# Patient Record
Sex: MALE | Race: WHITE | HISPANIC OR LATINO | Employment: UNEMPLOYED | URBAN - METROPOLITAN AREA
[De-identification: names, ages, dates, MRNs, and addresses within clinical notes are randomized per-mention and may not be internally consistent; named-entity substitution may affect disease eponyms.]

---

## 2017-03-03 DIAGNOSIS — N39.0 RECURRENT UTI: Primary | ICD-10-CM

## 2017-03-07 ENCOUNTER — OFFICE VISIT (OUTPATIENT)
Dept: UROLOGY | Facility: CLINIC | Age: 27
End: 2017-03-07
Payer: COMMERCIAL

## 2017-03-07 VITALS
WEIGHT: 245 LBS | HEART RATE: 80 BPM | BODY MASS INDEX: 29.83 KG/M2 | DIASTOLIC BLOOD PRESSURE: 86 MMHG | SYSTOLIC BLOOD PRESSURE: 138 MMHG | HEIGHT: 76 IN

## 2017-03-07 DIAGNOSIS — R30.0 DYSURIA: Primary | ICD-10-CM

## 2017-03-07 LAB
ALBUMIN UR-MCNC: NEGATIVE MG/DL
APPEARANCE UR: CLEAR
BILIRUB UR QL STRIP: NEGATIVE
COLOR UR AUTO: YELLOW
GLUCOSE UR STRIP-MCNC: NEGATIVE MG/DL
HGB UR QL STRIP: NEGATIVE
KETONES UR STRIP-MCNC: NEGATIVE MG/DL
LEUKOCYTE ESTERASE UR QL STRIP: NEGATIVE
NITRATE UR QL: NEGATIVE
PH UR STRIP: 6.5 PH (ref 5–7)
SP GR UR STRIP: 1.02 (ref 1–1.03)
URN SPEC COLLECT METH UR: NORMAL
UROBILINOGEN UR STRIP-ACNC: 0.2 EU/DL (ref 0.2–1)

## 2017-03-07 PROCEDURE — 87591 N.GONORRHOEAE DNA AMP PROB: CPT | Performed by: PHYSICIAN ASSISTANT

## 2017-03-07 PROCEDURE — 51798 US URINE CAPACITY MEASURE: CPT | Performed by: PHYSICIAN ASSISTANT

## 2017-03-07 PROCEDURE — 87491 CHLMYD TRACH DNA AMP PROBE: CPT | Performed by: PHYSICIAN ASSISTANT

## 2017-03-07 PROCEDURE — 81003 URINALYSIS AUTO W/O SCOPE: CPT | Performed by: PHYSICIAN ASSISTANT

## 2017-03-07 PROCEDURE — 99203 OFFICE O/P NEW LOW 30 MIN: CPT | Mod: 25 | Performed by: PHYSICIAN ASSISTANT

## 2017-03-07 RX ORDER — LEVOFLOXACIN 500 MG/1
500 TABLET, FILM COATED ORAL
COMMUNITY
Start: 2017-02-20 | End: 2020-08-27

## 2017-03-07 RX ORDER — FLUTICASONE PROPIONATE 50 MCG
2 SPRAY, SUSPENSION (ML) NASAL
COMMUNITY
Start: 2017-02-20 | End: 2020-11-04

## 2017-03-07 ASSESSMENT — PAIN SCALES - GENERAL: PAINLEVEL: NO PAIN (0)

## 2017-03-07 NOTE — LETTER
Patient:  Higinio Wallace  :   1990  MRN:     [id number]        Mr.Brian JOANNE Wallace  [address] [facility] [address]        2017      .Dear Mr. Wallace,  The results of your urine tests are attached and are negative. Please let me know if you have any questions or concerns.  Thank you,  Whit Gill PA-C  Urololgy    Resulted Orders   UA without Microscopic [[id number]]   Result Value Ref Range    Color Urine Yellow     Appearance Urine Clear     Glucose Urine Negative NEG mg/dL    Bilirubin Urine Negative NEG    Ketones Urine Negative NEG mg/dL    Specific Gravity Urine 1.025 1.003 - 1.035    Blood Urine Negative NEG    pH Urine 6.5 5.0 - 7.0 pH    Protein Albumin Urine Negative NEG mg/dL    Urobilinogen Urine 0.2 0.2 - 1.0 EU/dL    Nitrite Urine Negative NEG    Leukocyte Esterase Urine Negative NEG    Source Midstream Urine    NEISSERIA GONORRHOEA PCR   Result Value Ref Range    Specimen Descrip       Urine  CORRECTED ON  AT 0926: PREVIOUSLY REPORTED AS Midstream Urine      N Gonorrhea PCR  NEG     Negative   Negative for N. gonorrhoeae rRNA by transcription mediated amplification.   A negative result by transcription mediated amplification does not preclude the   presence of N. gonorrhoeae infection because results are dependent on proper   and adequate collection, absence of inhibitors, and sufficient rRNA to be   detected.     CHLAMYDIA TRACHOMATIS PCR   Result Value Ref Range    Specimen Description       Urine  CORRECTED ON  AT 0926: PREVIOUSLY REPORTED AS Midstream Urine      Chlamydia Trachomatis PCR  NEG     Negative   Negative for C. trachomatis rRNA by transcription mediated amplification.   A negative result by transcription mediated amplification does not preclude the   presence of C. trachomatis infection because results are dependent on proper   and adequate collection, absence of inhibitors, and sufficient rRNA to be   detected.                        [id number]  1990

## 2017-03-07 NOTE — LETTER
"3/7/2017       RE: Higinio Wallace  [address] [facility] [address]     Dear Colleague,    Thank you for referring your patient, Higinio Wallace, to the [facility] [place] at [facility]. Please see a copy of my visit note below.    UROLOGY OFFICE VISIT    March 7, 2017    CC: burning with urination    HPI: It is a pleasure to see Mr. Higinio Wallace, a very pleasant 26 year old male who presents via self-referral for evaluation of the above. He states this has been ongoing off and on since he was a young child. The burning will come on suddenly, last for a few days, and then resolves on it's own. He is uncircumcised and recently became sexually active with his current girlfriend who he is in a monogamous relationship with. Has not been checked for sexually transmitted diseases but does not have concern for any. He does not have a primary physician. He tells me his brother, who was also uncircumcised, had similar issues with burning with urination which got worse after he became sexually active. He apparently met with a urologist who recommended circumcision which his brother then had performed and he has not had burning with urination since then. The patient is interested in discussing elective circumcision - this is his only complaint today. He denies gross hematuria, frequency, urgency, pyuria, or penile discharge.     Past Medical History   Diagnosis Date     Complication of anesthesia        Past Surgical History   Procedure Laterality Date     Knee surgery Left         Allergies   Allergen Reactions     Liquid Adhesive      And band aid     No family history on file.     ROS: 10 point ROS neg other than the symptoms noted above in the HPI.    PEx  /86 (BP Location: Left arm, Patient Position: Chair, Cuff Size: Adult Regular)  Pulse 80  Ht 1.93 m (6' 4\")  Wt 111.1 kg (245 lb)  BMI 29.82 kg/m2  GEN: well-appearing male in NAD  HEENT: " normocephalic, atraumatic  RESP: no increased respiratory effort  ABD: soft, NT, ND  : patient declines today - states he will wait for follow up appt with urologist  EXT: no LE edema  NEURO: A/O x 3  PSYCH: Pleasant and cooperative throughout history and exam    LABS: Urine dip today completely negative    ASSESSMENT:  1) Chronic intermittent dysuria - low suspicion for true UTI's as symptoms resolve spontaneously, but no previous labs to confirm. UA today completley negative. Suspect he may have intermittent balanitis which may cause some burning with urination. As he has never been screened for STD's, also need to check these to rule out.     PLAN:  -Send urine for gonorrhea, chlamydia  -Discussed good hygiene with patient including daily cleaning of the foreskin, penis. We also discussed that his chronic intermittent dysuria may be related to intermittent balanitis which can be managed with good hygiene and topical antifungal creams PRN.  -Also discussed that a circumcision would not be guaranteed to relieve his intermittent dysuria. Regardless, circumcision worked well for his brother with similar complaints so patient wishes to consult with one of our urologists for possible elective circumcision.  -Schedule consult visit with next available urologist.   -Patient to look into procedural costs, insurance coverage, etc. in the meantime.    15 minutes were spent with the patient, >50% in counseling and coordination of care.    Whit Gill PA-C  Urology    Again, thank you for allowing me to participate in the care of your patient.      Sincerely,    Whit Gill PA-C

## 2017-03-07 NOTE — LETTER
Date:March 8, 2017      Patient was self referred, no letter generated. Do not send.        [facility] Health Information

## 2017-03-07 NOTE — MR AVS SNAPSHOT
"              After Visit Summary   3/7/2017    Higinio Wallace    MRN: [id number]           Patient Information     Date Of Birth          1990        Visit Information        Provider Department      3/7/2017 11:00 AM Whit Gill PA-C [facility] [place]        Today's Diagnoses     Dysuria    -  1       Follow-ups after your visit        Your next 10 appointments already scheduled     Mar 28, 2017  8:50 AM CDT   Return Visit with Sergo Estrada MD   [facility] ([facility] [place])    [address]  [address]  [facility] [id number] [phone]              Who to contact     If you have questions or need follow up information about today's clinic visit or your schedule please contact [facility] directly at [phone].  Normal or non-critical lab and imaging results will be communicated to you by MyChart, letter or phone within 4 business days after the clinic has received the results. If you do not hear from us within 7 days, please contact the clinic through [employer] or phone. If you have a critical or abnormal lab result, we will notify you by phone as soon as possible.  Submit refill requests through [employer] or call your pharmacy and they will forward the refill request to us. Please allow 3 business days for your refill to be completed.          Additional Information About Your Visit        MyChart Information     [employer] lets you send messages to your doctor, view your test results, renew your prescriptions, schedule appointments and more. To sign up, go to [email or web address] . Click on \"Log in\" on the left side of the screen, which will take you to the Welcome page. Then click on \"Sign up Now\" on the right side of the page.     You will be asked to enter the access code listed below, as well as some personal information. Please follow the directions to " "create your username and password.     Your access code is: [email or web address]  Expires: 2017 11:58 AM     Your access code will  in 90 days. If you need help or a new code, please call your [place] clinic or [phone].        Care EveryWhere ID     This is your Care EveryWhere ID. This could be used by other organizations to access your [place] medical records  [id number]        Your Vitals Were     Pulse Height BMI (Body Mass Index)             80 1.93 m (6' 4\") 29.82 kg/m2          Blood Pressure from Last 3 Encounters:   17 138/86    Weight from Last 3 Encounters:   17 111.1 kg (245 lb)              We Performed the Following     CHLAMYDIA TRACHOMATIS PCR     MEASURE POST-VOID RESIDUAL URINE/BLADDER CAPACITY, US NON-IMAGING (61122)     NEISSERIA GONORRHOEA PCR     UA without Microscopic [[id number]]        Primary Care Provider    None       No address on file        Thank you!     Thank you for choosing [facility] [place]  for your care. Our goal is always to provide you with excellent care. Hearing back from our patients is one way we can continue to improve our services. Please take a few minutes to complete the written survey that you may receive in the mail after your visit with us. Thank you!             Your Updated Medication List - Protect others around you: Learn how to safely use, store and throw away your medicines at www.disposemymeds.org.          This list is accurate as of: 3/7/17 11:58 AM.  Always use your most recent med list.                   Brand Name Dispense Instructions for use    fluticasone 50 MCG/ACT spray    FLONASE     2 sprays       levofloxacin 500 MG tablet    LEVAQUIN     Take 500 mg by mouth         "

## 2017-03-07 NOTE — PROGRESS NOTES
"UROLOGY OFFICE VISIT    March 7, 2017    CC: burning with urination    HPI: It is a pleasure to see Mr. Higinio Wallace, a very pleasant 26 year old male who presents via self-referral for evaluation of the above. He states this has been ongoing off and on since he was a young child. The burning will come on suddenly, last for a few days, and then resolves on it's own. He is uncircumcised and recently became sexually active with his current girlfriend who he is in a monogamous relationship with. Has not been checked for sexually transmitted diseases but does not have concern for any. He does not have a primary physician. He tells me his brother, who was also uncircumcised, had similar issues with burning with urination which got worse after he became sexually active. He apparently met with a urologist who recommended circumcision which his brother then had performed and he has not had burning with urination since then. The patient is interested in discussing elective circumcision - this is his only complaint today. He denies gross hematuria, frequency, urgency, pyuria, or penile discharge.     Past Medical History   Diagnosis Date     Complication of anesthesia        Past Surgical History   Procedure Laterality Date     Knee surgery Left         Allergies   Allergen Reactions     Liquid Adhesive      And band aid     No family history on file.     ROS: 10 point ROS neg other than the symptoms noted above in the HPI.    PEx  /86 (BP Location: Left arm, Patient Position: Chair, Cuff Size: Adult Regular)  Pulse 80  Ht 1.93 m (6' 4\")  Wt 111.1 kg (245 lb)  BMI 29.82 kg/m2  GEN: well-appearing male in NAD  HEENT: normocephalic, atraumatic  RESP: no increased respiratory effort  ABD: soft, NT, ND  : patient declines today - states he will wait for follow up appt with urologist  EXT: no LE edema  NEURO: A/O x 3  PSYCH: Pleasant and cooperative throughout history and exam    LABS: Urine dip today completely " negative    ASSESSMENT:  1) Chronic intermittent dysuria - low suspicion for true UTI's as symptoms resolve spontaneously, but no previous labs to confirm. UA today completley negative. Suspect he may have intermittent balanitis which may cause some burning with urination. As he has never been screened for STD's, also need to check these to rule out.     PLAN:  -Send urine for gonorrhea, chlamydia  -Discussed good hygiene with patient including daily cleaning of the foreskin, penis. We also discussed that his chronic intermittent dysuria may be related to intermittent balanitis which can be managed with good hygiene and topical antifungal creams PRN.  -Also discussed that a circumcision would not be guaranteed to relieve his intermittent dysuria. Regardless, circumcision worked well for his brother with similar complaints so patient wishes to consult with one of our urologists for possible elective circumcision.  -Schedule consult visit with next available urologist.   -Patient to look into procedural costs, insurance coverage, etc. in the meantime.    15 minutes were spent with the patient, >50% in counseling and coordination of care.    Whit Gill PA-C  Urology

## 2017-03-08 LAB
C TRACH DNA SPEC QL NAA+PROBE: NORMAL
N GONORRHOEA DNA SPEC QL NAA+PROBE: NORMAL
SPECIMEN SOURCE: NORMAL
SPECIMEN SOURCE: NORMAL

## 2017-06-25 ENCOUNTER — HOSPITAL ENCOUNTER (EMERGENCY)
Facility: CLINIC | Age: 27
Discharge: HOME | End: 2017-06-25
Attending: EMERGENCY MEDICINE | Admitting: EMERGENCY MEDICINE
Payer: COMMERCIAL

## 2017-06-25 ENCOUNTER — APPOINTMENT (OUTPATIENT)
Dept: GENERAL RADIOLOGY | Facility: CLINIC | Age: 27
End: 2017-06-25
Attending: EMERGENCY MEDICINE
Payer: COMMERCIAL

## 2017-06-25 VITALS
DIASTOLIC BLOOD PRESSURE: 84 MMHG | HEART RATE: 78 BPM | SYSTOLIC BLOOD PRESSURE: 133 MMHG | RESPIRATION RATE: 16 BRPM | TEMPERATURE: 97.8 F | OXYGEN SATURATION: 95 %

## 2017-06-25 DIAGNOSIS — R06.02 SHORTNESS OF BREATH: ICD-10-CM

## 2017-06-25 DIAGNOSIS — J06.9 UPPER RESPIRATORY TRACT INFECTION, UNSPECIFIED TYPE: ICD-10-CM

## 2017-06-25 DIAGNOSIS — J98.01 ACUTE BRONCHOSPASM: ICD-10-CM

## 2017-06-25 LAB — INTERPRETATION ECG - MUSE: NORMAL

## 2017-06-25 PROCEDURE — 99285 EMERGENCY DEPT VISIT HI MDM: CPT | Mod: 25

## 2017-06-25 PROCEDURE — 71020 XR CHEST 2 VW: CPT

## 2017-06-25 PROCEDURE — 40000275 ZZH STATISTIC RCP TIME EA 10 MIN

## 2017-06-25 PROCEDURE — 25000125 ZZHC RX 250: Performed by: EMERGENCY MEDICINE

## 2017-06-25 PROCEDURE — 94640 AIRWAY INHALATION TREATMENT: CPT

## 2017-06-25 PROCEDURE — 93005 ELECTROCARDIOGRAM TRACING: CPT

## 2017-06-25 RX ORDER — ALBUTEROL SULFATE 90 UG/1
2 AEROSOL, METERED RESPIRATORY (INHALATION) EVERY 4 HOURS PRN
Qty: 1 INHALER | Refills: 0 | Status: SHIPPED | OUTPATIENT
Start: 2017-06-25 | End: 2022-07-12

## 2017-06-25 RX ORDER — IPRATROPIUM BROMIDE AND ALBUTEROL SULFATE 2.5; .5 MG/3ML; MG/3ML
3 SOLUTION RESPIRATORY (INHALATION)
Status: DISCONTINUED | OUTPATIENT
Start: 2017-06-25 | End: 2017-06-25 | Stop reason: HOSPADM

## 2017-06-25 RX ORDER — PREDNISONE 20 MG/1
20 TABLET ORAL ONCE
Status: COMPLETED | OUTPATIENT
Start: 2017-06-25 | End: 2017-06-25

## 2017-06-25 RX ORDER — PREDNISONE 20 MG/1
40 TABLET ORAL DAILY
Qty: 10 TABLET | Refills: 0 | Status: SHIPPED | OUTPATIENT
Start: 2017-06-25 | End: 2017-06-30

## 2017-06-25 RX ADMIN — IPRATROPIUM BROMIDE AND ALBUTEROL SULFATE 3 ML: .5; 3 SOLUTION RESPIRATORY (INHALATION) at 07:54

## 2017-06-25 RX ADMIN — PREDNISONE 20 MG: 20 TABLET ORAL at 07:47

## 2017-06-25 NOTE — DISCHARGE INSTRUCTIONS
Discharge Instructions  Asthma    Asthma is a condition causing narrowing and inflammation of the airways that can make it hard to breathe.  Asthma can also cause cough, wheezing, noisy breathing and tightness in the chest.  Asthma can be brought on or  triggered  by many things, including dust, mold, pollen, cigarette smoke, exercise, stress and infections, like the common cold.     Return to the Emergency Department if:    Your breathing gets worse.    You need to use your inhaler more often than every 4 hours, or can t get relief from your inhaler.    You are very weak, or feel much more ill.    You develop new symptoms, such as chest pain.    You cough up blood.    You are vomiting enough that you can t keep fluids or your medicine down.    What can I do to help myself?    Fill any prescriptions the doctor gave you and take them right away--especially antibiotics. Be sure to finish the whole antibiotic prescription.    You may be given a prescription for an inhaler, which can help loosen tight air passages.  Use this as needed, but not more often than directed. Inhalers work much better when used with a spacer.     You may be given a prescription for a steroid to reduce inflammation. Used long-term, these can have many serious side effects, but for short courses these do not happen. You may notice restlessness or increased appetite.        You may use non-prescription cough or cold medicines. Cough medicines may help, but don t make the cough go away completely.     Avoid smoke, because this can make your symptoms worse. If you smoke, this may be a good time to quit! Consider using nicotine lozenges, gum, or patches to reduce cravings.     If you have a fever, Tylenol  (acetaminophen), Motrin  (ibuprofen), or Advil  (ibuprofen), may help bring fever down and may help you feel more comfortable. Be sure to read and follow the package directions, and ask your doctor if you have questions.    Be sure to get your  flu shot each year.  The pneumonia shot can help prevent pneumonia.  It is important that you follow up with your regular doctor, to be sure that you are improving from this spell (an acute asthma exacerbation), and also to do what you can to keep from having trouble again. Sometimes you need long-term medicines to keep your asthma under control.   If you were given a prescription for medicine here today, be sure to read all of the information (including the package insert) that comes with your prescription.  This will include important information about the medicine, its side effects, and any warnings that you need to know about.  The pharmacist who fills the prescription can provide more information and answer questions you may have about the medicine.  If you have questions or concerns that the pharmacist cannot address, please call or return to the Emergency Department.   Opioid Medication Information    Pain medications are among the most commonly prescribed medicines, so we are including this information for all our patients. If you did not receive pain medication or get a prescription for pain medicine, you can ignore it.     You may have been given a prescription for an opioid (narcotic) pain medicine and/or have received a pain medicine while here in the Emergency Department. These medicines can make you drowsy or impaired. You must not drive, operate dangerous equipment, or engage in any other dangerous activities while taking these medications. If you drive while taking these medications, you could be arrested for DUI, or driving under the influence. Do not drink any alcohol while you are taking these medications.     Opioid pain medications can cause addiction. If you have a history of chemical dependency of any type, you are at a higher risk of becoming addicted to pain medications.  Only take these prescribed medications to treat your pain when all other options have been tried. Take it for as short a  time and as few doses as possible. Store your pain pills in a secure place, as they are frequently stolen and provide a dangerous opportunity for children or visitors in your house to start abusing these powerful medications. We will not replace any lost or stolen medicine.  As soon as your pain is better, you should flush all your remaining medication.     Many prescription pain medications contain Tylenol  (acetaminophen), including Vicodin , Tylenol #3 , Norco , Lortab , and Percocet .  You should not take any extra pills of Tylenol  if you are using these prescription medications or you can get very sick.  Do not ever take more than 3000 mg of acetaminophen in any 24 hour period.    All opioids tend to cause constipation. Drink plenty of water and eat foods that have a lot of fiber, such as fruits, vegetables, prune juice, apple juice and high fiber cereal.  Take a laxative if you don t move your bowels at least every other day. Miralax , Milk of Magnesia, Colace , or Senna  can be used to keep you regular.      Remember that you can always come back to the Emergency Department if you are not able to see your regular doctor in the amount of time listed above, if you get any new symptoms, or if there is anything that worries you.

## 2017-06-25 NOTE — ED PROVIDER NOTES
PRIMARY CARE PHYSICIAN:  [facility].       CHIEF COMPLAINT:  Shortness of breath and fatigue.         HISTORY OF PRESENT ILLNESS:  Higinio Wallace is a 26-year-old male who reports that on 06/15/2017, he developed signs to suggest pneumonia.  He went in to see a physician and was diagnosed via chest x-ray with pneumonia, started on a 7-day course of Levaquin.  The patient said at that time he was having productive cough, fevers, chills, shortness of breath.  Those symptoms had improved, he still continued to have some coughing.  His roommate noticed today that he was having a coughing fit, the patient said that he became lightheaded, fatigued with that, had shortness of breath, also a flushed feeling in his chest, but no overt chest tightness or pain; and therefore presented to the ER.  Reports 5/10 discomfort because his breathing is doing better and his roommate heard audible wheezing.  He completed the course of antibiotics on 06/20/2017.  He says as a child he had a history of asthma, but has had no recent episodes.      MEDICATIONS:  Flonase, finished Levaquin recently.      ALLERGIES:  Liquid adhesives and Band-Aids.      PAST MEDICAL HISTORY:  History of knee surgery with an anesthetic complication and remote asthma.        SOCIAL HISTORY:  The patient does smoke, denies alcohol use.      REVIEW OF SYSTEMS:  Ten-point review of systems is all negative except as in the HPI.      PHYSICAL EXAMINATION:   GENERAL:  The patient is a pleasant, appropriate 26-year-old male who appears mildly uncomfortable on the gurney, but no respiratory distress.   VITAL SIGNS:  Temperature 97.8, blood pressure 133/78, heart rate 70, respirations 16, satting 95% on room air.   NEUROLOGIC:  GCS of 15, he has good strength in all extremities.   HEAD AND NECK:  Mucous membranes are moist.   LYMPHATICS:  There is no cervical lymphadenopathy.   CARDIOVASCULAR:  Regular rate and rhythm.   RESPIRATORY:  He has got  wheezing throughout with good air movement, no respiratory distress.  Speaks in full sentences.   GASTROINTESTINAL:  Abdomen is soft, nontender.   SKIN:  Cool, pink and dry.   HEME:  No signs of any bruising or bleeding.   PSYCHIATRIC:  The patient is not tearful.   MUSCULOSKELETAL:  No gross deformity.       LABORATORY AND DIAGNOSTICS:  The EKG demonstrates a normal sinus rhythm.  No specific ST abnormality.  Ventricular rate 82, , QRS 92 and QTC of 448.  His chest x-ray was clear without any signs of infiltrate, nor pneumothorax as read by the radiologist.      EMERGENCY DEPARTMENT COURSE AND DECISION MAKING:  The patient presented complaining of a flushed feeling in his chest; I did consider cardiac causes, though unlikely and things such as arrhythmia; therefore a screening EKG was performed which was reassuring.  The patient had wheezing, which I felt likely indicated asthma, improved with nebs. I considered nonresolution of the pneumonia and therefore a chest x-ray was ordered, but the patient showed no significant distress.  The patient had nebs, he completely cleared.  He was rechecked at 0900 and was feeling much better.  I discussed labs would not likely be helpful and he is doing better, this is likely bronchospasm.  I am not sure if this is recurrence of the child asthma or secondary to the recent illness, but he was discharged home in improved condition with no respiratory distress and resolved wheezing.        PLAN:  He was referred to a primary care doctor.  He will follow up with your doctor in 3 days.  Return if any significant problems.       DIAGNOSES:   1.  Shortness of breath.   2.  Acute bronchospasm.   3.  Upper respiratory infection.         RANDY LANDAVERDE MD             D: 2017 09:21   T: 2017 10:08   MT: [id number]      Name:     NIKKY ISAAC   MRN:      [id number]        Account:      [id number]   :      1990           Visit Date:   2017       Document: [id number]       cc: [facility]

## 2017-06-25 NOTE — ED AVS SNAPSHOT
[facility] Emergency Department    [address]    [facility] [address]    Phone:  [phone]    Fax:  [phone]                                       Higinio Wallace   MRN: [id number]    Department:  [facility] Emergency Department   Date of Visit:  6/25/2017           Patient Information     Date Of Birth          1990        Your diagnoses for this visit were:     Shortness of breath     Acute bronchospasm     Upper respiratory tract infection, unspecified type        You were seen by Gera Cortez MD.      Follow-up Information     Follow up with [facility]. Schedule an appointment as soon as possible for a visit in 3 days.    Specialty:  Family Medicine    Contact information:    [address] [facility] [address] [phone]        Discharge Instructions         Discharge Instructions  Asthma    Asthma is a condition causing narrowing and inflammation of the airways that can make it hard to breathe.  Asthma can also cause cough, wheezing, noisy breathing and tightness in the chest.  Asthma can be brought on or  triggered  by many things, including dust, mold, pollen, cigarette smoke, exercise, stress and infections, like the common cold.     Return to the Emergency Department if:    Your breathing gets worse.    You need to use your inhaler more often than every 4 hours, or can t get relief from your inhaler.    You are very weak, or feel much more ill.    You develop new symptoms, such as chest pain.    You cough up blood.    You are vomiting enough that you can t keep fluids or your medicine down.    What can I do to help myself?    Fill any prescriptions the doctor gave you and take them right away--especially antibiotics. Be sure to finish the whole antibiotic prescription.    You may be given a prescription for an inhaler, which can help loosen tight air passages.  Use this as needed, but not more often than  directed. Inhalers work much better when used with a spacer.     You may be given a prescription for a steroid to reduce inflammation. Used long-term, these can have many serious side effects, but for short courses these do not happen. You may notice restlessness or increased appetite.        You may use non-prescription cough or cold medicines. Cough medicines may help, but don t make the cough go away completely.     Avoid smoke, because this can make your symptoms worse. If you smoke, this may be a good time to quit! Consider using nicotine lozenges, gum, or patches to reduce cravings.     If you have a fever, Tylenol  (acetaminophen), Motrin  (ibuprofen), or Advil  (ibuprofen), may help bring fever down and may help you feel more comfortable. Be sure to read and follow the package directions, and ask your doctor if you have questions.    Be sure to get your flu shot each year.  The pneumonia shot can help prevent pneumonia.  It is important that you follow up with your regular doctor, to be sure that you are improving from this spell (an acute asthma exacerbation), and also to do what you can to keep from having trouble again. Sometimes you need long-term medicines to keep your asthma under control.   If you were given a prescription for medicine here today, be sure to read all of the information (including the package insert) that comes with your prescription.  This will include important information about the medicine, its side effects, and any warnings that you need to know about.  The pharmacist who fills the prescription can provide more information and answer questions you may have about the medicine.  If you have questions or concerns that the pharmacist cannot address, please call or return to the Emergency Department.   Opioid Medication Information    Pain medications are among the most commonly prescribed medicines, so we are including this information for all our patients. If you did not receive pain  medication or get a prescription for pain medicine, you can ignore it.     You may have been given a prescription for an opioid (narcotic) pain medicine and/or have received a pain medicine while here in the Emergency Department. These medicines can make you drowsy or impaired. You must not drive, operate dangerous equipment, or engage in any other dangerous activities while taking these medications. If you drive while taking these medications, you could be arrested for DUI, or driving under the influence. Do not drink any alcohol while you are taking these medications.     Opioid pain medications can cause addiction. If you have a history of chemical dependency of any type, you are at a higher risk of becoming addicted to pain medications.  Only take these prescribed medications to treat your pain when all other options have been tried. Take it for as short a time and as few doses as possible. Store your pain pills in a secure place, as they are frequently stolen and provide a dangerous opportunity for children or visitors in your house to start abusing these powerful medications. We will not replace any lost or stolen medicine.  As soon as your pain is better, you should flush all your remaining medication.     Many prescription pain medications contain Tylenol  (acetaminophen), including Vicodin , Tylenol #3 , Norco , Lortab , and Percocet .  You should not take any extra pills of Tylenol  if you are using these prescription medications or you can get very sick.  Do not ever take more than 3000 mg of acetaminophen in any 24 hour period.    All opioids tend to cause constipation. Drink plenty of water and eat foods that have a lot of fiber, such as fruits, vegetables, prune juice, apple juice and high fiber cereal.  Take a laxative if you don t move your bowels at least every other day. Miralax , Milk of Magnesia, Colace , or Senna  can be used to keep you regular.      Remember that you can always come back to  the Emergency Department if you are not able to see your regular doctor in the amount of time listed above, if you get any new symptoms, or if there is anything that worries you.        24 Hour Appointment Hotline       To make an appointment at any [facility] call [phone] ([phone]). If you don't have a family doctor or clinic, we will help you find one. [facility] are conveniently located to serve the needs of you and your family.             Review of your medicines      START taking        Dose / Directions Last dose taken    albuterol 108 (90 BASE) MCG/ACT Inhaler   Commonly known as:  albuterol   Dose:  2 puff   Quantity:  1 Inhaler        Inhale 2 puffs into the lungs every 4 hours as needed for shortness of breath / dyspnea or wheezing   Refills:  0        predniSONE 20 MG tablet   Commonly known as:  DELTASONE   Dose:  40 mg   Quantity:  10 tablet        Take 2 tablets (40 mg) by mouth daily for 5 days   Refills:  0          Our records show that you are taking the medicines listed below. If these are incorrect, please call your family doctor or clinic.        Dose / Directions Last dose taken    fluticasone 50 MCG/ACT spray   Commonly known as:  FLONASE   Dose:  2 spray        2 sprays   Refills:  0        levofloxacin 500 MG tablet   Commonly known as:  LEVAQUIN   Dose:  500 mg        Take 500 mg by mouth   Refills:  0                Prescriptions were sent or printed at these locations (2 Prescriptions)                   Other Prescriptions                Printed at Department/Unit printer (2 of 2)         predniSONE (DELTASONE) 20 MG tablet               albuterol (ALBUTEROL) 108 (90 BASE) MCG/ACT Inhaler                Procedures and tests performed during your visit     EKG 12-lead, tracing only    XR Chest 2 Views      Orders Needing Specimen Collection     None      Pending Results     Date and Time Order Name Status Description    6/25/2017 0740 EKG 12-lead, tracing only  Preliminary             Pending Culture Results     No orders found from 6/23/2017 to 6/26/2017.            Pending Results Instructions     If you had any lab results that were not finalized at the time of your Discharge, you can call the ED Lab Result RN at [phone]. You will be contacted by this team for any positive Lab results or changes in treatment. The nurses are available 7 days a week from 10A to 6:30P.  You can leave a message 24 hours per day and they will return your call.        Test Results From Your Hospital Stay        6/25/2017  8:24 AM      Narrative     XR CHEST 2 VW   6/25/2017 8:21 AM     HISTORY: Shortness of breath    COMPARISON: None.    FINDINGS: Heart size is normal. The lungs are clear.        Impression     IMPRESSION: Negative.    BRENDA CRESPO MD                Clinical Quality Measure: Blood Pressure Screening     Your blood pressure was checked while you were in the emergency department today. The last reading we obtained was  BP: 133/78 . Please read the guidelines below about what these numbers mean and what you should do about them.  If your systolic blood pressure (the top number) is less than 120 and your diastolic blood pressure (the bottom number) is less than 80, then your blood pressure is normal. There is nothing more that you need to do about it.  If your systolic blood pressure (the top number) is 120-139 or your diastolic blood pressure (the bottom number) is 80-89, your blood pressure may be higher than it should be. You should have your blood pressure rechecked within a year by a primary care provider.  If your systolic blood pressure (the top number) is 140 or greater or your diastolic blood pressure (the bottom number) is 90 or greater, you may have high blood pressure. High blood pressure is treatable, but if left untreated over time it can put you at risk for heart attack, stroke, or kidney failure. You should have your blood pressure rechecked by a primary  "care provider within the next 4 weeks.  If your provider in the emergency department today gave you specific instructions to follow-up with your doctor or provider even sooner than that, you should follow that instruction and not wait for up to 4 weeks for your follow-up visit.        Thank you for choosing [facility]       Thank you for choosing [facility] for your care. Our goal is always to provide you with excellent care. Hearing back from our patients is one way we can continue to improve our services. Please take a few minutes to complete the written survey that you may receive in the mail after you visit with us. Thank you!        FittingRoom Information     FittingRoom lets you send messages to your doctor, view your test results, renew your prescriptions, schedule appointments and more. To sign up, go to [email or web address] . Click on \"Log in\" on the left side of the screen, which will take you to the Welcome page. Then click on \"Sign up Now\" on the right side of the page.     You will be asked to enter the access code listed below, as well as some personal information. Please follow the directions to create your username and password.     Your access code is: [email or web address]  Expires: 2017  9:12 AM     Your access code will  in 90 days. If you need help or a new code, please call your [facility] clinic or [phone].        Care EveryWhere ID     This is your Care EveryWhere ID. This could be used by other organizations to access your [facility] medical records  [id number]        Equal Access to Services     GRECIA BAEZ : Hadii lou walker Sozach, waaxda lumikhail, qaybta kaalmada selvin wynn . So [facility] [phone].    ATENCIÓN: Si habla español, tiene a parry disposición servicios gratuitos de asistencia lingüística. Llame al [phone].    We comply with applicable federal civil rights laws and [place] laws. We do not discriminate on the basis of race, color, " national origin, age, disability sex, sexual orientation or gender identity.            After Visit Summary       This is your record. Keep this with you and show to your community pharmacist(s) and doctor(s) at your next visit.

## 2017-06-25 NOTE — ED NOTES
Pt states that he woke up with SOB, wheezing, epigastric discomfort, and nausea. He finished a course of antibiotics for pneumonia on 6/20/17. ABCDs intact.

## 2017-06-25 NOTE — LETTER
[facility] EMERGENCY DEPARTMENT  [address]  [facility] [address]  [phone]    Higinio Wallace  [address] [facility] [address]  [phone] (home) none (work)    : 1990      To Whom it may concern:    Higinio Wallace was seen in our Emergency Department today, 2017.  I expect his condition to improve over the next 2 days.  He may return to work/school when improved.    Sincerely,        Dalia Gates RN

## 2017-06-25 NOTE — ED AVS SNAPSHOT
[facility] Emergency Department    [address]    [facility] [address]    Phone:  [phone]    Fax:  [phone]                                       Higinio Wallace   MRN: [id number]    Department:  [facility] Emergency Department   Date of Visit:  6/25/2017           After Visit Summary Signature Page     I have received my discharge instructions, and my questions have been answered. I have discussed any challenges I see with this plan with the nurse or doctor.    ..........................................................................................................................................  Patient/Patient Representative Signature      ..........................................................................................................................................  Patient Representative Print Name and Relationship to Patient    ..................................................               ................................................  Date                                            Time    ..........................................................................................................................................  Reviewed by Signature/Title    ...................................................              ..............................................  Date                                                            Time

## 2018-07-05 ENCOUNTER — RECORDS - HEALTHEAST (OUTPATIENT)
Dept: ADMINISTRATIVE | Facility: OTHER | Age: 28
End: 2018-07-05

## 2019-07-29 ENCOUNTER — COMMUNICATION - HEALTHEAST (OUTPATIENT)
Dept: SCHEDULING | Facility: CLINIC | Age: 29
End: 2019-07-29

## 2019-08-14 ENCOUNTER — TRANSFERRED RECORDS (OUTPATIENT)
Dept: HEALTH INFORMATION MANAGEMENT | Facility: CLINIC | Age: 29
End: 2019-08-14

## 2019-08-21 ENCOUNTER — TRANSFERRED RECORDS (OUTPATIENT)
Dept: HEALTH INFORMATION MANAGEMENT | Facility: CLINIC | Age: 29
End: 2019-08-21

## 2019-09-14 ENCOUNTER — COMMUNICATION - HEALTHEAST (OUTPATIENT)
Dept: SCHEDULING | Facility: CLINIC | Age: 29
End: 2019-09-14

## 2019-12-04 ENCOUNTER — TRANSFERRED RECORDS (OUTPATIENT)
Dept: HEALTH INFORMATION MANAGEMENT | Facility: CLINIC | Age: 29
End: 2019-12-04

## 2019-12-13 ENCOUNTER — TRANSFERRED RECORDS (OUTPATIENT)
Dept: HEALTH INFORMATION MANAGEMENT | Facility: CLINIC | Age: 29
End: 2019-12-13

## 2020-01-06 NOTE — TELEPHONE ENCOUNTER
RECORDS RECEIVED FROM: Left knee issues (curved knee) due to failed ligament surgery of left leg, direct referral to Dr. Han from [facility], referral and records/x-rays being faxed, appt per Pt   DATE RECEIVED: Feb 4, 2020   NOTES STATUS DETAILS   OFFICE NOTE from referring provider RECEIVED  [facility]   OFFICE NOTE from other specialist N/A    DISCHARGE SUMMARY from hospital N/A    DISCHARGE REPORT from the ER N/A    OPERATIVE REPORT N/A    MEDICATION LIST Internal    IMPLANT RECORD/STICKER N/A    LABS     CBC/DIFF N/A    CULTURES N/A    INJECTIONS DONE IN RADIOLOGY N/A    MRI n/a    CT SCAN n/a    XRAYS (IMAGES & REPORTS) RECEIVED  12/04/2019   TUMOR     PATHOLOGY  Slides & report N/A      01/06/20   11:01 AM   Request faxed to [facility] for records and images of the left knee  Amparo Carvalho CMA

## 2020-01-30 DIAGNOSIS — M25.362 PATELLAR INSTABILITY OF LEFT KNEE: Primary | ICD-10-CM

## 2020-02-04 ENCOUNTER — PRE VISIT (OUTPATIENT)
Dept: ORTHOPEDICS | Facility: CLINIC | Age: 30
End: 2020-02-04

## 2020-02-07 ENCOUNTER — TELEPHONE (OUTPATIENT)
Dept: ORTHOPEDICS | Facility: CLINIC | Age: 30
End: 2020-02-07

## 2020-02-07 NOTE — TELEPHONE ENCOUNTER
Patient was called to reschedule his appointment with Dr. Han.  A message was NOT left as his mail box is full and unable to take any messages.      He can be rescheduled in the next available new knee slot with Dr. Han or he can be seen by Dr. Keating in sports if he would like to be seen sooner.

## 2020-03-22 ENCOUNTER — COMMUNICATION - HEALTHEAST (OUTPATIENT)
Dept: SCHEDULING | Facility: CLINIC | Age: 30
End: 2020-03-22

## 2020-03-31 ENCOUNTER — TELEPHONE (OUTPATIENT)
Dept: ORTHOPEDICS | Facility: CLINIC | Age: 30
End: 2020-03-31

## 2020-03-31 NOTE — TELEPHONE ENCOUNTER
Patient was called to reschedule his appointment to May or June with Dr. Han due to the COVID-19 virus.      A message was NOT left as his mail box is full and unable to take any messages.

## 2020-05-26 ENCOUNTER — ANCILLARY PROCEDURE (OUTPATIENT)
Dept: GENERAL RADIOLOGY | Facility: CLINIC | Age: 30
End: 2020-05-26
Attending: ORTHOPAEDIC SURGERY
Payer: COMMERCIAL

## 2020-05-26 ENCOUNTER — OFFICE VISIT (OUTPATIENT)
Dept: ORTHOPEDICS | Facility: CLINIC | Age: 30
End: 2020-05-26
Payer: COMMERCIAL

## 2020-05-26 ENCOUNTER — THERAPY VISIT (OUTPATIENT)
Dept: PHYSICAL THERAPY | Facility: CLINIC | Age: 30
End: 2020-05-26
Payer: COMMERCIAL

## 2020-05-26 VITALS — WEIGHT: 315 LBS | HEIGHT: 77 IN | BODY MASS INDEX: 37.19 KG/M2

## 2020-05-26 DIAGNOSIS — M25.562 PATELLOFEMORAL INSTABILITY OF LEFT KNEE WITH PAIN: ICD-10-CM

## 2020-05-26 DIAGNOSIS — M25.361 PATELLAR INSTABILITY OF RIGHT KNEE: ICD-10-CM

## 2020-05-26 DIAGNOSIS — M25.362 PATELLOFEMORAL INSTABILITY OF LEFT KNEE WITH PAIN: ICD-10-CM

## 2020-05-26 DIAGNOSIS — M25.561 KNEE PAIN, RIGHT: Primary | ICD-10-CM

## 2020-05-26 DIAGNOSIS — M25.362 PATELLAR INSTABILITY OF LEFT KNEE: ICD-10-CM

## 2020-05-26 DIAGNOSIS — M25.362 PATELLAR INSTABILITY OF LEFT KNEE: Primary | ICD-10-CM

## 2020-05-26 DIAGNOSIS — Z13.31 POSITIVE DEPRESSION SCREENING: Primary | ICD-10-CM

## 2020-05-26 PROCEDURE — 97112 NEUROMUSCULAR REEDUCATION: CPT | Mod: GP | Performed by: PHYSICAL THERAPIST

## 2020-05-26 PROCEDURE — 97161 PT EVAL LOW COMPLEX 20 MIN: CPT | Mod: GP | Performed by: PHYSICAL THERAPIST

## 2020-05-26 ASSESSMENT — MIFFLIN-ST. JEOR: SCORE: 2585.19

## 2020-05-26 ASSESSMENT — PATIENT HEALTH QUESTIONNAIRE - PHQ9: SUM OF ALL RESPONSES TO PHQ QUESTIONS 1-9: 17

## 2020-05-26 NOTE — PROGRESS NOTES
"[facility] Initial Evaluation  Subjective:  HPI                  PHYSICAL THERAPIST IMPRESSION: Patient presents to PF clinic with significant history of bilateral PF instability, L>R, with a previous history of MPFL-R in 2011.  Patient demonstrates a j-sign on the L. Patient is unable to perform single limb squatting activities on L secondary to apprehension.  He demonstrates relatively abundant quadriceps contraction in long sitting, but is unable to properly activate his quadriceps in early degrees of closed chain flexion secondary to apprehension.  Patient did much better with quadriceps activation at deeper angles of KF in [facility].  Del Real taping was helpful in improving his sense of stability.  Concern for limb version is also present.    RESPONSIVENESS TO DEL REAL TAPE:  Del Real Taping Trial    Pre/posttest activity squat   Taping technique(s) trialed Medial glide   Numerical Pain Scale Not painful today   Level of reported stability (0/10= full confidence in knee; 10/10=no confidence in knee) \"feels like support\", \"increase in stability\"   Improvement in movement pattern with tape on Increased depth        PT MODIFIABLE FACTORS PRESENT NOT PRESENT   Proximal LE/Trunk mm weakness  x   Quadriceps muscle dysfunction/weakness x    Poor postural stability x    Poor dynamic movement patterns x    Restricted ankle DF  x   Previous PF PT Interventions X?      PATIENT BONY ALIGNMENT SUSPICIOUS FOR: (to be determined by MD and imaging studies):    Limb version     Pes planus       Subjective History:      Question Response   Primary Complaint primarily instability symptoms   Onset date of current symptoms Initial onset at age 14 on L side.  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 He had  an MPFL-R in February 2011. It sounds like he dislocated his patella again within a year after surgery.  September 2017 dislocated R patella while playing basketball and child bumped into his " knee.  He has had recurrent instability events.  Now having more pain subpatellar.     History of similar/related symptoms See above   Previous treatment for condition PT    Symptoms with current condition Appropriate   Pain location, quality subpatellar, aching   Worst pain < > Best pain Worst: 7/10 < > Best: 3/10   Symptom exacerbation &   Functional limitations 3 worst activities: standing (Prior: no restrictions) , stairs (Prior: no restrictions)   Symptom relief rest   Symptom behavior activity/position dependent.    Symptom trend more often   Time of day dependent? Not related   Prior Diagnostic Testing x-ray and MRI   Prior Interventions PT  and Brace.          Lifestyle & General Medical History:  Employment: [employer].    General Physical Activity Level (within past year): standing 8 hour shifts.    General health status (as reported by patient): good.     Other orthopaedic history: s/p MPFL-R in 2011 on L.     Lower Extremity/Patellofemoral Exam    Dynamic Movement Screen:  2 leg stance: toed out L>R  2 leg squat: Excessive anterior knee excursion (reduced posterior hip excursion)    1 leg stance:   Right: proprioceptive challenge  Left: proprioceptive challenge    1 leg squat:   Right: excessive anterior knee excursion  Left: unable    Gait: tentative, increased stance time on L, toed out bilaterally    Functional Strength Testing   Right Left LSI%   Single Leg Squat for Depth 30 degrees                                    unable degrees 0 %   Star Excursion - Anterior Reach 25 cm unable cm 0 %     Patellofemoral Joint Special Testing:    Static Patellar Positioning in 90 degrees KF (Seated)  Right: patella garrett visually observed  Left: patella garrett visually observed    Patellar tracking with OKC knee extension (Seated)  Right: Normal       Left: Increased lateralization into TKE    Static Patellar Positioning in full extension (Supine)  Right: neutral      Left: moderate lateral tilt      Patellar  Quadrant Mobility Test (Med:Lat)  Right: 1:2       Left 1:3    Knee Joint AROM   Right Left Difference   Hyperextension 0 deg 0 deg 0 deg   Extension 0 deg 5 deg 5 deg   Flexion 125 deg 125 deg 0 deg     Basic Muscle Activation:  Quadriceps: Right: good, Left: good    Knee Joint Effusion (Stroke Test Assessment):  Right: 0; Left: 0    Palpation:   Tender to palpation at the following structures: patellar tendon    Hip Joint PROM Screen   Right Left   ER 80 deg 80 deg   IR 30 deg 40 deg   Flex 100 deg 100 deg     Lower Extremity Muscle Strength (x/5)   Right Left   Hip ABD 5-/5 5-/5     Lower Extremity Flexibility Screen:   Right Left   Gastroc/ Soleus + +         Objective:  System    Physical Exam    General     ROS    Assessment/Plan:    Patient is a 29 year old male with right side knee complaints.    Patient has the following significant findings with corresponding treatment plan.                Diagnosis 1:  S/p TTO, MPFL, MPTL    Decreased strength - therapeutic exercise and therapeutic activities  Impaired balance - neuro re-education and therapeutic activities  Decreased proprioception - neuro re-education and therapeutic activities  Impaired muscle performance - neuro re-education  Decreased function - therapeutic activities    Therapy Evaluation Codes:   1) History comprised of:   Personal factors that impact the plan of care:      None.    Comorbidity factors that impact the plan of care are:      None.     Medications impacting care: None.  2) Examination of Body Systems comprised of:   Body structures and functions that impact the plan of care:      Knee.   Activity limitations that impact the plan of care are:      Stairs.  3) Clinical presentation characteristics are:   Stable/Uncomplicated.  4) Decision-Making    Low complexity using standardized patient assessment instrument and/or measureable assessment of functional outcome.  Cumulative Therapy Evaluation is: Low complexity.    Previous and  current functional limitations:  (See Goal Flow Sheet for this information)    Short term and Long term goals: (See Goal Flow Sheet for this information)     Communication ability:  Patient appears to be able to clearly communicate and understand verbal and written communication and follow directions correctly.  Treatment Explanation - The following has been discussed with the patient:   RX ordered/plan of care  Anticipated outcomes  Possible risks and side effects  This patient would benefit from PT intervention to resume normal activities.   Rehab potential is excellent.    Frequency:  1 X week, once daily  Duration:  for 1 weeks  Discharge Plan:  Achieve all LTG.  Independent in home treatment program.  Reach maximal therapeutic benefit.    Please refer to the daily flowsheet for treatment today, total treatment time and time spent performing 1:1 timed codes.

## 2020-05-26 NOTE — LETTER
"    5/26/2020         RE: Higinio Wallace  [address] [place], [place] [id number]        Dear Colleague,    Thank you for referring your patient, Higinio Wallace, to the [facility]. Please see a copy of my visit note below.    Patient is a pleasant and articulate 29-year-old male who reports his own story.  He has been seen by 2 [facility] surgeons in [facility] and their notes are available for me to review.    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  He also reports some component of pain but that is not his primary issue.    He first dislocated his left knee when he was 14 years old.  He was able to describe this event fairly clearly.  He did have moderate knee swelling.  His father did not \"feel that the knee problem was an issue\" and never took him to the physician.    Following that he had intermittent patella instability throughout his late grammar school in early high school years.  Despite  These recurrent instability events he actually was a fairly talented athlete and played on 3 sports.    Because of continued instability he was operated on in 2011 at [facility] in [place].  From the incisions and his description it sounds like a left knee MPFL reconstruction using cadaver tissue.    He he reports that he had a few months where his kneecap did not dislocate, but in time began to dislocate with a similar frequency.  He was still living with his father at the time.  Because of the disappointment in his first surgery, the operating surgeon recommended a different operation or something to do with a stronger ligament but he and his father did not pursue that plan due to lack of physician confidence.    He reports that he has right Kneecap instability as well, first dislocating his right side at age 21.  This has been more easily controlled and he does not feel it is the current issue.  The pain in both knees comes and goes.    [place]    His current problem is that it now has a hop into the joint with every knee bend, and sometimes completely dislocates with pain and swelling following this.    He was last seen by Dr. Mena at [facility] and sent here.    He has mental health issues that he freely discusses.  He also admits that he has not shown appropriate attentiveness to following through on his doctor visits.  He was working through mental health facilities in [facility].  He reports that he was placed on a medication that made him gain a lot of weight.  He went off that medication.  He is no longer seeing that doctor as they have refused to see him due to his lack of follow-through with physician appointments.  He reports that when he was placed on this mood altering med he gained a lot of weight and that made him less inclined to want to continue to take it.  Before he started taking the med he reports a weight of 250 pounds.  His top weight was 348 when he went off the med.  He reports he is now at 325 (he was weighed at 328 pounds today).    More recently he is been under the care of her primary care physician through [employer].  He has been diagnosed as prediabetic.  His recent A1cwas 5.7.  He was placed on metformin.    He is not taking any mood altering drugs but feels that he is actually doing pretty well and not sure he needs it.  Although he does feel that he needs to be under the care of someone more consistently.    He currently is working even through the COVID world.  He works at [employer].      He lives with his girlfriend.  She works at a restaurant in the [employer] but is currently furloughed.    Physical exam reveals a large gentleman who moves around the room without an antalgic gait.  However he walks with an external rotated gait.  BMI computes to 39    Examination of patient's right knee reveals no knee swelling.  Mild hyperextension to 130 degrees of flexion.  Soft J sign with mild  crepitus with patella reentry into the groove.  Markedly positive apprehension sign.    Lateral patella translation could not be performed.  Medial patella translation is limited with 1 quadrant medial ability negative medial patella tilt test    Examination of patient's left knee reveals no knee swelling.  Similar range of motion to the right.  Hard J sign with relocation at 45 degrees with mild crepitus.  Passive patella mobility 3+ quadrant lateral mobility, 1+ medial mobility.    Bilateral hips show internal rotation to 40 degrees external rotation to 80.  There does not seem to be a suggestion of clinical tibial torsion.    X-rays were reviewed.  Standing alignment views show mild valgus measuring 1 degree right 2 degrees left.  There is rotation on the 2 plain film suggestive of version.    True lateral view shows minimal suprapatella spur with minimal boss.  C/D equals 1.07.  A tunnel is seen in the patella likely representing MPFL insertion points.  I see no sign of any bony tunnel on the lateral suggesting that this could have represented an MPFL repair.    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  Axial views show a located patella with a suggestion of lateral patella overload syndrome.    No advanced imaging is present.    Assessment: 1 bilateral instability with left >> right.  The left side has a significant J sign as well.  2.  Obesity  3.  Mental health concerns  4.  Prediabetes    Plan: This is a bit of an enigma as he has a very dramatic J sign on the left without a clear understanding of the etiology of this J sign.  He does not have significant trochlear dysplasia nor significant Secor.  Although his standing radiographs suggest the potential for increased femoral anteversion his physical exam does not support this.    I believe that a soft tissue procedure alone will not stabilize his kneecap and likely some bony procedure should be performed.    To that end I believe that  both an MRI and a CT scan for version should be obtained.    We will obtain these tests and a follow-up visit will be made.    Meanwhile we have placed a consult for behavioral health as he currently does not have a support system at that end.    Room time 45 minutes consultation time 30    Lela Han MD  Professor Orthopedic Surgery  [facility]

## 2020-05-26 NOTE — NURSING NOTE
Depression Response    Patient completed the PHQ-9 assessment for depression and scored >9? Yes  Question 9 on the PHQ-9 was positive for suicidality? Yes    I personally notified the following: clinic nurse

## 2020-05-26 NOTE — PROGRESS NOTES
"Patient is a pleasant and articulate 29-year-old male who reports his own story.  He has been seen by 2 [facility] surgeons in town and their notes are available for me to review.    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  He also reports some component of pain but that is not his primary issue.    He first dislocated his left knee when he was 14 years old.  He was able to describe this event fairly clearly.  He did have moderate knee swelling.  His father did not \"feel that the knee problem was an issue\" and never took him to the physician.    Following that he had intermittent patella instability throughout his late grammar school in early high school years.  Despite  These recurrent instability events he actually was a fairly talented athlete and played on 3 sports.    Because of continued instability he was operated on in 2011 at [facility] in [place].  From the incisions and his description it sounds like a left knee MPFL reconstruction using cadaver tissue.    He he reports that he had a few months where his kneecap did not dislocate, but in time began to dislocate with a similar frequency.  He was still living with his father at the time.  Because of the disappointment in his first surgery, the operating surgeon recommended a different operation or something to do with a stronger ligament but he and his father did not pursue that plan due to lack of physician confidence.    He reports that he has right Kneecap instability as well, first dislocating his right side at age 21.  This has been more easily controlled and he does not feel it is the current issue.  The pain in both knees comes and goes.    [place] then [place].    His current problem is that it now has a hop into the joint with every knee bend, and sometimes completely dislocates with pain and swelling following this.    He was last seen by Dr. Mena at [facility] and sent " here.    He has mental health issues that he freely discusses.  He also admits that he has not shown appropriate attentiveness to following through on his doctor visits.  He was working through mental health facilities in [place].  He reports that he was placed on a medication that made him gain a lot of weight.  He went off that medication.  He is no longer seeing that doctor as they have refused to see him due to his lack of follow-through with physician appointments.  He reports that when he was placed on this mood altering med he gained a lot of weight and that made him less inclined to want to continue to take it.  Before he started taking the med he reports a weight of 250 pounds.  His top weight was 348 when he went off the med.  He reports he is now at 325 (he was weighed at 328 pounds today).    More recently he is been under the care of her primary care physician through [employer].  He has been diagnosed as prediabetic.  His recent A1cwas 5.7.  He was placed on metformin.    He is not taking any mood altering drugs but feels that he is actually doing pretty well and not sure he needs it.  Although he does feel that he needs to be under the care of someone more consistently.    He currently is working even through the COVID world.  He works at [employer].      He lives with his girlfriend.  She works at a restaurant in the [employer] but is currently furloughed.    Physical exam reveals a large gentleman who moves around the room without an antalgic gait.  However he walks with an external rotated gait.  BMI computes to 39    Examination of patient's right knee reveals no knee swelling.  Mild hyperextension to 130 degrees of flexion.  Soft J sign with mild crepitus with patella reentry into the groove.  Markedly positive apprehension sign.    Lateral patella translation could not be performed.  Medial patella translation is limited with 1 quadrant medial ability negative medial patella tilt  test    Examination of patient's left knee reveals no knee swelling.  Similar range of motion to the right.  Hard J sign with relocation at 45 degrees with mild crepitus.  Passive patella mobility 3+ quadrant lateral mobility, 1+ medial mobility.    Bilateral hips show internal rotation to 40 degrees external rotation to 80.  There does not seem to be a suggestion of clinical tibial torsion.    X-rays were reviewed.  Standing alignment views show mild valgus measuring 1 degree right 2 degrees left.  There is rotation on the 2 plain film suggestive of version.    True lateral view shows minimal suprapatella spur with minimal boss.  C/D equals 1.07.  A tunnel is seen in the patella likely representing MPFL insertion points.  I see no sign of any bony tunnel on the lateral suggesting that this could have represented an MPFL repair.    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  Axial views show a located patella with a suggestion of lateral patella overload syndrome.    No advanced imaging is present.    Assessment: 1 bilateral instability with left >> right.  The left side has a significant J sign as well.  2.  Obesity  3.  Mental health concerns  4.  Prediabetes    Plan: This is a bit of an enigma as he has a very dramatic J sign on the left without a clear understanding of the etiology of this J sign.  He does not have significant trochlear dysplasia nor significant Dalhart.  Although his standing radiographs suggest the potential for increased femoral anteversion his physical exam does not support this.    I believe that a soft tissue procedure alone will not stabilize his kneecap and likely some bony procedure should be performed.    To that end I believe that both an MRI and a CT scan for version should be obtained.    We will obtain these tests and a follow-up visit will be made.    Meanwhile we have placed a consult for behavioral health as he currently does not have a support system at that  end.    Room time 45 minutes consultation time 30    Lela Han MD  Professor Orthopedic Surgery  [facility]

## 2020-05-26 NOTE — NURSING NOTE
"Reason For Visit:   Chief Complaint   Patient presents with     Consult     bilateral knee patellar instability, Left is worse.  Ref. Dr. Jamie Carr at [facility]       Primary MD: No Ref-Primary, Physician  Referring MD: Dr. Jamie Carr    ?  No  Occupation  at [employer].  Currently working? Yes.  Work status?  Full time.    Type of injury: Multiple on both knees.  Date of surgery: Jan or Feb 2011 at [facility].  Type of surgery: Left knee MPFL reconstuction.  Smoker: Yes  Request smoking cessation information: No    Ht 1.958 m (6' 5.09\")   Wt 149.1 kg (328 lb 12.8 oz)   BMI 38.90 kg/m      Pain Assessment  Patient Currently in Pain: Denies(Pain only with pressure on the knees,  pain comes and goes.)    "
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
## 2020-06-11 ENCOUNTER — COMMUNICATION - HEALTHEAST (OUTPATIENT)
Dept: UROLOGY | Facility: CLINIC | Age: 30
End: 2020-06-11

## 2020-06-12 ENCOUNTER — TELEPHONE (OUTPATIENT)
Dept: BEHAVIORAL HEALTH | Facility: CLINIC | Age: 30
End: 2020-06-12

## 2020-06-12 NOTE — TELEPHONE ENCOUNTER
I was finally able to connect with Higinio about Dr. Han's referral to see a therapist for treatment for depression. Higinio states he has already made an appointment with another therapist. He took my number down and will give me a call if the current therapist does not prove to be a good fit.

## 2020-06-17 ENCOUNTER — COMMUNICATION - HEALTHEAST (OUTPATIENT)
Dept: UROLOGY | Facility: CLINIC | Age: 30
End: 2020-06-17

## 2020-06-24 ENCOUNTER — ANCILLARY PROCEDURE (OUTPATIENT)
Dept: CT IMAGING | Facility: CLINIC | Age: 30
End: 2020-06-24
Attending: ORTHOPAEDIC SURGERY
Payer: COMMERCIAL

## 2020-06-24 ENCOUNTER — ANCILLARY PROCEDURE (OUTPATIENT)
Dept: MRI IMAGING | Facility: CLINIC | Age: 30
End: 2020-06-24
Attending: ORTHOPAEDIC SURGERY
Payer: COMMERCIAL

## 2020-06-24 DIAGNOSIS — M25.362 PATELLAR INSTABILITY OF LEFT KNEE: ICD-10-CM

## 2020-07-07 ENCOUNTER — DOCUMENTATION ONLY (OUTPATIENT)
Dept: ORTHOPEDICS | Facility: CLINIC | Age: 30
End: 2020-07-07

## 2020-07-07 ENCOUNTER — TELEPHONE (OUTPATIENT)
Dept: ORTHOPEDICS | Facility: CLINIC | Age: 30
End: 2020-07-07

## 2020-07-07 NOTE — PROGRESS NOTES
IMAGING REVIEW:   Bilateral knee X-rays from 19 and 20 reviewed.     The 20 degree axial views demonstrate:    Left patellar tilt: 17 deg    The AP/lateral views demonstrate:    IS: 1.49 on right. 1.33 on left    CD: 1.23 on right. 1.2 on left    L > R supratrochlear spur with crossover sign bilaterally    Long leg alignment films:    Mechanical axis: neutral on right. 3 deg valgus on left.    Previously obtained CT demonstrates:  - Femoral version:   - Right: 10 deg   - Left 6 deg  - Tibial torsion:   - Right: 38 deg   - Left: 33 deg  - TT-TG:   - Right: 26 mm   - Left: 32 mm    Previously obtained LEFT knee MRI demonstrates:  - IS: 1.57  - CD: 1.25  - Lateral trochlear inclination: 8 deg proximally, 21 deg distally  - Lateral patellar tilt: 17 deg  - TT-Tmm  - High grade cartilage loss over lateral trochlea and patellar cartilage. Fissuring of central patellar cartilage.    Acosta Sinha MD  PGY-4, Orthopaedic Surgery  Pager: [phone]

## 2020-07-07 NOTE — TELEPHONE ENCOUNTER
Writer attempted to call pt 3 times for virtual apt with Dr. Han today. Patient did not answer. Writer was unable to leave a voice message as there was no working box to leave a message with.     Fina Julio LPN

## 2020-07-28 ENCOUNTER — VIRTUAL VISIT (OUTPATIENT)
Dept: ORTHOPEDICS | Facility: CLINIC | Age: 30
End: 2020-07-28
Payer: COMMERCIAL

## 2020-07-28 DIAGNOSIS — M25.362 PATELLAR INSTABILITY OF LEFT KNEE: Primary | ICD-10-CM

## 2020-07-28 NOTE — LETTER
"    7/28/2020         RE: Higinio Wallace  [address] [place] [id number]        Dear Colleague,    Thank you for referring your patient, Higinio Wallace, to the [facility]. Please see a copy of my visit note below.    Reason For Visit:   Chief Complaint   Patient presents with     RECHECK     Video visit [facility] waiting room MRI and CT Results Left knee issues ligament surgery of left leg ,   Ph. [phone]      Primary MD: Dr. Hi  Ref. MD: est    ?  No  Occupation  at [employer].  Currently working? Yes.  Work status?  Full time.     Type of injury: Multiple on both knees.  Date of surgery: Jan or Feb 2011 at [facility].  Type of surgery: Left knee MPFL reconstuction.  Smoker: Yes  Request smoking cessation information: No    There were no vitals taken for this visit.    Pain Assessment  Patient Currently in Pain: Kleberies    Fina Julio LPN    Video Visit Technology for this patient: Well Video Visit- Patient was left in waiting room;    Higinio Wallace is a 29 year old male who is being evaluated via a billable video visit.      The patient has been notified of following:     \"This video visit will be conducted via a call between you and your physician/provider. We have found that certain health care needs can be provided without the need for an in-person physical exam.  This service lets us provide the care you need with a video conversation.  If a prescription is necessary we can send it directly to your pharmacy.  If lab work is needed we can place an order for that and you can then stop by our lab to have the test done at a later time.    Video visits are billed at different rates depending on your insurance coverage.  Please reach out to your insurance provider with any questions.    If during the course of the call the physician/provider feels a video visit is not appropriate, you will not be charged for this service.\"    Patient " has given verbal consent for Video visit? yes  How would you like to obtain your AVS? MyChart    Will anyone else be joining your video visit? No        Video-Visit Details    Type of service:  Video Visit    Originating Location (pt. Location): Home    Distant Location (provider location):  [facility]     Platform used for Video Visit: Fartun     Patient is a 29-year-old male who is following up after an MRI and CT scan was obtained.  We again identified that his main problem is 1 of recurrent patella dislocations but his left side is the more symptomatic side at this point in time    Findings are as follows:  Previously obtained CT demonstrates:  - Femoral version:              - Right: 10 deg              - Left 6 deg  - Tibial torsion:              - Right: 38 deg              - Left: 33 deg  - TT-TG:              - Right: 26 mm              - Left: 32 mm     Previously obtained LEFT knee MRI demonstrates:  - IS: 1.57  - CD: 1.25  - Lateral trochlear inclination: 8 deg proximally, 21 deg distally  - Lateral patellar tilt: 17 deg  - TT-Tmm  - High grade cartilage loss over lateral trochlea and patellar cartilage. Fissuring of central patellar cartilage.    Since we last saw him he went to the ER at [facility] in mid  for a kidney stone.  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  He was told at that time that he should see a surgeon.  It appears from my brief review and from the ER is reviewed that this has largely been asymptomatic up until this time.    Based on his imaging I believe that he will not need a derotation osteotomy since his tibial torsion at on   the left is just under the surgical threshold.  Based on his lateral base patellofemoral arthritis, and his high TT TG, I think that he would benefit from an AMZ, unloading his lateral patella facet at the same time as  neutralizing his lateralized TT TG.  At the same time a MPFL and a lateral facetectomy will be performed.    I believe that he for should be seen by a GI doctor.  We did get his images scanned to us and perhaps this could be done virtual.  Best times to reach him are as follows:  Off tues/ wed.  Works at [employer] from 1pm to 10 pm.    If there is any surgery did need to be addressed with a GI doctor, we will discuss timing of GI versus knee surgery.    If GI clears him for nonoperative management, we will proceed with scheduling him for surgery.    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    If he could be placed in a sitting job he likely could do 4 hours after 2 weeks of convalescence.  He will talk with his boss and have more phonation 1 surgery is scheduled.    All questions were answered.  Room time 25 minutes    Lela Han MD  Professor Orthopedic Surgery  [facility]

## 2020-07-28 NOTE — PROGRESS NOTES
"Reason For Visit:   Chief Complaint   Patient presents with     RECHECK     Video visit mychart waiting room MRI and CT Results Left knee issues ligament surgery of left leg ,   Ph. [phone]      Primary MD: Dr. Hi  Ref. MD: est    ?  No  Occupation  at [employer].  Currently working? Yes.  Work status?  Full time.     Type of injury: Multiple on both knees.  Date of surgery: Jan or Feb 2011 at [facility] the [facility].  Type of surgery: Left knee MPFL reconstuction.  Smoker: Yes  Request smoking cessation information: No    There were no vitals taken for this visit.    Pain Assessment  Patient Currently in Pain: Jeane Julio LPN    Video Visit Technology for this patient: shopp Video Visit- Patient was left in waiting room;    Higinio Wallace is a 29 year old male who is being evaluated via a billable video visit.      The patient has been notified of following:     \"This video visit will be conducted via a call between you and your physician/provider. We have found that certain health care needs can be provided without the need for an in-person physical exam.  This service lets us provide the care you need with a video conversation.  If a prescription is necessary we can send it directly to your pharmacy.  If lab work is needed we can place an order for that and you can then stop by our lab to have the test done at a later time.    Video visits are billed at different rates depending on your insurance coverage.  Please reach out to your insurance provider with any questions.    If during the course of the call the physician/provider feels a video visit is not appropriate, you will not be charged for this service.\"    Patient has given verbal consent for Video visit? yes  How would you like to obtain your AVS? MyChart    Will anyone else be joining your video visit? No        Video-Visit Details    Type of service:  Video Visit    Originating Location (pt. Location): " Home    Distant Location (provider location):  [facility]     Platform used for Video Visit: Fartun     Patient is a 29-year-old male who is following up after an MRI and CT scan was obtained.  We again identified that his main problem is 1 of recurrent patella dislocations but his left side is the more symptomatic side at this point in time    Findings are as follows:  Previously obtained CT demonstrates:  - Femoral version:              - Right: 10 deg              - Left 6 deg  - Tibial torsion:              - Right: 38 deg              - Left: 33 deg  - TT-TG:              - Right: 26 mm              - Left: 32 mm     Previously obtained LEFT knee MRI demonstrates:  - IS: 1.57  - CD: 1.25  - Lateral trochlear inclination: 8 deg proximally, 21 deg distally  - Lateral patellar tilt: 17 deg  - TT-Tmm  - High grade cartilage loss over lateral trochlea and patellar cartilage. Fissuring of central patellar cartilage.    Since we last saw him he went to the ER at [facility] in mid  for a kidney stone.  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  He was told at that time that he should see a surgeon.  It appears from my brief review and from the ER is reviewed that this has largely been asymptomatic up until this time.    Based on his imaging I believe that he will not need a derotation osteotomy since his tibial torsion at on   the left is just under the surgical threshold.  Based on his lateral base patellofemoral arthritis, and his high TT TG, I think that he would benefit from an AMZ, unloading his lateral patella facet at the same time as neutralizing his lateralized TT TG.  At the same time a MPFL and a lateral facetectomy will be performed.    I believe that he for should be seen by a GI doctor.  We did get his images scanned to us and perhaps this could be done virtual.  Best times to  reach him are as follows:  Off tues/ wed.  Works at [employer] from 1pm to 10 pm.    If there is any surgery did need to be addressed with a GI doctor, we will discuss timing of GI versus knee surgery.    If GI clears him for nonoperative management, we will proceed with scheduling him for surgery.    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    If he could be placed in a sitting job he likely could do 4 hours after 2 weeks of convalescence.  He will talk with his boss and have more phonation 1 surgery is scheduled.    All questions were answered.  Room time 25 minutes    Lela Han MD  Professor Orthopedic Surgery  [facility]

## 2020-08-14 ENCOUNTER — AMBULATORY - HEALTHEAST (OUTPATIENT)
Dept: FAMILY MEDICINE | Facility: CLINIC | Age: 30
End: 2020-08-14

## 2020-08-17 ENCOUNTER — OFFICE VISIT - HEALTHEAST (OUTPATIENT)
Dept: FAMILY MEDICINE | Facility: CLINIC | Age: 30
End: 2020-08-17

## 2020-08-17 DIAGNOSIS — F43.10 PTSD (POST-TRAUMATIC STRESS DISORDER): ICD-10-CM

## 2020-08-17 DIAGNOSIS — F41.0 SEVERE ANXIETY WITH PANIC: ICD-10-CM

## 2020-08-17 ASSESSMENT — MIFFLIN-ST. JEOR: SCORE: 2500.33

## 2020-08-21 ENCOUNTER — TELEPHONE (OUTPATIENT)
Dept: ORTHOPEDICS | Facility: CLINIC | Age: 30
End: 2020-08-21

## 2020-08-21 NOTE — TELEPHONE ENCOUNTER
Contacted patient to offer him an appointment with Dr Holland.     Patient states he has had colonoscopy at [facility]   Patient scheduled on Augsut 27 at 8 am

## 2020-08-21 NOTE — TELEPHONE ENCOUNTER
[facility] Call Center    Phone Message    May a detailed message be left on voicemail: yes     Reason for Call: Other: Pt requesting call back. Pt stated in his virtural visit with Dr. Han recently she was going to refer him to someone at the  for stomach surgery and he hs not heard anything yet, pt needing to know who he should contact or if she could put that referral in for him     Action Taken: Message routed to:  [facility] ortho

## 2020-08-24 NOTE — TELEPHONE ENCOUNTER
RECORDS RECEIVED FROM: Dr. Sahni    DATE RECEIVED: 8/27/2020   NOTES STATUS DETAILS   OFFICE NOTE from referring provider Internal 7/28/2020 Referral    OFFICE NOTE from other specialist In process [facility]   DISCHARGE SUMMARY from hospital N/A    OPERATIVE REPORT In process    MEDICATION LIST Care Everywhere         ENDOSCOPY  Care Everywhere EGD: 8/21/19 ([facility])   COLONOSCOPY In process    ERCP In process    EUS In process    STOOL TESTING N/A    PERTINENT LABS Care Everywhere    PATHOLOGY REPORTS (RELATED) In process    IMAGING (CT, MRI, EGD) Care Everywhere [facility]:  - CT Abdomen Pelvis: 6/11/2020  - US Abdomen: 7/7/19     REFERRAL INFORMATION    Date referral was placed: 8/27/2020   Date all records received:    Date records were scanned into EPIC:    Date records were sent to Provider to review:    Date and recommendation received from provider:  LETTER SENT  SCHEDULE APPOINTMENT   Date patient was contacted to schedule: 8/21/2020 8/24/2020 11:38am Fax request sent to [facility] ([phone]) for recs. -Yoli    8/26/2020 10:09 Called and LVM for [facility] for an update on fax request for recs. Sent another urgent fax request to [facility] for recs. -Yoli     8/26/2020 4:17pm CSS notified RN and provider of missing recs. -Yoli

## 2020-08-27 ENCOUNTER — PRE VISIT (OUTPATIENT)
Dept: GASTROENTEROLOGY | Facility: CLINIC | Age: 30
End: 2020-08-27

## 2020-08-27 ENCOUNTER — VIRTUAL VISIT (OUTPATIENT)
Dept: GASTROENTEROLOGY | Facility: CLINIC | Age: 30
End: 2020-08-27
Payer: COMMERCIAL

## 2020-08-27 VITALS — HEIGHT: 76 IN | WEIGHT: 315 LBS | BODY MASS INDEX: 38.36 KG/M2

## 2020-08-27 DIAGNOSIS — K58.0 IRRITABLE BOWEL SYNDROME WITH DIARRHEA: ICD-10-CM

## 2020-08-27 DIAGNOSIS — Q43.3 MALROTATION OF COLON (H): ICD-10-CM

## 2020-08-27 DIAGNOSIS — F42.8 PSYCHOGENIC RUMINATION: Primary | ICD-10-CM

## 2020-08-27 PROBLEM — K58.9 IRRITABLE BOWEL SYNDROME: Status: ACTIVE | Noted: 2020-08-27

## 2020-08-27 RX ORDER — OLANZAPINE 5 MG/1
5 TABLET ORAL
COMMUNITY
End: 2020-09-15 | Stop reason: SINTOL

## 2020-08-27 RX ORDER — OXYCODONE HYDROCHLORIDE 5 MG/1
TABLET ORAL
COMMUNITY
Start: 2020-06-11 | End: 2020-11-04

## 2020-08-27 RX ORDER — NICOTINE POLACRILEX 4 MG/1
20 GUM, CHEWING ORAL EVERY MORNING
COMMUNITY
Start: 2019-09-10 | End: 2021-08-17

## 2020-08-27 ASSESSMENT — PAIN SCALES - GENERAL: PAINLEVEL: NO PAIN (0)

## 2020-08-27 ASSESSMENT — MIFFLIN-ST. JEOR: SCORE: 2505.33
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
## 2020-08-27 NOTE — NURSING NOTE
"Chief Complaint   Patient presents with     Consult     Malrotation of Colon       Vitals:    08/27/20 0742   Weight: 142.9 kg (315 lb)   Height: 1.93 m (6' 4\")       Body mass index is 38.34 kg/m .      Sonya Salguero LPN                          "

## 2020-08-27 NOTE — PROGRESS NOTES
"Higinio Wallace is a 29 year old male who is being evaluated via a billable video visit.      The patient has been notified of following:     \"This video visit will be conducted via a call between you and your physician/provider. We have found that certain health care needs can be provided without the need for an in-person physical exam.  This service lets us provide the care you need with a video conversation.  If a prescription is necessary we can send it directly to your pharmacy.  If lab work is needed we can place an order for that and you can then stop by our lab to have the test done at a later time.    Video visits are billed at different rates depending on your insurance coverage.  Please reach out to your insurance provider with any questions.    If during the course of the call the physician/provider feels a video visit is not appropriate, you will not be charged for this service.\"    Patient has given verbal consent for Video visit? Yes  How would you like to obtain your AVS? MyChart  If you are dropped from the video visit, the video invite should be resent to: Send to e-mail at: [email or web address]  Will anyone else be joining your video visit? No      During this virtual visit the patient is located in [place], patient verifies this as the location during the entirety of this visit.       Video-Visit Details    Type of service:  Video Visit    Video 1:  Start time: 8:06 AM  End time: 8:35 AM     Video 2:  Start time: 8:47 AM  End time: 8:57 AM    Originating Location (pt. Location): Home    Distant Location (provider location):  [facility]     Platform used for Video Visit: Ben Holland MD        "
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
## 2020-08-27 NOTE — PATIENT INSTRUCTIONS
It was a pleasure taking care of you in the [facility] Gastroenterology clinic today. We discussed your congenital malrotation (twisted colon), regurgitation, abdominal pain, and diarrhea. Taken together you may be suffering from lactose intolerance and irritable bowel syndrome. We will order some lab work and stool tests to rule out other causes as well. Our detailed plan is below.     1. Refer you to our colorectal surgery colleagues to discuss your malrotation, they will call to arrange  2. Order blood tests and stool tests to evaluate for other causes of abdominal pain and diarrhea  3. Start fiber supplementation with Citrucel, 1 tablespoon per day, and you can increase the amount until you have 1-2 normal/formed BMs per day, you should decrease the amount if you experience worsening bloating   4. We will refer you to both our Dietitian and our GI Disease Psychologist to help with your symptoms  5. Please avoid dairy as you are able, you could also try to purchase over the counter Lactaid and other lactase supplements to take with meals if you would like   6. You will follow up with Dr. Holland in 3 months, earlier if symptoms worsen    Please do no hesitate to reach out with any questions or concerns.     Dr. Holland and Dr. Vogel

## 2020-08-28 ASSESSMENT — ANXIETY QUESTIONNAIRES
6. BECOMING EASILY ANNOYED OR IRRITABLE: NEARLY EVERY DAY
3. WORRYING TOO MUCH ABOUT DIFFERENT THINGS: NEARLY EVERY DAY
7. FEELING AFRAID AS IF SOMETHING AWFUL MIGHT HAPPEN: MORE THAN HALF THE DAYS
1. FEELING NERVOUS, ANXIOUS, OR ON EDGE: NEARLY EVERY DAY
5. BEING SO RESTLESS THAT IT IS HARD TO SIT STILL: MORE THAN HALF THE DAYS
GAD7 TOTAL SCORE: 18
2. NOT BEING ABLE TO STOP OR CONTROL WORRYING: NEARLY EVERY DAY
4. TROUBLE RELAXING: MORE THAN HALF THE DAYS

## 2020-08-28 ASSESSMENT — PATIENT HEALTH QUESTIONNAIRE - PHQ9: SUM OF ALL RESPONSES TO PHQ QUESTIONS 1-9: 26

## 2020-09-02 ENCOUNTER — PATIENT OUTREACH (OUTPATIENT)
Dept: GASTROENTEROLOGY | Facility: CLINIC | Age: 30
End: 2020-09-02

## 2020-09-02 ENCOUNTER — PATIENT OUTREACH (OUTPATIENT)
Dept: SURGERY | Facility: CLINIC | Age: 30
End: 2020-09-02

## 2020-09-02 NOTE — TELEPHONE ENCOUNTER
RECORDS RECEIVED FROM: [facility]- Dr. Donell Holland    DATE RECEIVED: 9/9/2020   NOTES STATUS DETAILS   OFFICE NOTE from referring provider  Internal 8/27/2020 Office visit with Dr. Holland    OFFICE NOTE from other specialist   Received  [facility]:  - 8/14/19 Office visit with Gualberto Monroy, JESUS   DISCHARGE SUMMARY from hospital  N/A    DISCHARGE REPORT from the ER Care Everywhere 6/11/2020 ([facility])    OPERATIVE REPORT  Care Everywhere    MEDICATION LIST Internal    LABS     PFC TESTING N/A    ANAL PAP N/A    BIOPSIES/PATHOLOGY RELATED TO DIAGNOSIS In process    DIAGNOSTIC PROCEDURES     COLONOSCOPY N/A     UPPER ENDOSCOPY (EGD) Care Everywhere/ Received  8/21/19 ([facility])    FLEX SIGMOIDOSCOPY  N/A    ERCP N/A    IMAGING (DISC & REPORT)      CT  Care Everywhere CT Abdomen Pelvis: 6/11/2020 ([facility])    MRI N/A    XRAY N/A    ULTRASOUND (ENDOANAL/ENDORECTAL) Care Everywhere US Abdomen: 7/7/19 ([facility])      9/2/2020 6:20pm Fax request sent to [employer] ([phone]) for images and [facility] ([phone]) for recs. -Bhao     9/3/2020 11:08am Received recs from [facility]; sent to scan for uploading. A copy was forward to SEVERIANO Gan's email address. [facility] will notify Franchesca. Images requested from [employer] are in PACS. Closing encounter now. -Yoli

## 2020-09-02 NOTE — PROGRESS NOTES
Patient scheduled for appointments  with Dr. Osullivan, Dr Hernandez and follow up with Dr. Holland.   Patient will do labs on September 9 and has lab appt.     referral to GI pych (Franchesca)   - referral to elvira for low fodmap   - blood and stool studies   - referral to surgery for consideration for KD procedure for congenital malrotation of colon     Follow up in 3 months

## 2020-09-04 ENCOUNTER — OFFICE VISIT - HEALTHEAST (OUTPATIENT)
Dept: BEHAVIORAL HEALTH | Facility: CLINIC | Age: 30
End: 2020-09-04

## 2020-09-04 DIAGNOSIS — F29 PSYCHOSIS, UNSPECIFIED PSYCHOSIS TYPE (H): ICD-10-CM

## 2020-09-04 ASSESSMENT — ANXIETY QUESTIONNAIRES
2. NOT BEING ABLE TO STOP OR CONTROL WORRYING: NEARLY EVERY DAY
7. FEELING AFRAID AS IF SOMETHING AWFUL MIGHT HAPPEN: MORE THAN HALF THE DAYS
1. FEELING NERVOUS, ANXIOUS, OR ON EDGE: NEARLY EVERY DAY
3. WORRYING TOO MUCH ABOUT DIFFERENT THINGS: NEARLY EVERY DAY
6. BECOMING EASILY ANNOYED OR IRRITABLE: NEARLY EVERY DAY
5. BEING SO RESTLESS THAT IT IS HARD TO SIT STILL: NEARLY EVERY DAY
GAD7 TOTAL SCORE: 20
4. TROUBLE RELAXING: NEARLY EVERY DAY
IF YOU CHECKED OFF ANY PROBLEMS ON THIS QUESTIONNAIRE, HOW DIFFICULT HAVE THESE PROBLEMS MADE IT FOR YOU TO DO YOUR WORK, TAKE CARE OF THINGS AT HOME, OR GET ALONG WITH OTHER PEOPLE: EXTREMELY DIFFICULT

## 2020-09-04 ASSESSMENT — PATIENT HEALTH QUESTIONNAIRE - PHQ9: SUM OF ALL RESPONSES TO PHQ QUESTIONS 1-9: 26

## 2020-09-08 ENCOUNTER — OFFICE VISIT - HEALTHEAST (OUTPATIENT)
Dept: BEHAVIORAL HEALTH | Facility: CLINIC | Age: 30
End: 2020-09-08

## 2020-09-08 DIAGNOSIS — F42.8 PSYCHOGENIC RUMINATION: ICD-10-CM

## 2020-09-08 DIAGNOSIS — K58.0 IRRITABLE BOWEL SYNDROME WITH DIARRHEA: ICD-10-CM

## 2020-09-08 DIAGNOSIS — F33.1 MODERATE EPISODE OF RECURRENT MAJOR DEPRESSIVE DISORDER (H): ICD-10-CM

## 2020-09-08 LAB
ALBUMIN SERPL-MCNC: 3.7 G/DL (ref 3.4–5)
ALP SERPL-CCNC: 56 U/L (ref 40–150)
ALT SERPL W P-5'-P-CCNC: 61 U/L (ref 0–70)
ANION GAP SERPL CALCULATED.3IONS-SCNC: 5 MMOL/L (ref 3–14)
AST SERPL W P-5'-P-CCNC: 24 U/L (ref 0–45)
BASOPHILS # BLD AUTO: 0.1 10E9/L (ref 0–0.2)
BASOPHILS NFR BLD AUTO: 0.8 %
BILIRUB SERPL-MCNC: 0.5 MG/DL (ref 0.2–1.3)
BUN SERPL-MCNC: 8 MG/DL (ref 7–30)
CALCIUM SERPL-MCNC: 8.7 MG/DL (ref 8.5–10.1)
CHLORIDE SERPL-SCNC: 109 MMOL/L (ref 94–109)
CO2 SERPL-SCNC: 25 MMOL/L (ref 20–32)
CREAT SERPL-MCNC: 0.83 MG/DL (ref 0.66–1.25)
CRP SERPL-MCNC: <2.9 MG/L (ref 0–8)
DIFFERENTIAL METHOD BLD: NORMAL
EOSINOPHIL # BLD AUTO: 0.2 10E9/L (ref 0–0.7)
EOSINOPHIL NFR BLD AUTO: 3.9 %
ERYTHROCYTE [DISTWIDTH] IN BLOOD BY AUTOMATED COUNT: 12.5 % (ref 10–15)
GFR SERPL CREATININE-BSD FRML MDRD: >90 ML/MIN/{1.73_M2}
GLUCOSE SERPL-MCNC: 97 MG/DL (ref 70–99)
HCT VFR BLD AUTO: 48.4 % (ref 40–53)
HGB BLD-MCNC: 16.7 G/DL (ref 13.3–17.7)
IMM GRANULOCYTES # BLD: 0 10E9/L (ref 0–0.4)
IMM GRANULOCYTES NFR BLD: 0.3 %
LYMPHOCYTES # BLD AUTO: 1 10E9/L (ref 0.8–5.3)
LYMPHOCYTES NFR BLD AUTO: 15.7 %
MCH RBC QN AUTO: 30.6 PG (ref 26.5–33)
MCHC RBC AUTO-ENTMCNC: 34.5 G/DL (ref 31.5–36.5)
MCV RBC AUTO: 89 FL (ref 78–100)
MONOCYTES # BLD AUTO: 0.5 10E9/L (ref 0–1.3)
MONOCYTES NFR BLD AUTO: 7.7 %
NEUTROPHILS # BLD AUTO: 4.4 10E9/L (ref 1.6–8.3)
NEUTROPHILS NFR BLD AUTO: 71.6 %
NRBC # BLD AUTO: 0 10*3/UL
NRBC BLD AUTO-RTO: 0 /100
PLATELET # BLD AUTO: 175 10E9/L (ref 150–450)
POTASSIUM SERPL-SCNC: 3.7 MMOL/L (ref 3.4–5.3)
PROT SERPL-MCNC: 6.9 G/DL (ref 6.8–8.8)
RBC # BLD AUTO: 5.46 10E12/L (ref 4.4–5.9)
SODIUM SERPL-SCNC: 140 MMOL/L (ref 133–144)
TSH SERPL DL<=0.005 MIU/L-ACNC: 1.5 MU/L (ref 0.4–4)
WBC # BLD AUTO: 6.1 10E9/L (ref 4–11)

## 2020-09-08 NOTE — PROGRESS NOTES
Colon and Rectal Surgery Clinic Note    RE: Higinio Wallace.  : 1990.  NANCY: 2020.    Reason for visit: colonic malrotation.    HPI: 31 yo M with hx of ANITA presenting after CT scan revealed colonic malrotation. He describes a spectrum of GI complaints including abdominal pain, regurgitation, diarrhea, reflux and bloating. He complains of regurgitating bilious as well as undigested food, at times preceding nausea. He also feels nauseous and vomits in the mornings prior to eating. Most of the time he is able to eat without vomiting. EGD performed at [facility] in  reportedly was normal.     Because of his medications, he also states weight gain, over the last few months. He has 1-2 BMs per week, this is his baseline and been present for years. He is passing gas, never had episodes of obstruction. Has intermittent nonspecific abdominal pain on right and left side of abdomen.     He was recently seen by Dr. Holland in GI who recommended medical management for weight loss, continued GI management optimizing his medication regimen to improve his symptoms.     Medical history:  Past Medical History:   Diagnosis Date     Complication of anesthesia        Surgical history:  Past Surgical History:   Procedure Laterality Date     KNEE SURGERY Left        Family history:  No hx of colon cancer or IBD.     Medications:  Current Outpatient Medications   Medication Sig Dispense Refill     albuterol (ALBUTEROL) 108 (90 BASE) MCG/ACT Inhaler Inhale 2 puffs into the lungs every 4 hours as needed for shortness of breath / dyspnea or wheezing 1 Inhaler 0     fluticasone (FLONASE) 50 MCG/ACT spray 2 sprays       metFORMIN (GLUCOPHAGE) 500 MG tablet Take 500 mg by mouth       OLANZapine (ZYPREXA) 5 MG tablet Take 5 mg by mouth       omeprazole 20 MG tablet        oxyCODONE (ROXICODONE) 5 MG tablet          Allergies:  Allergies   Allergen Reactions     Bee Venom Anaphylaxis and Other (See Comments)     Swelling  Large local  "reactions/ swelling       Fruit Acid Concentrate [Fruit Extracts] Swelling     Lips swell and crust over little bit- tongue gets numb     Liquid Adhesive Dermatitis     And band aid       Monosodium Glutamate Hives       Social history:   Social History     Tobacco Use     Smoking status: Current Every Day Smoker     Packs/day: 0.25     Types: Cigarettes     Smokeless tobacco: Never Used     Tobacco comment: Smoking pack/week   Substance Use Topics     Alcohol use: Not Currently     Marital status: single.    ROS:  A complete review of systems was performed with the patient and all systems negative except as per HPI.    Physical Examination:  Exam was chaperoned by Lela Castrejon.   BP (!) 149/103 (BP Location: Right arm, Patient Position: Sitting, Cuff Size: Adult Regular)   Pulse 93   Ht 6' 4\"   Wt 321 lb   SpO2 95%   BMI 39.07 kg/m    General: Well hydrated. No acute distress.  Abdomen: Obese, Soft, NT, nondistended at this time. No organomegaly or masses palpated. No incisions present.   Perianal external examination:  deferred  Digital rectal examination: Was deferred.    Anoscopy: Was deferred    Procedures:  none.    Laboratory values reviewed:  Recent Labs   Lab Test 09/08/20  1325   WBC 6.1   HGB 16.7      CR 0.83   ALBUMIN 3.7   BILITOTAL 0.5   ALKPHOS 56   ALT 61   AST 24       Imaging personally reviewed by me:  Outside CT from 6/11/19 reviewed, no evidence of obstruction, malrotation appreciated.     ASSESSMENT  29 yo M presenting with a multitude of GI symptoms, without previous episode obstruction, CT scan identifying congenital colonic malrotation.     PLAN  1. Agree with plan set forth by Dr. Holland. Optimize medical management of symptoms.    2. Medical weight loss referral given struggle with weight gain in setting of medications required for use with ANITA.     3. Recommend UGI with SBFT to identify any proximal causes of obstruction that may contributing to regurgitation. "     4. Follow up in 3 months for discussion of elective surgery and reevaluation of symptoms and control. He would benefit from medically optimizing medications and weight loss in order to reduce risk of postoperative complications. Given anatomy, has potential for conversion to open operation and thus a larger abdominal wound.     5. Clearly and extensively discussed need for urgent evaluation in ER should he develop symptoms of obstruction, specifically worsening abdominal pain, obstipation, nausea, vomiting.     6. I will discuss case further with my senior partners for their input as well as Dr. Holland given relative rarity of adult diagnosed congenital malrotation.     Time spent: 45 minutes.  >50% spent in discussing, counseling and coordinating care.    Stephon Lui M.D    Division of Colon and Rectal Surgery  [facility]    Referring Provider:  Referred Self, MD  No address on file     Primary Care Provider:  No Ref-Primary, Physician

## 2020-09-09 ENCOUNTER — OFFICE VISIT (OUTPATIENT)
Dept: SURGERY | Facility: CLINIC | Age: 30
End: 2020-09-09
Payer: COMMERCIAL

## 2020-09-09 ENCOUNTER — PRE VISIT (OUTPATIENT)
Dept: SURGERY | Facility: CLINIC | Age: 30
End: 2020-09-09

## 2020-09-09 VITALS
SYSTOLIC BLOOD PRESSURE: 149 MMHG | DIASTOLIC BLOOD PRESSURE: 103 MMHG | OXYGEN SATURATION: 95 % | HEART RATE: 93 BPM | HEIGHT: 76 IN | WEIGHT: 315 LBS | BODY MASS INDEX: 38.36 KG/M2

## 2020-09-09 DIAGNOSIS — F42.8 PSYCHOGENIC RUMINATION: ICD-10-CM

## 2020-09-09 DIAGNOSIS — Q43.3 MALROTATION OF COLON (H): Primary | ICD-10-CM

## 2020-09-09 DIAGNOSIS — K58.0 IRRITABLE BOWEL SYNDROME WITH DIARRHEA: ICD-10-CM

## 2020-09-09 LAB
C COLI+JEJUNI+LARI FUSA STL QL NAA+PROBE: NOT DETECTED
C DIFF TOX B STL QL: NEGATIVE
EC STX1 GENE STL QL NAA+PROBE: NOT DETECTED
EC STX2 GENE STL QL NAA+PROBE: NOT DETECTED
ENTERIC PATHOGEN COMMENT: NORMAL
IGA SERPL-MCNC: 181 MG/DL (ref 84–499)
NOROV GI+II ORF1-ORF2 JNC STL QL NAA+PR: NOT DETECTED
RVA NSP5 STL QL NAA+PROBE: NOT DETECTED
SALMONELLA SP RPOD STL QL NAA+PROBE: NOT DETECTED
SHIGELLA SP+EIEC IPAH STL QL NAA+PROBE: NOT DETECTED
SPECIMEN SOURCE: NORMAL
TTG IGA SER-ACNC: 1 U/ML
TTG IGG SER-ACNC: <1 U/ML
V CHOL+PARA RFBL+TRKH+TNAA STL QL NAA+PR: NOT DETECTED
Y ENTERO RECN STL QL NAA+PROBE: NOT DETECTED

## 2020-09-09 ASSESSMENT — MIFFLIN-ST. JEOR: SCORE: 2527.55

## 2020-09-09 ASSESSMENT — PAIN SCALES - GENERAL: PAINLEVEL: MILD PAIN (3)

## 2020-09-09 NOTE — LETTER
2020       RE: Higinio Wallace  [address] [facility]  [address] [place], [place] [id number]     Dear Colleague,    Thank you for referring your patient, Higinio Wallace, to the [facility] COLON AND RECTAL SURGERY at [facility]. Please see a copy of my visit note below.    Colon and Rectal Surgery Clinic Note    RE: Higinio Wallace.  : 1990.  NANCY: 2020.    Reason for visit: colonic malrotation.    HPI: 29 yo M with hx of ANITA presenting after CT scan revealed colonic malrotation. He describes a spectrum of GI complaints including abdominal pain, regurgitation, diarrhea, reflux and bloating. He complains of regurgitating bilious as well as undigested food, at times preceding nausea. He also feels nauseous and vomits in the mornings prior to eating. Most of the time he is able to eat without vomiting. EGD performed at [facility] in  reportedly was normal.     Because of his medications, he also states weight gain, over the last few months. He has 1-2 BMs per week, this is his baseline and been present for years. He is passing gas, never had episodes of obstruction. Has intermittent nonspecific abdominal pain on right and left side of abdomen.     He was recently seen by Dr. Holland in GI who recommended medical management for weight loss, continued GI management optimizing his medication regimen to improve his symptoms.     Medical history:  Past Medical History:   Diagnosis Date     Complication of anesthesia        Surgical history:  Past Surgical History:   Procedure Laterality Date     KNEE SURGERY Left        Family history:  No hx of colon cancer or IBD.     Medications:  Current Outpatient Medications   Medication Sig Dispense Refill     albuterol (ALBUTEROL) 108 (90 BASE) MCG/ACT Inhaler Inhale 2 puffs into the lungs every 4 hours as needed for shortness of breath / dyspnea or wheezing 1 Inhaler 0     fluticasone (FLONASE) 50 MCG/ACT spray 2 sprays       metFORMIN (GLUCOPHAGE) 500 MG  "tablet Take 500 mg by mouth       OLANZapine (ZYPREXA) 5 MG tablet Take 5 mg by mouth       omeprazole 20 MG tablet        oxyCODONE (ROXICODONE) 5 MG tablet          Allergies:  Allergies   Allergen Reactions     Bee Venom Anaphylaxis and Other (See Comments)     Swelling  Large local reactions/ swelling       Fruit Acid Concentrate [Fruit Extracts] Swelling     Lips swell and crust over little bit- tongue gets numb     Liquid Adhesive Dermatitis     And band aid       Monosodium Glutamate Hives       Social history:   Social History     Tobacco Use     Smoking status: Current Every Day Smoker     Packs/day: 0.25     Types: Cigarettes     Smokeless tobacco: Never Used     Tobacco comment: Smoking pack/week   Substance Use Topics     Alcohol use: Not Currently     Marital status: single.    ROS:  A complete review of systems was performed with the patient and all systems negative except as per HPI.    Physical Examination:  Exam was chaperoned by Lela Castrejon.   BP (!) 149/103 (BP Location: Right arm, Patient Position: Sitting, Cuff Size: Adult Regular)   Pulse 93   Ht 6' 4\"   Wt 321 lb   SpO2 95%   BMI 39.07 kg/m    General: Well hydrated. No acute distress.  Abdomen: Obese, Soft, NT, nondistended at this time. No organomegaly or masses palpated. No incisions present.   Perianal external examination:  deferred  Digital rectal examination: Was deferred.    Anoscopy: Was deferred    Procedures:  none.    Laboratory values reviewed:  Recent Labs   Lab Test 09/08/20  1325   WBC 6.1   HGB 16.7      CR 0.83   ALBUMIN 3.7   BILITOTAL 0.5   ALKPHOS 56   ALT 61   AST 24       Imaging personally reviewed by me:  Outside CT from 6/11/19 reviewed, no evidence of obstruction, malrotation appreciated.     ASSESSMENT  29 yo M presenting with a multitude of GI symptoms, without previous episode obstruction, CT scan identifying congenital colonic malrotation.     PLAN  1. Agree with plan set forth by  " Skyler. Optimize medical management of symptoms.    2. Medical weight loss referral given struggle with weight gain in setting of medications required for use with ANITA.     3. Recommend UGI with SBFT to identify any proximal causes of obstruction that may contributing to regurgitation.     4. Follow up in 3 months for discussion of elective surgery and reevaluation of symptoms and control. He would benefit from medically optimizing medications and weight loss in order to reduce risk of postoperative complications. Given anatomy, has potential for conversion to open operation and thus a larger abdominal wound.     5. Clearly and extensively discussed need for urgent evaluation in ER should he develop symptoms of obstruction, specifically worsening abdominal pain, obstipation, nausea, vomiting.     6. I will discuss case further with my senior partners for their input as well as Dr. Holland given relative rarity of adult diagnosed congenital malrotation.     Time spent: 45 minutes.  >50% spent in discussing, counseling and coordinating care.    Stephon Lui M.D    Division of Colon and Rectal Surgery  [facility]    Referring Provider:  Referred Self, MD  No address on file     Primary Care Provider:  No Ref-Primary, Physician    Again, thank you for allowing me to participate in the care of your patient.      Sincerely,    Stephon Lui MD, MD

## 2020-09-09 NOTE — LETTER
2020       RE: Higinio Wallace  [address] [facility]  [address] [place], [place] [id number]     Dear Colleague,    Thank you for referring your patient, Higinio Wallace, to the [place] [facility] at [facility]. Please see a copy of my visit note below.    Colon and Rectal Surgery Clinic Note    RE: Higinio Wallace.  : 1990.  NANCY: 2020.    Reason for visit: colonic malrotation.    HPI: 31 yo M with hx of ANITA presenting after CT scan revealed colonic malrotation. He describes a spectrum of GI complaints including abdominal pain, regurgitation, diarrhea, reflux and bloating. He complains of regurgitating bilious as well as undigested food, at times preceding nausea. He also feels nauseous and vomits in the mornings prior to eating. Most of the time he is able to eat without vomiting. EGD performed at [facility] in  reportedly was normal.     Because of his medications, he also states weight gain, over the last few months. He has 1-2 BMs per week, this is his baseline and been present for years. He is passing gas, never had episodes of obstruction. Has intermittent nonspecific abdominal pain on right and left side of abdomen.     He was recently seen by Dr. Holland in GI who recommended medical management for weight loss, continued GI management optimizing his medication regimen to improve his symptoms.     Medical history:  Past Medical History:   Diagnosis Date     Complication of anesthesia      Surgical history:  Past Surgical History:   Procedure Laterality Date     KNEE SURGERY Left      Family history:  No hx of colon cancer or IBD.     Medications:  Current Outpatient Medications   Medication Sig Dispense Refill     albuterol (ALBUTEROL) 108 (90 BASE) MCG/ACT Inhaler Inhale 2 puffs into the lungs every 4 hours as needed for shortness of breath / dyspnea or wheezing 1 Inhaler 0     fluticasone (FLONASE) 50 MCG/ACT spray 2 sprays       metFORMIN (GLUCOPHAGE) 500 MG  "tablet Take 500 mg by mouth       OLANZapine (ZYPREXA) 5 MG tablet Take 5 mg by mouth       omeprazole 20 MG tablet        oxyCODONE (ROXICODONE) 5 MG tablet        Allergies:  Allergies   Allergen Reactions     Bee Venom Anaphylaxis and Other (See Comments)     Swelling  Large local reactions/ swelling       Fruit Acid Concentrate [Fruit Extracts] Swelling     Lips swell and crust over little bit- tongue gets numb     Liquid Adhesive Dermatitis     And band aid       Monosodium Glutamate Hives     Social history:   Social History     Tobacco Use     Smoking status: Current Every Day Smoker     Packs/day: 0.25     Types: Cigarettes     Smokeless tobacco: Never Used     Tobacco comment: Smoking pack/week   Substance Use Topics     Alcohol use: Not Currently     Marital status: single.    ROS:  A complete review of systems was performed with the patient and all systems negative except as per HPI.    Physical Examination:  Exam was chaperoned by Lela Castrejon.   BP (!) 149/103 (BP Location: Right arm, Patient Position: Sitting, Cuff Size: Adult Regular)   Pulse 93   Ht 6' 4\"   Wt 321 lb   SpO2 95%   BMI 39.07 kg/m    General: Well hydrated. No acute distress.  Abdomen: Obese, Soft, NT, nondistended at this time. No organomegaly or masses palpated. No incisions present.   Perianal external examination:  deferred  Digital rectal examination: Was deferred.    Anoscopy: Was deferred    Procedures:  none.    Laboratory values reviewed:  Recent Labs   Lab Test 09/08/20  1325   WBC 6.1   HGB 16.7      CR 0.83   ALBUMIN 3.7   BILITOTAL 0.5   ALKPHOS 56   ALT 61   AST 24     Imaging personally reviewed by me:  Outside CT from 6/11/19 reviewed, no evidence of obstruction, malrotation appreciated.     ASSESSMENT  29 yo M presenting with a multitude of GI symptoms, without previous episode obstruction, CT scan identifying congenital colonic malrotation.     PLAN  1. Agree with plan set forth by Dr. Holland. " Optimize medical management of symptoms.    2. Medical weight loss referral given struggle with weight gain in setting of medications required for use with ANITA.     3. Recommend UGI with SBFT to identify any proximal causes of obstruction that may contributing to regurgitation.     4. Follow up in 3 months for discussion of elective surgery and reevaluation of symptoms and control. He would benefit from medically optimizing medications and weight loss in order to reduce risk of postoperative complications. Given anatomy, has potential for conversion to open operation and thus a larger abdominal wound.     5. Clearly and extensively discussed need for urgent evaluation in ER should he develop symptoms of obstruction, specifically worsening abdominal pain, obstipation, nausea, vomiting.     6. I will discuss case further with my senior partners for their input as well as Dr. Holland given relative rarity of adult diagnosed congenital malrotation.     Time spent: 45 minutes.  >50% spent in discussing, counseling and coordinating care.    Again, thank you for allowing me to participate in the care of your patient.  Sincerely,    Stephon Lui M.D    Division of Colon and Rectal Surgery  [facility]    Referring Provider:  Referred Self, MD  No address on file     Primary Care Provider:  No Ref-Primary, Physician

## 2020-09-09 NOTE — NURSING NOTE
"Chief Complaint   Patient presents with     Consult     Malrotation of the colon       Vitals:    09/09/20 1251   BP: (!) 149/103   BP Location: Right arm   Patient Position: Sitting   Cuff Size: Adult Regular   Pulse: 93   SpO2: 95%   Weight: 321 lb   Height: 6' 4\"       Body mass index is 39.07 kg/m .      Delon Brown LPN                        "

## 2020-09-09 NOTE — PATIENT INSTRUCTIONS
Follow up:    1. Medical weight management  2. Small Bowel xray   3. Follow up after seeing Dr. Holland (2 months)     Please call with any questions or concerns regarding your clinic visit today.    It is a pleasure being involved in your health care.    Contacts post-consultation depending on your need:    Radiology Appointments [phone]    Schedule Clinic Appointments [phone] # 1   M-F 7:30 - 5 pm    SEVERIANO Sorensen [phone]    Clinic Fax Number [phone]    Surgery Scheduling [phone]    My Chart is available 24 hours a day and is a secure way to access your records and communicate with your care team.  I strongly recommend signing up if you haven't already done so, if you are comfortable with computers.  If you would like to inquire about this or are having problems with My Chart access, you may call [phone] or go online at [email or web address].  Please allow at least 24 hours for a response and extra time on weekends and Holidays.

## 2020-09-10 ENCOUNTER — TELEPHONE (OUTPATIENT)
Dept: ENDOCRINOLOGY | Facility: CLINIC | Age: 30
End: 2020-09-10

## 2020-09-10 ENCOUNTER — TELEPHONE (OUTPATIENT)
Dept: SURGERY | Facility: CLINIC | Age: 30
End: 2020-09-10

## 2020-09-10 LAB
O+P STL MICRO: NORMAL
O+P STL MICRO: NORMAL
SPECIMEN SOURCE: NORMAL

## 2020-09-10 NOTE — TELEPHONE ENCOUNTER
Spoke with pt and advised him that an xray order has been placed for him advised pt of the number he stated he would call and get it scheduled later today.

## 2020-09-10 NOTE — TELEPHONE ENCOUNTER
Reason for call:  Other   Patient called regarding (reason for call): appointment  Additional comments: Please attach nonsurg questionnaires for upcoming appointment    Phone number to reach patient:  Home number on file [phone] (home)    Best Time:  anytime     Can we leave a detailed message on this number?  Not Applicable    Travel screening: Not Applicable

## 2020-09-11 LAB — CALPROTECTIN STL-MCNT: 126 MG/KG (ref 0–49.9)

## 2020-09-14 ENCOUNTER — CARE COORDINATION (OUTPATIENT)
Dept: GASTROENTEROLOGY | Facility: CLINIC | Age: 30
End: 2020-09-14

## 2020-09-14 ENCOUNTER — TELEPHONE (OUTPATIENT)
Dept: ENDOCRINOLOGY | Facility: CLINIC | Age: 30
End: 2020-09-14

## 2020-09-14 DIAGNOSIS — R19.8 IRREGULAR BOWEL HABITS: Primary | ICD-10-CM

## 2020-09-14 NOTE — TELEPHONE ENCOUNTER
Called attempting to inform patient to complete questionnaires prior to visit on mychart. Mailbox is full.  Jess Orlando, EMT

## 2020-09-15 ENCOUNTER — VIRTUAL VISIT (OUTPATIENT)
Dept: ENDOCRINOLOGY | Facility: CLINIC | Age: 30
End: 2020-09-15
Payer: COMMERCIAL

## 2020-09-15 ENCOUNTER — TELEPHONE (OUTPATIENT)
Dept: GASTROENTEROLOGY | Facility: CLINIC | Age: 30
End: 2020-09-15

## 2020-09-15 ENCOUNTER — PATIENT OUTREACH (OUTPATIENT)
Dept: GASTROENTEROLOGY | Facility: CLINIC | Age: 30
End: 2020-09-15

## 2020-09-15 ENCOUNTER — VIRTUAL VISIT (OUTPATIENT)
Dept: SURGERY | Facility: CLINIC | Age: 30
End: 2020-09-15
Payer: COMMERCIAL

## 2020-09-15 ENCOUNTER — OFFICE VISIT - HEALTHEAST (OUTPATIENT)
Dept: BEHAVIORAL HEALTH | Facility: CLINIC | Age: 30
End: 2020-09-15

## 2020-09-15 VITALS — HEIGHT: 77 IN | WEIGHT: 315 LBS | BODY MASS INDEX: 37.19 KG/M2

## 2020-09-15 DIAGNOSIS — Z71.3 NUTRITIONAL COUNSELING: ICD-10-CM

## 2020-09-15 DIAGNOSIS — E66.812 CLASS 2 SEVERE OBESITY DUE TO EXCESS CALORIES WITH SERIOUS COMORBIDITY AND BODY MASS INDEX (BMI) OF 37.0 TO 37.9 IN ADULT (H): ICD-10-CM

## 2020-09-15 DIAGNOSIS — E66.01 CLASS 2 SEVERE OBESITY DUE TO EXCESS CALORIES WITH SERIOUS COMORBIDITY AND BODY MASS INDEX (BMI) OF 37.0 TO 37.9 IN ADULT (H): Primary | ICD-10-CM

## 2020-09-15 DIAGNOSIS — E66.812 CLASS 2 SEVERE OBESITY DUE TO EXCESS CALORIES WITH SERIOUS COMORBIDITY AND BODY MASS INDEX (BMI) OF 37.0 TO 37.9 IN ADULT (H): Primary | ICD-10-CM

## 2020-09-15 DIAGNOSIS — E66.01 CLASS 2 SEVERE OBESITY DUE TO EXCESS CALORIES WITH SERIOUS COMORBIDITY AND BODY MASS INDEX (BMI) OF 37.0 TO 37.9 IN ADULT (H): ICD-10-CM

## 2020-09-15 DIAGNOSIS — F33.1 MODERATE EPISODE OF RECURRENT MAJOR DEPRESSIVE DISORDER (H): ICD-10-CM

## 2020-09-15 ASSESSMENT — PAIN SCALES - GENERAL: PAINLEVEL: NO PAIN (1)

## 2020-09-15 ASSESSMENT — MIFFLIN-ST. JEOR: SCORE: 2544.85

## 2020-09-15 NOTE — PROGRESS NOTES
"Higinio Wallace is a 30 year old male who is being evaluated via a billable video visit.      The patient has been notified of following:     \"This video visit will be conducted via a call between you and your physician/provider. We have found that certain health care needs can be provided without the need for an in-person physical exam.  This service lets us provide the care you need with a video conversation.  If a prescription is necessary we can send it directly to your pharmacy.  If lab work is needed we can place an order for that and you can then stop by our lab to have the test done at a later time.    Video visits are billed at different rates depending on your insurance coverage.  Please reach out to your insurance provider with any questions.    If during the course of the call the physician/provider feels a video visit is not appropriate, you will not be charged for this service.\"    Patient has given verbal consent for Video visit? Yes  How would you like to obtain your AVS? MyChart  If you are dropped from the video visit, the video invite should be resent to: Text to cell phone: [phone]  Will anyone else be joining your video visit? No    During this virtual visit the patient is located in [place], patient verifies this as the location during the entirety of this visit.     Video-Visit Details    Type of service:  Video Visit    Video Start Time: 0110PM  Video End Time: 0140 PM    Originating Location (pt. Location): Home    Distant Location (provider location):  [facility]     Platform used for Video Visit: Summu[employer] Render      [facility] Weight Management Consult    PATIENT:  Higinio Wallace  MRN:         [id number]  :         1990  NANCY:         9/15/2020    Dear ,    I had the pleasure of seeing your patient, Higinio Wallace. Full intake/assessment was done to determine barriers to weight loss success and develop a treatment plan. Higinio Wallace is a 30 year old male " "interested in treatment of medical problems associated with excess weight. He has a height of 6' 5.09\", a weight of 321 lbs 0 oz, and the calculated Body mass index is 37.98 kg/m .    He has the following co-morbidities: depression, anxiety, PTSD, congential malrotation of the colon, prediabetes, left UVJ stone    Higinio reports 360 lbs when he was in high school. He managed to lose about 100-150 lbs down to 215 lbs when he tried to go into [employer] at age 24. However he could not get into the [employer] due to the problem with the left knee. At the same time, his father went into the residential, Higinio got depressed and lost motivation. Since then, he gradually gained weight back. His weight today is 321 lbs. He reports having most of his weight in the lower abdomen.     He feels that he is ready to work on his weight again. He reports that his depression and anxiety are stable. He used to be on Zyprexa but not anymore due to increased blood glucose level. He is currently taking aripiprazole (Abilify)  He is taking metformin 1000 mg daily.    Exercise -- not much due to left knee pain and plantar fasciitis    He reports having a lot of GI symptoms including gas, bloating, pain in the lower abdomen, constipation, blood in the stool.     He mostly eats beef and rice. Portion is an issue.    Diet:   BF: protein pancake, cereal, egg, sausage, oatmeal  Lunch: skip, roast beef sandwich, vegetable  Dinner: chicken nugget, tender, burger  Snack: chip    Work at [employer] from 1:30 pm - 10 pm         9/15/2020   I have the following health issues associated with obesity: Type II Diabetes, High Blood Pressure, GERD (Reflux), Asthma, Osteoarthritis (joint disease)   I have the following symptoms associated with obesity: Knee Pain, Depression, Lower Extremity Swelling       Patient Goals 9/15/2020   I am interested in having a healthier weight to diminish current health problems: Yes   I am interested in having a healthier weight in order to " "prevent future health problems: Yes   I am interested in having a healthier weight in order to have a future surgery: No       Referring Provider 9/15/2020   Please name the provider who referred you to Medical Weight Management.  If you do not know, please answer: \"I Don't Know\". Dr. Szymanski       Weight History 9/15/2020   How concerned are you about your weight? Somewhat Concerned   Would you describe your weight gain as gradual? Yes   I became overweight: As a Child   The following factors have contributed to my weight gain:  Lack of Exercise, Other   Please list the other factors.  chronic knee dislocation and malrotation of stomach   I have tried the following methods to lose weight: Watching Portions or Calories, Exercise, Other   Please list the other methods.  tried to join the [employer], did lose weight then   My lowest weight since age 20 was: 215   The most weight I have ever lost was: (lbs) 130   I have the following family history of obesity/being overweight:  My father is overweight, One or more of my siblings are overweight   Has anyone in your family had weight loss surgery? No       Diet Recall Review with Patient 9/15/2020   Do you typically eat breakfast? Yes   If you do eat breakfast, what types of food do you eat? eggs, sausage, gunter, ceral, oatmeal, protein pancakes   Do you typically eat lunch? Yes   If you do eat lunch, what types of food do you typically eat?  roast beef sandwich, tomoatoes, mushrooms, pizza ([employer] food court sometimes), chicken nuggets   Do you typically eat supper? Yes   If you do eat supper, what types of food do you typically eat? once in a while microwave, lamb roast for dinner tonight   Do you typically eat snacks? Yes   If you do snack, what types of food do you typically eat? anything, variety not picky   Do you like vegetables?  Yes   Do you drink water? Yes   How many glasses of juice do you drink in a typical day? 0   How many of glasses of milk do you drink in a " typical day? 1   How many 8oz glasses of sugar containing drinks such as Lan-Aid/sweet tea do you drink in a day? 0   How many cans/bottles of sugar pop/soda/tea/sports drinks do you drink in a day? 0   How many cans/bottles of diet pop/soda/tea or sports drink do you drink in a day? 0   How often do you have a drink of alcohol? Never       Eating Habits 9/15/2020   Generally, my meals include foods like these: bread, pasta, rice, potatoes, corn, crackers, sweet dessert, pop, or juice. Almost Everyday   Generally, my meals include foods like these: fried meats, brats, burgers, french fries, pizza, cheese, chips, or ice cream. A Few Times a Week   Eat fast food (like McDonalds, iiko, Taco Bell). Once a Week   Eat at a buffet or sit-down restaurant. Once a Week   Eat most of my meals in front of the TV or computer. A Few Times a Week   Often skip meals, eat at random times, have no regular eating times. Almost Everyday   Rarely sit down for a meal but snack or graze throughout.  A Few Times a Week   Eat extra snacks between meals. A Few Times a Week   Eat most of my food at the end of the day. A Few Times a Week   Eat in the middle of the night or wake up at night to eat. Once a Week   Eat extra snacks to prevent or correct low blood sugar. A Few Times a Week   Eat to prevent acid reflux or stomach pain. Once a Week   Worry about not having enough food to eat. Never   Have you been to the food shelf at least a few times this year? No   I eat when I am depressed. A Few Times a Week   I eat when I am stressed. A Few Times a Week   I eat when I am bored. A Few Times a Week   I eat when I am anxious. A Few Times a Week   I eat when I am happy or as a reward. A Few Times a Week   I feel hungry all the time even if I just have eaten. Less Than Weekly   Feeling full is important to me. Everyday   I finish all the food on my plate even if I am already full. Everyday   I can't resist eating delicious food or walk past  the good food/smell. Never   I eat/snack without noticing that I am eating. A Few Times a Week   I eat when I am preparing the meal. A Few Times a Week   I eat more than usual when I see others eating. A Few Times a Week   I have trouble not eating sweets, ice cream, cookies, or chips if they are around the house. A Few Times a Week   I think about food all day. Never   Please list any other foods you crave? [place] food ( mom is from [place])       Amount of Food 9/15/2020   I make myself vomit what I have eaten or use laxatives to get rid of food. Never   I eat a large amount of food, like a loaf of bread, a box of cookies, a pint/quart of ice cream, all at once. Monthly   I eat a large amount of food even when I am not hungry. Almost Everyday   I eat rapidly. Everyday   I eat alone because I feel embarrassed and do not want others to see how much I have eaten. Never   I eat until I am uncomfortably full. Monthly   I feel bad, disgusted, or guilty after I overeat. Almost Everyday   I make myself vomit what I have eaten or use laxatives to get rid of food. Never       Activity/Exercise History 9/15/2020   How much of a typical 12 hour day do you spend sitting? Less Than Half the Day   How much of a typical 12 hour day do you spend lying down? Less Than Half the Day   How much of a typical day do you spend walking/standing? Most of the Day   How many hours (not including work) do you spend on the TV/Video Games/Computer/Tablet/Phone? 2-3 Hours   How many times a week are you active for the purpose of exercise? Never   What keeps you from being more active? Pain, Shortness of Breath   How many total minutes do you spend doing some activity for the purpose of exercising when you exercise? None       PAST MEDICAL HISTORY:  Past Medical History:   Diagnosis Date     Complication of anesthesia        Work/Social History Reviewed With Patient 9/15/2020   My employment status is: Full-Time   My job is: [employer]   How much of  your job is spent on the computer or phone? Less Than 50%   How many hours do you spend commuting to work daily?  5 min   What is your marital status? /In a Relationship   If in a relationship, is your significant other overweight? No   Do you have children? No   If you have children, are they overweight? No   Who do you live with?  girlfriend   Are they supportive of your health goals? Yes   Who does the food shopping?  both       Mental Health History Reviewed With Patient 9/15/2020   Have you ever been physically or sexually abused? No   If yes, do you feel that the abuse is affecting your weight? No   If yes, would you like to talk to a counselor about the abuse? No   How often in the past 2 weeks have you felt little interest or pleasure in doing things? For Several Days   Over the past 2 weeks how often have you felt down, depressed, or hopeless? Not at all       Sleep History Reviewed With Patient 9/15/2020   How many hours do you sleep at night? 6   Do you think that you snore loudly or has anybody ever heard you snore loudly (louder than talking or so loud it can be heard behind a shut door)? Yes   Has anyone seen or heard you stop breathing during your sleep? No   Do you often feel tired, fatigued, or sleepy during the day? No   Do you have a TV/Computer in your bedroom? Yes       MEDICATIONS:   Current Outpatient Medications   Medication Sig Dispense Refill     albuterol (ALBUTEROL) 108 (90 BASE) MCG/ACT Inhaler Inhale 2 puffs into the lungs every 4 hours as needed for shortness of breath / dyspnea or wheezing 1 Inhaler 0     fluticasone (FLONASE) 50 MCG/ACT spray 2 sprays       metFORMIN (GLUCOPHAGE) 500 MG tablet Take 500 mg by mouth       OLANZapine (ZYPREXA) 5 MG tablet Take 5 mg by mouth       omeprazole 20 MG tablet        oxyCODONE (ROXICODONE) 5 MG tablet          ALLERGIES:   Allergies   Allergen Reactions     Bee Venom Anaphylaxis and Other (See Comments)     Swelling  Large local  "reactions/ swelling       Fruit Acid Concentrate [Fruit Extracts] Swelling     Lips swell and crust over little bit- tongue gets numb     Liquid Adhesive Dermatitis     And band aid       Monosodium Glutamate Hives       PHYSICAL EXAM:  Ht 1.958 m (6' 5.09\")   Wt 145.6 kg (321 lb)   BMI 37.98 kg/m      Waist circumference:      Wt Readings from Last 4 Encounters:   09/15/20 145.6 kg (321 lb)   09/09/20 145.6 kg (321 lb)   08/27/20 142.9 kg (315 lb)   05/26/20 149.1 kg (328 lb 12.8 oz)   limited due to virtual visit  A & O x 3    Lab  Results for NIKKY ISAAC (MRN [id number]) as of 9/15/2020 13:02   Ref. Range 9/8/2020 13:25   Tissue Transglutaminase Antibody IgA Latest Ref Range: <7 U/mL 1   Tissue Transglutaminase Juliana IgG Latest Ref Range: <7 U/mL <1   TSH Latest Ref Range: 0.40 - 4.00 mU/L 1.50         ASSESSMENT/PLAN:  Nikky is a patient with mature onset obesity with significant element of familial/genetic influence and with current health consequences. He does not need aggressive weight loss plan.  Nikky Isaac eats a high carb diet, eats a high fat diet, uses food as mood management, has perception of intense hunger, eats to obtain specific degree of fullness and mostly eats during the evening.    His problem is complicated by a hunger disorder, strong craving/reward pathways and poor lifestyle choices    His ability to lose weight is impacted by physical impairment, lack of confidence and lack of education on nutrition and dietary needs.    PLAN:    Decrease portion sizes  Decrease eating out  Purge house of food triggers  Dietician visit of education  Calorie/low fat diet  Meal planning  Increase activity     Craving/Reward   Ancillary testing:  N/A.  Food Plan:  High protein/low carbohydrate.   Activity Plan:  Exercise after meals.   Supplementary:  N/A.   Medication:  Discussed topiramate, phentermine and GLP-1 agonist. I prefer him work on diet modification first given the side effects of each " medication and his symptoms.    FOLLOW-UP:   Refer nutritionist  Schedule f/up to see me in 1 month    TIME: 30 min spent on evaluation, management, counseling, education, & motivational interviewing with greater than 50 % of the total time was spent on counseling and coordinating care    Sincerely,    Giselle De La Paz MD

## 2020-09-15 NOTE — PROGRESS NOTES
Pt has some loose stools. Fecal calpro is elevated.     Pt needs to have a colonoscopy in the next 1-2 months    Follow up in 3 months     In basket message to endoscopy schedulers

## 2020-09-15 NOTE — LETTER
"9/15/2020       RE: Higinio Wallace  [address] [facility]  [address] [place] [address]     Dear Colleague,    Thank you for referring your patient, Higinio Wallace, to the [facility] MEDICAL WEIGHT MANAGEMENT at [facility]. Please see a copy of my visit note below.    Higinio Wallace is a 30 year old male who is being evaluated via a billable video visit.      The patient has been notified of following:     \"This video visit will be conducted via a call between you and your physician/provider. We have found that certain health care needs can be provided without the need for an in-person physical exam.  This service lets us provide the care you need with a video conversation.  If a prescription is necessary we can send it directly to your pharmacy.  If lab work is needed we can place an order for that and you can then stop by our lab to have the test done at a later time.    Video visits are billed at different rates depending on your insurance coverage.  Please reach out to your insurance provider with any questions.    If during the course of the call the physician/provider feels a video visit is not appropriate, you will not be charged for this service.\"    Patient has given verbal consent for Video visit? Yes  How would you like to obtain your AVS? MyChart  If you are dropped from the video visit, the video invite should be resent to: Text to cell phone: [phone]  Will anyone else be joining your video visit? No    During this virtual visit the patient is located in [place], patient verifies this as the location during the entirety of this visit.     Video-Visit Details    Type of service:  Video Visit    Video Start Time: 0110PM  Video End Time: 0140 PM    Originating Location (pt. Location): Home    Distant Location (provider location):   [facility]NAGEMENT     Platform used for Video Visit: [employer] [facility] Consult    PATIENT:  Higinio RUBIO" "[place]  MRN:         [id number]  :         1990  NANCY:         9/15/2020    Dear ,    I had the pleasure of seeing your patient, Higinio Wallace. Full intake/assessment was done to determine barriers to weight loss success and develop a treatment plan. Higinio Wallace is a 30 year old male interested in treatment of medical problems associated with excess weight. He has a height of 6' 5.09\", a weight of 321 lbs 0 oz, and the calculated Body mass index is 37.98 kg/m .    He has the following co-morbidities: depression, anxiety, PTSD, congential malrotation of the colon, prediabetes, left UVJ stone    Higinio reports 360 lbs when he was in high school. He managed to lose about 100-150 lbs down to 215 lbs when he tried to go into [employer] at age 24. However he could not get into the [employer] due to the problem with the left knee. At the same time, his father went into the detention, Higinio got depressed and lost motivation. Since then, he gradually gained weight back. His weight today is 321 lbs. He reports having most of his weight in the lower abdomen.     He feels that he is ready to work on his weight again. He reports that his depression and anxiety are stable. He used to be on Zyprexa but not anymore due to increased blood glucose level. He is currently taking aripiprazole (Abilify)  He is taking metformin 1000 mg daily.    Exercise -- not much due to left knee pain and plantar fasciitis    He reports having a lot of GI symptoms including gas, bloating, pain in the lower abdomen, constipation, blood in the stool.     He mostly eats beef and rice. Portion is an issue.    Diet:   BF: protein pancake, cereal, egg, sausage, oatmeal  Lunch: skip, roast beef sandwich, vegetable  Dinner: chicken nugget, tender, burger  Snack: chip    Work at [employer] from 1:30 pm - 10 pm         9/15/2020   I have the following health issues associated with obesity: Type II Diabetes, High Blood Pressure, GERD (Reflux), Asthma, " "Osteoarthritis (joint disease)   I have the following symptoms associated with obesity: Knee Pain, Depression, Lower Extremity Swelling       Patient Goals 9/15/2020   I am interested in having a healthier weight to diminish current health problems: Yes   I am interested in having a healthier weight in order to prevent future health problems: Yes   I am interested in having a healthier weight in order to have a future surgery: No       Referring Provider 9/15/2020   Please name the provider who referred you to Medical Weight Management.  If you do not know, please answer: \"I Don't Know\". Dr. Szymanski       Weight History 9/15/2020   How concerned are you about your weight? Somewhat Concerned   Would you describe your weight gain as gradual? Yes   I became overweight: As a Child   The following factors have contributed to my weight gain:  Lack of Exercise, Other   Please list the other factors.  chronic knee dislocation and malrotation of stomach   I have tried the following methods to lose weight: Watching Portions or Calories, Exercise, Other   Please list the other methods.  tried to join the [employer], did lose weight then   My lowest weight since age 20 was: 215   The most weight I have ever lost was: (lbs) 130   I have the following family history of obesity/being overweight:  My father is overweight, One or more of my siblings are overweight   Has anyone in your family had weight loss surgery? No       Diet Recall Review with Patient 9/15/2020   Do you typically eat breakfast? Yes   If you do eat breakfast, what types of food do you eat? eggs, sausage, gunter, ceral, oatmeal, protein pancakes   Do you typically eat lunch? Yes   If you do eat lunch, what types of food do you typically eat?  roast beef sandwich, tomoatoes, mushrooms, pizza ([employer] food court sometimes), chicken nuggets   Do you typically eat supper? Yes   If you do eat supper, what types of food do you typically eat? once in a while microwave, lamb " roast for dinner tonight   Do you typically eat snacks? Yes   If you do snack, what types of food do you typically eat? anything, variety not picky   Do you like vegetables?  Yes   Do you drink water? Yes   How many glasses of juice do you drink in a typical day? 0   How many of glasses of milk do you drink in a typical day? 1   How many 8oz glasses of sugar containing drinks such as Lan-Aid/sweet tea do you drink in a day? 0   How many cans/bottles of sugar pop/soda/tea/sports drinks do you drink in a day? 0   How many cans/bottles of diet pop/soda/tea or sports drink do you drink in a day? 0   How often do you have a drink of alcohol? Never       Eating Habits 9/15/2020   Generally, my meals include foods like these: bread, pasta, rice, potatoes, corn, crackers, sweet dessert, pop, or juice. Almost Everyday   Generally, my meals include foods like these: fried meats, brats, burgers, french fries, pizza, cheese, chips, or ice cream. A Few Times a Week   Eat fast food (like McDonalds, [employer], Taco Bell). Once a Week   Eat at a buffet or sit-down restaurant. Once a Week   Eat most of my meals in front of the TV or computer. A Few Times a Week   Often skip meals, eat at random times, have no regular eating times. Almost Everyday   Rarely sit down for a meal but snack or graze throughout.  A Few Times a Week   Eat extra snacks between meals. A Few Times a Week   Eat most of my food at the end of the day. A Few Times a Week   Eat in the middle of the night or wake up at night to eat. Once a Week   Eat extra snacks to prevent or correct low blood sugar. A Few Times a Week   Eat to prevent acid reflux or stomach pain. Once a Week   Worry about not having enough food to eat. Never   Have you been to the food shelf at least a few times this year? No   I eat when I am depressed. A Few Times a Week   I eat when I am stressed. A Few Times a Week   I eat when I am bored. A Few Times a Week   I eat when I am anxious. A Few  Times a Week   I eat when I am happy or as a reward. A Few Times a Week   I feel hungry all the time even if I just have eaten. Less Than Weekly   Feeling full is important to me. Everyday   I finish all the food on my plate even if I am already full. Everyday   I can't resist eating delicious food or walk past the good food/smell. Never   I eat/snack without noticing that I am eating. A Few Times a Week   I eat when I am preparing the meal. A Few Times a Week   I eat more than usual when I see others eating. A Few Times a Week   I have trouble not eating sweets, ice cream, cookies, or chips if they are around the house. A Few Times a Week   I think about food all day. Never   Please list any other foods you crave? [place] food ( mom is from there)       Amount of Food 9/15/2020   I make myself vomit what I have eaten or use laxatives to get rid of food. Never   I eat a large amount of food, like a loaf of bread, a box of cookies, a pint/quart of ice cream, all at once. Monthly   I eat a large amount of food even when I am not hungry. Almost Everyday   I eat rapidly. Everyday   I eat alone because I feel embarrassed and do not want others to see how much I have eaten. Never   I eat until I am uncomfortably full. Monthly   I feel bad, disgusted, or guilty after I overeat. Almost Everyday   I make myself vomit what I have eaten or use laxatives to get rid of food. Never       Activity/Exercise History 9/15/2020   How much of a typical 12 hour day do you spend sitting? Less Than Half the Day   How much of a typical 12 hour day do you spend lying down? Less Than Half the Day   How much of a typical day do you spend walking/standing? Most of the Day   How many hours (not including work) do you spend on the TV/Video Games/Computer/Tablet/Phone? 2-3 Hours   How many times a week are you active for the purpose of exercise? Never   What keeps you from being more active? Pain, Shortness of Breath   How many total minutes do  you spend doing some activity for the purpose of exercising when you exercise? None       PAST MEDICAL HISTORY:  Past Medical History:   Diagnosis Date     Complication of anesthesia        Work/Social History Reviewed With Patient 9/15/2020   My employment status is: Full-Time   My job is: [employer]   How much of your job is spent on the computer or phone? Less Than 50%   How many hours do you spend commuting to work daily?  5 min   What is your marital status? /In a Relationship   If in a relationship, is your significant other overweight? No   Do you have children? No   If you have children, are they overweight? No   Who do you live with?  girlfriend   Are they supportive of your health goals? Yes   Who does the food shopping?  both       Mental Health History Reviewed With Patient 9/15/2020   Have you ever been physically or sexually abused? No   If yes, do you feel that the abuse is affecting your weight? No   If yes, would you like to talk to a counselor about the abuse? No   How often in the past 2 weeks have you felt little interest or pleasure in doing things? For Several Days   Over the past 2 weeks how often have you felt down, depressed, or hopeless? Not at all       Sleep History Reviewed With Patient 9/15/2020   How many hours do you sleep at night? 6   Do you think that you snore loudly or has anybody ever heard you snore loudly (louder than talking or so loud it can be heard behind a shut door)? Yes   Has anyone seen or heard you stop breathing during your sleep? No   Do you often feel tired, fatigued, or sleepy during the day? No   Do you have a TV/Computer in your bedroom? Yes       MEDICATIONS:   Current Outpatient Medications   Medication Sig Dispense Refill     albuterol (ALBUTEROL) 108 (90 BASE) MCG/ACT Inhaler Inhale 2 puffs into the lungs every 4 hours as needed for shortness of breath / dyspnea or wheezing 1 Inhaler 0     fluticasone (FLONASE) 50 MCG/ACT spray 2 sprays       metFORMIN  "(GLUCOPHAGE) 500 MG tablet Take 500 mg by mouth       OLANZapine (ZYPREXA) 5 MG tablet Take 5 mg by mouth       omeprazole 20 MG tablet        oxyCODONE (ROXICODONE) 5 MG tablet          ALLERGIES:   Allergies   Allergen Reactions     Bee Venom Anaphylaxis and Other (See Comments)     Swelling  Large local reactions/ swelling       Fruit Acid Concentrate [Fruit Extracts] Swelling     Lips swell and crust over little bit- tongue gets numb     Liquid Adhesive Dermatitis     And band aid       Monosodium Glutamate Hives       PHYSICAL EXAM:  Ht 1.958 m (6' 5.09\")   Wt 145.6 kg (321 lb)   BMI 37.98 kg/m      Waist circumference:      Wt Readings from Last 4 Encounters:   09/15/20 145.6 kg (321 lb)   09/09/20 145.6 kg (321 lb)   08/27/20 142.9 kg (315 lb)   05/26/20 149.1 kg (328 lb 12.8 oz)   limited due to virtual visit  A & O x 3    Lab  Results for NIKKY ISAAC (MRN [id number]) as of 9/15/2020 13:02   Ref. Range 9/8/2020 13:25   Tissue Transglutaminase Antibody IgA Latest Ref Range: <7 U/mL 1   Tissue Transglutaminase Juliana IgG Latest Ref Range: <7 U/mL <1   TSH Latest Ref Range: 0.40 - 4.00 mU/L 1.50         ASSESSMENT/PLAN:  Nikky is a patient with mature onset obesity with significant element of familial/genetic influence and with current health consequences. He does not need aggressive weight loss plan.  Nikky Isaac eats a high carb diet, eats a high fat diet, uses food as mood management, has perception of intense hunger, eats to obtain specific degree of fullness and mostly eats during the evening.    His problem is complicated by a hunger disorder, strong craving/reward pathways and poor lifestyle choices    His ability to lose weight is impacted by physical impairment, lack of confidence and lack of education on nutrition and dietary needs.    PLAN:    Decrease portion sizes  Decrease eating out  Purge house of food triggers  Dietician visit of education  Calorie/low fat diet  Meal planning  Increase " activity     Craving/Reward   Ancillary testing:  N/A.  Food Plan:  High protein/low carbohydrate.   Activity Plan:  Exercise after meals.   Supplementary:  N/A.   Medication:  Discussed topiramate, phentermine and GLP-1 agonist. I prefer him work on diet modification first given the side effects of each medication and his symptoms.    FOLLOW-UP:   Refer nutritionist  Schedule f/up to see me in 1 month    TIME: 30 min spent on evaluation, management, counseling, education, & motivational interviewing with greater than 50 % of the total time was spent on counseling and coordinating care    Sincerely,    Giselle De La Paz MD

## 2020-09-15 NOTE — LETTER
"9/15/2020       RE: Higinio Wallace  [address] [facility]  [address] [place] [address]     Dear Colleague,    Thank you for referring your patient, Higinio Wallace, to the [facility] SURGICAL WEIGHT MANAGEMENT at [facility]. Please see a copy of my visit note below.  New Weight Management Nutrition Consultation    Higinio Wallace is a 30 year old male presents today for new weight management nutrition consultation.  Patient referred by Dr. Giselle George on September 14, 2020.    Patient with Co-morbidities of obesity including:  Type II DM no Pre-Diabetes  Renal Failure no  Sleep apnea no  Hypertension no   Dyslipidemia no  Joint pain no  Back pain no  GERD no     Anthropometrics:  Estimated body mass index is 39.07 kg/m  as calculated from the following:    Height as of 9/9/20: 1.93 m (6' 4\").    Weight as of 9/9/20: 145.6 kg (321 lb).    Medications for Weight Loss:  None at this time     NUTRITION HISTORY  See MD note for details.    Working of decreasing red meat: eating more fish and chicken. He likes fast food for the convince   Would like to create habit to wake up earlier and prep food for the day/week      Diet: beef, rice  BF: protein pancake, cereal, egg, sausage, oatmeal  Lunch: skip, roast beef sandwich, vegetable  Dinner: chicken nugget, tender, burger  Snack: chip  Beverages: water and lactaid milk   - likes green tea     Work: 1:30-10pm    Physical Activity:  Did not discuss today     MALNUTRITION  Video visit: visual NFPE  % Intake: No decreased intake noted  % Weight Loss: None noted  Subcutaneous Fat Loss: None observed  Muscle Loss: None observed  Fluid Accumulation/Edema: Unable to assess  Malnutrition Diagnosis: Patient does not meet two of the established criteria necessary for diagnosing malnutrition    Nutrition Prescription  Recommended energy/nutrient modification.    Nutrition Diagnosis  Obesity r/t long history of self-monitoring deficit and excessive " "energy intake aeb BMI >30.    Nutrition Intervention  Materials/education provided on Volumetric eating to help satiety level on fewer calories; portion control and healthy food choices (Plate Method), meal and snack planning and websites, how to start meal planning     Patient Understanding: good  Expected Compliance: good  Follow-Up Plans: exercise and meal prepping      Nutrition Goals  1)Meal prepping tips:   Wake up 30 minutes early to prep for lunch/dinner:   - plan to go grocery shopping the same day every 1-2 weeks  - Plan 1 day to make 1 meal for lunch or dinners for the week.   2) Use a small plate when eating at home: 9\" Plate method (1/2 plate non-starchy vegetables/fruit, 1/4 plate lean protein, 1/4 plate whole grain starch - no more than 1/2 cup carb/meal)  3) Eat slowly (20-30 minutes per meal)  4) Have a fruit or vegetable at eat meal    Follow-Up: 1 month    Time spent with patient: 21 minutes.    Again, thank you for allowing me to participate in the care of your patient.  Sincerely,    Danyelle Walker, MS, RD, LD  "

## 2020-09-15 NOTE — NURSING NOTE
"Chief Complaint   Patient presents with     Consult     New appointment.       Vitals:    09/15/20 1227   Weight: 145.6 kg (321 lb)   Height: 1.958 m (6' 5.09\")       Body mass index is 37.98 kg/m .                            DONALDO ESCOBAR, EMT    "

## 2020-09-15 NOTE — PROGRESS NOTES
"Higinio Wallace is a 30 year old male who is being evaluated via a billable video visit.      The patient has been notified of following:     \"This video visit will be conducted via a call between you and your physician/provider. We have found that certain health care needs can be provided without the need for an in-person physical exam.  This service lets us provide the care you need with a video conversation.  If a prescription is necessary we can send it directly to your pharmacy.  If lab work is needed we can place an order for that and you can then stop by our lab to have the test done at a later time.    Video visits are billed at different rates depending on your insurance coverage.  Please reach out to your insurance provider with any questions.    If during the course of the call the physician/provider feels a video visit is not appropriate, you will not be charged for this service.\"    Patient has given verbal consent for Video visit? Yes  How would you like to obtain your AVS? MyChart  If you are dropped from the video visit, the video invite should be resent to: Text to cell phone: [phone]  Will anyone else be joining your video visit? No        Video-Visit Details    Type of service:  Video Visit    Video Start Time: 2:36 PM  Video End Time: 2:57 PM    Originating Location (pt. Location): Home    Distant Location (provider location):  [facility] [facility] WEIGHT MANAGEMENT     Platform used for Video Visit: [employer]      New Weight Management Nutrition Consultation    Higinio Wallace is a 30 year old male presents today for new weight management nutrition consultation.  Patient referred by Dr. Giselle George on September 14, 2020.    Patient with Co-morbidities of obesity including:  Type II DM no Pre-Diabetes  Renal Failure no  Sleep apnea no  Hypertension no   Dyslipidemia no  Joint pain no  Back pain no  GERD no     Anthropometrics:  Estimated body mass index is 39.07 kg/m  as calculated from " "the following:    Height as of 9/9/20: 1.93 m (6' 4\").    Weight as of 9/9/20: 145.6 kg (321 lb).    Medications for Weight Loss:  None at this time     NUTRITION HISTORY  See MD note for details.    Working of decreasing red meat: eating more fish and chicken. He likes fast food for the convince   Would like to create habit to wake up earlier and prep food for the day/week      Diet: beef, rice  BF: protein pancake, cereal, egg, sausage, oatmeal  Lunch: skip, roast beef sandwich, vegetable  Dinner: chicken nugget, tender, burger  Snack: chip  Beverages: water and lactaid milk   - likes green tea     Work: 1:30-10pm    Physical Activity:  Did not discuss today     MALNUTRITION  Video visit: visual NFPE  % Intake: No decreased intake noted  % Weight Loss: None noted  Subcutaneous Fat Loss: None observed  Muscle Loss: None observed  Fluid Accumulation/Edema: Unable to assess  Malnutrition Diagnosis: Patient does not meet two of the established criteria necessary for diagnosing malnutrition    Nutrition Prescription  Recommended energy/nutrient modification.    Nutrition Diagnosis  Obesity r/t long history of self-monitoring deficit and excessive energy intake aeb BMI >30.    Nutrition Intervention  Materials/education provided on Volumetric eating to help satiety level on fewer calories; portion control and healthy food choices (Plate Method), meal and snack planning and websites, how to start meal planning     Patient Understanding: good  Expected Compliance: good  Follow-Up Plans: exercise and meal prepping      Nutrition Goals  1)Meal prepping tips:   Wake up 30 minutes early to prep for lunch/dinner:   - plan to go grocery shopping the same day every 1-2 weeks  - Plan 1 day to make 1 meal for lunch or dinners for the week.   2) Use a small plate when eating at home: 9\" Plate method (1/2 plate non-starchy vegetables/fruit, 1/4 plate lean protein, 1/4 plate whole grain starch - no more than 1/2 cup carb/meal)  3) " Eat slowly (20-30 minutes per meal)  4) Have a fruit or vegetable at eat meal      Follow-Up: 1 month    Time spent with patient: 21 minutes.  Danyelle Walker, MS, RD, LD

## 2020-09-15 NOTE — PATIENT INSTRUCTIONS
- work on diet modification  - return in 1 month    If you have any questions, please do not hesitate to call Weight management clinic at [phone] or [phone]    Sincerely,    Giselle De La Paz MD  Endocrinology

## 2020-09-15 NOTE — LETTER
"9/15/2020       RE: Higinio Wallace  [address] [facility]  [address] [place] [address]     Dear Colleague,    Thank you for referring your patient, Higinio Wallace, to the [facility] SURGICAL WEIGHT MANAGEMENT [facility]. Please see a copy of my visit note below.    Higinio Wallace is a 30 year old male who is being evaluated via a billable video visit.      The patient has been notified of following:     \"This video visit will be conducted via a call between you and your physician/provider. We have found that certain health care needs can be provided without the need for an in-person physical exam.  This service lets us provide the care you need with a video conversation.  If a prescription is necessary we can send it directly to your pharmacy.  If lab work is needed we can place an order for that and you can then stop by our lab to have the test done at a later time.    Video visits are billed at different rates depending on your insurance coverage.  Please reach out to your insurance provider with any questions.    If during the course of the call the physician/provider feels a video visit is not appropriate, you will not be charged for this service.\"    Patient has given verbal consent for Video visit? Yes  How would you like to obtain your AVS? MyChart  If you are dropped from the video visit, the video invite should be resent to: Text to cell phone: [phone]  Will anyone else be joining your video visit? No        Video-Visit Details    Type of service:  Video Visit    Video Start Time: 2:36 PM  Video End Time: 2:57 PM    Originating Location (pt. Location): Home    Distant Location (provider location):  [facility] SURGICAL WEIGHT MANAGEMENT     Platform used for Video Visit: Readz      New Weight Management Nutrition Consultation    Higinio Wallace is a 30 year old male presents today for new weight management nutrition consultation.  Patient referred by Dr. Desai " "Ariel on September 14, 2020.    Patient with Co-morbidities of obesity including:  Type II DM no Pre-Diabetes  Renal Failure no  Sleep apnea no  Hypertension no   Dyslipidemia no  Joint pain no  Back pain no  GERD no     Anthropometrics:  Estimated body mass index is 39.07 kg/m  as calculated from the following:    Height as of 9/9/20: 1.93 m (6' 4\").    Weight as of 9/9/20: 145.6 kg (321 lb).    Medications for Weight Loss:  None at this time     NUTRITION HISTORY  See MD note for details.    Working of decreasing red meat: eating more fish and chicken. He likes fast food for the convince   Would like to create habit to wake up earlier and prep food for the day/week      Diet: beef, rice  BF: protein pancake, cereal, egg, sausage, oatmeal  Lunch: skip, roast beef sandwich, vegetable  Dinner: chicken nugget, tender, burger  Snack: chip  Beverages: water and lactaid milk   - likes green tea     Work: 1:30-10pm    Physical Activity:  Did not discuss today     MALNUTRITION  Video visit: visual NFPE  % Intake: No decreased intake noted  % Weight Loss: None noted  Subcutaneous Fat Loss: None observed  Muscle Loss: None observed  Fluid Accumulation/Edema: Unable to assess  Malnutrition Diagnosis: Patient does not meet two of the established criteria necessary for diagnosing malnutrition    Nutrition Prescription  Recommended energy/nutrient modification.    Nutrition Diagnosis  Obesity r/t long history of self-monitoring deficit and excessive energy intake aeb BMI >30.    Nutrition Intervention  Materials/education provided on Volumetric eating to help satiety level on fewer calories; portion control and healthy food choices (Plate Method), meal and snack planning and websites, how to start meal planning     Patient Understanding: good  Expected Compliance: good  Follow-Up Plans: exercise and meal prepping      Nutrition Goals  1)Meal prepping tips:   Wake up 30 minutes early to prep for lunch/dinner:   - plan " "to go grocery shopping the same day every 1-2 weeks  - Plan 1 day to make 1 meal for lunch or dinners for the week.   2) Use a small plate when eating at home: 9\" Plate method (1/2 plate non-starchy vegetables/fruit, 1/4 plate lean protein, 1/4 plate whole grain starch - no more than 1/2 cup carb/meal)  3) Eat slowly (20-30 minutes per meal)  4) Have a fruit or vegetable at eat meal      Follow-Up: 1 month    Time spent with patient: 21 minutes.  Danyelle Walker, MS, RD, LD              Again, thank you for allowing me to participate in the care of your patient.      Sincerely,    Danyelle Walker RD      "

## 2020-09-16 ENCOUNTER — TELEPHONE (OUTPATIENT)
Dept: ENDOCRINOLOGY | Facility: CLINIC | Age: 30
End: 2020-09-16

## 2020-09-16 ENCOUNTER — OFFICE VISIT - HEALTHEAST (OUTPATIENT)
Dept: BEHAVIORAL HEALTH | Facility: CLINIC | Age: 30
End: 2020-09-16

## 2020-09-16 ENCOUNTER — TELEPHONE (OUTPATIENT)
Dept: GASTROENTEROLOGY | Facility: CLINIC | Age: 30
End: 2020-09-16

## 2020-09-16 DIAGNOSIS — Z11.59 ENCOUNTER FOR SCREENING FOR OTHER VIRAL DISEASES: Primary | ICD-10-CM

## 2020-09-16 DIAGNOSIS — F29 PSYCHOSIS, UNSPECIFIED PSYCHOSIS TYPE (H): ICD-10-CM

## 2020-09-16 DIAGNOSIS — F33.1 MODERATE EPISODE OF RECURRENT MAJOR DEPRESSIVE DISORDER (H): ICD-10-CM

## 2020-09-16 ASSESSMENT — ANXIETY QUESTIONNAIRES
2. NOT BEING ABLE TO STOP OR CONTROL WORRYING: SEVERAL DAYS
4. TROUBLE RELAXING: SEVERAL DAYS
1. FEELING NERVOUS, ANXIOUS, OR ON EDGE: SEVERAL DAYS
5. BEING SO RESTLESS THAT IT IS HARD TO SIT STILL: SEVERAL DAYS
GAD7 TOTAL SCORE: 7
6. BECOMING EASILY ANNOYED OR IRRITABLE: SEVERAL DAYS
7. FEELING AFRAID AS IF SOMETHING AWFUL MIGHT HAPPEN: SEVERAL DAYS
3. WORRYING TOO MUCH ABOUT DIFFERENT THINGS: SEVERAL DAYS

## 2020-09-16 ASSESSMENT — PATIENT HEALTH QUESTIONNAIRE - PHQ9: SUM OF ALL RESPONSES TO PHQ QUESTIONS 1-9: 7

## 2020-09-16 NOTE — TELEPHONE ENCOUNTER
Caller patient    Reason for cancel? Physician didn't have correct level of sedation for scheduled date    Rescheduled? Yes- 10-7-20    Will patient call back to reschedule?

## 2020-09-21 ENCOUNTER — VIRTUAL VISIT (OUTPATIENT)
Dept: GASTROENTEROLOGY | Facility: CLINIC | Age: 30
End: 2020-09-21
Payer: COMMERCIAL

## 2020-09-21 DIAGNOSIS — E66.812 CLASS 2 SEVERE OBESITY DUE TO EXCESS CALORIES WITH SERIOUS COMORBIDITY AND BODY MASS INDEX (BMI) OF 37.0 TO 37.9 IN ADULT (H): ICD-10-CM

## 2020-09-21 DIAGNOSIS — Z71.3 NUTRITIONAL COUNSELING: ICD-10-CM

## 2020-09-21 DIAGNOSIS — Q43.3 MALROTATION OF COLON (H): ICD-10-CM

## 2020-09-21 DIAGNOSIS — E66.01 CLASS 2 SEVERE OBESITY DUE TO EXCESS CALORIES WITH SERIOUS COMORBIDITY AND BODY MASS INDEX (BMI) OF 37.0 TO 37.9 IN ADULT (H): ICD-10-CM

## 2020-09-21 DIAGNOSIS — K58.0 IRRITABLE BOWEL SYNDROME WITH DIARRHEA: Primary | ICD-10-CM
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
## 2020-09-22 ENCOUNTER — AMBULATORY - HEALTHEAST (OUTPATIENT)
Dept: BEHAVIORAL HEALTH | Facility: CLINIC | Age: 30
End: 2020-09-22

## 2020-09-22 NOTE — PATIENT INSTRUCTIONS
Mohamud Sylvester,    It was great meeting you today. Below is a summary of what we discussed:    1. Replace intake of high-fat/greasy food items with soft-texture, soluble fiber containing foods   --We specifically discussed consuming plain oatmeal with peanut butter for breakfast and incorporating bananas into your diet  --We also discussed limiting intake of fast food in the evening as much as possible    Best regards,  Abran Yadav, PhD, RD

## 2020-09-23 ENCOUNTER — OFFICE VISIT - HEALTHEAST (OUTPATIENT)
Dept: BEHAVIORAL HEALTH | Facility: CLINIC | Age: 30
End: 2020-09-23

## 2020-09-23 DIAGNOSIS — F33.1 MODERATE EPISODE OF RECURRENT MAJOR DEPRESSIVE DISORDER (H): ICD-10-CM

## 2020-09-24 ENCOUNTER — TELEPHONE (OUTPATIENT)
Dept: ENDOCRINOLOGY | Facility: CLINIC | Age: 30
End: 2020-09-24

## 2020-09-24 ENCOUNTER — COMMUNICATION - HEALTHEAST (OUTPATIENT)
Dept: SCHEDULING | Facility: CLINIC | Age: 30
End: 2020-09-24

## 2020-09-24 NOTE — TELEPHONE ENCOUNTER
----- Message from Sugar Dey MA sent at 9/24/2020 11:07 AM CDT -----  Regarding: FW: Concern with diabetes medications    ----- Message -----  From: Carin Corral RN  Sent: 9/24/2020  11:00 AM CDT  To: [facility]  Subject: FW: Concern with diabetes medications            Can RN reach out to this pt   pt of [facility]   ----- Message -----  From: Abran Yadav RD  Sent: 9/21/2020   9:54 AM CDT  To: Carin Corral RN  Subject: Concern with diabetes medications                Mohamud Del Rosario -    I just finished meeting beth Sylvester. He apparently thought our appointment was at 10 and I woke him up so he was very sleepy during the visit, but he said something about his diabetes medications possibly impacting his ability to walk during the day? I think he was recently seen by endocrinology for weight loss, but if you know who the endo nurse would be working with him, can you ask that they call him about his diabetes meds? Otherwise I can send a note to someone, just not sure who that would be.    Thanks,  Abran

## 2020-09-24 NOTE — TELEPHONE ENCOUNTER
Pt recently seen by Dr Desai in Weight Management. Sent to WM team for review and recommendation. tiffanie Ruvalcaba RN on 9/24/2020 at 11:16 AM

## 2020-09-29 ENCOUNTER — OFFICE VISIT - HEALTHEAST (OUTPATIENT)
Dept: FAMILY MEDICINE | Facility: CLINIC | Age: 30
End: 2020-09-29

## 2020-09-29 ENCOUNTER — VIRTUAL VISIT (OUTPATIENT)
Dept: ORTHOPEDICS | Facility: CLINIC | Age: 30
End: 2020-09-29
Payer: COMMERCIAL

## 2020-09-29 ENCOUNTER — AMBULATORY - HEALTHEAST (OUTPATIENT)
Dept: BEHAVIORAL HEALTH | Facility: CLINIC | Age: 30
End: 2020-09-29

## 2020-09-29 DIAGNOSIS — M72.2 PLANTAR FASCIITIS: ICD-10-CM

## 2020-09-29 DIAGNOSIS — F29 PSYCHOSIS, UNSPECIFIED PSYCHOSIS TYPE (H): ICD-10-CM

## 2020-09-29 DIAGNOSIS — M25.362 PATELLOFEMORAL INSTABILITY OF LEFT KNEE WITH PAIN: Primary | ICD-10-CM

## 2020-09-29 DIAGNOSIS — M25.562 CHRONIC PAIN OF LEFT KNEE: ICD-10-CM

## 2020-09-29 DIAGNOSIS — Q43.3 MALROTATION OF INTESTINE (H): ICD-10-CM

## 2020-09-29 DIAGNOSIS — K21.9 GASTROESOPHAGEAL REFLUX DISEASE WITHOUT ESOPHAGITIS: ICD-10-CM

## 2020-09-29 DIAGNOSIS — F33.1 MODERATE EPISODE OF RECURRENT MAJOR DEPRESSIVE DISORDER (H): ICD-10-CM

## 2020-09-29 DIAGNOSIS — G89.29 CHRONIC PAIN OF LEFT KNEE: ICD-10-CM

## 2020-09-29 DIAGNOSIS — M25.361 PATELLAR INSTABILITY OF RIGHT KNEE: ICD-10-CM

## 2020-09-29 DIAGNOSIS — M25.562 PATELLOFEMORAL INSTABILITY OF LEFT KNEE WITH PAIN: Primary | ICD-10-CM

## 2020-09-29 RX ORDER — ARIPIPRAZOLE 15 MG/1
15 TABLET ORAL EVERY MORNING
COMMUNITY
Start: 2020-09-16 | End: 2021-08-17

## 2020-09-29 NOTE — LETTER
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      Electronically signed by Lela Han MD

## 2020-09-29 NOTE — PROGRESS NOTES
"Reason For Visit:   Chief Complaint   Patient presents with     RECHECK     [facility],  left knee pain and discuss surgeryand rule oyr if palnter facitist in foot / [facility] [employer]        Primary MD: Dr. Franklin [facility]  Ref. MD: Est    ?  No  Occupation  [employer].  Currently working? Yes.  Work status?  Full time.    Type of injury: Multiple on both.    Date of surgery: Jan or Feb 2011 at [facility] at the hospital.  Type of surgery: Left knee MPFL reconstuction.    Smoker: Yes  Request smoking cessation information: No    There were no vitals taken for this visit.    Pain Assessment  Patient Currently in Pain: Yes  0-10 Pain Scale: 2  Primary Pain Location: Knee        Fina Formato, LPN    Video Visit Technology for this patient: LUBB-TEX Video Visit- Patient was left in waiting room     Higinio Wallace is a 30 year old male who is being evaluated via a billable video visit.      The patient has been notified of following:     \"This video visit will be conducted via a call between you and your physician/provider. We have found that certain health care needs can be provided without the need for an in-person physical exam.  This service lets us provide the care you need with a video conversation.  If a prescription is necessary we can send it directly to your pharmacy.  If lab work is needed we can place an order for that and you can then stop by our lab to have the test done at a later time.    Video visits are billed at different rates depending on your insurance coverage.  Please reach out to your insurance provider with any questions.    If during the course of the call the physician/provider feels a video visit is not appropriate, you will not be charged for this service.\"    Patient has given verbal consent for Video visit? yes  How would you like to obtain your AVS? MyChart  If you are dropped from the video visit, the video invite should be resent to: Text to cell " phone: [phone]  Will anyone else be joining your video visit? No        Video-Visit Details    Type of service:  Video Visit    Video Start Time:  9:44  Video End Time:  10:20    Originating Location (pt. Location): Home    Distant Location (provider location):  [facility]     Platform used for Video Visit: F[employer] Christie    Patient is a 30-year-old male who I have seen this summer for bilateral knee instability Left>> Right.  He has been seen by at least 2 other orthopedists in town referred here for further discussion.  He has a complicated social situation, which is well detailed in my original note.    The current time is he is having trouble at work primarily when he is in a job that has been walking floors more.  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    He also is complaining of heel pain.  He wonders if this is due to his diabetes due to his knees or an unrelated problem.  He has been told he is plantar fasciitis.    He is working hard at getting healthy.  He is under the care of a new mental health professional and has changed his meds.  He saw GI doctor mainly due to some complaints.  They have advised getting his knee done so he can have a better approach to exercise for weight control.  They are also putting him up with the dietitian.    His primary care physician is Dr. Perdomo in the [facility] system, and he is seeing him regularly.  His last A1c was within normal limits.  He takes metformin for his diabetes.    He lives with his girlfriend.  He has a brother who is also helpful to him and works in the same location.  He feels that he has good support structure for postoperative care.    This is his first visit since he is obtained imaging.  He is here to discuss surgical recommendations    [facility] imaging: C/D < 1.2, I/S 1.5 (discrepancy due to a very short  patella nose)    MRI:     LEFT knee     LTI angle 8 degrees   Boss:   TT-TG  29mm    Lateral patella tilt 17                Patella trochlear index <10% (difficult to measure accurately due to the significant patella subluxation)              (+) Empty sulcus sign     1. Patellofemoral joint :. Focal high-grade grade  cartilage abnormalities at the junction of the distal patella to the lateral  femoral articular surface, otherwise, the articular cartilage of the  trochlea is preserved.  2. Wiberg type II patella, highly abnormal patellar tilt. The articular cartilage of the patella reveals fissuring at  the mid pole median ridge and higher grade abnormalities at its far  inferior pole articular cartilage.    CT:  Right femoral anteversion is 10 degrees.  Left femoral anteversion is 6 degrees.    Tibial torsion on the right is 38 degrees.  Tibial torsion on the left is 33 degrees.    Tibial Tuberosity to Trochlear groove distance:  Right: 26 mm.  Left: 32 mm.    Assessment: Although his tibial external rotation is just at the threshold for consideration of derotation, I believe that this coupled with his very high TT TG as well as his patella garrett, he would do best with a derotation tibial osteotomy combined with distalization and medialization of his tibial tubercle.  This will then be supplemented with MPFL reconstruction.    This is an outpatient procedure but given his mental health concerns, his diabetic concerns, I would have it done t in the setting that we could keep him overnight should that become necessary.    He was given patient information sheets and was able to discuss the surgery with me.  We will get him surgical date which will likely not be until November. He will take 3 months off of work.    Plan: 1) once we secure a surgical date, we will have him return to our PACS unit for a preoperative assessment.  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    Consultation time 25 minutes    Lela Han MD  Professor Orthopedic Surgery  [facility]

## 2020-09-29 NOTE — LETTER
"    9/29/2020         RE: Higinio Wallace  [address]  [address] [place] [id number]        Dear Colleague,    Thank you for referring your patient, Higinio Wallace, to the [facility]. Please see a copy of my visit note below.    Reason For Visit:   Chief Complaint   Patient presents with     RECHECK     [facility]  left [facility] pain and discuss surgeryand rule oyr if palnter facitist in foot / [facility] [employer]        Primary MD: Dr. Franklin [facility]  Ref. MD: Est    ?  No  Occupation  [employer].  Currently working? Yes.  Work status?  Full time.    Type of injury: Multiple on both.    Date of surgery: Jan or Feb 2011 at [facility] at the [facility].  Type of surgery: Left knee MPFL reconstuction.    Smoker: Yes  Request smoking cessation information: No    There were no vitals taken for this visit.    Pain Assessment  Patient Currently in Pain: Yes  0-10 Pain Scale: 2  Primary Pain Location: Knee        Fina Formato, LPN    Video Visit Technology for this patient: [employer] Video Visit- Patient was left in waiting room     Higinio Wallace is a 30 year old male who is being evaluated via a billable video visit.      The patient has been notified of following:     \"This video visit will be conducted via a call between you and your physician/provider. We have found that certain health care needs can be provided without the need for an in-person physical exam.  This service lets us provide the care you need with a video conversation.  If a prescription is necessary we can send it directly to your pharmacy.  If lab work is needed we can place an order for that and you can then stop by our lab to have the test done at a later time.    Video visits are billed at different rates depending on your insurance coverage.  Please reach out to your insurance provider with any questions.    If during the course of the call the physician/provider feels a video visit is not " "appropriate, you will not be charged for this service.\"    Patient has given verbal consent for Video visit? yes  How would you like to obtain your AVS? MyChart  If you are dropped from the video visit, the video invite should be resent to: Text to cell phone: [phone]  Will anyone else be joining your video visit? No        Video-Visit Details    Type of service:  Video Visit    Video Start Time:  9:44  Video End Time:  10:20    Originating Location (pt. Location): Home    Distant Location (provider location):  [facility]     Platform used for Video Visit: IsadoraWell    Shahnaz    Patient is a 30-year-old male who I have seen this summer for bilateral knee instability Left>> Right.  He has been seen by at least 2 other orthopedists in town referred here for further discussion.  He has a complicated social situation, which is well detailed in my original note.    The current time is he is having trouble at work primarily when he is in a job that has been walking floors more.  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    He also is complaining of heel pain.  He wonders if this is due to his diabetes due to his knees or an unrelated problem.  He has been told he is plantar fasciitis.    He is working hard at getting healthy.  He is under the care of a new mental health professional and has changed his meds.  He saw GI doctor mainly due to some complaints.  They have advised getting his knee done so he can have a better approach to exercise for weight control.  They are also putting him up with the dietitian.    His primary care physician is Dr. Perdomo in the [facility] system, and he is seeing him regularly.  His last A1c was within normal limits.  He takes metformin for his diabetes.    He lives with his girlfriend.  He has a brother who is also helpful to him and works in the same " location.  He feels that he has good support structure for postoperative care.    This is his first visit since he is obtained imaging.  He is here to discuss surgical recommendations    MRI:LEFT knee     LTI angle 8 degrees   Boss:   TT-TG  29mm    Lateral patella tilt 17       1. Patellofemoral joint :. Focal high-grade grade  cartilage abnormalities at the junction of the trochlea to the lateral  femoral articular surface, otherwise, the articular cartilage of the  trochlea is preserved.  2. Wiberg type II patella, highly abnormal patellar tilt. The articular cartilage of the patella reveals fissuring at  the mid pole median ridge and higher grade abnormalities at its far  inferior pole articular cartilage.    CT:  Right femoral anteversion is 10 degrees.  Left femoral anteversion is 6 degrees.    Tibial torsion on the right is 38 degrees.  Tibial torsion on the left is 33 degrees.    Tibial Tuberosity to Trochlear groove distance:  Right: 26 mm.  Left: 32 mm.      Assessment: Although his tibial external rotation is just at the threshold for consideration of derotation, I believe that this coupled with his very high TT TG as well as his patella garrett, he would do best with a derotation tibial osteotomy combined with distalization and medialization of his tibial tubercle.  This will then be supplemented with MPFL reconstruction.    This is an outpatient procedure but given his mental health concerns, his diabetic concerns, I would have it done t in the setting that we could keep him overnight should that become necessary.    He was given patient information sheets and was able to discuss the surgery with me.  We will get him surgical date which will likely not be until November. He will take 3 months off of work.    Plan: 1) once we secure a surgical date, we will have him return to our PACS unit for a preoperative assessment.  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    Consultation time 25 minutes    Lela Han MD  Professor Orthopedic Surgery  [facility]

## 2020-09-30 ENCOUNTER — OFFICE VISIT - HEALTHEAST (OUTPATIENT)
Dept: BEHAVIORAL HEALTH | Facility: CLINIC | Age: 30
End: 2020-09-30

## 2020-09-30 DIAGNOSIS — F33.1 MODERATE EPISODE OF RECURRENT MAJOR DEPRESSIVE DISORDER (H): ICD-10-CM

## 2020-10-03 DIAGNOSIS — Z11.59 ENCOUNTER FOR SCREENING FOR OTHER VIRAL DISEASES: ICD-10-CM

## 2020-10-03 PROCEDURE — U0003 INFECTIOUS AGENT DETECTION BY NUCLEIC ACID (DNA OR RNA); SEVERE ACUTE RESPIRATORY SYNDROME CORONAVIRUS 2 (SARS-COV-2) (CORONAVIRUS DISEASE [COVID-19]), AMPLIFIED PROBE TECHNIQUE, MAKING USE OF HIGH THROUGHPUT TECHNOLOGIES AS DESCRIBED BY CMS-2020-01-R: HCPCS | Performed by: INTERNAL MEDICINE

## 2020-10-04 LAB
SARS-COV-2 RNA SPEC QL NAA+PROBE: NOT DETECTED
SPECIMEN SOURCE: NORMAL

## 2020-10-05 ENCOUNTER — TELEPHONE (OUTPATIENT)
Dept: PHARMACY | Facility: CLINIC | Age: 30
End: 2020-10-05

## 2020-10-05 NOTE — TELEPHONE ENCOUNTER
Patient reports that he forgot about phone consult today with pharmacist. He reports that he would like to reschedule. He reports he has the number to reschedule, [phone].      Lauren Bloch, PharmD  Medication Therapy Management Pharmacist   [facility]   Phone: [phone]

## 2020-10-06 ENCOUNTER — VIRTUAL VISIT (OUTPATIENT)
Dept: GASTROENTEROLOGY | Facility: CLINIC | Age: 30
End: 2020-10-06
Payer: COMMERCIAL

## 2020-10-06 ENCOUNTER — ANESTHESIA EVENT (OUTPATIENT)
Dept: SURGERY | Facility: AMBULATORY SURGERY CENTER | Age: 30
End: 2020-10-06

## 2020-10-06 DIAGNOSIS — F41.9 ANXIETY: ICD-10-CM

## 2020-10-06 DIAGNOSIS — F54 PSYCHOLOGICAL FACTORS AFFECTING MEDICAL CONDITION: ICD-10-CM

## 2020-10-06 DIAGNOSIS — F32.A DEPRESSION, UNSPECIFIED DEPRESSION TYPE: ICD-10-CM

## 2020-10-06 DIAGNOSIS — F43.10 POSTTRAUMATIC STRESS DISORDER: Primary | ICD-10-CM

## 2020-10-06 PROCEDURE — 90791 PSYCH DIAGNOSTIC EVALUATION: CPT | Mod: TEL | Performed by: PSYCHOLOGIST

## 2020-10-06 RX ORDER — ACETAMINOPHEN 325 MG/1
975 TABLET ORAL ONCE
Status: CANCELLED | OUTPATIENT
Start: 2020-10-06 | End: 2020-10-06

## 2020-10-06 RX ORDER — HYDROMORPHONE HYDROCHLORIDE 1 MG/ML
.3-.5 INJECTION, SOLUTION INTRAMUSCULAR; INTRAVENOUS; SUBCUTANEOUS EVERY 10 MIN PRN
Status: CANCELLED | OUTPATIENT
Start: 2020-10-06

## 2020-10-06 RX ORDER — LABETALOL 20 MG/4 ML (5 MG/ML) INTRAVENOUS SYRINGE
10
Status: CANCELLED | OUTPATIENT
Start: 2020-10-06

## 2020-10-06 RX ORDER — MEPERIDINE HYDROCHLORIDE 25 MG/ML
12.5 INJECTION INTRAMUSCULAR; INTRAVENOUS; SUBCUTANEOUS
Status: CANCELLED | OUTPATIENT
Start: 2020-10-06

## 2020-10-06 RX ORDER — FENTANYL CITRATE 50 UG/ML
25-50 INJECTION, SOLUTION INTRAMUSCULAR; INTRAVENOUS
Status: CANCELLED | OUTPATIENT
Start: 2020-10-06

## 2020-10-06 RX ORDER — OXYCODONE HYDROCHLORIDE 5 MG/1
5 TABLET ORAL EVERY 4 HOURS PRN
Status: CANCELLED | OUTPATIENT
Start: 2020-10-06

## 2020-10-06 RX ORDER — SODIUM CHLORIDE, SODIUM LACTATE, POTASSIUM CHLORIDE, CALCIUM CHLORIDE 600; 310; 30; 20 MG/100ML; MG/100ML; MG/100ML; MG/100ML
INJECTION, SOLUTION INTRAVENOUS CONTINUOUS
Status: CANCELLED | OUTPATIENT
Start: 2020-10-06

## 2020-10-06 RX ORDER — NALOXONE HYDROCHLORIDE 0.4 MG/ML
.1-.4 INJECTION, SOLUTION INTRAMUSCULAR; INTRAVENOUS; SUBCUTANEOUS
Status: CANCELLED | OUTPATIENT
Start: 2020-10-06 | End: 2020-10-07

## 2020-10-06 RX ORDER — ALBUTEROL SULFATE 0.83 MG/ML
2.5 SOLUTION RESPIRATORY (INHALATION) EVERY 4 HOURS PRN
Status: CANCELLED | OUTPATIENT
Start: 2020-10-06

## 2020-10-06 RX ORDER — HYDRALAZINE HYDROCHLORIDE 20 MG/ML
2.5-5 INJECTION INTRAMUSCULAR; INTRAVENOUS EVERY 10 MIN PRN
Status: CANCELLED | OUTPATIENT
Start: 2020-10-06

## 2020-10-06 RX ORDER — ONDANSETRON 2 MG/ML
4 INJECTION INTRAMUSCULAR; INTRAVENOUS EVERY 30 MIN PRN
Status: CANCELLED | OUTPATIENT
Start: 2020-10-06

## 2020-10-06 RX ORDER — ONDANSETRON 4 MG/1
4 TABLET, ORALLY DISINTEGRATING ORAL EVERY 30 MIN PRN
Status: CANCELLED | OUTPATIENT
Start: 2020-10-06

## 2020-10-06 ASSESSMENT — ANXIETY QUESTIONNAIRES
4. TROUBLE RELAXING: NEARLY EVERY DAY
7. FEELING AFRAID AS IF SOMETHING AWFUL MIGHT HAPPEN: MORE THAN HALF THE DAYS
3. WORRYING TOO MUCH ABOUT DIFFERENT THINGS: NEARLY EVERY DAY
5. BEING SO RESTLESS THAT IT IS HARD TO SIT STILL: NEARLY EVERY DAY
2. NOT BEING ABLE TO STOP OR CONTROL WORRYING: NEARLY EVERY DAY
1. FEELING NERVOUS, ANXIOUS, OR ON EDGE: NEARLY EVERY DAY
GAD7 TOTAL SCORE: 20
6. BECOMING EASILY ANNOYED OR IRRITABLE: NEARLY EVERY DAY

## 2020-10-06 ASSESSMENT — PATIENT HEALTH QUESTIONNAIRE - PHQ9: SUM OF ALL RESPONSES TO PHQ QUESTIONS 1-9: 21
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
## 2020-10-06 NOTE — LETTER
Date:October 12, 2020      Provider requested that no letter be sent. Do not send.       [facility] Information
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
## 2020-10-07 ENCOUNTER — ANESTHESIA (OUTPATIENT)
Dept: SURGERY | Facility: AMBULATORY SURGERY CENTER | Age: 30
End: 2020-10-07

## 2020-10-07 ENCOUNTER — AMBULATORY - HEALTHEAST (OUTPATIENT)
Dept: BEHAVIORAL HEALTH | Facility: CLINIC | Age: 30
End: 2020-10-07

## 2020-10-07 ENCOUNTER — HOSPITAL ENCOUNTER (OUTPATIENT)
Facility: AMBULATORY SURGERY CENTER | Age: 30
Discharge: HOME | End: 2020-10-07
Attending: INTERNAL MEDICINE | Admitting: INTERNAL MEDICINE
Payer: COMMERCIAL

## 2020-10-07 ENCOUNTER — OFFICE VISIT - HEALTHEAST (OUTPATIENT)
Dept: BEHAVIORAL HEALTH | Facility: CLINIC | Age: 30
End: 2020-10-07

## 2020-10-07 VITALS
DIASTOLIC BLOOD PRESSURE: 73 MMHG | SYSTOLIC BLOOD PRESSURE: 111 MMHG | HEIGHT: 76 IN | TEMPERATURE: 97.9 F | HEART RATE: 75 BPM | BODY MASS INDEX: 38.36 KG/M2 | OXYGEN SATURATION: 97 % | RESPIRATION RATE: 16 BRPM | WEIGHT: 315 LBS

## 2020-10-07 VITALS — HEART RATE: 71 BPM

## 2020-10-07 DIAGNOSIS — F33.1 MODERATE EPISODE OF RECURRENT MAJOR DEPRESSIVE DISORDER (H): ICD-10-CM

## 2020-10-07 LAB — COLONOSCOPY: NORMAL

## 2020-10-07 PROCEDURE — 88305 TISSUE EXAM BY PATHOLOGIST: CPT | Mod: GC | Performed by: PATHOLOGY

## 2020-10-07 PROCEDURE — 45385 COLONOSCOPY W/LESION REMOVAL: CPT

## 2020-10-07 PROCEDURE — 45380 COLONOSCOPY AND BIOPSY: CPT | Mod: 59

## 2020-10-07 RX ORDER — NALOXONE HYDROCHLORIDE 0.4 MG/ML
.1-.4 INJECTION, SOLUTION INTRAMUSCULAR; INTRAVENOUS; SUBCUTANEOUS
Status: CANCELLED | OUTPATIENT
Start: 2020-10-07 | End: 2020-10-08

## 2020-10-07 RX ORDER — LIDOCAINE 40 MG/G
CREAM TOPICAL
Status: DISCONTINUED | OUTPATIENT
Start: 2020-10-07 | End: 2020-10-08 | Stop reason: HOSPADM

## 2020-10-07 RX ORDER — PROCHLORPERAZINE MALEATE 10 MG
10 TABLET ORAL EVERY 6 HOURS PRN
Status: CANCELLED | OUTPATIENT
Start: 2020-10-07

## 2020-10-07 RX ORDER — ONDANSETRON 2 MG/ML
4 INJECTION INTRAMUSCULAR; INTRAVENOUS
Status: DISCONTINUED | OUTPATIENT
Start: 2020-10-07 | End: 2020-10-08 | Stop reason: HOSPADM

## 2020-10-07 RX ORDER — ONDANSETRON 4 MG/1
4 TABLET, ORALLY DISINTEGRATING ORAL EVERY 6 HOURS PRN
Status: CANCELLED | OUTPATIENT
Start: 2020-10-07

## 2020-10-07 RX ORDER — ONDANSETRON 2 MG/ML
4 INJECTION INTRAMUSCULAR; INTRAVENOUS EVERY 6 HOURS PRN
Status: CANCELLED | OUTPATIENT
Start: 2020-10-07

## 2020-10-07 RX ORDER — SIMETHICONE
LIQUID (ML) MISCELLANEOUS PRN
Status: DISCONTINUED | OUTPATIENT
Start: 2020-10-07 | End: 2020-10-07 | Stop reason: HOSPADM

## 2020-10-07 RX ORDER — SODIUM CHLORIDE, SODIUM LACTATE, POTASSIUM CHLORIDE, CALCIUM CHLORIDE 600; 310; 30; 20 MG/100ML; MG/100ML; MG/100ML; MG/100ML
INJECTION, SOLUTION INTRAVENOUS CONTINUOUS PRN
Status: DISCONTINUED | OUTPATIENT
Start: 2020-10-07 | End: 2020-10-07

## 2020-10-07 RX ORDER — PROPOFOL 10 MG/ML
INJECTION, EMULSION INTRAVENOUS PRN
Status: DISCONTINUED | OUTPATIENT
Start: 2020-10-07 | End: 2020-10-07

## 2020-10-07 RX ORDER — PROPOFOL 10 MG/ML
INJECTION, EMULSION INTRAVENOUS CONTINUOUS PRN
Status: DISCONTINUED | OUTPATIENT
Start: 2020-10-07 | End: 2020-10-07

## 2020-10-07 RX ORDER — FLUMAZENIL 0.1 MG/ML
0.2 INJECTION, SOLUTION INTRAVENOUS
Status: CANCELLED | OUTPATIENT
Start: 2020-10-07 | End: 2020-10-07

## 2020-10-07 RX ORDER — ONDANSETRON 2 MG/ML
INJECTION INTRAMUSCULAR; INTRAVENOUS PRN
Status: DISCONTINUED | OUTPATIENT
Start: 2020-10-07 | End: 2020-10-07

## 2020-10-07 RX ADMIN — PROPOFOL 100 MCG/KG/MIN: 10 INJECTION, EMULSION INTRAVENOUS at 07:33

## 2020-10-07 RX ADMIN — PROPOFOL 40 MG: 10 INJECTION, EMULSION INTRAVENOUS at 07:38

## 2020-10-07 RX ADMIN — SODIUM CHLORIDE, SODIUM LACTATE, POTASSIUM CHLORIDE, CALCIUM CHLORIDE: 600; 310; 30; 20 INJECTION, SOLUTION INTRAVENOUS at 07:27

## 2020-10-07 RX ADMIN — ONDANSETRON 4 MG: 2 INJECTION INTRAMUSCULAR; INTRAVENOUS at 07:37

## 2020-10-07 ASSESSMENT — MIFFLIN-ST. JEOR: SCORE: 2527.55

## 2020-10-07 ASSESSMENT — ANXIETY QUESTIONNAIRES: GAD7 TOTAL SCORE: 20

## 2020-10-07 ASSESSMENT — LIFESTYLE VARIABLES: TOBACCO_USE: 1

## 2020-10-07 NOTE — ANESTHESIA PREPROCEDURE EVALUATION
"Anesthesia Pre-Procedure Evaluation    Patient: Higinio Wallace   MRN:     [id number] Gender:   male   Age:    30 year old :      1990        Preoperative Diagnosis: Irregular bowel habits [R19.8]   Procedure(s):  COLONOSCOPY     LABS:  CBC:   Lab Results   Component Value Date    WBC 6.1 2020    HGB 16.7 2020    HCT 48.4 2020     2020     BMP:   Lab Results   Component Value Date     2020    POTASSIUM 3.7 2020    CHLORIDE 109 2020    CO2 25 2020    BUN 8 2020    CR 0.83 2020    GLC 97 2020     COAGS: No results found for: PTT, INR, FIBR  POC: No results found for: BGM, HCG, HCGS  OTHER:   Lab Results   Component Value Date    NEAL 8.7 2020    ALBUMIN 3.7 2020    PROTTOTAL 6.9 2020    ALT 61 2020    AST 24 2020    ALKPHOS 56 2020    BILITOTAL 0.5 2020    TSH 1.50 2020    CRP <2.9 2020        Preop Vitals    BP Readings from Last 3 Encounters:   10/07/20 111/71   20 (!) 149/103   17 133/84    Pulse Readings from Last 3 Encounters:   10/07/20 65   20 93   17 78      Resp Readings from Last 3 Encounters:   10/07/20 16   17 16    SpO2 Readings from Last 3 Encounters:   10/07/20 95%   20 95%   17 95%      Temp Readings from Last 1 Encounters:   10/07/20 36.6  C (97.9  F) (Oral)    Ht Readings from Last 1 Encounters:   10/07/20 1.93 m (6' 4\")      Wt Readings from Last 1 Encounters:   10/07/20 145.6 kg (321 lb)    Estimated body mass index is 39.07 kg/m  as calculated from the following:    Height as of this encounter: 1.93 m (6' 4\").    Weight as of this encounter: 145.6 kg (321 lb).     LDA:  Peripheral IV 10/07/20 Right;Medial Upper forearm (Active)   Number of days: 0        Past Medical History:   Diagnosis Date     Complication of anesthesia       Past Surgical History:   Procedure Laterality Date     KNEE SURGERY Left       Allergies "   Allergen Reactions     Bee Venom Anaphylaxis and Other (See Comments)     Swelling  Large local reactions/ swelling       Fruit Acid Concentrate [Fruit Extracts] Swelling     Lips swell and crust over little bit- tongue gets numb     Liquid Adhesive Dermatitis     And band aid       Monosodium Glutamate Hives        Anesthesia Evaluation     . Pt has had prior anesthetic. Type: General    No history of anesthetic complications          ROS/MED HX    ENT/Pulmonary:  - neg pulmonary ROS   (+)tobacco use, Current use , . .    Neurologic:  - neg neurologic ROS     Cardiovascular:  - neg cardiovascular ROS       METS/Exercise Tolerance:  4 - Raking leaves, gardening   Hematologic:  - neg hematologic  ROS       Musculoskeletal:  - neg musculoskeletal ROS       GI/Hepatic:  - neg GI/hepatic ROS       Renal/Genitourinary:  - ROS Renal section negative       Endo:  - neg endo ROS       Psychiatric:  - neg psychiatric ROS       Infectious Disease:  - neg infectious disease ROS       Malignancy:         Other:                         PHYSICAL EXAM:   Mental Status/Neuro: A/A/O   Airway: Facies: Feasible  Mallampati: I  Mouth/Opening: Full  TM distance: > 6 cm  Neck ROM: Full   Respiratory: Auscultation: CTAB     Resp. Rate: Normal     Resp. Effort: Normal      CV: Rhythm: Regular  Rate: Age appropriate  Heart: Normal Sounds  Edema: None   Comments:      Dental: Normal Dentition                Assessment:   ASA SCORE: 2    H&P: History and physical reviewed and following examination; no interval change.   Smoking Status:  Non-Smoker/Unknown   NPO Status: NPO Appropriate     Plan:   Anes. Type:  MAC   Pre-Medication: None   Induction:  N/a   Airway: Native Airway   Access/Monitoring: PIV   Maintenance: N/a     Postop Plan:   Postop Pain: None  Postop Sedation/Airway: Not planned  Disposition: Outpatient     PONV Management:   Adult Risk Factors:, Non-Smoker   Prevention: Ondansetron, Dexamethasone     CONSENT: Direct  conversation   Plan and risks discussed with: Patient                      Waqar Kent MD, MD

## 2020-10-07 NOTE — DISCHARGE INSTRUCTIONS
Discharge Instructions after Colonoscopy      Activity and Diet  You were given medicine for pain. You may be dizzy or sleepy.  For 24 hours:    Do not drive or use heavy equipment.    Do not make important decisions.    Do not drink any alcohol.  You may return to your normal diet and medicines.    Discomfort    Air was placed in your colon during the exam in order to see it. Walking helps to pass the air.    You may take Tylenol (acetaminophen) for pain unless your doctor has told you not to.  Do not take aspirin or ibuprofen (Advil, Motrin, or other anti-inflammatory  drugs) for _____ days.    Follow-up  ____ We took small tissue samples or polyps to study. Your doctor will call you with the results  within two weeks.    When to call:    Call right away if you have:    Unusual pain in belly or chest pain not relieved with passing air.    More than 1 to 2 Tablespoons of bleeding from your rectum.    Fever above 100.6  F (37.5  C).    If you have severe pain, bleeding, or shortness of breath, go to an emergency room.    If you have questions, call:  Monday to Friday, 7 a.m. to 4:30 p.m.  Endoscopy: [phone] (We may have to call you back)    After hours  Hospital: [phone] (Ask for the GI fellow on call)

## 2020-10-07 NOTE — ANESTHESIA CARE TRANSFER NOTE
Patient: Higinio Wallace    Procedure(s):  COLONOSCOPY, WITH POLYPECTOMY AND BIOPSY    Diagnosis: Irregular bowel habits [R19.8]  Diagnosis Additional Information: No value filed.    Anesthesia Type:   MAC     Note:  Airway :Room Air  Patient transferred to:Phase II  Comments: 103/60 97%  97.7-86-20  Handoff Report: Identifed the Patient, Identified the Reponsible Provider, Reviewed the pertinent medical history, Discussed the surgical course, Reviewed Intra-OP anesthesia mangement and issues during anesthesia, Set expectations for post-procedure period and Allowed opportunity for questions and acknowledgement of understanding      Vitals: (Last set prior to Anesthesia Care Transfer)    CRNA VITALS  10/7/2020 0738 - 10/7/2020 0811      10/7/2020             Pulse:  71    SpO2:  98 %                Electronically Signed By: CARISSA Gunter CRNA  October 7, 2020  8:11 AM

## 2020-10-07 NOTE — ANESTHESIA POSTPROCEDURE EVALUATION
Anesthesia POST Procedure Evaluation    Patient: Higinio Wallace   MRN:     [id number] Gender:   male   Age:    30 year old :      1990        Preoperative Diagnosis: Irregular bowel habits [R19.8]   Procedure(s):  COLONOSCOPY, WITH POLYPECTOMY AND BIOPSY   Postop Comments: No value filed.     Anesthesia Type: MAC       Disposition: Outpatient   Postop Pain Control: Uneventful            Sign Out: Well controlled pain   PONV: No   Neuro/Psych: Uneventful            Sign Out: Acceptable/Baseline neuro status   Airway/Respiratory: Uneventful            Sign Out: Acceptable/Baseline resp. status   CV/Hemodynamics: Uneventful            Sign Out: Acceptable CV status   Other NRE: NONE   DID A NON-ROUTINE EVENT OCCUR? No         Last Anesthesia Record Vitals:  CRNA VITALS  10/7/2020 0738 - 10/7/2020 0838      10/7/2020             Pulse:  71    SpO2:  98 %          Last PACU Vitals:  No vitals data found for the desired time range.        Electronically Signed By: Waqar Kent MD, MD, 2020, 9:58 AM

## 2020-10-07 NOTE — H&P
"Higinio Wallace  [id number]  male  30 year old      Reason for procedure/surgery: diarrhea    Patient Active Problem List   Diagnosis     Irritable bowel syndrome     Class 2 severe obesity due to excess calories with serious comorbidity and body mass index (BMI) of 37.0 to 37.9 in adult (H)       Past Surgical History:    Past Surgical History:   Procedure Laterality Date     KNEE SURGERY Left        Past Medical History:   Past Medical History:   Diagnosis Date     Complication of anesthesia        Social History:   Social History     Tobacco Use     Smoking status: Current Every Day Smoker     Packs/day: 0.25     Types: Cigarettes     Smokeless tobacco: Never Used     Tobacco comment: 1 cig a day trying to quit   Substance Use Topics     Alcohol use: Not Currently       Family History: History reviewed. No pertinent family history.    Allergies:   Allergies   Allergen Reactions     Bee Venom Anaphylaxis and Other (See Comments)     Swelling  Large local reactions/ swelling       Fruit Acid Concentrate [Fruit Extracts] Swelling     Lips swell and crust over little bit- tongue gets numb     Liquid Adhesive Dermatitis     And band aid       Monosodium Glutamate Hives       Active Medications:   Current Outpatient Medications   Medication Sig Dispense Refill     ARIPiprazole (ABILIFY) 15 MG tablet Take 15 mg by mouth daily       metFORMIN (GLUCOPHAGE) 500 MG tablet Take 500 mg by mouth       omeprazole 20 MG tablet        albuterol (ALBUTEROL) 108 (90 BASE) MCG/ACT Inhaler Inhale 2 puffs into the lungs every 4 hours as needed for shortness of breath / dyspnea or wheezing 1 Inhaler 0     bisacodyl (DULCOLAX) 5 MG EC tablet Take 1 tablet (5 mg) by mouth See Admin Instructions Refer to \"Getting Ready for a Colonoscopy\"  My chart message 4 tablet 0     fluticasone (FLONASE) 50 MCG/ACT spray 2 sprays       oxyCODONE (ROXICODONE) 5 MG tablet        polyethylene glycol (GOLYTELY) 236 g suspension Take 4,000 mLs (4 L) by " "mouth See Admin Instructions Refer to \"Getting Ready for a Colonoscopy\" My chart message 4000 mL 0       Systemic Review:   CONSTITUTIONAL: NEGATIVE for fever, chills, change in weight  ENT/MOUTH: NEGATIVE for ear, mouth and throat problems  RESP: NEGATIVE for significant cough or SOB  CV: NEGATIVE for chest pain, palpitations or peripheral edema    Physical Examination:   Vital Signs: /71 (Cuff Size: Adult Large)   Pulse 65   Temp 97.9  F (36.6  C) (Oral)   Resp 16   Ht 1.93 m (6' 4\")   Wt 145.6 kg (321 lb)   SpO2 95%   BMI 39.07 kg/m    GENERAL: healthy, alert and no distress  NECK: no adenopathy, no asymmetry, masses, or scars  RESP: lungs clear to auscultation - no rales, rhonchi or wheezes  CV: regular rate and rhythm, normal S1 S2, no S3 or S4, no murmur, click or rub, no peripheral edema and peripheral pulses strong  ABDOMEN: soft, nontender, no hepatosplenomegaly, no masses and bowel sounds normal  MS: no gross musculoskeletal defects noted, no edema    Plan: Appropriate to proceed as scheduled.      Donell Holland MD  10/7/2020    PCP:  No Ref-Primary, Physician    "

## 2020-10-08 LAB — COPATH REPORT: NORMAL

## 2020-10-14 ENCOUNTER — TELEPHONE (OUTPATIENT)
Dept: ORTHOPEDICS | Facility: CLINIC | Age: 30
End: 2020-10-14

## 2020-10-14 ENCOUNTER — OFFICE VISIT - HEALTHEAST (OUTPATIENT)
Dept: BEHAVIORAL HEALTH | Facility: CLINIC | Age: 30
End: 2020-10-14

## 2020-10-14 DIAGNOSIS — F33.1 MODERATE EPISODE OF RECURRENT MAJOR DEPRESSIVE DISORDER (H): ICD-10-CM

## 2020-10-14 PROBLEM — M25.362 PATELLOFEMORAL INSTABILITY OF LEFT KNEE WITH PAIN: Status: ACTIVE | Noted: 2020-10-14

## 2020-10-14 PROBLEM — M25.562 PATELLOFEMORAL INSTABILITY OF LEFT KNEE WITH PAIN: Status: ACTIVE | Noted: 2020-10-14

## 2020-10-14 NOTE — TELEPHONE ENCOUNTER
Patient is scheduled for surgery with Dr. Han      Spoke or left message with: Spoke with Higinio    Date of Surgery: 11/13/20    Location: [place]    Informed patient they will need an adult  Yes    Pre-op with surgeon (if applicable): Complete    H&P: Scheduled with PAC    Additional imaging/appointments: Patient will await call from covid scheduling team    Surgery packet: Patient will receive at Nurse visit with Rosalia on 11/4/20     Additional comments: Patient will receive arrival time at PAC

## 2020-10-15 DIAGNOSIS — Z11.59 ENCOUNTER FOR SCREENING FOR OTHER VIRAL DISEASES: Primary | ICD-10-CM

## 2020-10-15 NOTE — TELEPHONE ENCOUNTER
FUTURE VISIT INFORMATION      SURGERY INFORMATION:    Date: 20    Location: [facility]    Surgeon:  Lela Han MD    Anesthesia Type:  choice    Procedure: Knee Arthroscopy, Medial Patello-femoral Ligament Reconstruction with Allograft, Distal Tibial Tubercle Osteotomy plus derotation tibial osteotomy    Consult: virtual visit     RECORDS REQUESTED FROM:       Primary Care Provider: Jeffry Hi [facility] - [facility]    Most recent EKG+ Tracin19- [facility]

## 2020-10-21 ENCOUNTER — AMBULATORY - HEALTHEAST (OUTPATIENT)
Dept: BEHAVIORAL HEALTH | Facility: CLINIC | Age: 30
End: 2020-10-21

## 2020-10-21 NOTE — PATIENT INSTRUCTIONS
"Nutrition Goals  1)Meal prepping tips:   Wake up 30 minutes early to prep for lunch/dinner:   - plan to go grocery shopping the same day every 1-2 weeks  - Plan 1 day to make 1 meal for lunch or dinners for the week.   2) Use a small plate when eating at home: 9\" Plate method (1/2 plate non-starchy vegetables/fruit, 1/4 plate lean protein, 1/4 plate whole grain starch - no more than 1/2 cup carb/meal)  3) Eat slowly (20-30 minutes per meal)  4) Have a fruit or vegetable at eat meal      "

## 2020-10-22 ENCOUNTER — OFFICE VISIT - HEALTHEAST (OUTPATIENT)
Dept: BEHAVIORAL HEALTH | Facility: CLINIC | Age: 30
End: 2020-10-22

## 2020-10-22 DIAGNOSIS — F33.1 MODERATE EPISODE OF RECURRENT MAJOR DEPRESSIVE DISORDER (H): ICD-10-CM

## 2020-10-25 NOTE — TELEPHONE ENCOUNTER
DIAGNOSIS: Heel pain, Referred by Dr. Han   APPOINTMENT DATE: 10/25/2020   NOTES STATUS DETAILS   OFFICE NOTE from referring provider Internal 09/29/2020   OFFICE NOTE from other specialist Internal 09/29/2020   DISCHARGE SUMMARY from hospital In process 11/13/2020   DISCHARGE REPORT from the ER N/A    OPERATIVE REPORT In process    MEDICATION LIST Internal    MRI N/A    CT SCAN N/A    XRAYS (IMAGES & REPORTS) N/A

## 2020-10-27 ENCOUNTER — TELEPHONE (OUTPATIENT)
Dept: GASTROENTEROLOGY | Facility: CLINIC | Age: 30
End: 2020-10-27

## 2020-10-28 ENCOUNTER — OFFICE VISIT - HEALTHEAST (OUTPATIENT)
Dept: BEHAVIORAL HEALTH | Facility: CLINIC | Age: 30
End: 2020-10-28

## 2020-10-28 DIAGNOSIS — F33.1 MODERATE EPISODE OF RECURRENT MAJOR DEPRESSIVE DISORDER (H): ICD-10-CM

## 2020-10-30 ENCOUNTER — COMMUNICATION - HEALTHEAST (OUTPATIENT)
Dept: BEHAVIORAL HEALTH | Facility: CLINIC | Age: 30
End: 2020-10-30

## 2020-10-30 DIAGNOSIS — F29 PSYCHOSIS, UNSPECIFIED PSYCHOSIS TYPE (H): ICD-10-CM

## 2020-11-03 ENCOUNTER — AMBULATORY - HEALTHEAST (OUTPATIENT)
Dept: BEHAVIORAL HEALTH | Facility: CLINIC | Age: 30
End: 2020-11-03

## 2020-11-04 ENCOUNTER — ANESTHESIA EVENT (OUTPATIENT)
Dept: SURGERY | Facility: CLINIC | Age: 30
End: 2020-11-04
Payer: COMMERCIAL

## 2020-11-04 ENCOUNTER — PRE VISIT (OUTPATIENT)
Dept: ORTHOPEDICS | Facility: CLINIC | Age: 30
End: 2020-11-04

## 2020-11-04 ENCOUNTER — PRE VISIT (OUTPATIENT)
Dept: SURGERY | Facility: CLINIC | Age: 30
End: 2020-11-04

## 2020-11-04 ENCOUNTER — ALLIED HEALTH/NURSE VISIT (OUTPATIENT)
Dept: ORTHOPEDICS | Facility: CLINIC | Age: 30
End: 2020-11-04
Payer: COMMERCIAL

## 2020-11-04 ENCOUNTER — OFFICE VISIT (OUTPATIENT)
Dept: SURGERY | Facility: CLINIC | Age: 30
End: 2020-11-04
Payer: COMMERCIAL

## 2020-11-04 ENCOUNTER — OFFICE VISIT - HEALTHEAST (OUTPATIENT)
Dept: BEHAVIORAL HEALTH | Facility: CLINIC | Age: 30
End: 2020-11-04

## 2020-11-04 VITALS
SYSTOLIC BLOOD PRESSURE: 135 MMHG | HEART RATE: 68 BPM | DIASTOLIC BLOOD PRESSURE: 84 MMHG | RESPIRATION RATE: 14 BRPM | HEIGHT: 76 IN | OXYGEN SATURATION: 98 % | BODY MASS INDEX: 38.36 KG/M2 | WEIGHT: 315 LBS

## 2020-11-04 DIAGNOSIS — F33.1 MODERATE EPISODE OF RECURRENT MAJOR DEPRESSIVE DISORDER (H): ICD-10-CM

## 2020-11-04 DIAGNOSIS — M25.562 PATELLOFEMORAL INSTABILITY OF LEFT KNEE WITH PAIN: ICD-10-CM

## 2020-11-04 DIAGNOSIS — M25.362 PATELLOFEMORAL INSTABILITY OF LEFT KNEE WITH PAIN: ICD-10-CM

## 2020-11-04 DIAGNOSIS — Z01.818 PRE-OP EXAMINATION: Primary | ICD-10-CM

## 2020-11-04 DIAGNOSIS — M25.362 PATELLOFEMORAL INSTABILITY OF LEFT KNEE WITH PAIN: Primary | ICD-10-CM

## 2020-11-04 DIAGNOSIS — M25.562 PATELLOFEMORAL INSTABILITY OF LEFT KNEE WITH PAIN: Primary | ICD-10-CM

## 2020-11-04 DIAGNOSIS — F29 PSYCHOSIS, UNSPECIFIED PSYCHOSIS TYPE (H): ICD-10-CM

## 2020-11-04 LAB — HBA1C MFR BLD: 5.4 % (ref 0–5.6)

## 2020-11-04 PROCEDURE — 36415 COLL VENOUS BLD VENIPUNCTURE: CPT | Performed by: PATHOLOGY

## 2020-11-04 PROCEDURE — 99203 OFFICE O/P NEW LOW 30 MIN: CPT | Performed by: PHYSICIAN ASSISTANT

## 2020-11-04 PROCEDURE — 99207 PR NO BILLABLE SERVICE THIS VISIT: CPT

## 2020-11-04 PROCEDURE — 83036 HEMOGLOBIN GLYCOSYLATED A1C: CPT | Performed by: PATHOLOGY

## 2020-11-04 ASSESSMENT — ANXIETY QUESTIONNAIRES
7. FEELING AFRAID AS IF SOMETHING AWFUL MIGHT HAPPEN: SEVERAL DAYS
4. TROUBLE RELAXING: MORE THAN HALF THE DAYS
GAD7 TOTAL SCORE: 13
1. FEELING NERVOUS, ANXIOUS, OR ON EDGE: MORE THAN HALF THE DAYS
2. NOT BEING ABLE TO STOP OR CONTROL WORRYING: MORE THAN HALF THE DAYS
3. WORRYING TOO MUCH ABOUT DIFFERENT THINGS: MORE THAN HALF THE DAYS
5. BEING SO RESTLESS THAT IT IS HARD TO SIT STILL: MORE THAN HALF THE DAYS
6. BECOMING EASILY ANNOYED OR IRRITABLE: MORE THAN HALF THE DAYS

## 2020-11-04 ASSESSMENT — ENCOUNTER SYMPTOMS: SEIZURES: 0

## 2020-11-04 ASSESSMENT — MIFFLIN-ST. JEOR: SCORE: 2568.37

## 2020-11-04 ASSESSMENT — PATIENT HEALTH QUESTIONNAIRE - PHQ9: SUM OF ALL RESPONSES TO PHQ QUESTIONS 1-9: 20

## 2020-11-04 ASSESSMENT — PAIN SCALES - GENERAL: PAINLEVEL: NO PAIN (0)

## 2020-11-04 NOTE — PATIENT INSTRUCTIONS
Preparing for Your Surgery      Name:  Higinio Wallace   MRN:  [id number]   :  1990   Today's Date:  2020       Arriving for surgery:  Surgery date:  20  Arrival time:  05:30 am    Restrictions due to COVID 19:  Patients are allowed one visitor in the pre-op period  All visitors must wear a mask  No visitors under 18  No ill visitors   parking is available for anyone with mobility limitations or disabilities.  ([place]  24 hours/ 7 days a week; [facility]  7 am- 3:30 pm, Mon- Fri)    Please come to:   [facility] [address]  [address] [place]  [address]    -Park in the front of [facility]. Park your car in the Green Lot.    -Proceed to the 3rd floor, check in at the Adult Surgery Waiting Lounge. [phone]    If an escort is needed stop at the Information Desk in the lobby. Inform the information person that you are here for surgery. An escort to the Adult Surgery Waiting Lounge will be provided.        What can I eat or drink?  -  You may eat and drink normally for up to 8 hours before your surgery.   -  You may have clear liquids until 2 hours before surgery.   Examples of clear liquids:  Water  Clear broth  Juices (apple, white grape, white cranberry  and cider) without pulp  Noncarbonated, powder based beverages  (lemonade and Lan-Aid)  Sodas (Sprite, 7-Up, ginger ale and seltzer)  Coffee or tea (without milk or cream)  Gatorade    -  No Alcohol for at least 24 hours before surgery     Which medicines can I take?    Hold Aspirin for 7 days before surgery.   Hold Multivitamins for 7 days before surgery.  Hold Supplements for 7 days before surgery.  Hold Ibuprofen (Advil, Motrin) for 1 day before surgery--unless otherwise directed by surgeon.  Hold Naproxen (Aleve) for 4 days before surgery.    -  DO NOT take these medications the day of surgery:  Metformin if normally taken in the morning.  -  PLEASE TAKE these medications the day  of surgery:  Tylenol or oxycodone if needed; take all other scheduled morning medications.  Bring inhaler to surgery.  How do I prepare myself?  - Please take 2 showers before surgery using Scrubcare or Hibiclens soap.    Use this soap only from the neck to your toes.     Leave the soap on your skin for one minute--then rinse thoroughly.      You may use your own shampoo and conditioner; no other hair products.   - Please remove all jewelry and body piercings.  - No lotions, deodorants or fragrance.  - No makeup or fingernail polish.   - Bring your ID and insurance card.    - All patients are required to have a Covid-19 test within 4 days of surgery/procedure.      -Patients will be contacted by the [facility] scheduling team within 1 week of surgery to make an appointment.      - Patients may call the Scheduling team at [phone] if they have not been scheduled within 4 days of  surgery.      ALL PATIENTS GOING HOME THE SAME DAY OF SURGERY ARE REQUIRED TO HAVE A RESPONSIBLE ADULT TO DRIVE AND BE IN ATTENDANCE WITH THEM FOR 24 HOURS FOLLOWING SURGERY     Questions or Concerns:    - For any questions regarding the day of surgery or your hospital stay, please contact the Pre Admission Nursing Office at [phone].       - If you have health changes between today and your surgery please call your surgeon.       For questions after surgery please call your surgeons office.

## 2020-11-04 NOTE — PROGRESS NOTES
Patient presents today for preop teaching. His surgery is scheduled for 11/13. He saw PAC today for preoperative exam. Discussed preop teaching packet as well as plans for postop recovery. Patient lives on the 3rd floor of an apartment building with no elevator. Advised patient that it would be best to line up a place to stay for the first 2 weeks to allow for some recovery before going home. He will check with his Mom today regarding staying with her. Once he is in his apartment, everything is on one floor. He will be partial weightbearing for at least 2 weeks after surgery. Patient received his surgical soap in the PAC clinic. He knows he is to begin physical therapy 5-7 days postop. DIscussed contact information with patient for any questions or concerns before surgery.

## 2020-11-04 NOTE — H&P
Pre-Operative H & P     CC:  Preoperative exam to assess for increased cardiopulmonary risk while undergoing surgery and anesthesia.    Date of Encounter: 11/4/2020  Primary Care Physician:  No Ref-Primary, Physician     Reason for visit: pre operative examination, Patellofemoral instability of left knee with pain    HPI  Higinio Wallace is a 30 year old male who presents for pre-operative H & P in preparation for Knee Arthroscopy, Medial Patello-femoral Ligament Reconstruction with Allograft, Distal Tibial Tubercle Osteotomy, plus derotation tibial osteotomy with Dr. Han and Dr. Keating on 11/13/20 at [facility].     The patient is a 30 year old man who has a past medical history significant for smoking, pre-diabetes, obesity, GERD, malrotation of the intestine, IBS, nephrolithiasis, PTSD, anxiety, recurrent major depression, psychosis and knee pain. The patient has been followed by GI and colorectal surgery for his GI issues. He has been recommended to work on losing more weight before any surgical intervention would be indicated. The patient first met with Dr. Han on 5/26/20 after seeing two outside orthopedic surgeons. He has had issues with bilateral patella instability. He has dislocated his kneecaps multiple times in his life. He did undergo a surgical procedure in 2011 but ultimately this did not improve his issue. At his visit with Dr. Han they discussed next steps in treatment and he was referred for MRI, CT scan and behavioral health consult. He followed up with Dr. Han on 7/28/20  virtually and again on 9/29/20 and he has been counseled for the procedure as above.     History is obtained from the patient and chart review    Past Medical History  Past Medical History:   Diagnosis Date     Anxiety      Class 2 obesity due to excess calories in adult      Complication of anesthesia      Gastroesophageal reflux disease with esophagitis      History of  nephrolithiasis      Irritable bowel syndrome      Major depression, recurrent (H)      Malrotation of intestine      Patellofemoral instability of left knee with pain      Pre-diabetes      Psychosis (H)      PTSD (post-traumatic stress disorder)      Tobacco use        Past Surgical History  Past Surgical History:   Procedure Laterality Date     COLONOSCOPY N/A 10/07/2020    Procedure: COLONOSCOPY, WITH POLYPECTOMY AND BIOPSY;  Surgeon: Donell Holland MD;  Location: [facility] OR     deviated septum  2018     KNEE SURGERY Left        Hx of Blood transfusions/reactions: denies     Hx of abnormal bleeding or anti-platelet use: none    Menstrual history: No LMP for male patient.:     Steroid use in the last year: none    Personal or FH with difficulty with Anesthesia:  denies    Prior to Admission Medications  Current Outpatient Medications   Medication Sig Dispense Refill     ARIPiprazole (ABILIFY) 15 MG tablet Take 15 mg by mouth every morning        metFORMIN (GLUCOPHAGE) 500 MG tablet Take 500 mg by mouth 2 times daily (with meals)        omeprazole 20 MG tablet Take 20 mg by mouth every morning        albuterol (ALBUTEROL) 108 (90 BASE) MCG/ACT Inhaler Inhale 2 puffs into the lungs every 4 hours as needed for shortness of breath / dyspnea or wheezing 1 Inhaler 0       Allergies  Allergies   Allergen Reactions     Bee Venom Anaphylaxis and Other (See Comments)     Swelling  Large local reactions/ swelling       Fruit Acid Concentrate [Fruit Extracts] Swelling     Lips swell and crust over little bit- tongue gets numb     Liquid Adhesive Dermatitis     And band aid       Monosodium Glutamate Hives       Social History  Social History     Socioeconomic History     Marital status: Single     Spouse name: Not on file     Number of children: Not on file     Years of education: Not on file     Highest education level: Not on file   Occupational History     Not on file   Social Needs     Financial resource strain:  Not on file     Food insecurity     Worry: Not on file     Inability: Not on file     Transportation needs     Medical: Not on file     Non-medical: Not on file   Tobacco Use     Smoking status: Current Some Day Smoker     Packs/day: 0.25     Types: Cigarettes     Start date: 2001     Smokeless tobacco: Never Used     Tobacco comment: 1 cig a day trying to quit   Substance and Sexual Activity     Alcohol use: Not Currently     Drug use: Yes     Types: Marijuana     Comment: occasionally     Sexual activity: Yes     Partners: Female     Birth control/protection: Condom   Lifestyle     Physical activity     Days per week: Not on file     Minutes per session: Not on file     Stress: Not on file   Relationships     Social connections     Talks on phone: Not on file     Gets together: Not on file     Attends [employer] service: Not on file     Active member of club or organization: Not on file     Attends meetings of clubs or organizations: Not on file     Relationship status: Not on file     Intimate partner violence     Fear of current or ex partner: Not on file     Emotionally abused: Not on file     Physically abused: Not on file     Forced sexual activity: Not on file   Other Topics Concern     Parent/sibling w/ CABG, MI or angioplasty before 65F 55M? Not Asked   Social History Narrative     Not on file       Family History  Family History   Problem Relation Age of Onset     Other - See Comments Mother         PONV     Cerebrovascular Disease Brother      Atrial fibrillation Brother      Other - See Comments Brother         cardiac autoimmune deficiency       Review of Systems      ROS/MED HX    ENT/Pulmonary:     (+)CARSON risk factors obese, tobacco use (patient down to 1 cigarettes every couple of days. he was smoking 0.25 ppd since 21 years old. ), Current use , . .    Neurologic:  - neg neurologic ROS    (-) seizures, CVA, TIA and migraines   Cardiovascular:  - neg cardiovascular ROS   (+) ----. : . . . :. .  "Previous cardiac testing date:results:date: results:ECG reviewed date:7/30/19 results:SR date: results:          METS/Exercise Tolerance:  >4 METS   Hematologic:  - neg hematologic  ROS      (-) history of blood clots, anemia and History of Transfusion   Musculoskeletal:   (+)  other musculoskeletal- Patellofemoral instability of left knee with pain      GI/Hepatic:     (+) GERD Asymptomatic on medication, Other GI/Hepatic IBS, malrotation of the intestine      Renal/Genitourinary:     (+) Nephrolithiasis ,       Endo:     (+) Obesity, Other Endocrine Disorder pre-diabetes.      Psychiatric:     (+) psychiatric history depression, anxiety and other (comment) (PTSD, psychosis)      Infectious Disease:  - neg infectious disease ROS       Malignancy:      - no malignancy   Other:    (+) H/O Chronic Pain,H/O chronic opiod use , no other significant disability          The complete review of systems is negative other than noted in the HPI or here.       BP: 135/84 Pulse: 68   Resp: 14 SpO2: 98 %         330 lbs 0 oz  6' 4\"   Body mass index is 40.17 kg/m .       Physical Exam  Constitutional: Awake, alert, cooperative, no apparent distress, and appears stated age.  Eyes: Pupils equal, round and reactive to light, extra ocular muscles intact, sclera clear, conjunctiva normal.  HENT: Normocephalic, oral pharynx with moist mucus membranes, good dentition. No goiter appreciated. Thick beard.   Respiratory: Clear to auscultation bilaterally, no crackles or wheezing.  Cardiovascular: Regular rate and rhythm, normal S1 and S2, and no murmur noted.  Carotids +2, no bruits. No edema. Palpable pulses to radial  DP and PT arteries.   GI: Normal bowel sounds, soft, non-distended, non-tender, obese, exam limited secondary to body habitus  Lymph/Hematologic: No cervical lymphadenopathy and no supraclavicular lymphadenopathy.  Genitourinary:  defer  Skin: Warm and dry.  No rashes at anticipated surgical site.   Musculoskeletal: Limited " ROM of neck 2/2 posterior fat pad. There is no redness, warmth, or swelling of the joints. Gross motor strength is normal.    Neurologic: Awake, alert, oriented to name, place and time. Cranial nerves II-XII are grossly intact. Gait is normal.   Neuropsychiatric: Calm, cooperative. Normal affect.     Labs: (personally reviewed)      LABS:  CBC:   Lab Results   Component Value Date    WBC 6.1 09/08/2020    HGB 16.7 09/08/2020    HCT 48.4 09/08/2020     09/08/2020     BMP:   Lab Results   Component Value Date     09/08/2020    POTASSIUM 3.7 09/08/2020    CHLORIDE 109 09/08/2020    CO2 25 09/08/2020    BUN 8 09/08/2020    CR 0.83 09/08/2020    GLC 97 09/08/2020     COAGS: No results found for: PTT, INR, FIBR  POC: No results found for: BGM, HCG, HCGS  OTHER:   Lab Results   Component Value Date    NEAL 8.7 09/08/2020    ALBUMIN 3.7 09/08/2020    PROTTOTAL 6.9 09/08/2020    ALT 61 09/08/2020    AST 24 09/08/2020    ALKPHOS 56 09/08/2020    BILITOTAL 0.5 09/08/2020    TSH 1.50 09/08/2020    CRP <2.9 09/08/2020      Results for NIKKY ISAAC (MRN [id number]) as of 11/4/2020 11:08   Ref. Range 11/4/2020 10:08   Hemoglobin A1C Latest Ref Range: 0 - 5.6 % 5.4     EKG: EKG 7/30/19  Sinus rhythm    The patient's records and results personally reviewed by this provider.     Outside records reviewed from:  Care everywhere     ASSESSMENT and PLAN  Nikky is a 30 year old man who is scheduled for Knee Arthroscopy, Medial Patello-femoral Ligament Reconstruction with Allograft, Distal Tibial Tubercle Osteotomy, plus derotation tibial osteotomy on 11/13/20 by Dr. Han and Dr. Keating in treatment of Patellofemoral instability of left knee with pain.  PAC referral for risk assessment and optimization for anesthesia with comorbid conditions of smoking, pre-diabetes, obesity, GERD, malrotation of the intestine, IBS, nephrolithiasis, PTSD, anxiety, recurrent major depression, psychosis:    Pre-operative  considerations:  1.  Cardiac:  Functional status- METS >4, the patient walks up the 3 flights of stairs where he lives. He reports he does get short of breath at the top of the 3 flights but he continues without stopping.  In the past month he has walked continuously for about 30 minutes. Intermediate risk surgery with 0.4% (RCRI #) risk of major adverse cardiac event. The patient has no cardiac diagnosis and acceptable METS. No further testing indicated.     2.  Pulm:  Airway feasible.  CARSON risk: Low (male, BMI)  ~ Smoking - the patient started smoking at age 21 years old and smoked 0.25 ppd. He is down to 1 cigarettes every other day. We discussed smoking cessation of cigarettes for the DOS and no smoking marijuana for 24 hours prior. The patient agrees to stop.     3. Endo: Pre-diabetes - last A1c was 5.7 on 5/9/20. Will recheck today  ~ Obesity, BMI 39 - consideration for safe lifting techniques.     4. GI:  Risk of PONV score = 2.  If > 2, anti-emetic intervention recommended.  ~ GERD - the patient reports intermittent symptoms and takes omeprazole when he remembers.   ~ Malrotation of the intestine/ IBS - seen by colorectal surgery and GI - patient will need to work on weight loss before any surgical intervention. He will continue bowel regimen.     5. : History of nephrolithiasis - No symptoms today of return of stone.     6. Psych: PTSD, anxiety, recurrent major depression, psychosis - followed by psychology and last seen on 10/6/20. He will continue abilify.     7. Musculoskeletal: Plantar fascitis/ Patellofemoral instability of left knee with pain - procedure as above.     VTE risk: 0.5%    Patient was discussed with Dr Lui.    The patient is optimized for their procedure. AVS with information on surgery time/arrival time, meds and NPO status given by nursing staff.          Laurel Kam PA-C  Preoperative Assessment Center  [facility]  Phone:  [phone]  Fax: [phone]

## 2020-11-04 NOTE — ANESTHESIA PREPROCEDURE EVALUATION
"Anesthesia Pre-Procedure Evaluation    Patient: Higinio Wallace   MRN:     [id number] Gender:   male   Age:    30 year old :      1990        Preoperative Diagnosis: Patellofemoral instability of left knee with pain [M25.362, M25.562]   Procedure(s):  Knee Arthroscopy, Medial Patello-femoral Ligament Reconstruction with Allograft, Distal Tibial Tubercle Osteotomy  plus derotation tibial osteotomy     LABS:  CBC:   Lab Results   Component Value Date    WBC 6.1 2020    HGB 16.7 2020    HCT 48.4 2020     2020     BMP:   Lab Results   Component Value Date     2020    POTASSIUM 3.7 2020    CHLORIDE 109 2020    CO2 25 2020    BUN 8 2020    CR 0.83 2020    GLC 97 2020     COAGS: No results found for: PTT, INR, FIBR  POC: No results found for: BGM, HCG, HCGS  OTHER:   Lab Results   Component Value Date    NEAL 8.7 2020    ALBUMIN 3.7 2020    PROTTOTAL 6.9 2020    ALT 61 2020    AST 24 2020    ALKPHOS 56 2020    BILITOTAL 0.5 2020    TSH 1.50 2020    CRP <2.9 2020        Preop Vitals    BP Readings from Last 3 Encounters:   10/07/20 111/73   20 (!) 149/103   17 133/84    Pulse Readings from Last 3 Encounters:   10/07/20 71   10/07/20 75   20 93      Resp Readings from Last 3 Encounters:   10/07/20 16   17 16    SpO2 Readings from Last 3 Encounters:   10/07/20 97%   20 95%   17 95%      Temp Readings from Last 1 Encounters:   10/07/20 97.9  F (36.6  C) (Oral)    Ht Readings from Last 1 Encounters:   10/07/20 1.93 m (6' 4\")      Wt Readings from Last 1 Encounters:   10/07/20 145.6 kg (321 lb)    Estimated body mass index is 39.07 kg/m  as calculated from the following:    Height as of 10/7/20: 1.93 m (6' 4\").    Weight as of 10/7/20: 145.6 kg (321 lb).     LDA:        Past Medical History:   Diagnosis Date     Anxiety      Class 2 obesity due to excess " calories in adult      Complication of anesthesia      Gastroesophageal reflux disease with esophagitis      History of nephrolithiasis      Irritable bowel syndrome      Major depression, recurrent (H)      Malrotation of intestine      Patellofemoral instability of left knee with pain      Pre-diabetes      Psychosis (H)      PTSD (post-traumatic stress disorder)      Tobacco use       Past Surgical History:   Procedure Laterality Date     COLONOSCOPY N/A 10/7/2020    Procedure: COLONOSCOPY, WITH POLYPECTOMY AND BIOPSY;  Surgeon: Donell Holland MD;  Location: [facility] OR     KNEE SURGERY Left       Allergies   Allergen Reactions     Bee Venom Anaphylaxis and Other (See Comments)     Swelling  Large local reactions/ swelling       Fruit Acid Concentrate [Fruit Extracts] Swelling     Lips swell and crust over little bit- tongue gets numb     Liquid Adhesive Dermatitis     And band aid       Monosodium Glutamate Hives        Anesthesia Evaluation     . Pt has had prior anesthetic. Type: General    History of anesthetic complications   - PONV        ROS/MED HX    ENT/Pulmonary:     (+)CARSON risk factors obese, tobacco use (patient down to 1/2 cigarettes every couple of days. he was smoking 0.25 ppd since 21 years old. ), Current use , . .    Neurologic:  - neg neurologic ROS    (-) seizures, CVA, TIA and migraines   Cardiovascular:  - neg cardiovascular ROS   (+) ----. : . . . :. . Previous cardiac testing date:results:date: results:ECG reviewed date:7/30/19 results:SR date: results:          METS/Exercise Tolerance:  >4 METS   Hematologic:  - neg hematologic  ROS      (-) history of blood clots, anemia and History of Transfusion   Musculoskeletal:   (+)  other musculoskeletal- Patellofemoral instability of left knee with pain      GI/Hepatic:     (+) GERD Asymptomatic on medication, Other GI/Hepatic IBS, malrotation of the intestine      Renal/Genitourinary:     (+) Nephrolithiasis ,       Endo:     (+) Obesity,  Other Endocrine Disorder pre-diabetes.      Psychiatric:     (+) psychiatric history depression, anxiety and other (comment) (PTSD, psychosis)      Infectious Disease:  - neg infectious disease ROS       Malignancy:      - no malignancy   Other:    (+) H/O Chronic Pain,H/O chronic opiod use , no other significant disability                        PHYSICAL EXAM:   Mental Status/Neuro: A/A/O; Age Appropriate   Airway: Facies: Thick Neck (beard)  Mallampati: II  Mouth/Opening: Full  TM distance: > 6 cm  Neck ROM: Limited   Respiratory: Auscultation: CTAB     Resp. Rate: Normal     Resp. Effort: Normal      CV: Rhythm: Regular  Heart: Normal Sounds  Edema: None  Pulses: Normal  Neck: Normal   Comments: Posterior fat pad, thick beard     Dental: Normal Dentition                Assessment:   ASA SCORE: 2    H&P: History and physical reviewed and following examination; no interval change.     Smoking Status:  Active Smoker       - patient did not smoke on day of surgery       - instructed to abstain from smoking on day of procedure   NPO Status: NPO Appropriate     Plan:   Anes. Type:  General   Pre-Medication: Dexmedetomidine   Induction:  IV (Standard)   Airway: ETT; Oral; CMAC/VL   Access/Monitoring: PIV   Maintenance: Balanced     Postop Plan:   Postop Pain: Opioids  Postop Sedation/Airway: Not planned  Disposition: Inpatient/Admit     PONV Management:   Adult Risk Factors:, Postop Opioids   Prevention: Ondansetron, Dexamethasone, Scopolamine     CONSENT: Direct conversation   Plan and risks discussed with: Patient   Blood Products: Consent Deferred (Minimal Blood Loss)       Comments for Plan/Consent:  Discussed risks of anesthesia including nausea, vomiting, sore throat, dental damage, cardiopulmonary complications, neurologic complications, and serious complications.                PAC Discussion and Assessment    ASA Classification: 3  Case is [facility]: [facility]  Anesthetic techniques and relevant risks  discussed: PAC Recommendations anesthetic techniques: choice.  Invasive monitoring and risk discussed:   Types:   Possibility and Risk of blood transfusion discussed:   NPO instructions given:   Additional anesthetic preparation and risks discussed:   Needs early admission to pre-op area:   Other:     PAC Resident/NP Anesthesia Assessment:  Higinio is a 30 year old man who is scheduled for Knee Arthroscopy, Medial Patello-femoral Ligament Reconstruction with Allograft, Distal Tibial Tubercle Osteotomy, plus derotation tibial osteotomy on 11/13/20 by Dr. Han and Dr. Keating in treatment of Patellofemoral instability of left knee with pain.  PAC referral for risk assessment and optimization for anesthesia with comorbid conditions of smoking, pre-diabetes, obesity, GERD, malrotation of the intestine, IBS, nephrolithiasis, PTSD, anxiety, recurrent major depression, psychosis:    Pre-operative considerations:  1.  Cardiac:  Functional status- METS >4, the patient walks up the 3 flights of stairs where he lives. He reports he does get short of breath at the top of the 3 flights but he continues without stopping.  In the past month he has walked continuously for about 30 minutes. Intermediate risk surgery with 0.4% (RCRI #) risk of major adverse cardiac event. The patient has no cardiac diagnosis and acceptable METS. No further testing indicated.     2.  Pulm:  Airway feasible.  CARSON risk: Low (male, BMI)  ~ Smoking - the patient started smoking at age 21 years old and smoked 0.25 ppd. He is down to 1 cigarettes every other day. We discussed smoking cessation of cigarettes for the DOS and no smoking marijuana for 24 hours prior. The patient agrees to stop.     3. Endo: Pre-diabetes - last A1c was 5.7 on 5/9/20. Will recheck today  ~ Obesity, BMI 39 - consideration for safe lifting techniques.     4. GI:  Risk of PONV score = 2.  If > 2, anti-emetic intervention recommended.  ~ GERD - the patient reports intermittent  symptoms and takes omeprazole when he remembers.   ~ Malrotation of the intestine/ IBS - seen by colorectal surgery and GI - patient will need to work on weight loss before any surgical intervention. He will continue bowel regimen.     5. : History of nephrolithiasis - No symptoms today of return of stone.     6. Psych: PTSD, anxiety, recurrent major depression, psychosis - followed by psychology and last seen on 10/6/20. He will continue abilify.     7. Musculoskeletal: Plantar fascitis/ Patellofemoral instability of left knee with pain - procedure as above.     VTE risk: 0.5%    Patient is optimized and is acceptable candidate for the proposed procedure.  No further diagnostic evaluation is needed.     Patient discussed with Dr Lui.     For further details of assessment, testing, and physical exam please see H and P completed on same date.    Laurel Kam PA-C          Mid-Level Provider/Resident: Laurel Kam PA-C  Date: 11/4/20  Time:     Attending Anesthesiologist Anesthesia Assessment:        Anesthesiologist:   Date:   Time:   Pass/Fail:   Disposition:     PAC Pharmacist Assessment:        Pharmacist:   Date:   Time:    Laurel Kam PA-C

## 2020-11-05 ENCOUNTER — TELEPHONE (OUTPATIENT)
Dept: ORTHOPEDICS | Facility: CLINIC | Age: 30
End: 2020-11-05

## 2020-11-05 NOTE — TELEPHONE ENCOUNTER
Received call back from Pt. Completed FMLA and Short Term Disability paperwork with an approximate return to work date of 02/15/21. Faxed to Pt.'s , Anabel Laguerre, at [phone].

## 2020-11-09 ENCOUNTER — AMBULATORY - HEALTHEAST (OUTPATIENT)
Dept: LAB | Facility: CLINIC | Age: 30
End: 2020-11-09

## 2020-11-09 DIAGNOSIS — Z11.59 SPECIAL SCREENING EXAMINATION FOR VIRAL DISEASE: ICD-10-CM

## 2020-11-10 ENCOUNTER — AMBULATORY - HEALTHEAST (OUTPATIENT)
Dept: LAB | Facility: CLINIC | Age: 30
End: 2020-11-10

## 2020-11-10 ENCOUNTER — TELEPHONE (OUTPATIENT)
Dept: ORTHOPEDICS | Facility: CLINIC | Age: 30
End: 2020-11-10

## 2020-11-10 DIAGNOSIS — Z11.59 SPECIAL SCREENING EXAMINATION FOR VIRAL DISEASE: ICD-10-CM

## 2020-11-10 NOTE — TELEPHONE ENCOUNTER
[facility] Call Center    Phone Message    May a detailed message be left on voicemail: yes     Reason for Call: Other: Patient was calling to let Dr. Han and care team know that he spoke to his insurance company and they told him everything should be covered and in network. Insurance company can be reached at [phone] option 2 then option 2 again.     Action Taken: Message routed to:  [facility] ([facility]): Orthopedic    Travel Screening: Not Applicable

## 2020-11-10 NOTE — TELEPHONE ENCOUNTER
Returned call to patient. Patient spoke with his insurance company who verified he is covered for both Dr. Han and the facility. Patient will be staying with his mother after surgery and understands that he will be outpatient and go home the day of his surgery. He had his COVID test performed today. Preop H&P done on 11/4.

## 2020-11-11 ENCOUNTER — COMMUNICATION - HEALTHEAST (OUTPATIENT)
Dept: SCHEDULING | Facility: CLINIC | Age: 30
End: 2020-11-11

## 2020-11-13 ENCOUNTER — ANESTHESIA (OUTPATIENT)
Dept: SURGERY | Facility: CLINIC | Age: 30
End: 2020-11-13
Payer: COMMERCIAL

## 2020-11-13 ENCOUNTER — APPOINTMENT (OUTPATIENT)
Dept: GENERAL RADIOLOGY | Facility: CLINIC | Age: 30
End: 2020-11-13
Attending: ORTHOPAEDIC SURGERY
Payer: COMMERCIAL

## 2020-11-13 ENCOUNTER — RECORDS - HEALTHEAST (OUTPATIENT)
Dept: ADMINISTRATIVE | Facility: OTHER | Age: 30
End: 2020-11-13

## 2020-11-13 ENCOUNTER — HOSPITAL ENCOUNTER (OUTPATIENT)
Facility: CLINIC | Age: 30
Discharge: HOME | End: 2020-11-14
Attending: ORTHOPAEDIC SURGERY | Admitting: ORTHOPAEDIC SURGERY
Payer: COMMERCIAL

## 2020-11-13 DIAGNOSIS — M25.362 PATELLOFEMORAL INSTABILITY OF LEFT KNEE WITH PAIN: ICD-10-CM

## 2020-11-13 DIAGNOSIS — M25.562 PATELLOFEMORAL INSTABILITY OF LEFT KNEE WITH PAIN: ICD-10-CM

## 2020-11-13 LAB
GLUCOSE BLDC GLUCOMTR-MCNC: 114 MG/DL (ref 70–99)
GLUCOSE BLDC GLUCOMTR-MCNC: 95 MG/DL (ref 70–99)

## 2020-11-13 PROCEDURE — C1762 CONN TISS, HUMAN(INC FASCIA): HCPCS | Performed by: ORTHOPAEDIC SURGERY

## 2020-11-13 PROCEDURE — 360N000031 HC SURGERY LEVEL 4 W FLUORO 1ST 30 MIN - UMMC: Performed by: ORTHOPAEDIC SURGERY

## 2020-11-13 PROCEDURE — 999N000140 HC STATISTIC PRE-PROCEDURE ASSESSMENT III: Performed by: ORTHOPAEDIC SURGERY

## 2020-11-13 PROCEDURE — 761N000004 HC RECOVERY PHASE 1 LEVEL 2 EA ADDTL HR: Performed by: ORTHOPAEDIC SURGERY

## 2020-11-13 PROCEDURE — 999N001017 HC STATISTIC GLUCOSE BY METER IP

## 2020-11-13 PROCEDURE — 27705 OSTEOTOMY TIBIA: CPT | Mod: LT | Performed by: ORTHOPAEDIC SURGERY

## 2020-11-13 PROCEDURE — 250N000011 HC RX IP 250 OP 636: Performed by: ORTHOPAEDIC SURGERY

## 2020-11-13 PROCEDURE — 250N000013 HC RX MED GY IP 250 OP 250 PS 637: Performed by: CLINICAL NURSE SPECIALIST

## 2020-11-13 PROCEDURE — 250N000013 HC RX MED GY IP 250 OP 250 PS 637: Performed by: STUDENT IN AN ORGANIZED HEALTH CARE EDUCATION/TRAINING PROGRAM

## 2020-11-13 PROCEDURE — 272N000002 HC OR SUPPLY OTHER OPNP: Performed by: ORTHOPAEDIC SURGERY

## 2020-11-13 PROCEDURE — 250N000013 HC RX MED GY IP 250 OP 250 PS 637: Performed by: ORTHOPAEDIC SURGERY

## 2020-11-13 PROCEDURE — 761N000003 HC RECOVERY PHASE 1 LEVEL 2 FIRST HR: Performed by: ORTHOPAEDIC SURGERY

## 2020-11-13 PROCEDURE — 27425 LAT RETINACULAR RELEASE OPEN: CPT | Mod: 59 | Performed by: ORTHOPAEDIC SURGERY

## 2020-11-13 PROCEDURE — 370N000002 HC ANESTHESIA TECHNICAL FEE, EACH ADDTL 15 MIN: Performed by: ORTHOPAEDIC SURGERY

## 2020-11-13 PROCEDURE — 250N000011 HC RX IP 250 OP 636: Performed by: STUDENT IN AN ORGANIZED HEALTH CARE EDUCATION/TRAINING PROGRAM

## 2020-11-13 PROCEDURE — 250N000002 HC ISOFLURANE, EA 15 MIN: Performed by: ORTHOPAEDIC SURGERY

## 2020-11-13 PROCEDURE — 250N000011 HC RX IP 250 OP 636: Performed by: CLINICAL NURSE SPECIALIST

## 2020-11-13 PROCEDURE — 360N000029 HC SURGERY LEVEL 4 EA 15 ADDTL MIN - UMMC: Performed by: ORTHOPAEDIC SURGERY

## 2020-11-13 PROCEDURE — 250N000011 HC RX IP 250 OP 636: Performed by: ANESTHESIOLOGY

## 2020-11-13 PROCEDURE — 27427 RECONSTRUCTION KNEE: CPT | Mod: LT | Performed by: ORTHOPAEDIC SURGERY

## 2020-11-13 PROCEDURE — 27705 OSTEOTOMY TIBIA: CPT | Mod: AS | Performed by: PHYSICIAN ASSISTANT

## 2020-11-13 PROCEDURE — 250N000009 HC RX 250: Performed by: ORTHOPAEDIC SURGERY

## 2020-11-13 PROCEDURE — 250N000009 HC RX 250: Performed by: NURSE ANESTHETIST, CERTIFIED REGISTERED

## 2020-11-13 PROCEDURE — 258N000001 HC RX 258: Performed by: ORTHOPAEDIC SURGERY

## 2020-11-13 PROCEDURE — 99207 PR CDG-CODE CATEGORY CHANGED: CPT | Performed by: INTERNAL MEDICINE

## 2020-11-13 PROCEDURE — 370N000001 HC ANESTHESIA TECHNICAL FEE, 1ST 30 MIN: Performed by: ORTHOPAEDIC SURGERY

## 2020-11-13 PROCEDURE — 250N000003 HC SEVOFLURANE, EA 15 MIN: Performed by: ORTHOPAEDIC SURGERY

## 2020-11-13 PROCEDURE — 250N000011 HC RX IP 250 OP 636: Performed by: NURSE ANESTHETIST, CERTIFIED REGISTERED

## 2020-11-13 PROCEDURE — 250N000013 HC RX MED GY IP 250 OP 250 PS 637: Performed by: ANESTHESIOLOGY

## 2020-11-13 PROCEDURE — 999N000180 XR SURGERY CARM FLUORO LESS THAN 5 MIN: Mod: TC

## 2020-11-13 PROCEDURE — C1713 ANCHOR/SCREW BN/BN,TIS/BN: HCPCS | Performed by: ORTHOPAEDIC SURGERY

## 2020-11-13 PROCEDURE — 258N000003 HC RX IP 258 OP 636: Performed by: NURSE ANESTHETIST, CERTIFIED REGISTERED

## 2020-11-13 PROCEDURE — 272N000001 HC OR GENERAL SUPPLY STERILE: Performed by: ORTHOPAEDIC SURGERY

## 2020-11-13 PROCEDURE — 250N000009 HC RX 250: Performed by: STUDENT IN AN ORGANIZED HEALTH CARE EDUCATION/TRAINING PROGRAM

## 2020-11-13 PROCEDURE — 258N000003 HC RX IP 258 OP 636: Performed by: CLINICAL NURSE SPECIALIST

## 2020-11-13 PROCEDURE — 99204 OFFICE O/P NEW MOD 45 MIN: CPT | Performed by: INTERNAL MEDICINE

## 2020-11-13 PROCEDURE — 258N000003 HC RX IP 258 OP 636: Performed by: ORTHOPAEDIC SURGERY

## 2020-11-13 PROCEDURE — 27420 REVISION OF UNSTABLE KNEECAP: CPT | Mod: LT | Performed by: ORTHOPAEDIC SURGERY

## 2020-11-13 PROCEDURE — C1769 GUIDE WIRE: HCPCS | Performed by: ORTHOPAEDIC SURGERY

## 2020-11-13 DEVICE — BIO-COMP SWVLK 3.5X 15.8MM
Type: IMPLANTABLE DEVICE | Site: LEG | Status: FUNCTIONAL
Brand: ARTHREX®

## 2020-11-13 DEVICE — GRAFT TENDON GRACILIS 430300: Type: IMPLANTABLE DEVICE | Site: LEG | Status: FUNCTIONAL

## 2020-11-13 RX ORDER — HYDROMORPHONE HYDROCHLORIDE 1 MG/ML
.3-.5 INJECTION, SOLUTION INTRAMUSCULAR; INTRAVENOUS; SUBCUTANEOUS EVERY 10 MIN PRN
Status: DISCONTINUED | OUTPATIENT
Start: 2020-11-13 | End: 2020-11-13 | Stop reason: HOSPADM

## 2020-11-13 RX ORDER — POLYETHYLENE GLYCOL 3350 17 G/17G
1 POWDER, FOR SOLUTION ORAL DAILY
Qty: 7 PACKET | Refills: 0 | Status: SHIPPED | OUTPATIENT
Start: 2020-11-13 | End: 2020-11-13

## 2020-11-13 RX ORDER — ACETAMINOPHEN 325 MG/1
650 TABLET ORAL EVERY 4 HOURS
Qty: 100 TABLET | Refills: 0 | Status: SHIPPED | OUTPATIENT
Start: 2020-11-13 | End: 2020-11-13

## 2020-11-13 RX ORDER — CEFAZOLIN SODIUM 2 G/100ML
2 INJECTION, SOLUTION INTRAVENOUS EVERY 8 HOURS
Status: COMPLETED | OUTPATIENT
Start: 2020-11-13 | End: 2020-11-14

## 2020-11-13 RX ORDER — HYDROXYZINE HYDROCHLORIDE 25 MG/1
50 TABLET, FILM COATED ORAL EVERY 6 HOURS PRN
Qty: 40 TABLET | Refills: 0 | Status: SHIPPED | OUTPATIENT
Start: 2020-11-13 | End: 2021-08-17

## 2020-11-13 RX ORDER — ACETAMINOPHEN 325 MG/1
975 TABLET ORAL EVERY 8 HOURS
Status: DISCONTINUED | OUTPATIENT
Start: 2020-11-13 | End: 2020-11-13

## 2020-11-13 RX ORDER — ALBUTEROL SULFATE 0.83 MG/ML
2.5 SOLUTION RESPIRATORY (INHALATION) EVERY 4 HOURS PRN
Status: DISCONTINUED | OUTPATIENT
Start: 2020-11-13 | End: 2020-11-13 | Stop reason: HOSPADM

## 2020-11-13 RX ORDER — BISACODYL 10 MG
10 SUPPOSITORY, RECTAL RECTAL DAILY PRN
Status: DISCONTINUED | OUTPATIENT
Start: 2020-11-13 | End: 2020-11-14 | Stop reason: HOSPADM

## 2020-11-13 RX ORDER — ONDANSETRON 4 MG/1
4 TABLET, ORALLY DISINTEGRATING ORAL EVERY 6 HOURS PRN
Status: DISCONTINUED | OUTPATIENT
Start: 2020-11-13 | End: 2020-11-14 | Stop reason: HOSPADM

## 2020-11-13 RX ORDER — KETOROLAC TROMETHAMINE 15 MG/ML
15 INJECTION, SOLUTION INTRAMUSCULAR; INTRAVENOUS EVERY 6 HOURS
Status: DISCONTINUED | OUTPATIENT
Start: 2020-11-13 | End: 2020-11-14 | Stop reason: HOSPADM

## 2020-11-13 RX ORDER — ONDANSETRON 2 MG/ML
INJECTION INTRAMUSCULAR; INTRAVENOUS PRN
Status: DISCONTINUED | OUTPATIENT
Start: 2020-11-13 | End: 2020-11-13

## 2020-11-13 RX ORDER — LIDOCAINE 40 MG/G
CREAM TOPICAL
Status: DISCONTINUED | OUTPATIENT
Start: 2020-11-13 | End: 2020-11-13

## 2020-11-13 RX ORDER — ACETAMINOPHEN 325 MG/1
975 TABLET ORAL ONCE
Status: DISCONTINUED | OUTPATIENT
Start: 2020-11-13 | End: 2020-11-13 | Stop reason: HOSPADM

## 2020-11-13 RX ORDER — MEPERIDINE HYDROCHLORIDE 25 MG/ML
12.5 INJECTION INTRAMUSCULAR; INTRAVENOUS; SUBCUTANEOUS
Status: DISCONTINUED | OUTPATIENT
Start: 2020-11-13 | End: 2020-11-13

## 2020-11-13 RX ORDER — ASPIRIN 81 MG/1
162 TABLET ORAL DAILY
Status: DISCONTINUED | OUTPATIENT
Start: 2020-11-14 | End: 2020-11-13

## 2020-11-13 RX ORDER — BUPIVACAINE HYDROCHLORIDE 2.5 MG/ML
INJECTION, SOLUTION EPIDURAL; INFILTRATION; INTRACAUDAL PRN
Status: DISCONTINUED | OUTPATIENT
Start: 2020-11-13 | End: 2020-11-13

## 2020-11-13 RX ORDER — ASPIRIN 81 MG/1
162 TABLET ORAL DAILY
Qty: 60 TABLET | Refills: 0 | Status: SHIPPED | OUTPATIENT
Start: 2020-11-13 | End: 2020-11-13

## 2020-11-13 RX ORDER — DOCUSATE SODIUM 100 MG/1
100 CAPSULE, LIQUID FILLED ORAL 2 TIMES DAILY
Status: DISCONTINUED | OUTPATIENT
Start: 2020-11-13 | End: 2020-11-13

## 2020-11-13 RX ORDER — ONDANSETRON 4 MG/1
4 TABLET, ORALLY DISINTEGRATING ORAL EVERY 30 MIN PRN
Status: DISCONTINUED | OUTPATIENT
Start: 2020-11-13 | End: 2020-11-13 | Stop reason: HOSPADM

## 2020-11-13 RX ORDER — PROPOFOL 10 MG/ML
INJECTION, EMULSION INTRAVENOUS PRN
Status: DISCONTINUED | OUTPATIENT
Start: 2020-11-13 | End: 2020-11-13

## 2020-11-13 RX ORDER — HYDROMORPHONE HYDROCHLORIDE 1 MG/ML
0.3 INJECTION, SOLUTION INTRAMUSCULAR; INTRAVENOUS; SUBCUTANEOUS
Status: DISCONTINUED | OUTPATIENT
Start: 2020-11-13 | End: 2020-11-13

## 2020-11-13 RX ORDER — ONDANSETRON 2 MG/ML
4 INJECTION INTRAMUSCULAR; INTRAVENOUS EVERY 30 MIN PRN
Status: DISCONTINUED | OUTPATIENT
Start: 2020-11-13 | End: 2020-11-13

## 2020-11-13 RX ORDER — FENTANYL CITRATE 50 UG/ML
25-50 INJECTION, SOLUTION INTRAMUSCULAR; INTRAVENOUS
Status: DISCONTINUED | OUTPATIENT
Start: 2020-11-13 | End: 2020-11-13 | Stop reason: HOSPADM

## 2020-11-13 RX ORDER — HYDRALAZINE HYDROCHLORIDE 20 MG/ML
2.5-5 INJECTION INTRAMUSCULAR; INTRAVENOUS EVERY 10 MIN PRN
Status: DISCONTINUED | OUTPATIENT
Start: 2020-11-13 | End: 2020-11-13 | Stop reason: HOSPADM

## 2020-11-13 RX ORDER — LORAZEPAM 2 MG/ML
1 INJECTION INTRAMUSCULAR
Status: DISCONTINUED | OUTPATIENT
Start: 2020-11-13 | End: 2020-11-13 | Stop reason: HOSPADM

## 2020-11-13 RX ORDER — HYDROMORPHONE HCL IN WATER/PF 6 MG/30 ML
0.2 PATIENT CONTROLLED ANALGESIA SYRINGE INTRAVENOUS
Status: DISCONTINUED | OUTPATIENT
Start: 2020-11-13 | End: 2020-11-13

## 2020-11-13 RX ORDER — ACETAMINOPHEN 325 MG/1
650 TABLET ORAL EVERY 4 HOURS PRN
Status: DISCONTINUED | OUTPATIENT
Start: 2020-11-16 | End: 2020-11-13

## 2020-11-13 RX ORDER — CEFAZOLIN SODIUM IN 0.9 % NACL 3 G/100 ML
3 INTRAVENOUS SOLUTION, PIGGYBACK (ML) INTRAVENOUS
Status: COMPLETED | OUTPATIENT
Start: 2020-11-13 | End: 2020-11-13

## 2020-11-13 RX ORDER — HYDROMORPHONE HYDROCHLORIDE 1 MG/ML
.2-.4 INJECTION, SOLUTION INTRAMUSCULAR; INTRAVENOUS; SUBCUTANEOUS EVERY 10 MIN PRN
Status: DISCONTINUED | OUTPATIENT
Start: 2020-11-13 | End: 2020-11-13 | Stop reason: HOSPADM

## 2020-11-13 RX ORDER — DEXTROSE MONOHYDRATE 25 G/50ML
25-50 INJECTION, SOLUTION INTRAVENOUS
Status: DISCONTINUED | OUTPATIENT
Start: 2020-11-13 | End: 2020-11-14 | Stop reason: HOSPADM

## 2020-11-13 RX ORDER — OXYCODONE HYDROCHLORIDE 5 MG/1
5-10 TABLET ORAL EVERY 4 HOURS PRN
Qty: 1 TABLET | Refills: 0 | Status: SHIPPED | OUTPATIENT
Start: 2020-11-13 | End: 2020-11-13

## 2020-11-13 RX ORDER — POLYETHYLENE GLYCOL 3350 17 G/17G
1 POWDER, FOR SOLUTION ORAL DAILY
Qty: 7 PACKET | Refills: 0 | Status: SHIPPED | OUTPATIENT
Start: 2020-11-13 | End: 2021-10-18

## 2020-11-13 RX ORDER — SODIUM CHLORIDE 9 MG/ML
INJECTION, SOLUTION INTRAVENOUS CONTINUOUS
Status: DISCONTINUED | OUTPATIENT
Start: 2020-11-13 | End: 2020-11-14 | Stop reason: HOSPADM

## 2020-11-13 RX ORDER — FENTANYL CITRATE 50 UG/ML
INJECTION, SOLUTION INTRAMUSCULAR; INTRAVENOUS PRN
Status: DISCONTINUED | OUTPATIENT
Start: 2020-11-13 | End: 2020-11-13

## 2020-11-13 RX ORDER — ASPIRIN 81 MG/1
162 TABLET ORAL DAILY
Status: DISCONTINUED | OUTPATIENT
Start: 2020-11-14 | End: 2020-11-14 | Stop reason: HOSPADM

## 2020-11-13 RX ORDER — BISACODYL 10 MG
10 SUPPOSITORY, RECTAL RECTAL DAILY PRN
Status: DISCONTINUED | OUTPATIENT
Start: 2020-11-13 | End: 2020-11-13

## 2020-11-13 RX ORDER — NICOTINE POLACRILEX 4 MG
15-30 LOZENGE BUCCAL
Status: DISCONTINUED | OUTPATIENT
Start: 2020-11-13 | End: 2020-11-14 | Stop reason: HOSPADM

## 2020-11-13 RX ORDER — CEFAZOLIN SODIUM 2 G/100ML
2 INJECTION, SOLUTION INTRAVENOUS EVERY 8 HOURS
Status: DISCONTINUED | OUTPATIENT
Start: 2020-11-13 | End: 2020-11-13

## 2020-11-13 RX ORDER — POLYETHYLENE GLYCOL 3350 17 G/17G
17 POWDER, FOR SOLUTION ORAL DAILY
Status: DISCONTINUED | OUTPATIENT
Start: 2020-11-14 | End: 2020-11-14 | Stop reason: HOSPADM

## 2020-11-13 RX ORDER — LIDOCAINE 40 MG/G
CREAM TOPICAL
Status: DISCONTINUED | OUTPATIENT
Start: 2020-11-13 | End: 2020-11-14 | Stop reason: HOSPADM

## 2020-11-13 RX ORDER — ACETAMINOPHEN 325 MG/1
650 TABLET ORAL EVERY 4 HOURS PRN
Status: DISCONTINUED | OUTPATIENT
Start: 2020-11-16 | End: 2020-11-14 | Stop reason: HOSPADM

## 2020-11-13 RX ORDER — KETOROLAC TROMETHAMINE 30 MG/ML
INJECTION, SOLUTION INTRAMUSCULAR; INTRAVENOUS PRN
Status: DISCONTINUED | OUTPATIENT
Start: 2020-11-13 | End: 2020-11-13

## 2020-11-13 RX ORDER — AMOXICILLIN 250 MG
1 CAPSULE ORAL 2 TIMES DAILY
Status: DISCONTINUED | OUTPATIENT
Start: 2020-11-13 | End: 2020-11-13

## 2020-11-13 RX ORDER — PROCHLORPERAZINE MALEATE 10 MG
10 TABLET ORAL EVERY 6 HOURS PRN
Status: DISCONTINUED | OUTPATIENT
Start: 2020-11-13 | End: 2020-11-13

## 2020-11-13 RX ORDER — AMOXICILLIN 250 MG
1-2 CAPSULE ORAL 2 TIMES DAILY
Qty: 30 TABLET | Refills: 0 | Status: SHIPPED | OUTPATIENT
Start: 2020-11-13 | End: 2020-11-13

## 2020-11-13 RX ORDER — ONDANSETRON 2 MG/ML
4 INJECTION INTRAMUSCULAR; INTRAVENOUS EVERY 6 HOURS PRN
Status: DISCONTINUED | OUTPATIENT
Start: 2020-11-13 | End: 2020-11-14 | Stop reason: HOSPADM

## 2020-11-13 RX ORDER — OXYCODONE HYDROCHLORIDE 5 MG/1
5-10 TABLET ORAL EVERY 4 HOURS PRN
Qty: 30 TABLET | Refills: 0 | Status: SHIPPED | OUTPATIENT
Start: 2020-11-13 | End: 2020-11-13

## 2020-11-13 RX ORDER — ACETAMINOPHEN 325 MG/1
975 TABLET ORAL EVERY 6 HOURS PRN
Status: DISCONTINUED | OUTPATIENT
Start: 2020-11-13 | End: 2020-11-13 | Stop reason: HOSPADM

## 2020-11-13 RX ORDER — ONDANSETRON 4 MG/1
4 TABLET, ORALLY DISINTEGRATING ORAL EVERY 6 HOURS PRN
Status: DISCONTINUED | OUTPATIENT
Start: 2020-11-13 | End: 2020-11-13

## 2020-11-13 RX ORDER — ONDANSETRON 4 MG/1
4 TABLET, ORALLY DISINTEGRATING ORAL EVERY 30 MIN PRN
Status: DISCONTINUED | OUTPATIENT
Start: 2020-11-13 | End: 2020-11-13

## 2020-11-13 RX ORDER — SODIUM CHLORIDE, SODIUM LACTATE, POTASSIUM CHLORIDE, CALCIUM CHLORIDE 600; 310; 30; 20 MG/100ML; MG/100ML; MG/100ML; MG/100ML
INJECTION, SOLUTION INTRAVENOUS CONTINUOUS PRN
Status: DISCONTINUED | OUTPATIENT
Start: 2020-11-13 | End: 2020-11-13

## 2020-11-13 RX ORDER — OXYCODONE HYDROCHLORIDE 5 MG/1
5 TABLET ORAL EVERY 4 HOURS PRN
Status: DISCONTINUED | OUTPATIENT
Start: 2020-11-13 | End: 2020-11-13

## 2020-11-13 RX ORDER — ACETAMINOPHEN 500 MG
1000 TABLET ORAL EVERY 8 HOURS PRN
Qty: 1 BOTTLE | Refills: 0 | Status: SHIPPED | OUTPATIENT
Start: 2020-11-13 | End: 2021-11-19

## 2020-11-13 RX ORDER — NALOXONE HYDROCHLORIDE 0.4 MG/ML
.1-.4 INJECTION, SOLUTION INTRAMUSCULAR; INTRAVENOUS; SUBCUTANEOUS
Status: DISCONTINUED | OUTPATIENT
Start: 2020-11-13 | End: 2020-11-13 | Stop reason: HOSPADM

## 2020-11-13 RX ORDER — FENTANYL CITRATE 50 UG/ML
25-50 INJECTION, SOLUTION INTRAMUSCULAR; INTRAVENOUS EVERY 5 MIN PRN
Status: DISCONTINUED | OUTPATIENT
Start: 2020-11-13 | End: 2020-11-13 | Stop reason: HOSPADM

## 2020-11-13 RX ORDER — AMOXICILLIN 250 MG
1-2 CAPSULE ORAL 2 TIMES DAILY
Qty: 30 TABLET | Refills: 0 | Status: SHIPPED | OUTPATIENT
Start: 2020-11-13 | End: 2021-08-17

## 2020-11-13 RX ORDER — SODIUM CHLORIDE, SODIUM LACTATE, POTASSIUM CHLORIDE, CALCIUM CHLORIDE 600; 310; 30; 20 MG/100ML; MG/100ML; MG/100ML; MG/100ML
INJECTION, SOLUTION INTRAVENOUS CONTINUOUS
Status: DISCONTINUED | OUTPATIENT
Start: 2020-11-13 | End: 2020-11-13 | Stop reason: HOSPADM

## 2020-11-13 RX ORDER — SODIUM CHLORIDE, SODIUM LACTATE, POTASSIUM CHLORIDE, CALCIUM CHLORIDE 600; 310; 30; 20 MG/100ML; MG/100ML; MG/100ML; MG/100ML
INJECTION, SOLUTION INTRAVENOUS CONTINUOUS
Status: DISCONTINUED | OUTPATIENT
Start: 2020-11-13 | End: 2020-11-13

## 2020-11-13 RX ORDER — LIDOCAINE HYDROCHLORIDE 20 MG/ML
INJECTION, SOLUTION INFILTRATION; PERINEURAL PRN
Status: DISCONTINUED | OUTPATIENT
Start: 2020-11-13 | End: 2020-11-13

## 2020-11-13 RX ORDER — MAGNESIUM HYDROXIDE 1200 MG/15ML
LIQUID ORAL PRN
Status: DISCONTINUED | OUTPATIENT
Start: 2020-11-13 | End: 2020-11-13 | Stop reason: HOSPADM

## 2020-11-13 RX ORDER — ALBUTEROL SULFATE 90 UG/1
2 AEROSOL, METERED RESPIRATORY (INHALATION) EVERY 4 HOURS PRN
Status: DISCONTINUED | OUTPATIENT
Start: 2020-11-13 | End: 2020-11-14 | Stop reason: HOSPADM

## 2020-11-13 RX ORDER — OXYCODONE HYDROCHLORIDE 10 MG/1
10 TABLET ORAL EVERY 4 HOURS PRN
Status: DISCONTINUED | OUTPATIENT
Start: 2020-11-13 | End: 2020-11-14 | Stop reason: HOSPADM

## 2020-11-13 RX ORDER — HYDROMORPHONE HYDROCHLORIDE 2 MG/1
2 TABLET ORAL EVERY 4 HOURS PRN
Status: DISCONTINUED | OUTPATIENT
Start: 2020-11-13 | End: 2020-11-13

## 2020-11-13 RX ORDER — ONDANSETRON 2 MG/ML
4 INJECTION INTRAMUSCULAR; INTRAVENOUS EVERY 6 HOURS PRN
Status: DISCONTINUED | OUTPATIENT
Start: 2020-11-13 | End: 2020-11-13

## 2020-11-13 RX ORDER — ACETAMINOPHEN 325 MG/1
975 TABLET ORAL EVERY 8 HOURS
Status: DISCONTINUED | OUTPATIENT
Start: 2020-11-13 | End: 2020-11-14 | Stop reason: HOSPADM

## 2020-11-13 RX ORDER — DEXAMETHASONE SODIUM PHOSPHATE 4 MG/ML
INJECTION, SOLUTION INTRA-ARTICULAR; INTRALESIONAL; INTRAMUSCULAR; INTRAVENOUS; SOFT TISSUE PRN
Status: DISCONTINUED | OUTPATIENT
Start: 2020-11-13 | End: 2020-11-13

## 2020-11-13 RX ORDER — ASPIRIN 81 MG/1
162 TABLET ORAL DAILY
Qty: 60 TABLET | Refills: 0 | Status: SHIPPED | OUTPATIENT
Start: 2020-11-13 | End: 2021-08-17

## 2020-11-13 RX ORDER — HYDROMORPHONE HYDROCHLORIDE 2 MG/1
4 TABLET ORAL EVERY 4 HOURS PRN
Status: DISCONTINUED | OUTPATIENT
Start: 2020-11-13 | End: 2020-11-13

## 2020-11-13 RX ORDER — ACETAMINOPHEN 325 MG/1
975 TABLET ORAL ONCE
Status: DISCONTINUED | OUTPATIENT
Start: 2020-11-13 | End: 2020-11-13

## 2020-11-13 RX ORDER — HYDROXYZINE HYDROCHLORIDE 25 MG/1
25 TABLET, FILM COATED ORAL EVERY 6 HOURS PRN
Status: DISCONTINUED | OUTPATIENT
Start: 2020-11-13 | End: 2020-11-14 | Stop reason: HOSPADM

## 2020-11-13 RX ORDER — FLUMAZENIL 0.1 MG/ML
0.2 INJECTION, SOLUTION INTRAVENOUS
Status: DISCONTINUED | OUTPATIENT
Start: 2020-11-13 | End: 2020-11-13 | Stop reason: HOSPADM

## 2020-11-13 RX ORDER — LABETALOL HYDROCHLORIDE 5 MG/ML
10 INJECTION, SOLUTION INTRAVENOUS
Status: DISCONTINUED | OUTPATIENT
Start: 2020-11-13 | End: 2020-11-13 | Stop reason: HOSPADM

## 2020-11-13 RX ORDER — NALOXONE HYDROCHLORIDE 0.4 MG/ML
.1-.4 INJECTION, SOLUTION INTRAMUSCULAR; INTRAVENOUS; SUBCUTANEOUS
Status: DISCONTINUED | OUTPATIENT
Start: 2020-11-13 | End: 2020-11-14 | Stop reason: HOSPADM

## 2020-11-13 RX ORDER — ONDANSETRON 2 MG/ML
4 INJECTION INTRAMUSCULAR; INTRAVENOUS EVERY 30 MIN PRN
Status: DISCONTINUED | OUTPATIENT
Start: 2020-11-13 | End: 2020-11-13 | Stop reason: HOSPADM

## 2020-11-13 RX ORDER — ACETAMINOPHEN 325 MG/1
975 TABLET ORAL ONCE
Status: COMPLETED | OUTPATIENT
Start: 2020-11-13 | End: 2020-11-13

## 2020-11-13 RX ORDER — SCOLOPAMINE TRANSDERMAL SYSTEM 1 MG/1
1 PATCH, EXTENDED RELEASE TRANSDERMAL ONCE
Status: COMPLETED | OUTPATIENT
Start: 2020-11-13 | End: 2020-11-14

## 2020-11-13 RX ORDER — GABAPENTIN 100 MG/1
300 CAPSULE ORAL 3 TIMES DAILY
Qty: 90 CAPSULE | Refills: 0 | Status: SHIPPED | OUTPATIENT
Start: 2020-11-13 | End: 2021-08-17

## 2020-11-13 RX ORDER — GABAPENTIN 100 MG/1
300 CAPSULE ORAL 3 TIMES DAILY
Status: DISCONTINUED | OUTPATIENT
Start: 2020-11-13 | End: 2020-11-14 | Stop reason: HOSPADM

## 2020-11-13 RX ORDER — PROCHLORPERAZINE MALEATE 10 MG
10 TABLET ORAL EVERY 6 HOURS PRN
Status: DISCONTINUED | OUTPATIENT
Start: 2020-11-13 | End: 2020-11-14 | Stop reason: HOSPADM

## 2020-11-13 RX ORDER — DOCUSATE SODIUM 100 MG/1
100 CAPSULE, LIQUID FILLED ORAL 2 TIMES DAILY
Status: DISCONTINUED | OUTPATIENT
Start: 2020-11-13 | End: 2020-11-14 | Stop reason: HOSPADM

## 2020-11-13 RX ORDER — TRANEXAMIC ACID 650 MG/1
1950 TABLET ORAL ONCE
Status: COMPLETED | OUTPATIENT
Start: 2020-11-13 | End: 2020-11-13

## 2020-11-13 RX ORDER — OXYCODONE HYDROCHLORIDE 5 MG/1
5-10 TABLET ORAL EVERY 4 HOURS PRN
Qty: 60 TABLET | Refills: 0 | Status: SHIPPED | OUTPATIENT
Start: 2020-11-13 | End: 2020-11-14

## 2020-11-13 RX ORDER — MEPERIDINE HYDROCHLORIDE 25 MG/ML
12.5 INJECTION INTRAMUSCULAR; INTRAVENOUS; SUBCUTANEOUS
Status: DISCONTINUED | OUTPATIENT
Start: 2020-11-13 | End: 2020-11-13 | Stop reason: HOSPADM

## 2020-11-13 RX ORDER — AMOXICILLIN 250 MG
1 CAPSULE ORAL 2 TIMES DAILY
Status: DISCONTINUED | OUTPATIENT
Start: 2020-11-13 | End: 2020-11-14 | Stop reason: HOSPADM

## 2020-11-13 RX ORDER — SODIUM CHLORIDE, SODIUM LACTATE, POTASSIUM CHLORIDE, CALCIUM CHLORIDE 600; 310; 30; 20 MG/100ML; MG/100ML; MG/100ML; MG/100ML
INJECTION, SOLUTION INTRAVENOUS CONTINUOUS
Status: DISCONTINUED | OUTPATIENT
Start: 2020-11-13 | End: 2020-11-14 | Stop reason: HOSPADM

## 2020-11-13 RX ORDER — FENTANYL CITRATE 50 UG/ML
25-50 INJECTION, SOLUTION INTRAMUSCULAR; INTRAVENOUS
Status: DISCONTINUED | OUTPATIENT
Start: 2020-11-13 | End: 2020-11-13

## 2020-11-13 RX ORDER — ARIPIPRAZOLE 5 MG/1
15 TABLET ORAL EVERY MORNING
Status: DISCONTINUED | OUTPATIENT
Start: 2020-11-14 | End: 2020-11-14 | Stop reason: HOSPADM

## 2020-11-13 RX ORDER — HYDROMORPHONE HCL IN WATER/PF 6 MG/30 ML
0.2 PATIENT CONTROLLED ANALGESIA SYRINGE INTRAVENOUS
Status: DISCONTINUED | OUTPATIENT
Start: 2020-11-13 | End: 2020-11-14 | Stop reason: HOSPADM

## 2020-11-13 RX ORDER — POLYETHYLENE GLYCOL 3350 17 G/17G
17 POWDER, FOR SOLUTION ORAL DAILY
Status: DISCONTINUED | OUTPATIENT
Start: 2020-11-14 | End: 2020-11-13

## 2020-11-13 RX ORDER — OXYCODONE HYDROCHLORIDE 5 MG/1
5 TABLET ORAL EVERY 4 HOURS PRN
Status: DISCONTINUED | OUTPATIENT
Start: 2020-11-13 | End: 2020-11-14 | Stop reason: HOSPADM

## 2020-11-13 RX ORDER — NALOXONE HYDROCHLORIDE 0.4 MG/ML
.1-.4 INJECTION, SOLUTION INTRAMUSCULAR; INTRAVENOUS; SUBCUTANEOUS
Status: DISCONTINUED | OUTPATIENT
Start: 2020-11-13 | End: 2020-11-13

## 2020-11-13 RX ORDER — GABAPENTIN 100 MG/1
100 CAPSULE ORAL 3 TIMES DAILY PRN
Status: DISCONTINUED | OUTPATIENT
Start: 2020-11-13 | End: 2020-11-13

## 2020-11-13 RX ORDER — CEFAZOLIN SODIUM 1 G/3ML
1 INJECTION, POWDER, FOR SOLUTION INTRAMUSCULAR; INTRAVENOUS SEE ADMIN INSTRUCTIONS
Status: DISCONTINUED | OUTPATIENT
Start: 2020-11-13 | End: 2020-11-13 | Stop reason: HOSPADM

## 2020-11-13 RX ORDER — HYDROMORPHONE HYDROCHLORIDE 1 MG/ML
0.4 INJECTION, SOLUTION INTRAMUSCULAR; INTRAVENOUS; SUBCUTANEOUS
Status: DISCONTINUED | OUTPATIENT
Start: 2020-11-13 | End: 2020-11-14 | Stop reason: HOSPADM

## 2020-11-13 RX ADMIN — LIDOCAINE HYDROCHLORIDE 100 MG: 20 INJECTION, SOLUTION INFILTRATION; PERINEURAL at 07:37

## 2020-11-13 RX ADMIN — DEXAMETHASONE SODIUM PHOSPHATE 10 MG: 4 INJECTION, SOLUTION INTRAMUSCULAR; INTRAVENOUS at 07:41

## 2020-11-13 RX ADMIN — PROCHLORPERAZINE EDISYLATE 10 MG: 5 INJECTION INTRAMUSCULAR; INTRAVENOUS at 15:39

## 2020-11-13 RX ADMIN — Medication 3 G: at 08:05

## 2020-11-13 RX ADMIN — Medication 1 G: at 12:05

## 2020-11-13 RX ADMIN — GABAPENTIN 300 MG: 100 CAPSULE ORAL at 17:56

## 2020-11-13 RX ADMIN — SODIUM CHLORIDE, POTASSIUM CHLORIDE, SODIUM LACTATE AND CALCIUM CHLORIDE: 600; 310; 30; 20 INJECTION, SOLUTION INTRAVENOUS at 07:35

## 2020-11-13 RX ADMIN — PROPOFOL 40 MG: 10 INJECTION, EMULSION INTRAVENOUS at 07:42

## 2020-11-13 RX ADMIN — FENTANYL CITRATE 50 MCG: 50 INJECTION, SOLUTION INTRAMUSCULAR; INTRAVENOUS at 07:01

## 2020-11-13 RX ADMIN — HYDROMORPHONE HYDROCHLORIDE 0.4 MG: 1 INJECTION, SOLUTION INTRAMUSCULAR; INTRAVENOUS; SUBCUTANEOUS at 14:39

## 2020-11-13 RX ADMIN — HYDROMORPHONE HYDROCHLORIDE 0.3 MG: 1 INJECTION, SOLUTION INTRAMUSCULAR; INTRAVENOUS; SUBCUTANEOUS at 14:10

## 2020-11-13 RX ADMIN — BUPIVACAINE HYDROCHLORIDE 20 ML: 2.5 INJECTION, SOLUTION EPIDURAL; INFILTRATION; INTRACAUDAL at 07:00

## 2020-11-13 RX ADMIN — TRANEXAMIC ACID 1950 MG: 650 TABLET ORAL at 06:30

## 2020-11-13 RX ADMIN — PROPOFOL 30 MG: 10 INJECTION, EMULSION INTRAVENOUS at 13:01

## 2020-11-13 RX ADMIN — DOCUSATE SODIUM AND SENNOSIDES 1 TABLET: 8.6; 5 TABLET ORAL at 20:08

## 2020-11-13 RX ADMIN — DEXMEDETOMIDINE HYDROCHLORIDE 20 MCG: 100 INJECTION, SOLUTION INTRAVENOUS at 07:00

## 2020-11-13 RX ADMIN — KETOROLAC TROMETHAMINE 30 MG: 30 INJECTION, SOLUTION INTRAMUSCULAR at 12:16

## 2020-11-13 RX ADMIN — Medication 1 G: at 10:05

## 2020-11-13 RX ADMIN — HYDROMORPHONE HYDROCHLORIDE 0.5 MG: 1 INJECTION, SOLUTION INTRAMUSCULAR; INTRAVENOUS; SUBCUTANEOUS at 13:37

## 2020-11-13 RX ADMIN — HYDROMORPHONE HYDROCHLORIDE 0.5 MG: 1 INJECTION, SOLUTION INTRAMUSCULAR; INTRAVENOUS; SUBCUTANEOUS at 11:54

## 2020-11-13 RX ADMIN — DEXMEDETOMIDINE HYDROCHLORIDE 10 MCG: 100 INJECTION, SOLUTION INTRAVENOUS at 13:01

## 2020-11-13 RX ADMIN — DEXMEDETOMIDINE HYDROCHLORIDE 10 MCG: 100 INJECTION, SOLUTION INTRAVENOUS at 12:00

## 2020-11-13 RX ADMIN — GABAPENTIN 300 MG: 100 CAPSULE ORAL at 22:41

## 2020-11-13 RX ADMIN — HYDROMORPHONE HYDROCHLORIDE 0.5 MG: 1 INJECTION, SOLUTION INTRAMUSCULAR; INTRAVENOUS; SUBCUTANEOUS at 11:34

## 2020-11-13 RX ADMIN — PROPOFOL 200 MG: 10 INJECTION, EMULSION INTRAVENOUS at 07:40

## 2020-11-13 RX ADMIN — MIDAZOLAM 1 MG: 1 INJECTION INTRAMUSCULAR; INTRAVENOUS at 07:02

## 2020-11-13 RX ADMIN — OXYCODONE HYDROCHLORIDE 5 MG: 5 TABLET ORAL at 22:42

## 2020-11-13 RX ADMIN — HYDROMORPHONE HYDROCHLORIDE 0.3 MG: 1 INJECTION, SOLUTION INTRAMUSCULAR; INTRAVENOUS; SUBCUTANEOUS at 15:34

## 2020-11-13 RX ADMIN — DOCUSATE SODIUM 100 MG: 100 CAPSULE, LIQUID FILLED ORAL at 20:08

## 2020-11-13 RX ADMIN — SODIUM CHLORIDE, POTASSIUM CHLORIDE, SODIUM LACTATE AND CALCIUM CHLORIDE: 600; 310; 30; 20 INJECTION, SOLUTION INTRAVENOUS at 10:34

## 2020-11-13 RX ADMIN — FENTANYL CITRATE 100 MCG: 50 INJECTION, SOLUTION INTRAMUSCULAR; INTRAVENOUS at 07:37

## 2020-11-13 RX ADMIN — ROCURONIUM BROMIDE 85 MG: 10 INJECTION INTRAVENOUS at 07:41

## 2020-11-13 RX ADMIN — KETOROLAC TROMETHAMINE 15 MG: 15 INJECTION, SOLUTION INTRAMUSCULAR; INTRAVENOUS at 20:08

## 2020-11-13 RX ADMIN — ACETAMINOPHEN 975 MG: 325 TABLET, FILM COATED ORAL at 06:30

## 2020-11-13 RX ADMIN — ACETAMINOPHEN 975 MG: 325 TABLET, FILM COATED ORAL at 17:56

## 2020-11-13 RX ADMIN — DEXMEDETOMIDINE HYDROCHLORIDE 20 MCG: 100 INJECTION, SOLUTION INTRAVENOUS at 07:47

## 2020-11-13 RX ADMIN — DEXMEDETOMIDINE HYDROCHLORIDE 10 MCG: 100 INJECTION, SOLUTION INTRAVENOUS at 10:44

## 2020-11-13 RX ADMIN — DEXMEDETOMIDINE HYDROCHLORIDE 10 MCG: 100 INJECTION, SOLUTION INTRAVENOUS at 09:47

## 2020-11-13 RX ADMIN — SUGAMMADEX 200 MG: 100 INJECTION, SOLUTION INTRAVENOUS at 12:11

## 2020-11-13 RX ADMIN — ONDANSETRON 4 MG: 2 INJECTION INTRAMUSCULAR; INTRAVENOUS at 12:00

## 2020-11-13 RX ADMIN — FENTANYL CITRATE 50 MCG: 50 INJECTION, SOLUTION INTRAMUSCULAR; INTRAVENOUS at 10:48

## 2020-11-13 RX ADMIN — FENTANYL CITRATE 50 MCG: 50 INJECTION, SOLUTION INTRAMUSCULAR; INTRAVENOUS at 09:47

## 2020-11-13 RX ADMIN — OXYCODONE HYDROCHLORIDE 5 MG: 5 TABLET ORAL at 17:57

## 2020-11-13 RX ADMIN — ONDANSETRON 4 MG: 2 INJECTION INTRAMUSCULAR; INTRAVENOUS at 13:41

## 2020-11-13 RX ADMIN — SODIUM CHLORIDE, POTASSIUM CHLORIDE, SODIUM LACTATE AND CALCIUM CHLORIDE: 600; 310; 30; 20 INJECTION, SOLUTION INTRAVENOUS at 15:59

## 2020-11-13 RX ADMIN — DEXAMETHASONE SODIUM PHOSPHATE 2 MG: 4 INJECTION, SOLUTION INTRAMUSCULAR; INTRAVENOUS at 07:00

## 2020-11-13 RX ADMIN — CEFAZOLIN SODIUM 2 G: 2 INJECTION, SOLUTION INTRAVENOUS at 20:08

## 2020-11-13 RX ADMIN — SCOPALAMINE 1 PATCH: 1 PATCH, EXTENDED RELEASE TRANSDERMAL at 07:04

## 2020-11-13 ASSESSMENT — LIFESTYLE VARIABLES: TOBACCO_USE: 1

## 2020-11-13 ASSESSMENT — MIFFLIN-ST. JEOR: SCORE: 2552.5

## 2020-11-13 NOTE — ANESTHESIA PROCEDURE NOTES
Peripheral Nerve Block Procedure Note      Staff -   Anesthesiologist:  Doc Mario MD  Resident/Fellow: Donell Spears MD  Performed By: anesthesiologist and with fellow  Location: Pre-op  Procedure Start/Stop TImes:     patient identified, IV checked, site marked, risks and benefits discussed, informed consent, monitors and equipment checked, pre-op evaluation, at physician/surgeon's request and post-op pain management      Correct Patient: Yes      Correct Position: Yes      Correct Site: Yes      Correct Procedure: Yes      Correct Laterality:  Yes    Site Marked:  Yes  Procedure details:     Procedure:  Adductor canal    Diagnosis:  Post operative pain    Laterality:  Left    Position:  Supine    Sterile Prep: chloraprep, mask and sterile gloves      Local skin infiltration:  None    Needle:  Short bevel    Needle gauge:  21    Needle length (mm):  110    Ultrasound: Yes      Ultrasound used to identify targeted nerve, plexus, or vascular structure and placed a needle adjacent to it      Permanent Image entered into patiient's record      Abnormal pain on injection: No      Blood Aspirated: No      Paresthesias:  No    Bleeding at site: No      Bolus via:  Needle    Infusion Method:  Single Shot    Complications:  None  Assessment/Narrative:     Injection made incrementally with aspirations every (mL):  5

## 2020-11-13 NOTE — OR NURSING
"Waking up and complains of \"feel really hot and sick to my stomach\".  Medicated for pain and nausea.  Cool cloths to face.  Skin clammy and diaphoretic.  Back to sleep.  "

## 2020-11-13 NOTE — ANESTHESIA CARE TRANSFER NOTE
Patient: Higinio Wallace    Procedure(s):  Knee Arthroscopy, Medial Patello-femoral Ligament Reconstruction with Allograft, Distal Tibial Tubercle Osteotomy, LRL  plus derotation tibial osteotomy  plus derotation tibial osteotomy    Diagnosis: Patellofemoral instability of left knee with pain [M25.362, M25.562]  Diagnosis Additional Information: No value filed.    Anesthesia Type:   General     Note:  Airway :Face Mask  Patient transferred to:PACU  Handoff Report: Identifed the Patient, Identified the Reponsible Provider, Reviewed the pertinent medical history, Discussed the surgical course, Reviewed Intra-OP anesthesia mangement and issues during anesthesia, Set expectations for post-procedure period and Allowed opportunity for questions and acknowledgement of understanding      Vitals: (Last set prior to Anesthesia Care Transfer)    CRNA VITALS  11/13/2020 1239 - 11/13/2020 1316      11/13/2020             NIBP:  135/75    Pulse:  87    NIBP Mean:  97    Temp:  36.9  C (98.4  F)     axillary    SpO2:  99 %    Resp Rate (observed):  20                Electronically Signed By: CARISSA Velazquez CRNA  November 13, 2020  1:16 PM

## 2020-11-13 NOTE — OR NURSING
PACU to Inpatient Nursing Handoff    Patient Higinio Wallace is a 30 year old male who speaks English.   Procedure Procedure(s):  Knee Arthroscopy, Medial Patello-femoral Ligament Reconstruction with Allograft, Distal Tibial Tubercle Osteotomy, LRL  plus derotation tibial osteotomy  plus derotation tibial osteotomy   Surgeon(s) Primary: Sourav Keating MD  Assisting: Lela Han MD; Florencio Villa PA-C     Allergies   Allergen Reactions     Bee Venom Anaphylaxis and Other (See Comments)     Swelling  Large local reactions/ swelling       Fruit Acid Concentrate [Fruit Extracts] Swelling     Lips swell and crust over little bit- tongue gets numb     Liquid Adhesive Dermatitis     And band aid       Monosodium Glutamate Hives       Isolation  [unfilled]     Past Medical History   has a past medical history of Anxiety, Class 2 obesity due to excess calories in adult, Complication of anesthesia, Gastroesophageal reflux disease with esophagitis, History of nephrolithiasis, Irritable bowel syndrome, Major depression, recurrent (H), Malrotation of intestine, Patellofemoral instability of left knee with pain, Pre-diabetes, Psychosis (H), PTSD (post-traumatic stress disorder), and Tobacco use.    Anesthesia Choice   Dermatome Level     Preop Meds acetaminophen (Tylenol) - time given: 0630  TXA  - time given: 0630   Nerve block Adductor canal.  Location:left. Med:bupivacaine. Time given: 0824   Intraop Meds dexamethasone (Decadron)  dexmedetomidine (Precedex): 80 mcg total  fentanyl (Sublimaze): 200 mcg total  hydromorphone (Dilaudid): 1 mg total  ketorolac (Toradol): last given at 1216  ondansetron (Zofran): last given at 1200   Local Meds Yes - Local Cocktail (morphine, ropivacaine, epinephrine, Toradol)   Antibiotics cefazolin (Ancef) - last given at 1205     Pain Patient Currently in Pain: sleeping: patient not able to self report   PACU meds  hydromorphone (Dilaudid): 1 mg (total dose) last given at  1439   ondansetron (Zofran): 4 mg (total dose) last given at 1341    PCA / epidural No   Capnography     Telemetry ECG Rhythm: Sinus bradycardia   Inpatient Telemetry Monitor Ordered? No        Labs Glucose Lab Results   Component Value Date    GLC 97 09/08/2020       Hgb Lab Results   Component Value Date    HGB 16.7 09/08/2020       INR No results found for: INR   PACU Imaging Not applicable     Wound/Incision Incision/Surgical Site 11/13/20 Left Leg (Active)   Incision Assessment UTV 11/13/20 1412   Closure FREEDOM 11/13/20 1412   Incision Drainage Amount UTV 11/13/20 1412   Dressing Intervention Clean, dry, intact 11/13/20 1412   Number of days: 0      CMS        Equipment ice pack and locked brace and cam boot   Other LDA       IV Access Peripheral IV 11/13/20 Left Hand (Active)   Site Assessment WDL 11/13/20 1312   Line Status Infusing 11/13/20 1312   Phlebitis Scale 0-->no symptoms 11/13/20 1312   Infiltration Scale 0 11/13/20 1312   Infiltration Site Treatment Method  None 11/13/20 1312   Number of days: 0      Blood Products Not applicable    mL   Intake/Output Date 11/13/20 0700 - 11/14/20 0659   Shift 1961-5785 5898-2289 2405-0867 24 Hour Total   INTAKE   I.V. 1800   1800   Shift Total(mL/kg) 1800(12.15)   1800(12.15)   OUTPUT   Blood 250   250   Shift Total(mL/kg) 250(1.69)   250(1.69)   Weight (kg) 148.1 148.1 148.1 148.1      Drains / [place]     Time of void PreOp Void Prior to Procedure: 0608 (11/13/20 0608)    PostOp      Diapered? No   Bladder Scan     PO    ice chips     Vitals    B/P: 110/63  T: 97.7  F (36.5  C)    Temp src: Oral  P:  Pulse: 57 (11/13/20 1500)          R: 10  O2:  SpO2: 97 %    O2 Device: Nasal cannula (11/13/20 1415)    Oxygen Delivery: 2 LPM (11/13/20 1415)         Family/support present no one here   Patient belongings     Patient transported on cart   DC meds/scripts (obs/outpt) Not applicable   Inpatient Pain Meds Released? Yes       Special needs/considerations Scope  patch behind right ear   Tasks needing completion None       Dagmar Oneal, RN  ASCOM [id number]

## 2020-11-13 NOTE — BRIEF OP NOTE
[facility]     Brief Operative Note    Pre-operative diagnosis: Patellofemoral instability of left knee with pain [M25.362, M25.562]  Post-operative diagnosis Same as pre-operative diagnosis    Procedure: Procedure(s):  Knee Arthroscopy, Medial Patello-femoral Ligament Reconstruction with Allograft, Distal Tibial Tubercle Osteotomy, LRL  plus derotation tibial osteotomy  plus derotation tibial osteotomy  Surgeon: Surgeon(s) and Role:     * Sourav Keating MD - Primary     * Florencio Villa PA-C - Assisting     * Lela Han MD - Assisting  Anesthesia: Choice   Estimated blood loss: 250 ml  Drains: None  Specimens: * No specimens in log *  Findings:   None.  Complications: None.  Implants:   Implant Name Type Inv. Item Serial No.  Lot No. LRB No. Used Action   T2 ALPHA TIBIAL NAIL 10 X 375MM Metallic Hardware/[employer]    [id number] Left 1 Implanted   IMP IMN SCREW 5 X 65MM LOCKING SCREW Metallic Hardware/[employer]    [id number] Left 1 Implanted   IMP LOCKING SCREW 5 X 37.5MM Metallic Hardware/[employer]    [id number] Left 1 Implanted   IMP GRACILIS TENDON 24.3CM X 5MM X 4.5MM X 6MM   [id number]   Left 1 Implanted   IMP ANCHOR ARTHREX BIO-SWIVELOCK 3.5X15.8MM [id number] Metallic Hardware/[employer] IMP ANCHOR ARTHREX BIO-SWIVELOCK 3.5X15.8MM [id number]  ARTHREX [id number] Left 1 Implanted   IMP CORTICAL SCREW 3.5 X 55 Metallic Hardware/[employer]     Left 2 Implanted   IMP CORTICAL SCREW 3.5 X 60 Metallic Hardware/[employer]     Left 1 Implanted   IMP FASTTHREAD BIOCOMPOSIT INTERFERENCE SCREW 6 X 20MM Metallic Hardware/[employer]    [id number] Left 1 Implanted       Assessment: Higinio Wallace is a 30 year old male  With patellar instability s/p knee arthroscopy, medial patello-femoral ligament reconstruction, distal tibial tubercle osteotomy, lateral retinacualr lengthening and derotation tibial osteotomy on 11/13/20 with Dr. Keating and Dr. Han.     Plan:  Activity: Up  with crutches.  Weight bearing status: Partial weight bearing with crutches with knee locked in 10 degrees of extension   Antibiotics: Ancef introp  Diet: Begin with clear fluids and progress diet as tolerated.  DVT prophylaxis: ASA x 6 weeks.  Bracing/Splinting: Boot and knee brace to be worn when up. Ok to unlock knee brace when in bed with knee ROM to 70 degrees   Elevation: Elevate LLE on pillows to keep as much as possible. Do not put anything directly behing the knee   Wound Care: Dressing in lace for 7 days   Pain management: Tylenol, oxycodone, gabapentin, atarax  X-rays: AP/Lat of tibia/knee in PACU   Physical Therapy: crutch training     Follow-up: Clinic with Sergio Liao PA-C on 11/27/20    ZAKIA Wharton MD 11/13/2020  Orthopaedic Surgery Resident, PGY-4  Pager: [phone]    For questions about this patient, please attempt to contact me at my pager prior to contacting the orthopaedics resident on call. Thank you!

## 2020-11-13 NOTE — OR NURSING
"Surgeon Dr Han calling for update.  States \"will admit for pain control.\"  Pt back to sleep after pain medication.  Pt states \"pain is in the front of leg now\".  Vital signs stable.  "

## 2020-11-13 NOTE — CONSULTS
HOSPITALIST CONSULTATION     REQUESTING PHYSICIAN: Lela Han MD    REASON FOR CONSULTATION: Evaluation, Recommendations and Co-management of Medical Comorbidities.     ASSESSMENT & PLAN :     Higinio Wallace is a 30 year old male with patellar instability s/p knee arthroscopy, medial patello-femoral ligament reconstruction, distal tibial tubercle osteotomy, lateral retinacualr lengthening and derotation tibial osteotomy on 11/13/20 with Dr. Keating and Dr. Han.  EBL: 250 ml. No complication.     PMH, Pre Op eval reviewed.   Currently doing well.     # Orthopedic Surgery:     Post Op Management per Primary team.     Hemodynamics: stable. Continue on gentle IV fluids, until adequate PO. Monitor I/o.   Post op capnography per protocol.   Pain control- per Primary team and/or Pain team.    Minimize use of narcotics as able.   Consider bowel regimen with narcotics.   Encourage Incentive spirometry to prevent atelectasis.  DVT prophylaxis- per Primary team. Aspirin 162 mg daily.   Activity, antibiotics, wound and/or drain care - per Primary team.     Anemia of acute blood loss: Pre op Hgb 16.7 . Monitor hgb for anemia of acute blood loss. Transfuse for hgb <7.0    CVS: Currently stable. Asymptomatic.   - Monitor vitals.     Pulm: Denies CARSON.   - Tobacco use disorder    - Encourage cessation.   - Albuterol inhaler prn.     Endo: Pre-diabetes - last A1c was 5.7 on 5/9/20  Obesity.   - Resume Metformin.     : hx of nephrolithiasis.    Psychiatry: PTSD, anxiety, recurrent major depression, psychosis - followed by psychology and last seen on 10/6/20.   - Continue abilify.     Rest per primary.     Thank you for the consultation. Please page with any question. Hospitalist team to follow.      Owen Hager MD   Hospitalist, Internal Medicine      CHIEF COMPLAINT: Doing well.     HISTORY OF PRESENT ILLNESS: Obtained from the patient and chart review including Pre op evaluation,  procedure note.    30 year old year old male  with above discussed medical problems s/p above procedure admitted on 11/13/2020  for post op care and monitoring  (for further details for indication of surgery and operative note, please refer to Lela Han MD note). Medicine consulted to evaluate, recommend and/or co manage medical co morbidities.   No documented hypotension, hypoxemia or other significant complications intra or post operative.   Currently: Incisional Pain controlled. Denies any chest pain, shortness of breath or LH or palpitations. Denies any nausea, vomiting or pain abdomen. No fever or chills. Denies any dysuria.  Denies any new rash.   Medical issues as discussed above.   Denies any other medical concern.     PAST MEDICAL HISTORY:   Past Medical History:   Diagnosis Date     Anxiety      Class 2 obesity due to excess calories in adult      Complication of anesthesia      Gastroesophageal reflux disease with esophagitis      History of nephrolithiasis      Irritable bowel syndrome      Major depression, recurrent (H)      Malrotation of intestine      Patellofemoral instability of left knee with pain      Pre-diabetes      Psychosis (H)      PTSD (post-traumatic stress disorder)      Tobacco use        PAST SURGICAL HISTORY:   Past Surgical History:   Procedure Laterality Date     COLONOSCOPY N/A 10/07/2020    Procedure: COLONOSCOPY, WITH POLYPECTOMY AND BIOPSY;  Surgeon: Donell Holland MD;  Location: [facility] OR     deviated septum  2018     KNEE SURGERY Left        FH: reviewed.     Family History   Problem Relation Age of Onset     Other - See Comments Mother         PONV     Cerebrovascular Disease Brother      Atrial fibrillation Brother      Other - See Comments Brother         cardiac autoimmune deficiency        SH: reviewed.     Social History     Socioeconomic History     Marital status: Single     Spouse name: None     Number of children: None     Years of education: None      Highest education level: None   Occupational History     None   Social Needs     Financial resource strain: None     Food insecurity     Worry: None     Inability: None     Transportation needs     Medical: None     Non-medical: None   Tobacco Use     Smoking status: Current Some Day Smoker     Packs/day: 0.25     Types: Cigarettes     Start date: 2001     Smokeless tobacco: Never Used     Tobacco comment: 1 cig a day trying to quit   Substance and Sexual Activity     Alcohol use: Not Currently     Drug use: Yes     Types: Marijuana     Comment: occasionally     Sexual activity: Yes     Partners: Female     Birth control/protection: Condom   Lifestyle     Physical activity     Days per week: None     Minutes per session: None     Stress: None   Relationships     Social connections     Talks on phone: None     Gets together: None     Attends Spiritism service: None     Active member of club or organization: None     Attends meetings of clubs or organizations: None     Relationship status: None     Intimate partner violence     Fear of current or ex partner: None     Emotionally abused: None     Physically abused: None     Forced sexual activity: None   Other Topics Concern     Parent/sibling w/ CABG, MI or angioplasty before 65F 55M? Not Asked   Social History Narrative     None       ALLERGIES:   Allergies   Allergen Reactions     Bee Venom Anaphylaxis and Other (See Comments)     Swelling  Large local reactions/ swelling       Fruit Acid Concentrate [Fruit Extracts] Swelling     Lips swell and crust over little bit- tongue gets numb     Liquid Adhesive Dermatitis     And band aid       Monosodium Glutamate Hives         HOME MEDICATIONS:     Prior to Admission medications    Medication Sig Start Date End Date Taking? Authorizing Provider   acetaminophen (TYLENOL) 325 MG tablet Take 2 tablets (650 mg) by mouth every 4 hours 11/13/20  Yes Aniyah lBackman APRN CNS   acetaminophen (TYLENOL) 325 MG tablet Take 2  tablets (650 mg) by mouth every 4 hours 11/13/20  Yes Aniyah Blackman APRN CNS   acetaminophen (TYLENOL) 500 MG tablet Take 2 tablets (1,000 mg) by mouth every 8 hours as needed for pain 11/13/20  Yes Florencio Villa PA-C   albuterol (ALBUTEROL) 108 (90 BASE) MCG/ACT Inhaler Inhale 2 puffs into the lungs every 4 hours as needed for shortness of breath / dyspnea or wheezing 6/25/17  Yes Gera Cortez MD   aspirin 81 MG EC tablet Take 2 tablets (162 mg) by mouth daily 11/13/20  Yes Aniyah Blackman APRN CNS   aspirin 81 MG EC tablet Take 2 tablets (162 mg) by mouth daily 11/13/20  Yes Aniyah Blackman APRN CNS   gabapentin (NEURONTIN) 100 MG capsule Take 3 capsules (300 mg) by mouth 3 times daily for 10 days 11/13/20 11/23/20 Yes Florencio Villa PA-C   hydrOXYzine (ATARAX) 25 MG tablet Take 2 tablets (50 mg) by mouth every 6 hours as needed for itching or other (pain adjuvant) 11/13/20  Yes Florencio Villa PA-C   omeprazole 20 MG tablet Take 20 mg by mouth every morning  9/10/19  Yes Reported, Patient   oxyCODONE (ROXICODONE) 5 MG tablet Take 1-2 tablets (5-10 mg) by mouth every 4 hours as needed for moderate to severe pain 11/13/20  Yes Florencio Villa PA-C   oxyCODONE (ROXICODONE) 5 MG tablet Take 1-2 tablets (5-10 mg) by mouth every 4 hours as needed for pain (Moderate to Severe) 11/13/20  Yes Aniyah Blackman APRN CNS   polyethylene glycol (MIRALAX) 17 g packet Take 17 g by mouth daily 11/13/20  Yes Aniyah Blackman APRN CNS   polyethylene glycol (MIRALAX) 17 g packet Take 17 g by mouth daily 11/13/20  Yes Aniyah Blackman APRN CNS   senna-docusate (SENOKOT-S/PERICOLACE) 8.6-50 MG tablet Take 1-2 tablets by mouth 2 times daily Take while on oral narcotics to prevent or treat constipation. 11/13/20  Yes Aniyah Blackman APRN CNS   senna-docusate (SENOKOT-S/PERICOLACE) 8.6-50 MG tablet Take 1-2 tablets by mouth 2 times daily Take while on oral narcotics to prevent or treat  constipation. 11/13/20  Yes Aniyah Blackman APRN CNS   ARIPiprazole (ABILIFY) 15 MG tablet Take 15 mg by mouth every morning  9/16/20   Reported, Patient   metFORMIN (GLUCOPHAGE) 500 MG tablet Take 500 mg by mouth 2 times daily (with meals)  5/12/20   Reported, Patient       CURRENT MEDICATIONS:    Current Facility-Administered Medications   Medication     [START ON 11/16/2020] acetaminophen (TYLENOL) tablet 650 mg     acetaminophen (TYLENOL) tablet 975 mg     albuterol (PROAIR HFA/PROVENTIL HFA/VENTOLIN HFA) 108 (90 Base) MCG/ACT inhaler 2 puff     [START ON 11/14/2020] ARIPiprazole (ABILIFY) tablet 15 mg     aspirin (ASA) EC tablet 325 mg     [START ON 11/14/2020] aspirin EC tablet 162 mg     benzocaine-menthol (CEPACOL) 15-3.6 MG lozenge 1 lozenge     bisacodyl (DULCOLAX) Suppository 10 mg     ceFAZolin (ANCEF) intermittent infusion 2 g in 100 mL dextrose PRE-MIX     glucose gel 15-30 g    Or     dextrose 50 % injection 25-50 mL    Or     glucagon injection 1 mg     docusate sodium (COLACE) capsule 100 mg     docusate sodium (COLACE) capsule 100 mg     gabapentin (NEURONTIN) capsule 100 mg     gabapentin (NEURONTIN) capsule 300 mg     HYDROmorphone (DILAUDID) injection 0.2 mg    Or     HYDROmorphone (PF) (DILAUDID) injection 0.4 mg     HYDROmorphone (DILAUDID) tablet 2 mg     HYDROmorphone (DILAUDID) tablet 2 mg    Or     HYDROmorphone (DILAUDID) tablet 4 mg     HYDROmorphone (PF) (DILAUDID) injection 0.3 mg     hydrOXYzine (ATARAX) tablet 25 mg     ketorolac (TORADOL) injection 15 mg     lactated ringers infusion     lactated ringers infusion     lidocaine (LMX4) cream     lidocaine (LMX4) cream     lidocaine 1 % 0.1-1 mL     lidocaine 1 % 0.1-1 mL     magnesium hydroxide (MILK OF MAGNESIA) suspension 30 mL     magnesium hydroxide (MILK OF MAGNESIA) suspension 30 mL     [START ON 11/14/2020] metFORMIN (GLUCOPHAGE) tablet 500 mg     naloxone (NARCAN) injection 0.1-0.4 mg     ondansetron (ZOFRAN-ODT) ODT tab 4  "mg    Or     ondansetron (ZOFRAN) injection 4 mg     ondansetron (ZOFRAN-ODT) ODT tab 4 mg    Or     ondansetron (ZOFRAN) injection 4 mg     oxyCODONE (ROXICODONE) tablet 5 mg     oxyCODONE (ROXICODONE) tablet 5 mg     oxyCODONE (ROXICODONE) tablet 5 mg    Or     oxyCODONE IR (ROXICODONE) tablet 10 mg     [START ON 2020] polyethylene glycol (MIRALAX) Packet 17 g     [START ON 2020] polyethylene glycol (MIRALAX) Packet 17 g     prochlorperazine (COMPAZINE) injection 10 mg    Or     prochlorperazine (COMPAZINE) tablet 10 mg     prochlorperazine (COMPAZINE) injection 10 mg    Or     prochlorperazine (COMPAZINE) tablet 10 mg     prochlorperazine (COMPAZINE) injection 10 mg    Or     prochlorperazine (COMPAZINE) tablet 10 mg     scopolamine (TRANSDERM) 72 hr patch 1 patch     scopolamine (TRANSDERM-SCOP) Patch in Place     [START ON 2020] scopolamine (TRANSDERM-SCOP) patch REMOVAL     senna-docusate (SENOKOT-S/PERICOLACE) 8.6-50 MG per tablet 1 tablet     senna-docusate (SENOKOT-S/PERICOLACE) 8.6-50 MG per tablet 1 tablet     sodium chloride (PF) 0.9% PF flush 3 mL     sodium chloride (PF) 0.9% PF flush 3 mL     sodium chloride (PF) 0.9% PF flush 3 mL     sodium chloride (PF) 0.9% PF flush 3 mL     sodium chloride (PF) 0.9% PF flush 3 mL     sodium chloride 0.9% infusion     sodium phosphate (FLEET ENEMA) 1 enema     sodium phosphate (FLEET ENEMA) 1 enema         ROS: 10 point ROS neg other than the symptoms noted above in the HPI.    PHYSICAL EXAMINATION:     /60 (BP Location: Right arm)   Pulse 67   Temp 97.2  F (36.2  C) (Oral)   Resp 10   Ht 1.93 m (6' 4\")   Wt 148.1 kg (326 lb 8 oz)   SpO2 97%   BMI 39.74 kg/m    Temp (24hrs), Av.8  F (36.6  C), Min:97.2  F (36.2  C), Max:98.4  F (36.9  C)      BMI= Body mass index is 39.74 kg/m .      Intake/Output Summary (Last 24 hours) at 2020 1652  Last data filed at 2020 1546  Gross per 24 hour   Intake 2070 ml   Output 250 ml "   Net 1820 ml       General: Alert, interactive, NAD.   HEENT: AT/NC. Moist MM.    Neck: Supple.    Heart/CVS: Normal S1 and S2. Regular.   Chest/Respi: Non labored breathing. CTA BL.   Abdomen/GI: Soft, non distended, non tender. No g/r.  Extremities/MSK: Distal pulses 2+, well perfused. Rest per ortho.   Neuro: Alert and oriented x4. Cranial nerves II-XII are grossly intact.    Skin:  No new rash over exposed areas.       LABORATORY DATA: reviewed.     Recent Results (from the past 24 hour(s))   Glucose by meter    Collection Time: 11/13/20  5:59 AM   Result Value Ref Range    Glucose 114 (H) 70 - 99 mg/dL       Recent Results (from the past 24 hour(s))   POC US Guidance Needle Placement    Impression    Left adductor canal block   XR Surgery BERNA L/T 5 Min Fluoro    Narrative    This exam was marked as non-reportable because it will not be read by a   radiologist or a [facility] non-radiologist provider.                 Owen Hager MD

## 2020-11-13 NOTE — PLAN OF CARE
Patient vital signs are at baseline: Yes  Patient able to ambulate as they were prior to admission or with assist devices provided by therapies during their stay:  No,  Reason:  Has not been OOB yet; pt very nervous about movement. Placed in CPM at 1830  Patient MUST void prior to discharge:  Yes  Patient able to tolerate oral intake:  Yes  Pain has adequate pain control using Oral analgesics:  Yes    Pt arrived to unit at 1615 and was oriented to room and call light  VS: VSS   O2: >90% on RA   Output: Voided 450 with PVR of 37   Last BM: 11/12   Activity: Pt will be partial WB (50%) LLE when up    Up for meals? Declined   Skin: Incision LLE   Pain: 5mg oxycodone x1   CMS: Intact   Dressing: CDI   Diet: Advanced to regular   LDA: L hand PIV infusing   Equipment: IV pole, capnography, CPM, HKB, CAM boot (ortho paged asking if CAM can be removed while in CPM)   Plan: TBD   Additional Info: Pt received general anesthesia + cocktail + adductor canal block. .  Scope patch behind R ear.

## 2020-11-13 NOTE — OR NURSING
"Arrived PACU per cart.  Pt waking up to touch and voice.  Moves all fours.  Breath sounds mostly clear heard anterior chest.  Coughing up clear sputum.  Had a small green emesis on admission.  Left leg in locked brace.  Ace wrap intact without drainage.  Pedal pulses 2+ bilateral with cap refill less then 3 seconds.  Pt stating \"pain down the back of the left leg\".  Pt back to sleep.  "

## 2020-11-13 NOTE — ANESTHESIA PROCEDURE NOTES
Airway   Date/Time: 11/13/2020 7:43 AM   Patient location during procedure: OR    Staff -   CRNA: Eda Matta APRN CRNA  Performed By: CRNA    Consent for Airway   Urgency: elective    Indications and Patient Condition  Indications for airway management: laura-procedural  Induction type:intravenousMask difficulty assessment: 3 - difficult mask (inadequate, unstable, or two providers) +/- NMBA (due to large size and beard )    Final Airway Details  Final airway type: endotracheal airway  Successful airway:ETT - single  Endotracheal Airway Details   ETT size (mm): 8.0  Cuffed: yes  Successful intubation technique: video laryngoscopy  Grade View of Cords: 2  Adjucts: stylet  Measured from: gums/teeth  Secured at (cm): 24  Secured with: silk tape (with benzoin)  Bite block used: Soft    Post intubation assessment   Placement verified by: capnometry, equal breath sounds and chest rise   Number of attempts at approach: 1  Number of other approaches attempted: 0  Secured with:silk tape (with benzoin)  Ease of procedure: easy  Dentition: Intact and Unchanged

## 2020-11-13 NOTE — DISCHARGE SUMMARY
Orthopaedic Surgery Discharge Summary          Name:  Higinio Wallace  MRN:  [id number]  YOB: 1990      Date of Admission:  11/13/2020  Date of Discharge::  11/14/2020  Discharge Physician:  Sourav Keating MD          Admission Diagnoses:   Patellofemoral instability of left knee with pain [M25.362, M25.562]          Discharge Diagnosis:     Patellofemoral instability of left knee with pain [M25.362, M25.562]          Procedures:   Surgery: knee arthroscopy, medial patello-femoral ligament reconstruction, distal tibial tubercle osteotomy, lateral retinacualr lengthening and derotation tibial osteotomy  Date: 11/13/2020          Discharge Medications:     Current Discharge Medication List      START taking these medications    Details   acetaminophen (TYLENOL) 500 MG tablet Take 2 tablets (1,000 mg) by mouth every 8 hours as needed for pain  Qty: 1 Bottle, Refills: 0    Associated Diagnoses: Patellofemoral instability of left knee with pain      aspirin 81 MG EC tablet Take 2 tablets (162 mg) by mouth daily  Qty: 60 tablet, Refills: 0    Associated Diagnoses: Patellofemoral instability of left knee with pain      gabapentin (NEURONTIN) 100 MG capsule Take 3 capsules (300 mg) by mouth 3 times daily for 10 days  Qty: 90 capsule, Refills: 0    Associated Diagnoses: Patellofemoral instability of left knee with pain      hydrOXYzine (ATARAX) 25 MG tablet Take 2 tablets (50 mg) by mouth every 6 hours as needed for itching or other (pain adjuvant)  Qty: 40 tablet, Refills: 0    Associated Diagnoses: Patellofemoral instability of left knee with pain      oxyCODONE (ROXICODONE) 5 MG tablet Take 1-2 tablets (5-10 mg) by mouth every 4 hours as needed for moderate to severe pain  Qty: 60 tablet, Refills: 0    Comments: Discharge today  Associated Diagnoses: Patellofemoral instability of left knee with pain      polyethylene glycol (MIRALAX) 17 g packet Take 17 g by mouth daily  Qty: 7 packet, Refills: 0     Associated Diagnoses: Patellofemoral instability of left knee with pain      senna-docusate (SENOKOT-S/PERICOLACE) 8.6-50 MG tablet Take 1-2 tablets by mouth 2 times daily Take while on oral narcotics to prevent or treat constipation.  Qty: 30 tablet, Refills: 0    Comments: While taking narcotics  Associated Diagnoses: Patellofemoral instability of left knee with pain         CONTINUE these medications which have NOT CHANGED    Details   albuterol (ALBUTEROL) 108 (90 BASE) MCG/ACT Inhaler Inhale 2 puffs into the lungs every 4 hours as needed for shortness of breath / dyspnea or wheezing  Qty: 1 Inhaler, Refills: 0      omeprazole 20 MG tablet Take 20 mg by mouth every morning       ARIPiprazole (ABILIFY) 15 MG tablet Take 15 mg by mouth every morning       metFORMIN (GLUCOPHAGE) 500 MG tablet Take 500 mg by mouth 2 times daily (with meals)                     Consultations:   Physical therapy          Brief History of Illness:   30-year-old male with history of bilateral knee instability with left greater than right.  Additionally he was having left knee pain.  He had been evaluated by multiple other orthopedists and eventually went to Dr. Lela Han's clinic for for conversation regarding surgical management of his knee.  History, clinical examination and imaging suggested that there were multiple issues contributing to his patellar instability and pain.  After extensive conversation he elected to undergo the above surgery for left knee instability and pain.           Hospital Course:   The patient was admitted to the hospital after the above listed procedure.  Hospital course was uneventful.       Patient worked with PT and OT beginning POD#1.  On POD#1, patient was tolerating a regular diet, voiding on own, had pain well controlled on oral pain medications and was felt to be medically stable for d/c to home with assistance of family.    Exam at time of Discharge: See progress note from day of  discharge    DVT prophylaxis: Aspirin 162 mg for 4 weeks  Blood Transfusion: none          Discharge Instructions and Follow-Up:     Discharge Procedure Orders   Discharge Instructions   Order Comments: TIBIAL TUBERCLE OSTEOTOMY WITH TIBIAL DEROTATIONAL OSTEOTOMY, LRL and MPFL RECONSTUCTION  POST OPERATIVE DISCHARGE INSTRUCTIONS    FOLLOW UP APPOINTMENT  You are scheduled for a post operative wound check with Dr. Han's clinic approximately two weeks after surgery. At approximately eight weeks after surgery, you will see Dr. Han in clinic.     Your follow up appointments will be at the location that you regularly see Dr. Han:    [facility]  [address] [place] [address]  [phone]    [facility]  [address] [place] [id number] [phone]    Physical therapy:   A referral for physical therapy will be made at discharge. You will also receive a physical therapy protocol in your after visit summary. This document must be taken to your first therapy visit.      ACTIVITY  Weight bearing status:   You are allowed partial weight bearing status (less than or equal to 50% body weight) on your operative leg. The hinged knee brace should be on and locked at 10 degrees. You may  tandem for brief periods during the first three weeks. Use assistive devices (crutches) as needed.    Continuous passive motion (CPM) machine:   Perform CPM exercises for six to eight hours per day for the first four weeks after surgery. The CPM should be set at 10 degrees to flexion tolerance with a goal of 90 degrees. Advance the CPM settings aggressively in increments of 5 degrees every 30 minutes to achieve desired goal. After four weeks, the CPM machine can be returned.    Hinged knee brace:  The brace should be set at 10 degrees to 90 degrees. It is to be locked at 10 degrees at all times except with CPM or ROM exercises. The brace is to be worn  for three to six weeks following surgery. Sleep with the brace on until directed.    Exercises:   Perform the following exercises at least three times per day for the first four weeks after surgery to prevent complications, such as blood clots in your legs:  1) Point and flex your feet  2) Move your ankle around in big circles  3) Wiggle your toes   Also, perform thigh muscle tightening exercises for 10 to 15 minutes at least three times per day for the first four weeks after surgery.    Athletic Activities:  Activities such as swimming, bicycling, jogging, running, and stop-and-go sports should be avoided until permitted by your provider.    Driving:  Driving is not permitted until directed by your provider. Typically, driving is restricted for three to four weeks after right knee surgery and three weeks after left knee surgery. Under no circumstance are you permitted to drive while using narcotic pain medications.    Return to Work:  You may return to work when directed by your provider. Typically, patients with desk/sitting jobs can return to work within two weeks while patients with heavy labor jobs can return to work around three months after surgery.      COMFORT AND PAIN MANAGEMENT  Elevation:   During times of inactivity throughout the first two weeks after surgery, make an effort to decrease swelling by elevating your operative extremity. This is most effectively done by lying down and placing several pillows lengthwise under your thigh and calf to raise your toes above the level of your nose. To ensure that your knee remains in full extension, do not place pillows directly under your knee.     Icing:  An ice pack will be provided to control swelling and discomfort after surgery. Place a thin towel on your skin and apply the ice pack overtop. You may apply ice for 20 minutes as often as two times per hour.    Pain Medications:  You will be discharged with acetaminophen (Tylenol) and a narcotic medication  for pain management after surgery. Acetaminophen is most effective when it is taken per the schedule outlined by your provider (every four, six, or eight hours as prescribed). You may safely use acetaminophen as prescribed for the first four weeks after surgery provided you do not exceed the maximum daily dose prescribed by your provider (usually 3000 mg - 4000 mg). The narcotic pain medication should only be taken on an as-needed basis when necessary and should be reserved for severe pain that is not controlled with scheduled acetaminophen. In the first three days following surgery, your symptoms may warrant use of the narcotic pain medication every three, four, or six hours as prescribed. After three days, focus your efforts on decreasing (tapering) use of narcotic medications.   The most successful tapering strategy is to first, decrease the dose (number of tablets) and second, increase the interval (time in between doses). For example, if you begin taking two tablets every four hours after surgery, start your taper by decreasing one of these doses to one tablet. Every one to two days, decrease another dose to one tablet until you are eventually taking one tablet every four hours. Once this is achieved, focus on increasing the number of hours between doses, moving from one tablet every four hours to one tablet every six hours. As tolerated, continue to increase the interval to eight and twelve hours. Eventually, taper to one dose every evening and discontinue when no longer needed.    ANTICOAGULATION  Depending on your risk factors, your provider may prescribe aspirin to prevent blood clots. If prescribed, take aspirin daily for the first four weeks after surgery.      WOUND CARE AND SHOWERING  Wound care:  Keep the initial post-op dressing on, clean, and dry for the first seven days after surgery. After seven days, remove the dressing and leave the incision open to air, covering only for showering (see showering  instructions below). Your surgical incision was closed with Exofin (a surgical adhesive that is directly on the incision areas). The Exofin should be left on until it falls off or is removed at your first office visit. Under no circumstance are you allowed to pick or scratch your incision. Please contact Dr. Han's office if you notice the followin) significant cloudy, bloody, or malodorous drainage from the incision, 2) excessive warmth and redness around the incision.    Showering:  You may shower the day after surgery provided the surgical dressing remains on. Once the dressing is removed on the seventh day after surgery, cover the incision with saran wrap (or any other non-permeable covering) while showering to keep the incision dry. You may shower in the usual fashion, allowing water and soap to run over the operative leg. You are strictly prohibited from soaking or submerging the surgical wound underwater.     Tub Bathing:  Tub bathing, swimming, or any other activities that cause your incision to be submerged should be avoided until allowed by your provider. Typically, patients are allowed to return to these activities six weeks after surgery.    CONTACTING YOUR PHYSICIAN:  You may experience symptoms that require follow-up before your scheduled two week appointment. Please contact Dr. Han's office if you experience:  1) Pain in your knee that persists or worsens in the first few days after surgery  2) Excessive redness or drainage of cloudy or bloody material from the wounds (clear red tinted fluid and some mild drainage should be expected) or drainage of any kind five days after surgery  3) A temperature elevation greater than 101.5 F   4) Pain, swelling or redness in your calf  5) Numbness or weakness in your leg or foot      Regular business hours (Monday - Friday, 8am - 5pm):  [facility] [place]: [phone]  [facility]: [phone]    After hours  and weekends:  [facility] on call Orthopedic resident: [phone]     Constipation management   Order Comments: Constipation (hard, dry bowel movements) is expected after surgery due to the combination of being less active, the anesthetic, and the opioid pain medication.  You can do the following to help reduce constipation:  ~  FLUIDS:  Drink clear liquids (water or Gatorade), or juice (apple/prune).  ~  DIET:  Eat a fiber rich diet.    ~  ACTIVITY:  Get up and move around several times a day.  Increase your activity as you are able.  MEDICATIONS:  Reduce the risk of constipation by starting medications before you are constipated.  You can take Miralax   (1 packet as directed) and/or a stool softener (Senokot 1-2 tablets 1-2 times a day).  If you already have constipation and these medications are not working, you can get magnesium citrate and use as directed.  If you continue to have constipation you can try an over the counter suppository or enema.  Call your Surgeon Team if it has been greater than 3 days since your last bowel movement.     Anticoagulation - aspirin   Order Comments: Take the aspirin as prescribed for a total of four weeks after surgery.  This is given to help minimize your risk of blood clot.     Return to Driving   Order Comments: Driving is NOT permitted until directed by your provider. Under no circumstance are you permitted to drive while using narcotic pain medications.     Continuous Passive Motion Machine   Order Comments: Settings (degrees): 0 degrees to flexion tolerance with a goal of 90 degrees  Advance CPM (degrees): increments of 5 degrees every 30 minutes.  Perform for 6-8 hours daily for four weeks     Reason for your hospital stay   Order Comments: You stayed in the hospital overnight following your knee surgery     Wound care   Order Comments: You have a clean dressing on your surgical wound. Dressing change instructions as follows: remove your dressing in 7  days, and leave incision open to air. Contact your Surgeon Team if you have increased redness, warmth around the surgical wound, and/or drainage from the surgical wound.     Activity - Total Knee Arthroplasty   Order Comments: Weight bearing as tolerated with the brace locked in extension. Utilize crutches for partial weight bearing initially.     Order Specific Question Answer Comments   Is discharge order? Yes      Follow Up Care   Order Comments: Follow-up with Sergio Liao PA-C at scheduled appointment.     Weight bearing - Partial   Order Comments: Partial weight bearing with knee brace locked in extension. Utilize crutches to comply with the weight bearing restrictions.     Walker DME   Order Comments: DME Documentation: Describe the reason for need to support medical necessity: Impaired gait status post knee surgery. I, the undersigned, certify that the above prescribed supplies are medically necessary for this patient and is both reasonable and necessary in reference to accepted standards of medical practice in the treatment of this patient's condition and is not prescribed as a convenience.     Order Specific Question Answer Comments   DME Provider: [facility]    Walker Type: Standard (2 Wheel)    Accessories: N/A      Crutches DME   Order Comments: DME Documentation: Describe the reason for need to support medical necessity: Impaired gait status post knee surgery. I, the undersigned, certify that the above prescribed supplies are medically necessary for this patient and is both reasonable and necessary in reference to accepted standards of medical practice in the treatment of this patient's condition and is not prescribed as a convenience.     Order Specific Question Answer Comments   DME Provider: [facility]    Crutch Type: Standard    Crutches Add On: NA    Length of Need: Lifetime      Advance Diet as Tolerated     Order Specific Question Answer Comments   Is discharge order? Yes      Assign  Questionnaire Series to Patient              Discharge Disposition:     Discharged to home      ZAKIA Hurtado MD  PGY-4, Orthopaedic Surgery     -------------------------------------------------------------------------------------------------------------------  Attending Physician Attestation:     I have reviewed the resident's history, physical exam, assessment, and management plans.  I concur with or have edited all elements of the resident's note.      Sourav Keating M.D.

## 2020-11-13 NOTE — OR NURSING
"Trying a few ice chips.  Unable to tolerate, becoming nauseated again.  Back on oxygen.  states \"pain is better in back of leg\".  Back to sleep.  "

## 2020-11-13 NOTE — ANESTHESIA POSTPROCEDURE EVALUATION
Anesthesia POST Procedure Evaluation    Patient: Higinio Wallace   MRN:     [id number] Gender:   male   Age:    30 year old :      1990        Preoperative Diagnosis: Patellofemoral instability of left knee with pain [M25.362, M25.562]   Procedure(s):  Knee Arthroscopy, Medial Patello-femoral Ligament Reconstruction with Allograft, Distal Tibial Tubercle Osteotomy, LRL  plus derotation tibial osteotomy  plus derotation tibial osteotomy   Postop Comments: No value filed.     Anesthesia Type: General       Disposition: Admission   Postop Pain Control: Uneventful            Sign Out: Well controlled pain   PONV: Yes            Sign Out: PONV/POV resolved with treatment   Neuro/Psych: Uneventful            Sign Out: Acceptable/Baseline neuro status   Airway/Respiratory: Uneventful            Sign Out: Acceptable/Baseline resp. status   CV/Hemodynamics: Uneventful            Sign Out: Acceptable CV status   Other NRE: NONE   DID A NON-ROUTINE EVENT OCCUR? No         Last Anesthesia Record Vitals:  [facility] VITALS  2020 1239 - 2020 1339      2020             NIBP:  135/75    Pulse:  87    NIBP Mean:  97    Temp:  36.9  C (98.4  F)     axillary    SpO2:  99 %    Resp Rate (observed):  20          Last PACU Vitals:  Vitals Value Taken Time   /65 20 1600   Temp 36.5  C (97.7  F) 20 1530   Pulse 68 20 1600   Resp 14 20 1600   SpO2 97 % 20 1600   Temp src     NIBP 135/75 20 1312   Pulse 87 20 1312   SpO2 99 % 20 1312   Resp     Temp 36.9  C (98.4  F) 20 1312   Ht Rate     Temp 2     Vitals shown include unvalidated device data.      Electronically Signed By: Daniela Arias MD, 2020, 4:31 PM

## 2020-11-14 VITALS
HEART RATE: 76 BPM | DIASTOLIC BLOOD PRESSURE: 67 MMHG | HEIGHT: 76 IN | TEMPERATURE: 98.9 F | OXYGEN SATURATION: 96 % | BODY MASS INDEX: 38.36 KG/M2 | RESPIRATION RATE: 16 BRPM | SYSTOLIC BLOOD PRESSURE: 112 MMHG | WEIGHT: 315 LBS

## 2020-11-14 LAB
ANION GAP SERPL CALCULATED.3IONS-SCNC: 6 MMOL/L (ref 3–14)
BUN SERPL-MCNC: 11 MG/DL (ref 7–30)
CALCIUM SERPL-MCNC: 7.5 MG/DL (ref 8.5–10.1)
CHLORIDE SERPL-SCNC: 111 MMOL/L (ref 94–109)
CO2 SERPL-SCNC: 19 MMOL/L (ref 20–32)
CREAT SERPL-MCNC: 0.86 MG/DL (ref 0.66–1.25)
ERYTHROCYTE [DISTWIDTH] IN BLOOD BY AUTOMATED COUNT: 12.9 % (ref 10–15)
GFR SERPL CREATININE-BSD FRML MDRD: >90 ML/MIN/{1.73_M2}
GLUCOSE SERPL-MCNC: 133 MG/DL (ref 70–99)
HCT VFR BLD AUTO: 40.1 % (ref 40–53)
HGB BLD-MCNC: 13.9 G/DL (ref 13.3–17.7)
MCH RBC QN AUTO: 30.8 PG (ref 26.5–33)
MCHC RBC AUTO-ENTMCNC: 34.7 G/DL (ref 31.5–36.5)
MCV RBC AUTO: 89 FL (ref 78–100)
PLATELET # BLD AUTO: 139 10E9/L (ref 150–450)
POTASSIUM SERPL-SCNC: 4 MMOL/L (ref 3.4–5.3)
RBC # BLD AUTO: 4.52 10E12/L (ref 4.4–5.9)
SODIUM SERPL-SCNC: 136 MMOL/L (ref 133–144)
WBC # BLD AUTO: 14.2 10E9/L (ref 4–11)

## 2020-11-14 PROCEDURE — 250N000013 HC RX MED GY IP 250 OP 250 PS 637: Performed by: CLINICAL NURSE SPECIALIST

## 2020-11-14 PROCEDURE — 250N000013 HC RX MED GY IP 250 OP 250 PS 637: Performed by: INTERNAL MEDICINE

## 2020-11-14 PROCEDURE — 80048 BASIC METABOLIC PNL TOTAL CA: CPT | Performed by: INTERNAL MEDICINE

## 2020-11-14 PROCEDURE — 250N000011 HC RX IP 250 OP 636: Performed by: CLINICAL NURSE SPECIALIST

## 2020-11-14 PROCEDURE — 36415 COLL VENOUS BLD VENIPUNCTURE: CPT | Performed by: INTERNAL MEDICINE

## 2020-11-14 PROCEDURE — 85018 HEMOGLOBIN: CPT | Performed by: INTERNAL MEDICINE

## 2020-11-14 PROCEDURE — 250N000013 HC RX MED GY IP 250 OP 250 PS 637: Performed by: STUDENT IN AN ORGANIZED HEALTH CARE EDUCATION/TRAINING PROGRAM

## 2020-11-14 PROCEDURE — 85027 COMPLETE CBC AUTOMATED: CPT | Performed by: INTERNAL MEDICINE

## 2020-11-14 RX ORDER — OXYCODONE HYDROCHLORIDE 5 MG/1
5-10 TABLET ORAL EVERY 4 HOURS PRN
Qty: 45 TABLET | Refills: 0 | Status: SHIPPED | OUTPATIENT
Start: 2020-11-14 | End: 2021-08-17

## 2020-11-14 RX ADMIN — CEFAZOLIN SODIUM 2 G: 2 INJECTION, SOLUTION INTRAVENOUS at 04:53

## 2020-11-14 RX ADMIN — GABAPENTIN 300 MG: 100 CAPSULE ORAL at 07:56

## 2020-11-14 RX ADMIN — KETOROLAC TROMETHAMINE 15 MG: 15 INJECTION, SOLUTION INTRAMUSCULAR; INTRAVENOUS at 04:53

## 2020-11-14 RX ADMIN — ASPIRIN 162 MG: 81 TABLET ORAL at 07:56

## 2020-11-14 RX ADMIN — DOCUSATE SODIUM 100 MG: 100 CAPSULE, LIQUID FILLED ORAL at 07:56

## 2020-11-14 RX ADMIN — ACETAMINOPHEN 975 MG: 325 TABLET, FILM COATED ORAL at 04:52

## 2020-11-14 RX ADMIN — DOCUSATE SODIUM AND SENNOSIDES 1 TABLET: 8.6; 5 TABLET ORAL at 07:56

## 2020-11-14 RX ADMIN — POLYETHYLENE GLYCOL 3350 17 G: 17 POWDER, FOR SOLUTION ORAL at 07:56

## 2020-11-14 RX ADMIN — OXYCODONE HYDROCHLORIDE 5 MG: 5 TABLET ORAL at 07:56

## 2020-11-14 RX ADMIN — METFORMIN HYDROCHLORIDE 500 MG: 500 TABLET ORAL at 07:56

## 2020-11-14 RX ADMIN — ARIPIPRAZOLE 15 MG: 5 TABLET ORAL at 07:56

## 2020-11-14 NOTE — PROGRESS NOTES
Postoperatively, patient remained neurovascularly intact in the left lower extremity after waking from anesthesia and was able to wiggle the toes of his left foot prior to leaving the OR and arriving in PACU.    PAT Vallejo, PAChantaleC  Physician Assistant, Orthopedic Surgery  Pager: [phone]

## 2020-11-14 NOTE — PLAN OF CARE
Patient vital signs are at baseline: Yes  Patient able to ambulate as they were prior to admission or with assist devices provided by therapies during their stay:  Yes  Patient MUST void prior to discharge:  Yes  Patient able to tolerate oral intake:  Yes  Pain has adequate pain control using Oral analgesics:  Yes    VS: VSS   O2: >90% on RA   Output: Adequate via urinal   Last BM: 11/12; +fl   Activity: 50% WB LLE. Ambulated 75 ft in hallway with SBA. Pt feels confident with ambulation.   Up for meals? Yes   Skin: Incision LLE   Pain: 5mg oxycodone x1   CMS: Intact   Dressing: CDI   Diet: Regular   LDA: PIV removed prior to discharge   Equipment: CAM boot, HKB, CPM, crutches, FWW, gait belt   Plan: Home today   Additional Info: Patient was cleared for discharge to home. Patient was given medication, discharge and follow up instructions and states no further questions at this time. Pt was given discharge medications besides oxycodone which was prescribed to [facility] in [place]. Pt was discharge via transport with A from family.

## 2020-11-14 NOTE — OP NOTE
PREOPERATIVE DIAGNOSES:   1. Left knee acute on chronic patellar dislocations.   2. Patella garrett.   3. (+) Lateral Tightness  4. Increased external tibial torsion   5. Rule out patellofemoral cartilage wear    POSTOPERATIVE DIAGNOSES:   1. Left knee acute on chronic patellar dislocations.   2. Patella garrett.   3. (+) Lateral Tightnesss  4. Increased external tibial torsion   5. No significant  patellofemoral cartilage wear    OPERATIONS:   1. Left  knee exam under anesthesia.   2. Diagnostic arthroscopy.   3. MPFL reconstruction using gracilis allograft.   4. Lateral retinacular lengthening.   5. Distal and medial tibial tubercle transfer (12 mm medial, 8 mm distal)  6. Osteotomy of the tibia with IM jhoana fixation, osteotomy of the fibula.    OPERATORS:   1. Sourav Keating MD  2. Lela Han MD   2. Topher Wharton MD   3. Florencio Villa PA-C    ANESTHESIA: General endotracheal anesthesia supplemented with adductor nerve block.   EXAM UNDER ANESTHESIA: Range of motion 5 /0/135 degrees of motion    The patient had 4 quadrant lateral mobility, zero quadrants medial mobility, negative medial patellar tilt test.    (+) J sign when awake, persisted when asleep, relocated at 45 degrees of flexion.  With manual pressure the patella could be relocated in groove thus eliminating the J-Sign.  MRI:     LEFT knee                     LTI angle 8 degrees              Boss: 9mm              TT-TG  29mm               Lateral patella tilt 17                Patella trochlear index <10% (difficult to measure accurately due to the significant patella subluxation)              (+) Empty sulcus sign    Arthroscopic findings revealed no fluid upon entry into the joint.   Patellofemoral joint:  Minor cartilage wear on lateral trochlear wall where the patella was relocating in mid-flexion.  Superficial fibrillation at inferior MPF and medial ridge.  The patient's medial and lateral compartment showed pristine cartilage  surfaces, normal meniscus and satisfactory ACL.     DESCRIPTION OF PROCEDURE: Under General endotracheal anesthesia , the patient was positioned supine on the operating room table. The patient's leg was prepped and draped in usual sterile fashion. A pause was performed identifying the correct leg, the administration of antibiotics and the inclusion of the lead apron over the patient for fluoroscopic unit.  No tourniquet was used.  A lateral para-patellar incision was used extending from superior patella to tibial tubercle.  A diagnostic arthroscopy was performed using anterolateral for visualization.   Diagnostic arthroscopy findings as stated above.     At the end of this portion of the procedure, excess fluid was removed from the knee.  We isolated the tibial tubercle region with its patellar tendon insertion for a distance of 6cm distal. Using a combination of an oscillating saw and osteotomes, we removed the tibial tubercle for a length of 6 cm. We then cut the distal extent of the osteotomy, removing 8 mm of bone, according to pre-operative planning.  We rasped both ends of the bone where the saw cut into cortical bone.    We then moved to the derotation osteotomy.  This part will be separately dictated.    After the tibial IM jhoana was secured, we moved back to the tibial tubercle fragment.  We moved the tibial ostetomized bone segment distally to reach the tibial bone (after removal of 8 mm of bone), and medially to effect a tubercle-sulcus angle of zero.  We then secured the tibial tubercle region in place with two 3.5 cortical screws, bicortically placed in compression.  We identified the iliotibial band and  from its insertion on the patella. We were then able to identify a second layer of the lateral patellofemoral ligament. This layer was then incised.  Posteriorly, leaving the deep layer attached to the patella, and the superifical layer attached to the IM septum.  We left this open until  closure.    Under separate cover, a gracilis graft was brought into the room, and prepared with leader sutures on both ends.  We identified the proximal location of the MPFL origin on the patella utilizing flouroscopy. Using a soft tissue sleeve, we placed the distal grafts end under the patellar/ recutus periosteum, and the proximal arm in the medial aspect of the distal-medial quad tendon.  These ends were secured with multiple  #1 Vicryl suture.    We brought the opposite end of the graft under the medial retinaculum, to exit at the adductor tubercle. With the help of flouroscopy, we identified Schoettle's point and placed a beath pin medial to lateral across the distal femur, then overdrilled this with a 6mm cannulate reamer.  Placing the knee at 45 degrees of flexion, we secured the graft into the femoral tunnel with a 6x20mm biocomposite screw.    We then brought the knee through a full range of motion.  We felt we had good construct with full motion on the table and 1+ quadrants passive lateral mobility with a good checkrein.    On the lateral side, we bent the knee to 60 degrees. We sewed the near end of layer 1 to the far end of layer 2 with a small imbricating suture of #1 Vicryl to see a gap of approximately 24 mm.     The incision in the medial retinaculum was closed with an imbricating suture of #1 Vicryl.  Larger incisions were then closed with 2-0 Vicryl on subcutaneous tissue and a running 3-0 Monocryl.  Smaller incisions were closed with simple of nylon. Exofin wound closure system was used to seal the skin, then covered with Tagadrem.   A sterile dressing and a Tubigrip stockinette was put in place.    The patient's knee was then placed in a locked hinge brace locked at 10 degrees of flexion. The patient will be maintained partial weightbearing on crutches, he can open the brace when sitting.  A short leg boot was places on the foot, keeping the patient's foot at neutral dorsiflexion.      Tereso was the primary surgeon, with first assist of Dr. Emely jackson with the patellar stabilization portion of th procedure.    AIDAN CROSS MD     11/13/2020  Higinio Wallace  MRN# [id number]  YOB: 1990

## 2020-11-14 NOTE — PROGRESS NOTES
A/Ox's 4. Pt rated pain as tolerable. Oxycodone, Toradol and Ice packs given for pain control. Dressing CDI. Pt also has a Cam Boot and a HKB. Ok per ortho for the Boot to be off when pt is in bed not ambulating.  CMS intact. Tolerated regular diet. BG checks done.  Denied any nausea, CP, SOB, lightheadedness or dizziness. Voiding without pain or difficulty. Passing flatus. Resting in bed at this time with call light in reach. Able to make needs known. Continue to monitor.

## 2020-11-14 NOTE — PROGRESS NOTES
"Orthopaedic Surgery Progress Note       E: No acute events overnight.    S: Pain well controlled, 1/10.  Oxycodone overnight    O:   /46   Pulse 57   Temp 96.9  F (36.1  C) (Oral)   Resp 16   Ht 1.93 m (6' 4\")   Wt 148.1 kg (326 lb 8 oz)   SpO2 93%   BMI 39.74 kg/m        Exam:  Gen: NAD, A/O x 3  Resp: Comfortable, non-labored breathing  LLE:   -Wound dressed, c/d/i  -Sens: SILT m/r/u/ax  -Motor: 5/5 , io, epl, fpl  -Vasc: 2+ pulses, wwp, brisk cap refill    No lab results found in last 7 days.  No lab results found in last 7 days.  No lab results found in last 7 days.      Impression: 30 year old male s/p Knee Arthroscopy, Medial Patello-femoral Ligament Reconstruction with Allograft, Distal on 11/13/  doing well    Plan:    Activity: Up with crutches.  Weight bearing status: Partial weight bearing with crutches with knee locked in 10 degrees of extension   Antibiotics: Ancef introp  Diet: Begin with clear fluids and progress diet as tolerated.  DVT prophylaxis: ASA x 6 weeks.  Bracing/Splinting: Boot and knee brace to be worn when up. Ok to unlock knee brace when in bed with knee ROM to 70 degrees   Elevation: Elevate LLE on pillows to keep as much as possible. Do not put anything directly behing the knee   Wound Care: Dressing in lace for 7 days   Pain management: Tylenol, oxycodone, gabapentin, atarax  X-rays: AP/Lat of tibia/knee in PACU   Physical Therapy: crutch training with [facility] extremity on 11/18 at 425 pm.      Follow-up: Clinic with Sergio Liao PA-C on 11/27/20     Future Appointments   Date Time Provider Department Center   11/14/2020  9:00 AM Esther Brantley PT URPT [place]   11/14/2020  1:00 PM Janeth Wyman OT UROT [place]   11/18/2020  4:40 PM Sergo Marino PT PPT [facility]   11/24/2020  2:00 PM Donell Holland MD [facility]   11/27/2020  2:20 PM Sergio Liao PA-C Replaced by [facility]   11/30/2020 10:00 AM Abran Yadav RD [facility] "   12/8/2020 12:40 PM Lela Han MD [facility]   12/8/2020  2:00 PM Franchesca Hernandez, PhD [facility]        Perla FERRER   Orthopedics    For any questions regarding this patient please page me at the above number prior to contacting the ortho resident on call.

## 2020-11-15 ENCOUNTER — MEDICAL CORRESPONDENCE (OUTPATIENT)
Dept: HEALTH INFORMATION MANAGEMENT | Facility: CLINIC | Age: 30
End: 2020-11-15

## 2020-11-16 ENCOUNTER — HEALTH MAINTENANCE LETTER (OUTPATIENT)
Age: 30
End: 2020-11-16

## 2020-11-17 ENCOUNTER — TELEPHONE (OUTPATIENT)
Dept: GASTROENTEROLOGY | Facility: CLINIC | Age: 30
End: 2020-11-17

## 2020-11-18 ENCOUNTER — MYC MEDICAL ADVICE (OUTPATIENT)
Dept: ORTHOPEDICS | Facility: CLINIC | Age: 30
End: 2020-11-18

## 2020-11-18 NOTE — OP NOTE
NAME: Higinio Wallace     MRN: [id number]    ENCOUNTER: [id number]    DICTATING CLINICIAN: ABHIJIT MANNING MD     OPERATIVE REPORT   : 1990     DATE OF OPERATION: 2020     PREOPERATIVE DIAGNOSIS:  1. Left external tibial torsion     POSTOPERATIVE DIAGNOSIS:    1. Left external tibial torsion     PROCEDURE PERFORMED:   1.Left tibia derotational osteotomy with internal fixation using intramedullary jhoana.     SURGEON: ABHIJIT MANNING MD      ASSISTANT: Florencio Villa PA-C     ANESTHESIA: GETA.      COMPLICATIONS: None.      TOURNIQUET TIME: None.      IMPLANTS: Hattie T2 tibial nail.  Two 5mm locking screws.    INDICATIONS: Higinio Wallace  is a 30 year old -year-old with complaints of Left knee pain that has been refractory to conservative management. The exam and MRI are consistent with Left knee patellofemoral instability, therefore the risks and benefits of surgical versus nonsurgical management were discussed with the patient. The risks of surgery included, but were not limited to, death secondary to perioperative complications, damage to the neurovascular structures, bleeding, wound healing problems, infection, DVT, malunion, nonunion, and chronic pain. The patient indicated a good understanding of these risks and wished to proceed with surgery.     DESCRIPTION OF PROCEDURE:   Higinio Wallace was identified in the preoperative area by the surgical, nursing and anesthesia staff. The site was confirmed and marked and the patient was signed in in accordance with hospital protocol. The patient was then brought to the operating room where they were placed supine on the operating room table and given general anesthesia. The Left lower extremity was then prepped and draped in the usual sterile fashion using DuraPrep skin prep. Once the leg was draped a timeout procedure was undertaken in accordance with hospital protocol including a discussion of antibiotics and review of the imaging to confirm the site.      Please see the operative note from Dr. Han to cover the rest of the procedure.  I served as the primary surgeon for all portions of the case, but performed this portion with the assistance of Florencio Villa. This portion of the procedure begins after we had completed the tibial tubercle osteotomy cut.     A 2-3 cm longitudinal incision was made at the mid-diaphysis just lateral to the tibial crest.  Bovie cautery was used to dissect to the fascia of the anterior compartment.  A 15 blade was used to incise the fascia longitudinally, just lateral to the crest.  A hector elevator was used to sweep the muscle off the periosteum.  The periosteum was then incised with a scalpel and released laterally and medially using a periosteal elevator.  This allowed circumferential exposure with 2 kelly retractors.  A transverse osteotomy was then started with an oscillating saw through the anterior half of the tibia to help vent the tibia during reaming, but the tibia was not fully osteotomized.     We then turned our attention to the intramedullary preparation.  Through the midline knee incision, the retinacular tissues were incised just medial to the patellar tendon. This allowed access for our tibial nail starting point.  A starting pin was placed in line with the longitudinal axis of the tibia and at the anterior edge of the tibial plateau, and overreamed with the starting reamer.  A ball tipped reaming jhoana was then introduced to the distal tibial scar and we verified its position with orthogonal xrays. Protecting the patellar tendon, the canal was reamed using flexible reamers and increasing by half centimeter increments until good cortical chatter was obtained, which was 1.5mm over our nail diameter.     Two 2.8mm k-wires were placed in the tibia, one in the medial proximal condyle and a second close to the medial malleolus, to measure the amount of rotational correction after our osteotomy.  Each was carefully placed  with sharp dissection for a portal sized incision through the skin, followed by blunt dissection to the bone and introduction of the pin under imaging. We measured for approximately 17 degree correction; therefore, the pins were  by 17 degrees.     Completion of the osteotomy was then performed with the oscillating saw.  The two previously placed k-wires were then lined up to obtain 17 degrees of internal rotation of the distal segment.  The intramedullary nail was then inserted.  It was locked proximally using one locking screw in the dynamic position using the aiming arm. Care was taken to ensure this was positioned in such a way that it would not be in the way of doing our tibial tubercle fixation. Being sure to maintain our rotational correction the nail was locked distally using a perfect [place]ute technique with one locking screw.  This gave excellent fixation and maintained our planned correction.  Final fuoroscopic images were obtained.     All wounds were irrigated. The periosteum and deep fascia at the osteotomy were closed with 0 vicryl. Deep and superificial subcutaneous layers were closed with 3-0 vicryl and skin reapproximated with 3-0 monocryl. The pin and screw site distally were closed with 3-0 vicryl.     Please see the rest of the operative note from Dr. Han for the rest of the closure and dressing information.      I attest that I was present for the entire procedure as was Florencio Villa PA-C who was critical for the key components of the procedure including the osteotomy, tibial nailing, and the closure.

## 2020-11-19 ENCOUNTER — THERAPY VISIT (OUTPATIENT)
Dept: PHYSICAL THERAPY | Facility: CLINIC | Age: 30
End: 2020-11-19
Payer: COMMERCIAL

## 2020-11-19 DIAGNOSIS — M25.562 LEFT KNEE PAIN: ICD-10-CM

## 2020-11-19 DIAGNOSIS — Z47.89 AFTERCARE FOLLOWING SURGERY OF THE MUSCULOSKELETAL SYSTEM: ICD-10-CM

## 2020-11-19 PROCEDURE — 97112 NEUROMUSCULAR REEDUCATION: CPT | Mod: GP | Performed by: PHYSICAL THERAPIST

## 2020-11-19 PROCEDURE — 97110 THERAPEUTIC EXERCISES: CPT | Mod: GP | Performed by: PHYSICAL THERAPIST

## 2020-11-19 PROCEDURE — 97161 PT EVAL LOW COMPLEX 20 MIN: CPT | Mod: GP | Performed by: PHYSICAL THERAPIST

## 2020-11-19 ASSESSMENT — ACTIVITIES OF DAILY LIVING (ADL)
WEAKNESS: THE SYMPTOM AFFECTS MY ACTIVITY SLIGHTLY
RAW_SCORE: 35
HOW_WOULD_YOU_RATE_THE_CURRENT_FUNCTION_OF_YOUR_KNEE_DURING_YOUR_USUAL_DAILY_ACTIVITIES_ON_A_SCALE_FROM_0_TO_100_WITH_100_BEING_YOUR_LEVEL_OF_KNEE_FUNCTION_PRIOR_TO_YOUR_INJURY_AND_0_BEING_THE_INABILITY_TO_PERFORM_ANY_OF_YOUR_USUAL_DAILY_ACTIVITIES?: 50
AS_A_RESULT_OF_YOUR_KNEE_INJURY,_HOW_WOULD_YOU_RATE_YOUR_CURRENT_LEVEL_OF_DAILY_ACTIVITY?: ABNORMAL
GO DOWN STAIRS: ACTIVITY IS SOMEWHAT DIFFICULT
SWELLING: THE SYMPTOM AFFECTS MY ACTIVITY SLIGHTLY
STIFFNESS: THE SYMPTOM AFFECTS MY ACTIVITY SLIGHTLY
LIMPING: THE SYMPTOM AFFECTS MY ACTIVITY MODERATELY
KNEEL ON THE FRONT OF YOUR KNEE: I AM UNABLE TO DO THE ACTIVITY
RISE FROM A CHAIR: ACTIVITY IS MINIMALLY DIFFICULT
HOW_WOULD_YOU_RATE_THE_OVERALL_FUNCTION_OF_YOUR_KNEE_DURING_YOUR_USUAL_DAILY_ACTIVITIES?: SEVERELY ABNORMAL
STAND: ACTIVITY IS MINIMALLY DIFFICULT
GO UP STAIRS: ACTIVITY IS SOMEWHAT DIFFICULT
SQUAT: I AM UNABLE TO DO THE ACTIVITY
PAIN: THE SYMPTOM AFFECTS MY ACTIVITY SLIGHTLY
SIT WITH YOUR KNEE BENT: I AM UNABLE TO DO THE ACTIVITY
KNEE_ACTIVITY_OF_DAILY_LIVING_SUM: 35
KNEE_ACTIVITY_OF_DAILY_LIVING_SCORE: 50
WALK: ACTIVITY IS SOMEWHAT DIFFICULT
GIVING WAY, BUCKLING OR SHIFTING OF KNEE: I HAVE THE SYMPTOM BUT IT DOES NOT AFFECT MY ACTIVITY

## 2020-11-19 NOTE — TELEPHONE ENCOUNTER
Completed Attending Physician Statement and faxed to [facility] along with notes at [phone]. Submitted original to Medical Records for scanning.

## 2020-11-19 NOTE — PROGRESS NOTES
Physical Therapy Initial Evaluation  November 19, 2020     Precautions/Restrictions/MD instructions: Treat per protocol: Knee Arthroscopy, Medial Patello-femoral Ligament Reconstruction with Allograft, Distal Tibial Tubercle Osteotomy, Lateral Retinacular Lengthening, tibial tubercle Osteotomy plus derotation tibial osteotomy    Subjective:   Date of Surgery: 11/13/20  C/C:pain and tightness at his left ankle and lateral knee   Quality of pain is dull, aching and tightness. Pains are described as constant in nature. Pain is worse: evening. Pain is rated 1/10.   History of symptoms: Pains began suddenly as the result of slipping in the rain when he was 14. He fell on a flexed left knee and dislocated his patella.   He experienced frequent and subsequent dislocations at least 1 x per year after that. He underwent an MPFL surgery on the left in 2011. Prior to his most recent surgery, he notes daily subluxations and dislocations for the past 10 years.   Worsened by: standing, stairs, sit<>stand  Alleviated by: Rest, weight loss, and surgery.    General health as reported by patient: fair  Pertinent medical/surgical history: asthma, depression, mental illness, overweight. Allergic to adhesives. He denies  significant current illness or recent hospital admission.   Imaging: x-ray and MRI. Current occupational status: [employer]. Patient's goals are: decrease pain, improve tolerance to stairs, sit<>stands,  . Return to MD:  12/8/20        Objective:  KNEE:    PROM:   L    Extension Lacking 10   Flexion 40     Incision: covered with sterile bandage, blister measuring 1 cm x 2 cm, lateral to the incision with redness. Pt took a picture of the blister and will monitor for worsening redness    Edema: significant pitting edema at left medial joint line.     Gait: Ambulates with FWW, step-to pattern with difficulty adhering to WB precautions. Able to progress to bilateral axillary crutches with step-to pattern and improved  adherence to WB precautions. Cues for reduction in downward gaze.       Assessment/Plan:    The patient is a 30 year old male with chief complaint of left knee pain.   The patient has the following significant findings with corresponding treatment plan.  Diagnosis 1: Knee Arthroscopy, Medial Patello-femoral Ligament Reconstruction with Allograft, Distal Tibial Tubercle Osteotomy, Lateral Retinacular Lengthening, tibial tubercle Osteotomy plus derotation tibial osteotomy on 11/13/20  Pain -  hot/cold therapy, electric stimulation, manual therapy, splint/taping/bracing/orthotics, self management, education, directional preference exercise and home program  Decreased ROM/flexibility - manual therapy and therapeutic exercise  Decreased joint mobility - manual therapy and therapeutic exercise  Decreased strength - therapeutic exercise and therapeutic activities  Decreased proprioception - neuro re-education and therapeutic activities  Inflammation - cold therapy, electric stimulation and self management/home program  Edema - vasopneumatics, cold therapy and self management/home program  Impaired gait - gait training  Impaired muscle performance - neuro re-education  Decreased function - therapeutic activities  Impaired posture - neuro re-education        Therapy Evaluation Codes:   1) History comprised of:   Personal factors that impact the plan of care:      Anxiety, Time since onset of symptoms and Work status.    Comorbidity factors that impact the plan of care are:      Asthma, Depression, Mental illness and Overweight.     Medications impacting care: Anti-depressant and Pain.  2) Examination of Body Systems comprised of:   Body structures and functions that impact the plan of care:      Ankle, Hip and Knee.   Activity limitations that impact the plan of care are:      Bathing, Bending, Cooking, Driving, Dressing, Lifting, Sitting, Squatting/kneeling, Stairs, Standing, Walking, Working and Sleeping.   Clinical  presentation characteristics are:    Stable/Uncomplicated.  3) Presentation comprised of:   Presentation scored as Low complexity with uncomplicated characteristics..  4) Decision-Making    Low complexity using standardized patient assessment instrument and/or measureable assessment of functional outcome.  Cumulative Therapy Evaluation is: Low complexity.    Previous and current functional limitations:  (See Goal Flow Sheet for this information)    Short term and Long term goals: (See Goal Flow Sheet for this information)     Communication ability:  Patient appears to be able to clearly communicate and understand verbal and written communication and follow directions correctly.  Treatment Explanation - The following has been discussed with the patient: RX ordered/plan of care, anticipated outcomes, and possible risks and side effects.  This patient would benefit from PT intervention to resume normal activities.   Rehab potential is good.    Frequency:  2 X week, once daily  Duration:  for 3 weeks tapering to 1 X a week over 12 weeks  Discharge Plan: Achieve all LTGs, be independent in home treatment program, and reach maximal therapeutic benefit.    Please refer to the daily flowsheet for treatment today, total treatment time and time spent performing 1:1 timed codes.

## 2020-11-20 ENCOUNTER — PATIENT OUTREACH (OUTPATIENT)
Dept: ORTHOPEDICS | Facility: CLINIC | Age: 30
End: 2020-11-20

## 2020-11-20 NOTE — TELEPHONE ENCOUNTER
DOS: 11/13    Reached out to patient for postop call. Patient states he is doing well but is having some discomfort with the brace. He had physical therapy yesterday where the brace was checked for proper fitting. He is scheduled to follow up with Sergio GALEAS on 11/27. Patient will call with any further questions or concerns.

## 2020-11-21 ENCOUNTER — TELEPHONE (OUTPATIENT)
Dept: OTHER | Facility: CLINIC | Age: 30
End: 2020-11-21

## 2020-11-21 NOTE — TELEPHONE ENCOUNTER
Brief Orthopedic Note    Patient called due to feeling of stiffness and swelling in the anterior proximal tibia near the site of the tibial tubercle osteotomy after going up some stairs. He denies any trauma, but states he had to go up stairs and the swelling started after that. He denies pain. He is wearing his brace and feels that it is too tight over this anterior aspect of the leg. He denies ongoing pain or tightness in his calf. He denies cp, sob, nausea, vomiting, fever, chills. No concerns related to his incision. We discussed potential causes for the feeling of pressure including swelling after increasing activity. Less likely to be related to hardware failure, infection, DVT as he has no pain currently and only a sensation of stiffness or tightness. We discussed these more concerning diagnoses at length as well as the red flag symptoms for which he should return to the ER or call back. Recommend strict elevation above the level of the heart, and icing today to try to reduce swelling. Also recommended he loosen the splint if it is too tight. He can stop CPM for today to allow the area to rest, but should resume if symptoms improve. Recommend he call back if he has further questions/concerns, or if symptoms do not improve over the day. Patient was amenable to the plan of care as described.    Manny Hawk MD  Orthopaedic Surgery, PGY4  [phone]

## 2020-11-23 ENCOUNTER — TELEPHONE (OUTPATIENT)
Dept: ORTHOPEDICS | Facility: CLINIC | Age: 30
End: 2020-11-23

## 2020-11-23 ENCOUNTER — PATIENT OUTREACH (OUTPATIENT)
Dept: ORTHOPEDICS | Facility: CLINIC | Age: 30
End: 2020-11-23

## 2020-11-23 NOTE — TELEPHONE ENCOUNTER
Reached out to patient to discuss his concerns regarding his brace. He states his leg swells after PT and the brace gets tight and he states the brace is bothering him. Instructed patient that he may adjust the tightness as needed to accommodate for swelling but should remain in the brace as instructed for 3-6 weeks (he follows up with Sergio GALEAS on Friday virtually). He should be icing and elevating frequently. He is to sleep in the brace until instructed otherwise. His brace should be locked at 10 degrees when up and around and set at 10 to 90 for CPM and ROM exercises. Patient expresses understanding.

## 2020-11-23 NOTE — TELEPHONE ENCOUNTER
Writer called and talked with pt on the phone. Pt states that they are open to a virtual visit on 11/27/2020. Pt then stated that is having some concerns when the knee is in the brace. Pt states that it swells to the point has to take the brace off. Pt states that when swollen can not feel the leg. Writer will forward on to Dr. Han's team and have them reach out to pt.    Fina Julio LPN

## 2020-11-24 ENCOUNTER — THERAPY VISIT (OUTPATIENT)
Dept: PHYSICAL THERAPY | Facility: CLINIC | Age: 30
End: 2020-11-24
Payer: COMMERCIAL

## 2020-11-24 DIAGNOSIS — M25.562 LEFT KNEE PAIN: ICD-10-CM

## 2020-11-24 DIAGNOSIS — Z47.89 AFTERCARE FOLLOWING SURGERY OF THE MUSCULOSKELETAL SYSTEM: ICD-10-CM

## 2020-11-24 PROCEDURE — 97032 APPL MODALITY 1+ESTIM EA 15: CPT | Mod: GP | Performed by: PHYSICAL THERAPIST

## 2020-11-24 PROCEDURE — 97110 THERAPEUTIC EXERCISES: CPT | Mod: GP | Performed by: PHYSICAL THERAPIST

## 2020-11-24 PROCEDURE — 97112 NEUROMUSCULAR REEDUCATION: CPT | Mod: GP | Performed by: PHYSICAL THERAPIST

## 2020-11-26 ENCOUNTER — COMMUNICATION - HEALTHEAST (OUTPATIENT)
Dept: BEHAVIORAL HEALTH | Facility: CLINIC | Age: 30
End: 2020-11-26

## 2020-11-27 ENCOUNTER — VIRTUAL VISIT (OUTPATIENT)
Dept: ORTHOPEDICS | Facility: CLINIC | Age: 30
End: 2020-11-27
Payer: COMMERCIAL

## 2020-11-27 ENCOUNTER — THERAPY VISIT (OUTPATIENT)
Dept: PHYSICAL THERAPY | Facility: CLINIC | Age: 30
End: 2020-11-27
Payer: COMMERCIAL

## 2020-11-27 ENCOUNTER — TELEPHONE (OUTPATIENT)
Dept: ORTHOPEDICS | Facility: CLINIC | Age: 30
End: 2020-11-27

## 2020-11-27 DIAGNOSIS — M25.562 LEFT KNEE PAIN: ICD-10-CM

## 2020-11-27 DIAGNOSIS — Z47.89 AFTERCARE FOLLOWING SURGERY OF THE MUSCULOSKELETAL SYSTEM: ICD-10-CM

## 2020-11-27 DIAGNOSIS — Z98.890 STATUS POST KNEE SURGERY: Primary | ICD-10-CM

## 2020-11-27 PROCEDURE — 97110 THERAPEUTIC EXERCISES: CPT | Mod: 95 | Performed by: PHYSICAL THERAPIST

## 2020-11-27 PROCEDURE — 99024 POSTOP FOLLOW-UP VISIT: CPT | Mod: GT | Performed by: PHYSICIAN ASSISTANT

## 2020-11-27 PROCEDURE — 97112 NEUROMUSCULAR REEDUCATION: CPT | Mod: 95 | Performed by: PHYSICAL THERAPIST

## 2020-11-27 NOTE — PROGRESS NOTES
"  Higinio Wallace is a 30 year old male who is being evaluated via a billable video visit.      The patient has been notified of following:     \"This video visit will be conducted via a call between you and your physician/provider. We have found that certain health care needs can be provided without the need for an in-person physical exam.  This service lets us provide the care you need with a video conversation.  If a prescription is necessary we can send it directly to your pharmacy.  If lab work is needed we can place an order for that and you can then stop by our lab to have the test done at a later time.    Video visits are billed at different rates depending on your insurance coverage.  Please reach out to your insurance provider with any questions.    If during the course of the call the physician/provider feels a video visit is not appropriate, you will not be charged for this service.\"    Patient has given verbal consent for Video visit? Yes  How would you like to obtain your AVS? [employer]    Video Visit Technology for this patient: [employer] Video Visit- Patient was left in waiting room      Will anyone else be joining your video visit? No              REASON FOR VIST/CC: Virtual follow-up appointment following left knee arthroscopy, MPFL reconstruction, distal tibial tubercle osteotomy, LRL, and derotational tibial osteotomy with Dr. Han/Rishabh on 11/13/2020. The patient was informed of the following:    \"This virtual visit will be conducted via a call between you and your physician/provider. We have found that certain health care needs can be provided without the need for an extensive physical exam.  This service lets us provide the care you need with a short phone conversation.  If a prescription is necessary we can send it directly to your pharmacy.  If lab work is needed we can place an order for that and you can then stop by our lab to have the test done at a later time.     If during the course of " "the call the physician/provider feels a virtual visit is not appropriate, you will not be charged for this service.\"     HISTORY OF PRESENT ILLNESS:  Higinio Wallace is a 30 year old male who is approximately two weeks status post left knee arthroscopy, MPFL reconstruction, distal tibial tubercle osteotomy, LRL, and derotational tibial osteotomy. Overall, Higinio has been doing well following surgery. He is spending much of his time with his leg at rest in the brace, elevating and icing. Pain is 6/10, controlled with current regimen of oxycodone, hydroxyzine. He is not taking acetaminophen regularly throughout the day. He is partial weight bearing with the knee brace on and locked, night splint on, using two crutches. Per last PT note, \"Absent quad activation; 0-90 degrees.\"    The patient endorses swelling around the surgical incision but denies surrounding redness. The incision has been dry, without discharge or drainage. Higinio denies recent fevers and chills, as well as any other symptoms concerning for infection.     Higinio is currently taking aspirin 162mg daily for DVT prophylaxis. Patient denies calf pain or tenderness.      MEDICATIONS:   Current Outpatient Rx   Medication Sig Dispense Refill     acetaminophen (TYLENOL) 500 MG tablet Take 2 tablets (1,000 mg) by mouth every 8 hours as needed for pain 1 Bottle 0     albuterol (ALBUTEROL) 108 (90 BASE) MCG/ACT Inhaler Inhale 2 puffs into the lungs every 4 hours as needed for shortness of breath / dyspnea or wheezing 1 Inhaler 0     ARIPiprazole (ABILIFY) 15 MG tablet Take 15 mg by mouth every morning        aspirin 81 MG EC tablet Take 2 tablets (162 mg) by mouth daily 60 tablet 0     gabapentin (NEURONTIN) 100 MG capsule Take 3 capsules (300 mg) by mouth 3 times daily for 10 days 90 capsule 0     hydrOXYzine (ATARAX) 25 MG tablet Take 2 tablets (50 mg) by mouth every 6 hours as needed for itching or other (pain adjuvant) 40 tablet 0     metFORMIN (GLUCOPHAGE) 500 " MG tablet Take 500 mg by mouth 2 times daily (with meals)        omeprazole 20 MG tablet Take 20 mg by mouth every morning        oxyCODONE (ROXICODONE) 5 MG tablet Take 1-2 tablets (5-10 mg) by mouth every 4 hours as needed for moderate to severe pain 45 tablet 0     polyethylene glycol (MIRALAX) 17 g packet Take 17 g by mouth daily 7 packet 0     senna-docusate (SENOKOT-S/PERICOLACE) 8.6-50 MG tablet Take 1-2 tablets by mouth 2 times daily Take while on oral narcotics to prevent or treat constipation. 30 tablet 0         ALLERGIES: Bee venom, Fruit acid concentrate [fruit extracts], Liquid adhesive, and Monosodium glutamate       PHYSICAL EXAMINATION:   A full physical exam was not performed during this virtual visit. The incision was inspected via virtual conferencing. There was no erythema or discharge.    ASSESSMENT/PLAN:  Higinio Wallace is a 30 year old who is status post left knee arthroscopy, MPFL reconstruction, distal tibial tubercle osteotomy, LRL, and derotational tibial osteotomy. The patient is progressing well.    Basic wound cares were discussed today. I recommended that all nylon sutures be removed today. We discussed the technique for removal. His girlfriend will perform the removal later today after this visit has concluded. They will call with questions/concerns.    The patient will see Dr. Han at six to eight weeks post op. Higinio has our clinic number and will call with any questions or concerns.    Sergio Liao PA-C  Orthopaedic Surgery

## 2020-11-27 NOTE — LETTER
"    11/27/2020         RE: Higinio Wallace  [address] [facility]  [address] [place] [address]        Dear Colleague,    Thank you for referring your patient, Higinio Wallace, to the [facility] [place]. Please see a copy of my visit note below.      Higinio Wallace is a 30 year old male who is being evaluated via a billable video visit.      The patient has been notified of following:     \"This video visit will be conducted via a call between you and your physician/provider. We have found that certain health care needs can be provided without the need for an in-person physical exam.  This service lets us provide the care you need with a video conversation.  If a prescription is necessary we can send it directly to your pharmacy.  If lab work is needed we can place an order for that and you can then stop by our lab to have the test done at a later time.    Video visits are billed at different rates depending on your insurance coverage.  Please reach out to your insurance provider with any questions.    If during the course of the call the physician/provider feels a video visit is not appropriate, you will not be charged for this service.\"    Patient has given verbal consent for Video visit? Yes  How would you like to obtain your AVS? [employer]    Video Visit Technology for this patient: [employer] Video Visit- Patient was left in waiting room      Will anyone else be joining your video visit? No              REASON FOR VIST/CC: Virtual follow-up appointment following left knee arthroscopy, MPFL reconstruction, distal tibial tubercle osteotomy, LRL, and derotational tibial osteotomy with Dr. Han/Rishabh on 11/13/2020. The patient was informed of the following:    \"This virtual visit will be conducted via a call between you and your physician/provider. We have found that certain health care needs can be provided without the need for an extensive physical exam.  This service lets us provide the care " "you need with a short phone conversation.  If a prescription is necessary we can send it directly to your pharmacy.  If lab work is needed we can place an order for that and you can then stop by our lab to have the test done at a later time.     If during the course of the call the physician/provider feels a virtual visit is not appropriate, you will not be charged for this service.\"     HISTORY OF PRESENT ILLNESS:  Higinio Wallace is a 30 year old male who is approximately two weeks status post left knee arthroscopy, MPFL reconstruction, distal tibial tubercle osteotomy, LRL, and derotational tibial osteotomy. Overall, Higinio has been doing well following surgery. He is spending much of his time with his leg at rest in the brace, elevating and icing. Pain is 6/10, controlled with current regimen of oxycodone, hydroxyzine. He is not taking acetaminophen regularly throughout the day. He is partial weight bearing with the knee brace on and locked, night splint on, using two crutches. Per last PT note, \"Absent quad activation; 0-90 degrees.\"    The patient endorses swelling around the surgical incision but denies surrounding redness. The incision has been dry, without discharge or drainage. Higinio denies recent fevers and chills, as well as any other symptoms concerning for infection.     Higinio is currently taking aspirin 162mg daily for DVT prophylaxis. Patient denies calf pain or tenderness.      MEDICATIONS:   Current Outpatient Rx   Medication Sig Dispense Refill     acetaminophen (TYLENOL) 500 MG tablet Take 2 tablets (1,000 mg) by mouth every 8 hours as needed for pain 1 Bottle 0     albuterol (ALBUTEROL) 108 (90 BASE) MCG/ACT Inhaler Inhale 2 puffs into the lungs every 4 hours as needed for shortness of breath / dyspnea or wheezing 1 Inhaler 0     ARIPiprazole (ABILIFY) 15 MG tablet Take 15 mg by mouth every morning        aspirin 81 MG EC tablet Take 2 tablets (162 mg) by mouth daily 60 tablet 0     gabapentin " (NEURONTIN) 100 MG capsule Take 3 capsules (300 mg) by mouth 3 times daily for 10 days 90 capsule 0     hydrOXYzine (ATARAX) 25 MG tablet Take 2 tablets (50 mg) by mouth every 6 hours as needed for itching or other (pain adjuvant) 40 tablet 0     metFORMIN (GLUCOPHAGE) 500 MG tablet Take 500 mg by mouth 2 times daily (with meals)        omeprazole 20 MG tablet Take 20 mg by mouth every morning        oxyCODONE (ROXICODONE) 5 MG tablet Take 1-2 tablets (5-10 mg) by mouth every 4 hours as needed for moderate to severe pain 45 tablet 0     polyethylene glycol (MIRALAX) 17 g packet Take 17 g by mouth daily 7 packet 0     senna-docusate (SENOKOT-S/PERICOLACE) 8.6-50 MG tablet Take 1-2 tablets by mouth 2 times daily Take while on oral narcotics to prevent or treat constipation. 30 tablet 0         ALLERGIES: Bee venom, Fruit acid concentrate [fruit extracts], Liquid adhesive, and Monosodium glutamate       PHYSICAL EXAMINATION:   A full physical exam was not performed during this virtual visit. The incision was inspected via virtual conferencing. There was no erythema or discharge.    ASSESSMENT/PLAN:  Higinio Wallace is a 30 year old who is status post left knee arthroscopy, MPFL reconstruction, distal tibial tubercle osteotomy, LRL, and derotational tibial osteotomy. The patient is progressing well.    Basic wound cares were discussed today. I recommended that all nylon sutures be removed today. We discussed the technique for removal. His girlfriend will perform the removal later today after this visit has concluded. They will call with questions/concerns.    The patient will see Dr. Han at six to eight weeks post op. Higinio has our clinic number and will call with any questions or concerns.    Sergio Liao PA-C  Orthopaedic Surgery

## 2020-11-30 ENCOUNTER — VIRTUAL VISIT (OUTPATIENT)
Dept: GASTROENTEROLOGY | Facility: CLINIC | Age: 30
End: 2020-11-30
Payer: COMMERCIAL

## 2020-11-30 DIAGNOSIS — Q43.3 MALROTATION OF COLON (H): ICD-10-CM

## 2020-11-30 DIAGNOSIS — K58.0 IRRITABLE BOWEL SYNDROME WITH DIARRHEA: Primary | ICD-10-CM

## 2020-11-30 DIAGNOSIS — E66.812 CLASS 2 SEVERE OBESITY DUE TO EXCESS CALORIES WITH SERIOUS COMORBIDITY AND BODY MASS INDEX (BMI) OF 37.0 TO 37.9 IN ADULT (H): ICD-10-CM

## 2020-11-30 DIAGNOSIS — Z98.890 S/P LEFT KNEE SURGERY: Primary | ICD-10-CM

## 2020-11-30 DIAGNOSIS — E66.01 CLASS 2 SEVERE OBESITY DUE TO EXCESS CALORIES WITH SERIOUS COMORBIDITY AND BODY MASS INDEX (BMI) OF 37.0 TO 37.9 IN ADULT (H): ICD-10-CM

## 2020-11-30 DIAGNOSIS — Z71.3 NUTRITIONAL COUNSELING: ICD-10-CM

## 2020-11-30 PROCEDURE — 97803 MED NUTRITION INDIV SUBSEQ: CPT | Mod: TEL | Performed by: DIETITIAN, REGISTERED

## 2020-11-30 NOTE — PATIENT INSTRUCTIONS
Mohamud Sylvester,    It was great meeting with you again today. Below is a summary of what we discussed:    1. Continue to emphasize intake of soluble fiber containing foods  --Oatmeal, peanut butter, bananas, canned and rinsed beans    2. Continue to limit intake of high-fat/greasy food items    Best regards,  Abran Yadav, PhD, RD

## 2020-11-30 NOTE — PROGRESS NOTES
"[facility] Outpatient Medical Nutrition Therapy      Higinio Wallace is a 30 year old male who is being evaluated via a billable telephone visit (converted from video visit due to technical difficulties).      The patient has been notified of following:     \"This telephone visit will be conducted via a call between you and your physician/provider. We have found that certain health care needs can be provided without the need for a physical exam.  This service lets us provide the care you need with a short phone conversation.  If a prescription is necessary we can send it directly to your pharmacy.  If lab work is needed we can place an order for that and you can then stop by our lab to have the test done at a later time.    Telephone visits are billed at different rates depending on your insurance coverage. During this emergency period, for some insurers they may be billed the same as an in-person visit.  Please reach out to your insurance provider with any questions.    If during the course of the call the physician/provider feels a telephone visit is not appropriate, you will not be charged for this service.\"    Patient has given verbal consent for Telephone visit?  Yes    What phone number would you like to be contacted at? [phone]    How would you like to obtain your AVS? MyChart    Phone call duration: 26 minutes     Abran Yadav, PhD, RD    Additional provider notes:  Referring Physician: Skyler  Reason for RD Visit: IBS    Nutrition Plan: Continue to emphasize intake of soluble fiber containing foods    Recommendations for MD/Provider to order: None at this time    Nutrition Assessment:  Patient is here for follow-up visit with Registered Dietitian (RD). Patient is a 30 year old male with history of prediabetes, depression, anxiety, PTSD, psychogenic rumination, IBS-D, and malrotation of colon. He is s/p colonoscopy 10/7. We met initially to discuss diet in IBS-D 9/21/2020. At that time diet was found to " "be fairly reliant on fast food and an obstacle to change identified at that time were finances. Therefore, a number of low-cost opportunities to increase intake of soluble fiber were discussed. Today he reports that he had knee surgery ~2 weeks ago and he is finding it difficult to follow a diet plan because he can't move and relying more on ready-to-eat type foods. States that he had made diet changes prior to knee surgery: added vegetables and rice, cut out red meat (limited to chicken/fish). Therefore, reinforced some of the opportunities discussed previously to increase soluble fiber intake.    Symptom Review  1. Nausea/vomiting? Improved  2. Heartburn? Yes: 2x/wk  3. Bloating? Improved  4. Decreased appetite? Yes: Appetite goes \"up and down\"  7. Weight loss/gain? Yes: Increased which he attributes to stress and decreased exercise  8. Constipation/Diarrhea? Constipation, but attributes this to pain medications for knee. Had improved prior to knee surgery - began to develop a more regular BM pattern  9. Urgency? Improved  10. Incomplete Bowel Emptying? Improved  11. Abdominal pain/pain with or without eating? Improved    Dietary beliefs and Practices  1. Do you take any vitamin, mineral, or herbal supplements? No  2.  Do you use any calorie/protein supplements?  No    Diet Recall:  (Typical Day)  Meal Name Time Diet Recall (prior to knee surgery) Diet Recall 9/21    Breakfast  Eggos with peanut butter Water or Lactaid Milk (2%) - Full glass (~12-16 ounces)      AND [place]/Sausage, eggs, and protein pancake         Lunch  [place] prepared at home with sourdough bread and roast beef/turkey Often skips, but if eats lunch would be sandwich (white bread, roast beef, mayonnaise) or something small         Dinner  Fish/Chicken AND vegetables AND rice (white) Chicken, beef, or fish AND vegetables (broccoli, asparagus, brussel sprouts, carrots) OR Fast food (2-3x/wk)         Snacks  Tomatoes, mushrooms, or nuts Potato " chips OR trail mix   Beverages  Water primarily and lactose free milk (1-2 cups/day) Water primarily   Alcohol   None Minimal   *In the last couple of weeks still having an eggo for breakfast, still eating sandwich meat for lunch but also sometimes ramen noodles or pastas, and dinner is frozen microwaveable foods like chicken nuggets.     Frequency of eating/taking out meals: NA  Food access/availability: Doesn't always have money to buy healthy foods so forced to buy quick foods. This has become even more limited now that he is on disability for 3 months. At this point striving to buy groceries every 2 weeks  Food preparation confidence/abilities: Fine    Anthropometrics:   Height: Data Unavailable  Weight: 325 pounds  BMI: There is no height or weight on file to calculate BMI.    Weight History:  Wt Readings from Last 10 Encounters:   11/13/20 148.1 kg (326 lb 8 oz)   11/04/20 149.7 kg (330 lb)   10/07/20 145.6 kg (321 lb)   09/15/20 145.6 kg (321 lb)   09/09/20 145.6 kg (321 lb)   08/27/20 142.9 kg (315 lb)   05/26/20 149.1 kg (328 lb 12.8 oz)   03/07/17 111.1 kg (245 lb)     Usual Weight: NA  Weight change in past 6 months: Weight fluctuating between 320-330 pounds    Labs: Reviewed  Pertinent Medications/vitamin and mineral supplements:   Current Outpatient Medications   Medication     acetaminophen (TYLENOL) 500 MG tablet     albuterol (ALBUTEROL) 108 (90 BASE) MCG/ACT Inhaler     ARIPiprazole (ABILIFY) 15 MG tablet     aspirin 81 MG EC tablet     gabapentin (NEURONTIN) 100 MG capsule     hydrOXYzine (ATARAX) 25 MG tablet     metFORMIN (GLUCOPHAGE) 500 MG tablet     omeprazole 20 MG tablet     oxyCODONE (ROXICODONE) 5 MG tablet     polyethylene glycol (MIRALAX) 17 g packet     senna-docusate (SENOKOT-S/PERICOLACE) 8.6-50 MG tablet     No current facility-administered medications for this visit.        Food Allergies: Citrus containing fruits  Food Intolerances: NA  Physical Activity: Limited    Nutrition  Diagnosis:    Food and nutrition related knowledge deficit related to IBD as evidenced by need for diet education.    Nutrition Intervention:    Nutrition Education/Counseling:  Discussed diet and symptom history. Encouraged continue to strive for increased intake of soluble fiber rich foods and decreased intake of fried/greasy foods. Specifically focused on consuming a breakfast like oatmeal with peanut butter and incorporating canned and rinsed beans and bananas into his diet.    Educational Materials Provided: No education materials provided at this time  Patient verbalized understanding of education provided. See all recommendations under Goals.    Goals:  1. Continue to emphasize intake of soluble fiber containing foods  --Oatmeal, peanut butter, bananas, canned and rinsed beans    2. Continue to limit intake of high-fat/greasy food items    Nutrition Monitoring and Evaluation: Will monitor adherence to nutrition recommendations at future RD visits.     Further Medical Nutrition Therapy: Yes  Next Appointment (if applicable): 3 months  Patient was encouraged to call/contact RD with any further questions.

## 2020-11-30 NOTE — LETTER
"  11/30/2020      RE: Higinio Wallace  [address] [facility]  [address] [place] [address]      Dear Colleague,    Thank you for referring your patient, Higinio Wallace, to the [facility]STROENTEROLOG[facility] [place]. Please see a copy of my visit note below.    [facility] Outpatient Medical Nutrition Therapy      Higinio Wallace is a 30 year old male who is being evaluated via a billable telephone visit (converted from video visit due to technical difficulties).      The patient has been notified of following:     \"This telephone visit will be conducted via a call between you and your physician/provider. We have found that certain health care needs can be provided without the need for a physical exam.  This service lets us provide the care you need with a short phone conversation.  If a prescription is necessary we can send it directly to your pharmacy.  If lab work is needed we can place an order for that and you can then stop by our lab to have the test done at a later time.    Telephone visits are billed at different rates depending on your insurance coverage. During this emergency period, for some insurers they may be billed the same as an in-person visit.  Please reach out to your insurance provider with any questions.    If during the course of the call the physician/provider feels a telephone visit is not appropriate, you will not be charged for this service.\"    Patient has given verbal consent for Telephone visit?  Yes    What phone number would you like to be contacted at? [phone]    How would you like to obtain your AVS? Arabella    Phone call duration: 26 minutes       Additional provider notes:  Referring Physician: Skyler  Reason for RD Visit: IBS    Nutrition Plan: Continue to emphasize intake of soluble fiber containing foods    Recommendations for MD/Provider to order: None at this time    Nutrition Assessment:  Patient is here for follow-up visit with Registered Dietitian (RD). Patient is a " "30 year old male with history of prediabetes, depression, anxiety, PTSD, psychogenic rumination, IBS-D, and malrotation of colon. He is s/p colonoscopy 10/7. We met initially to discuss diet in IBS-D 9/21/2020. At that time diet was found to be fairly reliant on fast food and an obstacle to change identified at that time were finances. Therefore, a number of low-cost opportunities to increase intake of soluble fiber were discussed. Today he reports that he had knee surgery ~2 weeks ago and he is finding it difficult to follow a diet plan because he can't move and relying more on ready-to-eat type foods. States that he had made diet changes prior to knee surgery: added vegetables and rice, cut out red meat (limited to chicken/fish). Therefore, reinforced some of the opportunities discussed previously to increase soluble fiber intake.    Symptom Review  1. Nausea/vomiting? Improved  2. Heartburn? Yes: 2x/wk  3. Bloating? Improved  4. Decreased appetite? Yes: Appetite goes \"up and down\"  7. Weight loss/gain? Yes: Increased which he attributes to stress and decreased exercise  8. Constipation/Diarrhea? Constipation, but attributes this to pain medications for knee. Had improved prior to knee surgery - began to develop a more regular BM pattern  9. Urgency? Improved  10. Incomplete Bowel Emptying? Improved  11. Abdominal pain/pain with or without eating? Improved    Dietary beliefs and Practices  1. Do you take any vitamin, mineral, or herbal supplements? No  2.  Do you use any calorie/protein supplements?  No    Diet Recall:  (Typical Day)  Meal Name Time Diet Recall (prior to knee surgery) Diet Recall 9/21    Breakfast  Eggos with peanut butter Water or Lactaid Milk (2%) - Full glass (~12-16 ounces)      AND Bergman/Sausage, eggs, and protein pancake         Lunch  [place]mora prepared at home with sourdough bread and roast beef/turkey Often skips, but if eats lunch would be sandwich (white bread, roast beef, mayonnaise) or " something small         Dinner  Fish/Chicken AND vegetables AND rice (white) Chicken, beef, or fish AND vegetables (broccoli, asparagus, brussel sprouts, carrots) OR Fast food (2-3x/wk)         Snacks  Tomatoes, mushrooms, or nuts Potato chips OR trail mix   Beverages  Water primarily and lactose free milk (1-2 cups/day) Water primarily   Alcohol   None Minimal   *In the last couple of weeks still having an eggo for breakfast, still eating sandwich meat for lunch but also sometimes ramen noodles or pastas, and dinner is frozen microwaveable foods like chicken nuggets.     Frequency of eating/taking out meals: NA  Food access/availability: Doesn't always have money to buy healthy foods so forced to buy quick foods. This has become even more limited now that he is on disability for 3 months. At this point striving to buy groceries every 2 weeks  Food preparation confidence/abilities: Fine    Anthropometrics:   Height: Data Unavailable  Weight: 325 pounds  BMI: There is no height or weight on file to calculate BMI.    Weight History:  Wt Readings from Last 10 Encounters:   11/13/20 148.1 kg (326 lb 8 oz)   11/04/20 149.7 kg (330 lb)   10/07/20 145.6 kg (321 lb)   09/15/20 145.6 kg (321 lb)   09/09/20 145.6 kg (321 lb)   08/27/20 142.9 kg (315 lb)   05/26/20 149.1 kg (328 lb 12.8 oz)   03/07/17 111.1 kg (245 lb)     Usual Weight: NA  Weight change in past 6 months: Weight fluctuating between 320-330 pounds    Labs: Reviewed  Pertinent Medications/vitamin and mineral supplements:   Current Outpatient Medications   Medication     acetaminophen (TYLENOL) 500 MG tablet     albuterol (ALBUTEROL) 108 (90 BASE) MCG/ACT Inhaler     ARIPiprazole (ABILIFY) 15 MG tablet     aspirin 81 MG EC tablet     gabapentin (NEURONTIN) 100 MG capsule     hydrOXYzine (ATARAX) 25 MG tablet     metFORMIN (GLUCOPHAGE) 500 MG tablet     omeprazole 20 MG tablet     oxyCODONE (ROXICODONE) 5 MG tablet     polyethylene glycol (MIRALAX) 17 g packet      senna-docusate (SENOKOT-S/PERICOLACE) 8.6-50 MG tablet     No current facility-administered medications for this visit.        Food Allergies: Citrus containing fruits  Food Intolerances: NA  Physical Activity: Limited    Nutrition Diagnosis:    Food and nutrition related knowledge deficit related to IBD as evidenced by need for diet education.    Nutrition Intervention:    Nutrition Education/Counseling:  Discussed diet and symptom history. Encouraged continue to strive for increased intake of soluble fiber rich foods and decreased intake of fried/greasy foods. Specifically focused on consuming a breakfast like oatmeal with peanut butter and incorporating canned and rinsed beans and bananas into his diet.    Educational Materials Provided: No education materials provided at this time  Patient verbalized understanding of education provided. See all recommendations under Goals.    Goals:  1. Continue to emphasize intake of soluble fiber containing foods  --Oatmeal, peanut butter, bananas, canned and rinsed beans    2. Continue to limit intake of high-fat/greasy food items    Nutrition Monitoring and Evaluation: Will monitor adherence to nutrition recommendations at future RD visits.     Further Medical Nutrition Therapy: Yes  Next Appointment (if applicable): 3 months  Patient was encouraged to call/contact RD with any further questions.    Abran Yadav RD

## 2020-12-01 ENCOUNTER — THERAPY VISIT (OUTPATIENT)
Dept: PHYSICAL THERAPY | Facility: CLINIC | Age: 30
End: 2020-12-01
Payer: COMMERCIAL

## 2020-12-01 DIAGNOSIS — M25.562 LEFT KNEE PAIN: ICD-10-CM

## 2020-12-01 DIAGNOSIS — Z47.89 AFTERCARE FOLLOWING SURGERY OF THE MUSCULOSKELETAL SYSTEM: ICD-10-CM

## 2020-12-01 PROCEDURE — 97110 THERAPEUTIC EXERCISES: CPT | Mod: GP | Performed by: PHYSICAL THERAPIST

## 2020-12-01 PROCEDURE — 97530 THERAPEUTIC ACTIVITIES: CPT | Mod: GP | Performed by: PHYSICAL THERAPIST

## 2020-12-01 PROCEDURE — 97112 NEUROMUSCULAR REEDUCATION: CPT | Mod: GP | Performed by: PHYSICAL THERAPIST

## 2020-12-04 ENCOUNTER — THERAPY VISIT (OUTPATIENT)
Dept: PHYSICAL THERAPY | Facility: CLINIC | Age: 30
End: 2020-12-04
Payer: COMMERCIAL

## 2020-12-04 DIAGNOSIS — Z47.89 AFTERCARE FOLLOWING SURGERY OF THE MUSCULOSKELETAL SYSTEM: ICD-10-CM

## 2020-12-04 DIAGNOSIS — M25.562 LEFT KNEE PAIN: ICD-10-CM

## 2020-12-04 PROCEDURE — 97112 NEUROMUSCULAR REEDUCATION: CPT | Mod: 95 | Performed by: PHYSICAL THERAPIST

## 2020-12-04 PROCEDURE — 97110 THERAPEUTIC EXERCISES: CPT | Mod: 95 | Performed by: PHYSICAL THERAPIST

## 2020-12-07 ENCOUNTER — THERAPY VISIT (OUTPATIENT)
Dept: PHYSICAL THERAPY | Facility: CLINIC | Age: 30
End: 2020-12-07
Payer: COMMERCIAL

## 2020-12-07 DIAGNOSIS — M25.562 LEFT KNEE PAIN: ICD-10-CM

## 2020-12-07 DIAGNOSIS — Z47.89 AFTERCARE FOLLOWING SURGERY OF THE MUSCULOSKELETAL SYSTEM: ICD-10-CM

## 2020-12-07 PROCEDURE — 97112 NEUROMUSCULAR REEDUCATION: CPT | Mod: 95 | Performed by: PHYSICAL THERAPIST

## 2020-12-07 PROCEDURE — 97110 THERAPEUTIC EXERCISES: CPT | Mod: 95 | Performed by: PHYSICAL THERAPIST

## 2020-12-08 ENCOUNTER — OFFICE VISIT (OUTPATIENT)
Dept: ORTHOPEDICS | Facility: CLINIC | Age: 30
End: 2020-12-08
Payer: COMMERCIAL

## 2020-12-08 ENCOUNTER — VIRTUAL VISIT (OUTPATIENT)
Dept: GASTROENTEROLOGY | Facility: CLINIC | Age: 30
End: 2020-12-08
Payer: COMMERCIAL

## 2020-12-08 ENCOUNTER — ANCILLARY PROCEDURE (OUTPATIENT)
Dept: GENERAL RADIOLOGY | Facility: CLINIC | Age: 30
End: 2020-12-08
Attending: ORTHOPAEDIC SURGERY
Payer: COMMERCIAL

## 2020-12-08 VITALS — WEIGHT: 300.2 LBS | BODY MASS INDEX: 35.45 KG/M2 | HEIGHT: 77 IN

## 2020-12-08 VITALS — WEIGHT: 300 LBS | HEIGHT: 77 IN | BODY MASS INDEX: 35.42 KG/M2

## 2020-12-08 DIAGNOSIS — K21.9 GASTROESOPHAGEAL REFLUX DISEASE WITHOUT ESOPHAGITIS: ICD-10-CM

## 2020-12-08 DIAGNOSIS — Z98.890 S/P LEFT KNEE SURGERY: ICD-10-CM

## 2020-12-08 DIAGNOSIS — F43.10 POSTTRAUMATIC STRESS DISORDER: ICD-10-CM

## 2020-12-08 DIAGNOSIS — F54 PSYCHOLOGICAL FACTORS AFFECTING MEDICAL CONDITION: Primary | ICD-10-CM

## 2020-12-08 DIAGNOSIS — M17.12 PATELLOFEMORAL ARTHRITIS OF LEFT KNEE: Primary | ICD-10-CM

## 2020-12-08 DIAGNOSIS — Q43.3 CONGENITAL MALROTATION OF INTESTINE (H): ICD-10-CM

## 2020-12-08 DIAGNOSIS — F50.84 RUMINATION SYNDROME OF INGESTED FOOD IN ADULT: Primary | ICD-10-CM

## 2020-12-08 DIAGNOSIS — D12.6 ADENOMA OF COLON: ICD-10-CM

## 2020-12-08 DIAGNOSIS — F41.9 ANXIETY: ICD-10-CM

## 2020-12-08 DIAGNOSIS — F32.A DEPRESSION, UNSPECIFIED DEPRESSION TYPE: ICD-10-CM

## 2020-12-08 DIAGNOSIS — K58.0 IRRITABLE BOWEL SYNDROME WITH DIARRHEA: ICD-10-CM

## 2020-12-08 PROCEDURE — 99024 POSTOP FOLLOW-UP VISIT: CPT | Mod: GC | Performed by: ORTHOPAEDIC SURGERY

## 2020-12-08 PROCEDURE — 73560 X-RAY EXAM OF KNEE 1 OR 2: CPT | Mod: LT | Performed by: RADIOLOGY

## 2020-12-08 PROCEDURE — 90832 PSYTX W PT 30 MINUTES: CPT | Mod: TEL | Performed by: PSYCHOLOGIST

## 2020-12-08 PROCEDURE — 73590 X-RAY EXAM OF LOWER LEG: CPT | Mod: LT | Performed by: RADIOLOGY

## 2020-12-08 PROCEDURE — 99214 OFFICE O/P EST MOD 30 MIN: CPT | Mod: 95 | Performed by: INTERNAL MEDICINE

## 2020-12-08 RX ORDER — DICYCLOMINE HYDROCHLORIDE 10 MG/1
10 CAPSULE ORAL 4 TIMES DAILY PRN
Qty: 120 CAPSULE | Refills: 3 | Status: SHIPPED | OUTPATIENT
Start: 2020-12-08 | End: 2021-08-17

## 2020-12-08 ASSESSMENT — PAIN SCALES - GENERAL: PAINLEVEL: NO PAIN (0)

## 2020-12-08 ASSESSMENT — MIFFLIN-ST. JEOR
SCORE: 2447.69
SCORE: 2448.58

## 2020-12-08 NOTE — NURSING NOTE
"Chief Complaint   Patient presents with     RECHECK     3 month follow up for IBS.       Vitals:    12/08/20 1449   Weight: 136.1 kg (300 lb)   Height: 1.955 m (6' 4.97\")       Body mass index is 35.6 kg/m .                            DONALDO ESCOBAR, EMT    "

## 2020-12-08 NOTE — LETTER
"    12/8/2020         RE: Higinio Wallace  [address] [facility]  [address] [place] [address]        Dear Colleague,    Thank you for referring your patient, Higinio Wallace, to the [facility] GASTROENTEROLOGY [facility] [place]. Please see a copy of my visit note below.    The patient has been notified of the following:      \"We have found that certain health care needs can be provided without the need for a face to face visit.  This service lets us provide the care you need with a phone conversation.       I will have full access to your [place] medical record during this entire phone call.   I will be taking notes for your medical record.      Since this is like an office visit, we will bill your insurance company for this service.       There are potential benefits and risks of telephone visits (e.g. limits to patient confidentiality) that differ from in-person visits.?  Confidentiality still applies for telephone services, and nobody will record the visit.  It is important to be in a quiet, private space that is free of distractions (including cell phone or other devices) during the visit.??      If during the course of the call I believe a telephone visit is not appropriate, you will not be charged for this service\"     Consent has been obtained for this service by care team member: Yes     Health Psychology                  Clinic    Department of Medicine  Odessa Beltran, PhD, LP [phone]                          Clinics and Surgery Center  [facility] Denice Brown, PhD, LP [phone]                  3rd [facility] Mail Code 741   Topher Antunez, PhD, ABPP, LP [phone]     [address] [facility], [address],  Franchesca Hernandez,  PhD, LP [phone]            [place] [address]  [place] [address] Matilde Jackson, PhD, LP [phone]    Health Psychology Follow-Up Note    SUBJECTIVE:  Higinio Wallace is a 30 year old male with medical history significant for " prediabetes, depression, anxiety, PTSD, psychogenic rumination, IBS-D, and congenital malrotation of colon who was seen for individual psychotherapy. Reviewed emotional and physical health since our last session. The patient reported he is recovering from an orthopedic surgery - appt time delayed today as he was returning from an ortho visit. He endorses weight loss of 25 lbs via increased activity, is losing weight to help prevent GI obstruction due to malrotation. Aims to maintain a weight around 230 lbs. Current weight is around 300 lbs. Reviewed GI symptoms -  regurgitation was reported to have gone away. Attributes this to diet changes - eliminated dairy - but also acknowledges life has been less stressful lately as he has had time away from work. Plans on reconnecting with his mental health provider soon, but not seeing them at this time. No treatment plan conducted at this visit as he reported he had no ongoing needs for GI health psychology. Practiced breathing and found benefit for stress reduction.    OBJECTIVE:  Appearance/Behavior/Orientation:  Alert and oriented to person, place, time, and situation.   Cooperation/Reliability: Patient was open and cooperative throughout the session.    Speech/Language: Speech was clear, coherent, and of normal rate, rhythm and volume.   Thought Form: Overall logical and organized.   Mood/Affect: Mood euthymic; affect mood congruent     ASSESSMENT:  Regurgitation was reported to be minimal - no ongoing need for behavioral interventions.     DIAGNOSIS:  Psychological factors affecting medical condition  Posttraumatic stress disorder  Depressive disorder, unspecified; rule out bipolar mood disorder  Anxiety disorder, unspecified      PLAN:  1. Return for GI behavioral health as needed as future concerns arise.   2.  Continue practice of breathing exercise for rumination as needed.   3. Follow-up with general mental health care team.     Time in: 2:28p  Time out:  2:46p  Extended session due to complexity of case and length of interval.    Franchesca Hernandez, PhD,   Clinical Health Psychologist    Tx plan completed: 12/08/20  Tx plan due:  n/a    *no letter    This note was completed using Dragon voice recognition software.  Although reviewed after completion, some word and grammatical errors may occur.        Again, thank you for allowing me to participate in the care of your patient.        Sincerely,        Franchesca Hernandez, PhD

## 2020-12-08 NOTE — PATIENT INSTRUCTIONS
It was very nice to see you today at your virtual visit.  Am so glad that you are feeling so much better.    Please continue your breathing techniques to help treat the rumination syndrome.  Please continue your dietary changes as well.    Please continue the Citrucel fiber tablets.  You can increase this up to 2 tablets daily.    I will send a prescription for dicyclomine to your pharmacy.  You can take this pill as needed for any abdominal pain or cramping that precedes a bowel movement.    Please follow-up with the colorectal surgeons regarding your congenital malrotation of the bowel.  They had wanted to get an upper GI and small bowel follow-through test.  You will want to get this done before your follow-up visit.    Congratulations on losing the weight.  That is hard work and you deserve to be commended!    Please continue to follow antireflux precautions.  You can take the omeprazole if you feel this is helping you.  If you feel your reflux is well controlled even without the omeprazole you can stop this medication.    Follow-up in 6 months.  Please call or MyChart with questions.

## 2020-12-08 NOTE — LETTER
12/8/2020         RE: Higinio Wallace  [address] [facility]  [address] [place], [place] [id number]        Dear Colleague,    Thank you for referring your patient, Higinio Wallace, to the [facility] ORTHOPEDIC [facility] [place]. Please see a copy of my visit note below.    Date of Service: Dec 8, 2020    Reason for visit: Postoperative follow-up    Date of Surgery: 11/13/2020    Procedure Performed: Left knee arthroscopy, medial patellofemoral ligament reconstruction with allograft, distal tibial tubercle osteotomy, derotational osteotomy of tibia    Interval events: Higinio Wallace comes to see us in follow-up from the above surgery. Patient reports pain has been well managed.  He takes 1 oxycodone tablet intermittently for pain.  Patient denies any new numbness or tingling.  He reports no wound drainage after the surgery.  He has begun working with physical therapy.  He is utilizing a CPM and has gotten up to 70 degrees range of motion.  His goal is to achieve 90 degrees range of motion by Friday.  He continues to wear his hinged knee brace locked in 10 degrees extension when he is up and unlocked at 70 degrees of flexion when he is sitting.    Physical examination:  The patient is well-developed, well nourished and in on acute distress. The patient is alert and oriented to the surroundings.     Radiographs: Three views of the left tibia were obtained and reviewed. These demonstrate postsurgical changes of tibial derotational osteotomy with placement of intramedullary nail and tibial tubercle osteotomy with screws in place.  There is been no interval displacement of hardware.    Assessment: 3 weeks status post left knee arthroscopy, medial patellofemoral ligament reconstruction with allograft, distal tibial tubercle osteotomy and derotational osteotomy of the tibia.  Progressing as expected    Plan:    -Advance to functional weightbearing and activity  -Hinged knee brace; advance to 0 degrees extension to 70 degrees  flexion.  Brace to be locked when mobilizing and unlocked to work on range of motion when sitting.   -Continue aspirin for 6 weeks from date of surgery    Patient to follow-up with Dr. Han on 1/5/2020 with repeat x-rays of the left tibia    This patient was seen and examined by Dr. Lela Han MD who is in agreement with assessment and plan.    ZAKIA Wharton MD   Orthopaedic Surgery, PGY-4    I have personally examined this patient and have reviewed the clinical presentation and progress note with the resident.  I agree with the treatment plan as outlined.  The plan was formulated with the resident on the day of the resident dictation.    Lela Han MD

## 2020-12-08 NOTE — LETTER
12/8/2020       RE: Higinio Wallace  [address] [facility]  [address] [place], [place] [id number]      Dear Colleague,    Thank you for referring your patient, Higinio Wallace, to the [facility] [place]. Please see a copy of my visit note below.    GI CLINIC VISIT    CC/REFERRING MD:  Referred Self  REASON FOR CONSULTATION:   Referred Self for rumination syndrome, IBS with diarrhea, GERD, and congenital malrotation of the bowel.    ASSESSMENT/PLAN:  30-year-old male here to follow-up for multiple functional GI disorders.    1.  Rumination syndrome-this has improved with weight loss, dietary changes, and breathing techniques as outlined by Dr. Franchesca Hernandez.  He has had such improvement that he no longer needs to meet with Dr. Hernandez at this time.  He can follow-up with his primary mental health provider.  If rumination becomes more of an issue moving forward he can be referred back to Dr. Hernandez.    2 IBS with diarrhea-this is improved with initiation of fiber and dietary changes.  His laboratory evaluation and colonoscopy was unremarkable.  There was no evidence of celiac disease or inflammatory bowel disease.  No further general GI evaluation is required at this time.  I will send him a prescription for dicyclomine that he can take as needed.  He can increase his Citrucel up to 2 tablets daily.    3.  GERD-this is well controlled with weight loss.  He can try tapering off the omeprazole if he would like.  If his symptoms return he can go back on the omeprazole.  He could also consider famotidine as needed.    4.  Congenital malrotation of the bowel.  He will follow up with colorectal surgery.  He knows that he needs to get the upper GI series and small bowel follow-through before his follow-up.    5.  2 small adenomas of the colon-he should have a follow-up colonoscopy in 2025.    RTC 6 months    Thank you for this consultation.  It was a pleasure to participate in the care of this patient;  please contact us with any further questions.      This note was created with voice recognition software, and while reviewed for accuracy, typos may remain.     Donell Holland MD  Inflammatory Bowel Disease Program  Division of Gastroenterology, Hepatology and Nutrition  [facility]  Pager: [phone]      HPI:  Higinio is here today to follow-up in GI clinic for multiple issues.  Overall he is doing quite well.    1.  Rumination syndrome-this has been going quite well.  He did meet with Dr. Franchesca Hernandez who worked with him on some breathing techniques.  This in conjunction with losing weight and dietary changes has essentially resolved his rumination.  He is very happy with how he is doing.    2.  Irritable bowel syndrome with diarrhea-this is going quite well as well.  He is taking Citrucel 1 tablet daily.  His stools have been a little irregular because of his recent knee surgery.  Because of some of the pain medications he got constipated though this is now resolved.  His stools are soft but gaining more formed.  He does have some right lower abdominal pressure and cramping that heralds a bowel movement.  This discomfort resolves immediately upon defecation.  It is quite tolerable.  He would be interested in an antispasmodic.  Overall he is quite happy with how he is doing.  His laboratory evaluation was unremarkable apart from a mildly elevated fecal calprotectin.  We did perform a colonoscopy.  The ileum was normal and the colon was normal apart from some small polyps.  One of the polyps was a small tubular adenoma.  The other polyp was a small sessile serrated adenoma.  Random colon biopsies were normal.    3.  GERD-his symptoms are improved after he is lost about 25 pounds.  He is still taking the omeprazole but does not take this reliably.  He has noticed his symptoms are still improved even when he does not take the omeprazole.    4.  Congenital malrotation of the bowel-he did meet with colorectal  surgery.  They recommended weight loss prior to surgery.  He was supposed to undergo an upper GI series as well as a small bowel follow-through but has not yet done this.  He will contact the clinic to schedule these tests and then the appropriate follow-up.    ROS:    No fevers or chills  No weight loss  No blurry vision, double vision or change in vision  No sore throat  No lymphadenopathy  No headache, paraesthesias, or weakness in a limb  No shortness of breath or wheezing  No chest pain or pressure  No arthralgias or myalgias  No rashes or skin changes  No odynophagia or dysphagia  No BRBPR, hematochezia, melena  No dysuria, frequency or urgency  No hot/cold intolerance or polyria  No anxiety or depression    PREVIOUS ENDOSCOPY:  1.  Upper endoscopy-8/21/2019-normal esophagus, normal stomach, normal duodenum.    2.  Colonoscopy 10/7/2020-normal ileum and normal colon mucosa.  Normal colon biopsies of the right colon, left colon, and rectum.  One small tubular adenoma and one small sessile serrated adenoma.    PERTINENT RELEVANT IMAGING OR LABS:  CRP, TTG, TSH, LFTs, BMP, CBC all normal.  Fecal calprotectin mildly elevated as above.    ALLERGIES:     Allergies   Allergen Reactions     Bee Venom Anaphylaxis and Other (See Comments)     Swelling  Large local reactions/ swelling       Fruit Acid Concentrate [Fruit Extracts] Swelling     Lips swell and crust over little bit- tongue gets numb     Liquid Adhesive Dermatitis     And band aid       Monosodium Glutamate Hives       PERTINENT MEDICATIONS:    Current Outpatient Medications:      acetaminophen (TYLENOL) 500 MG tablet, Take 2 tablets (1,000 mg) by mouth every 8 hours as needed for pain, Disp: 1 Bottle, Rfl: 0     albuterol (ALBUTEROL) 108 (90 BASE) MCG/ACT Inhaler, Inhale 2 puffs into the lungs every 4 hours as needed for shortness of breath / dyspnea or wheezing, Disp: 1 Inhaler, Rfl: 0     ARIPiprazole (ABILIFY) 15 MG tablet, Take 15 mg by mouth every  morning , Disp: , Rfl:      aspirin 81 MG EC tablet, Take 2 tablets (162 mg) by mouth daily, Disp: 60 tablet, Rfl: 0     gabapentin (NEURONTIN) 100 MG capsule, Take 3 capsules (300 mg) by mouth 3 times daily for 10 days, Disp: 90 capsule, Rfl: 0     hydrOXYzine (ATARAX) 25 MG tablet, Take 2 tablets (50 mg) by mouth every 6 hours as needed for itching or other (pain adjuvant), Disp: 40 tablet, Rfl: 0     metFORMIN (GLUCOPHAGE) 500 MG tablet, Take 500 mg by mouth 2 times daily (with meals) , Disp: , Rfl:      omeprazole 20 MG tablet, Take 20 mg by mouth every morning , Disp: , Rfl:      oxyCODONE (ROXICODONE) 5 MG tablet, Take 1-2 tablets (5-10 mg) by mouth every 4 hours as needed for moderate to severe pain, Disp: 45 tablet, Rfl: 0     polyethylene glycol (MIRALAX) 17 g packet, Take 17 g by mouth daily (Patient not taking: Reported on 11/27/2020), Disp: 7 packet, Rfl: 0     senna-docusate (SENOKOT-S/PERICOLACE) 8.6-50 MG tablet, Take 1-2 tablets by mouth 2 times daily Take while on oral narcotics to prevent or treat constipation. (Patient not taking: Reported on 11/27/2020), Disp: 30 tablet, Rfl: 0    PROBLEM LIST  Patient Active Problem List    Diagnosis Date Noted     Aftercare following surgery of the musculoskeletal system 11/19/2020     Priority: Medium     Left knee pain 11/19/2020     Priority: Medium     Patellofemoral instability of left knee with pain 10/14/2020     Priority: Medium     Added automatically from request for surgery [id number]       Class 2 severe obesity due to excess calories with serious comorbidity and body mass index (BMI) of 37.0 to 37.9 in adult (H) 09/15/2020     Priority: Medium     Irritable bowel syndrome 08/27/2020     Priority: Medium       PERTINENT PAST MEDICAL HISTORY:  Past Medical History:   Diagnosis Date     Anxiety      Class 2 obesity due to excess calories in adult      Complication of anesthesia      Gastroesophageal reflux disease with esophagitis      History of  nephrolithiasis      Irritable bowel syndrome      Major depression, recurrent (H)      Malrotation of intestine      Patellofemoral instability of left knee with pain      Pre-diabetes      Psychosis (H)      PTSD (post-traumatic stress disorder)      Tobacco use        PREVIOUS SURGERIES:  Past Surgical History:   Procedure Laterality Date     ARTHROSCOPY KNEE WITH PATELLAR REALIGNMENT Left 11/13/2020    Procedure: Knee Arthroscopy, Medial Patello-femoral Ligament Reconstruction with Allograft, Distal Tibial Tubercle Osteotomy, Lateral Retinacular Lengthening;  Surgeon: Sourav Keating MD;  Location: [facility] OR     ARTHROTOMY TIBIAL TUBERCLE SHIFT Left 11/13/2020    Procedure: tibial tubercle Osteotomy ;  Surgeon: Sourav Keating MD;  Location: [facility] OR     COLONOSCOPY N/A 10/07/2020    Procedure: COLONOSCOPY, WITH POLYPECTOMY AND BIOPSY;  Surgeon: Donell Holland MD;  Location: [facility] OR     deviated septum  2018     KNEE SURGERY Left      OPEN REDUCTION INTERNAL FIXATION RODDING INTRAMEDULLARY TIBIA Left 11/13/2020    Procedure: plus derotation tibial osteotomy;  Surgeon: Sourav Keating MD;  Location: [facility] OR       SOCIAL HISTORY:  Social History     Socioeconomic History     Marital status: Single     Spouse name: Not on file     Number of children: Not on file     Years of education: Not on file     Highest education level: Not on file   Occupational History     Not on file   Social Needs     Financial resource strain: Not on file     Food insecurity     Worry: Not on file     Inability: Not on file     Transportation needs     Medical: Not on file     Non-medical: Not on file   Tobacco Use     Smoking status: Current Some Day Smoker     Packs/day: 0.25     Types: Cigarettes     Start date: 2001     Smokeless tobacco: Never Used     Tobacco comment: 1 cig a day trying to quit   Substance and Sexual Activity     Alcohol use: Not Currently     Drug use: Yes     Types: Marijuana     Comment:  "occasionally     Sexual activity: Yes     Partners: Female     Birth control/protection: Condom   Lifestyle     Physical activity     Days per week: Not on file     Minutes per session: Not on file     Stress: Not on file   Relationships     Social connections     Talks on phone: Not on file     Gets together: Not on file     Attends Druze service: Not on file     Active member of club or organization: Not on file     Attends meetings of clubs or organizations: Not on file     Relationship status: Not on file     Intimate partner violence     Fear of current or ex partner: Not on file     Emotionally abused: Not on file     Physically abused: Not on file     Forced sexual activity: Not on file   Other Topics Concern     Parent/sibling w/ CABG, MI or angioplasty before 65F 55M? Not Asked   Social History Narrative     Not on file       FAMILY HISTORY:  Family History   Problem Relation Age of Onset     Other - See Comments Mother         PONV     Cerebrovascular Disease Brother      Atrial fibrillation Brother      Other - See Comments Brother         cardiac autoimmune deficiency       Past/family/social history reviewed and no changes    PHYSICAL EXAMINATION:  Constitutional: aaox3, cooperative, pleasant, not dyspneic/diaphoretic, no acute distress  Vitals reviewed: There were no vitals taken for this visit.  Wt:   Wt Readings from Last 2 Encounters:   11/13/20 148.1 kg (326 lb 8 oz)   11/04/20 149.7 kg (330 lb)      Eyes: Sclera anicteric/injected  Respiratory: Unlabored breathing  Skin: no jaundice  Psych: Normal affect        Higinio Wallace is a 30 year old male who is being evaluated via a billable video visit.      The patient has been notified of following:     \"This video visit will be conducted via a call between you and your physician/provider. We have found that certain health care needs can be provided without the need for an in-person physical exam.  This service lets us provide the care you need " "with a video conversation.  If a prescription is necessary we can send it directly to your pharmacy.  If lab work is needed we can place an order for that and you can then stop by our lab to have the test done at a later time.    Video visits are billed at different rates depending on your insurance coverage.  Please reach out to your insurance provider with any questions.    If during the course of the call the physician/provider feels a video visit is not appropriate, you will not be charged for this service.\"    Patient has given verbal consent for Video visit? Yes  How would you like to obtain your AVS? MyChart  If you are dropped from the video visit, the video invite should be resent to: Text to cell phone: [phone]  Will anyone else be joining your video visit? No    Video-Visit Details    Type of service:  Video Visit    Video Start Time: 3:04PM  Video End Time: 3:17PM    Originating Location (pt. Location): Home    Distant Location (provider location):  [facility] [place]     Platform used for Video Visit: AmWell and then Doximity. Henry stopped working    Donell Holland MD      "

## 2020-12-08 NOTE — LETTER
Date:February 16, 2021      Provider requested that no letter be sent. Do not send.       [facility]

## 2020-12-08 NOTE — NURSING NOTE
"Reason For Visit:   Chief Complaint   Patient presents with     RECHECK     DOS 11/13/20 Knee Arthroscopy, Medial Patello-femoral Ligament Reconstruction with Allograft, Distal Tibial Tubercle Osteotomy, plus derotation tibial osteotomy (Left)         Primary MD: Raza Franklin  Ref. MD: Est    ?  No  Occupation  [employer].  Currently working? Yes.  Work status?  Full time.     Type of injury: Multiple on both.     Date of surgery: Jan or Feb 2011 at [facility].  Type of surgery: Left knee MPFL reconstuction.    11/13/2020 Left  knee exam under anesthesia. Diagnostic arthroscopy. MPFL reconstruction using gracilis allograft. Lateral retinacular lengthening. Distal and medial tibial tubercle transfer (12 mm medial, 8 mm distal). Osteotomy of the tibia with IM jhoana fixation, osteotomy of the fibula     Smoker: Yes  Request smoking cessation information: No    Ht 1.955 m (6' 4.97\")   Wt 136.2 kg (300 lb 3.2 oz)   BMI 35.63 kg/m      Pain Assessment  Patient Currently in Pain: Denies         Fina Julio LPN  "

## 2020-12-08 NOTE — PROGRESS NOTES
Date of Service: Dec 8, 2020    Reason for visit: Postoperative follow-up    Date of Surgery: 11/13/2020    Procedure Performed: Left knee arthroscopy, medial patellofemoral ligament reconstruction with allograft, distal tibial tubercle osteotomy, derotational osteotomy of tibia    Interval events: Higinio Wallace comes to see us in follow-up from the above surgery. Patient reports pain has been well managed.  He takes 1 oxycodone tablet intermittently for pain.  Patient denies any new numbness or tingling.  He reports no wound drainage after the surgery.  He has begun working with physical therapy.  He is utilizing a CPM and has gotten up to 70 degrees range of motion.  His goal is to achieve 90 degrees range of motion by Friday.  He continues to wear his hinged knee brace locked in 10 degrees extension when he is up and unlocked at 70 degrees of flexion when he is sitting.    Physical examination:  The patient is well-developed, well nourished and in on acute distress. The patient is alert and oriented to the surroundings.     Radiographs: Three views of the left tibia were obtained and reviewed. These demonstrate postsurgical changes of tibial derotational osteotomy with placement of intramedullary nail and tibial tubercle osteotomy with screws in place.  There is been no interval displacement of hardware.    Assessment: 3 weeks status post left knee arthroscopy, medial patellofemoral ligament reconstruction with allograft, distal tibial tubercle osteotomy and derotational osteotomy of the tibia.  Progressing as expected    Plan:    -Advance to functional weightbearing and activity  -Hinged knee brace; advance to 0 degrees extension to 70 degrees flexion.  Brace to be locked when mobilizing and unlocked to work on range of motion when sitting.   -Continue aspirin for 6 weeks from date of surgery    Patient to follow-up with Dr. Han on 1/5/2020 with repeat x-rays of the left tibia    This patient was seen and  examined by Dr. Lela Han MD who is in agreement with assessment and plan.    ZAKIA Wharton MD   Orthopaedic Surgery, PGY-4    I have personally examined this patient and have reviewed the clinical presentation and progress note with the resident.  I agree with the treatment plan as outlined.  The plan was formulated with the resident on the day of the resident dictation.    Lela Han MD

## 2020-12-08 NOTE — PROGRESS NOTES
GI CLINIC VISIT    CC/REFERRING MD:  Referred Self  REASON FOR CONSULTATION:   Referred Self for rumination syndrome, IBS with diarrhea, GERD, and congenital malrotation of the bowel.    ASSESSMENT/PLAN:  30-year-old male here to follow-up for multiple functional GI disorders.    1.  Rumination syndrome-this has improved with weight loss, dietary changes, and breathing techniques as outlined by Dr. Franchesca Hernandez.  He has had such improvement that he no longer needs to meet with Dr. Hernandez at this time.  He can follow-up with his primary mental health provider.  If rumination becomes more of an issue moving forward he can be referred back to Dr. Hernandez.    2 IBS with diarrhea-this is improved with initiation of fiber and dietary changes.  His laboratory evaluation and colonoscopy was unremarkable.  There was no evidence of celiac disease or inflammatory bowel disease.  No further general GI evaluation is required at this time.  I will send him a prescription for dicyclomine that he can take as needed.  He can increase his Citrucel up to 2 tablets daily.    3.  GERD-this is well controlled with weight loss.  He can try tapering off the omeprazole if he would like.  If his symptoms return he can go back on the omeprazole.  He could also consider famotidine as needed.    4.  Congenital malrotation of the bowel.  He will follow up with colorectal surgery.  He knows that he needs to get the upper GI series and small bowel follow-through before his follow-up.    5.  2 small adenomas of the colon-he should have a follow-up colonoscopy in 2025.    RTC 6 months    Thank you for this consultation.  It was a pleasure to participate in the care of this patient; please contact us with any further questions.      This note was created with voice recognition software, and while reviewed for accuracy, typos may remain.     Donell Holland MD  Inflammatory Bowel Disease Program  Division of Gastroenterology, Hepatology and  Nutrition  [facility]  Pager: [phone]      HPI:  Higinio is here today to follow-up in GI clinic for multiple issues.  Overall he is doing quite well.    1.  Rumination syndrome-this has been going quite well.  He did meet with Dr. Franchesca Hernandez who worked with him on some breathing techniques.  This in conjunction with losing weight and dietary changes has essentially resolved his rumination.  He is very happy with how he is doing.    2.  Irritable bowel syndrome with diarrhea-this is going quite well as well.  He is taking Citrucel 1 tablet daily.  His stools have been a little irregular because of his recent knee surgery.  Because of some of the pain medications he got constipated though this is now resolved.  His stools are soft but gaining more formed.  He does have some right lower abdominal pressure and cramping that heralds a bowel movement.  This discomfort resolves immediately upon defecation.  It is quite tolerable.  He would be interested in an antispasmodic.  Overall he is quite happy with how he is doing.  His laboratory evaluation was unremarkable apart from a mildly elevated fecal calprotectin.  We did perform a colonoscopy.  The ileum was normal and the colon was normal apart from some small polyps.  One of the polyps was a small tubular adenoma.  The other polyp was a small sessile serrated adenoma.  Random colon biopsies were normal.    3.  GERD-his symptoms are improved after he is lost about 25 pounds.  He is still taking the omeprazole but does not take this reliably.  He has noticed his symptoms are still improved even when he does not take the omeprazole.    4.  Congenital malrotation of the bowel-he did meet with colorectal surgery.  They recommended weight loss prior to surgery.  He was supposed to undergo an upper GI series as well as a small bowel follow-through but has not yet done this.  He will contact the clinic to schedule these tests and then the appropriate  follow-up.    ROS:    No fevers or chills  No weight loss  No blurry vision, double vision or change in vision  No sore throat  No lymphadenopathy  No headache, paraesthesias, or weakness in a limb  No shortness of breath or wheezing  No chest pain or pressure  No arthralgias or myalgias  No rashes or skin changes  No odynophagia or dysphagia  No BRBPR, hematochezia, melena  No dysuria, frequency or urgency  No hot/cold intolerance or polyria  No anxiety or depression    PREVIOUS ENDOSCOPY:  1.  Upper endoscopy-8/21/2019-normal esophagus, normal stomach, normal duodenum.    2.  Colonoscopy 10/7/2020-normal ileum and normal colon mucosa.  Normal colon biopsies of the right colon, left colon, and rectum.  One small tubular adenoma and one small sessile serrated adenoma.    PERTINENT RELEVANT IMAGING OR LABS:  CRP, TTG, TSH, LFTs, BMP, CBC all normal.  Fecal calprotectin mildly elevated as above.    ALLERGIES:     Allergies   Allergen Reactions     Bee Venom Anaphylaxis and Other (See Comments)     Swelling  Large local reactions/ swelling       Fruit Acid Concentrate [Fruit Extracts] Swelling     Lips swell and crust over little bit- tongue gets numb     Liquid Adhesive Dermatitis     And band aid       Monosodium Glutamate Hives       PERTINENT MEDICATIONS:    Current Outpatient Medications:      acetaminophen (TYLENOL) 500 MG tablet, Take 2 tablets (1,000 mg) by mouth every 8 hours as needed for pain, Disp: 1 Bottle, Rfl: 0     albuterol (ALBUTEROL) 108 (90 BASE) MCG/ACT Inhaler, Inhale 2 puffs into the lungs every 4 hours as needed for shortness of breath / dyspnea or wheezing, Disp: 1 Inhaler, Rfl: 0     ARIPiprazole (ABILIFY) 15 MG tablet, Take 15 mg by mouth every morning , Disp: , Rfl:      aspirin 81 MG EC tablet, Take 2 tablets (162 mg) by mouth daily, Disp: 60 tablet, Rfl: 0     gabapentin (NEURONTIN) 100 MG capsule, Take 3 capsules (300 mg) by mouth 3 times daily for 10 days, Disp: 90 capsule, Rfl: 0      hydrOXYzine (ATARAX) 25 MG tablet, Take 2 tablets (50 mg) by mouth every 6 hours as needed for itching or other (pain adjuvant), Disp: 40 tablet, Rfl: 0     metFORMIN (GLUCOPHAGE) 500 MG tablet, Take 500 mg by mouth 2 times daily (with meals) , Disp: , Rfl:      omeprazole 20 MG tablet, Take 20 mg by mouth every morning , Disp: , Rfl:      oxyCODONE (ROXICODONE) 5 MG tablet, Take 1-2 tablets (5-10 mg) by mouth every 4 hours as needed for moderate to severe pain, Disp: 45 tablet, Rfl: 0     polyethylene glycol (MIRALAX) 17 g packet, Take 17 g by mouth daily (Patient not taking: Reported on 11/27/2020), Disp: 7 packet, Rfl: 0     senna-docusate (SENOKOT-S/PERICOLACE) 8.6-50 MG tablet, Take 1-2 tablets by mouth 2 times daily Take while on oral narcotics to prevent or treat constipation. (Patient not taking: Reported on 11/27/2020), Disp: 30 tablet, Rfl: 0    PROBLEM LIST  Patient Active Problem List    Diagnosis Date Noted     Aftercare following surgery of the musculoskeletal system 11/19/2020     Priority: Medium     Left knee pain 11/19/2020     Priority: Medium     Patellofemoral instability of left knee with pain 10/14/2020     Priority: Medium     Added automatically from request for surgery [id number]       Class 2 severe obesity due to excess calories with serious comorbidity and body mass index (BMI) of 37.0 to 37.9 in adult (H) 09/15/2020     Priority: Medium     Irritable bowel syndrome 08/27/2020     Priority: Medium       PERTINENT PAST MEDICAL HISTORY:  Past Medical History:   Diagnosis Date     Anxiety      Class 2 obesity due to excess calories in adult      Complication of anesthesia      Gastroesophageal reflux disease with esophagitis      History of nephrolithiasis      Irritable bowel syndrome      Major depression, recurrent (H)      Malrotation of intestine      Patellofemoral instability of left knee with pain      Pre-diabetes      Psychosis (H)      PTSD (post-traumatic stress disorder)       Tobacco use        PREVIOUS SURGERIES:  Past Surgical History:   Procedure Laterality Date     ARTHROSCOPY KNEE WITH PATELLAR REALIGNMENT Left 11/13/2020    Procedure: Knee Arthroscopy, Medial Patello-femoral Ligament Reconstruction with Allograft, Distal Tibial Tubercle Osteotomy, Lateral Retinacular Lengthening;  Surgeon: Sourav Keating MD;  Location: [facility]     ARTHROTOMY TIBIAL TUBERCLE SHIFT Left 11/13/2020    Procedure: tibial tubercle Osteotomy ;  Surgeon: Sourav Keating MD;  Location: [facility] OR     COLONOSCOPY N/A 10/07/2020    Procedure: COLONOSCOPY, WITH POLYPECTOMY AND BIOPSY;  Surgeon: Donell Holland MD;  Location: [facility] OR     deviated septum  2018     KNEE SURGERY Left      OPEN REDUCTION INTERNAL FIXATION RODDING INTRAMEDULLARY TIBIA Left 11/13/2020    Procedure: plus derotation tibial osteotomy;  Surgeon: Sourav Keating MD;  Location: [facility] OR       SOCIAL HISTORY:  Social History     Socioeconomic History     Marital status: Single     Spouse name: Not on file     Number of children: Not on file     Years of education: Not on file     Highest education level: Not on file   Occupational History     Not on file   Social Needs     Financial resource strain: Not on file     Food insecurity     Worry: Not on file     Inability: Not on file     Transportation needs     Medical: Not on file     Non-medical: Not on file   Tobacco Use     Smoking status: Current Some Day Smoker     Packs/day: 0.25     Types: Cigarettes     Start date: 2001     Smokeless tobacco: Never Used     Tobacco comment: 1 cig a day trying to quit   Substance and Sexual Activity     Alcohol use: Not Currently     Drug use: Yes     Types: Marijuana     Comment: occasionally     Sexual activity: Yes     Partners: Female     Birth control/protection: Condom   Lifestyle     Physical activity     Days per week: Not on file     Minutes per session: Not on file     Stress: Not on file   Relationships     Social  connections     Talks on phone: Not on file     Gets together: Not on file     Attends Church service: Not on file     Active member of club or organization: Not on file     Attends meetings of clubs or organizations: Not on file     Relationship status: Not on file     Intimate partner violence     Fear of current or ex partner: Not on file     Emotionally abused: Not on file     Physically abused: Not on file     Forced sexual activity: Not on file   Other Topics Concern     Parent/sibling w/ CABG, MI or angioplasty before 65F 55M? Not Asked   Social History Narrative     Not on file       FAMILY HISTORY:  Family History   Problem Relation Age of Onset     Other - See Comments Mother         PONV     Cerebrovascular Disease Brother      Atrial fibrillation Brother      Other - See Comments Brother         cardiac autoimmune deficiency       Past/family/social history reviewed and no changes    PHYSICAL EXAMINATION:  Constitutional: aaox3, cooperative, pleasant, not dyspneic/diaphoretic, no acute distress  Vitals reviewed: There were no vitals taken for this visit.  Wt:   Wt Readings from Last 2 Encounters:   11/13/20 148.1 kg (326 lb 8 oz)   11/04/20 149.7 kg (330 lb)      Eyes: Sclera anicteric/injected  Respiratory: Unlabored breathing  Skin: no jaundice  Psych: Normal affect

## 2020-12-15 ENCOUNTER — THERAPY VISIT (OUTPATIENT)
Dept: PHYSICAL THERAPY | Facility: CLINIC | Age: 30
End: 2020-12-15
Payer: COMMERCIAL

## 2020-12-15 DIAGNOSIS — Z47.89 AFTERCARE FOLLOWING SURGERY OF THE MUSCULOSKELETAL SYSTEM: ICD-10-CM

## 2020-12-15 DIAGNOSIS — M25.562 LEFT KNEE PAIN: ICD-10-CM

## 2020-12-15 PROCEDURE — 97110 THERAPEUTIC EXERCISES: CPT | Mod: GP | Performed by: PHYSICAL THERAPIST

## 2020-12-15 PROCEDURE — 97112 NEUROMUSCULAR REEDUCATION: CPT | Mod: GP | Performed by: PHYSICAL THERAPIST

## 2020-12-15 PROCEDURE — 97140 MANUAL THERAPY 1/> REGIONS: CPT | Mod: GP | Performed by: PHYSICAL THERAPIST

## 2020-12-21 ENCOUNTER — THERAPY VISIT (OUTPATIENT)
Dept: PHYSICAL THERAPY | Facility: CLINIC | Age: 30
End: 2020-12-21
Payer: COMMERCIAL

## 2020-12-21 DIAGNOSIS — M25.562 LEFT KNEE PAIN: ICD-10-CM

## 2020-12-21 DIAGNOSIS — Z47.89 AFTERCARE FOLLOWING SURGERY OF THE MUSCULOSKELETAL SYSTEM: ICD-10-CM

## 2020-12-21 PROCEDURE — 97110 THERAPEUTIC EXERCISES: CPT | Mod: 95 | Performed by: PHYSICAL THERAPIST

## 2020-12-21 PROCEDURE — 97112 NEUROMUSCULAR REEDUCATION: CPT | Mod: 95 | Performed by: PHYSICAL THERAPIST

## 2020-12-22 DIAGNOSIS — Z98.890 S/P LEFT KNEE SURGERY: Primary | ICD-10-CM

## 2020-12-23 ENCOUNTER — THERAPY VISIT (OUTPATIENT)
Dept: PHYSICAL THERAPY | Facility: CLINIC | Age: 30
End: 2020-12-23
Payer: COMMERCIAL

## 2020-12-23 DIAGNOSIS — M25.562 LEFT KNEE PAIN: ICD-10-CM

## 2020-12-23 DIAGNOSIS — Z47.89 AFTERCARE FOLLOWING SURGERY OF THE MUSCULOSKELETAL SYSTEM: ICD-10-CM

## 2020-12-23 PROCEDURE — 97110 THERAPEUTIC EXERCISES: CPT | Mod: 95 | Performed by: PHYSICAL THERAPIST

## 2020-12-29 ENCOUNTER — THERAPY VISIT (OUTPATIENT)
Dept: PHYSICAL THERAPY | Facility: CLINIC | Age: 30
End: 2020-12-29
Payer: COMMERCIAL

## 2020-12-29 ENCOUNTER — RECORDS - HEALTHEAST (OUTPATIENT)
Dept: ADMINISTRATIVE | Facility: OTHER | Age: 30
End: 2020-12-29

## 2020-12-29 DIAGNOSIS — Z47.89 AFTERCARE FOLLOWING SURGERY OF THE MUSCULOSKELETAL SYSTEM: ICD-10-CM

## 2020-12-29 DIAGNOSIS — M25.562 LEFT KNEE PAIN: ICD-10-CM

## 2020-12-29 PROCEDURE — 97110 THERAPEUTIC EXERCISES: CPT | Mod: 95 | Performed by: PHYSICAL THERAPIST

## 2020-12-29 NOTE — PROGRESS NOTES
PROGRESS  REPORT    Progress reporting period is from 11/19/20 to 12/29/20.       SUBJECTIVE  Subjective changes noted by patient: Higinio was unable to attend in person due to weather. He has been unable to get to the clinic since 12/15/20.  He reports he thinks his knee is moving better and his swelling in his foot has improved. He is still using crutches at all times.    Current pain level is 1/10  .     Changes in function:  Yes (See Goal flowsheet attached for changes in current functional level)  Adverse reaction to treatment or activity: None    OBJECTIVE  Changes noted in objective findings:  Yes,   : ROM 0-84.  Unable to visualize well due to difficulty with camera positioning on virtual visit     ASSESSMENT/PLAN  Updated problem list and treatment plan: Diagnosis 1: Knee Arthroscopy, Medial Patello-femoral Ligament Reconstruction with Allograft, Distal Tibial Tubercle Osteotomy, Lateral Retinacular Lengthening, tibial tubercle Osteotomy plus derotation tibial osteotomy on 11/13/20  Pain -  hot/cold therapy, electric stimulation, manual therapy, splint/taping/bracing/orthotics, self management, education, directional preference exercise and home program  Decreased ROM/flexibility - manual therapy and therapeutic exercise  Decreased joint mobility - manual therapy and therapeutic exercise  Decreased strength - therapeutic exercise and therapeutic activities  Decreased proprioception - neuro re-education and therapeutic activities  Inflammation - cold therapy, electric stimulation and self management/home program  Edema - vasopneumatics, cold therapy and self management/home program  Impaired gait - gait training  Impaired muscle performance - neuro re-education  Decreased function - therapeutic activities  Impaired posture - neuro re-education     STG/LTGs have been met or progress has been made towards goals:  Yes (See Goal flow sheet completed today.)  Assessment of Progress: The patient's condition has  potential to improve.  He is responding slower than anticipated  Self Management Plans:  Patient has been instructed in a home treatment program.  I have re-evaluated this patient and find that the nature, scope, duration and intensity of the therapy is appropriate for the medical condition of the patient.  Higinio continues to require the following intervention to meet STG and LTG's:  PT    Recommendations:  This patient would benefit from continued therapy.     Frequency:  2 X week, once daily  Duration:  for 2 weeks tapering to 1 x per week over 8 weeks        Please refer to the daily flowsheet for treatment today, total treatment time and time spent performing 1:1 timed codes.

## 2020-12-30 ENCOUNTER — RECORDS - HEALTHEAST (OUTPATIENT)
Dept: ADMINISTRATIVE | Facility: OTHER | Age: 30
End: 2020-12-30

## 2020-12-30 ENCOUNTER — TELEPHONE (OUTPATIENT)
Dept: ORTHOPEDICS | Facility: CLINIC | Age: 30
End: 2020-12-30

## 2020-12-30 DIAGNOSIS — Z98.890 S/P LEFT KNEE SURGERY: Primary | ICD-10-CM

## 2020-12-30 NOTE — TELEPHONE ENCOUNTER
Patient was called at the request of Dr. Han.  She would like him to see Ani in PT on the same day that she sees Dr. Han.  This was scheduled and he was agreeable with this.  A referral for Home Care PT was also placed and he is aware that they will reach out to him to see if Home Care PT will be available.  He stated it is difficult to get to PT with rides and the weather.

## 2020-12-31 ENCOUNTER — THERAPY VISIT (OUTPATIENT)
Dept: PHYSICAL THERAPY | Facility: CLINIC | Age: 30
End: 2020-12-31
Payer: COMMERCIAL

## 2020-12-31 ENCOUNTER — TELEPHONE (OUTPATIENT)
Dept: ORTHOPEDICS | Facility: CLINIC | Age: 30
End: 2020-12-31

## 2020-12-31 ENCOUNTER — COMMUNICATION - HEALTHEAST (OUTPATIENT)
Dept: HOME HEALTH SERVICES | Facility: HOME HEALTH | Age: 30
End: 2020-12-31

## 2020-12-31 ENCOUNTER — HOME CARE/HOSPICE - HEALTHEAST (OUTPATIENT)
Dept: HOME HEALTH SERVICES | Facility: HOME HEALTH | Age: 30
End: 2020-12-31

## 2020-12-31 DIAGNOSIS — Z47.89 AFTERCARE FOLLOWING SURGERY OF THE MUSCULOSKELETAL SYSTEM: ICD-10-CM

## 2020-12-31 DIAGNOSIS — M25.562 LEFT KNEE PAIN: ICD-10-CM

## 2020-12-31 PROCEDURE — 97112 NEUROMUSCULAR REEDUCATION: CPT | Mod: GP | Performed by: PHYSICAL THERAPIST

## 2020-12-31 PROCEDURE — 97110 THERAPEUTIC EXERCISES: CPT | Mod: GP | Performed by: PHYSICAL THERAPIST

## 2020-12-31 NOTE — TELEPHONE ENCOUNTER
Writer called Lizy from [facility] and gave the verbal orders to wait to start home care PT on 1/3/2021.     Fina Julio LPN

## 2020-12-31 NOTE — TELEPHONE ENCOUNTER
[facility] Call Center    Phone Message:  Lizy from [facility] called, stating she needs a VERBAL order.    Verbal order needed:  To delay PT start of care until 1-3-2021      Lizy would like a call back at [phone]    May a detailed message be left on voicemail: YES     Reason for Call: Other: Verbal Order Needed     Action Taken: Message routed to:  [facility] ([facility] Team to MD    Travel Screening: Not Applicable

## 2021-01-01 NOTE — PROGRESS NOTES
PROGRESS  REPORT    Progress reporting period is from 12/29/20 to 12/31/20.       SUBJECTIVE  Subjective changes noted by patient:   Higinio arrives 30 minutes late today. He has not attended an in person visit since 12/15/20. He reports the swelling in his feet has decreased. He still has pain at his right shin when walking without the boot.  He uses the boot when out of the house because he cannot yet fit his foot into regular shoes.    Current pain level is 4/10  .     Changes in function:  Yes (See Goal flowsheet attached for changes in current functional level)  Adverse reaction to treatment or activity: None    OBJECTIVE  Changes noted in objective findings:  Yes,    ROM 0-87.   Gait: Ambulates with bilateral axillary crutches with upright posture. Improved step length with cues to lift chest and engage his quadriceps muscles at initial contact.  Incision: clean,covered and healing well.      ASSESSMENT/PLAN  Updated problem list and treatment plan: Diagnosis 1: Knee Arthroscopy, Medial Patello-femoral Ligament Reconstruction with Allograft, Distal Tibial Tubercle Osteotomy, Lateral Retinacular Lengthening, tibial tubercle Osteotomy plus derotation tibial osteotomy on 11/13/20  Pain -  hot/cold therapy, electric stimulation, manual therapy, splint/taping/bracing/orthotics, self management, education, directional preference exercise and home program  Decreased ROM/flexibility - manual therapy and therapeutic exercise  Decreased joint mobility - manual therapy and therapeutic exercise  Decreased strength - therapeutic exercise and therapeutic activities  Decreased proprioception - neuro re-education and therapeutic activities  Inflammation - cold therapy, electric stimulation and self management/home program  Edema - vasopneumatics, cold therapy and self management/home program  Impaired gait - gait training  Impaired muscle performance - neuro re-education  Decreased function - therapeutic activities  Impaired  posture - neuro re-education     STG/LTGs have been met or progress has been made towards goals:  Yes (See Goal flow sheet completed today.)  Assessment of Progress: The patient's condition has potential to improve.  He is responding slower than anticipated  Self Management Plans:  Patient has been instructed in a home treatment program.  I have re-evaluated this patient and find that the nature, scope, duration and intensity of the therapy is appropriate for the medical condition of the patient.  Higinio continues to require the following intervention to meet STG and LTG's:  PT     Recommendations:  This patient would benefit from continued therapy.     Frequency:  2 X week, once daily  Duration:  for 1 weeks tapering to 1 x per week over 8 weeks  Consider home care PT per MD recommendation to alleviate the challenge in setting up transportation and fatigue associated with outpatient PT visits.     Please refer to the daily flowsheet for treatment today, total treatment time and time spent performing 1:1 timed codes.

## 2021-01-03 ENCOUNTER — HOME CARE/HOSPICE - HEALTHEAST (OUTPATIENT)
Dept: HOME HEALTH SERVICES | Facility: HOME HEALTH | Age: 31
End: 2021-01-03

## 2021-01-05 ENCOUNTER — THERAPY VISIT (OUTPATIENT)
Dept: PHYSICAL THERAPY | Facility: CLINIC | Age: 31
End: 2021-01-05
Payer: COMMERCIAL

## 2021-01-05 ENCOUNTER — OFFICE VISIT (OUTPATIENT)
Dept: ORTHOPEDICS | Facility: CLINIC | Age: 31
End: 2021-01-05
Payer: COMMERCIAL

## 2021-01-05 ENCOUNTER — ANCILLARY PROCEDURE (OUTPATIENT)
Dept: GENERAL RADIOLOGY | Facility: CLINIC | Age: 31
End: 2021-01-05
Attending: ORTHOPAEDIC SURGERY
Payer: COMMERCIAL

## 2021-01-05 ENCOUNTER — MEDICAL CORRESPONDENCE (OUTPATIENT)
Dept: HEALTH INFORMATION MANAGEMENT | Facility: CLINIC | Age: 31
End: 2021-01-05

## 2021-01-05 VITALS — HEIGHT: 76 IN | BODY MASS INDEX: 38.36 KG/M2 | WEIGHT: 315 LBS

## 2021-01-05 DIAGNOSIS — M25.562 LEFT KNEE PAIN, UNSPECIFIED CHRONICITY: ICD-10-CM

## 2021-01-05 DIAGNOSIS — Z47.89 AFTERCARE FOLLOWING SURGERY OF THE MUSCULOSKELETAL SYSTEM: Primary | ICD-10-CM

## 2021-01-05 DIAGNOSIS — Z98.890 S/P LEFT KNEE SURGERY: ICD-10-CM

## 2021-01-05 DIAGNOSIS — Z98.890 S/P KNEE SURGERY: Primary | ICD-10-CM

## 2021-01-05 PROCEDURE — 97530 THERAPEUTIC ACTIVITIES: CPT | Mod: GP | Performed by: PHYSICAL THERAPIST

## 2021-01-05 PROCEDURE — 97110 THERAPEUTIC EXERCISES: CPT | Mod: GP | Performed by: PHYSICAL THERAPIST

## 2021-01-05 PROCEDURE — 99024 POSTOP FOLLOW-UP VISIT: CPT | Mod: GC | Performed by: ORTHOPAEDIC SURGERY

## 2021-01-05 PROCEDURE — 73590 X-RAY EXAM OF LOWER LEG: CPT | Mod: LT | Performed by: RADIOLOGY

## 2021-01-05 ASSESSMENT — MIFFLIN-ST. JEOR: SCORE: 2574.37

## 2021-01-05 NOTE — PROGRESS NOTES
Date of Service: Jan 5, 2021    Reason for visit: Postoperative follow-up    Date of Surgery: 11/13/2020    Procedure Performed: Left knee arthroscopy, medial patellofemoral ligament reconstruction with allograft, distal tibial tubercle osteotomy, derotational osteotomy of tibia    Interval events: Higinio Wallace comes to see us in follow-up from the above surgery. Patient reports pain has been well managed.  He reports that his knee is still very stiff and weak.  He was not aware that he was able to drive, and so he had been canceling his physical therapy appointments and been doing virtual visits instead, though as a result he has not been progressing as fast as we would expect.  He is putting little weight on the leg during ambulation, still using his crutches for all ambulation.  He reports that he has been doing his home therapy stretching exercises every day.  He was working with Marly Reynoso at the [place] [facility] in [facility].  He is still using his brace and the locking it for all ambulation, unlocking it when he is sitting down or supine.  He had his first at home physical therapy appointment yesterday where there was some concern showed over his lack of progress and apprehension with participation.    Physical examination:  The patient is well-developed, well nourished and in on acute distress. The patient is alert and oriented to the surroundings.     Left knee surgical sites healing well, though dry skin and scab are still in place and appear unwashed.  No openings or drainage present.  No erythema seen in the skin.  Moderate swelling around the knee and leg.  Significant quad atrophy seen.  Patient exhibits weak straight leg raise with 15 degree extensor lag.  Passive range of motion of the knee is from 5 to 70 degrees of motion.  Neurovascular exam normal distally with intact sensation and pulses.  Normal strength with EHL, FHL, TA, GSC.    Radiographs: X-rays of the left tib-fib  were reviewed today.  AP and lateral views show intact hardware in place with IM nail and tibial tubercle osteotomy screws securely placed, no evidence of breakage or pullout.  Tibial tubercle osteotomy site distally has not filled in with bone yet, though there has been no evidence to suggest nonunion.  Tibial osteotomy site with some bony callus formation around the anterior and posterior as well as the medial lateral borders.  No full bony bridging seen yet.    Assessment: 2 months status post left knee arthroscopy, medial patellofemoral ligament reconstruction with allograft, distal tibial tubercle osteotomy and derotational osteotomy of the tibia.  Progressing much slower than expected, with poor ROM, quad strength, and gait mechanics.      We discussed her concerns with the patient regarding his slow progress thus far.  It is imperative.he start working on normal gait mechanics, improve range of motion, and improve quad activation more aggressively as well as increasing his weightbearing on the operative leg.  He recently started home PT which he should certainly continue, and we will communicate with the therapist regarding her plan and what we discussed today.    Plan:    -WBAT, patient should push his weightbearing more at this time since he is still putting very little weight through his LLE and he has no reason to need to limit his weightbearing.   -Hinged knee brace, remove while in bed, unlock during ambulation when able to work on normal heel-toe gait pattern.   -Work with PT on gait mechanics primarily, with quad strengthening and gait progression.  . Emphasized the importance of improving his quad strength, ROM, and gait.  We felt that if he cannot achieve flexion we can manage that with a manipulation at a later date but progressing to having quad strength and reasonable gait progression is most important at this point time    Patient to follow-up with Dr. Han in 1 month for ROM and PT progress  check.     This is a post op visit.    Nghia Hobbs MD  Orthopedic Surgery PGY-4  Pager: [phone]      I have personally examined this patient and have reviewed the clinical presentation and progress note with the resident.  I agree with the treatment plan as outlined.  The plan was formulated with the resident on the day of the resident dictation.    Lela Han MD

## 2021-01-05 NOTE — LETTER
1/5/2021         RE: Higinio Wallace  [address] [facility]  [address] [place], [place] [id number]        Dear Colleague,    Thank you for referring your patient, Higinio Wallace, to the [facility] [place]. Please see a copy of my visit note below.    Date of Service: Jan 5, 2021    Reason for visit: Postoperative follow-up    Date of Surgery: 11/13/2020    Procedure Performed: Left knee arthroscopy, medial patellofemoral ligament reconstruction with allograft, distal tibial tubercle osteotomy, derotational osteotomy of tibia    Interval events: Higinio Wallace comes to see us in follow-up from the above surgery. Patient reports pain has been well managed.  He reports that his knee is still very stiff and weak.  He was not aware that he was able to drive, and so he had been canceling his physical therapy appointments and been doing virtual visits instead, though as a result he has not been progressing as fast as we would expect.  He is putting little weight on the leg during ambulation, still using his crutches for all ambulation.  He reports that he has been doing his home therapy stretching exercises every day.  He was working with Marly Reynoso at the [place] [facility] in [facility].  He is still using his brace and the locking it for all ambulation, unlocking it when he is sitting down or supine.  He had his first at home physical therapy appointment yesterday where there was some concern showed over his lack of progress and apprehension with participation.    Physical examination:  The patient is well-developed, well nourished and in on acute distress. The patient is alert and oriented to the surroundings.     Left knee surgical sites healing well, though dry skin and scab are still in place and appear unwashed.  No openings or drainage present.  No erythema seen in the skin.  Moderate swelling around the knee and leg.  Significant quad atrophy seen.  Patient exhibits weak  straight leg raise with 15 degree extensor lag.  Passive range of motion of the knee is from 5 to 70 degrees of motion.  Neurovascular exam normal distally with intact sensation and pulses.  Normal strength with EHL, FHL, TA, GSC.    Radiographs: X-rays of the left tib-fib were reviewed today.  AP and lateral views show intact hardware in place with IM nail and tibial tubercle osteotomy screws securely placed, no evidence of breakage or pullout.  Tibial tubercle osteotomy site distally has not filled in with bone yet, though there has been no evidence to suggest nonunion.  Tibial osteotomy site with some bony callus formation around the anterior and posterior as well as the medial lateral borders.  No full bony bridging seen yet.    Assessment: 2 months status post left knee arthroscopy, medial patellofemoral ligament reconstruction with allograft, distal tibial tubercle osteotomy and derotational osteotomy of the tibia.  Progressing much slower than expected, with poor ROM, quad strength, and gait mechanics.      We discussed her concerns with the patient regarding his slow progress thus far.  It is imperative.he start working on normal gait mechanics, improve range of motion, and improve quad activation more aggressively as well as increasing his weightbearing on the operative leg.  He recently started home PT which he should certainly continue, and we will communicate with the therapist regarding her plan and what we discussed today.    Plan:    -WBAT, patient should push his weightbearing more at this time since he is still putting very little weight through his LLE and he has no reason to need to limit his weightbearing.   -Hinged knee brace, remove while in bed, unlock during ambulation when able to work on normal heel-toe gait pattern.   -Work with PT on gait mechanics primarily, with quad strengthening and gait progression.  . Emphasized the importance of improving his quad strength, ROM, and gait.  We felt  that if he cannot achieve flexion we can manage that with a manipulation at a later date but progressing to having quad strength and reasonable gait progression is most important at this point time    Patient to follow-up with Dr. Han in 1 month for ROM and PT progress check.     This is a post op visit.    Nghia Hobbs MD  Orthopedic Surgery PGY-4  Pager: [phone]      I have personally examined this patient and have reviewed the clinical presentation and progress note with the resident.  I agree with the treatment plan as outlined.  The plan was formulated with the resident on the day of the resident dictation.    Lela Han MD

## 2021-01-05 NOTE — PROGRESS NOTES
Progress Note    Progress reporting period is from initial eval to Jan 5, 2021.     Patient seen for 12  visits.    SUBJECTIVE  Subjective changes noted by patient: Patient reports that his biggest complaint of pain is his shin pain. The knee has not been painful and the shin is only painful when he walks on it. He has been very apprehensive to progress and has yet to return to drive or even to shower because he has not been told or shown how. He has had trouble getting to in person PT visits and many of them have been virtual instead. He feels he is behind.   Changes in function:  Yes (See Goal flowsheet attached for changes in current functional level)  Adverse reaction to treatment or activity: None    OBJECTIVE  Changes noted in objective findings:    Ambulates with 2 crutches, a step to gait pattern. Lacking heel to toe pattern.   ROM: 0-3-80 (measured seated off edge of mat)  No wave sign but significant swelling in the foot, ankle and calf.   Had clear conversation with Higinio about progressing and what symptoms to listen to and what symptoms to acknowledge and move beyond.   Presents with disuse atrophy of the quadriceps muscles  Nerve supply to the muscle is intact  Physical therapy alone is not sufficient to treat disuse atrophy  Treatment area is large and includes multiple sites - patient cannot feasibly deliver treatment with standard electrodes, conducive garment required    Weight bearing: will be discussed at MD visit   Shower: can do, needs instruction on how to get in/out. Can get the knee wet.   Driving: can do if he can get into the  seat   Stairs: Begin to use 1 crutch and 1 railing   Standing: emphasize equal weight bearing, practice while in the kitchen at the countertop  Walking: indoor or outdoor. Working towards normalizing your walking.     ASSESSMENT/PLAN      DIAGP:  There were no encounter diagnoses.  Updated problem list and treatment plan:   Diagnosis 1:  S/P left knee    Pain -   hot/cold therapy, manual therapy, STS, splint/taping/bracing/orthotics, self management and education  Decreased ROM/flexibility - manual therapy, therapeutic exercise and home program  Decreased joint mobility - manual therapy, therapeutic exercise and home program  Decreased strength - therapeutic exercise, therapeutic activities and home program  Impaired balance - neuro re-education, therapeutic activities and home program  Decreased proprioception - neuro re-education, therapeutic activities and home program  Impaired gait - gait training and home program  Impaired muscle performance - neuro re-education and home program  Decreased function - therapeutic activities and home program    STG/LTGs have been met or progress has been made towards goals:  Yes, please see goal flowsheet for most current information.    Assessment of Progress: The patient's condition has potential to improve.  Self Management Plans:  Patient has been instructed in a home treatment program.  Patient  has been instructed in self management of symptoms.  I have re-evaluated this patient and find that the nature, scope, duration and intensity of the therapy is appropriate for the medical condition of the patient.  Higinio continues to require the following intervention to meet STG and LTG's:  PT.    Recommendations:  This patient is ready to be discharged from outpatient therapy and continue with home care therapy.   Ani Anaya, PT, DPT, OCS      Please refer to the daily flowsheet for treatment today, total treatment time and time spent performing 1:1 timed codes.

## 2021-01-05 NOTE — NURSING NOTE
"Reason For Visit:   Chief Complaint   Patient presents with     RECHECK     2 month pop DOS 11/13/20 Knee Arthroscopy, Medial Patello-femoral Ligament Reconstruction with Allograft, Distal Tibial Tubercle Osteotomy, plus derotation tibial osteotomy (Left)         Primary MD: Raza Franklin  Ref. MD: Est    ?  No  Occupation  [employer].  Currently working? Yes.  Work status?  Full time.     Type of injury: Multiple on both.     Date of surgery: Jan or Feb 2011 at [facility].  Type of surgery: Left knee MPFL reconstuction.     11/13/2020 Left  knee exam under anesthesia. Diagnostic arthroscopy. MPFL reconstruction using gracilis allograft. Lateral retinacular lengthening. Distal and medial tibial tubercle transfer (12 mm medial, 8 mm distal). Osteotomy of the tibia with IM jhoana fixation, osteotomy of the fibula     Smoker: Yes  Request smoking cessation information: No  Ht 1.94 m (6' 4.38\")   Wt 149.7 kg (330 lb)   BMI 39.77 kg/m      Pain Assessment  Patient Currently in Pain: Denies            Fina Julio LPN  "

## 2021-01-06 ENCOUNTER — HOME CARE/HOSPICE - HEALTHEAST (OUTPATIENT)
Dept: HOME HEALTH SERVICES | Facility: HOME HEALTH | Age: 31
End: 2021-01-06

## 2021-01-07 ENCOUNTER — HOME CARE/HOSPICE - HEALTHEAST (OUTPATIENT)
Dept: HOME HEALTH SERVICES | Facility: HOME HEALTH | Age: 31
End: 2021-01-07

## 2021-01-08 ENCOUNTER — HOME CARE/HOSPICE - HEALTHEAST (OUTPATIENT)
Dept: HOME HEALTH SERVICES | Facility: HOME HEALTH | Age: 31
End: 2021-01-08

## 2021-01-12 ENCOUNTER — MEDICAL CORRESPONDENCE (OUTPATIENT)
Dept: HEALTH INFORMATION MANAGEMENT | Facility: CLINIC | Age: 31
End: 2021-01-12

## 2021-01-13 ENCOUNTER — HOME CARE/HOSPICE - HEALTHEAST (OUTPATIENT)
Dept: HOME HEALTH SERVICES | Facility: HOME HEALTH | Age: 31
End: 2021-01-13

## 2021-01-14 ENCOUNTER — HOME CARE/HOSPICE - HEALTHEAST (OUTPATIENT)
Dept: HOME HEALTH SERVICES | Facility: HOME HEALTH | Age: 31
End: 2021-01-14

## 2021-01-19 ENCOUNTER — HOME CARE/HOSPICE - HEALTHEAST (OUTPATIENT)
Dept: HOME HEALTH SERVICES | Facility: HOME HEALTH | Age: 31
End: 2021-01-19

## 2021-01-22 ENCOUNTER — HOME CARE/HOSPICE - HEALTHEAST (OUTPATIENT)
Dept: HOME HEALTH SERVICES | Facility: HOME HEALTH | Age: 31
End: 2021-01-22

## 2021-01-26 ENCOUNTER — HOME CARE/HOSPICE - HEALTHEAST (OUTPATIENT)
Dept: HOME HEALTH SERVICES | Facility: HOME HEALTH | Age: 31
End: 2021-01-26

## 2021-01-26 DIAGNOSIS — Z98.890 S/P LEFT KNEE SURGERY: Primary | ICD-10-CM

## 2021-01-28 ENCOUNTER — MEDICAL CORRESPONDENCE (OUTPATIENT)
Dept: HEALTH INFORMATION MANAGEMENT | Facility: CLINIC | Age: 31
End: 2021-01-28

## 2021-01-28 ENCOUNTER — HOME CARE/HOSPICE - HEALTHEAST (OUTPATIENT)
Dept: HOME HEALTH SERVICES | Facility: HOME HEALTH | Age: 31
End: 2021-01-28

## 2021-02-02 ENCOUNTER — THERAPY VISIT (OUTPATIENT)
Dept: PHYSICAL THERAPY | Facility: CLINIC | Age: 31
End: 2021-02-02
Payer: COMMERCIAL

## 2021-02-02 ENCOUNTER — OFFICE VISIT (OUTPATIENT)
Dept: ORTHOPEDICS | Facility: CLINIC | Age: 31
End: 2021-02-02
Payer: COMMERCIAL

## 2021-02-02 ENCOUNTER — MEDICAL CORRESPONDENCE (OUTPATIENT)
Dept: HEALTH INFORMATION MANAGEMENT | Facility: CLINIC | Age: 31
End: 2021-02-02

## 2021-02-02 ENCOUNTER — ANCILLARY PROCEDURE (OUTPATIENT)
Dept: GENERAL RADIOLOGY | Facility: CLINIC | Age: 31
End: 2021-02-02
Attending: ORTHOPAEDIC SURGERY
Payer: COMMERCIAL

## 2021-02-02 VITALS — BODY MASS INDEX: 37.19 KG/M2 | HEIGHT: 77 IN | WEIGHT: 315 LBS

## 2021-02-02 DIAGNOSIS — M25.362 PATELLAR INSTABILITY OF LEFT KNEE: ICD-10-CM

## 2021-02-02 DIAGNOSIS — Z98.890 S/P LEFT KNEE SURGERY: ICD-10-CM

## 2021-02-02 DIAGNOSIS — Z98.890 S/P KNEE SURGERY: Primary | ICD-10-CM

## 2021-02-02 PROCEDURE — 97530 THERAPEUTIC ACTIVITIES: CPT | Mod: GP | Performed by: PHYSICAL THERAPIST

## 2021-02-02 PROCEDURE — 97110 THERAPEUTIC EXERCISES: CPT | Mod: GP | Performed by: PHYSICAL THERAPIST

## 2021-02-02 PROCEDURE — 73590 X-RAY EXAM OF LOWER LEG: CPT | Mod: LT | Performed by: RADIOLOGY

## 2021-02-02 PROCEDURE — 99024 POSTOP FOLLOW-UP VISIT: CPT | Performed by: ORTHOPAEDIC SURGERY

## 2021-02-02 ASSESSMENT — MIFFLIN-ST. JEOR: SCORE: 2616.92

## 2021-02-02 NOTE — LETTER
2021     Seen today: yes    Patient:  Higinio Wallace  :   1990  MRN:     [id number]  Physician: LELA HANchase Wallace may return to work on Date: 2/15/21.      The next clinic appointment is scheduled for (date/time) 2021.    Patient limitations:  Minimal walking and 15 min breaks from standing (sitting) every two (2) hours.      Please call the clinic with any questions.      Electronically signed by Lela Han MD

## 2021-02-02 NOTE — PROGRESS NOTES
Date of Service: Feb 2, 2021    Reason for visit: Postoperative follow-up    Date of Surgery: 11/13/2020    Procedure Performed: Left knee arthroscopy, medial patellofemoral ligament reconstruction with allograft, distal tibial tubercle osteotomy, derotational osteotomy of tibia    Interval events: Higinio returns today now 1 month status post his last visit in 3 months status post above surgeries.  Since his last visit, he has been working hard with physical therapy and on quad activation and gait mechanics.  He feels that he has improved dramatically since his last visit and the therapist that he work with before today's visit agrees that he has made excellent progress.  He is down to ambulating with one crutch instead of 2.  He has been doing home PT since his last visit.  He has been driving which is going well.  He still notes some soreness over the midshaft tibial osteotomy site, but states that it has improved quite a bit over the past month with ambulation.  No new or concerning symptoms.    He states that he is slated to go back to work on February 15 and would like a work note outlining his restrictions and recommendations.  He feels that he would be ready to go back as long as he is able to sit down intermittently.  He has been able to stand for over an hour at a time without an increase in pain recently.    Physical examination:  The patient is well-developed, well nourished and in on acute distress. The patient is alert and oriented to the surroundings.     Left knee surgical sites healing well, no drainage or wound breakdown.  Mild swelling around the knee and leg.  Active knee extension to 0 with active flexion to 105 measured today.  10 to 15 degree extensor leg with straight leg raise.  No calf tenderness.    Radiographs: X-rays of the left tib-fib were reviewed today.  AP and lateral views show intact hardware in place with IM nail and tibial tubercle osteotomy screws securely placed, no evidence of  breakage or pullout.  Tibial tubercle osteotomy site still with clearly visible distal interface without obvious osseous bridging.  Midshaft tibial osteotomy site with good interval callus formation around the medial, lateral, and posterior cortices.    Assessment: 3 months status post left knee arthroscopy, medial patellofemoral ligament reconstruction with allograft, distal tibial tubercle osteotomy and derotational osteotomy of the tibia.  Progressing well since his last visit with good interval bony healing seen on x-rays today..      We are encouraged by his progress over the last month both in terms of range of motion, strength, and the osseous healing we see better around the osteotomy sites on x-rays today.  He should continue to work on range of motion and strengthening as well as gait mechanics and begin to progress towards return to normal activities at this time.    Plan:    -Continue working with physical therapy on range of motion and strengthening, now he can transition to outpatient physical therapy.  We recommended [facility] which is very close to where he lives.  -He may stop using his hinged knee brace for most activities.  We recommended he keep it and use it if it is wet or slippery outside  -Work note provided for the patient today outlining initial work restrictions of minimal walking, 15 minutes of sitting time every 2 hours starting on February 15.   - F/U 2 mo. With functional test.    This is a post op visit.    Nghia Hobbs MD  Orthopedic Surgery PGY-4  Pager: [phone]    I have personally examined this patient and have reviewed the clinical presentation and progress note with the resident.  I agree with the treatment plan as outlined.  The plan was formulated with the resident on the day of the resident dictation.    Lela Han MD

## 2021-02-02 NOTE — PROGRESS NOTES
Improving. But still slowly.   His pain is still mid-tibia and I think his calf is a contributor to that - so tight. Swelling has greatly improved.   ROM: 0-2-102.   18 Degree extensor lag. Insurance won t cover a kneehab.   Improved gait pattern. Using 1 crutch with his brace unlocked. He is lacking ankle motion and needs to continue with quad activation/strengthening.     He is going to continue with home care PT and then is willing to return to in person PT at [place] now that he can drive.     I will reach out to his home care PT and give direction to continue with strengthening and gait training.     He is planning on returning to work on 2/15 - a  at [employer]. I think he can, brace on, locked to help with fatigue and subsequent soreness?

## 2021-02-02 NOTE — NURSING NOTE
"Reason For Visit:   Chief Complaint   Patient presents with     RECHECK     3 month pop DOS 11/13/20 Knee Arthroscopy, Medial Patello-femoral Ligament Reconstruction with Allograft, Distal Tibial Tubercle Osteotomy, plus derotation tibial osteotomy (Left)         Primary MD: Raza Franklin  Ref. MD: Est    ?  No  Occupation  [employer].  Currently working? Yes.  Work status? medical leave     Type of injury: Multiple on both.     Date of surgery: Jan or Feb 2011 at [facility].  Type of surgery: Left knee MPFL reconstuction.     11/13/2020 Left  knee exam under anesthesia. Diagnostic arthroscopy. MPFL reconstruction using gracilis allograft. Lateral retinacular lengthening. Distal and medial tibial tubercle transfer (12 mm medial, 8 mm distal). Osteotomy of the tibia with IM jhoana fixation, osteotomy of the fibula     Smoker: Yes  Request smoking cessation information: No    Ht 1.95 m (6' 4.77\")   Wt (!) 153.3 kg (338 lb)   BMI 40.32 kg/m      Pain Assessment  Patient Currently in Pain: Denies         Fina Julio LPN  "
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
## 2021-02-03 ENCOUNTER — HOME CARE/HOSPICE - HEALTHEAST (OUTPATIENT)
Dept: HOME HEALTH SERVICES | Facility: HOME HEALTH | Age: 31
End: 2021-02-03

## 2021-02-04 ENCOUNTER — HOME CARE/HOSPICE - HEALTHEAST (OUTPATIENT)
Dept: HOME HEALTH SERVICES | Facility: HOME HEALTH | Age: 31
End: 2021-02-04

## 2021-02-08 ENCOUNTER — HOME CARE/HOSPICE - HEALTHEAST (OUTPATIENT)
Dept: HOME HEALTH SERVICES | Facility: HOME HEALTH | Age: 31
End: 2021-02-08

## 2021-02-10 ENCOUNTER — HOME CARE/HOSPICE - HEALTHEAST (OUTPATIENT)
Dept: HOME HEALTH SERVICES | Facility: HOME HEALTH | Age: 31
End: 2021-02-10

## 2021-02-10 ENCOUNTER — MEDICAL CORRESPONDENCE (OUTPATIENT)
Dept: HEALTH INFORMATION MANAGEMENT | Facility: CLINIC | Age: 31
End: 2021-02-10

## 2021-02-26 NOTE — PROGRESS NOTES
"[facility] Outpatient Medical Nutrition Therapy      Higinio Wallace is a 30 year old male who is being evaluated via a billable telephone visit (converted from video visit due to technical difficulties).      The patient has been notified of following:     \"This telephone visit will be conducted via a call between you and your physician/provider. We have found that certain health care needs can be provided without the need for a physical exam.  This service lets us provide the care you need with a short phone conversation.  If a prescription is necessary we can send it directly to your pharmacy.  If lab work is needed we can place an order for that and you can then stop by our lab to have the test done at a later time.    Telephone visits are billed at different rates depending on your insurance coverage. During this emergency period, for some insurers they may be billed the same as an in-person visit.  Please reach out to your insurance provider with any questions.    If during the course of the call the physician/provider feels a telephone visit is not appropriate, you will not be charged for this service.\"    Patient has given verbal consent for Telephone visit?  Yes    What phone number would you like to be contacted at? [phone]    How would you like to obtain your AVS? MyChart    Phone call duration: 15 minutes     Abran Yadav, PhD, RD    Additional provider notes:  Referring Physician: Skyler  Reason for RD Visit: IBS    Nutrition Plan: Continue to emphasize intake of soluble fiber containing foods    Recommendations for MD/Provider to order: None at this time    Nutrition Assessment:  Patient is here for follow-up visit with Registered Dietitian (RD). Patient is a 30 year old male with history of prediabetes, depression, anxiety, PTSD, psychogenic rumination, IBS-D, and malrotation of colon. He is s/p colonoscopy 10/7. We met initially to discuss diet in IBS-D 9/21/2020. At that time diet was found to " "be fairly reliant on fast food and an obstacle to change identified at that time were finances. Therefore, a number of low-cost opportunities to increase intake of soluble fiber were discussed. At the time of our most recent visit 11/30/2020 he reported recent knee surgery ~2 weeks prior that made it difficult to follow a diet plan because he was unable to move well and was forced to rely more on ready-to-eat type foods. He noted that he had successfully made diet changes prior to knee surgery: added vegetables and rice, cut out red meat (limited to chicken/fish). Therefore, reinforced some of the opportunities discussed previously to increase soluble fiber intake. Today reports that he has regained some of his recent weight lost. States that finances have been tight, which has resulted in more frozen/fast food. States that he is returning to work today and expects subsequent improvement in finances to help with food options. States that he plans, once able to financially, to go back to having a protein shake for breakfast and doing more plain meat and vegetables.     Symptom Review  1. Nausea/vomiting? Improved  2. Heartburn? Improved  3. Bloating? Improved  4. Decreased appetite? Yes: Appetite goes \"up and down\"  7. Weight loss/gain? Yes: Increased which he attributes to stress and decreased exercise  8. Constipation/Diarrhea? Able to maintain regular BM pattern  9. Urgency? Improved  10. Incomplete Bowel Emptying? Improved  11. Abdominal pain/pain with or without eating? Improved    Dietary beliefs and Practices  1. Do you take any vitamin, mineral, or herbal supplements? No  2.  Do you use any calorie/protein supplements?  No    Diet Recall:  (Typical Day)  Meal Name Time Diet Recall  Diet Recall (prior to knee surgery) Diet Recall 9/21    Breakfast  Either skips OR cereal (cinnamon toast crunch) Eggos with peanut butter Water or Lactaid Milk (2%) - Full glass (~12-16 ounces)       AND [place]/Sausage, eggs, and " protein pancake          Lunch  Same as prior OR chili [place] prepared at home with sourdough bread and roast beef/turkey Often skips, but if eats lunch would be sandwich (white bread, roast beef, mayonnaise) or something small          Dinner  Chicken nuggets/chicken patties. Primarily microwaveable foods Fish/Chicken AND vegetables AND rice (white) Chicken, beef, or fish AND vegetables (broccoli, asparagus, brussel sprouts, carrots) OR Fast food (2-3x/wk)          Snacks  Almonds, but none lately Tomatoes, mushrooms, or nuts Potato chips OR trail mix   Beverages  Water and lactose free milk Water primarily and lactose free milk (1-2 cups/day) Water primarily   Alcohol   None None Minimal   *In the last couple of weeks still having an eggo for breakfast, still eating sandwich meat for lunch but also sometimes ramen noodles or pastas, and dinner is frozen microwaveable foods like chicken nuggets.     Frequency of eating/taking out meals: NA  Food access/availability: Doesn't always have money to buy healthy foods so forced to buy quick foods. This has become even more limited now that he is on disability for 3 months. At this point striving to buy groceries every 2 weeks  Food preparation confidence/abilities: Fine    Anthropometrics:   Height: Data Unavailable  Weight: 325 pounds  BMI: There is no height or weight on file to calculate BMI.    Weight History:  Wt Readings from Last 10 Encounters:   02/02/21 (!) 153.3 kg (338 lb)   01/05/21 149.7 kg (330 lb)   12/08/20 136.1 kg (300 lb)   12/08/20 136.2 kg (300 lb 3.2 oz)   11/13/20 148.1 kg (326 lb 8 oz)   11/04/20 149.7 kg (330 lb)   10/07/20 145.6 kg (321 lb)   09/15/20 145.6 kg (321 lb)   09/09/20 145.6 kg (321 lb)   08/27/20 142.9 kg (315 lb)     Usual Weight: NA  Weight change in past 6 months: Weight increase to 338 pounds    Labs: Reviewed  Pertinent Medications/vitamin and mineral supplements:   Current Outpatient Medications   Medication     acetaminophen  (TYLENOL) 500 MG tablet     albuterol (ALBUTEROL) 108 (90 BASE) MCG/ACT Inhaler     ARIPiprazole (ABILIFY) 15 MG tablet     aspirin 81 MG EC tablet     dicyclomine (BENTYL) 10 MG capsule     gabapentin (NEURONTIN) 100 MG capsule     hydrOXYzine (ATARAX) 25 MG tablet     metFORMIN (GLUCOPHAGE) 500 MG tablet     omeprazole 20 MG tablet     oxyCODONE (ROXICODONE) 5 MG tablet     polyethylene glycol (MIRALAX) 17 g packet     senna-docusate (SENOKOT-S/PERICOLACE) 8.6-50 MG tablet     No current facility-administered medications for this visit.        Food Allergies: Citrus containing fruits  Food Intolerances: NA  Physical Activity: Limited    Nutrition Diagnosis:    Food and nutrition related knowledge deficit related to IBD as evidenced by need for diet education.    Nutrition Intervention:    Nutrition Education/Counseling:  Discussed diet and symptom history. Encouraged continue to strive for increased intake of soluble fiber rich foods and decreased intake of fried/greasy foods. Specifically focused on consuming a breakfast like oatmeal with walnuts, almonds, or peanut butter and incorporating canned and rinsed beans and bananas into his diet as low cost, soluble fiber rich options.    Educational Materials Provided: No education materials provided at this time  Patient verbalized understanding of education provided. See all recommendations under Goals.    Goals:  1. Continue to emphasize intake of soluble fiber containing foods  --Oatmeal, almonds, bananas, canned and rinsed beans    2. Continue to limit intake of high-fat/greasy food items    Nutrition Monitoring and Evaluation: Will monitor adherence to nutrition recommendations at future RD visits.     Further Medical Nutrition Therapy: Yes  Next Appointment (if applicable): 6 months  Patient was encouraged to call/contact RD with any further questions.

## 2021-03-01 ENCOUNTER — VIRTUAL VISIT (OUTPATIENT)
Dept: GASTROENTEROLOGY | Facility: CLINIC | Age: 31
End: 2021-03-01
Payer: COMMERCIAL

## 2021-03-01 DIAGNOSIS — K58.0 IRRITABLE BOWEL SYNDROME WITH DIARRHEA: Primary | ICD-10-CM

## 2021-03-01 DIAGNOSIS — Q43.3 CONGENITAL MALROTATION OF INTESTINE (H): ICD-10-CM

## 2021-03-01 DIAGNOSIS — Z71.3 NUTRITIONAL COUNSELING: ICD-10-CM

## 2021-03-01 DIAGNOSIS — E66.812 CLASS 2 SEVERE OBESITY DUE TO EXCESS CALORIES WITH SERIOUS COMORBIDITY AND BODY MASS INDEX (BMI) OF 37.0 TO 37.9 IN ADULT (H): ICD-10-CM

## 2021-03-01 DIAGNOSIS — K21.9 GASTROESOPHAGEAL REFLUX DISEASE WITHOUT ESOPHAGITIS: ICD-10-CM

## 2021-03-01 DIAGNOSIS — E66.01 CLASS 2 SEVERE OBESITY DUE TO EXCESS CALORIES WITH SERIOUS COMORBIDITY AND BODY MASS INDEX (BMI) OF 37.0 TO 37.9 IN ADULT (H): ICD-10-CM

## 2021-03-01 PROCEDURE — 97803 MED NUTRITION INDIV SUBSEQ: CPT | Mod: TEL | Performed by: DIETITIAN, REGISTERED

## 2021-03-01 NOTE — PATIENT INSTRUCTIONS
Mohamud Sylvester,    It was great meeting with you again today. Below is a summary of what we discussed:    1. Continue to emphasize intake of soluble fiber containing foods  --Oatmeal, almonds, walnuts, bananas, canned and rinsed beans    2. Continue to limit intake of high-fat/greasy food items    Best regards,  Abran Yadav, PhD, RD

## 2021-03-01 NOTE — LETTER
"    3/1/2021         RE: Higinio Wallace  [address] [facility]  [address] [place] [address]        Dear Colleague,    Thank you for referring your patient, Higinio Wallace, to the [facility] [place]. Please see a copy of my visit note below.    [facility] Outpatient Medical Nutrition Therapy      Higinio Wallace is a 30 year old male who is being evaluated via a billable telephone visit (converted from video visit due to technical difficulties).      The patient has been notified of following:     \"This telephone visit will be conducted via a call between you and your physician/provider. We have found that certain health care needs can be provided without the need for a physical exam.  This service lets us provide the care you need with a short phone conversation.  If a prescription is necessary we can send it directly to your pharmacy.  If lab work is needed we can place an order for that and you can then stop by our lab to have the test done at a later time.    Telephone visits are billed at different rates depending on your insurance coverage. During this emergency period, for some insurers they may be billed the same as an in-person visit.  Please reach out to your insurance provider with any questions.    If during the course of the call the physician/provider feels a telephone visit is not appropriate, you will not be charged for this service.\"    Patient has given verbal consent for Telephone visit?  Yes    What phone number would you like to be contacted at? [phone]    How would you like to obtain your AVS? Arabella    Phone call duration: 15 minutes     Abran Yadav, PhD, RD    Additional provider notes:  Referring Physician: Skyler  Reason for RD Visit: IBS    Nutrition Plan: Continue to emphasize intake of soluble fiber containing foods    Recommendations for MD/Provider to order: None at this time    Nutrition Assessment:  Patient is here for follow-up visit with " "Registered Dietitian (RD). Patient is a 30 year old male with history of prediabetes, depression, anxiety, PTSD, psychogenic rumination, IBS-D, and malrotation of colon. He is s/p colonoscopy 10/7. We met initially to discuss diet in IBS-D 9/21/2020. At that time diet was found to be fairly reliant on fast food and an obstacle to change identified at that time were finances. Therefore, a number of low-cost opportunities to increase intake of soluble fiber were discussed. At the time of our most recent visit 11/30/2020 he reported recent knee surgery ~2 weeks prior that made it difficult to follow a diet plan because he was unable to move well and was forced to rely more on ready-to-eat type foods. He noted that he had successfully made diet changes prior to knee surgery: added vegetables and rice, cut out red meat (limited to chicken/fish). Therefore, reinforced some of the opportunities discussed previously to increase soluble fiber intake. Today reports that he has regained some of his recent weight lost. States that finances have been tight, which has resulted in more frozen/fast food. States that he is returning to work today and expects subsequent improvement in finances to help with food options. States that he plans, once able to financially, to go back to having a protein shake for breakfast and doing more plain meat and vegetables.     Symptom Review  1. Nausea/vomiting? Improved  2. Heartburn? Improved  3. Bloating? Improved  4. Decreased appetite? Yes: Appetite goes \"up and down\"  7. Weight loss/gain? Yes: Increased which he attributes to stress and decreased exercise  8. Constipation/Diarrhea? Able to maintain regular BM pattern  9. Urgency? Improved  10. Incomplete Bowel Emptying? Improved  11. Abdominal pain/pain with or without eating? Improved    Dietary beliefs and Practices  1. Do you take any vitamin, mineral, or herbal supplements? No  2.  Do you use any calorie/protein supplements?  No    Diet " Recall:  (Typical Day)  Meal Name Time Diet Recall  Diet Recall (prior to knee surgery) Diet Recall 9/21    Breakfast  Either skips OR cereal (cinnamon toast crunch) Eggos with peanut butter Water or Lactaid Milk (2%) - Full glass (~12-16 ounces)       AND Bergman/Sausage, eggs, and protein pancake          Lunch  Same as prior OR chili [place] prepared at home with sourdough bread and roast beef/turkey Often skips, but if eats lunch would be sandwich (white bread, roast beef, mayonnaise) or something small          Dinner  Chicken nuggets/chicken patties. Primarily microwaveable foods Fish/Chicken AND vegetables AND rice (white) Chicken, beef, or fish AND vegetables (broccoli, asparagus, brussel sprouts, carrots) OR Fast food (2-3x/wk)          Snacks  Almonds, but none lately Tomatoes, mushrooms, or nuts Potato chips OR trail mix   Beverages  Water and lactose free milk Water primarily and lactose free milk (1-2 cups/day) Water primarily   Alcohol   None None Minimal   *In the last couple of weeks still having an eggo for breakfast, still eating sandwich meat for lunch but also sometimes ramen noodles or pastas, and dinner is frozen microwaveable foods like chicken nuggets.     Frequency of eating/taking out meals: NA  Food access/availability: Doesn't always have money to buy healthy foods so forced to buy quick foods. This has become even more limited now that he is on disability for 3 months. At this point striving to buy groceries every 2 weeks  Food preparation confidence/abilities: Fine    Anthropometrics:   Height: Data Unavailable  Weight: 325 pounds  BMI: There is no height or weight on file to calculate BMI.    Weight History:  Wt Readings from Last 10 Encounters:   02/02/21 (!) 153.3 kg (338 lb)   01/05/21 149.7 kg (330 lb)   12/08/20 136.1 kg (300 lb)   12/08/20 136.2 kg (300 lb 3.2 oz)   11/13/20 148.1 kg (326 lb 8 oz)   11/04/20 149.7 kg (330 lb)   10/07/20 145.6 kg (321 lb)   09/15/20 145.6 kg (321  lb)   09/09/20 145.6 kg (321 lb)   08/27/20 142.9 kg (315 lb)     Usual Weight: NA  Weight change in past 6 months: Weight increase to 338 pounds    Labs: Reviewed  Pertinent Medications/vitamin and mineral supplements:   Current Outpatient Medications   Medication     acetaminophen (TYLENOL) 500 MG tablet     albuterol (ALBUTEROL) 108 (90 BASE) MCG/ACT Inhaler     ARIPiprazole (ABILIFY) 15 MG tablet     aspirin 81 MG EC tablet     dicyclomine (BENTYL) 10 MG capsule     gabapentin (NEURONTIN) 100 MG capsule     hydrOXYzine (ATARAX) 25 MG tablet     metFORMIN (GLUCOPHAGE) 500 MG tablet     omeprazole 20 MG tablet     oxyCODONE (ROXICODONE) 5 MG tablet     polyethylene glycol (MIRALAX) 17 g packet     senna-docusate (SENOKOT-S/PERICOLACE) 8.6-50 MG tablet     No current facility-administered medications for this visit.        Food Allergies: Citrus containing fruits  Food Intolerances: NA  Physical Activity: Limited    Nutrition Diagnosis:    Food and nutrition related knowledge deficit related to IBD as evidenced by need for diet education.    Nutrition Intervention:    Nutrition Education/Counseling:  Discussed diet and symptom history. Encouraged continue to strive for increased intake of soluble fiber rich foods and decreased intake of fried/greasy foods. Specifically focused on consuming a breakfast like oatmeal with walnuts, almonds, or peanut butter and incorporating canned and rinsed beans and bananas into his diet as low cost, soluble fiber rich options.    Educational Materials Provided: No education materials provided at this time  Patient verbalized understanding of education provided. See all recommendations under Goals.    Goals:  1. Continue to emphasize intake of soluble fiber containing foods  --Oatmeal, almonds, bananas, canned and rinsed beans    2. Continue to limit intake of high-fat/greasy food items    Nutrition Monitoring and Evaluation: Will monitor adherence to nutrition recommendations at  future RD visits.     Further Medical Nutrition Therapy: Yes  Next Appointment (if applicable): 6 months  Patient was encouraged to call/contact RD with any further questions.      Again, thank you for allowing me to participate in the care of your patient.        Sincerely,        Abran Yadav RD

## 2021-03-01 NOTE — LETTER
"3/1/2021      RE: Higinio Wallace  [address] [facility]  [address] [place] [id number]       [facility] Outpatient Medical Nutrition Therapy      Higinio Wallace is a 30 year old male who is being evaluated via a billable telephone visit (converted from video visit due to technical difficulties).      The patient has been notified of following:     \"This telephone visit will be conducted via a call between you and your physician/provider. We have found that certain health care needs can be provided without the need for a physical exam.  This service lets us provide the care you need with a short phone conversation.  If a prescription is necessary we can send it directly to your pharmacy.  If lab work is needed we can place an order for that and you can then stop by our lab to have the test done at a later time.    Telephone visits are billed at different rates depending on your insurance coverage. During this emergency period, for some insurers they may be billed the same as an in-person visit.  Please reach out to your insurance provider with any questions.    If during the course of the call the physician/provider feels a telephone visit is not appropriate, you will not be charged for this service.\"    Patient has given verbal consent for Telephone visit?  Yes    What phone number would you like to be contacted at? [phone]    How would you like to obtain your AVS? Eugenioharjenna    Phone call duration: 15 minutes     Abran Yadav, PhD, RD    Additional provider notes:  Referring Physician: Skyler  Reason for RD Visit: IBS    Nutrition Plan: Continue to emphasize intake of soluble fiber containing foods    Recommendations for MD/Provider to order: None at this time    Nutrition Assessment:  Patient is here for follow-up visit with Registered Dietitian (RD). Patient is a 30 year old male with history of prediabetes, depression, anxiety, PTSD, psychogenic rumination, IBS-D, and malrotation of colon. He is s/p colonoscopy 10/7. " "We met initially to discuss diet in IBS-D 9/21/2020. At that time diet was found to be fairly reliant on fast food and an obstacle to change identified at that time were finances. Therefore, a number of low-cost opportunities to increase intake of soluble fiber were discussed. At the time of our most recent visit 11/30/2020 he reported recent knee surgery ~2 weeks prior that made it difficult to follow a diet plan because he was unable to move well and was forced to rely more on ready-to-eat type foods. He noted that he had successfully made diet changes prior to knee surgery: added vegetables and rice, cut out red meat (limited to chicken/fish). Therefore, reinforced some of the opportunities discussed previously to increase soluble fiber intake. Today reports that he has regained some of his recent weight lost. States that finances have been tight, which has resulted in more frozen/fast food. States that he is returning to work today and expects subsequent improvement in finances to help with food options. States that he plans, once able to financially, to go back to having a protein shake for breakfast and doing more plain meat and vegetables.     Symptom Review  1. Nausea/vomiting? Improved  2. Heartburn? Improved  3. Bloating? Improved  4. Decreased appetite? Yes: Appetite goes \"up and down\"  7. Weight loss/gain? Yes: Increased which he attributes to stress and decreased exercise  8. Constipation/Diarrhea? Able to maintain regular BM pattern  9. Urgency? Improved  10. Incomplete Bowel Emptying? Improved  11. Abdominal pain/pain with or without eating? Improved    Dietary beliefs and Practices  1. Do you take any vitamin, mineral, or herbal supplements? No  2.  Do you use any calorie/protein supplements?  No    Diet Recall:  (Typical Day)  Meal Name Time Diet Recall  Diet Recall (prior to knee surgery) Diet Recall 9/21    Breakfast  Either skips OR cereal (cinnamon toast crunch) Eggos with peanut butter Water or " Lactaid Milk (2%) - Full glass (~12-16 ounces)       AND Bergman/Sausage, eggs, and protein pancake          Lunch  Same as prior OR chili [place] prepared at home with sourdough bread and roast beef/turkey Often skips, but if eats lunch would be sandwich (white bread, roast beef, mayonnaise) or something small          Dinner  Chicken nuggets/chicken patties. Primarily microwaveable foods Fish/Chicken AND vegetables AND rice (white) Chicken, beef, or fish AND vegetables (broccoli, asparagus, brussel sprouts, carrots) OR Fast food (2-3x/wk)          Snacks  Almonds, but none lately Tomatoes, mushrooms, or nuts Potato chips OR trail mix   Beverages  Water and lactose free milk Water primarily and lactose free milk (1-2 cups/day) Water primarily   Alcohol   None None Minimal   *In the last couple of weeks still having an eggo for breakfast, still eating sandwich meat for lunch but also sometimes ramen noodles or pastas, and dinner is frozen microwaveable foods like chicken nuggets.     Frequency of eating/taking out meals: NA  Food access/availability: Doesn't always have money to buy healthy foods so forced to buy quick foods. This has become even more limited now that he is on disability for 3 months. At this point striving to buy groceries every 2 weeks  Food preparation confidence/abilities: Fine    Anthropometrics:   Height: Data Unavailable  Weight: 325 pounds  BMI: There is no height or weight on file to calculate BMI.    Weight History:  Wt Readings from Last 10 Encounters:   02/02/21 (!) 153.3 kg (338 lb)   01/05/21 149.7 kg (330 lb)   12/08/20 136.1 kg (300 lb)   12/08/20 136.2 kg (300 lb 3.2 oz)   11/13/20 148.1 kg (326 lb 8 oz)   11/04/20 149.7 kg (330 lb)   10/07/20 145.6 kg (321 lb)   09/15/20 145.6 kg (321 lb)   09/09/20 145.6 kg (321 lb)   08/27/20 142.9 kg (315 lb)     Usual Weight: NA  Weight change in past 6 months: Weight increase to 338 pounds    Labs: Reviewed  Pertinent Medications/vitamin and  mineral supplements:   Current Outpatient Medications   Medication     acetaminophen (TYLENOL) 500 MG tablet     albuterol (ALBUTEROL) 108 (90 BASE) MCG/ACT Inhaler     ARIPiprazole (ABILIFY) 15 MG tablet     aspirin 81 MG EC tablet     dicyclomine (BENTYL) 10 MG capsule     gabapentin (NEURONTIN) 100 MG capsule     hydrOXYzine (ATARAX) 25 MG tablet     metFORMIN (GLUCOPHAGE) 500 MG tablet     omeprazole 20 MG tablet     oxyCODONE (ROXICODONE) 5 MG tablet     polyethylene glycol (MIRALAX) 17 g packet     senna-docusate (SENOKOT-S/PERICOLACE) 8.6-50 MG tablet     No current facility-administered medications for this visit.        Food Allergies: Citrus containing fruits  Food Intolerances: NA  Physical Activity: Limited    Nutrition Diagnosis:    Food and nutrition related knowledge deficit related to IBD as evidenced by need for diet education.    Nutrition Intervention:    Nutrition Education/Counseling:  Discussed diet and symptom history. Encouraged continue to strive for increased intake of soluble fiber rich foods and decreased intake of fried/greasy foods. Specifically focused on consuming a breakfast like oatmeal with walnuts, almonds, or peanut butter and incorporating canned and rinsed beans and bananas into his diet as low cost, soluble fiber rich options.    Educational Materials Provided: No education materials provided at this time  Patient verbalized understanding of education provided. See all recommendations under Goals.    Goals:  1. Continue to emphasize intake of soluble fiber containing foods  --Oatmeal, almonds, bananas, canned and rinsed beans    2. Continue to limit intake of high-fat/greasy food items    Nutrition Monitoring and Evaluation: Will monitor adherence to nutrition recommendations at future RD visits.     Further Medical Nutrition Therapy: Yes  Next Appointment (if applicable): 6 months  Patient was encouraged to call/contact RD with any further questions.      Abran Yadav,  RD

## 2021-03-05 ENCOUNTER — TELEPHONE (OUTPATIENT)
Dept: ORTHOPEDICS | Facility: CLINIC | Age: 31
End: 2021-03-05

## 2021-03-05 NOTE — TELEPHONE ENCOUNTER
Writer called and asked pt to send a photo of the knee via [employer]. Pt is going to attempt to upload a image of the site that pt is concerned about. Writer will then have Sergio Liao review imaging once in clinic.     Fina Julio LPN

## 2021-03-05 NOTE — TELEPHONE ENCOUNTER
[facility] Call Center    Phone Message    May a detailed message be left on voicemail: yes     Reason for Call: Other: This pt of Dr. Han's called to speak with someone on Dr. Han's care team. This pt has returned to work. The pt stated that after 3 days of work he began to notice a divot on his surgical leg near the incision site. The pt is wondering if this is normal or if this is something he should be concerned aobut due to over exertion. Please have someone reach back out to this pt to discuss this concern.     Action Taken: Message routed to:  [facility] Ortho    Travel Screening: Not Applicable

## 2021-04-06 ENCOUNTER — THERAPY VISIT (OUTPATIENT)
Dept: PHYSICAL THERAPY | Facility: CLINIC | Age: 31
End: 2021-04-06
Payer: COMMERCIAL

## 2021-04-06 ENCOUNTER — OFFICE VISIT (OUTPATIENT)
Dept: ORTHOPEDICS | Facility: CLINIC | Age: 31
End: 2021-04-06
Payer: COMMERCIAL

## 2021-04-06 VITALS — WEIGHT: 315 LBS | HEIGHT: 77 IN | BODY MASS INDEX: 37.19 KG/M2

## 2021-04-06 DIAGNOSIS — Z98.890 S/P KNEE SURGERY: Primary | ICD-10-CM

## 2021-04-06 DIAGNOSIS — Z47.89 AFTERCARE FOLLOWING SURGERY OF THE MUSCULOSKELETAL SYSTEM: ICD-10-CM

## 2021-04-06 DIAGNOSIS — M25.362 PATELLAR INSTABILITY OF LEFT KNEE: Primary | ICD-10-CM

## 2021-04-06 PROCEDURE — 99213 OFFICE O/P EST LOW 20 MIN: CPT | Performed by: ORTHOPAEDIC SURGERY

## 2021-04-06 PROCEDURE — 97530 THERAPEUTIC ACTIVITIES: CPT | Mod: GP | Performed by: PHYSICAL THERAPIST

## 2021-04-06 PROCEDURE — 97750 PHYSICAL PERFORMANCE TEST: CPT | Mod: GP | Performed by: PHYSICAL THERAPIST

## 2021-04-06 PROCEDURE — 97110 THERAPEUTIC EXERCISES: CPT | Mod: GP | Performed by: PHYSICAL THERAPIST

## 2021-04-06 ASSESSMENT — MIFFLIN-ST. JEOR: SCORE: 2588.44

## 2021-04-06 ASSESSMENT — PATIENT HEALTH QUESTIONNAIRE - PHQ9: SUM OF ALL RESPONSES TO PHQ QUESTIONS 1-9: 0

## 2021-04-06 NOTE — PROGRESS NOTES
Lower Extremity Physical Performance Testing    Surgery/Injury: MPFL reconstruction using gracilis allograft, Lateral retinacular lengthening, Distal and medial tibial tubercle transfer (12 mm medial, 8 mm distal), Osteotomy of the tibia with IM jhoana fixation, osteotomy of the fibula.      Involved Extremity: left   Date of Surgery/Injury: 11/13/2020    Surgeon/MD: Dr. Han, Dr. Keating  Therapist performing test: Ani Anaya, PT     Primary Treating Therapist: Estelita Butler, PT @ [facility]    Patient subjective symptom/function report: Higinio reports that his knee is improving. He is still working at [employer] and notices that it swells at the end of the day and is more painful. He has asked to be taken off the walking tasks at work and do more standing still. He recognizes that he needs to lose weight and gain fitness and previously has worked out a lot at the gym.     LSI% =Limb Symmetry Index (score comparison between involved/uninvolved extremity)    Anthropomorphic Measures      Range of Motion   Uninvolved WFL   Involved 0-119 degrees   Difference      Evaluation chosen: Return to Function (Level I): Balance and Muscle Strength    Return to Function (Level I): Balance, Muscle Strength   Testing Protocol: Recorded values = best effort of 3 attempts (after 2 practice attempts).    Single Leg Squat (required UE support bilaterally)    Uninvolved Extremity 80 degrees   Involved Extremity 55 degrees   LSI% 69%     Retro Step    Uninvolved Extremity 4 in.   Involved Extremity (could not perform) 4 in. Failure     LSI% -%       Anterior Excursion Test  ** Patient could not perform with proper form/technique, lacking coordination Anterior Reach   Uninvolved Extremity - cm   Involved Extremity - cm   LSI% -%       Assessment/Plan:  Higinio presents to PT 5 months s/p the above procedure. He is improving but continues to be slow in his progress. His ROM is WFL and he can activate his quad and perform an SLR without a lag. But  his functional strength continues to be very poor specifically in his squatting and step drills. He was unable to complete the step test or the anterior star reach with good form and without UE support. He had a 2 month hiatus from PT and just resumed PT last week. He was encouraged to return to the gym to work on fitness as well as weight loss. He will continue with PT at [facility] and I will be in touch with his local therapist.     Exercises Instructed per Test Findings:  1) Squatting  2) Step drills  3) Core  4) Cardio: bike, elliptical, swimming

## 2021-04-06 NOTE — NURSING NOTE
"Reason For Visit:   Chief Complaint   Patient presents with     RECHECK     5 month pop DOS 11/13/20 Knee Arthroscopy, Medial Patello-femoral Ligament Reconstruction with Allograft, Distal Tibial Tubercle Osteotomy, plus derotation tibial osteotomy (Left)         Primary MD: Raza Franklin  Ref. MD: Est    ?  No  Occupation  [employer].  Currently working? Yes.  Work status? Full time     Type of injury: Multiple on both.     Date of surgery: Jan or Feb 2011 at [facility].  Type of surgery: Left knee MPFL reconstuction.     11/13/2020 Left  knee exam under anesthesia. Diagnostic arthroscopy. MPFL reconstruction using gracilis allograft. Lateral retinacular lengthening. Distal and medial tibial tubercle transfer (12 mm medial, 8 mm distal). Osteotomy of the tibia with IM jhoana fixation, osteotomy of the fibula     Smoker: Yes  Request smoking cessation information: No    Ht 1.948 m (6' 4.69\")   Wt (!) 150.6 kg (332 lb)   BMI 39.69 kg/m      Pain Assessment  Patient Currently in Pain: Denies            Fina Julio LPN  "

## 2021-04-06 NOTE — LETTER
2021     Seen today: yes    Patient:  Higinio Wallace  :   1990  MRN:     [id number]  Physician: LELA HANchase Wallace may return to work on Date: 21.      The next clinic appointment is scheduled for (date/time) 2021.    Patient limitations:  Minimal walking and 15 min breaks from standing (sitting) every two (2) hours and no squatting or lifting from the floor.        Please call the clinic with any questions.      Electronically signed by Lela Han MD

## 2021-04-06 NOTE — LETTER
4/6/2021         RE: Higinio Wallace  [address] [facility]  [address] [place], [place] [id number]        Dear Colleague,    Thank you for referring your patient, Higinio Wallace, to the [facility] [place]. Please see a copy of my visit note below.    Patient is a 30-year-old male who is now 5-month status post left MPFL, distal TT osteotomy, plus derotation tibial osteotomy by Dr. Sourav Fitch.    He is back to work as a .  When he stays only in the  position he is doing okay but as he sometimes is forced to be on the floor was doing some lifting carrying, he has rather significant knee swelling.    He started back with therapy at [facility] and is currently seeing Emily Butler.  She opined that he is doing too much at work and needs to control his swelling if he is going to control his motion.    Physical exam today of patient's left knee reveals benign appearing incisions fullness about the knee and limb mild in amount.  No barbara fluid wave.  Range of motion 0-1 19 with a soft endpoint.    Distal swelling is 1+ pedal edema    X-rays taken today shows continued healing at the osteotomy sites with screws in place on altered.    Functional test today reveals inability to do a retrostep, 69% single leg squat LSI however it required upper extremity support bilaterally.    Assessment: Continued significant weakness and mild decrease in knee range of motion but significantly improved from previous visits    Plan: I believe that Higinio is trying hard to maintain workability and improve his knee condition in both strength and motion.  His weight continues to be a factor and we continue to discuss ways that we can improve healthy eating habits and generalized fitness.    We will discuss further therapy directly with his therapist at [facility].    We we wrote a note for work encouraging that his primary job be standing and sitting at the  with minimal walking and no squatting  and lifting from the ground    He will follow-up with me in 3 months time.  Further functional test will be done at that time.    Lela Han MD  Professor Orthopedic Surgery  [facility]

## 2021-04-07 NOTE — PROGRESS NOTES
Patient is a 30-year-old male who is now 5-month status post left MPFL, distal TT osteotomy, plus derotation tibial osteotomy by Dr. Sourav Fitch.    He is back to work as a .  When he stays only in the  position he is doing okay but as he sometimes is forced to be on the floor was doing some lifting carrying, he has rather significant knee swelling.    He started back with therapy at [facility] and is currently seeing Emily Butler.  She opined that he is doing too much at work and needs to control his swelling if he is going to control his motion.    Physical exam today of patient's left knee reveals benign appearing incisions fullness about the knee and limb mild in amount.  No barbara fluid wave.  Range of motion 0-1 19 with a soft endpoint.    Distal swelling is 1+ pedal edema    X-rays taken today shows continued healing at the osteotomy sites with screws in place on altered.    Functional test today reveals inability to do a retrostep, 69% single leg squat LSI however it required upper extremity support bilaterally.    Assessment: Continued significant weakness and mild decrease in knee range of motion but significantly improved from previous visits    Plan: I believe that Higinio is trying hard to maintain workability and improve his knee condition in both strength and motion.  His weight continues to be a factor and we continue to discuss ways that we can improve healthy eating habits and generalized fitness.    We will discuss further therapy directly with his therapist at [facility].    We we wrote a note for work encouraging that his primary job be standing and sitting at the  with minimal walking and no squatting and lifting from the ground    He will follow-up with me in 3 months time.  Further functional test will be done at that time.    Lela Han MD  Professor Orthopedic Surgery  [facility]

## 2021-05-26 ASSESSMENT — PATIENT HEALTH QUESTIONNAIRE - PHQ9
SUM OF ALL RESPONSES TO PHQ QUESTIONS 1-9: 7
SUM OF ALL RESPONSES TO PHQ QUESTIONS 1-9: 26

## 2021-05-27 VITALS
OXYGEN SATURATION: 97 % | TEMPERATURE: 98.3 F | HEART RATE: 72 BPM | SYSTOLIC BLOOD PRESSURE: 150 MMHG | DIASTOLIC BLOOD PRESSURE: 82 MMHG

## 2021-05-27 ASSESSMENT — PATIENT HEALTH QUESTIONNAIRE - PHQ9
SUM OF ALL RESPONSES TO PHQ QUESTIONS 1-9: 26
SUM OF ALL RESPONSES TO PHQ QUESTIONS 1-9: 20

## 2021-05-28 ASSESSMENT — ANXIETY QUESTIONNAIRES
GAD7 TOTAL SCORE: 20
GAD7 TOTAL SCORE: 7
GAD7 TOTAL SCORE: 18
GAD7 TOTAL SCORE: 13

## 2021-05-30 NOTE — TELEPHONE ENCOUNTER
Patient calling - says he was recently diagnosed with ADHD.  He says he accidentally an anxiety medication today instead of the new medication for ADHD.      States he has chest pain on left side of chest since 7:00pm.  Radiating into back.  Rates pain 2 or 3/10.  Hurts more when taking a deep breath.    No difficulty breathing.    Patient does not have [facility] PCP.      Triaged to disposition of Go to ED Now.    Rubi Covington RN  Triage Nurse Advisor    Reason for Disposition    Chest pain lasts > 5 minutes (Exceptions: chest pain occurring > 3 days ago and now asymptomatic; same as previously diagnosed heartburn and has accompanying sour taste in mouth)    Taking a deep breath makes pain worse    Protocols used: CHEST PAIN-A-AH

## 2021-06-01 VITALS — HEIGHT: 73 IN | BODY MASS INDEX: 34.3 KG/M2

## 2021-06-01 VITALS — BODY MASS INDEX: 32.5 KG/M2 | WEIGHT: 260 LBS

## 2021-06-01 NOTE — TELEPHONE ENCOUNTER
RN triage   Patient is calling to report impulsive thoughts to hurt himself. He does have a knife with him in his car.    Hears voices in his head  He disassociates and does not know when he would hurt  Himself. He states that he has been off his medications for over a week now and is starting to feel withdrawal symptoms.  Called 911 and dispatched EMS to his location, he is in [employer] parking lot in [place].    Stayed on phone until first responders arrived.  Iirs Varela RN  [facility] Triage Nurse  2:53 PM  9/14/2019    [facility] police called back with an update. Patient was brought to [facility] and is receiving care.  Iris Varela RN  [facility] Triage Nurse  4:24 PM  9/14/2019        Reason for Disposition    Patient is threatening suicide now    Protocols used: SCHIZOPHRENIA-A-AH

## 2021-06-04 VITALS
WEIGHT: 315 LBS | OXYGEN SATURATION: 98 % | SYSTOLIC BLOOD PRESSURE: 128 MMHG | HEIGHT: 76 IN | HEART RATE: 73 BPM | DIASTOLIC BLOOD PRESSURE: 76 MMHG | BODY MASS INDEX: 38.36 KG/M2

## 2021-06-05 VITALS
BODY MASS INDEX: 38.33 KG/M2 | OXYGEN SATURATION: 98 % | SYSTOLIC BLOOD PRESSURE: 134 MMHG | HEART RATE: 82 BPM | DIASTOLIC BLOOD PRESSURE: 80 MMHG | WEIGHT: 315 LBS

## 2021-06-07 NOTE — TELEPHONE ENCOUNTER
Pt states he has a mental disorder, gets anxious.  States he has PTSD.   Pt does not want to go to ED unless he needs to.  Pt states he is not sure if he is sick or if its his anxiety.    A few days ago he started to feel sick. Felt fatigued, weak, body aches. Pt states he has a dry cough and stomach pain, worse with coughing. Also c/o chest pain, more with movement and coughing. Does not get worse with taking a deep breath.   No fever  No thoughts of harming others, or self.   Pt states he feels like he has been working out, shortness of breath.     Due to difficulty breathing/sob RN recommended pt be evaluated in the ED.     RN could tell as the call went on the patient started to breathe faster.  RN advised that this does not mean he has COVID 19 but we need to be cautious and notify the ED and well as have him evaluated.     Pt stated understanding.    RN spoke to ED physician and he will be expecting Higinio.     Pt was advised to present to ED, advised visitors are not allowed at this time in hospitals and that he will be asked to wear a mask when he arrives.     Pt agreed to plan.    Nan Brunson, RN   [facility] RN Triage      Reason for Disposition    Difficulty breathing    Protocols used: COUGH - ACUTE NON-PRODUCTIVE-A-AH    COVID 19 Nurse Triage Plan    Please be aware that novel coronavirus (COVID-19) may be circulating in the community. If you develop symptoms such as fever, cough, or SOB or if you have concerns about the presence of another infection including coronavirus (COVID-19), please contact your health care provider or visit [email or web address].     Patient HAS known exposure, fever, cough or SOB in addition to reason for call. Patient advised to go to ED.     Instructions Given to Patient  You need to be seen in the Emergency Department (ED). Leave now. Drive carefully. Follow these instructions.    You should go to the closest ED.    Another adult should drive you to the ED.    You or a  care team member should call ahead to inform the ED of your symptoms and possible diagnosis of COVID-19 and get instructions about what entrance you should use to avoid going into the waiting area and risking infecting others.    Your mobile phone number should be given to whomever is referring you to the ED and entered into Epic so we can contact you.    Tell the first person you meet in the emergency department that you may have been exposed to COVID-19 so you can be directed to the appropriate entrance and not be in the general waiting room.    Regardless of if you have been tested or not:  Patient who have symptoms (cough, fever, or shortness of breath), need to isolate for 7 days from when symptoms started OR 72 hours after fever resolves (without fever reducing medications) AND improvement of respiratory symptoms (whichever is longer).      Isolate yourself at home (in own room/own bathroom if possible)    Do Not allow any visitors    Do Not go to work or school    Do Not go to [place],  centers, shopping, or other public places.    Do Not shake hands.    Avoid close and intimate contact with others (hugging, kissing).    Follow CDC recommendations for household cleaning of frequently touched services.     After the initial 7 days, continue to isolate yourself from household members as much as possible. To continue decrease the risk of community spread and exposure, you and any members of your household should limit activities in public for 14 days after starting home isolation.     You can reference the following CDC link for helpful home isolation/care tips:  https://www.cdc.gov/coronavirus/2019-ncov/downloads/10Things.pdf    Protect Others:    Cover Your Mouth and Nose with a mask, disposable tissue or wash cloth to avoid spreading germs to others.    Wash your hands and face frequently with soap and water      Thank you for limiting contact with others, wearing a simple mask to cover your cough,  practice good hand hygiene habits and accessing our virtual services where possible to limit the spread of this virus.    For more information about COVID19 and options for caring for yourself at home, please visit the CDC website at https://www.cdc.gov/coronavirus/2019-ncov/about/steps-when-sick.html  For more options for care at [facility], please visit our website at [email or web address]

## 2021-06-08 NOTE — TELEPHONE ENCOUNTER
Message left for patient to call clinic to set up an appointment for kidney stone follow-up with our PA.  Charlene Garcia RN

## 2021-06-10 ENCOUNTER — COMMUNICATION - HEALTHEAST (OUTPATIENT)
Dept: BEHAVIORAL HEALTH | Facility: CLINIC | Age: 31
End: 2021-06-10
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
## 2021-06-11 NOTE — PROGRESS NOTES
9/30/2020    Start time: 2:34 PM    stop Time: 3:20 PM   session #4    Session Type: Patient is presenting for an Individual session.    Higinio Wallace is a 30 y.o. male is being seen today for    Chief Complaint   Patient presents with      Follow Up     Understanding symptomology   .     Follow up in regards to ongoing symptom management of creat drop down.     New symptoms or complaints: None    Functional Impairment:   Personal: 3  Family: 3  Social: 3  Work: 3    Clinical assessment of mental status:   Higinio Wallace presented on time.   He was oriented x3, open and cooperative, and dressed appropriately for this session and weather. His memory was Normal cognitive functioning .  His speech was  Within normal.  Language was clear and coherent.  Concentration and focus is Brief. Psychosis Unknown noted or reported. He reports his mood is Anxious.  Affect is congruent with speech and is Congruent w/content of speech.  Fund of knowledge is adequate. Insight is adequate for therapy.    Suicidal/Homicidal Ideation present: Patient reports that his suicidal ideation can shift based on his medication compliance.  He does report that overall he would not want to hurt or kill himself.     Patient's impression of their current status: Patient shared regarding anxiety and panic-like symptoms that he experienced this week.  He shared he felt this was related to his medication being a little off and he reports that he is back on track.  Patient also shared regarding the close relationship he had with his grandmother and how he feels he values singing because of that relationship with her and that singing can bring him peace and calm.    Therapist impression of patients current state: This 30 y.o. Patient Declined male presents with with symptoms of both anxiety and depression.  This session focused more on the anxious and panic-like symptoms that he experiences, ways that he can manage those symptoms, and overall the  person that he would like to become as an adult.  Patient reported that for him it is important to try to have all people get along.    Assessment tools used today include: None at today's session and all previous assessments can be found documented in Doc Flowsheets.       Type of psychotherapeutic technique provided: Client centered      Review of medication: When taken as prescribed patient reports medications are effective for him.      Review of patient health/health concerns: No new concerns; treatment sought/referral are not needed at this time      Progress toward short term goals:To be established at next session    Review of long term goals: Not done at today's visit .    Diagnosis:   1. Moderate episode of recurrent major depressive disorder (H)     no change    Plan and Follow-up: Patient plans to continue to use thinking as a form of calling when he is experiencing anxiety and/or panic.  Patient will also utilize grounding techniques to manage voices that he hears in his head at times.  Patient plans to continue taking the medication as prescribed and will consult his doctor with any questions or concerns that he may have. Patient plans to follow up with therapy in a week.     Discharge Criteria/Planning: Patient will continue with follow-up until therapy can be discontinued without return of signs and symptoms.    Performed and documented by REBA Garcia 9/30/2020

## 2021-06-11 NOTE — PROGRESS NOTES
Patient had a session scheduled for 5:00 pm today.  Therapist reached out for that session and patient was in the car/community and he reports that he was paying bills.  He was also with other people.  Since confidentiality wouldn't be able to be maintained patient asked about rescheduling and therapist provided him with the number to do so.

## 2021-06-11 NOTE — PROGRESS NOTES
________________________________________  Medications Phoned  to Pharmacy [] yes [x]no  Name of Pharmacist:  List Medications, including dose, quantity and instructions    Medications ordered this visit were e-scribed.  Verified by order class [x] yes  [] no   Abilify 5 mg    Medication changes or discontinuations were communicated to patient's pharmacy: [] yes  [x] no    Dictation completed at time of chart check: [] yes  [x] no    I have checked the documentation for today s encounters and the above information has been reviewed and completed.       No

## 2021-06-11 NOTE — PROGRESS NOTES
Patient ID: Higinio Wallace is a 30 y.o. male.  /80   Pulse 82   Wt (!) 324 lb (147 kg)   SpO2 98%   BMI 39.44 kg/m      Assessment/Plan:                   Diagnoses and all orders for this visit:    Gastroesophageal reflux disease without esophagitis  -     omeprazole 20 mg TbEC; Take 20 mg by mouth daily before breakfast.  Dispense: 90 each; Refill: 3    Moderate episode of recurrent major depressive disorder (H)    Psychosis, unspecified psychosis type (H)    Chronic pain of left knee    Malrotation of intestine    Other orders  -     Cancel: Influenza, Seasonal Quad, PF =/> 6months  -     Cancel: Pneumococcal polysaccharide vaccine 23-valent 3 yo or older, subq/IM        DISCUSSION  Overall seems to doing much better from a mental health standpoint.  Reviewed health considerations as outlined below.  No indication for any specific lab testing.  Recommend return in the near future for preoperative and/or general physical exam.  Subjective:     HPI    Higinio Wallace is a 30 y.o. male he is here today to follow-up on several issues.  I met him for the first time on August 17, 2020.  We discussed primarily his history of significant mental health concerns.  We made arrangements for him to see psychiatry.  He has been seen by psychiatry and was started on Abilify.  Follow-up as planned.  Patient reports he is doing overall much better.  He reports many of the psychosis type symptoms have subsided almost completely.  He is happy with the results and reporting no significant changes currently.    He brings up he has heel pain that he believes is plantar fasciitis.  He recently obtained new shoes.  He is on his feet much of the day.  He reports pain in the heel typical of plantar fasciitis.  We examined his foot where noting that he has minimal arch support.  We discussed plantar fasciitis in some depth including its causes and treatment considerations.    He has acid reflux.  Reports good control with  omeprazole requests refill of medication.  Denies hematemesis hematochezia and melena.    He has a history of incidentally found malrotation of the intestine.  He has been seen at the emergency [place].  Details of this history largely unknown.  He reports no current digestive system concerns.  He does report he has conferred with specialty providers and will be undergoing a colonoscopy.    He has chronic problems with his left knee.  He has a history of surgical intervention.  He is reporting that he needs to have surgery.    Review of Systems  Complete review of systems is obtained.  Other than the specific considerations noted above complete review of systems is negative.          Objective:   Medications:  Current Outpatient Medications   Medication Sig     albuterol (PROAIR HFA;PROVENTIL HFA;VENTOLIN HFA) 90 mcg/actuation inhaler Inhale 1-2 puffs.     ARIPiprazole (ABILIFY) 15 MG tablet Take 1 tablet (15 mg total) by mouth daily.     metFORMIN (GLUCOPHAGE) 500 MG tablet Take 500 mg by mouth 2 (two) times a day with meals.      omeprazole 20 mg TbEC Take 20 mg by mouth daily before breakfast.     hydrOXYzine pamoate (VISTARIL) 50 MG capsule Take 1 capsule (50 mg total) by mouth 3 (three) times a day as needed for anxiety.       Allergies:  Allergies   Allergen Reactions     Bee Venom Protein (Honey Bee) Anaphylaxis     Adhesive Dermatitis     And band aid     Monosodium Glutamate Hives       Tobacco:   reports that he has been smoking. He has never used smokeless tobacco.     Physical Exam          /80   Pulse 82   Wt (!) 324 lb (147 kg)   SpO2 98%   BMI 39.44 kg/m        General: Patient alert no signs of distress.  Patient much more interactive and seemingly less anxious.    Duration of visit about 15 minutes entire time spent in counseling and coronation of care today.
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
## 2021-06-11 NOTE — PATIENT INSTRUCTIONS - HE
"1. Have ordered lab work completed by next appointment  2. Schedule individual therapy appointment. You will receive call to set first one up separate of this clinic  3. START Abilify 5mg for 3 days before increasing to 10mg every morning   4. Continue other medications as prescribed  5. Have your pharmacy contact us for a refill if you are running low on medications (We may ask you to come into clinic to get a refill from the nurse)  6. No alcohol or drug use  7. No driving if sedated  8. Contact the clinic with any questions or concerns   a. Phone: [phone]  b. Fax: [phone]  9. Reach out for help if you feel like hurting yourself or others:   a. [facility]: [address] [place] [address], [address] (phone: [phone])  b. [facility] Suicide Hotline: 981.205.9189   c. Crisis Texting Line: Text \"[place]\" to 267856  d. Call 911 or go to nearest Emergency room   10. Follow up as directed in 2 weeks, for your appointments, per your After Visit Summary Form    "

## 2021-06-11 NOTE — PATIENT INSTRUCTIONS - HE
"1. START taking Abilify 15mg daily for symptoms  2. Continue other medications as prescribed  3. Have your pharmacy contact us for a refill if you are running low on medications (We may ask you to come into clinic to get a refill from the nurse)  4. No alcohol or drug use  5. No driving if sedated  6. Contact the clinic with any questions or concerns   a. Phone: [phone]  b. Fax: [phone]  7. Reach out for help if you feel like hurting yourself or others:   a. [facility]: [address] [place] [address], [address] (phone: [phone])  b. [facility] Suicide Hotline: 825.484.5036   c. Crisis Texting Line: Text \"[place]\" to 728754  d. Call 911 or go to nearest Emergency room   8. Follow up as directed in 1 month, for your appointments, per your After Visit Summary Form    "

## 2021-06-11 NOTE — PROGRESS NOTES
________________________________________  Medications Phoned  to Pharmacy [] yes [x]no  Name of Pharmacist:  List Medications, including dose, quantity and instructions    Medications ordered this visit were e-scribed.  Verified by order class [x] yes  [] no   Abilify 15 mg  Medication changes or discontinuations were communicated to patient's pharmacy: [x] yes  [] no  Called [facility] pharmacy, spoke to Jaclyn and d/c'd order for Abilify 5 mg  Dictation completed at time of chart check: [x] yes  [] no    I have checked the documentation for today s encounters and the above information has been reviewed and completed.

## 2021-06-11 NOTE — PROGRESS NOTES
"Telemedicine Visit: The patient's condition can be safely assessed and treated via synchronous audio and visual telemedicine encounter.      Reason for Telemedicine Visit: Patient unable to travel    Originating Site (Patient Location): Patient's home    Distant Site (Provider Location): Provider Remote Setting- Home Office    Consent:  The patient/guardian has verbally consented to: the potential risks and benefits of telemedicine (video visit) versus in person care; bill my insurance or make self-payment for services provided; and responsibility for payment of non-covered services.     Mode of Communication:  Video Conference via [employer]    As the provider I attest to compliance with applicable laws and regulations related to telemedicine.    Outpatient Psychiatric Follow Up    Date of Service: 9/16/2020    --  Chief Complaint: \"I'm feeling so much better\"     --  History of Present Illness/Client Impression of Mental Health Consult:    Higinio Wallace is a 30 y.o. male who presents for virtual visit follow up. Last visit occurred 9/4/20 and was initial appointment. At that time initiated aripiprazole and referred to individual therapy appointments.     Since last visit, has had multiple doctors appointments. [place] that colon is inflamed and now has first colonoscopy. Advised to improve eating habits and lose weight. States mental health is \"a lot better\". Feels Abilify medication is helpful for controlling intrusive thinking and helping his mood stay more even. Finds it more easy to control responses when frustrated. States \"I don't hear the voice any more\" other than a \"muffled drum\" that is much more manageable. Denies side effects of starting medications. Feels happier now that these symptoms are improving. His friend has even complimented on how is smiling and seeming happier. Denies suicidal thoughts for this reason today. Established with individual therapy appointments.    States mood is \"happy\". " Rates depression and anxiety minimal. Sleeping approximately better at night. Feels well rested in the morning. No new appetite or weight concerns. Denies suicidal and homicidal ideation. No overt psychosis. Denies all other psychiatric symptoms. No new physical concerns.     Medication adherence: Reviewed risk/benefits of medication , Patient able to verbalize understanding of side effects  and Patient verbally consents to taking medications  Medication side effects: none  The patient was given information on medications: aripiprazole.  [place] Prescription Monitoring program: Not indicated for this patient.    Clinical Outcomes Measures:  PHQ-9 Total Score: 7  ANITA-7: 7  DISCUS: due next in clinic visit    --  Current Medications:  Current Outpatient Medications   Medication Sig Dispense Refill     albuterol (PROAIR HFA;PROVENTIL HFA;VENTOLIN HFA) 90 mcg/actuation inhaler Inhale 1-2 puffs.       ARIPiprazole (ABILIFY) 5 MG tablet Take 1 tablet (5 mg total) by mouth daily for 3 days, THEN 2 tablets (10 mg total) daily for 27 days. 57 tablet 0     hydrOXYzine pamoate (VISTARIL) 50 MG capsule Take 1 capsule (50 mg total) by mouth 3 (three) times a day as needed for anxiety. 30 capsule 0     metFORMIN (GLUCOPHAGE) 500 MG tablet Take 500 mg by mouth 2 (two) times a day with meals.        omeprazole 20 mg TbEC Take 20 mg by mouth daily before breakfast.        No current facility-administered medications for this visit.        --  Allergies  Allergies   Allergen Reactions     Bee Venom Protein (Honey Bee) Anaphylaxis     Adhesive Dermatitis     And band aid     Monosodium Glutamate Hives       --  Summary of Diagnostic Studies  No visits with results within 30 Day(s) from this visit.   Latest known visit with results is:   Admission on 06/11/2020, Discharged on 06/11/2020   Component Date Value Ref Range Status     Sodium 06/11/2020 140  136 - 145 mmol/L Final     Potassium 06/11/2020 3.6  3.5 - 5.0 mmol/L Final      Chloride 06/11/2020 106  98 - 107 mmol/L Final     CO2 06/11/2020 23  22 - 31 mmol/L Final     Anion Gap, Calculation 06/11/2020 11  5 - 18 mmol/L Final     Glucose 06/11/2020 128* 70 - 125 mg/dL Final     BUN 06/11/2020 7* 8 - 22 mg/dL Final     Creatinine 06/11/2020 0.92  0.70 - 1.30 mg/dL Final     GFR MDRD Af Amer 06/11/2020 >60  >60 mL/min/1.73m2 Final     GFR MDRD Non Af Amer 06/11/2020 >60  >60 mL/min/1.73m2 Final     Bilirubin, Total 06/11/2020 0.3  0.0 - 1.0 mg/dL Final     Calcium 06/11/2020 9.2  8.5 - 10.5 mg/dL Final     Protein, Total 06/11/2020 6.7  6.0 - 8.0 g/dL Final     Albumin 06/11/2020 3.9  3.5 - 5.0 g/dL Final     Alkaline Phosphatase 06/11/2020 51  45 - 120 U/L Final     AST 06/11/2020 41* 0 - 40 U/L Final     ALT 06/11/2020 79* 0 - 45 U/L Final     Color, UA 06/11/2020 Yellow  Colorless, Yellow, Straw, Light Yellow Final     Clarity, UA 06/11/2020 Clear  Clear Final     Glucose, UA 06/11/2020 Negative  Negative Final     Bilirubin, UA 06/11/2020 Negative  Negative Final     Ketones, UA 06/11/2020 Negative  Negative Final     Specific Gravity, UA 06/11/2020 1.030  1.001 - 1.030 Final     Blood, UA 06/11/2020 Moderate* Negative Final     pH, UA 06/11/2020 6.0  4.5 - 8.0 Final     Protein, UA 06/11/2020 Negative  Negative mg/dL Final     Urobilinogen, UA 06/11/2020 2.0 E.U./dL  <2.0 E.U./dL, 2.0 E.U./dL Final     Nitrite, UA 06/11/2020 Negative  Negative Final     Leukocytes, UA 06/11/2020 Negative  Negative Final     Bacteria, UA 06/11/2020 None Seen  None Seen hpf Final     RBC, UA 06/11/2020 25-50* None Seen, 0-2 hpf Final     WBC, UA 06/11/2020 0-5  None Seen, 0-5 hpf Final     Squam Epithel, UA 06/11/2020 0-5  None Seen, 0-5 lpf Final     Mucus, UA 06/11/2020 Many* None Seen lpf Final     Sperm, UA 06/11/2020 Present* None Seen Final     CRP 06/11/2020 0.1  0.0 - 0.8 mg/dL Final     WBC 06/11/2020 6.6  4.0 - 11.0 thou/uL Final     RBC 06/11/2020 5.31  4.40 - 6.20 mill/uL Final      Hemoglobin 06/11/2020 16.3  14.0 - 18.0 g/dL Final     Hematocrit 06/11/2020 46.2  40.0 - 54.0 % Final     MCV 06/11/2020 87  80 - 100 fL Final     MCH 06/11/2020 30.7  27.0 - 34.0 pg Final     MCHC 06/11/2020 35.3  32.0 - 36.0 g/dL Final     RDW 06/11/2020 12.7  11.0 - 14.5 % Final     Platelets 06/11/2020 179  140 - 440 thou/uL Final     MPV 06/11/2020 11.2  8.5 - 12.5 fL Final     Neutrophils % 06/11/2020 69  50 - 70 % Final     Lymphocytes % 06/11/2020 20  20 - 40 % Final     Monocytes % 06/11/2020 6  2 - 10 % Final     Eosinophils % 06/11/2020 4  0 - 6 % Final     Basophils % 06/11/2020 1  0 - 2 % Final     Neutrophils Absolute 06/11/2020 4.5  2.0 - 7.7 thou/uL Final     Lymphocytes Absolute 06/11/2020 1.3  0.8 - 4.4 thou/uL Final     Monocytes Absolute 06/11/2020 0.4  0.0 - 0.9 thou/uL Final     Eosinophils Absolute 06/11/2020 0.3  0.0 - 0.4 thou/uL Final     Basophils Absolute 06/11/2020 0.1  0.0 - 0.2 thou/uL Final       --  Review of Systems  Wt Readings from Last 3 Encounters:   08/17/20 (!) 315 lb (142.9 kg)   06/11/20 (!) 330 lb (149.7 kg)   03/22/20 (!) 330 lb (149.7 kg)     Temp Readings from Last 3 Encounters:   06/11/20 98.4  F (36.9  C) (Oral)   03/22/20 98.4  F (36.9  C) (Oral)   07/29/19 98.4  F (36.9  C) (Oral)     BP Readings from Last 3 Encounters:   08/17/20 128/76   06/11/20 125/68   03/22/20 123/76     Pulse Readings from Last 3 Encounters:   08/17/20 73   06/11/20 77   03/22/20 65      There were no vitals taken for this visit. unable to assess today Pain Score: 0  Pain Location: n/a     As noted in the subjective section above, otherwise a 10 point review of systems is negative. Limited ability to assess given virtual nature of visit. Review of symptoms based entirely on patient's verbal report and what writer is able to assess via camera.  __  Psychiatric Examination:    Appearance: appears stated age, clean, well groomed beard, and wearing red tshirt  Orientation: Patient alert and  "oriented to person, place, time, and situation  Reliability:  Patient appears to be an adequate historian.   Behavior: Patient makes good eye contact and engages with normal rapport in the interview.   There is no evidence of responding to hallucinations or flashbacks.  Speech: Speech is spontaneous and coherent, with a normal rate, rhythm and tone.    Language: There are no difficulties with expressive or receptive language as observed throughout the interview.    Mood: Described as \"happy\".    Affect: Congruent and shows a normal range and level of reactivity.  Judgement: Able to make basic decision regarding safety.  Insight: Good awareness of physical and mental health conditions and aware of needs around care for these.  Gait and station:  unable to assess   Thought process: Logical   Thought content: No evidence of delusions or paranoia.  No thoughts of self harm or suicide. No thoughts of harming others. Reported auditory hallucinations.  Associations: Connected  Fund of knowledge: Average  Attention / Concentration: Able to remain focused during the interview with minimal distractibility or need for redirection.  Short Term Memory: Grossly intact as evidence by client recalling themes and ideas discussed.  Long Term Memory: Intact  Motor Status: No recent apparent change.  No current tremor.    --  Assessment/Impression:  1. Moderate episode of recurrent major depressive disorder (H)    2. Psychosis, unspecified psychosis type (H)  - ARIPiprazole (ABILIFY) 15 MG tablet; Take 1 tablet (15 mg total) by mouth daily.  Dispense: 30 tablet; Refill: 0       Higinio Wallace is a 30 y.o. male who presents for ongoing outpatient psychiatric care. Information today obtained from patient's verbal reports and reivew of records available in EHR. Referred by Elan Cartagena due to worsening mental health. History of being in inpatient psychiatric hospitalization. Last hospitalization at [facility] from 9/14/19- 9/18/19 for " "auditory hallucinations, MDD, and suicidal ideation. Past medication trials include, but are not limited to: olanzepine, Paxil, unknown stimulant, and several others he reports being unable to recall.    Significant improvements reported in symptoms since initiation of Abilify. Denies being able to hear voices now and now hearing \"muffled sounds\" on occasion. signifciant improvements in depression and anxiety since as result. Will titrate today to 15mg for residual symptoms. No other medication changes indicated at this time. Discussed indicated lab work to rule out other contributing factors to symptoms presented today. Patient plans to collect before next visit.     Also discussed at length cannabis use but patient not open to cessation at this time. Educated on risks associated with current symptoms which he denies despite demonstrating in discussion. Started with Meri Rich for individual therapy appointments which he reports are positive.     Plan to follow up in 4 weeks for evaluation of current medication trials, lab work, and ongoing psychiatric assessment. Patient educated that they may schedule sooner appointment or contact writer for any worsening or lack of improvement in symptoms.     Patient denies suicidal and homicidal ideation. Not at imminent risk this visit. Educated on need to seek emergent services should they become a risk to themselves or others. Higinio Wallace verbalized understanding and agreement with this safety plan.    Rule out: schizophrenia,  Schizoaffective disorder, bipolar disorder, cannabis use disorder, psychosis due to other medical condition, substance induced psychotic disorder, MDD, ANITA, and personality disorder    --  Plan  1. Continue to monitor for safety  2. Current Medications  1. TITRATE aripiprazole 10mg to 15mg daily for psychosis  2. Continue hydroxyzine 50mg three times a day PRN for anxiety  3. Neuroleptic Consent Form Signed unable to complete virtually. " Discussed and patient verbally consented today (9/3/20)  4. Non-Opioid Contract Agreement Form Signed n/a  5. REFILLS: new prescription sent to pharmacy  1. Labs ordered this visit: see above orders   2. INITIATE individual psychotherapy appointments for mood stabilization and nonpharmacologic coping. Collaborate with interdisciplinary care team as needed.  3. ROIs: unable to complete today virtually. Requested RAFAL to be completed for past providers when able.  3. Consent to Communicate: unable to complete today virtually.   4. Patient will continue abstinence from drugs and alcohol  5. Patient to return to clinic in 2 weeks for evaluation of medication trials and continued assessment. Ongoing patient psychoeducation regarding chronic illness and treatment .  6. I reviewed the potential risks, side effects, and benefits of all medications with the patient. Patient verbalized understanding and was encouraged to call clinic with further questions or concerns.  --  START TIME: 2:35 PM  END TIME: 3:05 PM    Video call duration: 30 minutes with > 50% spent on coordination of care and psycho-education.    Dr. Analisa Warner, DNP, APRN, PMHNP-BC  Nurse Practitioner - Psychiatry    This medical report was made using Dragon Dictation. Spelling and grammatical errors with Dragon exist and are not intentional.

## 2021-06-11 NOTE — PROGRESS NOTES
Patient had a session scheduled today for 5 PM.  When therapist called patient he reported that he forgot about the session and was unable to meet.  Patient reports he will call tomorrow to schedule another session.

## 2021-06-11 NOTE — PROGRESS NOTES
"Mental Health tele Visit Note    Patient: Higinio Wallace    : 1990 MRN: [id number]    Date: September 15, 2020 start time: 5:05 PM   stop Time: 5:50 PM   session #2    The patient has been notified of the following:   \"We have found that certain health care needs can be provided without the need for a face to face visit.  This service lets us provide the care you need with a phone conversation.  I will have full access to your [place] medical record during this entire phone call.   I will be taking notes for your medical record. Since this is like an office visit, we will bill your insurance company for this service.  There are potential benefits and risks of telephone visits (e.g. limits to patient confidentiality) that differ from in-person visits.?  Confidentiality still applies for telephone services, and nobody will record the visit.  It is important to be in a quiet, private space that is free of distractions (including cell phone or other devices) during the visit.?? If during the course of the call I believe a telephone visit is not appropriate, you will not be charged for this service\"  Consent has been obtained for this service by care team member: Yes, per verbal agreement   Session Type: Patient [facility]    Chief Complaint   Patient presents with     Depression     Continue to gather information for diagnostic assessment       New symptoms or complaints: None reported as patient is providing initial information regarding symptoms and complaints for the diagnostic assessment    Functional Impairment:   Personal: 3  Family: 3  Work: 3  Social:3          ASSESSMENT: Current Emotional / Mental Status (status of significant symptoms):              Risk status (Self / Other harm or suicidal ideation)              Patient denied being a personal safety risk              Patient denies current or recent suicidal ideation or behaviors.              " Patient denies current or recent homicidal ideation or behaviors.              Patient denies current or recent self injurious behavior or ideation.              Patient denies other safety concerns.              Patient did not report any changes in risk factors              Patient shared that his support system includes his girlfriend, brother, and mom.              Recommended that patient call 911 or go to the local ED should there be a change in any of these risk factors.                Attitude:                                   Cooperative               Orientation:                             To person place and time              Speech                          Rate / Production:       Slow                           Volume:                       Normal               Mood:                                      Sad               Thought Content:                    Clear               Thought Form:                        Flight of Ideas               Insight:                                     Impaired      Patient's impression of their current status: Patient shared that after his last session he cried but did feel better.  He shared that he wants to be an emotionally and physically healthy person.  He added that he believes his current medications are helpful and he would like to decrease his marijuana use.    Therapist impression of patients current state: Patient presents with a variety of symptoms including depression which appear to tie back to trauma experiences.  He seems open to the therapeutic process which he has displayed by sharing his personal life history.  Patient appears to struggle with concentration and focus as at times his thoughts began to wander.    Type of psychotherapeutic technique provided: Client centered    Progress toward short term goals: Treatment plan not yet established    Progress as expected as patient continues to share about his life experiences, Pt discussing concerns and  coping strategies during tele-sessions. Pt working on homework assignments and skills learned in therapy between sessions    Review of long term goals:   Not done at today's visit  Treatment plan to be completed at a future session       Diagnosis:  1. Moderate episode of recurrent major depressive disorder (H)          Plan and Follow up: Patient will continue to attend weekly therapy appointments.  He will take all medications as prescribed and consult with his doctor with any questions or concerns that he may have.  As patient notices an increase in depressive or anxious symptoms he will utilize the 5 senses grounding activity.      Discharge Criteria/Planning: Patient will continue with follow-up until therapy can be discontinued without return of signs and symptoms.              I have reviewed the note as documented above.  This accurately captures the substance of my conversation with the patient.  As the provider I attest to compliance with applicable laws and regulations related to telemedicine.  Meri Rich, LICSW

## 2021-06-11 NOTE — TELEPHONE ENCOUNTER
Patient said his feet and legs felt like this on the Zyprexa too.  His feet and legs started hurting when taking Abilify.  He said he would resume the 10 mg until his next appointment.  Patient was encouraged to call us with any concerns or if symptoms get worse.  He also said he was wondering if it could be planter fascitis.  He will discuss with JEREMIE Warner at next appointment and also would like her to write a letter for restrictions at his job at the next appointment.

## 2021-06-11 NOTE — PROGRESS NOTES
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 feeling like he is back in the moments of trauma and stuck in that moment, experiencing flashbacks and nightmares, feeling as if something bad would happen again, and per his report feeling like his trauma can trigger symptoms of anxiety as well as depression.  Patient also reports that he does hear things in his mind that can start his sound such as drinking and driving and then can turn to voices.  He should have further psychological testing before an official diagnosis can be given.  6. Prognosis: Relieve Acute Symptoms.  7. Likely consequences of symptoms if not treated: Patient would likely see an increase in symptoms which would affect his emotional and mental health along with his physical health.  This will have a negative impact on his ability to work, to function day-to-day, and within his relationships with family as well as his girlfriend.  8. Client strengths include:  caring, motivated, wants to learn and Being a personal integrity .     Recommendations:     1. Plan for Safety and Risk Management:Recommended that patient call 911 or go to the local ED should there be a change in any of these risk factors..  Report to child / adult protection services was NA.     2. Patient's identified Mental health concerns with a cultural influence will be addressed by  Patient would like to explore his [place] heritage and develop an understanding of what that means to him which can be done during his weekly sessions.     3. Initial Treatment will focus on: Exploring understanding the impact that trauma have had on him, what his triggers are, and developing coping skills and strategies to manage the thoughts and feelings that he experiences on a daily basis related to his trauma.     4. Resources/Service Plan:       services are not indicated.     Modifications to assist communication are not indicated.     Additional disability accommodations are not indicated.      5. Collaboration:  Collaboration / coordination of treatment will be initiated with the following support professionals: None at this time.      6.  Referrals:  The following referral(s) will be initiated: None at this time.  Next Scheduled Appointment: Tuesday, October 29 at 5 PM.  A Release of Information has been obtained for the following: psychiatry.    7. CARSON: CARSON:  Discussed denies using alcohol.. Provider gave patient printed information about the effects of chemical use on their health and well being. Recommendations: Patient should continue to refrain from all chemical use as he has been doing.     8. Records were reviewed at time of assessment.  Information in this assessment was obtained from the medical record and provided by patient who is a fair historian.   Patient will have open access to their mental health medical record..      Eval type:  Mental Health    Staff Name/Credentials: REBA Gracia  9/23/2020

## 2021-06-11 NOTE — TELEPHONE ENCOUNTER
Patient having trouble with walking since he has started the [employer]il[employer].    He cannot walk normally.    Please call patient to discuss. He cannot walk, and do his job.  His job is [employer] employee.    He needs a note for work , to explain what is going on.    [phone]    Sherie Flowers RN  [facility] Triage/refill nurse    Reason for Disposition    Caller has NON-URGENT medication question about med that PCP prescribed and triager unable to answer question    Additional Information    Negative: Drug overdose and triager unable to answer question    Negative: Caller requesting information unrelated to medicine    Negative: Caller requesting a prescription for Strep throat and has a positive culture result    Negative: Rash while taking a medication or within 3 days of stopping it    Negative: Immunization reaction suspected    Negative: Asthma and having symptoms of asthma (cough, wheezing, etc.)    Negative: Breastfeeding questions about mother's medicines and diet    Negative: MORE THAN A DOUBLE DOSE of a prescription or over-the-counter (OTC) drug    Negative: DOUBLE DOSE (an extra dose or lesser amount) of over-the-counter (OTC) drug and any symptoms (e.g., dizziness, nausea, pain, sleepiness)    Negative: DOUBLE DOSE (an extra dose or lesser amount) of prescription drug and any symptoms (e.g., dizziness, nausea, pain, sleepiness)    Negative: Took another person's prescription drug    Negative: DOUBLE DOSE (an extra dose or lesser amount) of prescription drug and NO symptoms (Exception: a double dose of antibiotics)    Negative: Diabetes drug error or overdose (e.g., took wrong type of insulin or took extra dose)    Negative: Caller has medication question about med not prescribed by PCP and triager unable to answer question (e.g., compatibility with other med, storage)    Negative: Request for URGENT new prescription or refill of 'essential' medication (i.e., likelihood of harm to patient if not taken)  and triager unable to fill per department policy    Negative: Prescription not at pharmacy and was prescribed today by PCP    Negative: Pharmacy calling with prescription questions and triager unable to answer question    Negative: Caller has URGENT medication question about med that PCP prescribed and triager unable to answer question    Protocols used: MEDICATION QUESTION CALL-A-[place]

## 2021-06-12 NOTE — PROGRESS NOTES
Patient had an appointment scheduled for today at 4 PM with this writer.  Patient did not login for appointments and this writer reached out twice trying to connect with him.  A voice message was left each time as well as the phone number for him to call and reschedule his appointments.

## 2021-06-12 NOTE — PROGRESS NOTES
"Mental Health Psychotherapy Telephone Note    Patient: Higinio Wallace    : 1990 MRN: [id number]    Date: 2020 start time: 2:40 PM   stop Time: 3:20 PM   session #8    The patient has been notified of the following:   \"We have found that certain health care needs can be provided without the need for a face to face visit.  This service lets us provide the care you need with a phone conversation.  I will have full access to your [facility] medical record during this entire phone call.   I will be taking notes for your medical record. Since this is like an office visit, we will bill your insurance company for this service.  There are potential benefits and risks of telephone visits (e.g. limits to patient confidentiality) that differ from in-person visits.?  Confidentiality still applies for telephone services, and nobody will record the visit.  It is important to be in a quiet, private space that is free of distractions (including cell phone or other devices) during the visit.?? If during the course of the call I believe a telephone visit is not appropriate, you will not be charged for this service\"  Consent has been obtained for this service by care team member: Yes, per verbal agreement   Session Type: Patient is participating in a telemedicine phone visit through [employer]    Chief Complaint   Patient presents with      Follow Up     Managing symptoms of anxiety in the moments       New symptoms or complaints: None reported    Functional Impairment:   Personal: 3  Family: 3  Work: 2  Social:2          ASSESSMENT: Current Emotional / Mental Status (status of significant symptoms):              Risk status (Self / Other harm or suicidal ideation)              Patient denied changes in his personal safety              Patient denies current or recent suicidal ideation or behaviors.              Patient denies current or recent homicidal ideation or behaviors.              Patient denies current or " recent self injurious behavior or ideation.              Patient denies other safety concerns.              Patient denied changes to risk factors              Patient denied changes to his protective factors              Recommended that patient call 911 or go to the local ED should there be a change in any of these risk factors.                Attitude:                                   Cooperative               Orientation:                             To person, place, and time              Speech                          Rate / Production:       Slow                           Volume:                       Normal               Mood:                                      Anxious               Thought Content:                    Clear               Thought Form:                        Coherent  Logical               Insight:                                     Fair       Patient's impression of their current status: Patient reported he was experiencing an increase in his anxiety level during the session.  Patient stated that his symptoms included being hot, a tingling sensation in his hands, struggles with breath, and experiencing a headache.    Therapist impression of patients current state: While processing current life stressors patient reported that he was seeing an increase in anxiety symptoms.  Patient allowed therapist to guide him through a breathing exercise as well as a 5 senses grounding activity.  Therapist suggested that patient also do something physical following the session.    Type of psychotherapeutic technique provided: Client centered and Mindfulness    Progress toward short term goals: Progress as expected, As patient continues to explore and process current and past life stressors    Progress as expected as patient was open and engaged throughout session, Pt discussing concerns and coping strategies during tele-sessions. Pt working on homework assignments and skills learned in therapy between  sessions    Review of long term goals:   Not done at today's visit  Treatment plan updated on September 1, 2020       Diagnosis:  1. Moderate episode of recurrent major depressive disorder (H)    No change      Plan and Follow up: Patient plans to take a walk or engage in some other type of physical activity following today's session.  He will also continue to use breathing and grounding exercises to manage symptoms of anxiety.  Patient will continue to take all medications as prescribed and consult his physician with any questions or concerns he may have.  Patient and therapist will meet again in 1 week.      Discharge Criteria/Planning: Patient will continue with follow-up until therapy can be discontinued without return of signs and symptoms.              I have reviewed the note as documented above.  This accurately captures the substance of my conversation with the patient.  As the provider I attest to compliance with applicable laws and regulations related to telemedicine.  Meri Rich, LICSW

## 2021-06-12 NOTE — PROGRESS NOTES
Outpatient Mental Health Treatment Plan    Name:  Higinio Wallace  :  1990  MRN:  [id number]    Treatment Plan:  Initial Treatment Plan  Date Treatment Plan Initiated:  10/07/2020  Treatment Plan: Updated Treatment Plan R  Intake/initial treatment plan date: 10/07/2020  Benefit and risks and alternatives have been discussed: Yes    Plan: Initial Treatment Plan to focus on depression and trauma related symptoms     ? Depression    Goal:  Decrease average depression level from of low motivation to a level of motivation on a daily basis.   Strategies:    ?[] Decrease social isolation     [x] Increase involvement in meaningful activities     ?[x] Discuss sleep hygiene     ?[x] Explore thoughts and expectations about self and others     ?[x] Process grief (loss of significant person, independence, role, etc.)     ?[x] Assess for suicide risk     ?[x] Implement physical activity routine (with physician approval)     [] Consider introduction of bibliotherapy and/or videos     [x] Continue compliance with medical treatment plan (or explore barriers)       ?    Degree to which this is a problem (1-4): 3  Degree to which goal is met (1-4) Initial Goal  Date of Review: 90 days    ? Other:   Goal: Trauma - patient experiences some type of impact on his functioning daily and he would like to decrease this while moving towards letting go   Strategies: Patient will process his trauma experiences   ?   Patient will practice and implement coping strategies     Patient will practice and implement grounding techniques     Patient will engage in regular sleep and practice daily sleep hygiene     Patient will engage in regular self-care which includes daily hygiene, drinking water, eating health foods, and engaging in movement     Patient will take all medications as prescribed     Patient will process all thoughts or incidents of self-harming behaviors.         ?  Degree to which this is a problem (1-4): 4  Degree to which goal is  met (1-4) initial goal  Date of Review: 90 days      Functional Impairment: 1=Not at all/Rarely  2=Some days  3=Most Days  4=Every Day  Personal: 3  Family: 3  Social: 2  Work: 3    Diagnosis:  Major Depressive Disorder, Recurrent, Moderate    Post Traumatic Stress Disorder      Strengths:  Leadership and a desire for things to improve with him his wife  Limitations: Patient has a variety of different mental and physical health that she does that impact him on a daily basis  Cultural Considerations: Patient is half [place] and is interested in learning more about his culture  Family involvement Yes/No, If no reason none at this time as patient has chosen to do work individually    Anticipated intensity of services:  Every week  Estimated duration of treatment: Due to the intensity of symptomology patient experiences has estimated time in treatment could be a year or more.  Patient symptoms and treatment plan will be reviewed every 90 days to assess where he is at and treatment.  Necessity for frequency: This frequency is needed to establish therapeutic goals and for continuity of care in order to monitor progress.   Necessity for treatment: To address cognitive, behavioral, and/or emotional barriers in order to work toward goals and to improve quality of life.   Is this treatment appropriate with minimal intrusion/restrictions: Yes    Persons responsible for this plan:  ? [x] Patient ? [x] Provider ? [] Other: __________________      Provider: Meri Rich  Date:  10/7/2020

## 2021-06-12 NOTE — PROGRESS NOTES
"Higinio Wallace is a 30 y.o. male who is being evaluated via a billable telephone visit.      The patient has been notified of following:     \"This telephone visit will be conducted via a call between you and your physician/provider. We have found that certain health care needs can be provided without the need for a physical exam.  This service lets us provide the care you need with a short phone conversation.  If a prescription is necessary we can send it directly to your pharmacy.  If lab work is needed we can place an order for that and you can then stop by our lab to have the test done at a later time.    Telephone visits are billed at different rates depending on your insurance coverage. During this emergency period, for some insurers they may be billed the same as an in-person visit.  Please reach out to your insurance provider with any questions.    If during the course of the call the physician/provider feels a telephone visit is not appropriate, you will not be charged for this service.\"    Patient has given verbal consent to a Telephone visit? Yes    What phone number would you like to be contacted at? [phone]    Patient would like to receive their AVS by AVS Preference: Arabella.    Additional provider notes:.    Outpatient Psychiatric Follow Up    Date of Service: 11/4/2020    --  Chief Complaint: \"I have surgery coming up\"     --  History of Present Illness/Client Impression of Mental Health Consult:    Higinio Wallace is a 30 y.o. male who presents for virtual visit follow up. Last visit occurred 9/16/20. At that time titrated aripiprazole.     Anxious about upcoming surgery and follow through. Planning to do physical therapy following surgery. Has girlfriend, mother, and has a couple of friends that he would consider calling if needed to help. Continues meeting with psychotherapist and feels positive effect. Felt badly for missing appointment yesterday due to voting. Forgot to take Abilify today due " "to busy schedule but otherwise takes fairly consistently. Feels \"very bipolar today\" and anxiety as result of missing medication. Still hears occasional voices if medication wearing off or if he is \"already too anxious\". Smokes marijuana on occasion. Prefers Indica type because it makes him feel tired and relaxed. Uses at most a couple times a day two to three times a week. Remembers being told during last hospitalization that marijuana likely induced first psychosis symptoms. Asks about doing retreat that \"isn't forced\" for mental health symptoms.     States mood is \"good\". Rates depression and anxiety minimal. Sleeping sometimes difficult at night. Feels well rested in the morning. No new appetite or weight concerns. No suicidal and homicidal ideation. No overt psychosis. Denies all other psychiatric symptoms. No new physical concerns.     Medication adherence: Reviewed risk/benefits of medication , Patient able to verbalize understanding of side effects  and Patient verbally consents to taking medications  Medication side effects: none  The patient was given information on medications: aripiprazole.  [place] Prescription Monitoring program: Not indicated for this patient.    Clinical Outcomes Measures:  PHQ-9 Total Score: 20  ANITA-7: 13  DISCUS: due next in clinic visit    --  Current Medications:  Current Outpatient Medications   Medication Sig Dispense Refill     albuterol (PROAIR HFA;PROVENTIL HFA;VENTOLIN HFA) 90 mcg/actuation inhaler Inhale 1-2 puffs.       ARIPiprazole (ABILIFY) 15 MG tablet Take 1 tablet (15 mg total) by mouth daily. 30 tablet 1     hydrOXYzine pamoate (VISTARIL) 50 MG capsule Take 1 capsule (50 mg total) by mouth 3 (three) times a day as needed for anxiety. 30 capsule 1     metFORMIN (GLUCOPHAGE) 500 MG tablet Take 500 mg by mouth 2 (two) times a day with meals.        omeprazole 20 mg TbEC Take 20 mg by mouth daily before breakfast. 90 each 3     No current facility-administered " medications for this visit.        --  Allergies  Allergies   Allergen Reactions     Bee Venom Protein (Honey Bee) Anaphylaxis     Adhesive Dermatitis     And band aid     Monosodium Glutamate Hives     --  Summary of Diagnostic Studies  No visits with results within 30 Day(s) from this visit.   Latest known visit with results is:   Admission on 06/11/2020, Discharged on 06/11/2020   Component Date Value Ref Range Status     Sodium 06/11/2020 140  136 - 145 mmol/L Final     Potassium 06/11/2020 3.6  3.5 - 5.0 mmol/L Final     Chloride 06/11/2020 106  98 - 107 mmol/L Final     CO2 06/11/2020 23  22 - 31 mmol/L Final     Anion Gap, Calculation 06/11/2020 11  5 - 18 mmol/L Final     Glucose 06/11/2020 128* 70 - 125 mg/dL Final     BUN 06/11/2020 7* 8 - 22 mg/dL Final     Creatinine 06/11/2020 0.92  0.70 - 1.30 mg/dL Final     GFR MDRD Af Amer 06/11/2020 >60  >60 mL/min/1.73m2 Final     GFR MDRD Non Af Amer 06/11/2020 >60  >60 mL/min/1.73m2 Final     Bilirubin, Total 06/11/2020 0.3  0.0 - 1.0 mg/dL Final     Calcium 06/11/2020 9.2  8.5 - 10.5 mg/dL Final     Protein, Total 06/11/2020 6.7  6.0 - 8.0 g/dL Final     Albumin 06/11/2020 3.9  3.5 - 5.0 g/dL Final     Alkaline Phosphatase 06/11/2020 51  45 - 120 U/L Final     AST 06/11/2020 41* 0 - 40 U/L Final     ALT 06/11/2020 79* 0 - 45 U/L Final     Color, UA 06/11/2020 Yellow  Colorless, Yellow, Straw, Light Yellow Final     Clarity, UA 06/11/2020 Clear  Clear Final     Glucose, UA 06/11/2020 Negative  Negative Final     Bilirubin, UA 06/11/2020 Negative  Negative Final     Ketones, UA 06/11/2020 Negative  Negative Final     Specific Gravity, UA 06/11/2020 1.030  1.001 - 1.030 Final     Blood, UA 06/11/2020 Moderate* Negative Final     pH, UA 06/11/2020 6.0  4.5 - 8.0 Final     Protein, UA 06/11/2020 Negative  Negative mg/dL Final     Urobilinogen, UA 06/11/2020 2.0 E.U./dL  <2.0 E.U./dL, 2.0 E.U./dL Final     Nitrite, UA 06/11/2020 Negative  Negative Final      Leukocytes, UA 06/11/2020 Negative  Negative Final     Bacteria, UA 06/11/2020 None Seen  None Seen hpf Final     RBC, UA 06/11/2020 25-50* None Seen, 0-2 hpf Final     WBC, UA 06/11/2020 0-5  None Seen, 0-5 hpf Final     Squam Epithel, UA 06/11/2020 0-5  None Seen, 0-5 lpf Final     Mucus, UA 06/11/2020 Many* None Seen lpf Final     Sperm, UA 06/11/2020 Present* None Seen Final     CRP 06/11/2020 0.1  0.0 - 0.8 mg/dL Final     WBC 06/11/2020 6.6  4.0 - 11.0 thou/uL Final     RBC 06/11/2020 5.31  4.40 - 6.20 mill/uL Final     Hemoglobin 06/11/2020 16.3  14.0 - 18.0 g/dL Final     Hematocrit 06/11/2020 46.2  40.0 - 54.0 % Final     MCV 06/11/2020 87  80 - 100 fL Final     MCH 06/11/2020 30.7  27.0 - 34.0 pg Final     MCHC 06/11/2020 35.3  32.0 - 36.0 g/dL Final     RDW 06/11/2020 12.7  11.0 - 14.5 % Final     Platelets 06/11/2020 179  140 - 440 thou/uL Final     MPV 06/11/2020 11.2  8.5 - 12.5 fL Final     Neutrophils % 06/11/2020 69  50 - 70 % Final     Lymphocytes % 06/11/2020 20  20 - 40 % Final     Monocytes % 06/11/2020 6  2 - 10 % Final     Eosinophils % 06/11/2020 4  0 - 6 % Final     Basophils % 06/11/2020 1  0 - 2 % Final     Neutrophils Absolute 06/11/2020 4.5  2.0 - 7.7 thou/uL Final     Lymphocytes Absolute 06/11/2020 1.3  0.8 - 4.4 thou/uL Final     Monocytes Absolute 06/11/2020 0.4  0.0 - 0.9 thou/uL Final     Eosinophils Absolute 06/11/2020 0.3  0.0 - 0.4 thou/uL Final     Basophils Absolute 06/11/2020 0.1  0.0 - 0.2 thou/uL Final       --  Review of Systems  Wt Readings from Last 3 Encounters:   09/29/20 (!) 324 lb (147 kg)   08/17/20 (!) 315 lb (142.9 kg)   06/11/20 (!) 330 lb (149.7 kg)     Temp Readings from Last 3 Encounters:   06/11/20 98.4  F (36.9  C) (Oral)   03/22/20 98.4  F (36.9  C) (Oral)   07/29/19 98.4  F (36.9  C) (Oral)     BP Readings from Last 3 Encounters:   09/29/20 134/80   08/17/20 128/76   06/11/20 125/68     Pulse Readings from Last 3 Encounters:   09/29/20 82   08/17/20 73  "  06/11/20 77      There were no vitals taken for this visit. unable to assess today Pain Score: moderate to severe  Pain Location: L knee    As noted in the subjective section above, otherwise a 10 point review of systems is negative. Limited ability to assess given virtual nature of visit. Review of symptoms based entirely on patient's verbal report and what writer is able to assess via camera if used during appointment.    --  Mental Status Examination    Appearance: unable to assess  Orientation: Patient alert and oriented to person, place, time, and situation  Reliability:  Patient appears to be an adequate historian.    Behavior: unable to assess  Speech: Speech is spontaneous and coherent, with a normal rate, rhythm and tone.    Language:There are no difficulties with expressive or receptive language as observed throughout the interview.    Mood: Described as \"good\".    Affect: unable to assess  Judgement: Able to make basic decision regarding safety.  Insight: Good awareness of physical and mental health conditions and aware of needs around care for these.  Gait and station: unable to assess  Thought process: Logical   Thought content: No evidence of delusions or paranoia.  No thoughts of self harm or suicide. No thoughts of harming others.  Associations: Connected  Fund of knowledge: Average  Attention / Concentration: Able to remain focused during the interview with minimal distractibility or need for redirection.  Short Term Memory: Grossly intact as evidence by client recalling themes and ideas discussed.  Long Term Memory: Intact  Motor Status: unable to assess    --  Diagnostic Impression:  1. Psychosis, unspecified psychosis type (H)  - ARIPiprazole (ABILIFY) 15 MG tablet; Take 1 tablet (15 mg total) by mouth daily.  Dispense: 30 tablet; Refill: 1  - hydrOXYzine pamoate (VISTARIL) 50 MG capsule; Take 1 capsule (50 mg total) by mouth 3 (three) times a day as needed for anxiety.  Dispense: 30 capsule; " "Refill: 1    2. Moderate episode of recurrent major depressive disorder (H)    --  Medical Decision-Making   Higinio Wallace is a 30 y.o. male who presents for ongoing outpatient psychiatric care. Information today obtained from patient's verbal reports and reivew of records available in EHR. Referred by Elan Cartagena due to worsening mental health. Medically complex and has Tobacco abuse; Moderate episode of recurrent major depressive disorder (H); Suicidal ideation; Irritable bowel syndrome; and Class 2 severe obesity due to excess calories with serious comorbidity and body mass index (BMI) of 37.0 to 37.9 in adult (H) on their problem list. Established with Estelita Rich for individual psychotherapy appointments. Past medication trials include, but are not limited to: olanzepine, Paxil, unknown stimulant, and several others he reports being unable to recall.    Significant improvements reported in symptoms since initiation of Abilify. Denies being able to hear voices now and now hearing \"muffled sounds\" on occasion. signifciant improvements in depression and anxiety consequently. No reported side effects. No other medication changes indicated at this time. Discussed indicated lab work to rule out other contributing factors to symptoms presented today. Patient plans to collect before next visit. Discussed at length cannabis use with recommendations for cessation as symptoms may also be secondary to substance. Unable to fully rule out organic psychosis diagnosis from substance induced unless sobriety achieved.    Patient denies suicidal and homicidal ideation. Not at imminent risk this visit. Educated on need to seek emergent services should they become a risk to themselves or others. Higinio Wallace verbalized understanding and agreement with this safety plan.    --  Plan  1. Continue to monitor for safety  2. Current Medications  1. Continue aripiprazole 15mg daily for psychosis  2. Continue hydroxyzine " 50mg three times a day PRN for anxiety  3. Neuroleptic Consent Form Signed unable to complete virtually. Discussed and patient verbally consented today (9/3/20)  4. REFILLS: see above  1. Labs ordered this visit: pending from past visit  2. Continue individual psychotherapy appointments for mood stabilization and nonpharmacologic coping. Collaborate with interdisciplinary care team as needed.  3. Patient will continue abstinence from drugs and alcohol  4. Patient to return to clinic in 4 weeks for evaluation of medication trials and continued assessment. Ongoing patient psychoeducation regarding chronic illness and treatment.   1. Patient educated that they may schedule sooner appointment or contact writer for any worsening or lack of improvement in symptoms.   5. I reviewed the potential risks, side effects, and benefits of all medications with the patient. Patient verbalized understanding and was encouraged to call clinic with further questions or concerns.  6.   --  START TIME: 3:00 PM  END TIME: 3:30 PM    Appointment duration: 30 minutes with > 75% spent on coordination of care and psycho-education regarding illness, symptoms, neurobiological basis of disease, substance use, alternative interventions, sleep hygiene, safety planning, care planning, and pharmacology.    Dr. Analisa Warner, DNP, APRN, PMHNP-BC  Nurse Practitioner - Psychiatry    This medical report was made using Dragon Dictation. Spelling and grammatical errors with Dragon exist and are not intentional.

## 2021-06-12 NOTE — PATIENT INSTRUCTIONS - HE
"1. Continue medications as prescribed  2. Have your pharmacy contact us for a refill if you are running low on medications (We may ask you to come into clinic to get a refill from the nurse)  3. No alcohol or drug use  4. No driving if sedated  5. Contact the clinic with any questions or concerns   a. Phone: [phone]  b. Fax: [phone]  6. Reach out for help if you feel like hurting yourself or others:   a. [facility]: [address] [place] [address], [address] (phone: [phone])  b. [facility] Suicide Hotline: [phone]   c. Crisis Texting Line: Text \"[place]\" to [phone]  d. Call 911 or go to nearest Emergency room   7. Follow up as directed in 2 months in person with provider for your appointment    "

## 2021-06-12 NOTE — PROGRESS NOTES
10/22/2020    Start time: 12:37 pm   Stop Time: 01:28   Session # 7    Session Type: Patient is presenting for an Individual session.    Higinio Wallace is a 30 y.o. male is being seen today for    Chief Complaint   Patient presents with      Follow Up     Explored negative views of himself   .     Follow up in regards to ongoing symptom management of creat drop down.     New symptoms or complaints: None    Functional Impairment:   Personal: 3  Family: 3  Social: 3  Work: 2    Clinical assessment of mental status:   Higinio Wallace presented on time.   He was oriented x3, open and cooperative, and dressed appropriately for this session and weather. His memory was Mild .  His speech was  Within normal.  Language was clear and coherent.  Concentration and focus is Brief. Psychosis unknown noted or reported. He reports his mood is Congruent w/content of speech.  Affect is congruent with speech and is Congruent w/content of speech.  Fund of knowledge is adequate. Insight is adequate for therapy.    Suicidal/Homicidal Ideation present: Patient reports that he has thoughts that life is to hard and that he can't go on.  He dines having a plan or intent.     Patient's impression of their current status: Patient currently experiencing financial stress which for him leads to an increase in depressive symptomology.  Patient reports that he struggles to find ways to manage his depressive thoughts.  Patient was open to exploring strategies to relieve some stressors as well as to take care of his depressive symptomology.    Therapist impression of patients current state: This 30 y.o. Patient Declined male presents with symptoms of depression.  Patient was asked open to exploring ways to address his financial stressors.  When discussing his stressors as well as his depressive symptoms patient presented himself in a negative light.  Therapist challenged this as well as how patient could notice in the moment and to begin to pause  the critical thinking he has towards himself.  Also, patient mentions that he has been having thoughts that life is too hard and that he cannot go on.  Therapist explored with him if he was at risk to hurt himself and he declined that that was the case in this moment.  Patient and therapist also explored what he could go to should this thoughts ever escalate to the point of intention or planning.    Assessment tools used today include: No assessments were completed during this session and all previous assessments can be found documented in Doc Flowsheets.       Type of psychotherapeutic technique provided: Client centered      Review of medication: Adherence as MD prescribed; No Side effects noted; Efficacy is stable and positive      Review of patient health/health concerns: No new concerns; treatment sought/referral options are not needed at this time      Progress toward short term goals:Patient reports being at the same level, not better or worse    Review of long term goals: Not done at today's visit . Date of last review September 2020.     Diagnosis:   1. Moderate episode of recurrent major depressive disorder (H)    no change    Plan and Follow-up: Patient plans to notice when he is having negative thoughts about himself and will tell himself to stop in that moment. Patient plans to continue taking the medication as prescribed and will consult his doctor with any questions or concerns he may have. Patient plans to follow up with therapy in a week.     Discharge Criteria/Planning: Patient will continue with follow-up until therapy can be discontinued without return of signs and symptoms.    Performed and documented by REBA Garcia 10/22/2020

## 2021-06-12 NOTE — PROGRESS NOTES
Patient had an appointment scheduled for today at 3:30 PM.  When patient did not arrive for that session this writer reached out to him by phone, patient reported that he was on his way to the grocery store.  Due to the time constraints and patient not being by himself for a confidential space the appointment was not able to occur today and will be rescheduled.

## 2021-06-12 NOTE — TELEPHONE ENCOUNTER
Date of Last Office Visit: 9-16-20  Date of Next Office Visit: none  No shows since last visit: 0  Cancellations since last visit: 0  ED visits since last visit:  0  Medication abilify  date last ordered: 9-16-20  Qty: 30  Refills: 0  Lapse in therapy greater than 7 days: yes  Medication refill request verified as identical to current order: yes  Result of Last DAM, VPA, Li+ Level, CBC, or Carbamazepine Level (at or since last visit): N/A     [] Medication refilled per [facility] M-1.   [x] Medication unable to be refilled by RN due to criteria not met as indicated below:     []Eligibility - not seen in last year    [x]Supervision - no future appointment    [x]Compliance     []Verification - order discrepancy    []Controlled Medication    []Medication not included in RN Protocol    []90 - day supply request    []Other   Current Medication list:  Your Current Medications Are    albuterol (PROAIR HFA;PROVENTIL HFA;VENTOLIN HFA) 90 mcg/actuation inhaler Inhale 1-2 puffs.   ARIPiprazole (ABILIFY) 15 MG tablet Take 1 tablet (15 mg total) by mouth daily.   hydrOXYzine pamoate (VISTARIL) 50 MG capsule Take 1 capsule (50 mg total) by mouth 3 (three) times a day as needed for anxiety.   metFORMIN (GLUCOPHAGE) 500 MG tablet Take 500 mg by mouth 2 (two) times a day with meals.    omeprazole 20 mg TbEC Take 20 mg by mouth daily before breakfast.       Medication Plan of Care at last office visit with MD/CNP:Plan  1. Continue to monitor for safety  2. Current Medications  1. TITRATE aripiprazole 10mg to 15mg daily for psychosis  2. Continue hydroxyzine 50mg three times a day PRN for anxiety  3. Neuroleptic Consent Form Signed unable to complete virtually. Discussed and patient verbally consented today (9/3/20)  4. Non-Opioid Contract Agreement Form Signed n/a  5. REFILLS: new prescription sent to pharmacy  1. Labs ordered this visit: see above orders   2. INITIATE individual psychotherapy appointments for mood stabilization and  nonpharmacologic coping. Collaborate with interdisciplinary care team as needed.  3. ROIs: unable to complete today virtually. Requested RAFAL to be completed for past providers when able.  3. Consent to Communicate: unable to complete today virtually.   4. Patient will continue abstinence from drugs and alcohol  5. Patient to return to clinic in 2 weeks for evaluation of medication trials and continued assessment. Ongoing patient psychoeducation regarding chronic illness and treatment .  6. I reviewed the potential risks, side effects, and benefits of all medications with the patient. Patient verbalized understanding and was encouraged to call clinic with further questions or concerns.  --

## 2021-06-12 NOTE — PROGRESS NOTES
"Mental Health tele Visit Note    Patient: Higinio Wallace    : 1990 MRN: [id number]    Date: 2020 start time: 9:15 AM   stop Time: 10:00 AM   session #5    The patient has been notified of the following:   \"We have found that certain health care needs can be provided without the need for a face to face visit.  This service lets us provide the care you need with a phone conversation.  I will have full access to your [facility] medical record during this entire phone call.   I will be taking notes for your medical record. Since this is like an office visit, we will bill your insurance company for this service.  There are potential benefits and risks of telephone visits (e.g. limits to patient confidentiality) that differ from in-person visits.?  Confidentiality still applies for telephone services, and nobody will record the visit.  It is important to be in a quiet, private space that is free of distractions (including cell phone or other devices) during the visit.?? If during the course of the call I believe a telephone visit is not appropriate, you will not be charged for this service\"  Consent has been obtained for this service by care team member: Yes, per verbal agreement   Session Type: Patient is participating in a telemedicine phone visit due to connectivity struggles    Chief Complaint   Patient presents with     MH Follow Up     How his mental and physical health impact one another       New symptoms or complaints: None reported    Functional Impairment:   Personal: 3  Family: 3  Work: 3  Social:2          ASSESSMENT: Current Emotional / Mental Status (status of significant symptoms):              Risk status (Self / Other harm or suicidal ideation)              Patient denied any personal safety risk              Patient denies current or recent suicidal ideation or behaviors.              Patient denies current or recent homicidal ideation or behaviors.              Patient denies current or " recent self injurious behavior or ideation.              Patient denies other safety concerns.              Patient denies change in risk factors              Patient continues to report that his girlfriend and family are protective factors              Recommended that patient call 911 or go to the local ED should there be a change in any of these risk factors.                Attitude:                                   Cooperative               Orientation:                             To person, place, and time              Speech                          Rate / Production:       Mumbled                          Volume:                       Normal               Mood:                                      Normal              Thought Content:                    Clear               Thought Form:                        Coherent  Logical               Insight:                                     Fair       Patient's impression of their current status: Patient shared that he feels like his emotional and physical health impact one another.  Patient reports that currently he has a poor image of himself and he would like to focus on drinking more water, eating healthier, and cleaning up around his apartment.  He shared that he believes if he can make changes in these areas he will feel better about himself and experience less depressive symptoms.    Therapist impression of patients current state: Patient shared that he experiences symptoms related to depression as well as trauma almost every day.  Patient shared he felt if he were able to get his physical health in a better place it would impact his emotional and mental health.  Patient appeared open to exploring small steps he can make towards his bigger physical health call.  Patient was also open to exploring treatment plan goals during session that focused on depressive and trauma symptomology.    Type of psychotherapeutic technique provided: Client centered    Progress  toward short term goals: Progress as expected, As patient was open and engaged throughout the therapeutic process    Progress as expected as patient continues to attend weekly sessions and feels that Wednesday will be a better day for him to be done consistently, Pt discussing concerns and coping strategies during tele-sessions. Pt working on homework assignments and skills learned in therapy between sessions    Review of long term goals:     Treatment plan updated during today's session and patient provided verbal consent to the treatment plan.       Diagnosis:  1. Moderate episode of recurrent major depressive disorder (H)          Plan and Follow up: During the next week patient will focus on increasing his water intake each day.  He will also continue to use coping and grounding strategies to manage symptoms of trauma.  Patient will continue to take all medications as prescribed and will consult with his doctor regarding any questions or concerns he may have.  Patient and therapist will meet again in 1 week.      Discharge Criteria/Planning: Patient will continue with follow-up until therapy can be discontinued without return of signs and symptoms.              I have reviewed the note as documented above.  This accurately captures the substance of my conversation with the patient.  As the provider I attest to compliance with applicable laws and regulations related to telemedicine.  Meri Rich, LICSW

## 2021-06-12 NOTE — PROGRESS NOTES
"Mental Health tele Visit Note    Patient: Higinio Wallace    : 1990 MRN: [id number]    Date: 2020 start time: 1:40 PM   stop Time: 2:20 PM   session #6    The patient has been notified of the following:   \"We have found that certain health care needs can be provided without the need for a face to face visit.  This service lets us provide the care you need with a phone conversation.  I will have full access to your [facility] medical record during this entire phone call.   I will be taking notes for your medical record. Since this is like an office visit, we will bill your insurance company for this service.  There are potential benefits and risks of telephone visits (e.g. limits to patient confidentiality) that differ from in-person visits.?  Confidentiality still applies for telephone services, and nobody will record the visit.  It is important to be in a quiet, private space that is free of distractions (including cell phone or other devices) during the visit.?? If during the course of the call I believe a telephone visit is not appropriate, you will not be charged for this service\"  Consent has been obtained for this service by care team member: Yes, per verbal agreement   Session Type: Patient is participating in a telemedicine phone visit rather than video due to connectivity issues    Chief Complaint   Patient presents with     MH Follow Up     How he views himself       New symptoms or complaints: None reported    Functional Impairment:   Personal: 3  Family: 3  Work: 3  Social:2          ASSESSMENT: Current Emotional / Mental Status (status of significant symptoms):              Risk status (Self / Other harm or suicidal ideation)              Patient denied personal safety concerns              Patient denies current or recent suicidal ideation or behaviors.              Patient denies current or recent homicidal ideation or behaviors.              Patient reports current or recent self " injurious behavior or ideation including Hitting his head on the wall.  Patient reports that this occurred after he did not take his meds 1 day.  He reports that when taking his meds he does not engage in self-injurious behaviors.              Patient denies other safety concerns.              Patient did not endorse changes in risk factors              Patient did not add any additional protective factors              Recommended that patient call 911 or go to the local ED should there be a change in any of these risk factors.                Attitude:                                   Cooperative               Orientation:                             To person, place, and time              Speech                          Rate / Production:       Normal                           Volume:                       Normal               Mood:                                      Normal              Thought Content:                    Clear               Thought Form:                        Tangential               Insight:                                     Good  and Fair       Patient's impression of their current status: Patient reports that overall he is doing well.  He shared that Wednesday during the past week he forgot to take his medications and on that day he had thoughts or someone telling him in his mind that he should hurt himself.  Patient reports on that day he did lightly banged his head on the wall but that his girlfriend was able to intervene and he did not hurt himself.  Patient did not indicate that on his own he would want to hurt himself and he denied suicidal ideation.    Therapist impression of patients current state: Patient presented as engaged throughout session today.  He shared about the impact that skipping his medication has on him and exploring ways that he could remember to take this on a daily basis.  Patient also shared a variety of experiences or others reviewed him negatively and he added  how he has held onto these negative use that people have of him and how he has come to hold onto these views of himself as well.    Type of psychotherapeutic technique provided: Client centered    Progress toward short term goals: Progress as expected, As patient continues to share the thoughts in his mind that he experiences and how those thoughts lead to actions or behaviors    Progress as expected as patient was engaged throughout session, he asked questions of therapist, and was open to feedback, Pt discussing concerns and coping strategies during tele-sessions. Pt working on homework assignments and skills learned in therapy between sessions    Review of long term goals:   Not done at today's visit         Diagnosis:  1. Moderate episode of recurrent major depressive disorder (H)    No change      Plan and Follow up: Patient plans to notice in the next week when he receives positive feedback and what the automatic thoughts in his head are.  Patient will continue to take all medication as prescribed and will consult with his doctor regarding any questions or concerns that he may have.  Patient and therapist will meet again in 1 week.      Discharge Criteria/Planning: Patient will continue with follow-up until therapy can be discontinued without return of signs and symptoms.              I have reviewed the note as documented above.  This accurately captures the substance of my conversation with the patient.  As the provider I attest to compliance with applicable laws and regulations related to telemedicine.  Meri Rich, LICSW

## 2021-06-14 NOTE — TELEPHONE ENCOUNTER
Requesting new verbal order for start of care assessment to be completed on 1/3/21 per/due to patient request     This is outside of the original 48 hour referral, requiring a new verbal order.     Disciplines ordered: PT    Thank you

## 2021-06-23 ENCOUNTER — RECORDS - HEALTHEAST (OUTPATIENT)
Dept: BEHAVIORAL HEALTH | Facility: CLINIC | Age: 31
End: 2021-06-23

## 2021-06-25 NOTE — TELEPHONE ENCOUNTER
RECEIVED LEAVE OF ABSENCE PAPERWORK - PLACED IN PROVIDER'S CLINIC MAILBOX - LAST APPOINTMENT 11-   Attending Only I have personally performed a face to face diagnostic evaluation on this patient. I have reviewed the ACP note and agree with the history, exam and plan of care, except as noted.

## 2021-06-25 NOTE — TELEPHONE ENCOUNTER
Reviewed paperwork and unable to complete due to length of time without visit. Will ask patient to be called and recommended to follow up with alternative psychiatric provider as I am leaving clinic and primary care to review these documents since I am unable to complete.

## 2021-06-26 NOTE — TELEPHONE ENCOUNTER
Received fax from [facility] with time off work forms to be completed.    Higinio had only one appointment with Jose LUJAN on 11/04/2020, he did not follow up.  There were no medication renewals of medication initiated on 11/04/2020.   Jose LUJAN no longer works at this clinic.    Phone call to Higinio on cell, left msg. To call clinic.  Phone call to home phone listed in demographics, a woman answered, said I had a wrong number.    Put [facility] paperwork in a red sleeve and in the PA ferrell.

## 2021-06-29 NOTE — PROGRESS NOTES
Progress Notes by Betsy Hein LPN at 9/16/2020  2:30 PM     Author: Betsy Hein LPN Service: -- Author Type: Licensed Nurse    Filed: 9/16/2020  3:08 PM Encounter Date: 9/16/2020 Status: Signed    : Betsy Hein LPN (Licensed Nurse)       This video/telephone visit will be conducted via a call between you and your physician/provider. We have found that certain health care needs can be provided without the need for an in-person physical exam. This service lets us provide the care you need with a video /telephone conversation. If a prescription is necessary we can send it directly to your pharmacy. If lab work is needed we can place an order for that and you can then stop by our lab to have the test done at a later time.   Just as we bill insurance for in-person visits, we also bill insurance for video/telephone visits. If you have questions about your insurance coverage, we recommend that you speak with your insurance company.   Patient has given verbal consent for video/Telephone visit? yes  Patient would like the video visit invitation sent by: Text to cell phone: NA or Send to email: [employer]  ROBERTO/LPN : AD, LPN  Pt says he is doing a lot better on Abilify, it does control his emotions so he does not get overwhelmed as much as he was. Pt denies hearing voices since he started Abilify and told that he does not need to smoke THC as often as before ( does it ~ once a week)     Patient verified allergies, medications and pharmacy via phone. PHQ : 7 and ANITA: 7 done verbally with writer. Patient states he  is ready for visit.

## 2021-06-29 NOTE — PROGRESS NOTES
Progress Notes by Betsy Hein LPN at 11/4/2020  3:00 PM     Author: Betsy Hein LPN Service: -- Author Type: Licensed Nurse    Filed: 11/11/2020  7:43 PM Encounter Date: 11/4/2020 Status: Signed    : Betsy Hein LPN (Licensed Nurse)       This video/telephone visit will be conducted via a call between you and your physician/provider. We have found that certain health care needs can be provided without the need for an in-person physical exam. This service lets us provide the care you need with a video /telephone conversation. If a prescription is necessary we can send it directly to your pharmacy. If lab work is needed we can place an order for that and you can then stop by our lab to have the test done at a later time.   Just as we bill insurance for in-person visits, we also bill insurance for video/telephone visits. If you have questions about your insurance coverage, we recommend that you speak with your insurance company.   Patient has given verbal consent for video/Telephone visit? yes  Patient would like the video visit invitation sent by: Text to cell phone: NA or Send to email: NA  Patient would like telephone visit, please call: [phone]  ROBERTO/VENTURA : Niraj [address] LPN  Patient verified allergies, medications and pharmacy via phone. PHQ : 20 and ANITA: 13  done verbally with writer. Patient states he  is ready for visit.    :

## 2021-06-29 NOTE — PROGRESS NOTES
Progress Notes by Joy Tom CMA at 9/4/2020  9:30 AM     Author: Joy Tom CMA Service: -- Author Type: Certified Medical Assistant    Filed: 9/9/2020  7:56 PM Encounter Date: 9/4/2020 Status: Signed    : Joy Tom CMA (Certified Medical Assistant)       This video/telephone visit will be conducted via a call between you and your physician/provider. We have found that certain health care needs can be provided without the need for an in-person physical exam. This service lets us provide the care you need with a video /telephone conversation. If a prescription is necessary we can send it directly to your pharmacy. If lab work is needed we can place an order for that and you can then stop by our lab to have the test done at a later time.   Just as we bill insurance for in-person visits, we also bill insurance for video/telephone visits. If you have questions about your insurance coverage, we recommend that you speak with your insurance company.   Patient has given verbal consent for video/Telephone visit? Yes  Patient would like the video visit invitation sent by: [employer], if connection issues, please call:  [phone]    ROBERTO/VENTURA MERLOS CMA  Denies plan or intent, but states he has SI everyday he just hasn't acted on them.  Patient verified allergies, medications and pharmacy via phone. PHQ: 26 and ANITA: 20 done verbally with writer. Patient states he is ready for visit.    [place]  reviewed prior to appt, please see embedded report below:

## 2021-07-03 NOTE — ADDENDUM NOTE
Addendum Note by Raji Warner CNP at 9/16/2020  2:30 PM     Author: Raji Warner CNP Service: -- Author Type: Nurse Practitioner    Filed: 9/16/2020  4:07 PM Encounter Date: 9/16/2020 Status: Signed    : Raji Warner CNP (Nurse Practitioner)    Addended by: RAJI WARNER on: 9/16/2020 04:07 PM        Modules accepted: Orders

## 2021-07-04 NOTE — LETTER
Letter by Meri Rich LICSW at      Author: Meri Rich LICSW Service: -- Author Type: --    Filed:  Encounter Date: 6/10/2021 Status: (Other)         Yohana 10, 2021        Higinio Wallace  [address]  [place] [address]             Dear Higinio,      I hope this letter finds you well.  I have not heard from you since  October 28, 2020.      [facility] and agency policies indicate the need to close files that have shown no activity for 3 months.  If you would like to reschedule please call [phone] to set up an appointment.  If we do not hear from you by July 1, 2021, we will close your file.  If you prefer not to reschedule at this time please know you can reopen your file at a later date or seek counseling with another mental health professional.  If you would like a referral or assistance in locating another mental health agency or professional please let us know.        Sincerely,    REBA Garcia  __________________________________  Psychotherapist Printed Signature Sincerely,

## 2021-07-11 ENCOUNTER — TELEPHONE (OUTPATIENT)
Dept: GASTROENTEROLOGY | Facility: CLINIC | Age: 31
End: 2021-07-11

## 2021-08-04 ENCOUNTER — LAB REQUISITION (OUTPATIENT)
Dept: LAB | Facility: CLINIC | Age: 31
End: 2021-08-04
Payer: COMMERCIAL

## 2021-08-04 DIAGNOSIS — M79.10 MYALGIA, UNSPECIFIED SITE: ICD-10-CM

## 2021-08-04 LAB
ALBUMIN SERPL-MCNC: 3.7 G/DL (ref 3.5–5)
ALP SERPL-CCNC: 75 U/L (ref 45–120)
ALT SERPL W P-5'-P-CCNC: 44 U/L (ref 0–45)
ANION GAP SERPL CALCULATED.3IONS-SCNC: 8 MMOL/L (ref 5–18)
AST SERPL W P-5'-P-CCNC: 23 U/L (ref 0–40)
BASOPHILS # BLD AUTO: 0 10E3/UL (ref 0–0.2)
BASOPHILS NFR BLD AUTO: 1 %
BILIRUB SERPL-MCNC: 0.3 MG/DL (ref 0–1)
BUN SERPL-MCNC: 10 MG/DL (ref 8–22)
CALCIUM SERPL-MCNC: 9.5 MG/DL (ref 8.5–10.5)
CHLORIDE BLD-SCNC: 105 MMOL/L (ref 98–107)
CK SERPL-CCNC: 133 U/L (ref 30–190)
CO2 SERPL-SCNC: 26 MMOL/L (ref 22–31)
CREAT SERPL-MCNC: 0.98 MG/DL (ref 0.7–1.3)
CRP SERPL HS-MCNC: 2.2 MG/L (ref 0–3)
EOSINOPHIL # BLD AUTO: 0.3 10E3/UL (ref 0–0.7)
EOSINOPHIL NFR BLD AUTO: 5 %
ERYTHROCYTE [DISTWIDTH] IN BLOOD BY AUTOMATED COUNT: 13.3 % (ref 10–15)
ERYTHROCYTE [SEDIMENTATION RATE] IN BLOOD BY WESTERGREN METHOD: 2 MM/HR (ref 0–15)
GFR SERPL CREATININE-BSD FRML MDRD: >90 ML/MIN/1.73M2
GLUCOSE BLD-MCNC: 158 MG/DL (ref 70–125)
HCT VFR BLD AUTO: 46.3 % (ref 40–53)
HGB BLD-MCNC: 16 G/DL (ref 13.3–17.7)
IMM GRANULOCYTES # BLD: 0 10E3/UL
IMM GRANULOCYTES NFR BLD: 1 %
LYMPHOCYTES # BLD AUTO: 1.1 10E3/UL (ref 0.8–5.3)
LYMPHOCYTES NFR BLD AUTO: 18 %
MCH RBC QN AUTO: 30.4 PG (ref 26.5–33)
MCHC RBC AUTO-ENTMCNC: 34.6 G/DL (ref 31.5–36.5)
MCV RBC AUTO: 88 FL (ref 78–100)
MONOCYTES # BLD AUTO: 0.5 10E3/UL (ref 0–1.3)
MONOCYTES NFR BLD AUTO: 8 %
NEUTROPHILS # BLD AUTO: 4.2 10E3/UL (ref 1.6–8.3)
NEUTROPHILS NFR BLD AUTO: 67 %
NRBC # BLD AUTO: 0 10E3/UL
NRBC BLD AUTO-RTO: 0 /100
PLATELET # BLD AUTO: 200 10E3/UL (ref 150–450)
POTASSIUM BLD-SCNC: 3.9 MMOL/L (ref 3.5–5)
PROT SERPL-MCNC: 6.4 G/DL (ref 6–8)
RBC # BLD AUTO: 5.26 10E6/UL (ref 4.4–5.9)
RHEUMATOID FACT SER NEPH-ACNC: <15 IU/ML (ref 0–30)
SODIUM SERPL-SCNC: 139 MMOL/L (ref 136–145)
TSH SERPL DL<=0.005 MIU/L-ACNC: 1.58 UIU/ML (ref 0.3–5)
WBC # BLD AUTO: 6.2 10E3/UL (ref 4–11)

## 2021-08-04 PROCEDURE — 86431 RHEUMATOID FACTOR QUANT: CPT | Performed by: PHYSICIAN ASSISTANT

## 2021-08-04 PROCEDURE — 36415 COLL VENOUS BLD VENIPUNCTURE: CPT | Performed by: PHYSICIAN ASSISTANT

## 2021-08-04 PROCEDURE — 86141 C-REACTIVE PROTEIN HS: CPT | Mod: ORL | Performed by: PHYSICIAN ASSISTANT

## 2021-08-04 PROCEDURE — 82550 ASSAY OF CK (CPK): CPT | Performed by: PHYSICIAN ASSISTANT

## 2021-08-04 PROCEDURE — 85025 COMPLETE CBC W/AUTO DIFF WBC: CPT | Mod: ORL | Performed by: PHYSICIAN ASSISTANT

## 2021-08-04 PROCEDURE — 84443 ASSAY THYROID STIM HORMONE: CPT | Mod: ORL | Performed by: PHYSICIAN ASSISTANT

## 2021-08-04 PROCEDURE — 86038 ANTINUCLEAR ANTIBODIES: CPT | Mod: ORL | Performed by: PHYSICIAN ASSISTANT

## 2021-08-04 PROCEDURE — 86618 LYME DISEASE ANTIBODY: CPT | Mod: ORL | Performed by: PHYSICIAN ASSISTANT

## 2021-08-04 PROCEDURE — 85652 RBC SED RATE AUTOMATED: CPT | Mod: ORL | Performed by: PHYSICIAN ASSISTANT

## 2021-08-04 PROCEDURE — 84155 ASSAY OF PROTEIN SERUM: CPT | Performed by: PHYSICIAN ASSISTANT

## 2021-08-05 LAB — B BURGDOR IGG+IGM SER QL: 0.09

## 2021-08-06 LAB
ANA PAT SER IF-IMP: ABNORMAL
ANA SER QL IF: POSITIVE
ANA TITR SER IF: ABNORMAL {TITER}

## 2021-08-07 ENCOUNTER — APPOINTMENT (OUTPATIENT)
Dept: RADIOLOGY | Facility: CLINIC | Age: 31
End: 2021-08-07
Attending: STUDENT IN AN ORGANIZED HEALTH CARE EDUCATION/TRAINING PROGRAM
Payer: COMMERCIAL

## 2021-08-07 ENCOUNTER — HOSPITAL ENCOUNTER (EMERGENCY)
Facility: CLINIC | Age: 31
Discharge: HOME | End: 2021-08-07
Admitting: PHYSICIAN ASSISTANT
Payer: COMMERCIAL

## 2021-08-07 VITALS
BODY MASS INDEX: 38.36 KG/M2 | WEIGHT: 315 LBS | RESPIRATION RATE: 18 BRPM | OXYGEN SATURATION: 97 % | SYSTOLIC BLOOD PRESSURE: 156 MMHG | HEIGHT: 76 IN | TEMPERATURE: 98.6 F | HEART RATE: 64 BPM | DIASTOLIC BLOOD PRESSURE: 90 MMHG

## 2021-08-07 DIAGNOSIS — R06.02 SHORTNESS OF BREATH: ICD-10-CM

## 2021-08-07 DIAGNOSIS — F41.9 ANXIETY: ICD-10-CM

## 2021-08-07 LAB
ANION GAP SERPL CALCULATED.3IONS-SCNC: 12 MMOL/L (ref 5–18)
BUN SERPL-MCNC: 15 MG/DL (ref 8–22)
CALCIUM SERPL-MCNC: 10.6 MG/DL (ref 8.5–10.5)
CHLORIDE BLD-SCNC: 106 MMOL/L (ref 98–107)
CO2 SERPL-SCNC: 22 MMOL/L (ref 22–31)
CREAT SERPL-MCNC: 1.12 MG/DL (ref 0.7–1.3)
ERYTHROCYTE [DISTWIDTH] IN BLOOD BY AUTOMATED COUNT: 13.1 % (ref 10–15)
GFR SERPL CREATININE-BSD FRML MDRD: 89 ML/MIN/1.73M2
GLUCOSE BLD-MCNC: 143 MG/DL (ref 70–125)
HCT VFR BLD AUTO: 50.7 % (ref 40–53)
HGB BLD-MCNC: 17.8 G/DL (ref 13.3–17.7)
HOLD SPECIMEN: NORMAL
HOLD SPECIMEN: NORMAL
MCH RBC QN AUTO: 30.2 PG (ref 26.5–33)
MCHC RBC AUTO-ENTMCNC: 35.1 G/DL (ref 31.5–36.5)
MCV RBC AUTO: 86 FL (ref 78–100)
PLATELET # BLD AUTO: 271 10E3/UL (ref 150–450)
POTASSIUM BLD-SCNC: 3.7 MMOL/L (ref 3.5–5)
RBC # BLD AUTO: 5.89 10E6/UL (ref 4.4–5.9)
SODIUM SERPL-SCNC: 140 MMOL/L (ref 136–145)
TROPONIN I SERPL-MCNC: <0.01 NG/ML (ref 0–0.29)
WBC # BLD AUTO: 7.9 10E3/UL (ref 4–11)

## 2021-08-07 PROCEDURE — 36415 COLL VENOUS BLD VENIPUNCTURE: CPT | Performed by: STUDENT IN AN ORGANIZED HEALTH CARE EDUCATION/TRAINING PROGRAM

## 2021-08-07 PROCEDURE — 71046 X-RAY EXAM CHEST 2 VIEWS: CPT

## 2021-08-07 PROCEDURE — 85027 COMPLETE CBC AUTOMATED: CPT | Performed by: STUDENT IN AN ORGANIZED HEALTH CARE EDUCATION/TRAINING PROGRAM

## 2021-08-07 PROCEDURE — 99285 EMERGENCY DEPT VISIT HI MDM: CPT | Mod: 25

## 2021-08-07 PROCEDURE — 93005 ELECTROCARDIOGRAM TRACING: CPT | Performed by: EMERGENCY MEDICINE

## 2021-08-07 PROCEDURE — 80048 BASIC METABOLIC PNL TOTAL CA: CPT | Performed by: STUDENT IN AN ORGANIZED HEALTH CARE EDUCATION/TRAINING PROGRAM

## 2021-08-07 PROCEDURE — 84484 ASSAY OF TROPONIN QUANT: CPT | Performed by: STUDENT IN AN ORGANIZED HEALTH CARE EDUCATION/TRAINING PROGRAM

## 2021-08-07 ASSESSMENT — ENCOUNTER SYMPTOMS
CHEST TIGHTNESS: 0
NAUSEA: 0
CHILLS: 0
SORE THROAT: 0
NECK PAIN: 0
TROUBLE SWALLOWING: 0
HEADACHES: 1
FREQUENCY: 0
DIARRHEA: 0
WOUND: 0
HEMATURIA: 0
BACK PAIN: 1
NUMBNESS: 1
DYSURIA: 0
VOMITING: 0
WEAKNESS: 0
COUGH: 0
ABDOMINAL PAIN: 0
FEVER: 0
EYE DISCHARGE: 0
SHORTNESS OF BREATH: 0

## 2021-08-07 ASSESSMENT — MIFFLIN-ST. JEOR: SCORE: 2636.41

## 2021-08-08 NOTE — ED TRIAGE NOTES
"Lt sided cp x 45 minutes  Also reporting multiple complaints-\"tightness in inner thighs and discomfort to shoulders and neck\", pain/numbness to lt arm and fingers  Reports hx of anxiety, ptsd and \"mental disorders\"  "

## 2021-08-08 NOTE — ED PROVIDER NOTES
EMERGENCY DEPARTMENT ENCOUNTER      NAME: Higinio Wallace  AGE: 30 year old male  YOB: 1990  MRN: [id number]  EVALUATION DATE & TIME: 8/7/2021 11:01 PM    PCP: Raza Franklin    ED PROVIDER: Gualberto Conti PA-C      Chief Complaint   Patient presents with     Chest Pain         FINAL IMPRESSION:  1. Shortness of breath    2. Anxiety          MEDICAL DECISION MAKING:    Pertinent Labs & Imaging studies reviewed. (See chart for details)  30 year old male presents to the Emergency Department for evaluation of shortness of breath, chest pain, feeling anxious.    Patient did have assessment done while his weight in triage which included chest x-ray, screening labs with troponin, and EKG.  Overall the work-up has been unremarkable.    Patient reports at this point time that all of his symptoms have resolved.  I did briefly examine the patient and went over all of his results thoroughly and also reviewed previous medical work-ups from just a couple days ago which were quite extensive.  At this point time I do favor that the patient's symptoms are likely related to more of an anxiety reaction than anything else.  I do not feel that further emergent work-up was necessary and I did not feel admission was necessary.  Overall patient is comfortable with discharge home and outpatient follow-up through the primary care.        ED COURSE    11:31 PM I met with the patient, obtained history, performed an initial exam, and discussed options and plan for diagnostics and treatment here in the ED.    At the conclusion of the encounter I discussed the results of all of the tests and the disposition. The questions were answered. The patient or family acknowledged understanding and was agreeable with the care plan.     MEDICATIONS GIVEN IN THE EMERGENCY:  Medications - No data to display    NEW PRESCRIPTIONS STARTED AT TODAY'S ER VISIT  Discharge Medication List as of 8/7/2021 11:33 PM            "    =================================================================    HPI    Patient information was obtained from: Patient    Use of Interpretor: N/A     Higinio Wallace is a 30 year old male with a pertinent history of anxiety, depressive disorder, tobacco abuse, and severe obsity, who presents to this ED via walk-in for evaluation of chest pain.     Patient reports left sided chest pressure that started today around ten hours ago. Patient also reports feeling feeling \"hot and cold\" earlier today, but associates that with \"mental disorders.\"     Patient has been on Seroquel for the past month. He reports that he forgot to take medication routinely the first couple weeks, but for the past week, patient has been regularly taking medication. He reports that since change in medication, he has noticed generalized muscle pain and some weakness in bilateral thighs. Patient reports a history of left knee surgery. Patient also reports discomfort in upper back and a tension headache. Patient denies any difficulties breathing.     Patient also reports high blood pressure for the past week. He reports numbness in left fourth and fifth fingers and left arm. Patient reports that Lamotrigine is not working.     No other complaints at this time.       REVIEW OF SYSTEMS   Review of Systems   Constitutional: Negative for chills and fever.   HENT: Negative for congestion, sore throat and trouble swallowing.    Eyes: Negative for discharge and visual disturbance.   Respiratory: Negative for cough, chest tightness and shortness of breath.         Negative for difficulties breathing.    Cardiovascular: Positive for chest pain (Pressure). Negative for leg swelling.        Positive for hypertension.    Gastrointestinal: Negative for abdominal pain, diarrhea, nausea and vomiting.   Genitourinary: Negative for dysuria, frequency, hematuria and urgency.   Musculoskeletal: Positive for back pain (Upper). Negative for gait problem and neck " pain.        Positive for weakness in bilateral thighs. Positive for generalized muscle pain.    Skin: Negative for rash and wound.   Neurological: Positive for numbness (Left arm and left 4th and 5th fingers  ) and headaches (Tension). Negative for weakness.   Psychiatric/Behavioral: Negative for behavioral problems and suicidal ideas.   All other systems reviewed and are negative.       PAST MEDICAL HISTORY:  Past Medical History:   Diagnosis Date     Anxiety      Class 2 obesity due to excess calories in adult      Complication of anesthesia      Depression      Gastroesophageal reflux disease with esophagitis      History of nephrolithiasis      Irritable bowel syndrome      Major depression, recurrent (H)      Malrotation of intestine      Manic disorder (H)      Panic disorder      Patellofemoral instability of left knee with pain      Pre-diabetes      Psychosis (H)      PTSD (post-traumatic stress disorder)      PTSD (post-traumatic stress disorder)      Tobacco use        PAST SURGICAL HISTORY:  Past Surgical History:   Procedure Laterality Date     ARTHROSCOPY KNEE WITH PATELLAR REALIGNMENT Left 11/13/2020    Procedure: Knee Arthroscopy, Medial Patello-femoral Ligament Reconstruction with Allograft, Distal Tibial Tubercle Osteotomy, Lateral Retinacular Lengthening;  Surgeon: Sourav Keating MD;  Location: [facility]     ARTHROTOMY TIBIAL TUBERCLE SHIFT Left 11/13/2020    Procedure: tibial tubercle Osteotomy ;  Surgeon: Sourav Keating MD;  Location: [facility] OR     COLONOSCOPY N/A 10/07/2020    Procedure: COLONOSCOPY, WITH POLYPECTOMY AND BIOPSY;  Surgeon: Donell Holland MD;  Location: [facility] OR     deviated septum  2018     KNEE SURGERY Left      OPEN REDUCTION INTERNAL FIXATION RODDING INTRAMEDULLARY TIBIA Left 11/13/2020    Procedure: plus derotation tibial osteotomy;  Surgeon: Sourav Keating MD;  Location: [facility] OR         CURRENT MEDICATIONS:    No current facility-administered  medications for this encounter.    Current Outpatient Medications:      acetaminophen (TYLENOL) 500 MG tablet, Take 2 tablets (1,000 mg) by mouth every 8 hours as needed for pain (Patient not taking: Reported on 4/6/2021), Disp: 1 Bottle, Rfl: 0     albuterol (ALBUTEROL) 108 (90 BASE) MCG/ACT Inhaler, Inhale 2 puffs into the lungs every 4 hours as needed for shortness of breath / dyspnea or wheezing, Disp: 1 Inhaler, Rfl: 0     ARIPiprazole (ABILIFY) 15 MG tablet, Take 15 mg by mouth every morning , Disp: , Rfl:      aspirin 81 MG EC tablet, Take 2 tablets (162 mg) by mouth daily (Patient not taking: Reported on 4/6/2021), Disp: 60 tablet, Rfl: 0     dicyclomine (BENTYL) 10 MG capsule, Take 1 capsule (10 mg) by mouth 4 times daily as needed (ABDOMINAL PAIN) (Patient not taking: Reported on 4/6/2021), Disp: 120 capsule, Rfl: 3     gabapentin (NEURONTIN) 100 MG capsule, Take 3 capsules (300 mg) by mouth 3 times daily for 10 days, Disp: 90 capsule, Rfl: 0     hydrOXYzine (ATARAX) 25 MG tablet, Take 2 tablets (50 mg) by mouth every 6 hours as needed for itching or other (pain adjuvant) (Patient not taking: Reported on 4/6/2021), Disp: 40 tablet, Rfl: 0     metFORMIN (GLUCOPHAGE) 500 MG tablet, Take 500 mg by mouth 2 times daily (with meals) , Disp: , Rfl:      omeprazole 20 MG tablet, Take 20 mg by mouth every morning , Disp: , Rfl:      oxyCODONE (ROXICODONE) 5 MG tablet, Take 1-2 tablets (5-10 mg) by mouth every 4 hours as needed for moderate to severe pain (Patient not taking: Reported on 4/6/2021), Disp: 45 tablet, Rfl: 0     polyethylene glycol (MIRALAX) 17 g packet, Take 17 g by mouth daily (Patient not taking: Reported on 4/6/2021), Disp: 7 packet, Rfl: 0     senna-docusate (SENOKOT-S/PERICOLACE) 8.6-50 MG tablet, Take 1-2 tablets by mouth 2 times daily Take while on oral narcotics to prevent or treat constipation. (Patient not taking: Reported on 4/6/2021), Disp: 30 tablet, Rfl: 0      ALLERGIES:  Allergies    Allergen Reactions     Bee Venom Anaphylaxis and Other (See Comments)     Swelling  Large local reactions/ swelling       Fruit Acid Concentrate [Fruit Extracts] Swelling     Lips swell and crust over little bit- tongue gets numb     Liquid Adhesive Dermatitis     And band aid       Monosodium Glutamate Hives       FAMILY HISTORY:  Family History   Problem Relation Age of Onset     Other - See Comments Mother         PONV     Cerebrovascular Disease Brother      Atrial fibrillation Brother      Other - See Comments Brother         cardiac autoimmune deficiency       SOCIAL HISTORY:   Social History     Socioeconomic History     Marital status: Single     Spouse name: Not on file     Number of children: Not on file     Years of education: Not on file     Highest education level: Not on file   Occupational History     Not on file   Tobacco Use     Smoking status: Current Some Day Smoker     Packs/day: 0.25     Types: Cigarettes     Start date: 2001     Smokeless tobacco: Never Used     Tobacco comment: 1 cig a day trying to quit   Substance and Sexual Activity     Alcohol use: Not Currently     Drug use: Yes     Types: Marijuana     Comment: occasionally     Sexual activity: Yes     Partners: Female     Birth control/protection: Condom   Other Topics Concern     Parent/sibling w/ CABG, MI or angioplasty before 65F 55M? Not Asked   Social History Narrative     Not on file     Social Determinants of Health     Financial Resource Strain:      Difficulty of Paying Living Expenses:    Food Insecurity:      Worried About Running Out of Food in the Last Year:      Ran Out of Food in the Last Year:    Transportation Needs:      Lack of Transportation (Medical):      Lack of Transportation (Non-Medical):    Physical Activity:      Days of Exercise per Week:      Minutes of Exercise per Session:    Stress:      Feeling of Stress :    Social Connections:      Frequency of Communication with Friends and Family:      Frequency  "of Social Gatherings with Friends and Family:      Attends [employer] Services:      Active Member of Clubs or Organizations:      Attends Club or Organization Meetings:      Marital Status:    Intimate Partner Violence:      Fear of Current or Ex-Partner:      Emotionally Abused:      Physically Abused:      Sexually Abused:        VITALS:  Patient Vitals for the past 24 hrs:   BP Temp Temp src Pulse Resp SpO2 Height Weight   08/07/21 2330 (!) 156/90 98.6  F (37  C) Oral 64 18 97 % -- --   08/07/21 2315 (!) 136/98 -- -- 71 20 96 % -- --   08/07/21 2311 (!) 136/98 -- -- 71 20 98 % -- --   08/07/21 2050 -- 99.3  F (37.4  C) Oral -- -- -- -- --   08/07/21 2037 (!) 182/109 -- -- 89 16 97 % 1.93 m (6' 4\") (!) 156.5 kg (345 lb)       PHYSICAL EXAM    Physical Exam  Vitals and nursing note reviewed.   Constitutional:       General: He is not in acute distress.     Appearance: Normal appearance. He is not ill-appearing or toxic-appearing.   HENT:      Head: Normocephalic and atraumatic.      Right Ear: Tympanic membrane, ear canal and external ear normal.      Left Ear: Tympanic membrane, ear canal and external ear normal.      Nose: Nose normal.      Mouth/Throat:      Mouth: Mucous membranes are moist.      Pharynx: Oropharynx is clear. No oropharyngeal exudate or posterior oropharyngeal erythema.   Eyes:      General: No scleral icterus.        Right eye: No discharge.         Left eye: No discharge.      Extraocular Movements: Extraocular movements intact.      Conjunctiva/sclera: Conjunctivae normal.   Cardiovascular:      Rate and Rhythm: Normal rate and regular rhythm.      Pulses: Normal pulses.      Heart sounds: Normal heart sounds. No murmur heard.     Pulmonary:      Effort: Pulmonary effort is normal. No respiratory distress.      Breath sounds: Normal breath sounds. No stridor. No wheezing, rhonchi or rales.   Chest:      Chest wall: No tenderness.   Abdominal:      General: Abdomen is flat. Bowel sounds are " normal. There is no distension.      Palpations: Abdomen is soft.      Tenderness: There is no abdominal tenderness. There is no guarding or rebound.   Musculoskeletal:         General: No swelling, tenderness, deformity or signs of injury. Normal range of motion.      Cervical back: Normal range of motion and neck supple. No rigidity or tenderness.   Lymphadenopathy:      Cervical: No cervical adenopathy.   Skin:     General: Skin is warm and dry.      Coloration: Skin is not jaundiced.      Findings: No bruising, erythema or rash.   Neurological:      General: No focal deficit present.      Mental Status: He is alert and oriented to person, place, and time. Mental status is at baseline.      Cranial Nerves: No cranial nerve deficit.      Sensory: No sensory deficit.      Motor: No weakness.      Gait: Gait normal.   Psychiatric:         Mood and Affect: Mood normal.         Behavior: Behavior normal.         Judgment: Judgment normal.            LAB:  All pertinent labs reviewed and interpreted.  Results for orders placed or performed during the hospital encounter of 08/07/21   Chest XR,  PA & LAT    Impression    IMPRESSION: Negative chest.   CBC (+ platelets, no diff)   Result Value Ref Range    WBC Count 7.9 4.0 - 11.0 10e3/uL    RBC Count 5.89 4.40 - 5.90 10e6/uL    Hemoglobin 17.8 (H) 13.3 - 17.7 g/dL    Hematocrit 50.7 40.0 - 53.0 %    MCV 86 78 - 100 fL    MCH 30.2 26.5 - 33.0 pg    MCHC 35.1 31.5 - 36.5 g/dL    RDW 13.1 10.0 - 15.0 %    Platelet Count 271 150 - 450 10e3/uL   Basic metabolic panel   Result Value Ref Range    Sodium 140 136 - 145 mmol/L    Potassium 3.7 3.5 - 5.0 mmol/L    Chloride 106 98 - 107 mmol/L    Carbon Dioxide (CO2) 22 22 - 31 mmol/L    Anion Gap 12 5 - 18 mmol/L    Urea Nitrogen 15 8 - 22 mg/dL    Creatinine 1.12 0.70 - 1.30 mg/dL    Calcium 10.6 (H) 8.5 - 10.5 mg/dL    Glucose 143 (H) 70 - 125 mg/dL    GFR Estimate 89 >60 mL/min/1.73m2   Troponin I (now)   Result Value Ref Range     Troponin I <0.01 0.00 - 0.29 ng/mL   Extra Red Top Tube   Result Value Ref Range    Hold Specimen JIC    Extra Blue Top Tube   Result Value Ref Range    Hold Specimen JIC        RADIOLOGY:  Reviewed all pertinent imaging. Please see official radiology report.  Chest XR,  PA & LAT   Final Result   IMPRESSION: Negative chest.          EKG:    Performed at: 2036    The EKG showing a sinus rhythm at a rate of 82 bpm.  The ST segments are grossly normal with no obvious elevation or depression.  T waves are upright but somewhat flat.  QTC measured at 404.    I have independently reviewed and interpreted the EKG(s) documented above.      PROCEDURES:         I, Sugar uCrrie, am serving as a scribe to document services personally performed by Gualberto Conti PA-C based on my observation and the provider's statements to me. I, Gualberto Conti PA-C attest that Sugar Currie is acting in a scribe capacity, has observed my performance of the services and has documented them in accordance with my direction.    Gualberto Conti PA-C  Emergency Medicine  [facility]     Gualberto Conti PA-C  08/08/21 0050

## 2021-08-10 ENCOUNTER — THERAPY VISIT (OUTPATIENT)
Dept: PHYSICAL THERAPY | Facility: CLINIC | Age: 31
End: 2021-08-10
Payer: COMMERCIAL

## 2021-08-10 ENCOUNTER — ANCILLARY PROCEDURE (OUTPATIENT)
Dept: GENERAL RADIOLOGY | Facility: CLINIC | Age: 31
End: 2021-08-10
Attending: ORTHOPAEDIC SURGERY
Payer: COMMERCIAL

## 2021-08-10 ENCOUNTER — OFFICE VISIT (OUTPATIENT)
Dept: ORTHOPEDICS | Facility: CLINIC | Age: 31
End: 2021-08-10
Payer: COMMERCIAL

## 2021-08-10 VITALS — HEIGHT: 77 IN | BODY MASS INDEX: 37.19 KG/M2 | WEIGHT: 315 LBS

## 2021-08-10 DIAGNOSIS — M25.562 PATELLOFEMORAL INSTABILITY OF LEFT KNEE WITH PAIN: Primary | ICD-10-CM

## 2021-08-10 DIAGNOSIS — Z98.890 S/P LEFT KNEE SURGERY: ICD-10-CM

## 2021-08-10 DIAGNOSIS — Z47.89 AFTERCARE FOLLOWING SURGERY OF THE MUSCULOSKELETAL SYSTEM: ICD-10-CM

## 2021-08-10 DIAGNOSIS — Z98.890 S/P LEFT KNEE SURGERY: Primary | ICD-10-CM

## 2021-08-10 DIAGNOSIS — M25.362 PATELLOFEMORAL INSTABILITY OF LEFT KNEE WITH PAIN: Primary | ICD-10-CM

## 2021-08-10 DIAGNOSIS — Z98.890 S/P KNEE SURGERY: ICD-10-CM

## 2021-08-10 PROCEDURE — 73560 X-RAY EXAM OF KNEE 1 OR 2: CPT | Mod: LT | Performed by: RADIOLOGY

## 2021-08-10 PROCEDURE — 97750 PHYSICAL PERFORMANCE TEST: CPT | Mod: GP | Performed by: PHYSICAL THERAPIST

## 2021-08-10 PROCEDURE — 99213 OFFICE O/P EST LOW 20 MIN: CPT | Performed by: ORTHOPAEDIC SURGERY

## 2021-08-10 PROCEDURE — 97110 THERAPEUTIC EXERCISES: CPT | Mod: GP | Performed by: PHYSICAL THERAPIST

## 2021-08-10 PROCEDURE — 97530 THERAPEUTIC ACTIVITIES: CPT | Mod: GP | Performed by: PHYSICAL THERAPIST

## 2021-08-10 RX ORDER — QUETIAPINE FUMARATE 25 MG/1
1 TABLET, FILM COATED ORAL 2 TIMES DAILY
COMMUNITY
Start: 2021-08-05 | End: 2021-08-17

## 2021-08-10 ASSESSMENT — MIFFLIN-ST. JEOR: SCORE: 2654.9

## 2021-08-10 NOTE — LETTER
8/10/2021         RE: Higinio Wallace  [address]  [place] [address]        Dear Colleague,    Thank you for referring your patient, Higinio Wallace, to the [facility] [place]. Please see a copy of my visit note below.    But I can send a message to them but is getting the run around I do not patient is a 30-year-old male who is now 9-month status post left MPFL, distal TT osteotomy, plus derotation tibial osteotomy by Dr. Sourav Fitch.    Interval history:   The patient reports he has no pain in his left knee or leg. He does note some plantar fascia type pain bilaterally that is currently limiting him. He does note that the when he is standing at work his left knee/thigh shakes.     He does note that he has been working on his psychiatric health as well and started a new medication. He feels that he has gained significant weight since starting a new medication 1 month ago. He also notes issues with prediabetes and hypertension. He is seeing his psychiatrist later in the week to work on these issues.     Physical exam today of patient's left knee reveals benign appearing incision. No barbara fluid wave. Range of motion 0-119 with a soft endpoint.    Distal swelling is 1+ pedal edema    X-rays taken today of lateral left knee shows interval healing of osteotomy sites.      Functional test today reveals inability to do a single leg squat, lacking proprioception.     Assessment: Continued significant weakness of left lower extremity without any significant pain in his left knee. Has full confidence in his knee and is happy with its stability.    Plan: he is working with a nutritionist on diet choices. He also is working with psychiatry on his medication regiment. We encouraged him strongly to work on physical conditioning, strengthening and weight loss.    He was encouraged to join his girlfriend in a regular gym membership     He will follow-up with me in 3 months time at  the year follow up [place].  Further functional test will be done at that time.    Lela Han MD  Professor Orthopedic Surgery  [facility]

## 2021-08-10 NOTE — PROGRESS NOTES
Lower Extremity Physical Performance Testing    Surgery/Injury: MPFL reconstruction using gracilis allograft, Lateral retinacular lengthening, Distal and medial tibial tubercle transfer (12 mm medial, 8 mm distal), Osteotomy of the tibia with IM jhoana fixation, osteotomy of the fibula.                                                  Involved Extremity: left             Date of Surgery/Injury: 11/13/2020                  Surgeon/MD: Dr. Han, Dr. Keating  Therapist performing test: Ani Anaya, PT                                     Primary Treating Therapist: Not currently in PT     Patient subjective symptom/function report: Higinio reports that he still feels weak and his leg feels shakey. 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 He feels his knee cap moving laterally but it does not dislocate.     LSI% =Limb Symmetry Index (score comparison between involved/uninvolved extremity)    Anthropomorphic Measures     Full ROM of the knee.   No extensor lag.   No effusion.     Unable to perform a SL squat.     Antalgic gait pattern (reports due to be due plantar fasciits pain), trendelenberg.     Evaluation chosen: Return to Function (Level I): Balance and Muscle Strength    Return to Function (Level I): Balance, Muscle Strength   Testing Protocol: Recorded values = best effort of 3 attempts (after 2 practice attempts).    Single Leg Stand and Reach (did not test due to poor balance/SL control) Anterior/Medial Anterior/Lateral   Uninvolved Extremity     Involved Extremity     LSI%       Single Leg Balance Max = 60 seconds   Uninvolved Extremity 30 seconds   Involved Extremity <5 seconds   LSI% <20%     Single Leg Squat    Uninvolved Extremity 30 degrees   Involved Extremity Unable to perform degrees   LSI%      Retro Step    Uninvolved Extremity 6 in.   Involved Extremity 2 in.     LSI% 33%       Assessment/Plan:  Higinio presents to physical therapy 9 months s/p the above  "procedure. He has not been participating in formal physical therapy nor participating in a work out program of any kind. He is very weak and is lacking proprioception and control on a single leg. He was unable to participate in a formal functional test today due to lack of strength and control. He is not interested in returning to physical therapy because he \"is sore for 4 days after each visit\". He was amenable to going to the gym with his girlfriend and I have provided him with a gym exercise program to promote strength with strong encouragement to participate in it a minimum of 3x/day. He was open to returning to work with weight management as well.     Exercises Instructed per Test Findings:  GYM PLAN:   - Leg press  o Double leg  o Single leg (decrease the weight)  o Up on two, down on one  - Hamstring curl   - Toe raises (with support for balance)  - Step down (go down just a little bit with some support)   - Squats with a butt tap on a box  - Cardio  o Treadmill walking with incline  o Stationary bike  o Pool (swimming or Aqua jogging)    HOME PLAN:   - Wall sits  - Squats  - Toe Raises   - Step drills     OTHER  - Weight Management  - Consistency rather than go real hard and then not for a week   - Brace: as needed  - Swelling: due to weakness.   "

## 2021-08-10 NOTE — PROGRESS NOTES
But I can send a message to them but is getting the run around I do not patient is a 30-year-old male who is now 9-month status post left MPFL, distal TT osteotomy, plus derotation tibial osteotomy by Dr. Sourav Fitch.    Interval history:   The patient reports he has no pain in his left knee or leg. He does note some plantar fascia type pain bilaterally that is currently limiting him. He does note that the when he is standing at work his left knee/thigh shakes.     He does note that he has been working on his psychiatric health as well and started a new medication. He feels that he has gained significant weight since starting a new medication 1 month ago. He also notes issues with prediabetes and hypertension. He is seeing his psychiatrist later in the week to work on these issues.     Physical exam today of patient's left knee reveals benign appearing incision. No barbara fluid wave. Range of motion 0-119 with a soft endpoint.    Distal swelling is 1+ pedal edema    X-rays taken today of lateral left knee shows interval healing of osteotomy sites.      Functional test today reveals inability to do a single leg squat, lacking proprioception.     Assessment: Continued significant weakness of left lower extremity without any significant pain in his left knee. Has full confidence in his knee and is happy with its stability.    Plan: he is working with a nutritionist on diet choices. He also is working with psychiatry on his medication regiment. We encouraged him strongly to work on physical conditioning, strengthening and weight loss.    He was encouraged to join his girlfriend in a regular gym membership     He will follow-up with me in 3 months time at the year follow up [place].  Further functional test will be done at that time.    Lela Han MD  Professor Orthopedic Surgery  [facility]

## 2021-08-10 NOTE — NURSING NOTE
"Reason For Visit:   Chief Complaint   Patient presents with     RECHECK     8 month pop DOS 11/13/20 Knee Arthroscopy, Medial Patello-femoral Ligament Reconstruction with Allograft, Distal Tibial Tubercle Osteotomy, plus derotation tibial osteotomy (Left)         Primary MD: Raza Franklin  Ref. MD: Est    ?  No  Occupation  [email or web address]  Currently working? Yes.  Work status? Full time     Type of injury: Multiple on both.     Date of surgery: Jan or Feb 2011 at [facility].  Type of surgery: Left knee MPFL reconstuction.     11/13/2020 Left  knee exam under anesthesia. Diagnostic arthroscopy. MPFL reconstruction using gracilis allograft. Lateral retinacular lengthening. Distal and medial tibial tubercle transfer (12 mm medial, 8 mm distal). Osteotomy of the tibia with IM jhoana fixation, osteotomy of the fibula     Smoker: Yes  Request smoking cessation information: No    Ht 1.96 m (6' 5.17\")   Wt (!) 156.5 kg (345 lb)   BMI 40.74 kg/m      Pain Assessment  Patient Currently in Pain: Denies           Fina Julio LPN  "

## 2021-08-11 ENCOUNTER — DOCUMENTATION ONLY (OUTPATIENT)
Dept: BEHAVIORAL HEALTH | Facility: CLINIC | Age: 31
End: 2021-08-11

## 2021-08-11 NOTE — PROGRESS NOTES
Discharge Summary  Multiple Sessions    Client Name: Higinio Wallace MRN#: [id number] YOB: 1990      Intake / Discharge Date: Intake date-September 8, 2020      Discharge date-August 11, 2021       DSM5 Diagnoses: (Sustained by DSM5 Criteria Listed Above)  Diagnoses: 296.32 (F33.1) Major Depressive Disorder, Recurrent Episode, Moderate _ and With anxious distress  Psychosocial & Contextual Factors: Patient is a  male who has a history of trauma as well as health related issues        Presenting Concerns:   Patient was referred for therapeutic services from his medical doctor. At the time of the intake he shared that he struggled to focus, could get easily frustrated, and will hear voices in his head. Patient was also having difficulties with in his work environment due to health related issues and at times struggles with his interpersonal relationships.      Reason for Discharge:  Client did not return      Disposition at Time of Last Encounter:   Comments:   Patient had difficulty throughout his time in therapy with remembering appointments. Patient would often cancel last minute or no-show and then we would rereschedule. Patient was able to work on developing coping skills and strategies towards managing symptoms of depression, develop and practice assertive communication skills to be able to advocate for himself, and begin to focus on developing a positive sense of self.     Risk Management:   Client has had a history of suicidal ideation: Which he first began to experience around age 13 or 14. Patient did not note current suicidal ideation.  Recommended that patient call 911 or go to the local ED should there be a change in any of these risk factors.      Referred To:  Patient should continue to work with his medical doctor regarding health related issues as well as medications. Patient should follow all recommendations. Patient should seek out individual therapeutic  supports in the future if he feels it would be beneficial to support him in managing symptoms of depression.        Meri Rich, [facility]   8/11/2021

## 2021-08-14 ENCOUNTER — NURSE TRIAGE (OUTPATIENT)
Dept: NURSING | Facility: CLINIC | Age: 31
End: 2021-08-14

## 2021-08-14 NOTE — TELEPHONE ENCOUNTER
Muscle aches for 2 weeks.  Lymph  Nodes   Swollen bilateral underarm lymph nodes, and painful    No fever. Chilled yesterday.he reports.  Otherwise he reports he does not feel ill.    Patient reports he has been vaccinated with   COVID vax.    Patient advised to go to  at [facility] today.    Sherie Flowers, RN RN  [facility] Triage/refill nurse      Reason for Disposition    [1] Tender node in the armpit AND [2] has a sore, scratch, cut or painful red area on that arm    [1] Large node AND [2] present > 2 weeks    [1] Very tender to the touch AND [2] no fever    Protocols used: LYMPH NODES - [id number]

## 2021-08-17 ENCOUNTER — OFFICE VISIT (OUTPATIENT)
Dept: FAMILY MEDICINE | Facility: CLINIC | Age: 31
End: 2021-08-17
Payer: COMMERCIAL

## 2021-08-17 VITALS
TEMPERATURE: 98.6 F | BODY MASS INDEX: 40.15 KG/M2 | WEIGHT: 315 LBS | SYSTOLIC BLOOD PRESSURE: 128 MMHG | DIASTOLIC BLOOD PRESSURE: 78 MMHG

## 2021-08-17 DIAGNOSIS — R53.83 FATIGUE, UNSPECIFIED TYPE: Primary | ICD-10-CM

## 2021-08-17 DIAGNOSIS — R06.83 SNORING: ICD-10-CM

## 2021-08-17 DIAGNOSIS — R76.8 POSITIVE ANA (ANTINUCLEAR ANTIBODY): ICD-10-CM

## 2021-08-17 DIAGNOSIS — R73.9 HYPERGLYCEMIA: ICD-10-CM

## 2021-08-17 PROBLEM — F33.1 MODERATE EPISODE OF RECURRENT MAJOR DEPRESSIVE DISORDER (H): Status: ACTIVE | Noted: 2019-09-16

## 2021-08-17 PROBLEM — R45.851 SUICIDAL IDEATION: Status: ACTIVE | Noted: 2019-09-16

## 2021-08-17 PROBLEM — Z72.0 TOBACCO ABUSE: Status: ACTIVE | Noted: 2017-04-20

## 2021-08-17 LAB — HBA1C MFR BLD: 5.5 % (ref 0–5.6)

## 2021-08-17 PROCEDURE — 36415 COLL VENOUS BLD VENIPUNCTURE: CPT | Performed by: FAMILY MEDICINE

## 2021-08-17 PROCEDURE — 86039 ANTINUCLEAR ANTIBODIES (ANA): CPT | Performed by: FAMILY MEDICINE

## 2021-08-17 PROCEDURE — 83036 HEMOGLOBIN GLYCOSYLATED A1C: CPT | Performed by: FAMILY MEDICINE

## 2021-08-17 PROCEDURE — 86038 ANTINUCLEAR ANTIBODIES: CPT | Performed by: FAMILY MEDICINE

## 2021-08-17 PROCEDURE — 99214 OFFICE O/P EST MOD 30 MIN: CPT | Performed by: FAMILY MEDICINE

## 2021-08-17 RX ORDER — LAMOTRIGINE 25 MG/1
TABLET ORAL
COMMUNITY
Start: 2021-07-30 | End: 2021-08-17

## 2021-08-17 RX ORDER — OMEPRAZOLE 20 MG/1
20 TABLET, DELAYED RELEASE ORAL
COMMUNITY
Start: 2020-09-29 | End: 2021-08-17

## 2021-08-17 RX ORDER — HYDROXYZINE PAMOATE 50 MG/1
CAPSULE ORAL
COMMUNITY
Start: 2020-11-04 | End: 2021-08-17

## 2021-08-17 ASSESSMENT — PATIENT HEALTH QUESTIONNAIRE - PHQ9
10. IF YOU CHECKED OFF ANY PROBLEMS, HOW DIFFICULT HAVE THESE PROBLEMS MADE IT FOR YOU TO DO YOUR WORK, TAKE CARE OF THINGS AT HOME, OR GET ALONG WITH OTHER PEOPLE: VERY DIFFICULT
SUM OF ALL RESPONSES TO PHQ QUESTIONS 1-9: 16
SUM OF ALL RESPONSES TO PHQ QUESTIONS 1-9: 16

## 2021-08-17 NOTE — PROGRESS NOTES
Higinio Wallace  /78   Temp 98.6  F (37  C)   Wt (!) 154.2 kg (340 lb)   BMI 40.15 kg/m       Assessment/Plan:                Higinio was seen today for recheck medication and lymphedema.    Diagnoses and all orders for this visit:    Fatigue, unspecified type  -     SLEEP EVALUATION & MANAGEMENT REFERRAL - ADULT -; Future    Snoring  -     SLEEP EVALUATION & MANAGEMENT REFERRAL - ADULT -; Future    Hyperglycemia  -     Hemoglobin A1c; Future  -     Hemoglobin A1c    Positive RAMANA (antinuclear antibody)  -     Anti Nuclear Juliana IgG by IFA with Reflex; Future  -     Anti Nuclear Juliana IgG by IFA with Reflex      DISCUSSION  Overall he has a relatively benign exam without any specific finding and only subjective complaint of stiffness of muscles with movement.    See discussion below.  Will check RAMANA again if positive will order further testing to determine if further action is necessary.  We will check A1c given history of blood sugar elevation currently on Metformin.    Referral to sleep specialist for high probability of sleep apnea as a major contributing factor to symptoms.  Further planning based on results of this work-up.  Subjective:     HPI:    Higinio Wallace is a 30 year old male with a complex mental health history currently seen by psychiatry.  Most recently been on quetiapine and lamotrigine.    Patient sent me a message via [employer] asking if he had an autoimmune disorder because of a positive RAMANA ordered at a different clinic.  He has had 3 prior visits for evaluation of symptoms including fatigue, muscle aches and pains and concern for swollen lymph nodes in the axillary area.  He was seen twice at urgent cares and once in the emergency department.    It had a suggested to patient that perhaps his mental health medications were causing some of his symptoms.  Patient made the decision himself to stop these medications.  He denies any decompensation in his mental health.  He states he always hears  voices the medications just suppress them and right now he feels everything is fine after having stopped 6 days ago.  He does feel there has been an improvement in his muscle aches and pains.  He does not notice any swollen lymph nodes in the axillary area.    He reports his symptoms have been longstanding but worsening recently.  He reports nonspecific fatigue.  He does snore when he sleeps but no distinct noted apnea spells.  He does not awake feeling refreshed.  He would be at high risk for sleep apnea as a major contributing factor to some of the symptoms.    Patient is counseled regarding the dangers of adjusting his mental health medications.  He plans to meet with his psychiatrist next week.  He agrees to seek evaluation if there is any decompensation.    Review of laboratory tests indicate that he is often had CK levels and other types of inflammatory markers checked none of which have been in the abnormal range including as recent as August 4.      He did have an RAMANA test on August 4 that was positive but it does not appear that there was any follow-up testing performed other than a low level titer for total RAMANA.  Discussed how this can really be a normal finding.  Discussed recheck.    ROS:  Complete review of systems is obtained.  Other than the specific considerations noted above complete review of systems is negative.    Objective:   Medications:  Current Outpatient Medications   Medication     acetaminophen (TYLENOL) 500 MG tablet     albuterol (ALBUTEROL) 108 (90 BASE) MCG/ACT Inhaler     metFORMIN (GLUCOPHAGE) 500 MG tablet     polyethylene glycol (MIRALAX) 17 g packet     No current facility-administered medications for this visit.      Allergies:  Allergies   Allergen Reactions     Bee Venom Anaphylaxis and Other (See Comments)     Swelling  Large local reactions/ swelling       Fruit Acid Concentrate [Fruit Extracts] Swelling     Lips swell and crust over little bit- tongue gets numb     Liquid  Adhesive Dermatitis     And band aid       Monosodium Glutamate Hives        Social History     Socioeconomic History     Marital status: Single     Spouse name: Not on file     Number of children: Not on file     Years of education: Not on file     Highest education level: Not on file   Occupational History     Not on file   Tobacco Use     Smoking status: Current Some Day Smoker     Packs/day: 0.25     Types: Cigarettes     Start date: 2001     Smokeless tobacco: Never Used     Tobacco comment: 1 cig a day trying to quit   Substance and Sexual Activity     Alcohol use: Not Currently     Drug use: Yes     Types: Marijuana     Comment: occasionally     Sexual activity: Yes     Partners: Female     Birth control/protection: Condom   Other Topics Concern     Parent/sibling w/ CABG, MI or angioplasty before 65F 55M? Not Asked   Social History Narrative     Not on file     Social Determinants of Health     Financial Resource Strain:      Difficulty of Paying Living Expenses:    Food Insecurity:      Worried About Running Out of Food in the Last Year:      Ran Out of Food in the Last Year:    Transportation Needs:      Lack of Transportation (Medical):      Lack of Transportation (Non-Medical):    Physical Activity:      Days of Exercise per Week:      Minutes of Exercise per Session:    Stress:      Feeling of Stress :    Social Connections:      Frequency of Communication with Friends and Family:      Frequency of Social Gatherings with Friends and Family:      Attends Sikhism Services:      Active Member of Clubs or Organizations:      Attends Club or Organization Meetings:      Marital Status:    Intimate Partner Violence:      Fear of Current or Ex-Partner:      Emotionally Abused:      Physically Abused:      Sexually Abused:      Family History   Problem Relation Age of Onset     Other - See Comments Mother         PONV     Cerebrovascular Disease Brother      Atrial fibrillation Brother      Other - See  Comments Brother         cardiac autoimmune deficiency      Most Recent Immunizations   Administered Date(s) Administered     COVID-19,PF,Moderna 05/21/2021     Tdap (Adacel,Boostrix) 07/03/2019      Wt Readings from Last 3 Encounters:   08/17/21 (!) 154.2 kg (340 lb)   08/10/21 (!) 156.5 kg (345 lb)   08/07/21 (!) 156.5 kg (345 lb)      BP Readings from Last 6 Encounters:   08/17/21 128/78   08/07/21 (!) 156/90   01/03/21 (!) 150/82   11/14/20 112/67   11/04/20 135/84   10/07/20 111/73      Hemoglobin A1C   Date Value Ref Range Status   11/04/2020 5.4 0 - 5.6 % Final     Comment:     Normal <5.7% Prediabetes 5.7-6.4%  Diabetes 6.5% or higher - adopted from ADA   consensus guidelines.        PHYSICAL EXAM:    /78   Temp 98.6  F (37  C)   Wt (!) 154.2 kg (340 lb)   BMI 40.15 kg/m       General Appearance:    Alert, cooperative, no distress   Eyes:   No scleral icterus or conjunctival irritation       Neck:   Supple, symmetrical, trachea midline, no adenopathy;        thyroid:  No enlargement/tenderness/nodules   Lungs:     Clear to auscultation bilaterally, respirations unlabored, no wheezes or crackles   Heart:    Regular rate and rhythm,  No murmur   Abdomen:    Soft, no distention, no tenderness on palpation, no masses, no organomegaly     Extremities:  No edema, no joint swelling or redness, no evidence of any injuries   Skin:  No concerning skin findings, no suspicious moles, no rashes   Neurologic:  On gross examination there is no motor or sensory deficit.  Patient walks with a normal gait                                               Answers for HPI/ROS submitted by the patient on 8/17/2021  If you checked off any problems, how difficult have these problems made it for you to do your work, take care of things at home, or get along with other people?: Very difficult  PHQ9 TOTAL SCORE: 16

## 2021-08-18 ASSESSMENT — PATIENT HEALTH QUESTIONNAIRE - PHQ9: SUM OF ALL RESPONSES TO PHQ QUESTIONS 1-9: 16

## 2021-08-19 LAB
ANA PAT SER IF-IMP: ABNORMAL
ANA SER QL IF: POSITIVE
ANA TITR SER IF: ABNORMAL {TITER}

## 2021-08-31 DIAGNOSIS — R76.8 POSITIVE ANA (ANTINUCLEAR ANTIBODY): Primary | ICD-10-CM

## 2021-08-31 DIAGNOSIS — R53.83 FATIGUE, UNSPECIFIED TYPE: ICD-10-CM

## 2021-08-31 DIAGNOSIS — M79.10 MYALGIA: ICD-10-CM

## 2021-08-31 DIAGNOSIS — M25.50 MULTIPLE JOINT PAIN: ICD-10-CM

## 2021-09-03 ENCOUNTER — HOSPITAL ENCOUNTER (EMERGENCY)
Facility: CLINIC | Age: 31
Discharge: HOME | End: 2021-09-03
Attending: EMERGENCY MEDICINE | Admitting: EMERGENCY MEDICINE
Payer: COMMERCIAL

## 2021-09-03 ENCOUNTER — APPOINTMENT (OUTPATIENT)
Dept: RADIOLOGY | Facility: CLINIC | Age: 31
End: 2021-09-03
Payer: COMMERCIAL

## 2021-09-03 VITALS
WEIGHT: 315 LBS | RESPIRATION RATE: 16 BRPM | HEART RATE: 82 BPM | DIASTOLIC BLOOD PRESSURE: 97 MMHG | TEMPERATURE: 97.2 F | BODY MASS INDEX: 38.36 KG/M2 | OXYGEN SATURATION: 96 % | HEIGHT: 76 IN | SYSTOLIC BLOOD PRESSURE: 147 MMHG

## 2021-09-03 DIAGNOSIS — S90.01XA CONTUSION OF RIGHT ANKLE, INITIAL ENCOUNTER: ICD-10-CM
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
## 2021-09-03 PROCEDURE — 250N000013 HC RX MED GY IP 250 OP 250 PS 637: Performed by: EMERGENCY MEDICINE

## 2021-09-03 PROCEDURE — 99283 EMERGENCY DEPT VISIT LOW MDM: CPT

## 2021-09-03 PROCEDURE — 73610 X-RAY EXAM OF ANKLE: CPT | Mod: RT

## 2021-09-03 RX ORDER — IBUPROFEN 600 MG/1
600 TABLET, FILM COATED ORAL ONCE
Status: COMPLETED | OUTPATIENT
Start: 2021-09-03 | End: 2021-09-03

## 2021-09-03 RX ADMIN — IBUPROFEN 600 MG: 600 TABLET, FILM COATED ORAL at 02:27

## 2021-09-03 ASSESSMENT — MIFFLIN-ST. JEOR: SCORE: 2631.41
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
## 2021-09-18 ENCOUNTER — HEALTH MAINTENANCE LETTER (OUTPATIENT)
Age: 31
End: 2021-09-18

## 2021-09-28 ENCOUNTER — VIRTUAL VISIT (OUTPATIENT)
Dept: FAMILY MEDICINE | Facility: CLINIC | Age: 31
End: 2021-09-28
Payer: COMMERCIAL

## 2021-09-28 DIAGNOSIS — M25.50 MULTIPLE JOINT PAIN: ICD-10-CM

## 2021-09-28 DIAGNOSIS — R76.8 POSITIVE ANA (ANTINUCLEAR ANTIBODY): Primary | ICD-10-CM

## 2021-09-28 DIAGNOSIS — M25.562 CHRONIC PAIN OF LEFT KNEE: ICD-10-CM

## 2021-09-28 DIAGNOSIS — M79.10 MYALGIA: ICD-10-CM

## 2021-09-28 DIAGNOSIS — Z98.890 S/P LEFT KNEE SURGERY: Primary | ICD-10-CM

## 2021-09-28 DIAGNOSIS — G89.29 CHRONIC PAIN OF LEFT KNEE: ICD-10-CM

## 2021-09-28 PROCEDURE — 99214 OFFICE O/P EST MOD 30 MIN: CPT | Mod: TEL | Performed by: FAMILY MEDICINE

## 2021-09-28 NOTE — LETTER
09/28/21      Higinio Junggenevieve  1990          Higinio Wallace has been evaluated for pain in the lower extremities and requires the following restriction until October 12, 2021, 2 weeks from the date of this letter:    May perform usual duties as a  but must be limited in terms of extended walking.    Walking no more than a 30-minute total duration during a single work shift.    Walking for no more than 5 minutes at a time without being able to stand and rest for 5 minutes before additional walking is required.    If further information is required please contact my office      Raza Franklin MD, MD

## 2021-09-28 NOTE — PROGRESS NOTES
Higinio is a 31 year old who is being evaluated via a billable telephone visit.      What phone number would you like to be contacted at? [phone]  How would you like to obtain your AVS? Arabella Sylvester was seen today for musculoskeletal problem and form request.    Diagnoses and all orders for this visit:    Positive RAMANA (antinuclear antibody)    Multiple joint pain    Myalgia    Chronic pain of left knee       Formulate a more specific work restriction for minimizing walking during the course of work shift.  Duration of restrictions should be 2 weeks until evaluated further by orthopedic specialty provider and/or rheumatologist to formulate a more specific approach to the situation.  Recommend follow-up with me to ensure all issues are being addressed and to follow-up blood pressure as discussed below.    Subjective   Higinio is a 31 year old who has chronic pain in his left leg that required surgery with delayed healing.  He follows with orthopedic specialty provider and wants to have a repeat evaluation.  He reports an acute injury to his left leg but increased pain.  This injury occurred while at home.  He also has diffuse musculoskeletal pain including muscle pain and joint pain in the lower extremities of an unknown etiology for which she has follow-up planned with rheumatology on October 4, 2021 for evaluation.    He states he currently has a work restriction based on his prior leg surgery from his orthopedic specialty provider.  He states that this restriction necessitates the need for minimal walking.  He reports that his workplace seems to have difficulty being able to abide by this restriction and that he is required to do a fair amount of walking which exacerbates his pain.  He would like to formulate a new restriction that is more specific and conducive for avoiding increased pain during the course of his work shift.    He denies any redness pain swelling numbness tingling or other concerning symptoms  which required a more immediate evaluation.    Follow-up as planned with orthopedic specialty provider and rheumatology as mentioned.    Also of note has had elevated blood pressure readings recently.  Discussed a follow-up appointment to reevaluate blood pressure and potentially discussed treatment, based on evaluated information do not feel immediate action is necessary.    Review of Systems   Complete review of systems is obtained.  Other than the specific considerations noted above complete review of systems is negative.          Objective         Wt Readings from Last 3 Encounters:   09/03/21 (!) 156.5 kg (345 lb)   08/17/21 (!) 154.2 kg (340 lb)   08/10/21 (!) 156.5 kg (345 lb)        BP Readings from Last 6 Encounters:   09/03/21 (!) 147/97   08/17/21 128/78   08/07/21 (!) 156/90   01/03/21 (!) 150/82   11/14/20 112/67   11/04/20 135/84        Hemoglobin A1C   Date Value Ref Range Status   08/17/2021 5.5 0.0 - 5.6 % Final     Comment:     Normal <5.7%   Prediabetes 5.7-6.4%    Diabetes 6.5% or higher     Note: Adopted from ADA consensus guidelines.   11/04/2020 5.4 0 - 5.6 % Final     Comment:     Normal <5.7% Prediabetes 5.7-6.4%  Diabetes 6.5% or higher - adopted from ADA   consensus guidelines.          Vitals:  No vitals were obtained today due to virtual visit.    Physical Exam   healthy, alert and no distress  PSYCH: Alert and oriented times 3; coherent speech, normal   rate and volume, able to articulate logical thoughts, able   to abstract reason, no tangential thoughts, no hallucinations   or delusions  His affect is normal  RESP: No cough, no audible wheezing, able to talk in full sentences  Remainder of exam unable to be completed due to telephone visits                Phone call duration: 12 minutes

## 2021-10-08 PROCEDURE — C9803 HOPD COVID-19 SPEC COLLECT: HCPCS

## 2021-10-08 PROCEDURE — 99283 EMERGENCY DEPT VISIT LOW MDM: CPT

## 2021-10-09 ENCOUNTER — HOSPITAL ENCOUNTER (EMERGENCY)
Facility: CLINIC | Age: 31
Discharge: HOME | End: 2021-10-09
Attending: EMERGENCY MEDICINE | Admitting: EMERGENCY MEDICINE
Payer: COMMERCIAL

## 2021-10-09 VITALS
DIASTOLIC BLOOD PRESSURE: 79 MMHG | RESPIRATION RATE: 20 BRPM | OXYGEN SATURATION: 98 % | BODY MASS INDEX: 42.6 KG/M2 | TEMPERATURE: 99 F | SYSTOLIC BLOOD PRESSURE: 132 MMHG | WEIGHT: 315 LBS | HEART RATE: 72 BPM

## 2021-10-09 DIAGNOSIS — M79.10 MUSCLE ACHE: ICD-10-CM

## 2021-10-09 LAB — SARS-COV-2 RNA RESP QL NAA+PROBE: NEGATIVE

## 2021-10-09 PROCEDURE — U0003 INFECTIOUS AGENT DETECTION BY NUCLEIC ACID (DNA OR RNA); SEVERE ACUTE RESPIRATORY SYNDROME CORONAVIRUS 2 (SARS-COV-2) (CORONAVIRUS DISEASE [COVID-19]), AMPLIFIED PROBE TECHNIQUE, MAKING USE OF HIGH THROUGHPUT TECHNOLOGIES AS DESCRIBED BY CMS-2020-01-R: HCPCS | Performed by: PHYSICIAN ASSISTANT

## 2021-10-09 PROCEDURE — 250N000013 HC RX MED GY IP 250 OP 250 PS 637: Performed by: EMERGENCY MEDICINE

## 2021-10-09 RX ORDER — CYCLOBENZAPRINE HCL 10 MG
10 TABLET ORAL 3 TIMES DAILY PRN
Qty: 20 TABLET | Refills: 0 | Status: SHIPPED | OUTPATIENT
Start: 2021-10-09 | End: 2021-10-15

## 2021-10-09 RX ORDER — CYCLOBENZAPRINE HCL 10 MG
10 TABLET ORAL ONCE
Status: COMPLETED | OUTPATIENT
Start: 2021-10-09 | End: 2021-10-09

## 2021-10-09 RX ADMIN — CYCLOBENZAPRINE HYDROCHLORIDE 10 MG: 10 TABLET, FILM COATED ORAL at 04:03

## 2021-10-09 ASSESSMENT — ENCOUNTER SYMPTOMS
SHORTNESS OF BREATH: 0
ABDOMINAL PAIN: 0
FEVER: 0
MYALGIAS: 1
ABDOMINAL DISTENTION: 1

## 2021-10-09 NOTE — Clinical Note
Higinio Wallace was seen and treated in our emergency department on 10/8/2021.  He may return to work on 10/23/2021.  Please accommodate patient by mainly standing positions while working.  Minimal walking, ok up to 15-30 minutes.       If you have any questions or concerns, please don't hesitate to call.      Bruce Dawson MD

## 2021-10-09 NOTE — ED TRIAGE NOTES
Here for pleuritic pain, has minor dry cough, body aches.when starts coughing body aches are worse.  Has muscle soreness and minor headache.

## 2021-10-09 NOTE — DISCHARGE INSTRUCTIONS
Take medication as prescribed. Follow-up with your rheumatologist as planned to continue outpatient work-up of the symptoms. Follow-up on your pending COVID-19 testing results. This will post to my chart. You will receive a phone call if it is positive. If you have escalation your symptoms or develop additional concern return to the emergency department repeat assessment.

## 2021-10-09 NOTE — ED PROVIDER NOTES
EMERGENCY DEPARTMENT ENCOUNTER      NAME: Higinio Wallace  AGE: 31 year old male  YOB: 1990  MRN: [id number]  EVALUATION DATE & TIME: 10/9/2021  3:23 AM    PCP: Raza Franklin    ED PROVIDER: PATY Dawson    Chief Complaint   Patient presents with     Generalized Body Aches     FINAL IMPRESSION:  1. Muscle ache      ED COURSE & MEDICAL DECISION MAKING:    Pertinent Labs & Imaging studies reviewed. (See chart for details)  31 year old male presents to the Emergency Department for evaluation of body aches.  Patient has had the symptoms for months.  They wax and wane in intensity.  Currently being assessed on an outpatient basis for possibly rheumatoid arthritis with a positive RAMANA x2 on laboratory testing.  On examination patient was well-appearing.  His vital signs were unremarkable.  His clinical exam was unremarkable.  He requested COVID-19 testing which was sent off but I do not think that his symptoms or exam are consistent with an acute COVID-19 testing result.  He will follow-up on this pending test results.  Patient was most interested in receiving a muscle relaxant to aid in his symptoms.  I think that is reasonable.  He will be prescribed Flexeril to see if this helps alleviate some of his discomfort.  He is her chronic issues.  I did not see acute findings on clinical history examination that warranted any further work-up in the emergency department.  He also was interested in work note to limit his activities which I also felt was reasonable pending his additional follow-up.  Plan of care for discharge.  Flexeril prescription.  Follow-up rheumatology.  Reviewed return precautions prior to discharge.    3:34 AM I met with the patient, obtained history, performed an initial exam, and discussed options and plan for diagnostics and treatment here in the ED. PPE worn including N95 mask, face shield, surgical gloves, surgical gown.    Plan and all results were discussed  Time was taken to answer  all questions. Patient and/or associated parties expressed understanding and were agreeable to the treatment plan.  Warning signs and ER return precautions provided. Discharged with discharge instructions outlining plan for further care and follow up.     MEDICATIONS GIVEN IN THE EMERGENCY:  New Prescriptions    CYCLOBENZAPRINE (FLEXERIL) 10 MG TABLET    Take 1 tablet (10 mg) by mouth 3 times daily as needed for muscle spasms       NEW PRESCRIPTIONS STARTED AT TODAY'S ER VISIT  Patient's Medications   New Prescriptions    CYCLOBENZAPRINE (FLEXERIL) 10 MG TABLET    Take 1 tablet (10 mg) by mouth 3 times daily as needed for muscle spasms   Previous Medications    ACETAMINOPHEN (TYLENOL) 500 MG TABLET    Take 2 tablets (1,000 mg) by mouth every 8 hours as needed for pain    ALBUTEROL (ALBUTEROL) 108 (90 BASE) MCG/ACT INHALER    Inhale 2 puffs into the lungs every 4 hours as needed for shortness of breath / dyspnea or wheezing    METFORMIN (GLUCOPHAGE) 500 MG TABLET    Take 500 mg by mouth 2 times daily (with meals)     POLYETHYLENE GLYCOL (MIRALAX) 17 G PACKET    Take 17 g by mouth daily   Modified Medications    No medications on file   Discontinued Medications    No medications on file          =================================================================    HPI    Patient information was obtained from: the patient    Use of Intrepreter: N/A        Higinio Wallace is a 31 year old male with a pertinent medical history of obesity, IBS, left knee surgery, depression, PTSD, anxiety, depression, psychosis who presents by walk in for the evaluation of body aches, myalgias. Patient reports he has been having body aches, myalgias for the past several months. Reports his pain is palliated by standing and provoked by sitting for long periods of time and walking for long distances. He states he has been evaluated for his symptoms multiple times, but the cause of his symptoms has not been found. Currently, he wishes to have  muscle relaxants prescribed. He also endorses some abdominal bloating. Patient is vaccinated for COVID-19.    Patient states he is currently awaiting the results of a rheumatoid arthritis workup given a positive RAMANA and is scheduled to see his rheumatologist next month for follow up.    Denies fever, chest pain, shortness of breath, abdominal pain      REVIEW OF SYSTEMS   Review of Systems   Constitutional: Negative for fever.        Positive for body aches.   Respiratory: Negative for shortness of breath.    Gastrointestinal: Positive for abdominal distention. Negative for abdominal pain.   Musculoskeletal: Positive for myalgias.   All other systems reviewed and are negative.      PAST MEDICAL HISTORY:  Past Medical History:   Diagnosis Date     Anxiety      Class 2 obesity due to excess calories in adult      Complication of anesthesia      Depression      Gastroesophageal reflux disease with esophagitis      History of nephrolithiasis      Irritable bowel syndrome      Major depression, recurrent (H)      Malrotation of intestine      Manic disorder (H)      Panic disorder      Patellofemoral instability of left knee with pain      Pre-diabetes      Psychosis (H)      PTSD (post-traumatic stress disorder)      PTSD (post-traumatic stress disorder)      Tobacco use        PAST SURGICAL HISTORY:  Past Surgical History:   Procedure Laterality Date     ARTHROSCOPY KNEE WITH PATELLAR REALIGNMENT Left 11/13/2020    Procedure: Knee Arthroscopy, Medial Patello-femoral Ligament Reconstruction with Allograft, Distal Tibial Tubercle Osteotomy, Lateral Retinacular Lengthening;  Surgeon: Sourav Keating MD;  Location: [facility] OR     ARTHROTOMY TIBIAL TUBERCLE SHIFT Left 11/13/2020    Procedure: tibial tubercle Osteotomy ;  Surgeon: Sourav Keating MD;  Location: [facility] OR     COLONOSCOPY N/A 10/07/2020    Procedure: COLONOSCOPY, WITH POLYPECTOMY AND BIOPSY;  Surgeon: Donell Holland MD;  Location: [facility] OR      deviated septum  2018     KNEE SURGERY Left      OPEN REDUCTION INTERNAL FIXATION RODDING INTRAMEDULLARY TIBIA Left 11/13/2020    Procedure: plus derotation tibial osteotomy;  Surgeon: Sourav Keating MD;  Location: [facility] OR       ALLERGIES:  Allergies   Allergen Reactions     Bee Venom Anaphylaxis and Other (See Comments)     Swelling  Large local reactions/ swelling       Fruit Acid Concentrate [Fruit Extracts] Swelling     Lips swell and crust over little bit- tongue gets numb     Liquid Adhesive Dermatitis     And band aid       Monosodium Glutamate Hives       FAMILY HISTORY:  Family History   Problem Relation Age of Onset     Other - See Comments Mother         PONV     Cerebrovascular Disease Brother      Atrial fibrillation Brother      Other - See Comments Brother         cardiac autoimmune deficiency       SOCIAL HISTORY:   Social History     Socioeconomic History     Marital status: Single     Spouse name: Not on file     Number of children: Not on file     Years of education: Not on file     Highest education level: Not on file   Occupational History     Not on file   Tobacco Use     Smoking status: Current Some Day Smoker     Packs/day: 0.25     Types: Cigarettes     Start date: 2001     Smokeless tobacco: Never Used     Tobacco comment: 1 cig a day trying to quit   Substance and Sexual Activity     Alcohol use: Not Currently     Drug use: Yes     Types: Marijuana     Comment: occasionally     Sexual activity: Yes     Partners: Female     Birth control/protection: Condom   Other Topics Concern     Parent/sibling w/ CABG, MI or angioplasty before 65F 55M? Not Asked   Social History Narrative     Not on file     Social Determinants of Health     Financial Resource Strain:      Difficulty of Paying Living Expenses:    Food Insecurity:      Worried About Running Out of Food in the Last Year:      Ran Out of Food in the Last Year:    Transportation Needs:      Lack of Transportation (Medical):      Lack  of Transportation (Non-Medical):    Physical Activity:      Days of Exercise per Week:      Minutes of Exercise per Session:    Stress:      Feeling of Stress :    Social Connections:      Frequency of Communication with Friends and Family:      Frequency of Social Gatherings with Friends and Family:      Attends Sikh Services:      Active Member of Clubs or Organizations:      Attends Club or Organization Meetings:      Marital Status:    Intimate Partner Violence:      Fear of Current or Ex-Partner:      Emotionally Abused:      Physically Abused:      Sexually Abused:        VITALS:  Patient Vitals for the past 24 hrs:   BP Temp Temp src Pulse Resp SpO2 Weight   10/09/21 0415 132/79 -- -- 72 -- 98 % --   10/09/21 0400 136/77 -- -- 80 -- 97 % --   10/09/21 0345 (!) 152/89 -- -- 68 -- 98 % --   10/09/21 0330 (!) 146/82 -- -- 76 -- 97 % --   10/08/21 2319 (!) 128/107 99  F (37.2  C) Oral 71 20 97 % (!) 158.8 kg (350 lb)       PHYSICAL EXAM    Constitutional:  Well developed, Well nourished, NAD  HENT:  Normocephalic, Atraumatic, Bilateral external ears normal, Oropharynx moist Nose normal.   Neck: Normal range of motion   Eyes: Conjunctiva normal, No discharge.   Respiratory:  Normal breath sounds, No respiratory distress, No wheezing.  No cough.  Cardiovascular:  Normal heart rate, Normal rhythm. No murmur appreciated.  Chest wall non-tender.  GI:  Soft, No tenderness, No masses, No flank tenderness.  No rebound or guarding.  : No CVA tenderness  Musculoskeletal: Full ROM.  No edema appreciated.  No cyanosis. Good ROM of major joints.  Integument:  Warm, Dry, No erythema, No rash.    Neurologic:  Alert & oriented.  No focal deficits appreciated.  Ambulatory.  Psychiatric:  Affect normal, Judgment normal, Mood normal.     I, Gonzalo Deshpande, am serving as a scribe to document services personally performed by Dr. Dawson based on my observation and the provider's statements to me. I, Bruce Dawson,  MD attest that Gonzalo Deshpande is acting in a scribe capacity, has observed my performance of the services and has documented them in accordance with my direction.    Bruce Dawson M.D.  Emergency Medicine  [facility] EMERGENCY ROOM  [address] [address]  [phone]  Dept: [phone]     Bruce Dawson MD  10/09/21 0443

## 2021-10-15 VITALS — BODY MASS INDEX: 38.36 KG/M2 | HEIGHT: 76 IN | WEIGHT: 315 LBS

## 2021-10-15 ASSESSMENT — MIFFLIN-ST. JEOR: SCORE: 2654.09

## 2021-10-15 NOTE — PROGRESS NOTES
Higinio is a 31 year old who is being evaluated via a billable video visit.      How would you like to obtain your AVS? MyChart  If the video visit is dropped, the invitation should be resent by: Text to cell phone: [phone]  Will anyone else be joining your video visit? No      Video Start Time: 11:11 AM  Video-Visit Details    Type of service:  Video Visit    Video End Time:12:08 PM    Originating Location (pt. Location): Home    Distant Location (provider location):  [facility]     Platform used for Video Visit: [employer]

## 2021-10-15 NOTE — PATIENT INSTRUCTIONS
"      MY TREATMENT INFORMATION FOR SLEEP APNEA-  Higinio Wallace    DOCTOR : Bennett Goltz, PA-C    Am I having a sleep study at a sleep center?  --->Due to insurance clearance delays, you will be contacted within 2-4 weeks to schedule    Am I having a home sleep study?  --->Watch the video for the device you are using:    -/drop off device-   https://www.[employer].com/watch?v=yGGFBdELGhk    -Disposable device sent out require phone/computer application-   https://www.[employer].com/watch?v=BCce_vbiwxE      Frequently asked questions:  1. What is Obstructive Sleep Apnea (CARSON)? CARSON is the most common type of sleep apnea. Apnea means, \"without breath.\"  Apnea is most often caused by narrowing or collapse of the upper airway as muscles relax during sleep.   Almost everyone has occasional apneas. Most people with sleep apnea have had brief interruptions at night frequently for many years.  The severity of sleep apnea is related to how frequent and severe the events are.   2. What are the consequences of CARSON? Symptoms include: feeling sleepy during the day, snoring loudly, gasping or stopping of breathing, trouble sleeping, and occasionally morning headaches or heartburn at night.  Sleepiness can be serious and even increase the risk of falling asleep while driving. Other health consequences may include development of high blood pressure and other cardiovascular disease in persons who are susceptible. Untreated CARSON  can contribute to heart disease, stroke and diabetes.   3. What are the treatment options? In most situations, sleep apnea is a lifelong disease that must be managed with daily therapy. Medications are not effective for sleep apnea and surgery is generally not considered until other therapies have been tried. Your treatment is your choice . Continuous Positive Airway (CPAP) works right away and is the therapy that is effective in nearly everyone. An oral device to hold your jaw forward is usually the next " most reliable option. Other options include postioning devices (to keep you off your back), weight loss, and surgery including a tongue pacing device. There is more detail about some of these options below.  4. Are my sleep studies covered by insurance? Although we will request verification of coverage, we advise you also check in advance of the study to ensure there is coverage.    Important tips for those choosing CPAP and similar devices   Know your equipment:  CPAP is continuous positive airway pressure that prevents obstructive sleep apnea by keeping the throat from collapsing while you are sleeping. In most cases, the device is  smart  and can slowly self-adjusts if your throat collapses and keeps a record every day of how well you are treated-this information is available to you and your care team.  BPAP is bilevel positive airway pressure that keeps your throat open and also assists each breath with a pressure boost to maintain adequate breathing.  Special kinds of BPAP are used in patients who have inadequate breathing from lung or heart disease. In most cases, the device is  smart  and can slowly self-adjusts to assist breathing. Like CPAP, the device keeps a record of how well you are treated.  Your mask is your connection to the device. You get to choose what feels most comfortable and the staff will help to make sure if fits. Here: are some examples of the different masks that are available:       Key points to remember on your journey with sleep apnea:  1. Sleep study.  PAP devices often need to be adjusted during a sleep study to show that they are effective and adjusted right.  2. Good tips to remember: Try wearing just the mask during a quiet time during the day so your body adapts to wearing it. A humidifier is recommended for comfort in most cases to prevent drying of your nose and throat. Allergy medication from your provider may help you if you are having nasal congestion.  3. Getting  settled-in. It takes more than one night for most of us to get used to wearing a mask. Try wearing just the mask during a quiet time during the day so your body adapts to wearing it. A humidifier is recommended for comfort in most cases. Our team will work with you carefully on the first day and will be in contact within 4 days and again at 2 and 4 weeks for advice and remote device adjustments. Your therapy is evaluated by the device each day.   4. Use it every night. The more you are able to sleep naturally for 7-8 hours, the more likely you will have good sleep and to prevent health risks or symptoms from sleep apnea. Even if you use it 4 hours it helps. Occasionally all of us are unable to use a medical therapy, in sleep apnea, it is not dangerous to miss one night.   5. Communicate. Call our skilled team on the number provided on the first day if your visit for problems that make it difficult to wear the device. Over 2 out of 3 patients can learn to wear the device long-term with help from our team. Remember to call our team or your sleep providers if you are unable to wear the device as we may have other solutions for those who cannot adapt to mask CPAP therapy. It is recommended that you sleep your sleep provider within the first 3 months and yearly after that if you are not having problems.   6. Use it for your health. We encourage use of CPAP masks during daytime quiet periods to allow your face and brain to adapt to the sensation of CPAP so that it will be a more natural sensation to awaken to at night or during naps. This can be very useful during the first few weeks or months of adapting to CPAP though it does not help medically to wear CPAP during wakefulness and  should not be used as a strategy just to meet guidelines.  7. Take care of your equipment. Make sure you clean your mask and tubing using directions every day and that your filter and mask are replaced as recommended or if they are not  working.     BESIDES CPAP, WHAT OTHER THERAPIES ARE THERE?    Positioning Device  Positioning devices are generally used when sleep apnea is mild and only occurs on your back.This example shows a pillow that straps around the waist. It may be appropriate for those whose sleep study shows milder sleep apnea that occurs primarily when lying flat on one's back. Preliminary studies have shown benefit but effectiveness at home may need to be verified by a home sleep test. These devices are generally not covered by medical insurance.  Examples of devices that maintain sleeping on the back to prevent snoring and mild sleep apnea.    Belt type body positioner  http://Szl.it.[employer]junky/    Electronic reminder  http://nightshifttherapy.com/  http://www.[employer]foodjunky.au/      Oral Appliance  What is oral appliance therapy?  An oral appliance device fits on your teeth at night like a retainer used after having braces. The device is made by a specialized dentist and requires several visits over 1-2 months before a manufactured device is made to fit your teeth and is adjusted to prevent your sleep apnea. Once an oral device is working properly, snoring should be improved. A home sleep test may be recommended at that time if to determine whether the sleep apnea is adequately treated.       Some things to remember:  -Oral devices are often, but not always, covered by your medical insurance. Be sure to check with your insurance provider.   -If you are referred for oral therapy, you will be given a list of specialized dentists to consider or you may choose to visit the Web site of the American Academy of Dental Sleep Medicine  -Oral devices are less likely to work if you have severe sleep apnea or are extremely overweight.     More detailed information  An oral appliance is a small acrylic device that fits over the upper and lower teeth  (similar to a retainer or a mouth guard). This device slightly moves jaw forward, which moves the base  of the tongue forward, opens the airway, improves breathing for effective treat snoring and obstructive sleep apnea in perhaps 7 out of 10 people .  The best working devices are custom-made by a dental device  after a mold is made of the teeth 1, 2, 3.  When is an oral appliance indicated?  Oral appliance therapy is recommended as a first-line treatment for patients with primary snoring, mild sleep apnea, and for patients with moderate sleep apnea who prefer appliance therapy to use of CPAP4, 5. Severity of sleep apnea is determined by sleep testing and is based on the number of respiratory events per hour of sleep.   How successful is oral appliance therapy?  The success rate of oral appliance therapy in patients with mild sleep apnea is 75-80% while in patients with moderate sleep apnea it is 50-70%. The chance of success in patients with severe sleep apnea is 40-50%. The research also shows that oral appliances have a beneficial effect on the cardiovascular health of CARSON patients at the same magnitude as CPAP therapy7.  Oral appliances should be a second-line treatment in cases of severe sleep apnea, but if not completely successful then a combination therapy utilizing CPAP plus oral appliance therapy may be effective. Oral appliances tend to be effective in a broad range of patients although studies show that the patients who have the highest success are females, younger patients, those with milder disease, and less severe obesity. 3, 6.   Finding a dentist that practices dental sleep medicine  Specific training is available through the American Academy of Dental Sleep Medicine for dentists interested in working in the field of sleep. To find a dentist who is educated in the field of sleep and the use of oral appliances, near you, visit the Web site of the American Academy of Dental Sleep Medicine.    References  1. evy Stauffer al. Objectively measured vs self-reported compliance during oral  appliance therapy for sleep-disordered breathing. Chest 2013; 144(5): 0654-0799.  2. Rosey, et al. Objective measurement of compliance during oral appliance therapy for sleep-disordered breathing. Thorax 2013; 68(1): 91-96.  3. Fanny et al. Mandibular advancement devices in 620 men and women with CARSON and snoring: tolerability and predictors of treatment success. Chest 2004; 125: 5939-2362.  4. Janice, et al. Oral appliances for snoring and CARSON: a review. Sleep 2006; 29: 244-262.  5. Jamila et al. Oral appliance treatment for CARSON: an update. J Clin Sleep Med 2014; 10(2): 215-227.  6. Efra et al. Predictors of OSAH treatment outcome. J Dent Res 2007; 86: 6912-9745.      Weight Loss:    Weight loss is a long-term strategy that may improve sleep apnea in some patients.    Weight management is a personal decision and the decision should be based on your interest and the potential benefits.  If you are interested in exploring weight loss strategies, the following discussion covers the impact on weight loss on sleep apnea and the approaches that may be successful.    Being overweight does not necessarily mean you will have health consequences.  Those who have BMI over 35 or over 27 with existing medical conditions carries greater risk.   Weight loss decreases severity of sleep apnea in most people with obesity. For those with mild obesity who have developed snoring with weight gain, even 15-30 pound weight loss can improve and occasionally eliminate sleep apnea.  Structured and life-long dietary and health habits are necessary to lose weight and keep healthier weight levels.     Though there may be significant health benefits from weight loss, long-term weight loss is very difficult to achieve- studies show success with dietary management in less than 10% of people. In addition, substantial weight loss may require years of dietary control and may be difficult if patients have severe obesity. In these  cases, surgical management may be considered.  Finally, older individuals who have tolerated obesity without health complications may be less likely to benefit from weight loss strategies.        Your BMI is Body mass index is 42.6 kg/m .  Weight management is a personal decision.  If you are interested in exploring weight loss strategies, the following discussion covers the approaches that may be successful. Body mass index (BMI) is one way to tell whether you are at a healthy weight, overweight, or obese. It measures your weight in relation to your height.  A BMI of 18.5 to 24.9 is in the healthy range. A person with a BMI of 25 to 29.9 is considered overweight, and someone with a BMI of 30 or greater is considered obese. More than two-thirds of American adults are considered overweight or obese.  Being overweight or obese increases the risk for further weight gain. Excess weight may lead to heart disease and diabetes.  Creating and following plans for healthy eating and physical activity may help you improve your health.  Weight control is part of healthy lifestyle and includes exercise, emotional health, and healthy eating habits. Careful eating habits lifelong are the mainstay of weight control. Though there are significant health benefits from weight loss, long-term weight loss with diet alone may be very difficult to achieve- studies show long-term success with dietary management in less than 10% of people. Attaining a healthy weight may be especially difficult to achieve in those with severe obesity. In some cases, medications, devices and surgical management might be considered.  What can you do?  If you are overweight or obese and are interested in methods for weight loss, you should discuss this with your provider.     Consider reducing daily calorie intake by 500 calories.     Keep a food journal.     Avoiding skipping meals, consider cutting portions instead.    Diet combined with exercise helps  maintain muscle while optimizing fat loss. Strength training is particularly important for building and maintaining muscle mass. Exercise helps reduce stress, increase energy, and improves fitness. Increasing exercise without diet control, however, may not burn enough calories to loose weight.       Start walking three days a week 10-20 minutes at a time    Work towards walking thirty minutes five days a week     Eventually, increase the speed of your walking for 1-2 minutes at time    In addition, we recommend that you review healthy lifestyles and methods for weight loss available through the National Institutes of Health patient information sites:  http://win.niddk.nih.gov/publications/index.htm    And look into health and wellness programs that may be available through your health insurance provider, employer, local community center, or jenn club.    Weight management plan: Patient was referred to their PCP to discuss a diet and exercise plan.    Surgery:    Surgery for obstructive sleep apnea is considered generally only when other therapies fail to work. Surgery may be discussed with you if you are having a difficult time tolerating CPAP and or when there is an abnormal structure that requires surgical correction.  Nose and throat surgeries often enlarge the airway to prevent collapse.  Most of these surgeries create pain for 1-2 weeks and up to half of the most common surgeries are not effective throughout life.  You should carefully discuss the benefits and drawbacks to surgery with your sleep provider and surgeon to determine if it is the best solution for you.   More information  Surgery for CARSON is directed at areas that are responsible for narrowing or complete obstruction of the airway during sleep.  There are a wide range of procedures available to enlarge and/or stabilize the airway to prevent blockage of breathing in the three major areas where it can occur: the palate, tongue, and nasal regions.   Successful surgical treatment depends on the accurate identification of the factors responsible for obstructive sleep apnea in each person.  A personalized approach is required because there is no single treatment that works well for everyone.  Because of anatomic variation, consultation with an examination by a sleep surgeon is a critical first step in determining what surgical options are best for each patient.  In some cases, examination during sedation may be recommended in order to guide the selection of procedures.  Patients will be counseled about risks and benefits as well as the typical recovery course after surgery. Surgery is typically not a cure for a person s CARSON.  However, surgery will often significantly improve one s CARSON severity (termed  success rate ).  Even in the absence of a cure, surgery will decrease the cardiovascular risk associated with OSA7; improve overall quality of life8 (sleepiness, functionality, sleep quality, etc).      Palate Procedures:  Patients with CARSON often have narrowing of their airway in the region of their tonsils and uvula.  The goals of palate procedures are to widen the airway in this region as well as to help the tissues resist collapse.  Modern palate procedure techniques focus on tissue conservation and soft tissue rearrangement, rather than tissue removal.  Often the uvula is preserved in this procedure. Residual sleep apnea is common in patient after pharyngoplasty with an average reduction in sleep apnea events of 33%2.      Tongue Procedures:  ExamWhile patients are awake, the muscles that surround the throat are active and keep this region open for breathing. These muscles relax during sleep, allowing the tongue and other structures to collapse and block breathing.  There are several different tongue procedures available.  Selection of a tongue base procedure depends on characteristics seen on physical exam.  Generally, procedures are aimed at removing bulky  tissues in this area or preventing the back of the tongue from falling back during sleep.  Success rates for tongue surgery range from 50-62%3.    Hypoglossal Nerve Stimulation:  Hypoglossal nerve stimulation has recently received approval from the [place] Food and Drug Administration for the treatment of obstructive sleep apnea.  This is based on research showing that the system was safe and effective in treating sleep apnea6.  Results showed that the median AHI score decreased 68%, from 29.3 to 9.0. This therapy uses an implant system that senses breathing patterns and delivers mild stimulation to airway muscles, which keeps the airway open during sleep.  The system consists of three fully implanted components: a small generator (similar in size to a pacemaker), a breathing sensor, and a stimulation lead.  Using a small handheld remote, a patient turns the therapy on before bed and off upon awakening.    Candidates for this device must be greater than 22 years of age, have moderate to severe CARSON (AHI between 20-65), BMI less than 32, have tried CPAP/oral appliance without success, and have appropriate upper airway anatomy (determined by a sleep endoscopy performed by Dr. Turner).    Hypoglossal Nerve Stimulation Pathway:    The sleep surgeon s office will work with the patient through the insurance prior-authorization process (including communications and appeals).    Nasal Procedures:  Nasal obstruction can interfere with nasal breathing during the day and night.  Studies have shown that relief of nasal obstruction can improve the ability of some patients to tolerate positive airway pressure therapy for obstructive sleep apnea1.  Treatment options include medications such as nasal saline, topical corticosteroid and antihistamine sprays, and oral medications such as antihistamines or decongestants. Non-surgical treatments can include external nasal dilators for selected patients. If these are not successful by  themselves, surgery can improve the nasal airway either alone or in combination with these other options.      Combination Procedures:  Combination of surgical procedures and other treatments may be recommended, particularly if patients have more than one area of narrowing or persistent positional disease.  The success rate of combination surgery ranges from 66-80%2,3.    References  1. Damián RUBIO. The Role of the Nose in Snoring and Obstructive Sleep Apnoea: An Update.  Eur Arch Otorhinolaryngol. 2011; 268: 1365-73.  2.  Milka SM; Wilner JA; Raúl JR; Pallanch JF; Elías MB; Daysi SG; Iván NOGUERA. Surgical modifications of the upper airway for obstructive sleep apnea in adults: a systematic review and meta-analysis. SLEEP 2010;33(10):2066-6064. Josiane CARLOS. Hypopharyngeal surgery in obstructive sleep apnea: an evidence-based medicine review.  Arch Otolaryngol Head Neck Surg. 2006 Feb;132(2):206-13.  3. Titus YH1, Chaparrita Y, Dillon CARLOS. The efficacy of anatomically based multilevel surgery for obstructive sleep apnea. Otolaryngol Head Neck Surg. 2003 Oct;129(4):327-35.  4. Josiane CARLOS, Goldberg A. Hypopharyngeal Surgery in Obstructive Sleep Apnea: An Evidence-Based Medicine Review. Arch Otolaryngol Head Neck Surg. 2006 Feb;132(2):206-13.  5. Florecita VILLA et al. Upper-Airway Stimulation for Obstructive Sleep Apnea.  N Engl J Med. 2014 Jan 9;370(2):139-49.  6. Griffin Y et al. Increased Incidence of Cardiovascular Disease in Middle-aged Men with Obstructive Sleep Apnea. Am J Respir Crit Care Med; 2002 166: 159-165  7. Auguste EM et al. Studying Life Effects and Effectiveness of Palatopharyngoplasty (SLEEP) study: Subjective Outcomes of Isolated Uvulopalatopharyngoplasty. Otolaryngol Head Neck Surg. 2011; 144: 623-631.    Drive Safe... Drive Alive     Sleep health profoundly affects your health, mood, and your safety.  Thirty three percent of the population (one in three of us) is not getting enough sleep and many have a sleep  disorder. Not getting enough sleep or having an untreated / undertreated sleep condition may make us sleepy without even knowing it. In fact, our driving could be dramatically impaired due to our sleep health. As your provider, here are some things I would like you to know about driving:     Here are some warning signs for impairment and dangerous drowsy driving:              -Having been awake more than 16 hours               -Looking tired               -Eyelid drooping              -Head nodding (it could be too late at this point)              -Driving for more than 30 minutes     Some things you could do to make the driving safer if you are experiencing some drowsiness:              -Stop driving and rest              -Call for transportation              -Make sure your sleep disorder is adequately treated     Some things that have been shown NOT to work when experiencing drowsiness while driving:              -Turning on the radio              -Opening windows              -Eating any  distracting  /  entertaining  foods (e.g., sunflower seeds, candy, or any other)              -Talking on the phone      Your decision may not only impact your life, but also the life of others. Please, remember to drive safe for yourself and all of us.    Instructions for treating Delayed Sleep Phase Syndrome:    Delayed Sleep Phase Syndrome (DSPS) means that your body's internal timing is set late compared to the 24 hour day. Therefore, it is often difficult to get up on time for work in the morning and sometimes difficult to fall asleep on time, in order to get enough sleep. People with DSPS often tend to like to stay up late on weekends and sleep in until between 10 AM and noon, sometimes even later.This is actually a bad habit that will perpetuate the problem. It reinforces your body's tendency to be on that later schedule.    You should go to bed when you are sleepy and ready to sleep. During this entire process, you  should not engage in activities that may make it worse, such as watching TV in bed, leaving the TV on all night, drinking any caffeine 6 hours before bed or exercising 1-2 hours before bed.     Start taking Melatonin, 1 mg tablet 3-5 hours before the time that you fall asleep on average (not your desired bedtime or time that you get in bed, but the time you normally fall asleep on your own).     Upon awakening, get exposure to sun-light for about 30-45 minutes. You do not need to look at the sun, in fact, this is dangerous. Reading the paper with the sun shining on you is adequate.  Alternatively, you may use a Seasonal Affective Disorder Lamp (intensity 10,000 Lux) instead of the sun. The lamp should be positioned 1-2 arms lengths away from you. They lamps are sold at [employer] such as [employer] or [employer]. A prescription can be written to get insurance coverage in some cases. They are also sold on [employer].    Using the light and melatonin should help march your internal clock (known as your circadian rhythms) gradually earlier. As your bedtime advances, remember to take your melatonin earlier, keeping it 5 hours before your fall asleep time.    Avoid naps and sleeping in because sleeping during the day will delay your body's clock and you will have to start from scratch.     More information about light therapy:  If the cost of any light box is too much, you can also purchase a compact fluorescent all spectrum light bulb at a local hardware store for about $8.  The most commonly available bulb is 1400 lumen.  You would need two of these positioned within 1 meter of yourself to be equivalent to 2,500 lux.  The bulbs can be placed in a standard light fixture.  Additionally, they can be placed in a mountable fixture that is used in greenhouses.  Mountable fixtures are also available at hardware stores for about $9.  Do not look directly at the light.  If you have any concerns  regarding the safety of bright light therapy for you, it is recommended that you consult an ophthalmologist before using a light box.  If you have a condition that makes your eyes very sensitive to light, macular degeneration, a family history of such problems, or diabetic changes to your eyes, consult an ophthalmologist before using a light box. If you have anxiety disorder and have an increase in anxiety discontinue use.
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
## 2021-10-18 ENCOUNTER — VIRTUAL VISIT (OUTPATIENT)
Dept: SLEEP MEDICINE | Facility: CLINIC | Age: 31
End: 2021-10-18
Attending: FAMILY MEDICINE
Payer: COMMERCIAL

## 2021-10-18 DIAGNOSIS — G47.21 DELAYED SLEEP PHASE SYNDROME: ICD-10-CM

## 2021-10-18 DIAGNOSIS — G47.00 INSOMNIA, UNSPECIFIED TYPE: ICD-10-CM

## 2021-10-18 DIAGNOSIS — R06.83 SNORING: ICD-10-CM

## 2021-10-18 DIAGNOSIS — R40.0 SLEEPINESS: Primary | ICD-10-CM

## 2021-10-18 PROCEDURE — 99205 OFFICE O/P NEW HI 60 MIN: CPT | Mod: 95 | Performed by: PHYSICIAN ASSISTANT

## 2021-10-19 ENCOUNTER — VIRTUAL VISIT (OUTPATIENT)
Dept: FAMILY MEDICINE | Facility: CLINIC | Age: 31
End: 2021-10-19
Payer: COMMERCIAL

## 2021-10-19 DIAGNOSIS — R73.03 PREDIABETES: ICD-10-CM

## 2021-10-19 DIAGNOSIS — K21.9 GASTROESOPHAGEAL REFLUX DISEASE WITHOUT ESOPHAGITIS: Primary | ICD-10-CM

## 2021-10-19 DIAGNOSIS — Q43.3 MALROTATION OF INTESTINE (H): ICD-10-CM

## 2021-10-19 DIAGNOSIS — F44.81 DISSOCIATIVE IDENTITY DISORDER (H): ICD-10-CM

## 2021-10-19 DIAGNOSIS — M79.10 MUSCLE PAIN: ICD-10-CM

## 2021-10-19 DIAGNOSIS — F43.10 PTSD (POST-TRAUMATIC STRESS DISORDER): ICD-10-CM

## 2021-10-19 PROCEDURE — 99214 OFFICE O/P EST MOD 30 MIN: CPT | Mod: TEL | Performed by: FAMILY MEDICINE

## 2021-10-19 RX ORDER — CYCLOBENZAPRINE HCL 10 MG
10 TABLET ORAL 3 TIMES DAILY PRN
Qty: 30 TABLET | Refills: 0
Start: 2021-10-19 | End: 2022-04-25

## 2021-10-19 NOTE — PROGRESS NOTES
Higinio is a 31 year old who is being evaluated via a billable telephone visit.      What phone number would you like to be contacted at? [phone]  How would you like to obtain your AVS? Arabella Sylvester was seen today for form request.    Diagnoses and all orders for this visit:    Gastroesophageal reflux disease without esophagitis  -     omeprazole (PRILOSEC) 20 MG DR capsule; Take 1 capsule (20 mg) by mouth daily    Muscle pain  -     cyclobenzaprine (FLEXERIL) 10 MG tablet; Take 1 tablet (10 mg) by mouth 3 times daily as needed for muscle spasms    PTSD (post-traumatic stress disorder)    Dissociative identity disorder (H)    Prediabetes    Malrotation of intestine           Subjective   Higinio is a 31 year old who who is evaluated via this telephone visit today to review his health history in relation to his desire to donate plasma.  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  I received paperwork asking to outline aspects of his complex medical concerns and asked that we schedule this visit to discuss this.    He forms clearly show his signature allowing authorization for release of information and I can confirm this with him.    We discussed that he has no diagnosis of obstructive sleep apnea.  He has a body mass index of 41 on last measurement here at the clinic.  He has a historical diagnosis of prediabetes with A1c of 5.5 obtained on August 17, 2021.  Takes Metformin 500 g twice daily.  He reports being diagnosed with PTSD related to his medication since childhood.  Patient reports intrusive thoughts of severe anxiety with some dissociative symptoms at times.  Reports that this is largely controlled he describes his symptoms as more moderate.  In terms of considerations related to diagnosis of plasma likely this would not confer a significant concern.  He has more complex mental health considerations.  He states he has been diagnosed with identity dissociative  disorder by a psychiatrist.  I do not have written records this is reported by patient.  He reports that these symptoms include mood swings impulsivity decreased focus paranoid ideology.  He reports his symptoms are moderate to severe at times.  He reports he deals with some type of symptom daily.  He states he can identify when symptoms occur and does have means to control them.  Whether this would be an interference with his ability to donate plasma is uncertain but he does have the capacity to make his own decisions and judgments.  He feels comfortable that he understands the risks and I see no reason to think that he would not of consenting to medical procedure of having plasmapheresis.  He has a history of congenital malrotation of the intestines.  This was seen on CT scan.  He states that at one point when he was examined at the facility where he plans to donate plasma he was told that he might have a problem with his liver.  Reviewing his records occasional liver problem, the last imaging test was from 2020 which shows new malrotation and no sign of other concern.  He has had liver tests performed as recent as August 2021 which did not show any liver testing abnormalities.    Documentation is completed.  We will send a medication list which was also updated noting that he takes medication currently for muscle pain and omeprazole for acid reflux.    HPI           Review of Systems   Complete review of systems is obtained.  Other than the specific considerations noted above complete review of systems is negative.        Objective           Vitals:  No vitals were obtained today due to virtual visit.    Physical Exam   healthy, alert and no distress  PSYCH: Alert and oriented times 3; coherent speech, normal   rate and volume, able to articulate logical thoughts, able   to abstract reason, no tangential thoughts, no hallucinations   or delusions  His affect is normal  RESP: No cough, no audible wheezing, able to  talk in full sentences  Remainder of exam unable to be completed due to telephone visits                Phone call duration: 22 minutes

## 2021-10-24 ENCOUNTER — HOSPITAL ENCOUNTER (EMERGENCY)
Facility: CLINIC | Age: 31
Discharge: HOME | End: 2021-10-24
Admitting: PHYSICIAN ASSISTANT
Payer: COMMERCIAL

## 2021-10-24 ENCOUNTER — APPOINTMENT (OUTPATIENT)
Dept: CT IMAGING | Facility: CLINIC | Age: 31
End: 2021-10-24
Payer: COMMERCIAL

## 2021-10-24 VITALS
BODY MASS INDEX: 38.36 KG/M2 | TEMPERATURE: 98.4 F | DIASTOLIC BLOOD PRESSURE: 74 MMHG | HEART RATE: 62 BPM | WEIGHT: 315 LBS | OXYGEN SATURATION: 99 % | SYSTOLIC BLOOD PRESSURE: 131 MMHG | RESPIRATION RATE: 18 BRPM | HEIGHT: 76 IN

## 2021-10-24 DIAGNOSIS — R10.13 EPIGASTRIC PAIN: ICD-10-CM

## 2021-10-24 DIAGNOSIS — M79.10 MYALGIA: ICD-10-CM

## 2021-10-24 LAB
ALBUMIN SERPL-MCNC: 3.6 G/DL (ref 3.5–5)
ALBUMIN UR-MCNC: NEGATIVE MG/DL
ALP SERPL-CCNC: 49 U/L (ref 45–120)
ALT SERPL W P-5'-P-CCNC: 37 U/L (ref 0–45)
ANION GAP SERPL CALCULATED.3IONS-SCNC: 8 MMOL/L (ref 5–18)
APPEARANCE UR: CLEAR
AST SERPL W P-5'-P-CCNC: 27 U/L (ref 0–40)
ATRIAL RATE - MUSE: 57 BPM
BILIRUB DIRECT SERPL-MCNC: 0.1 MG/DL
BILIRUB SERPL-MCNC: 0.4 MG/DL (ref 0–1)
BILIRUB UR QL STRIP: NEGATIVE
BUN SERPL-MCNC: 9 MG/DL (ref 8–22)
CALCIUM SERPL-MCNC: 8.9 MG/DL (ref 8.5–10.5)
CHLORIDE BLD-SCNC: 105 MMOL/L (ref 98–107)
CK SERPL-CCNC: 91 U/L (ref 30–190)
CO2 SERPL-SCNC: 23 MMOL/L (ref 22–31)
COLOR UR AUTO: ABNORMAL
CREAT SERPL-MCNC: 0.82 MG/DL (ref 0.7–1.3)
DIASTOLIC BLOOD PRESSURE - MUSE: NORMAL MMHG
ERYTHROCYTE [DISTWIDTH] IN BLOOD BY AUTOMATED COUNT: 12.8 % (ref 10–15)
GFR SERPL CREATININE-BSD FRML MDRD: >90 ML/MIN/1.73M2
GLUCOSE BLD-MCNC: 130 MG/DL (ref 70–125)
GLUCOSE BLDC GLUCOMTR-MCNC: 133 MG/DL (ref 70–99)
GLUCOSE UR STRIP-MCNC: NEGATIVE MG/DL
HCT VFR BLD AUTO: 46.9 % (ref 40–53)
HGB BLD-MCNC: 16.2 G/DL (ref 13.3–17.7)
HGB UR QL STRIP: NEGATIVE
INTERPRETATION ECG - MUSE: NORMAL
KETONES UR STRIP-MCNC: NEGATIVE MG/DL
LEUKOCYTE ESTERASE UR QL STRIP: NEGATIVE
LIPASE SERPL-CCNC: 22 U/L (ref 0–52)
MAGNESIUM SERPL-MCNC: 2 MG/DL (ref 1.8–2.6)
MCH RBC QN AUTO: 30.7 PG (ref 26.5–33)
MCHC RBC AUTO-ENTMCNC: 34.5 G/DL (ref 31.5–36.5)
MCV RBC AUTO: 89 FL (ref 78–100)
MUCOUS THREADS #/AREA URNS LPF: PRESENT /LPF
NITRATE UR QL: NEGATIVE
P AXIS - MUSE: 17 DEGREES
PH UR STRIP: 7 [PH] (ref 5–7)
PLATELET # BLD AUTO: 193 10E3/UL (ref 150–450)
POTASSIUM BLD-SCNC: 4.2 MMOL/L (ref 3.5–5)
PR INTERVAL - MUSE: 136 MS
PROT SERPL-MCNC: 6.6 G/DL (ref 6–8)
QRS DURATION - MUSE: 110 MS
QT - MUSE: 438 MS
QTC - MUSE: 426 MS
R AXIS - MUSE: 74 DEGREES
RBC # BLD AUTO: 5.28 10E6/UL (ref 4.4–5.9)
RBC URINE: 1 /HPF
SODIUM SERPL-SCNC: 136 MMOL/L (ref 136–145)
SP GR UR STRIP: 1.02 (ref 1–1.03)
SYSTOLIC BLOOD PRESSURE - MUSE: NORMAL MMHG
T AXIS - MUSE: 21 DEGREES
TROPONIN I SERPL-MCNC: <0.01 NG/ML (ref 0–0.29)
UROBILINOGEN UR STRIP-MCNC: <2 MG/DL
VENTRICULAR RATE- MUSE: 57 BPM
WBC # BLD AUTO: 5.7 10E3/UL (ref 4–11)
WBC URINE: <1 /HPF

## 2021-10-24 PROCEDURE — 250N000009 HC RX 250: Performed by: PHYSICIAN ASSISTANT

## 2021-10-24 PROCEDURE — 36415 COLL VENOUS BLD VENIPUNCTURE: CPT | Performed by: PHYSICIAN ASSISTANT

## 2021-10-24 PROCEDURE — 82550 ASSAY OF CK (CPK): CPT | Performed by: PHYSICIAN ASSISTANT

## 2021-10-24 PROCEDURE — 74177 CT ABD & PELVIS W/CONTRAST: CPT

## 2021-10-24 PROCEDURE — 250N000011 HC RX IP 250 OP 636: Performed by: PHYSICIAN ASSISTANT

## 2021-10-24 PROCEDURE — 96361 HYDRATE IV INFUSION ADD-ON: CPT

## 2021-10-24 PROCEDURE — 258N000003 HC RX IP 258 OP 636: Performed by: PHYSICIAN ASSISTANT

## 2021-10-24 PROCEDURE — 250N000013 HC RX MED GY IP 250 OP 250 PS 637: Performed by: PHYSICIAN ASSISTANT

## 2021-10-24 PROCEDURE — 96374 THER/PROPH/DIAG INJ IV PUSH: CPT | Mod: 59

## 2021-10-24 PROCEDURE — 83690 ASSAY OF LIPASE: CPT | Performed by: PHYSICIAN ASSISTANT

## 2021-10-24 PROCEDURE — 99285 EMERGENCY DEPT VISIT HI MDM: CPT | Mod: 25

## 2021-10-24 PROCEDURE — 82374 ASSAY BLOOD CARBON DIOXIDE: CPT | Performed by: PHYSICIAN ASSISTANT

## 2021-10-24 PROCEDURE — 83735 ASSAY OF MAGNESIUM: CPT | Performed by: PHYSICIAN ASSISTANT

## 2021-10-24 PROCEDURE — 81001 URINALYSIS AUTO W/SCOPE: CPT | Performed by: PHYSICIAN ASSISTANT

## 2021-10-24 PROCEDURE — 96375 TX/PRO/DX INJ NEW DRUG ADDON: CPT

## 2021-10-24 PROCEDURE — 85027 COMPLETE CBC AUTOMATED: CPT | Performed by: PHYSICIAN ASSISTANT

## 2021-10-24 PROCEDURE — 82248 BILIRUBIN DIRECT: CPT | Performed by: PHYSICIAN ASSISTANT

## 2021-10-24 PROCEDURE — 84484 ASSAY OF TROPONIN QUANT: CPT | Performed by: PHYSICIAN ASSISTANT

## 2021-10-24 PROCEDURE — 93005 ELECTROCARDIOGRAM TRACING: CPT | Performed by: PHYSICIAN ASSISTANT

## 2021-10-24 RX ORDER — SUCRALFATE 1 G/1
1 TABLET ORAL 4 TIMES DAILY
Qty: 56 TABLET | Refills: 0 | Status: SHIPPED | OUTPATIENT
Start: 2021-10-24 | End: 2021-11-07

## 2021-10-24 RX ORDER — ONDANSETRON 2 MG/ML
4 INJECTION INTRAMUSCULAR; INTRAVENOUS ONCE
Status: COMPLETED | OUTPATIENT
Start: 2021-10-24 | End: 2021-10-24

## 2021-10-24 RX ORDER — ALUMINA, MAGNESIA, AND SIMETHICONE 2400; 2400; 240 MG/30ML; MG/30ML; MG/30ML
30 SUSPENSION ORAL EVERY 4 HOURS PRN
Qty: 100 ML | Refills: 0 | Status: SHIPPED | OUTPATIENT
Start: 2021-10-24 | End: 2022-07-12

## 2021-10-24 RX ORDER — KETOROLAC TROMETHAMINE 15 MG/ML
15 INJECTION, SOLUTION INTRAMUSCULAR; INTRAVENOUS ONCE
Status: COMPLETED | OUTPATIENT
Start: 2021-10-24 | End: 2021-10-24

## 2021-10-24 RX ORDER — IOPAMIDOL 755 MG/ML
100 INJECTION, SOLUTION INTRAVASCULAR ONCE
Status: COMPLETED | OUTPATIENT
Start: 2021-10-24 | End: 2021-10-24

## 2021-10-24 RX ADMIN — FAMOTIDINE 20 MG: 10 INJECTION, SOLUTION INTRAVENOUS at 14:51

## 2021-10-24 RX ADMIN — LIDOCAINE HYDROCHLORIDE 30 ML: 20 SOLUTION ORAL; TOPICAL at 14:51

## 2021-10-24 RX ADMIN — IOPAMIDOL 100 ML: 755 INJECTION, SOLUTION INTRAVENOUS at 13:47

## 2021-10-24 RX ADMIN — SODIUM CHLORIDE 1000 ML: 9 INJECTION, SOLUTION INTRAVENOUS at 12:23

## 2021-10-24 RX ADMIN — ONDANSETRON 4 MG: 2 INJECTION INTRAMUSCULAR; INTRAVENOUS at 12:24

## 2021-10-24 RX ADMIN — KETOROLAC TROMETHAMINE 15 MG: 15 INJECTION, SOLUTION INTRAMUSCULAR; INTRAVENOUS at 12:26

## 2021-10-24 ASSESSMENT — ENCOUNTER SYMPTOMS
WEAKNESS: 1
APPETITE CHANGE: 1
COUGH: 1
BACK PAIN: 1
DIARRHEA: 1
FEVER: 0
ABDOMINAL DISTENTION: 1
SHORTNESS OF BREATH: 0
VOMITING: 1
MYALGIAS: 1
NAUSEA: 1
FATIGUE: 1
POLYDIPSIA: 1
BLOOD IN STOOL: 0
CONSTIPATION: 0
CHILLS: 0
ABDOMINAL PAIN: 1

## 2021-10-24 ASSESSMENT — MIFFLIN-ST. JEOR: SCORE: 2563.37

## 2021-10-24 NOTE — DISCHARGE INSTRUCTIONS
You were seen today in the ER for stomach pain and body aches.  All of your labs are normal.  Your CT scan was also normal.  You could have some inflammation in the stomach lining causing your discomfort, therefore I am sending you home with Carafate to take with your omeprazole.  You can also take the Maalox on an as-needed basis.  Please drink plenty of fluids to stay hydrated.  Follow-up with GI, primary care, and rheumatology.  Return to the ER for worsening of pain, feeling dehydrated, or if you develop any new, concerning symptoms.

## 2021-10-24 NOTE — ED PROVIDER NOTES
EMERGENCY DEPARTMENT ENCOUNTER      NAME: Higinio Wallace  AGE: 31 year old male  YOB: 1990  MRN: [id number]  EVALUATION DATE & TIME: 10/24/2021 11:25 AM    PCP: Raza Franklin    ED PROVIDER: Cristina Pemberton PA-C      Chief Complaint   Patient presents with     Generalized Body Aches         FINAL IMPRESSION:  1. Epigastric pain    2. Myalgia          MEDICAL DECISION MAKING:    Pertinent Labs & Imaging studies reviewed. (See chart for details)  31-year-old male with a history of prediabetes presents to the emergency department for evaluation of ongoing muscle cramping x 4 months, stomach pain, urinary frequency and feeling dehydrated for the last 1 month.  History of gastric malrotation and IBS.  Patient currently being evaluated on an outpatient basis for possible rheumatoid arthritis with a positive RAMANA x2 on lab testing.  He is scheduled to see rheumatology next month.    Vital signs notable for blood pressure 156/108, normalized on recheck, all else normal.  On exam, is resting comfortably.  He is nontoxic and appears well-hydrated.  Patient has tenderness in the epigastric and left upper abdomen, no rebound or guarding.  Remainder of exam is unremarkable.  He does not have any muscle tenderness, swelling, warmth, erythema that would suggest myositis, rhabdomyolysis, compartment syndrome, cellulitis.  He feels dehydrated which could be worsening his myalgias. No bony tenderness or trauma to suggest fracture. His epigastric discomfort and nausea seems most consistent with GERD/gastritis, IBS but will obtain labs to assess for pancreatitis, hepatobiliary dysfunction. No RUQ tenderness, therefore suspicion for acute cholecystitis is low.  Patient has no signs of GI bleeding.  No fevers or respiratory symptoms to suggest Covid and patient had negative testing recently.  Patient endorsing intermittent reflux as well, this is atypical for ACS and he has no pain at this time but with his BMI and  "prediabetes, will obtain troponin and EKG to rule out atypical ACS. IV inserted, patient given bolus of normal saline, Zofran, and Toradol.      Labs are notable for normal WBC and hemoglobin, normal electrolytes and kidney function, normal hepatic profile and lipase, CK normal at 91, troponin undetectable.  EKG shows sinus bradycardia, no acute ischemic changes.  UA is negative for signs of infection or RBCs.  Patient feels improved with the above interventions.  I believe his stomach discomfort is secondary to gastritis.  Patient given Pepcid and GI cocktail as well.  Patient's intermittent myalgias have been chronic and he is undergoing an outpatient work-up for this, no acute cause of this identified today.  I do not believe further emergent work-up is needed and patient can continue his outpatient rheumatology work-up.  I discussed plans for discharge with close primary care and GI follow up vs CT abdomen pelvis. Patient feels something \"is not right\" in his stomach and he wishes to proceed with imaging. CT abdomen pelvis negative for acute findings.  Patient feels improved and is tolerating p.o. intake.  Low suspicion for acute process at this time.  Could be component of gastritis.  Patient feels comfortable with discharge and outpatient specialist follow-up.  I will send him home with Carafate and Maalox to take in conjunction with his Prilosec.  He will drink plenty fluids to stay hydrated.  He will continue taking his muscle relaxers for his muscle cramps and spasms.  He will follow-up with GI, rheumatology, and primary care. I discussed strict return precautions. Patient agreeable and discharged in a comfortable, ambulatory state.     ED COURSE  11:38 AM I met with the patient, obtained history, performed an initial exam, and discussed options and plan for diagnostics and treatment here in the ED. PPE worn: gloves, surgical mask, and eye protection.  1:20 PM Rechecked on the patient and updated him on lab " results. Patient states his nausea and abdominal pain have improved, but he does not feel comfortable with discharge at this time and feels like something is wrong in his abdomen. Will order a CT scan.   2:54 PM I rechecked on the patient and updated him on CT results. He just received medications, so will wait to discharge.  3:05 PM Rechecked on the patient. He is feeling better. We discussed the plan for discharge and the patient is agreeable. Reviewed supportive cares, symptomatic treatment, outpatient follow up, and reasons to return to the Emergency Department. Patient to be discharged by ED RN.     At the conclusion of the encounter I discussed the results of all of the tests and the disposition. The questions were answered. The patient or family acknowledged understanding and was agreeable with the care plan.     MEDICATIONS GIVEN IN THE EMERGENCY:  Medications   0.9% sodium chloride BOLUS (0 mLs Intravenous Stopped 10/24/21 1507)   ondansetron (ZOFRAN) injection 4 mg (4 mg Intravenous Given 10/24/21 1224)   ketorolac (TORADOL) injection 15 mg (15 mg Intravenous Given 10/24/21 1226)   lidocaine (XYLOCAINE) 2 % 15 mL, alum & mag hydroxide-simethicone (MAALOX) 15 mL GI Cocktail (30 mLs Oral Given 10/24/21 1451)   famotidine (PEPCID) injection 20 mg (20 mg Intravenous Given 10/24/21 1451)   iopamidol (ISOVUE-370) solution 100 mL (100 mLs Intravenous Given 10/24/21 1347)       NEW PRESCRIPTIONS STARTED AT TODAY'S ER VISIT  New Prescriptions    ALUM & MAG HYDROXIDE-SIMETHICONE (MAALOX MAX) 400-400-40 MG/5ML SUSP SUSPENSION    Take 30 mLs by mouth every 4 hours as needed for indigestion or heartburn    SUCRALFATE (CARAFATE) 1 GM TABLET    Take 1 tablet (1 g) by mouth 4 times daily for 14 days            =================================================================    HPI    Patient information was obtained from: Patient    Use of Interpretor: N/A      Higiniochase Wallace is a 31 year old male with a pertinent  "history of obesity, anxiety, and depression who presents to this ED via walk-in for evaluation of body aches.    Per chart review,  Patient was seen here in the ED 10/9/21 for evaluation of body aches. COVID-19 negative. Patient given prescription for flexeril and discharged home.    Patient reports \"constant body aches\" for the past 4 months. Over the past month, he has also felt very dehydrated and thirsty and states that about 10 minutes after he drinks water, he has to urinate. Patient reports a history of prediabetes and is on Metformin. Patient also reports intermittent left upper quadrant abdominal pain and feeling like his abdomen is bloated that began this past week and improves when he lays down or sits. He states he has been having diarrhea as well. The patient states he has a history of IBS and was also born with an \"upside down stomach.\" He denies any history of abdominal surgeries and has not seen a GI doctor recently. He does state that he has had a colonoscopy and it was normal. Additionally, the patient states that if he stands for a long period of time he develops heart burn with pain radiating to his low back and vomits. He does state that he is prescribed omeprazole for acid reflux. Patient also notes pain radiating into his back when he coughs. Overall, the patient is feeling weak and has not been eating very much either. Of note, patient was referred to rheumatology after his last ED visit and states he has an appointment in ~1 month.     Patient denies any fevers, chills, congestion, chest pain, or other symptoms or concerns at this time. NKDA.      REVIEW OF SYSTEMS   Review of Systems   Constitutional: Positive for appetite change (decreased) and fatigue. Negative for chills and fever.        Positive for dehydration.   HENT: Negative for congestion.    Respiratory: Positive for cough. Negative for shortness of breath.    Cardiovascular: Negative for chest pain.   Gastrointestinal: Positive " for abdominal distention, abdominal pain (LUQ), diarrhea, nausea and vomiting (associated with heart burn). Negative for blood in stool and constipation.   Endocrine: Positive for polydipsia.   Musculoskeletal: Positive for back pain (low back secondary to heart burn and coughing) and myalgias.   Neurological: Positive for weakness.   All other systems reviewed and are negative.       PAST MEDICAL HISTORY:  Past Medical History:   Diagnosis Date     Anxiety      Class 2 obesity due to excess calories in adult      Complication of anesthesia      Depression      Gastroesophageal reflux disease with esophagitis      History of nephrolithiasis      Irritable bowel syndrome      Major depression, recurrent (H)      Malrotation of intestine      Manic disorder (H)      Panic disorder      Patellofemoral instability of left knee with pain      Pre-diabetes      Psychosis (H)      PTSD (post-traumatic stress disorder)      PTSD (post-traumatic stress disorder)      Tobacco use        PAST SURGICAL HISTORY:  Past Surgical History:   Procedure Laterality Date     ARTHROSCOPY KNEE WITH PATELLAR REALIGNMENT Left 11/13/2020    Procedure: Knee Arthroscopy, Medial Patello-femoral Ligament Reconstruction with Allograft, Distal Tibial Tubercle Osteotomy, Lateral Retinacular Lengthening;  Surgeon: Sourav Keating MD;  Location: [facility]     ARTHROTOMY TIBIAL TUBERCLE SHIFT Left 11/13/2020    Procedure: tibial tubercle Osteotomy ;  Surgeon: Sourav Keating MD;  Location: [facility]     COLONOSCOPY N/A 10/07/2020    Procedure: COLONOSCOPY, WITH POLYPECTOMY AND BIOPSY;  Surgeon: Donell Holland MD;  Location: [facility] OR     deviated septum  2018     KNEE SURGERY Left      OPEN REDUCTION INTERNAL FIXATION RODDING INTRAMEDULLARY TIBIA Left 11/13/2020    Procedure: plus derotation tibial osteotomy;  Surgeon: Sourav Keating MD;  Location: [facility] OR         CURRENT MEDICATIONS:    No current facility-administered  medications for this encounter.    Current Outpatient Medications:      alum & mag hydroxide-simethicone (MAALOX MAX) 400-400-40 MG/5ML SUSP suspension, Take 30 mLs by mouth every 4 hours as needed for indigestion or heartburn, Disp: 100 mL, Rfl: 0     sucralfate (CARAFATE) 1 GM tablet, Take 1 tablet (1 g) by mouth 4 times daily for 14 days, Disp: 56 tablet, Rfl: 0     acetaminophen (TYLENOL) 500 MG tablet, Take 2 tablets (1,000 mg) by mouth every 8 hours as needed for pain, Disp: 1 Bottle, Rfl: 0     albuterol (ALBUTEROL) 108 (90 BASE) MCG/ACT Inhaler, Inhale 2 puffs into the lungs every 4 hours as needed for shortness of breath / dyspnea or wheezing, Disp: 1 Inhaler, Rfl: 0     cyclobenzaprine (FLEXERIL) 10 MG tablet, Take 1 tablet (10 mg) by mouth 3 times daily as needed for muscle spasms, Disp: 30 tablet, Rfl: 0     metFORMIN (GLUCOPHAGE) 500 MG tablet, Take 500 mg by mouth 2 times daily (with meals) , Disp: , Rfl:      omeprazole (PRILOSEC) 20 MG DR capsule, Take 1 capsule (20 mg) by mouth daily, Disp: 30 capsule, Rfl: 0      ALLERGIES:  Allergies   Allergen Reactions     Bee Venom Anaphylaxis and Other (See Comments)     Swelling  Large local reactions/ swelling       Fruit Acid Concentrate [Fruit Extracts] Swelling     Lips swell and crust over little bit- tongue gets numb     Liquid Adhesive Dermatitis     And band aid       Monosodium Glutamate Hives       FAMILY HISTORY:  Family History   Problem Relation Age of Onset     Other - See Comments Mother         PONV     Cerebrovascular Disease Brother      Atrial fibrillation Brother      Other - See Comments Brother         cardiac autoimmune deficiency       SOCIAL HISTORY:   Social History     Socioeconomic History     Marital status: Single     Spouse name: Not on file     Number of children: Not on file     Years of education: Not on file     Highest education level: Not on file   Occupational History     Not on file   Tobacco Use     Smoking status:  "Current Some Day Smoker     Packs/day: 0.25     Types: Cigarettes     Start date: 2001     Smokeless tobacco: Never Used     Tobacco comment: 1 cig a day trying to quit   Substance and Sexual Activity     Alcohol use: Not Currently     Drug use: Yes     Types: Marijuana     Comment: daily     Sexual activity: Yes     Partners: Female     Birth control/protection: Condom   Other Topics Concern     Parent/sibling w/ CABG, MI or angioplasty before 65F 55M? Not Asked   Social History Narrative     Not on file     Social Determinants of Health     Financial Resource Strain:      Difficulty of Paying Living Expenses:    Food Insecurity:      Worried About Running Out of Food in the Last Year:      Ran Out of Food in the Last Year:    Transportation Needs:      Lack of Transportation (Medical):      Lack of Transportation (Non-Medical):    Physical Activity:      Days of Exercise per Week:      Minutes of Exercise per Session:    Stress:      Feeling of Stress :    Social Connections:      Frequency of Communication with Friends and Family:      Frequency of Social Gatherings with Friends and Family:      Attends Mosque Services:      Active Member of Clubs or Organizations:      Attends Club or Organization Meetings:      Marital Status:    Intimate Partner Violence:      Fear of Current or Ex-Partner:      Emotionally Abused:      Physically Abused:      Sexually Abused:        VITALS:  Patient Vitals for the past 24 hrs:   BP Temp Temp src Pulse Resp SpO2 Height Weight   10/24/21 1510 131/74 -- -- 61 15 98 % -- --   10/24/21 1046 (!) 156/108 97.3  F (36.3  C) Temporal 69 16 96 % 1.93 m (6' 4\") 149.7 kg (330 lb)       PHYSICAL EXAM    General Appearance: Alert, cooperative, normal speech and facial symmetry, appears stated age, the patient does not appear in distress  Head:  Normocephalic, without obvious abnormality, atraumatic  Eyes: Conjunctiva/corneas clear, EOM's intact, no nystagmus, PERRL  ENT:  Lips, mucosa, " and tongue normal; teeth and gums normal, no pharyngeal inflammation, no dysphonia or difficulty swallowing, membranes are moist without pallor  Neck:  Supple, symmetrical, trachea midline, no adenopathy; thyroid is not enlarged and there are no palpable nodules or tenderness; no soft tissue swelling or tenderness  Chest:  No tenderness or deformity  Cardio:  Regular rate and rhythm, S1 and S2 normal, no murmur, rub    or gallop, 2+ pulses symmetric in all extremities  Pulm:  Clear to auscultation bilaterally, respirations unlabored with no accessory muscle use  Back:  Symmetric, no curvature, ROM normal, no CVA tenderness, no spinal tenderness  Abdomen:  Active bowel sounds in all quadrants; abdomen is soft, non-distended with mild epigastric and left upper abdominal tenderness, otherwise no tenderness to palpation, rebound tenderness, or guarding.   Extremities:  Extremities normal, there is no tenderness to palpation , atraumatic, no cyanosis or edema, full function and range of motion, pulses equal in all extremities, normal cap refill, no joint swelling  Skin:  Skin color appears normal; no rashes or lesions noted  Lymph:  Cervical, supraclavicular, and axillary nodes are non-tender  Neuro: Patient is awake, alert, and responsive to voice. No gross motor weaknesses or sensory loss; moves all extremities.    LAB:  All pertinent labs reviewed and interpreted.  Results for orders placed or performed during the hospital encounter of 10/24/21   CT Abdomen Pelvis w Contrast    Impression    IMPRESSION:   1.  No acute findings. No evidence of an inflammatory process or hydronephrosis.  2.  Stable findings consistent with developmental bowel malrotation. No associated bowel obstruction.   Glucose by meter   Result Value Ref Range    GLUCOSE BY METER POCT 133 (H) 70 - 99 mg/dL   Basic metabolic panel   Result Value Ref Range    Sodium 136 136 - 145 mmol/L    Potassium 4.2 3.5 - 5.0 mmol/L    Chloride 105 98 - 107 mmol/L     Carbon Dioxide (CO2) 23 22 - 31 mmol/L    Anion Gap 8 5 - 18 mmol/L    Urea Nitrogen 9 8 - 22 mg/dL    Creatinine 0.82 0.70 - 1.30 mg/dL    Calcium 8.9 8.5 - 10.5 mg/dL    Glucose 130 (H) 70 - 125 mg/dL    GFR Estimate >90 >60 mL/min/1.73m2   Result Value Ref Range    Lipase 22 0 - 52 U/L   Hepatic function panel   Result Value Ref Range    Bilirubin Total 0.4 0.0 - 1.0 mg/dL    Bilirubin Direct 0.1 <=0.5 mg/dL    Protein Total 6.6 6.0 - 8.0 g/dL    Albumin 3.6 3.5 - 5.0 g/dL    Alkaline Phosphatase 49 45 - 120 U/L    AST 27 0 - 40 U/L    ALT 37 0 - 45 U/L   Troponin I (now)   Result Value Ref Range    Troponin I <0.01 0.00 - 0.29 ng/mL   CBC (+ platelets, no diff)   Result Value Ref Range    WBC Count 5.7 4.0 - 11.0 10e3/uL    RBC Count 5.28 4.40 - 5.90 10e6/uL    Hemoglobin 16.2 13.3 - 17.7 g/dL    Hematocrit 46.9 40.0 - 53.0 %    MCV 89 78 - 100 fL    MCH 30.7 26.5 - 33.0 pg    MCHC 34.5 31.5 - 36.5 g/dL    RDW 12.8 10.0 - 15.0 %    Platelet Count 193 150 - 450 10e3/uL   Result Value Ref Range    Magnesium 2.0 1.8 - 2.6 mg/dL   Result Value Ref Range    CK 91 30 - 190 U/L   UA with Microscopic reflex to Culture    Specimen: Urine, Midstream   Result Value Ref Range    Color Urine Light Yellow Colorless, Straw, Light Yellow, Yellow    Appearance Urine Clear Clear    Glucose Urine Negative Negative mg/dL    Bilirubin Urine Negative Negative    Ketones Urine Negative Negative mg/dL    Specific Gravity Urine 1.022 1.001 - 1.030    Blood Urine Negative Negative    pH Urine 7.0 5.0 - 7.0    Protein Albumin Urine Negative Negative mg/dL    Urobilinogen Urine <2.0 <2.0 mg/dL    Nitrite Urine Negative Negative    Leukocyte Esterase Urine Negative Negative    Mucus Urine Present (A) None Seen /LPF    RBC Urine 1 <=2 /HPF    WBC Urine <1 <=5 /HPF   ECG 12-LEAD WITH MUSE (LHE)   Result Value Ref Range    Systolic Blood Pressure  mmHg    Diastolic Blood Pressure  mmHg    Ventricular Rate 57 BPM    Atrial Rate 57 BPM    AR  Interval 136 ms    QRS Duration 110 ms     ms    QTc 426 ms    P Axis 17 degrees    R AXIS 74 degrees    T Axis 21 degrees    Interpretation ECG       Sinus bradycardia  Otherwise normal ECG  When compared with ECG of 07-AUG-2021 20:36,  No significant change was found  Confirmed by SEE ED PROVIDER NOTE FOR, ECG INTERPRETATION ([id number]),  KOSTAS MCGINNIS ([id number]) on 10/24/2021 12:59:08 PM         RADIOLOGY:  Reviewed all pertinent imaging. Please see official radiology report.  CT Abdomen Pelvis w Contrast   Final Result   IMPRESSION:    1.  No acute findings. No evidence of an inflammatory process or hydronephrosis.   2.  Stable findings consistent with developmental bowel malrotation. No associated bowel obstruction.          EKG:    Performed at: 1257    Impression: Sinus bradycardia    Rate: 57  Rhythm: Sinus  DC Interval: 136  QRS Interval: 110  QTc Interval: 426  ST Changes: None  Comparison: 07-AUG-2021; no change noted    I have independently reviewed and interpreted the EKG(s) documented above.  Reviewed with Dr. Marie VALERO, Ximena Joe, am serving as a scribe to document services personally performed by Cristina Pemberton PA-C based on my observation and the provider's statements to me. I, Cristina Pemberton PA-C attest that Ximena Joe is acting in a scribe capacity, has observed my performance of the services and has documented them in accordance with my direction.    Cristina Pemberton PA-C  Emergency Medicine  [facility]       Cristina Pemberton PA-C  10/24/21 6333

## 2021-10-24 NOTE — ED TRIAGE NOTES
The patient arrives to the ED with multiple complaints.     The patient reports he has had generalized muscle aches for 4 months. The patient reports he feels he is dehydrated. He states as soon as he drinks any fluid he pees it out within 15 minutes. The patient reports his lower back hurts, which causes him to get gastritis. The patient reports he has been coughing a lot and that makes him feel short of breath. He states he is very thirsty all the time. He denies history of diabetes but states he is pre-diabetic. He states he was seen here 2 weeks ago.    Blood sugar in triage 133.

## 2021-11-01 NOTE — NURSING NOTE
Tried calling the patient to schedule their sleep study. Their voicemail box was full and was unable to leave a message.

## 2021-11-08 ENCOUNTER — VIRTUAL VISIT (OUTPATIENT)
Dept: BEHAVIORAL HEALTH | Facility: CLINIC | Age: 31
End: 2021-11-08
Payer: COMMERCIAL

## 2021-11-08 DIAGNOSIS — F43.10 PTSD (POST-TRAUMATIC STRESS DISORDER): Primary | ICD-10-CM

## 2021-11-08 PROCEDURE — 90791 PSYCH DIAGNOSTIC EVALUATION: CPT | Mod: GT,95 | Performed by: SOCIAL WORKER

## 2021-11-08 ASSESSMENT — ANXIETY QUESTIONNAIRES
6. BECOMING EASILY ANNOYED OR IRRITABLE: SEVERAL DAYS
1. FEELING NERVOUS, ANXIOUS, OR ON EDGE: SEVERAL DAYS
4. TROUBLE RELAXING: NEARLY EVERY DAY
GAD7 TOTAL SCORE: 14
8. IF YOU CHECKED OFF ANY PROBLEMS, HOW DIFFICULT HAVE THESE MADE IT FOR YOU TO DO YOUR WORK, TAKE CARE OF THINGS AT HOME, OR GET ALONG WITH OTHER PEOPLE?: VERY DIFFICULT
7. FEELING AFRAID AS IF SOMETHING AWFUL MIGHT HAPPEN: MORE THAN HALF THE DAYS
5. BEING SO RESTLESS THAT IT IS HARD TO SIT STILL: SEVERAL DAYS
7. FEELING AFRAID AS IF SOMETHING AWFUL MIGHT HAPPEN: MORE THAN HALF THE DAYS
GAD7 TOTAL SCORE: 14
3. WORRYING TOO MUCH ABOUT DIFFERENT THINGS: NEARLY EVERY DAY
GAD7 TOTAL SCORE: 14
2. NOT BEING ABLE TO STOP OR CONTROL WORRYING: NEARLY EVERY DAY

## 2021-11-08 ASSESSMENT — PATIENT HEALTH QUESTIONNAIRE - PHQ9
SUM OF ALL RESPONSES TO PHQ QUESTIONS 1-9: 14
SUM OF ALL RESPONSES TO PHQ QUESTIONS 1-9: 14
10. IF YOU CHECKED OFF ANY PROBLEMS, HOW DIFFICULT HAVE THESE PROBLEMS MADE IT FOR YOU TO DO YOUR WORK, TAKE CARE OF THINGS AT HOME, OR GET ALONG WITH OTHER PEOPLE: VERY DIFFICULT
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
## 2021-11-09 ENCOUNTER — OFFICE VISIT (OUTPATIENT)
Dept: ORTHOPEDICS | Facility: CLINIC | Age: 31
End: 2021-11-09
Payer: COMMERCIAL

## 2021-11-09 ENCOUNTER — ANCILLARY PROCEDURE (OUTPATIENT)
Dept: GENERAL RADIOLOGY | Facility: CLINIC | Age: 31
End: 2021-11-09
Attending: ORTHOPAEDIC SURGERY
Payer: COMMERCIAL

## 2021-11-09 ENCOUNTER — THERAPY VISIT (OUTPATIENT)
Dept: PHYSICAL THERAPY | Facility: CLINIC | Age: 31
End: 2021-11-09
Payer: COMMERCIAL

## 2021-11-09 VITALS — HEIGHT: 77 IN | BODY MASS INDEX: 37.19 KG/M2 | WEIGHT: 315 LBS

## 2021-11-09 DIAGNOSIS — Z47.89 AFTERCARE FOLLOWING SURGERY OF THE MUSCULOSKELETAL SYSTEM: ICD-10-CM

## 2021-11-09 DIAGNOSIS — M25.562 LEFT KNEE PAIN, UNSPECIFIED CHRONICITY: ICD-10-CM

## 2021-11-09 DIAGNOSIS — M25.562 PATELLOFEMORAL INSTABILITY OF LEFT KNEE WITH PAIN: Primary | ICD-10-CM

## 2021-11-09 DIAGNOSIS — M25.362 PATELLOFEMORAL INSTABILITY OF LEFT KNEE WITH PAIN: Primary | ICD-10-CM

## 2021-11-09 DIAGNOSIS — Z98.890 S/P LEFT KNEE SURGERY: Primary | ICD-10-CM

## 2021-11-09 DIAGNOSIS — Z98.890 S/P LEFT KNEE SURGERY: ICD-10-CM

## 2021-11-09 PROCEDURE — 99213 OFFICE O/P EST LOW 20 MIN: CPT | Performed by: ORTHOPAEDIC SURGERY

## 2021-11-09 PROCEDURE — 97530 THERAPEUTIC ACTIVITIES: CPT | Mod: GP | Performed by: PHYSICAL THERAPIST

## 2021-11-09 PROCEDURE — 97110 THERAPEUTIC EXERCISES: CPT | Mod: GP | Performed by: PHYSICAL THERAPIST

## 2021-11-09 PROCEDURE — 73590 X-RAY EXAM OF LOWER LEG: CPT | Mod: LT | Performed by: RADIOLOGY

## 2021-11-09 ASSESSMENT — ACTIVITIES OF DAILY LIVING (ADL)
GIVING WAY, BUCKLING OR SHIFTING OF KNEE: THE SYMPTOM AFFECTS MY ACTIVITY SLIGHTLY
RAW_SCORE: 40
KNEEL ON THE FRONT OF YOUR KNEE: I AM UNABLE TO DO THE ACTIVITY
GO UP STAIRS: ACTIVITY IS MINIMALLY DIFFICULT
HOW_WOULD_YOU_RATE_THE_CURRENT_FUNCTION_OF_YOUR_KNEE_DURING_YOUR_USUAL_DAILY_ACTIVITIES_ON_A_SCALE_FROM_0_TO_100_WITH_100_BEING_YOUR_LEVEL_OF_KNEE_FUNCTION_PRIOR_TO_YOUR_INJURY_AND_0_BEING_THE_INABILITY_TO_PERFORM_ANY_OF_YOUR_USUAL_DAILY_ACTIVITIES?: 75
STAND: ACTIVITY IS NOT DIFFICULT
SQUAT: ACTIVITY IS FAIRLY DIFFICULT
WEAKNESS: THE SYMPTOM AFFECTS MY ACTIVITY SEVERELY
HOW_WOULD_YOU_RATE_THE_OVERALL_FUNCTION_OF_YOUR_KNEE_DURING_YOUR_USUAL_DAILY_ACTIVITIES?: NEARLY NORMAL
AS_A_RESULT_OF_YOUR_KNEE_INJURY,_HOW_WOULD_YOU_RATE_YOUR_CURRENT_LEVEL_OF_DAILY_ACTIVITY?: NEARLY NORMAL
SIT WITH YOUR KNEE BENT: I AM UNABLE TO DO THE ACTIVITY
KNEE_ACTIVITY_OF_DAILY_LIVING_SUM: 40
RISE FROM A CHAIR: ACTIVITY IS SOMEWHAT DIFFICULT
KNEE_ACTIVITY_OF_DAILY_LIVING_SCORE: 57.14
LIMPING: I HAVE THE SYMPTOM BUT IT DOES NOT AFFECT MY ACTIVITY
WALK: ACTIVITY IS NOT DIFFICULT
STIFFNESS: THE SYMPTOM AFFECTS MY ACTIVITY MODERATELY
GO DOWN STAIRS: ACTIVITY IS MINIMALLY DIFFICULT
PAIN: I HAVE THE SYMPTOM BUT IT DOES NOT AFFECT MY ACTIVITY
SWELLING: THE SYMPTOM AFFECTS MY ACTIVITY SLIGHTLY

## 2021-11-09 ASSESSMENT — PATIENT HEALTH QUESTIONNAIRE - PHQ9: SUM OF ALL RESPONSES TO PHQ QUESTIONS 1-9: 14

## 2021-11-09 ASSESSMENT — MIFFLIN-ST. JEOR: SCORE: 2631.47

## 2021-11-09 ASSESSMENT — ANXIETY QUESTIONNAIRES: GAD7 TOTAL SCORE: 14

## 2021-11-09 NOTE — NURSING NOTE
"Reason For Visit:   Chief Complaint   Patient presents with     RECHECK     1 year pop DOS 11/13/20 Knee Arthroscopy, Medial Patello-femoral Ligament Reconstruction with Allograft, Distal Tibial Tubercle Osteotomy, plus derotation tibial osteotomy (Left)         Primary MD: Raza Franklin  Ref. MD: Est    ?  No  Occupation  [employer].  Currently working? Yes.  Work status? Full time     Type of injury: Multiple on both.     Date of surgery: Jan or Feb 2011 at [facility].  Type of surgery: Left knee MPFL reconstuction.     11/13/2020 Left  knee exam under anesthesia. Diagnostic arthroscopy. MPFL reconstruction using gracilis allograft. Lateral retinacular lengthening. Distal and medial tibial tubercle transfer (12 mm medial, 8 mm distal). Osteotomy of the tibia with IM jhoana fixation, osteotomy of the fibula     Smoker: Yes  Request smoking cessation information: No    Ht 1.945 m (6' 4.58\")   Wt (!) 155.6 kg (343 lb)   BMI 41.13 kg/m      Pain Assessment  Patient Currently in Pain: Denies           Fina Julio LPN  "

## 2021-11-09 NOTE — PROGRESS NOTES
Progress Note    Progress reporting period is from initial eval to Nov 9, 2021.     Patient seen for Rxs Used: 16 visits.    THERAPIST IMPRESSION:  Higinio returns to physical therapy 1 year s/p patellar stabilization surgery. His recovery has been complicated by onset of full body pain and muscle cramps and has been unable to complete any sort of exercise program or even complete a full day of work. He will be seeing Rheumatology next week for a work-up. Pending the outcomes of the rheumatology testing, he would benefit from a strengthening and conditioning program as he has had 1+ year of low activity and is very deconditioned. He overall is happy with his knee and says it is the one part of his body that feels good.       SUBJECTIVE  Subjective changes noted by patient: Higinio returns to PT 1 year after his surgery. He has been unable to do any exercise as he has been having extreme body pain. He is unable to work >4 hours or he has to leave work in a wheelchair. He has been to the ED 4 times in the past several months due to this pain. He reports also low back pain that makes him vomit when it gets really bad. He is actually happy with his surgical knee and reports minimal knee pain or patellar instability.   Changes in function:  Yes (See Goal flowsheet attached for changes in current functional level)  Adverse reaction to treatment or activity: None     OBJECTIVE  Changes noted in objective findings:    Significant gastroc tightness bilaterally.  Ambulates with an altered gait pattern, questionably due to lack of ankle DF  Questionable component of lumbar radiculopathy > bending backwards felt good (unable to attempt prone press-ups due to shoulder pain and stomach pain)   Good tracking of the patella.   Unable to rise from a chair with primarily using the left leg. No issues with the right leg.     Was unable to perform the required tasks for the physical performance test.   Patient has known weakness of his  quadriceps muscle. He has core weakness.    ASSESSMENT/PLAN  Diagnosis: s/p knee   DIAGP:  There were no encounter diagnoses.  Updated problem list and treatment plan:   Diagnosis 1:  S/P Knee surgery     Pain -  education and home program  Decreased ROM/flexibility - therapeutic exercise, therapeutic activity and home program  Decreased strength - therapeutic exercise, therapeutic activities and home program  Impaired balance - neuro re-education, therapeutic activities and home program  Decreased function - therapeutic activities and home program    STG/LTGs have been met or progress has been made towards goals:  Yes, please see goal flowsheet for most current information.    Assessment of Progress: The patient's condition is unchanged.  Self Management Plans:  Patient has been instructed in a home treatment program.  Patient  has been instructed in self management of symptoms.  I have re-evaluated this patient and find that the nature, scope, duration and intensity of the therapy is appropriate for the medical condition of the patient.  Higinio continues to require the following intervention to meet STG and LTG's:  PT.    Recommendations:  This patient would benefit from further evaluation and would benefit from rheumatology evaluation.    Ani Anaya, PT, DPT, OCS      Please refer to the daily flowsheet for treatment today, total treatment time and time spent performing 1:1 timed codes.

## 2021-11-09 NOTE — LETTER
11/9/2021         RE: Higinio Wallace  [address]  [place] [address]        Dear Colleague,    Thank you for referring your patient, Higinio Wallace, to the [facility] [place]. Please see a copy of my visit note below.    Patient is a 31-year-old male who is here now 1 year status post RIGHT  tibial derotation osteotomy, MPFL reconstruction with allograft, distal tibial tubercle osteotomy.    He has a number of other health issues most specifically anxiety and mental health issues, and irritable bowel syndrome.  He is healthcare has not been concentrated in a single healthcare system; his GI doctor is located in a private practice setting and notes are not readily available through care everywhere.    In regards to his left knee he is doing very well.  He denies pain.  He is able to do much more than he used to be able to do with more confidence.  He has had no episodes of kneecap slippage or movement and certainly no barbara dislocation.    He continues to live with that his same girlfriend and continues to work at [employer].  He has however gained what he estimates to being 60 pounds since he for started working with me.  His current weight is 343, and he opines that this is the heaviest he has ever been.  This computes to a BMI of 41.    He feels that the increase in weight is a combination of poor response to some of his psychotropic meds, and his of his bowel issues.  He currently is also having symptoms of what he describes as total body aches.  This includes discomfort in his ankles when he first gets up in the morning, and achiness in his shoulders and hips after he does activity including work activity.  He is scheduled to see rheumatologist at the [place] later this month.    He was seen by her physical therapist today who was not able to do the typical postop testing regiment due to general body aches, poor core, and other complaints of discomfort of his  feet.    Physical exam of the patient reveals benign appearing incisions, good straight leg raising effort without a lag.  Kneecap tracks well through an active arc of motion with a soft J sign present.  No crepitus to active arc of motion.    Passive patella mobility 2 quadrants lateral ability with a firm endpoint.    X-rays taken today were reviewed.  They show complete healing of the osteotomy site of the tibia as well as the osteotomy site of the tibial tubercle.  Patella height is slight Baha but within normal limits at C/D equaling 0.95    Assessment  : 1.  Satisfactory postoperative left patella stabilizing surgery  2.  Morbid obesity    Plan: I have shared with Higinio that I think that he has been able to to discuss his problems with me in a more reticular fashion than at any point time that I have known him.  I do feel for him and his struggle with his weight and with his mental health and I believe that it would be important to continue to address both as he moves forward.    He will follow-up with me on an as-needed basis.  No formal follow-up needs to be scheduled at this time    Room time 25 minutes consultation time 20    Lela Han MD  Professor Orthopedic Surgery  [facility]

## 2021-11-10 NOTE — PROGRESS NOTES
Patient is a 31-year-old male who is here now 1 year status post RIGHT  tibial derotation osteotomy, MPFL reconstruction with allograft, distal tibial tubercle osteotomy.    He has a number of other health issues most specifically anxiety and mental health issues, and irritable bowel syndrome.  He is healthcare has not been concentrated in a single healthcare system; his GI doctor is located in a private practice setting and notes are not readily available through care everywhere.    In regards to his left knee he is doing very well.  He denies pain.  He is able to do much more than he used to be able to do with more confidence.  He has had no episodes of kneecap slippage or movement and certainly no barbara dislocation.    He continues to live with that his same girlfriend and continues to work at [employer].  He has however gained what he estimates to being 60 pounds since he for started working with me.  His current weight is 343, and he opines that this is the heaviest he has ever been.  This computes to a BMI of 41.    He feels that the increase in weight is a combination of poor response to some of his psychotropic meds, and his of his bowel issues.  He currently is also having symptoms of what he describes as total body aches.  This includes discomfort in his ankles when he first gets up in the morning, and achiness in his shoulders and hips after he does activity including work activity.  He is scheduled to see rheumatologist at the [place] later this month.    He was seen by her physical therapist today who was not able to do the typical postop testing regiment due to general body aches, poor core, and other complaints of discomfort of his feet.    Physical exam of the patient reveals benign appearing incisions, good straight leg raising effort without a lag.  Kneecap tracks well through an active arc of motion with a soft J sign present.  No crepitus to active arc of motion.    Passive patella mobility 2  quadrants lateral ability with a firm endpoint.    X-rays taken today were reviewed.  They show complete healing of the osteotomy site of the tibia as well as the osteotomy site of the tibial tubercle.  Patella height is slight Baha but within normal limits at C/D equaling 0.95    Assessment  : 1.  Satisfactory postoperative left patella stabilizing surgery  2.  Morbid obesity    Plan: I have shared with Higinio that I think that he has been able to to discuss his problems with me in a more reticular fashion than at any point time that I have known him.  I do feel for him and his struggle with his weight and with his mental health and I believe that it would be important to continue to address both as he moves forward.    He will follow-up with me on an as-needed basis.  No formal follow-up needs to be scheduled at this time    Room time 25 minutes consultation time 20    Lela Han MD  Professor Orthopedic Surgery  [facility]

## 2021-11-15 ENCOUNTER — LAB (OUTPATIENT)
Dept: LAB | Facility: CLINIC | Age: 31
End: 2021-11-15
Payer: COMMERCIAL

## 2021-11-15 ENCOUNTER — MYC MEDICAL ADVICE (OUTPATIENT)
Dept: GASTROENTEROLOGY | Facility: CLINIC | Age: 31
End: 2021-11-15

## 2021-11-15 ENCOUNTER — OFFICE VISIT (OUTPATIENT)
Dept: RHEUMATOLOGY | Facility: CLINIC | Age: 31
End: 2021-11-15
Attending: INTERNAL MEDICINE
Payer: COMMERCIAL

## 2021-11-15 ENCOUNTER — PRE VISIT (OUTPATIENT)
Dept: RHEUMATOLOGY | Facility: CLINIC | Age: 31
End: 2021-11-15
Payer: COMMERCIAL

## 2021-11-15 VITALS
OXYGEN SATURATION: 95 % | HEIGHT: 77 IN | SYSTOLIC BLOOD PRESSURE: 130 MMHG | TEMPERATURE: 98.3 F | RESPIRATION RATE: 18 BRPM | DIASTOLIC BLOOD PRESSURE: 83 MMHG | HEART RATE: 83 BPM | BODY MASS INDEX: 37.19 KG/M2 | WEIGHT: 315 LBS

## 2021-11-15 DIAGNOSIS — E24.0 CUSHING'S DISEASE (H): Primary | ICD-10-CM

## 2021-11-15 DIAGNOSIS — K58.0 IRRITABLE BOWEL SYNDROME WITH DIARRHEA: ICD-10-CM

## 2021-11-15 DIAGNOSIS — R76.8 POSITIVE ANA (ANTINUCLEAR ANTIBODY): ICD-10-CM

## 2021-11-15 DIAGNOSIS — M25.50 MULTIPLE JOINT PAIN: ICD-10-CM

## 2021-11-15 DIAGNOSIS — M79.10 MYALGIA: ICD-10-CM

## 2021-11-15 DIAGNOSIS — K21.9 GASTROESOPHAGEAL REFLUX DISEASE WITHOUT ESOPHAGITIS: Primary | ICD-10-CM

## 2021-11-15 DIAGNOSIS — R53.83 FATIGUE, UNSPECIFIED TYPE: ICD-10-CM

## 2021-11-15 LAB
ALBUMIN SERPL-MCNC: 3.8 G/DL (ref 3.4–5)
ALP SERPL-CCNC: 59 U/L (ref 40–150)
ALT SERPL W P-5'-P-CCNC: 47 U/L (ref 0–70)
ANION GAP SERPL CALCULATED.3IONS-SCNC: 4 MMOL/L (ref 3–14)
AST SERPL W P-5'-P-CCNC: 24 U/L (ref 0–45)
BILIRUB SERPL-MCNC: 0.5 MG/DL (ref 0.2–1.3)
BUN SERPL-MCNC: 11 MG/DL (ref 7–30)
CALCIUM SERPL-MCNC: 9.4 MG/DL (ref 8.5–10.1)
CHLORIDE BLD-SCNC: 108 MMOL/L (ref 94–109)
CO2 SERPL-SCNC: 27 MMOL/L (ref 20–32)
CREAT SERPL-MCNC: 0.86 MG/DL (ref 0.66–1.25)
CRP SERPL-MCNC: <2.9 MG/L (ref 0–8)
ERYTHROCYTE [SEDIMENTATION RATE] IN BLOOD BY WESTERGREN METHOD: 5 MM/HR (ref 0–15)
GFR SERPL CREATININE-BSD FRML MDRD: >90 ML/MIN/1.73M2
GLUCOSE BLD-MCNC: 110 MG/DL (ref 70–99)
POTASSIUM BLD-SCNC: 3.8 MMOL/L (ref 3.4–5.3)
PROT SERPL-MCNC: 7.3 G/DL (ref 6.8–8.8)
SODIUM SERPL-SCNC: 139 MMOL/L (ref 133–144)
TSH SERPL DL<=0.005 MIU/L-ACNC: 0.97 MU/L (ref 0.4–4)

## 2021-11-15 PROCEDURE — 84443 ASSAY THYROID STIM HORMONE: CPT | Performed by: PATHOLOGY

## 2021-11-15 PROCEDURE — 86200 CCP ANTIBODY: CPT | Performed by: INTERNAL MEDICINE

## 2021-11-15 PROCEDURE — 36415 COLL VENOUS BLD VENIPUNCTURE: CPT | Performed by: PATHOLOGY

## 2021-11-15 PROCEDURE — 80053 COMPREHEN METABOLIC PANEL: CPT | Performed by: PATHOLOGY

## 2021-11-15 PROCEDURE — 86140 C-REACTIVE PROTEIN: CPT | Performed by: PATHOLOGY

## 2021-11-15 PROCEDURE — 85652 RBC SED RATE AUTOMATED: CPT | Performed by: PATHOLOGY

## 2021-11-15 PROCEDURE — 99205 OFFICE O/P NEW HI 60 MIN: CPT | Performed by: INTERNAL MEDICINE

## 2021-11-15 PROCEDURE — 86235 NUCLEAR ANTIGEN ANTIBODY: CPT | Performed by: INTERNAL MEDICINE

## 2021-11-15 PROCEDURE — G0463 HOSPITAL OUTPT CLINIC VISIT: HCPCS

## 2021-11-15 ASSESSMENT — MIFFLIN-ST. JEOR: SCORE: 2662.31

## 2021-11-15 ASSESSMENT — PAIN SCALES - GENERAL: PAINLEVEL: MODERATE PAIN (5)
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
## 2021-11-15 NOTE — NURSING NOTE
"Chief Complaint   Patient presents with     Consult     Positive RAMANA, muscle and joint pain     Vital signs:  Temp: 98.3  F (36.8  C) Temp src: Oral BP: 130/83 Pulse: 83   Resp: 18 SpO2: 95 %     Height: 194.5 cm (6' 4.58\") Weight: (!) 158.7 kg (349 lb 12.8 oz)  Estimated body mass index is 41.94 kg/m  as calculated from the following:    Height as of this encounter: 1.945 m (6' 4.58\").    Weight as of this encounter: 158.7 kg (349 lb 12.8 oz).      Verito Beaulieu, [facility]  11/15/2021 11:31 AM      "

## 2021-11-15 NOTE — TELEPHONE ENCOUNTER
NOTES Status Details   OFFICE NOTE from referring provider Internal 08.31.2021 Raza Franklin MD   OFFICE NOTE from other specialist N/A    DISCHARGE SUMMARY from hospital N/A    DISCHARGE REPORT from the ER N/A    MEDICATION LIST Internal    LABS (Any and all labs)      Internal    Biopsy/pathology (Anything related to diagnoses I.e. fluid aspirations, lip biopsy, muscle biopsy)      N/A     N/A    Imaging (All imaging related to diagnoses)     Echo N/A    HRCT N/A    CXR N/A    EMG N/A                   Scleroderma/Dermatomyositis diagnoses     Previous Cardiology notes  N/A    Previous Pulmonary notes N/A    Previous Dermatology notes N/A    Previous GI notes N/A    Lupus diagnoses     Previous Nephrology notes N/A    Previous Dermatology notes N/A    Previous Cardiology notes N/A
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
## 2021-11-17 LAB
CCP AB SER IA-ACNC: 1.1 U/ML
ENA SM IGG SER IA-ACNC: 1.6 U/ML
ENA SM IGG SER IA-ACNC: NEGATIVE
ENA SS-A AB SER IA-ACNC: 1.1 U/ML
ENA SS-A AB SER IA-ACNC: NEGATIVE
ENA SS-B IGG SER IA-ACNC: <0.6 U/ML
ENA SS-B IGG SER IA-ACNC: NEGATIVE
U1 SNRNP IGG SER IA-ACNC: 1.5 U/ML
U1 SNRNP IGG SER IA-ACNC: NEGATIVE

## 2021-11-18 RX ORDER — OMEPRAZOLE 40 MG/1
40 CAPSULE, DELAYED RELEASE ORAL DAILY
Qty: 90 CAPSULE | Refills: 3 | Status: SHIPPED | OUTPATIENT
Start: 2021-11-18 | End: 2022-04-25

## 2021-11-18 NOTE — TELEPHONE ENCOUNTER
See [employer] message    CT ok.    PPI BID    Set up clinic visit    See PCP    See Franchesca Hernandez if able

## 2021-11-19 ENCOUNTER — HOSPITAL ENCOUNTER (EMERGENCY)
Facility: CLINIC | Age: 31
Discharge: HOME | End: 2021-11-19
Attending: EMERGENCY MEDICINE | Admitting: EMERGENCY MEDICINE
Payer: COMMERCIAL

## 2021-11-19 VITALS
DIASTOLIC BLOOD PRESSURE: 85 MMHG | RESPIRATION RATE: 16 BRPM | SYSTOLIC BLOOD PRESSURE: 131 MMHG | WEIGHT: 315 LBS | OXYGEN SATURATION: 97 % | HEART RATE: 75 BPM | BODY MASS INDEX: 41.49 KG/M2

## 2021-11-19 DIAGNOSIS — R45.851 SUICIDAL IDEATION: ICD-10-CM

## 2021-11-19 DIAGNOSIS — K58.8 OTHER IRRITABLE BOWEL SYNDROME: ICD-10-CM

## 2021-11-19 DIAGNOSIS — G89.29 OTHER CHRONIC PAIN: ICD-10-CM

## 2021-11-19 DIAGNOSIS — F33.1 MODERATE EPISODE OF RECURRENT MAJOR DEPRESSIVE DISORDER (H): ICD-10-CM

## 2021-11-19 DIAGNOSIS — R52 BODY ACHES: ICD-10-CM

## 2021-11-19 PROCEDURE — 99283 EMERGENCY DEPT VISIT LOW MDM: CPT

## 2021-11-19 RX ORDER — ACETAMINOPHEN 500 MG
1000 TABLET ORAL EVERY 6 HOURS PRN
Qty: 28 TABLET | Refills: 0 | Status: SHIPPED | OUTPATIENT
Start: 2021-11-19 | End: 2022-04-25

## 2021-11-19 RX ORDER — SUCRALFATE 1 G/1
1 TABLET ORAL 4 TIMES DAILY
Qty: 28 TABLET | Refills: 0 | Status: SHIPPED | OUTPATIENT
Start: 2021-11-19 | End: 2022-07-12

## 2021-11-19 ASSESSMENT — ENCOUNTER SYMPTOMS
FEVER: 0
ABDOMINAL PAIN: 1
RESPIRATORY NEGATIVE: 1
CARDIOVASCULAR NEGATIVE: 1
MYALGIAS: 1
DIARRHEA: 1
FATIGUE: 1
APPETITE CHANGE: 1
NEUROLOGICAL NEGATIVE: 1
ARTHRALGIAS: 1

## 2021-11-19 NOTE — DISCHARGE INSTRUCTIONS
As discussed a referral was placed to both endocrinology and  to assist with further your care on an outpatient basis.  Please follow-up with both tomorrow.  Medication as prescribed for management of your symptoms.  Follow-up with your primary care doctor as planned.

## 2021-11-19 NOTE — Clinical Note
Higinio Wallace was seen and treated in our emergency department on 11/19/2021.  He may return to work on 11/21/2021.       If you have any questions or concerns, please don't hesitate to call.      Bruce Dawson MD

## 2021-11-19 NOTE — ED PROVIDER NOTES
"EMERGENCY DEPARTMENT ENCOUNTER      NAME: Higinio Wallace  AGE: 31 year old male  YOB: 1990  MRN: [id number]  EVALUATION DATE & TIME: 11/19/2021 12:28 PM    PCP: Raza Franklin    ED PROVIDER: PATY Dawson      Chief Complaint   Patient presents with     Fatigue     Abdominal Pain     FINAL IMPRESSION:  1. Other irritable bowel syndrome    2. Body aches    3. Other chronic pain    4. Moderate episode of recurrent major depressive disorder (H)    5. Suicidal ideation      ED COURSE & MEDICAL DECISION MAKING:    Pertinent Labs & Imaging studies reviewed. (See chart for details)  31 year old male presents to the Emergency Department for evaluation of double symptoms.  Patient has multiple medical problems and mental health problems.  Multiple ED visits for body aches and general malaise and irritable bowel syndrome.  Followed by gastroenterology.  Recent rheumatology visit that was nondiagnostic.  Set up for endocrinology visit but missed appointment because he \"overslept\".  Subsequently after missing the endocrinology appointment he presents the emergency department for repeat assessment.  I had a very long conversation with the patient.  Morbidly obese adult male but otherwise unremarkable clinical examination.  He had related some intermittent abdominal pain that he attributes to his \"irritable bowel\".  He had previous imaging for this.  On his abdominal examination today there is no reproducible abdominal tenderness to palpation.  He had previous laboratory testing multiple times for the symptoms without clear etiology identified.  He also has issues with mental health and anxiety.  He reports previous issues with suicidal ideation he reports its fleeting it comes and goes he is not currently suicidal but is interested in getting reconnected with .  I offered that here in the emergency department which the patient declined he was hoping more to have it set up on an outpatient basis.  " Based on my conversations the patient appeared to have appropriate medical capacity.  No active suicidal or homicidal ideation.  I do believe he likely suffers from depression and his anxiety is escalated.  He is declining  ED evaluation and I think that reasonable based on the above with plan for close follow-up on an outpatient basis.  Relative to his other complaints I did not see indication for acute laboratory or imaging.  His clinical examination was otherwise unremarkable.  We will try the patient on an additional antacid for management of his stomach symptoms.  Also wrote a prescription for Tylenol for management of his pain.  I stressed him the importance of making these outpatient appointments to continue to work through his complaints and rule out additional pathology.  I put in another urgent referral to endocrinology for follow-up appointment.  I also put in the next day  evaluation for outpatient.  Patient was ambulatory without issue and comfortable with the plan of care.  I reviewed return precautions prior to discharge.    12:45 PM I met with the patient for the initial interview and physical examination. Discussed plan for treatment and workup in the ED. PPE: Provider wore eye protection, N95 mask and gloves.     Plan and all results were discussed  Time was taken to answer all questions. Patient and/or associated parties expressed understanding and were agreeable to the treatment plan.  Warning signs and ER return precautions provided. Discharged with discharge instructions outlining plan for further care and follow up.     MEDICATIONS GIVEN IN THE EMERGENCY:  Discharge Medication List as of 11/19/2021  1:08 PM      START taking these medications    Details   sucralfate (CARAFATE) 1 GM tablet Take 1 tablet (1 g) by mouth 4 times daily, Disp-28 tablet, R-0, E-Prescribe             NEW PRESCRIPTIONS STARTED AT TODAY'S ER VISIT  Discharge Medication List as of 11/19/2021   1:08 PM      START taking these medications    Details   sucralfate (CARAFATE) 1 GM tablet Take 1 tablet (1 g) by mouth 4 times daily, Disp-28 tablet, R-0, E-Prescribe         CONTINUE these medications which have CHANGED    Details   acetaminophen (TYLENOL) 500 MG tablet Take 2 tablets (1,000 mg) by mouth every 6 hours as needed for mild pain, Disp-28 tablet, R-0, E-Prescribe         CONTINUE these medications which have NOT CHANGED    Details   albuterol (ALBUTEROL) 108 (90 BASE) MCG/ACT Inhaler Inhale 2 puffs into the lungs every 4 hours as needed for shortness of breath / dyspnea or wheezing, Disp-1 Inhaler, R-0, Local Print      alum & mag hydroxide-simethicone (MAALOX MAX) 400-400-40 MG/5ML SUSP suspension Take 30 mLs by mouth every 4 hours as needed for indigestion or heartburn, Disp-100 mL, R-0, Local Print      cyclobenzaprine (FLEXERIL) 10 MG tablet Take 1 tablet (10 mg) by mouth 3 times daily as needed for muscle spasms, Disp-30 tablet, R-0, No Print Out      metFORMIN (GLUCOPHAGE) 500 MG tablet Take 500 mg by mouth 2 times daily (with meals) , Historical      !! omeprazole (PRILOSEC) 20 MG DR capsule Take 1 capsule (20 mg) by mouth daily, Disp-30 capsule, R-0, No Print Out      !! omeprazole (PRILOSEC) 40 MG DR capsule Take 1 capsule (40 mg) by mouth daily, Disp-90 capsule, R-3, E-Prescribe       !! - Potential duplicate medications found. Please discuss with provider.             =================================================================    HPI    Patient information was obtained from: Patient     Use of Intrepreter: N/A         Higinio Wallace is a 31 year old male with a pertinent medical history of obesity, GERD, malrotation of intestine, IBS, nephrolithiasis, anxiety, and depression who presents to this ED by walk in for evaluation of audible symptoms.  Patient reporting that he has a history of IBS that is escalating in intensity as well as increased fatigue and body aches.  Has had several  "evaluations for this in the emergency department the primary care community and the specialist community with no clear etiology for symptoms identified.  Patient was set up with an endocrinology appointment to evaluate for the possibility of Cushing syndrome but unfortunately he missed the appointment yesterday because he \"overslept\".  Was rescheduled in February.  Constellation of this resulted in his emergency department presentation today.  Denies fever denies chills.  Reports body aches.  Worse with certain positions and certain activities.  Reports intermittent abdominal pain primarily epigastric similar to his previous \"IBS\" symptoms.  Patient reports diarrhea that is a chronic intermittent issue for him.  Denies bloody stools.  Overall all of the symptoms per patient report are similar to what he has been dealing with over the last several months they wax and wane in intensity he is frustrated that he has not had clarity on his diagnosis.  He also reports ongoing issues with depression anxiety and dissociative identity disorder.  Occasionally he does have suicidal ideation he is interested in getting reconnected with  on an outpatient basis.  He does not feel suicidal or homicidal today.  Denies additional symptoms.    Per chart review, patient was seen in this ED on 10/9/2021 for evaluation of body aches ongoing for months. COVID-19 testing was negative. Discharged with prescription for Flexeril. Patient again seen in this ED on 10/24/2021 for ongoing muscle cramping x 4 months, epigastric pain, and urinary frequency. Labs with normal WBC and hemoglobin, normal electrolytes and kidney function, normal hepatic profile and lipase, CK normal at 91, troponin undetectable. EKG demnstrated sinus bradycardia, no acute ischemic changes. UA negative for signs of infection or RBCs. CT abdomen pelvis negative for acute findings. Discharged home with prescriptions for Carafate and Maalox. More recently, " patient had a rheumatology office visit on 11/15/2021 for evaluation of diffuse muscle pain, fatigue, and positive RAMANA. Summary of impression/plan from rheumatology visit: He has a week positive RAMANA but clinically there is no evidence for SLE, or any other connective tissue disease. There is no evidence for rheumatoid arthritis. There is no evidence for an inflammatory muscle disease. His symptoms are suggestive of chronic myofascial pain syndrome. He has some cushingoid features without prednisone use. Specifically he has striae on the abdomen, easy bruising, hypertension, obesity, presumed obstructive sleep apnea, proximal muscle weakness.     REVIEW OF SYSTEMS   Review of Systems   Constitutional: Positive for appetite change and fatigue. Negative for fever.   HENT: Negative.    Respiratory: Negative.    Cardiovascular: Negative.    Gastrointestinal: Positive for abdominal pain and diarrhea.   Genitourinary: Negative.    Musculoskeletal: Positive for arthralgias and myalgias.   Neurological: Negative.    All other systems reviewed and are negative.     PAST MEDICAL HISTORY:  Past Medical History:   Diagnosis Date     Anxiety      Class 2 obesity due to excess calories in adult      Complication of anesthesia      Depression      Gastroesophageal reflux disease with esophagitis      History of nephrolithiasis      Irritable bowel syndrome      Major depression, recurrent (H)      Malrotation of intestine      Manic disorder (H)      Panic disorder      Patellofemoral instability of left knee with pain      Pre-diabetes      Psychosis (H)      PTSD (post-traumatic stress disorder)      Tobacco use        PAST SURGICAL HISTORY:  Past Surgical History:   Procedure Laterality Date     ARTHROSCOPY KNEE WITH PATELLAR REALIGNMENT Left 11/13/2020    Procedure: Knee Arthroscopy, Medial Patello-femoral Ligament Reconstruction with Allograft, Distal Tibial Tubercle Osteotomy, Lateral Retinacular Lengthening;  Surgeon:  Sourav Keating MD;  Location: [facility] OR     ARTHROTOMY TIBIAL TUBERCLE SHIFT Left 11/13/2020    Procedure: tibial tubercle Osteotomy ;  Surgeon: Sourav Keating MD;  Location: [facility] OR     COLONOSCOPY N/A 10/07/2020    Procedure: COLONOSCOPY, WITH POLYPECTOMY AND BIOPSY;  Surgeon: Donell Holland MD;  Location: [facility] OR     deviated septum  2018     KNEE SURGERY Left      OPEN REDUCTION INTERNAL FIXATION RODDING INTRAMEDULLARY TIBIA Left 11/13/2020    Procedure: plus derotation tibial osteotomy;  Surgeon: Sourav Keating MD;  Location: [facility] OR       ALLERGIES:  Allergies   Allergen Reactions     Bee Venom Anaphylaxis and Other (See Comments)     Swelling  Large local reactions/ swelling       Fruit Acid Concentrate [Fruit Extracts] Swelling     Lips swell and crust over little bit- tongue gets numb     Liquid Adhesive Dermatitis     And band aid       Monosodium Glutamate Hives       FAMILY HISTORY:  Family History   Problem Relation Age of Onset     Other - See Comments Mother         PONV     Cerebrovascular Disease Brother      Atrial fibrillation Brother      Other - See Comments Brother         cardiac autoimmune deficiency       SOCIAL HISTORY:   Social History     Socioeconomic History     Marital status: Single     Spouse name: Not on file     Number of children: Not on file     Years of education: Not on file     Highest education level: Not on file   Occupational History     Not on file   Tobacco Use     Smoking status: Current Some Day Smoker     Packs/day: 0.25     Types: Cigarettes     Start date: 2001     Smokeless tobacco: Never Used     Tobacco comment: 1 cig a day trying to quit   Substance and Sexual Activity     Alcohol use: Not Currently     Drug use: Yes     Types: Marijuana     Comment: daily     Sexual activity: Yes     Partners: Female     Birth control/protection: Condom   Other Topics Concern     Parent/sibling w/ CABG, MI or angioplasty before 65F 55M? Not Asked    Social History Narrative     Not on file     Social Determinants of Health     Financial Resource Strain: Not on file   Food Insecurity: Not on file   Transportation Needs: Not on file   Physical Activity: Not on file   Stress: Not on file   Social Connections: Not on file   Intimate Partner Violence: Not on file   Housing Stability: Not on file       VITALS:  Patient Vitals for the past 24 hrs:   BP Temp src Pulse Resp SpO2 Weight   11/19/21 1313 131/85 -- 75 16 97 % --   11/19/21 1049 (!) 147/100 Oral 73 16 96 % (!) 156.9 kg (346 lb)       PHYSICAL EXAM    Constitutional:   Morbidly obese adult male does not appear to be in acute distress  HENT:  Normocephalic, Atraumatic, Bilateral external ears normal, Oropharynx moist Nose normal.   Neck: Normal range of motion   Eyes: Conjunctiva normal, No discharge.   Respiratory:  Normal breath sounds, No respiratory distress, No wheezing.  No cough.  Cardiovascular:  Normal heart rate, Normal rhythm. No murmur appreciated.  Chest wall non-tender.  GI:  Soft, No tenderness, No masses, No flank tenderness.  No rebound or guarding.  : No CVA tenderness  Musculoskeletal: Full ROM.  No edema appreciated.  No cyanosis. Good ROM of major joints.  Integument:  Warm, Dry, No erythema, No rash.    Neurologic:  Alert & oriented.  No focal deficits appreciated.  Ambulatory.  Psych:   General appearance: Normal   Orientation: Normal   Affect: Normal   Behavior: Normal   Cognitive function: Normal   Concentration: Normal   Communicative abilities: Normal   Evidence of self-harm: absent   Judgement and insight: Normal   Impulse control: Normal   Indications of substance abuse: absent   Memory: Normal   Thought processes: Normal   Homicidal ideation: absent   Suicidal ideation: absent currently but does report over the past several weeks he is occasionally had fleeting thoughts about it with no plan    Ely VALERO, am serving as a scribe to document services personally performed  by Dr. Dawson based on my observation and the provider's statements to me. I, Bruce Dawson MD attest that Ely Nava is acting in a scribe capacity, has observed my performance of the services and has documented them in accordance with my direction.    Bruce Dawson M.D.  Emergency Medicine  [facility] EMERGENCY ROOM  [address] [facility] [address]  [phone]  Dept: [phone]     Bruce Dawson MD  11/19/21 5027

## 2021-11-19 NOTE — ED TRIAGE NOTES
Patient presents to the ED with multiple complaints of body aches, fatigue, etc...   He also reports that he has abdominal pains, back pain, bloating, and diarrhea.  He reports his symptoms have been on going for several months.

## 2021-11-19 NOTE — Clinical Note
Higinio Wallace was seen and treated in our emergency department on 11/19/2021.  He may return to work on 11/22/2021.  Light duty with no lifting over 20 pounds     If you have any questions or concerns, please don't hesitate to call.      Bruce Dawson MD

## 2021-11-22 ENCOUNTER — PATIENT OUTREACH (OUTPATIENT)
Dept: CARE COORDINATION | Facility: CLINIC | Age: 31
End: 2021-11-22
Payer: COMMERCIAL

## 2021-11-22 ASSESSMENT — ACTIVITIES OF DAILY LIVING (ADL): DEPENDENT_IADLS:: INDEPENDENT

## 2021-11-22 NOTE — PROGRESS NOTES
Follow Up Progress Note      Assessment: CCC RN spoke with patient today after recent ED visit for irritable bowel syndrome, body aches, moderate recurrent major depression, suicidal ideation. Patient agreeable to talking with [facility] LSW at scheduled phone visit on 11/24/21 to discuss financial concerns, need for a psychiatrist and possibly going on Disability. Patient has follow up with PCP tomorrow and plans to attend this appointment. He said he continues to have fleeting thoughts of SI, but said he would not act on it. He stated he knows his mental health is not managed well at this time. Not taking any psychiatric medications at this time. He stated his psychiatric provider left the Clinic where he was seeing her. Patient said he has a supporting girl friend who he lives with at this time. Does not like current housing.     Care Gaps:    Health Maintenance Due   Topic Date Due     PREVENTIVE CARE VISIT  Never done     ADVANCE CARE PLANNING  Never done     DEPRESSION ACTION PLAN  Never done     Pneumococcal Vaccine: Pediatrics (0 to 5 Years) and At-Risk Patients (6 to 64 Years) (1 of 2 - PPSV23) Never done     HIV SCREENING  Never done     HEPATITIS C SCREENING  Never done     INFLUENZA VACCINE (1) Never done     COVID-19 Vaccine (3 - Booster for Moderna series) 11/21/2021       Scheduled with PCP tomorrow, 11/23/21      Goals addressed this encounter:   Goals Addressed    None         Intervention/Education provided during outreach: Discussed the importance of attending scheduled appointments. Encouraged to take his medications as directed. Encourage him to return to the ED is he feels he is not safe or he feels his mental health is unmanageable.           Plan: Scheduled with PCP tomorrow. Scheduled with a phone visit with VIOLET MIRZAW on 11/24/21.

## 2021-11-23 ENCOUNTER — VIRTUAL VISIT (OUTPATIENT)
Dept: FAMILY MEDICINE | Facility: CLINIC | Age: 31
End: 2021-11-23
Payer: COMMERCIAL

## 2021-11-23 ENCOUNTER — VIRTUAL VISIT (OUTPATIENT)
Dept: BEHAVIORAL HEALTH | Facility: CLINIC | Age: 31
End: 2021-11-23
Payer: COMMERCIAL

## 2021-11-23 ENCOUNTER — TELEPHONE (OUTPATIENT)
Dept: ENDOCRINOLOGY | Facility: CLINIC | Age: 31
End: 2021-11-23

## 2021-11-23 DIAGNOSIS — M25.562 CHRONIC PAIN OF LEFT KNEE: ICD-10-CM

## 2021-11-23 DIAGNOSIS — M25.50 MULTIPLE JOINT PAIN: ICD-10-CM

## 2021-11-23 DIAGNOSIS — F44.81 DISSOCIATIVE IDENTITY DISORDER (H): ICD-10-CM

## 2021-11-23 DIAGNOSIS — F43.10 PTSD (POST-TRAUMATIC STRESS DISORDER): Primary | ICD-10-CM

## 2021-11-23 DIAGNOSIS — M79.10 MYALGIA: ICD-10-CM

## 2021-11-23 DIAGNOSIS — G89.29 CHRONIC PAIN OF LEFT KNEE: ICD-10-CM

## 2021-11-23 DIAGNOSIS — R73.03 PREDIABETES: ICD-10-CM

## 2021-11-23 DIAGNOSIS — R76.8 POSITIVE ANA (ANTINUCLEAR ANTIBODY): ICD-10-CM

## 2021-11-23 DIAGNOSIS — Q43.3 MALROTATION OF INTESTINE (H): ICD-10-CM

## 2021-11-23 PROCEDURE — 99213 OFFICE O/P EST LOW 20 MIN: CPT | Mod: 95 | Performed by: FAMILY MEDICINE

## 2021-11-23 PROCEDURE — 90834 PSYTX W PT 45 MINUTES: CPT | Mod: GT,95 | Performed by: SOCIAL WORKER

## 2021-11-23 NOTE — PROGRESS NOTES
Progress Note    Patient Name: Higinio Wallace  Date: 11/23/21         Service Type: Individual      Session Start Time: 2:05 PM  session End Time: 2:52 PM     Session Length: 47 minutes    Session #: 2  Attendees: Client and His girlfriend was in the background but not part of the appointment    Service Modality:  Video Visit:      Provider verified identity through the following two step process.  Patient provided:  Patient is known previously to provider    Telemedicine Visit: The patient's condition can be safely assessed and treated via synchronous audio and visual telemedicine encounter.      Reason for Telemedicine Visit: Services only offered telehealth    Originating Site (Patient Location): Patient's home    Distant Site (Provider Location): Provider Remote Setting- Home Office    Consent:  The patient/guardian has verbally consented to: the potential risks and benefits of telemedicine (video visit) versus in person care; bill my insurance or make self-payment for services provided; and responsibility for payment of non-covered services.     Patient would like the video invitation sent by:  Send to e-mail at: [email or web address] I Like    Mode of Communication:  Video Conference via well    As the provider I attest to compliance with applicable laws and regulations related to telemedicine.     Treatment Plan Last Reviewed: 11/23/2021  PHQ-9 / ANITA-7 :   PHQ 4/6/2021 8/17/2021 11/8/2021   PHQ-9 Total Score 0 16 14   Q9: Thoughts of better off dead/self-harm past 2 weeks Not at all Not at all Several days   F/U: Thoughts of suicide or self-harm - - No   F/U: Safety concerns - - No     ANITA-7 SCORE 10/6/2020 11/4/2020 11/8/2021   Total Score - - 14 (moderate anxiety)   Total Score 20 13 14         DATA  Interactive Complexity: No  Crisis: No       Progress Since Last Session (Related to Symptoms / Goals / Homework):   Symptoms: Worsening Patient reports having ongoing  mental and physical health struggles which negatively impact each other    Homework: Partially completed      Episode of Care Goals: Minimal progress - PREPARATION (Decided to change - considering how); Intervened by negotiating a change plan and determining options / strategies for behavior change, identifying triggers, exploring social supports, and working towards setting a date to begin behavior change     Current / Ongoing Stressors and Concerns:   Patient shared regarding a variety of stressors that he is experiencing in life.  Patient notes that the stressors include his work environment, his physical health, and financial stress.  Patient reports that all of the stressors have an impact on his overall emotional wellbeing.     Treatment Objective(s) Addressed in This Session:   Decrease frequency and intensity of feeling down, depressed, hopeless  Patient and therapist began to explore how his physical health and life stressors are impacting his emotional health and wellbeing.  Patient and therapist also began to brainstorm ways that he can begin to focus on taking care of both his emotional as well as his physical health.  Finally patient and therapist drafted his treatment plan, see the detailed plan below.     Intervention:   CBT: CBT triangle, cognitive distortions    ACT: Value based living   DBT: Validated patient's emotions and experiences   MI: Explored stages of change        ASSESSMENT: Current Emotional / Mental Status (status of significant symptoms):   Risk status (Self / Other harm or suicidal ideation)   Patient reports the following current fears or concerns for personal safety: Patient reports that he gets concerned regarding his physical safety and wellbeing when the personalities are voices that exist within him tell him to do things to hurt himself.   Patient denies current or recent suicidal ideation or behaviors.   Patient denies current or recent homicidal ideation or  behaviors.   Patient denies current or recent self injurious behavior or ideation.   Patient denies other safety concerns.   Patient reports there has been no change in risk factors since their last session.     Patient reports there has been no change in protective factors since their last session.     Recommended that patient call 911 or go to the local ED should there be a change in any of these risk factors.     Appearance:   Appropriate    Eye Contact:   Good    Psychomotor Behavior: Agitated    Attitude:   Cooperative  Friendly   Orientation:   All   Speech    Rate / Production: Normal     Volume:  Normal    Mood:    Anxious    Affect:    Appropriate    Thought Content:  Perservative    Thought Form:  Circumstantial   Insight:    Good  and Fair      Medication Review:   No current psychiatric medications prescribed     Medication Compliance:   NA     Changes in Health Issues:   Yes: Ongoing health related issues without specific diagnoses at this time, Associated Psychological Distress     Chemical Use Review:    Substance Use: Chemical use reviewed, no active concerns identified      Tobacco Use: No change in amount of tobacco use since last session.  Not reviewed during session    Diagnosis:  1. PTSD (post-traumatic stress disorder)        Collateral Reports Completed:   Not Applicable    PLAN: (Patient Tasks / Therapist Tasks / Other)  Patient will focus on engaging in a regular self-care practice as well as using assertive communication to advocate for himself.  Patient will continue to work with his primary care physician to address physical health concerns.  Patient and therapist will meet again in a week.        Meri Rich, [facility] 11/23/21                                                           ______________________________________________________________________    Treatment Plan    Patient's Name: Higinio Wallace  YOB: 1990    Date: November 23, 2021    DSM5 Diagnoses: 309.81  (F43.10) Posttraumatic Stress Disorder (includes Posttraumatic Stress Disorder for Children 6 Years and Younger)  With dissociative symptoms  Psychosocial / Contextual Factors: History of trauma, significant mental health concerns, physical health concerns, financial stressors  WHODAS:   WHODAS 2.0 Total Score 10/6/2020 11/8/2021   Total Score 39 32   Total Score MyChart - 32       Referral / Collaboration:  Was/were discussed and client will pursue.    Anticipated number of session or this episode of care: 8-12      MeasurableTreatment Goal(s) related to diagnosis / functional impairment(s)  Goal 1: Patient will focus on decreasing his mental health symptomology as evidenced by a PHQ-9 and ANITA-7 score of 10 or less    I will know I've met my goal when I feel less stressed and increased happiness and motivation.      Objective #A (Patient Action)    Patient will learn about trauma and the impact that can have on him both emotionally and physically.  Status: New - Date: November 23, 2021     Intervention(s)  Therapist will provide educational materials on The impact that trauma can have on the mind, the body, the spirit, and emotions.    Objective #B  Patient will Decrease frequency and intensity of feeling down, depressed, hopeless  Feel less tired and more energy during the day   Identify negative self-talk and behaviors: challenge core beliefs, myths, and actions.  Status: New - Date: November 23, 2021     Intervention(s)  Therapist will teach emotional regulation skills. Patient and therapist will focus on increasing his awareness and recognition of symptoms in the moment as well as developing coping skills and strategies to manage the symptoms.  The skills and strategies could include positive affirmations, movement, engaging in the creative process, and developing a positive sense of self.    Objective #C  Patient will Explore with therapist how his trauma has impacted his thoughts and how his thoughts impact  his behaviors and emotional wellbeing.  Status: New - Date: November 23, 2021     Intervention(s)  Therapist will teach distraction skills. As well as ways to challenge negative thoughts and self talk..      Patient has reviewed and agreed to the above plan.      Meri Rich, [facility]  November 23, 2021

## 2021-11-23 NOTE — LETTER
11/23/21      Higinio Wallace  1990          Higinio Wallace has a medical condition that requires that he have the following work restriction in place from this date until December 23, 2021 at which time he will be reevaluated.    Working normal duties as a  for no more than 4 hours per day.      Raza Franklin MD, MD

## 2021-11-23 NOTE — TELEPHONE ENCOUNTER
[facility] Call [facility]    Phone Message    May a detailed message be left on voicemail: yes     Reason for Call: Other:Referral,  Cushings disease unable to scheduled with in 2weeks     Action Taken: Other: Endo    Travel Screening: Not Applicable

## 2021-11-23 NOTE — PROGRESS NOTES
Higinio is a 31 year old who is being evaluated via a billable telephone visit.      What phone number would you like to be contacted at? [phone]  How would you like to obtain your AVS? Arabella    Diagnoses and all orders for this visit:    PTSD (post-traumatic stress disorder)    Dissociative identity disorder (H)    Prediabetes    Malrotation of intestine    Positive RAMANA (antinuclear antibody)    Multiple joint pain    Myalgia    Chronic pain of left knee       Formulated work restriction to allow working no more than 4 hours/day.  Continues to work to minimize walking.  Can perform regular duties as a  otherwise.  Will reevaluate in about 1 month.    Subjective   Higinio is a 31 year old with a complex medical and social history who is evaluated today primarily for concerns related to difficulty with physical symptoms at work.    His health care is fragmented.  He currently sees specialists including mental health care provider which she is in the process of establishing with a new psychiatrist, he sees a therapist, he sees an orthopedic specialty provider, he sees a gastroenterologist for history of malrotation and possible IBS, he was recently referred to a rheumatologist over concern of a positive RAMANA and a multitude of nonspecific musculoskeletal symptoms, he is going to be seen by an endocrinologist in the near future over concerns of possible Cushing syndrome.  He has been evaluated by a sleep specialist.  He seeks care in the emergency department for what basically comes down to primary care issues.  States that emergency care hours are much more convenient for his work schedule.    He is reporting difficulty at work with being able to follow up work restriction that is conducive for helping him to manage his multiple musculoskeletal pain symptoms.  He currently is dealing with muscle pain issues in both legs.  Reports increasing pain with ambulation.  Reports that his muscles overall feel weak.   Previously we had tried to have a restriction to minimize walking but this is proving to be difficult based on his report.  We discussed and formulated the plan for a new restriction.    We discussed recent considerations related to his rheumatology visit.  He plans to be seen by endocrinology.  He reports no significant concerns at this time with his complex mental health concerns although does admit that this is the source of significant ongoing concerns.    He is encouraged overall to maintain close contact with primary care to avoid using emergency department for evaluation of concerns which could be taken care of with primary care office visits.      Review of Systems   Complete review of systems is obtained.  Other than the specific considerations noted above complete review of systems is negative.        Objective           Vitals:  No vitals were obtained today due to virtual visit.    Physical Exam   healthy, alert and no distress  PSYCH: Alert and oriented times 3; coherent speech, normal   rate and volume, able to articulate logical thoughts, able   to abstract reason, no tangential thoughts, no hallucinations   or delusions  His affect is normal  RESP: No cough, no audible wheezing, able to talk in full sentences  Remainder of exam unable to be completed due to telephone visits                Phone call duration: 18 minutes

## 2021-11-24 ENCOUNTER — PATIENT OUTREACH (OUTPATIENT)
Dept: NURSING | Facility: CLINIC | Age: 31
End: 2021-11-24
Payer: COMMERCIAL

## 2021-11-24 SDOH — ECONOMIC STABILITY: TRANSPORTATION INSECURITY
IN THE PAST 12 MONTHS, HAS THE LACK OF TRANSPORTATION KEPT YOU FROM MEDICAL APPOINTMENTS OR FROM GETTING MEDICATIONS?: NO

## 2021-11-24 SDOH — ECONOMIC STABILITY: TRANSPORTATION INSECURITY
IN THE PAST 12 MONTHS, HAS LACK OF TRANSPORTATION KEPT YOU FROM MEETINGS, WORK, OR FROM GETTING THINGS NEEDED FOR DAILY LIVING?: NO

## 2021-11-24 SDOH — ECONOMIC STABILITY: INCOME INSECURITY: IN THE LAST 12 MONTHS, WAS THERE A TIME WHEN YOU WERE NOT ABLE TO PAY THE MORTGAGE OR RENT ON TIME?: NO

## 2021-11-24 ASSESSMENT — ACTIVITIES OF DAILY LIVING (ADL): DEPENDENT_IADLS:: INDEPENDENT

## 2021-11-24 NOTE — TELEPHONE ENCOUNTER
Endocrine triage:   He has seen endocrinologist Dr Ng in weight management clinic in the past.  He no showed for new endocrine appt with Dr Garcia 11/18/2021.    OK to keep first available.    Dana Armas MD     intact/delivered spontaneously
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
## 2021-11-26 ENCOUNTER — PATIENT OUTREACH (OUTPATIENT)
Dept: CARE COORDINATION | Facility: CLINIC | Age: 31
End: 2021-11-26
Payer: COMMERCIAL

## 2021-11-26 NOTE — PROGRESS NOTES
CHW reviewed initial assessment from 11/24/21.    CHW delegations: None at this time    Next outreach due: 12/23/21

## 2021-11-29 ENCOUNTER — DOCUMENTATION ONLY (OUTPATIENT)
Dept: BEHAVIORAL HEALTH | Facility: CLINIC | Age: 31
End: 2021-11-29
Payer: COMMERCIAL

## 2021-11-29 NOTE — PROGRESS NOTES
840 am Contacted patient's therapist, Meri Rich regarding need to contact.       10 am. Meri Rich responded to message. She stated she would follow up on   Safety at tomorrow's appointment.    Caputo (Mindy),[facility]

## 2021-11-30 ENCOUNTER — VIRTUAL VISIT (OUTPATIENT)
Dept: BEHAVIORAL HEALTH | Facility: CLINIC | Age: 31
End: 2021-11-30
Payer: COMMERCIAL

## 2021-11-30 DIAGNOSIS — F43.10 PTSD (POST-TRAUMATIC STRESS DISORDER): Primary | ICD-10-CM

## 2021-11-30 PROCEDURE — 90832 PSYTX W PT 30 MINUTES: CPT | Mod: GT,95 | Performed by: SOCIAL WORKER

## 2021-11-30 NOTE — PROGRESS NOTES
Progress Note    Patient Name: Higinio Wallace  Date: 11/30/21         Service Type: Individual      Session Start Time: 4:20 PM  session End Time: 4:47 PM     Session Length: 27 minutes    Session #: 3    Attendees: Client and His girlfriend was in the background but not part of the appointment    Service Modality:  Video Visit:      Provider verified identity through the following two step process.  Patient provided:  Patient is known previously to provider    Telemedicine Visit: The patient's condition can be safely assessed and treated via synchronous audio and visual telemedicine encounter.      Reason for Telemedicine Visit: Services only offered telehealth    Originating Site (Patient Location): Patient's other His car    Distant Site (Provider Location): Provider Remote Setting- Home Office    Consent:  The patient/guardian has verbally consented to: the potential risks and benefits of telemedicine (video visit) versus in person care; bill my insurance or make self-payment for services provided; and responsibility for payment of non-covered services.     Patient would like the video invitation sent by:  Send to e-mail at: [email or web address]    Mode of Communication:  Video Conference via well    As the provider I attest to compliance with applicable laws and regulations related to telemedicine.     Treatment Plan Last Reviewed: 11/23/2021  PHQ-9 / ANITA-7 :   PHQ 8/17/2021 11/8/2021 11/23/2021   PHQ-9 Total Score 16 14 18   Q9: Thoughts of better off dead/self-harm past 2 weeks Not at all Several days More than half the days   F/U: Thoughts of suicide or self-harm - No Yes   F/U: Self harm-plan - - No   F/U: Self-harm action - - No   F/U: Safety concerns - No No     ANITA-7 SCORE 10/6/2020 11/4/2020 11/8/2021   Total Score - - 14 (moderate anxiety)   Total Score 20 13 14         DATA  Interactive Complexity: No  Crisis: No       Progress Since Last Session  (Related to Symptoms / Goals / Homework):   Symptoms: Improving Patient reports reduced symptomology along with mood stability    Homework: Achieved / completed to satisfaction      Episode of Care Goals: Minimal progress - PREPARATION (Decided to change - considering how); Intervened by negotiating a change plan and determining options / strategies for behavior change, identifying triggers, exploring social supports, and working towards setting a date to begin behavior change     Current / Ongoing Stressors and Concerns:   Patient shared that over the past week he was able to advocate for himself at work regarding what would be appropriate accommodations, establish connection with his primary care physician, and reached out for social work support through the [facility].  Patient identified that he is currently still experiencing stressors at work as well as with his car.  Patient shared that he is hoping to find the supports that are best for him and that allow him to the live the life he would like to live.     Treatment Objective(s) Addressed in This Session:   Decrease frequency and intensity of feeling down, depressed, hopeless  Patient and therapist were limited on what they could discuss due to his being in the car with his girlfriend and wanting to maintain confidentiality.  Patient did provide an update on the resources he has been advocating for throughout the week.  Patient and therapist were also able to review coping skills and strategies to manage negative self talk/voices he hears in his mind.  Therapist reminded patient of the 5 senses grounding activity to stay within the present moment and patient shared how he has been challenging the voices that he hears.     Intervention:   CBT: CBT triangle, cognitive distortions    ACT: Value based living   DBT: Validated patient's emotions and experiences   MI: Explored stages of change        ASSESSMENT: Current Emotional / Mental Status (status of significant  symptoms):   Risk status (Self / Other harm or suicidal ideation)   Patient reports the following current fears or concerns for personal safety: Patient reports that he gets concerned regarding his physical safety and wellbeing when the personalities are voices that exist within him tell him to do things to hurt himself.   Patient denies current or recent suicidal ideation or behaviors.   Patient denies current or recent homicidal ideation or behaviors.   Patient denies current or recent self injurious behavior or ideation.   Patient denies other safety concerns.   Patient reports there has been no change in risk factors since their last session.     Patient reports there has been no change in protective factors since their last session.     Recommended that patient call 911 or go to the local ED should there be a change in any of these risk factors.     Appearance:   Appropriate    Eye Contact:   Good    Psychomotor Behavior: Normal    Attitude:   Cooperative  Friendly   Orientation:   All   Speech    Rate / Production: Normal     Volume:  Normal    Mood:    Normal   Affect:    Appropriate    Thought Content:  Clear    Thought Form:  Coherent  Logical    Insight:    Good      Medication Review:   No current psychiatric medications prescribed     Medication Compliance:   NA     Changes in Health Issues:   Yes: Ongoing health related issues without specific diagnoses at this time, Associated Psychological Distress     Chemical Use Review:    Substance Use: Chemical use reviewed, no active concerns identified      Tobacco Use: No change in amount of tobacco use since last session.  Not reviewed during session    Diagnosis:  1. PTSD (post-traumatic stress disorder)        Collateral Reports Completed:   Not Applicable    PLAN: (Patient Tasks / Therapist Tasks / Other)  Patient will continue to focus on establishing and maintaining a regular self-care plan as well as he will follow-up with the [facility]   regarding possibility of services.  Patient and therapist will meet again in a week and patient will focus on being home and by himself to allow for confidential conversation.        Meri Rich, [facility] 11/30/21                                                           ______________________________________________________________________    Treatment Plan    Patient's Name: Higinio Wallace  YOB: 1990    Date: November 23, 2021    DSM5 Diagnoses: 309.81 (F43.10) Posttraumatic Stress Disorder (includes Posttraumatic Stress Disorder for Children 6 Years and Younger)  With dissociative symptoms  Psychosocial / Contextual Factors: History of trauma, significant mental health concerns, physical health concerns, financial stressors  WHODAS:   WHODAS 2.0 Total Score 10/6/2020 11/8/2021   Total Score 39 32   Total Score MyChart - 32       Referral / Collaboration:  Was/were discussed and client will pursue.    Anticipated number of session or this episode of care: 8-12      MeasurableTreatment Goal(s) related to diagnosis / functional impairment(s)  Goal 1: Patient will focus on decreasing his mental health symptomology as evidenced by a PHQ-9 and ANITA-7 score of 10 or less    I will know I've met my goal when I feel less stressed and increased happiness and motivation.      Objective #A (Patient Action)    Patient will learn about trauma and the impact that can have on him both emotionally and physically.  Status: New - Date: November 23, 2021     Intervention(s)  Therapist will provide educational materials on The impact that trauma can have on the mind, the body, the spirit, and emotions.    Objective #B  Patient will Decrease frequency and intensity of feeling down, depressed, hopeless  Feel less tired and more energy during the day   Identify negative self-talk and behaviors: challenge core beliefs, myths, and actions.  Status: New - Date: November 23, 2021     Intervention(s)  Therapist will teach  emotional regulation skills. Patient and therapist will focus on increasing his awareness and recognition of symptoms in the moment as well as developing coping skills and strategies to manage the symptoms.  The skills and strategies could include positive affirmations, movement, engaging in the creative process, and developing a positive sense of self.    Objective #C  Patient will Explore with therapist how his trauma has impacted his thoughts and how his thoughts impact his behaviors and emotional wellbeing.  Status: New - Date: November 23, 2021     Intervention(s)  Therapist will teach distraction skills. As well as ways to challenge negative thoughts and self talk..      Patient has reviewed and agreed to the above plan.      Meri Rich, [facility]  November 23, 2021

## 2021-12-01 ENCOUNTER — PATIENT OUTREACH (OUTPATIENT)
Dept: NURSING | Facility: CLINIC | Age: 31
End: 2021-12-01
Payer: COMMERCIAL

## 2021-12-01 ASSESSMENT — ACTIVITIES OF DAILY LIVING (ADL): DEPENDENT_IADLS:: INDEPENDENT

## 2021-12-01 NOTE — LETTER
[facility]  Patient Centered Plan of Care  About Me:        Patient Name:  Higinio Isaac    YOB: 1990  Age:         31 year old   Ashlyn MRN:    [id number] Telephone Information:  Home Phone [phone]   Mobile [phone]       Address:  [address] [place] [address] Email address:  [email or web address]      Emergency Contact(s)    Name Relationship Lgl Grd Work Phone Home Phone Mobile Phone   1. ALLEN ISAAC Mother   [phone]   2. JESSICA NARANJO Significant ot*    [phone]           Primary language:  English     needed? No   [employer]:  [phone] op. 1  Other communication barriers:None    Preferred Method of Communication:  Mail  Current living arrangement: Other (Lives with girlfriend)    Mobility Status/ Medical Equipment: Independent        Health Maintenance  Health Maintenance Reviewed: Due/Overdue   Health Maintenance Due   Topic Date Due     PREVENTIVE CARE VISIT  Never done     ADVANCE CARE PLANNING  Never done     DEPRESSION ACTION PLAN  Never done     Pneumococcal Vaccine: Pediatrics (0 to 5 Years) and At-Risk Patients (6 to 64 Years) (1 of 2 - PPSV23) Never done     HIV SCREENING  Never done     HEPATITIS C SCREENING  Never done     INFLUENZA VACCINE (1) Never done     COVID-19 Vaccine (3 - Booster for Moderna series) 11/21/2021           My Access Plan  Medical Emergency 911   Primary Clinic Line [phone]   24 Hour Appointment Line [phone] or  [phone]) (toll-free)   24 Hour Nurse Line [phone] (toll-free)   Preferred Urgent Care [facility], [phone]     Preferred Hospital [facility]  [phone]     Preferred Pharmacy [facility] [address] [address]     Behavioral Health Crisis Line The National Suicide Prevention Lifeline at 1-652.730.8624 or 911             My Care Team Members  Patient Care Team        Relationship Specialty Notifications Start End    Lela Han MD MD Orthopedics  1/2/20     Phone: [phone] Fax: [phone]         [address] [place] [address]    Jamie Carr MD MD Orthopaedic Surgery  1/2/20     Phone: [phone] Fax: [phone]         [facility] ORTHOPEDICS [address] [facility] [address]    Bloch, Lauren Turner, [facility] Pharmacist Pharmacist  10/5/20     Phone: [phone]          [address] [place] [address]    Donell Holland MD Assigned Gastroenterology Provider   10/23/20     Phone: [phone] Fax: [phone]         [address] [place] [address]    Lela Han MD Assigned Musculoskeletal Provider   10/23/20     Phone: [phone] Fax: [phone]         [address] [place] [address]    Giselle De La Paz MD Assigned Endocrinology Provider   10/23/20     Phone: [phone] Fax: [phone]         [address] [place] [address] [address] [facility] [address]    Laurel Kam PA-C Assigned Surgical Provider   6/13/21     Phone: [phone] Fax: [phone]         [address] [place] [address] [address] [facility] [address]    Raza Franklin MD Assigned PCP   6/16/21     Phone: [phone] Fax: [phone]         [address] [facility] [address] [facility] [address]    Goltz, Bennett Ezra, PA-C Assigned Neuroscience Provider   10/24/21     Phone: [phone] Fax: [phone] [address] [facility] [address]    Waqar Go MD MD Rheumatology  11/15/21     Phone: [phone] Fax: [phone]         [address] [facility] [address]    Franchesca Young MD MD Endocrinology, Diabetes, and Metabolism  11/18/21     Phone: [phone] Fax: [phone]         [facility] [place] [address]    Waqar Go MD Assigned Rheumatology Provider   11/21/21     Phone: [phone] Fax: [phone]         [address] [place] [address]    Franchesca Hernandez, PhD Assigned  "Behavioral Health Provider   11/21/21     Phone: [phone] Fax: [phone] [address] [facility] [address]    Dana Kumar LSW Lead Care Coordinator Primary Care - [facility] Admissions 11/24/21     Fax: [phone]         Kathleen Bernal Community Health Worker Primary Care - [facility] Admissions 11/24/21     Phone: [phone] Fax: [phone]                My Care Plans  Self Management and Treatment Plan  Goals and (Comments)  Goals        General     1. Mental Health Management (pt-stated)      Notes - Note edited  11/24/2021  3:42 PM by Dana Kumar LSW     Goal Statement: I will have stable mental health and have a therapist and psychiatrist within 2 months.  Date Goal set: 11-  Barriers: Feeling overwhelmed, stressed.  Strengths: has insurance, already connected to therapist.   Date to Achieve By: 1-  Patient expressed understanding of goal: yes  Action steps to achieve this goal:  1. I will attend appointments with my therapists.  2. I will meet with new psychiatrist on 12-9 at 1:00 with [facility] with Mekhit \"Ana Paula\" Staci Macias NP  3. I will report progress towards this goal at scheduled outreach telephone calls from the [facility] team.         2. Financial Wellbeing (pt-stated)      Notes - Note edited  12/1/2021  4:25 PM by Dana Kumar LSW     Goal Statement: I will have financial stability and am able to pay my bills within 6 months.  Date Goal set: 12-1-2021  Barriers: trouble at current job, only able to work part time.  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   Date to Achieve By: 5-1-2022  Patient expressed understanding of goal: yes  Action steps to achieve this goal:  1. I will continue to work with my current employer to have income while I look for a new job.  2. I will review [employer] center  and "[employer] resources for help.  3. I will report progress towards this goal at scheduled outreach telephone calls from the [facility] team.            "  3. Other (pt-stated)      Notes - Note edited  12/1/2021  4:46 PM by Dana Kumar LSW     Goal Statement: I will find a new place to live with my girlfriend that is affordable within one year.  Date Goal set:12-1-2021  Barriers: hard to find affordable apartments.  Strengths: have a place to live now.   Date to Achieve By: 12-1-2022  Patient expressed understanding of goal: yes  Action steps to achieve this goal:  1. I will look at [employer] for options.   2. I will continue to pay rent at my current apartment.  3. I will report progress towards this goal at scheduled outreach telephone calls from the [facility] team.                   Advance Care Plans/Directives Type:   No data recorded    My Medical and Care Information  Problem List   Patient Active Problem List   Diagnosis     Irritable bowel syndrome     Class 2 severe obesity due to excess calories with serious comorbidity and body mass index (BMI) of 37.0 to 37.9 in adult (H)     Patellofemoral instability of left knee with pain     Tobacco abuse     Suicidal ideation     Moderate episode of recurrent major depressive disorder (H)      Current Medications and Allergies:   Current Outpatient Medications   Medication Instructions     acetaminophen (TYLENOL) 1,000 mg, Oral, EVERY 6 HOURS PRN     albuterol (ALBUTEROL) 108 (90 BASE) MCG/ACT Inhaler 2 puffs, Inhalation, EVERY 4 HOURS PRN     alum & mag hydroxide-simethicone (MAALOX MAX) 400-400-40 MG/5ML SUSP suspension 30 mLs, Oral, EVERY 4 HOURS PRN     cyclobenzaprine (FLEXERIL) 10 mg, Oral, 3 TIMES DAILY PRN     metFORMIN (GLUCOPHAGE) 500 mg, Oral, 2 TIMES DAILY WITH MEALS     omeprazole (PRILOSEC) 20 mg, Oral, DAILY     omeprazole (PRILOSEC) 40 mg, Oral, DAILY     sucralfate (CARAFATE) 1 g, Oral, 4 TIMES DAILY         Care Coordination Start Date: 11/24/2021   Frequency of Care Coordination: monthly     Form Last Updated: 12/01/2021
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
## 2021-12-01 NOTE — LETTER
[facility] CARE COORDINATION  [facility]    December 1, 2021    Higinio Wallace  [address]   [place] [address]      Dear Higinio,    I am a clinic care coordinator who works with Dr. Franklin. I wanted to thank you for spending the time to talk with me.  Below is a description of clinic care coordination and how I can further assist you.      The clinic care coordination team is made up of a registered nurse,  and community health worker who understand the health care system. The goal of clinic care coordination is to help you manage your health and improve access to the health care system in the most efficient manner. The team can assist you in meeting your health care goals by providing education, coordinating services, strengthening the communication among your providers and supporting you with any resource needs.    Please feel free to contact me at [phone] with any questions or concerns. We are focused on providing you with the highest-quality healthcare experience possible and that all starts with you.     To help with your housing search, I recommend you look at website [email or web address] as they have good search option to look for affordable apartments.     Sincerely,     Dana Kumar,   [facility]  [phone]      Enclosed: I have enclosed a copy of the Patient Centered Plan of Care. This has helpful information and goals that we have talked about. Please keep this in an easy to access place to use as needed.

## 2021-12-07 ENCOUNTER — VIRTUAL VISIT (OUTPATIENT)
Dept: BEHAVIORAL HEALTH | Facility: CLINIC | Age: 31
End: 2021-12-07
Payer: COMMERCIAL

## 2021-12-07 DIAGNOSIS — F43.10 PTSD (POST-TRAUMATIC STRESS DISORDER): Primary | ICD-10-CM

## 2021-12-07 PROCEDURE — 90834 PSYTX W PT 45 MINUTES: CPT | Mod: TEL,95 | Performed by: SOCIAL WORKER

## 2021-12-07 NOTE — PROGRESS NOTES
"                                             Progress Note    Patient Name: Higinio Wallace  Date: 12/07/21         Service Type: Individual      Session Start Time: 4:15 PM  session End Time:  4:55 PM     Session Length: 40 minutes    Session #: 4    Attendees: Client    Service Modality:  Phone Visit:      Provider verified identity through the following two step process.  Patient provided:  Patient is known previously to provider    The patient has been notified of the following:      \"We have found that certain health care needs can be provided without the need for a face to face visit.  This service lets us provide the care you need with a phone conversation.       I will have full access to your [facility] medical record during this entire phone call.   I will be taking notes for your medical record.      Since this is like an office visit, we will bill your insurance company for this service.       There are potential benefits and risks of telephone visits (e.g. limits to patient confidentiality) that differ from in-person visits.?Confidentiality still applies for telephone services, and nobody will record the visit.  It is important to be in a quiet, private space that is free of distractions (including cell phone or other devices) during the visit.??      If during the course of the call I believe a telephone visit is not appropriate, you will not be charged for this service\"     Consent has been obtained for this service by care team member: Yes      Treatment Plan Last Reviewed: 11/23/2021  PHQ-9 / ANITA-7 :   PHQ 8/17/2021 11/8/2021 11/23/2021   PHQ-9 Total Score 16 14 18   Q9: Thoughts of better off dead/self-harm past 2 weeks Not at all Several days More than half the days   F/U: Thoughts of suicide or self-harm - No Yes   F/U: Self harm-plan - - No   F/U: Self-harm action - - No   F/U: Safety concerns - No No     ANITA-7 SCORE 10/6/2020 11/4/2020 11/8/2021   Total Score - - 14 (moderate anxiety) "   Total Score 20 13 14         DATA  Interactive Complexity: No  Crisis: No       Progress Since Last Session (Related to Symptoms / Goals / Homework):   Symptoms: No change Patient reports inconsistencies in his mood including trauma triggers    Homework: Achieved / completed to satisfaction      Episode of Care Goals: Minimal progress - PREPARATION (Decided to change - considering how); Intervened by negotiating a change plan and determining options / strategies for behavior change, identifying triggers, exploring social supports, and working towards setting a date to begin behavior change     Current / Ongoing Stressors and Concerns:   Patient shared that a couple of years ago he was given the diagnosis of dissociative identity disorder by clinician at [facility].  Patient shared regarding 7 different personalities that he currently experiences and shared that he has 8th personality that has .  Patient was able to share what is like for him to experience these different personalities and what is stressful about them.  He shared at this point he does not know if he just wants to understand them or integrate them.     Treatment Objective(s) Addressed in This Session:   Decrease frequency and intensity of feeling down, depressed, hopeless  Patient and therapist were able to explore his mood.  Patient shared that he currently is experiencing a variety of stress brought on by financial challenges.  Patient and therapist also explored various personalities that he experiences at a previous diagnosis that he was given of dissociative identity disorder.  Patient shared the research that he has done on this diagnosis and how the information he is finding it is helpful.  Patient shared it is nice for him to have a place where he is able to discuss what he is learning.     Intervention:   CBT: CBT triangle, cognitive distortions    ACT: Value based living   DBT: Validated patient's emotions and  experiences   MI: Explored stages of change        ASSESSMENT: Current Emotional / Mental Status (status of significant symptoms):   Risk status (Self / Other harm or suicidal ideation)   Patient reports the following current fears or concerns for personal safety: Patient reports that he gets concerned regarding his physical safety and wellbeing when the personalities are voices that exist within him tell him to do things to hurt himself.   Patient denies current or recent suicidal ideation or behaviors.   Patient denies current or recent homicidal ideation or behaviors.   Patient denies current or recent self injurious behavior or ideation.   Patient denies other safety concerns.   Patient reports there has been no change in risk factors since their last session.     Patient reports there has been no change in protective factors since their last session.     Recommended that patient call 911 or go to the local ED should there be a change in any of these risk factors.     Appearance:   NA    Eye Contact:   NA    Psychomotor Behavior: Normal    Attitude:   Cooperative  Friendly   Orientation:   All   Speech    Rate / Production: Talkative Normal     Volume:  Normal    Mood:    Normal   Affect:    NA    Thought Content:  Clear    Thought Form:  Coherent  Logical    Insight:    Fair      Medication Review:   No current psychiatric medications prescribed     Medication Compliance:   NA     Changes in Health Issues:   Yes: Ongoing health related issues without specific diagnoses at this time, Associated Psychological Distress     Chemical Use Review:    Substance Use: Chemical use reviewed, no active concerns identified      Tobacco Use: No change in amount of tobacco use since last session.  Not reviewed during session    Diagnosis:  1. PTSD (post-traumatic stress disorder)        Collateral Reports Completed:   Not Applicable    PLAN: (Patient Tasks / Therapist Tasks / Other)  Patient will continue to focus on  establishing and maintaining a regular self-care practice.  He will continue to work with his doctors regarding health related issues that impact his mood.  Patient and therapist will meet again in a week.        Meri Rich, [facility] 12/07/21                                                           ______________________________________________________________________    Treatment Plan    Patient's Name: Higinio Wallace  YOB: 1990    Date: November 23, 2021    DSM5 Diagnoses: 309.81 (F43.10) Posttraumatic Stress Disorder (includes Posttraumatic Stress Disorder for Children 6 Years and Younger)  With dissociative symptoms  Psychosocial / Contextual Factors: History of trauma, significant mental health concerns, physical health concerns, financial stressors  WHODAS:   WHODAS 2.0 Total Score 10/6/2020 11/8/2021   Total Score 39 32   Total Score MyChart - 32       Referral / Collaboration:  Was/were discussed and client will pursue.    Anticipated number of session or this episode of care: 8-12      MeasurableTreatment Goal(s) related to diagnosis / functional impairment(s)  Goal 1: Patient will focus on decreasing his mental health symptomology as evidenced by a PHQ-9 and ANITA-7 score of 10 or less    I will know I've met my goal when I feel less stressed and increased happiness and motivation.      Objective #A (Patient Action)    Patient will learn about trauma and the impact that can have on him both emotionally and physically.  Status: New - Date: November 23, 2021     Intervention(s)  Therapist will provide educational materials on The impact that trauma can have on the mind, the body, the spirit, and emotions.    Objective #B  Patient will Decrease frequency and intensity of feeling down, depressed, hopeless  Feel less tired and more energy during the day   Identify negative self-talk and behaviors: challenge core beliefs, myths, and actions.  Status: New - Date: November 23, 2021      Intervention(s)  Therapist will teach emotional regulation skills. Patient and therapist will focus on increasing his awareness and recognition of symptoms in the moment as well as developing coping skills and strategies to manage the symptoms.  The skills and strategies could include positive affirmations, movement, engaging in the creative process, and developing a positive sense of self.    Objective #C  Patient will Explore with therapist how his trauma has impacted his thoughts and how his thoughts impact his behaviors and emotional wellbeing.  Status: New - Date: November 23, 2021     Intervention(s)  Therapist will teach distraction skills. As well as ways to challenge negative thoughts and self talk..      Patient has reviewed and agreed to the above plan.      Meri Rich, [facility]  November 23, 2021

## 2021-12-10 ENCOUNTER — TELEPHONE (OUTPATIENT)
Dept: ENDOCRINOLOGY | Facility: CLINIC | Age: 31
End: 2021-12-10
Payer: COMMERCIAL

## 2021-12-10 NOTE — TELEPHONE ENCOUNTER
"Pt has not been seen in endocrine clinic.     Spoke w/ Pt: Reports sharp headache, very painful. Slight slurring of speech.   Instructed Pt to be seen by Urgent Care or ER for immediate assessment.   States \"Everytime I go there, they don't help me, they tell me i have anxiety\"  Reiterated the importance of immediate assessment.   Confirms understanding to be seen in Urgent Care or ER for immediate assessment.     Provider notified.     New visit 01/18/2022.  Lisbeth Ruvalcaba RN on 12/10/2021 at 10:50 AM     RE    Reason for Call: Symptoms or Concerns      If patient has red-flag symptoms, warm transfer to triage line     Current symptom or concern: Headache sharp pain in between per patient could be due to new cushing's diagnosis and wants endocrinologist to be aware of this . He has not seen specialist yet      Symptoms have been present for:  1 day(s)     Has patient previously been seen for this? No    "

## 2021-12-10 NOTE — TELEPHONE ENCOUNTER
[facility] Call Center    Phone Message    May a detailed message be left on voicemail: yes     Reason for Call: Symptoms or Concerns     If patient has red-flag symptoms, warm transfer to triage line    Current symptom or concern: Headache sharp pain in between per patient could be due to new cushing's diagnosis and wants endocrinologist to be aware of this . He has not seen specialist yet     Symptoms have been present for:  1 day(s)    Has patient previously been seen for this? No          Are there any new or worsening symptoms? Yes: Headache      Action Taken: Other: ENDO    Travel Screening: Not Applicable

## 2021-12-14 ENCOUNTER — TELEPHONE (OUTPATIENT)
Dept: FAMILY MEDICINE | Facility: CLINIC | Age: 31
End: 2021-12-14
Payer: COMMERCIAL

## 2021-12-14 NOTE — TELEPHONE ENCOUNTER
Dr Franklin received some forms and is wondering if we can do a visit for Higinio.  It can be in person or virtual.   I have the forms in my in-box.  20 minutes is okay.

## 2021-12-15 NOTE — TELEPHONE ENCOUNTER
Confirms understanding to contact current providers. Has appt with Primary this week.   Lisbeth Ruvalcaba RN on 12/15/2021 at 2:54 PM       Symptoms, Instructed to be seen in ER/Urgent Care     Mark Knapp MD  You 5 minutes ago (2:47 PM)     AM    This patient has not been seen in our clinic so should follow-up with her current providers for these concerns.   Thank you

## 2021-12-16 ENCOUNTER — VIRTUAL VISIT (OUTPATIENT)
Dept: BEHAVIORAL HEALTH | Facility: CLINIC | Age: 31
End: 2021-12-16
Payer: COMMERCIAL

## 2021-12-16 DIAGNOSIS — F43.10 PTSD (POST-TRAUMATIC STRESS DISORDER): Primary | ICD-10-CM

## 2021-12-16 PROCEDURE — 90834 PSYTX W PT 45 MINUTES: CPT | Mod: TEL,95 | Performed by: SOCIAL WORKER

## 2021-12-16 NOTE — PROGRESS NOTES
"                                             Progress Note    Patient Name: Higinio Wallace  Date: 12/16/21         Service Type: Individual      Session Start Time: 2:36 pm  session End Time:  3:26 pm     Session Length: 50 minutes    Session #: 5    Attendees: Client    Service Modality:  Phone Visit:      Provider verified identity through the following two step process.  Patient provided:  Patient is known previously to provider    The patient has been notified of the following:      \"We have found that certain health care needs can be provided without the need for a face to face visit.  This service lets us provide the care you need with a phone conversation.       I will have full access to your [facility] medical record during this entire phone call.   I will be taking notes for your medical record.      Since this is like an office visit, we will bill your insurance company for this service.       There are potential benefits and risks of telephone visits (e.g. limits to patient confidentiality) that differ from in-person visits.?Confidentiality still applies for telephone services, and nobody will record the visit.  It is important to be in a quiet, private space that is free of distractions (including cell phone or other devices) during the visit.??      If during the course of the call I believe a telephone visit is not appropriate, you will not be charged for this service\"     Consent has been obtained for this service by care team member: Yes      Treatment Plan Last Reviewed: 11/23/2021  PHQ-9 / ANITA-7 :   PHQ 8/17/2021 11/8/2021 11/23/2021   PHQ-9 Total Score 16 14 18   Q9: Thoughts of better off dead/self-harm past 2 weeks Not at all Several days More than half the days   F/U: Thoughts of suicide or self-harm - No Yes   F/U: Self harm-plan - - No   F/U: Self-harm action - - No   F/U: Safety concerns - No No     ANITA-7 SCORE 10/6/2020 11/4/2020 11/8/2021   Total Score - - 14 (moderate anxiety) "   Total Score 20 13 14         DATA  Interactive Complexity: No  Crisis: No       Progress Since Last Session (Related to Symptoms / Goals / Homework):   Symptoms: Improving described his mood as positive and upbeat    Homework: Partially completed      Episode of Care Goals: Minimal progress - PREPARATION (Decided to change - considering how); Intervened by negotiating a change plan and determining options / strategies for behavior change, identifying triggers, exploring social supports, and working towards setting a date to begin behavior change     Current / Ongoing Stressors and Concerns:   Patient shared that he believes part of the struggle he is currently experiencing is related to not having the support that he needs and [place] for himself individually as well as within his relationship.  Patient shared regarding the influence/impact that his girlfriend's family has on the relationship and him overall.  Patient also shared he does not feel understood or supported by [facility].  Patient and therapist discussed how he is feeling manic currently what she described for himself as being positive and upbeat.  Therapist reminded patient to be mindful of where positive can shift into impulsive.  Patient and therapist also explored what it would take to move out of state.     Treatment Objective(s) Addressed in This Session:   Decrease frequency and intensity of feeling down, depressed, hopeless       Intervention:   CBT: CBT triangle, cognitive distortions    ACT: Value based living   DBT: Validated patient's emotions and experiences   MI: Explored stages of change        ASSESSMENT: Current Emotional / Mental Status (status of significant symptoms):   Risk status (Self / Other harm or suicidal ideation)   Patient denies current fears or concerns for personal safety.   Patient denies current or recent suicidal ideation or behaviors.   Patient denies current or recent homicidal ideation or behaviors.   Patient denies  current or recent self injurious behavior or ideation.   Patient denies other safety concerns.   Patient reports there has been no change in risk factors since their last session.     Patient reports there has been no change in protective factors since their last session.     Recommended that patient call 911 or go to the local ED should there be a change in any of these risk factors.     Appearance:   NA    Eye Contact:   NA    Psychomotor Behavior: Agitated    Attitude:   Interested   Orientation:   All   Speech    Rate / Production: Talkative    Volume:  Normal    Mood:    Normal   Affect:    NA    Thought Content:  Perservative    Thought Form:  Circumstantial   Insight:    Fair      Medication Review:   No current psychiatric medications prescribed     Medication Compliance:   NA     Changes in Health Issues:   Yes: Ongoing health related issues without specific diagnoses at this time, Associated Psychological Distress     Chemical Use Review:    Substance Use: Chemical use reviewed, no active concerns identified      Tobacco Use: No change in amount of tobacco use since last session.  Not reviewed during session    Diagnosis:  1. PTSD (post-traumatic stress disorder)        Collateral Reports Completed:   Not Applicable    PLAN: (Patient Tasks / Therapist Tasks / Other)  Patient will continue to focus on establishing and maintaining a regular self-care practice.  He will also check in with himself to make sure he is not shifting into impulsive behaviors.  He will continue to work with his doctors regarding health related issues that impact his mood.  Patient and therapist will meet again in a week.        Meri Rich, [facility] 12/16/21                                                           ______________________________________________________________________    Treatment Plan    Patient's Name: Higinio Wallace  YOB: 1990    Date: November 23, 2021    DSM5 Diagnoses: 309.81 (F43.10)  Posttraumatic Stress Disorder (includes Posttraumatic Stress Disorder for Children 6 Years and Younger)  With dissociative symptoms  Psychosocial / Contextual Factors: History of trauma, significant mental health concerns, physical health concerns, financial stressors  WHODAS:   WHODAS 2.0 Total Score 10/6/2020 11/8/2021   Total Score 39 32   Total Score MyChart - 32       Referral / Collaboration:  Was/were discussed and client will pursue.    Anticipated number of session or this episode of care: 8-12      MeasurableTreatment Goal(s) related to diagnosis / functional impairment(s)  Goal 1: Patient will focus on decreasing his mental health symptomology as evidenced by a PHQ-9 and ANITA-7 score of 10 or less    I will know I've met my goal when I feel less stressed and increased happiness and motivation.      Objective #A (Patient Action)    Patient will learn about trauma and the impact that can have on him both emotionally and physically.  Status: New - Date: November 23, 2021     Intervention(s)  Therapist will provide educational materials on The impact that trauma can have on the mind, the body, the spirit, and emotions.    Objective #B  Patient will Decrease frequency and intensity of feeling down, depressed, hopeless  Feel less tired and more energy during the day   Identify negative self-talk and behaviors: challenge core beliefs, myths, and actions.  Status: New - Date: November 23, 2021     Intervention(s)  Therapist will teach emotional regulation skills. Patient and therapist will focus on increasing his awareness and recognition of symptoms in the moment as well as developing coping skills and strategies to manage the symptoms.  The skills and strategies could include positive affirmations, movement, engaging in the creative process, and developing a positive sense of self.    Objective #C  Patient will Explore with therapist how his trauma has impacted his thoughts and how his thoughts impact his  behaviors and emotional wellbeing.  Status: New - Date: November 23, 2021     Intervention(s)  Therapist will teach distraction skills. As well as ways to challenge negative thoughts and self talk..      Patient has reviewed and agreed to the above plan.      Meri Rich, [facility]  November 23, 2021

## 2021-12-24 ENCOUNTER — PATIENT OUTREACH (OUTPATIENT)
Dept: CARE COORDINATION | Facility: CLINIC | Age: 31
End: 2021-12-24
Payer: COMMERCIAL

## 2021-12-24 NOTE — PROGRESS NOTES
Patient spoke with [facility] SW on 12/1/21 and discussed goals     CHW delegations:Delegating to CHW to contact in less than one month.      Next outreach due: 12/31/21

## 2021-12-28 ENCOUNTER — VIRTUAL VISIT (OUTPATIENT)
Dept: BEHAVIORAL HEALTH | Facility: CLINIC | Age: 31
End: 2021-12-28
Payer: COMMERCIAL

## 2021-12-28 DIAGNOSIS — F43.10 PTSD (POST-TRAUMATIC STRESS DISORDER): Primary | ICD-10-CM

## 2021-12-28 PROCEDURE — 90832 PSYTX W PT 30 MINUTES: CPT | Mod: GT,95 | Performed by: SOCIAL WORKER

## 2021-12-28 NOTE — PROGRESS NOTES
Progress Note    Patient Name: Higinio Wallace  Date: 12/28/21         Service Type: Individual      Session Start Time: 4:04 PM  session End Time:  4:34 PM     Session Length: 30 minutes    Session #: 6    Attendees: Client    Service Modality:  Video Visit:      Provider verified identity through the following two step process.  Patient provided:  Patient is known previously to provider     Telemedicine Visit: The patient's condition can be safely assessed and treated via synchronous audio and visual telemedicine encounter.       Reason for Telemedicine Visit: Services only offered telehealth     Originating Site (Patient Location): Patient's other His car     Distant Site (Provider Location): Provider Remote Setting- Home Office     Consent:  The patient/guardian has verbally consented to: the potential risks and benefits of telemedicine (video visit) versus in person care; bill my insurance or make self-payment for services provided; and responsibility for payment of non-covered services.      Patient would like the video invitation sent by:  Send to e-mail at: [email or web address]     Mode of Communication:  Video Conference via [employer]     As the provider I attest to compliance with applicable laws and regulations related to telemedicine.     Treatment Plan Last Reviewed: 11/23/2021  PHQ-9 / ANITA-7 :   PHQ 8/17/2021 11/8/2021 11/23/2021   PHQ-9 Total Score 16 14 18   Q9: Thoughts of better off dead/self-harm past 2 weeks Not at all Several days More than half the days   F/U: Thoughts of suicide or self-harm - No Yes   F/U: Self harm-plan - - No   F/U: Self-harm action - - No   F/U: Safety concerns - No No     ANITA-7 SCORE 10/6/2020 11/4/2020 11/8/2021   Total Score - - 14 (moderate anxiety)   Total Score 20 13 14         DATA  Interactive Complexity: No  Crisis: No       Progress Since Last Session (Related to Symptoms / Goals / Homework):   Symptoms: Improving  Patient reports less anxious symptomology    Homework: Partially completed      Episode of Care Goals: Minimal progress - PREPARATION (Decided to change - considering how); Intervened by negotiating a change plan and determining options / strategies for behavior change, identifying triggers, exploring social supports, and working towards setting a date to begin behavior change     Current / Ongoing Stressors and Concerns:   Patient shared that his holidays went smoothly and he has felt less anxious.  Patient identified goals he would like to work on in 2022 as being -quit smoking, find a new job that supports both his mental and physical health, and manage the thoughts and negative talk that go on in his mind.  Patient had an today's session early due to dinner plans.     Treatment Objective(s) Addressed in This Session:   Decrease frequency and intensity of feeling down, depressed, hopeless  The mind-body connection     Intervention:   CBT: CBT triangle, cognitive distortions    ACT: Value based living   DBT: Validated patient's emotions and experiences        ASSESSMENT: Current Emotional / Mental Status (status of significant symptoms):   Risk status (Self / Other harm or suicidal ideation)   Patient denies current fears or concerns for personal safety.   Patient denies current or recent suicidal ideation or behaviors.   Patient denies current or recent homicidal ideation or behaviors.   Patient denies current or recent self injurious behavior or ideation.   Patient denies other safety concerns.   Patient reports there has been no change in risk factors since their last session.     Patient reports there has been no change in protective factors since their last session.     Recommended that patient call 911 or go to the local ED should there be a change in any of these risk factors.     Appearance:   Appropriate    Eye Contact:   Fair    Psychomotor Behavior: Normal     Attitude:   Pleasant   Orientation:   All   Speech    Rate / Production: Talkative    Volume:  Normal    Mood:    Normal   Affect:    NA    Thought Content:  Perservative    Thought Form:  Coherent  Logical    Insight:    Fair      Medication Review:   No changes to current psychiatric medication(s)     Medication Compliance:   Yes Patient reports he has been compliant however he has not taken his medications for 2 days due to misplacing them after the holiday     Changes in Health Issues:   Yes: Ongoing health related issues without specific diagnoses at this time, Associated Psychological Distress     Chemical Use Review:    Substance Use: Chemical use reviewed, no active concerns identified      Tobacco Use: No change in amount of tobacco use since last session.  Not reviewed during session    Diagnosis:  1. PTSD (post-traumatic stress disorder)        Collateral Reports Completed:   Not Applicable    PLAN: (Patient Tasks / Therapist Tasks / Other)  Patient will focus on finding his medication and resuming compliance as well as maintaining a regular self-care practice.  Patient and therapist will meet again in a week.        Meri Rich, [facility] 12/28/21                                                           ______________________________________________________________________    Treatment Plan    Patient's Name: Higinio Wallace  YOB: 1990    Date: November 23, 2021    DSM5 Diagnoses: 309.81 (F43.10) Posttraumatic Stress Disorder (includes Posttraumatic Stress Disorder for Children 6 Years and Younger)  With dissociative symptoms  Psychosocial / Contextual Factors: History of trauma, significant mental health concerns, physical health concerns, financial stressors  WHODAS:   WHODAS 2.0 Total Score 10/6/2020 11/8/2021   Total Score 39 32   Total Score MyChart - 32       Referral / Collaboration:  Was/were discussed and client will pursue.    Anticipated number of session or this episode  of care: 8-12      MeasurableTreatment Goal(s) related to diagnosis / functional impairment(s)  Goal 1: Patient will focus on decreasing his mental health symptomology as evidenced by a PHQ-9 and ANITA-7 score of 10 or less    I will know I've met my goal when I feel less stressed and increased happiness and motivation.      Objective #A (Patient Action)    Patient will learn about trauma and the impact that can have on him both emotionally and physically.  Status: New - Date: November 23, 2021     Intervention(s)  Therapist will provide educational materials on The impact that trauma can have on the mind, the body, the spirit, and emotions.    Objective #B  Patient will Decrease frequency and intensity of feeling down, depressed, hopeless  Feel less tired and more energy during the day   Identify negative self-talk and behaviors: challenge core beliefs, myths, and actions.  Status: New - Date: November 23, 2021     Intervention(s)  Therapist will teach emotional regulation skills. Patient and therapist will focus on increasing his awareness and recognition of symptoms in the moment as well as developing coping skills and strategies to manage the symptoms.  The skills and strategies could include positive affirmations, movement, engaging in the creative process, and developing a positive sense of self.    Objective #C  Patient will Explore with therapist how his trauma has impacted his thoughts and how his thoughts impact his behaviors and emotional wellbeing.  Status: New - Date: November 23, 2021     Intervention(s)  Therapist will teach distraction skills. As well as ways to challenge negative thoughts and self talk..      Patient has reviewed and agreed to the above plan.      Meri Rich, [facility]  November 23, 2021

## 2022-01-02 ENCOUNTER — HEALTH MAINTENANCE LETTER (OUTPATIENT)
Age: 32
End: 2022-01-02

## 2022-01-03 NOTE — TELEPHONE ENCOUNTER
RECORDS RECEIVED FROM: internal /ce    DATE RECEIVED: 1.18.22   NOTES (FOR ALL VISITS) STATUS DETAILS   OFFICE NOTES from referring provider internal  Bruce Dawson MD   OFFICE NOTES from other specialist internal  11.15.21 Ijdo      ED NOTES na    OPERATIVE REPORT  (thyroid, pituitary, adrenal, parathyroid) na    MEDICATION LIST internal     IMAGING      DEXASCAN na    MRI (BRAIN) na    XR (Chest) internal  8/7/21, 3.22.20,    CT (HEAD/NECK/CHEST/ABDOMEN) internal  10.24.21, 6.11.20   NUCLEAR  na    ULTRASOUND (HEAD/NECK) na    LABS     DIABETES: HBGA1C, CREATININE, FASTING LIPIDS, MICROALBUMIN URINE, POTASSIUM, TSH, T4    THYROID: TSH, T4, CBC, THYRODLONULIN, TOTAL T3, FREE T4, CALCITONIN, CEA internal /ce  Calcium- 5.9.20  Cbc- 10.24.21  Glucose by meter- 10.24.21  HBGA1C- 8/17/21  Lipid- 7/3.19  TSH/T4- 11.15.21

## 2022-01-04 ENCOUNTER — OFFICE VISIT (OUTPATIENT)
Dept: FAMILY MEDICINE | Facility: CLINIC | Age: 32
End: 2022-01-04
Payer: COMMERCIAL

## 2022-01-04 ENCOUNTER — PATIENT OUTREACH (OUTPATIENT)
Dept: CARE COORDINATION | Facility: CLINIC | Age: 32
End: 2022-01-04

## 2022-01-04 ENCOUNTER — VIRTUAL VISIT (OUTPATIENT)
Dept: BEHAVIORAL HEALTH | Facility: CLINIC | Age: 32
End: 2022-01-04
Payer: COMMERCIAL

## 2022-01-04 VITALS
HEIGHT: 76 IN | SYSTOLIC BLOOD PRESSURE: 136 MMHG | HEART RATE: 75 BPM | WEIGHT: 315 LBS | DIASTOLIC BLOOD PRESSURE: 84 MMHG | BODY MASS INDEX: 38.36 KG/M2 | OXYGEN SATURATION: 98 %

## 2022-01-04 DIAGNOSIS — F43.10 PTSD (POST-TRAUMATIC STRESS DISORDER): ICD-10-CM

## 2022-01-04 DIAGNOSIS — R53.83 FATIGUE, UNSPECIFIED TYPE: ICD-10-CM

## 2022-01-04 DIAGNOSIS — F43.10 PTSD (POST-TRAUMATIC STRESS DISORDER): Primary | ICD-10-CM

## 2022-01-04 DIAGNOSIS — M79.10 MYALGIA: ICD-10-CM

## 2022-01-04 DIAGNOSIS — F44.81 DISSOCIATIVE IDENTITY DISORDER (H): ICD-10-CM

## 2022-01-04 DIAGNOSIS — M25.50 MULTIPLE JOINT PAIN: Primary | ICD-10-CM

## 2022-01-04 PROCEDURE — 99214 OFFICE O/P EST MOD 30 MIN: CPT | Performed by: FAMILY MEDICINE

## 2022-01-04 PROCEDURE — 90834 PSYTX W PT 45 MINUTES: CPT | Mod: GT,95 | Performed by: SOCIAL WORKER

## 2022-01-04 ASSESSMENT — MIFFLIN-ST. JEOR: SCORE: 2599.66

## 2022-01-04 ASSESSMENT — ANXIETY QUESTIONNAIRES
6. BECOMING EASILY ANNOYED OR IRRITABLE: NEARLY EVERY DAY
GAD7 TOTAL SCORE: 21
2. NOT BEING ABLE TO STOP OR CONTROL WORRYING: NEARLY EVERY DAY
7. FEELING AFRAID AS IF SOMETHING AWFUL MIGHT HAPPEN: NEARLY EVERY DAY
GAD7 TOTAL SCORE: 21
7. FEELING AFRAID AS IF SOMETHING AWFUL MIGHT HAPPEN: NEARLY EVERY DAY
5. BEING SO RESTLESS THAT IT IS HARD TO SIT STILL: NEARLY EVERY DAY
1. FEELING NERVOUS, ANXIOUS, OR ON EDGE: NEARLY EVERY DAY
4. TROUBLE RELAXING: NEARLY EVERY DAY
GAD7 TOTAL SCORE: 21
3. WORRYING TOO MUCH ABOUT DIFFERENT THINGS: NEARLY EVERY DAY

## 2022-01-04 ASSESSMENT — PATIENT HEALTH QUESTIONNAIRE - PHQ9
SUM OF ALL RESPONSES TO PHQ QUESTIONS 1-9: 23
10. IF YOU CHECKED OFF ANY PROBLEMS, HOW DIFFICULT HAVE THESE PROBLEMS MADE IT FOR YOU TO DO YOUR WORK, TAKE CARE OF THINGS AT HOME, OR GET ALONG WITH OTHER PEOPLE: EXTREMELY DIFFICULT
SUM OF ALL RESPONSES TO PHQ QUESTIONS 1-9: 23

## 2022-01-04 NOTE — PROGRESS NOTES
3:00 PM                                             Progress Note    Patient Name: Higinio Wallace  Date: 1/04/22         Service Type: Individual      Session Start Time: 3:00 PM session End Time:  3:52 PM     Session Length: 52 minutes    Session #: 7    Attendees: Client and His girlfriend was in the background    Service Modality:  Video Visit:      Provider verified identity through the following two step process.  Patient provided:  Patient is known previously to provider     Telemedicine Visit: The patient's condition can be safely assessed and treated via synchronous audio and visual telemedicine encounter.       Reason for Telemedicine Visit: Services only offered telehealth     Originating Site (Patient Location): Patient's other His car     Distant Site (Provider Location): Provider Remote Setting- Home Office     Consent:  The patient/guardian has verbally consented to: the potential risks and benefits of telemedicine (video visit) versus in person care; bill my insurance or make self-payment for services provided; and responsibility for payment of non-covered services.      Patient would like the video invitation sent by:  Send to e-mail at: [email or web address]     Mode of Communication:  Video Conference via well     As the provider I attest to compliance with applicable laws and regulations related to telemedicine.     Treatment Plan Last Reviewed: 11/23/2021  PHQ-9 / ANITA-7 :   PHQ 11/8/2021 11/23/2021 1/4/2022   PHQ-9 Total Score 14 18 23   Q9: Thoughts of better off dead/self-harm past 2 weeks Several days More than half the days Several days   F/U: Thoughts of suicide or self-harm No Yes Yes   F/U: Self harm-plan - No No   F/U: Self-harm action - No No   F/U: Safety concerns No No No     ANITA-7 SCORE 11/8/2021 1/4/2022 1/4/2022   Total Score 14 (moderate anxiety) - 21 (severe anxiety)   Total Score 14 21 21         DATA  Interactive Complexity: No  Crisis: No       Progress Since Last Session  (Related to Symptoms / Goals / Homework):   Symptoms: Worsening Patient reports ongoing health issues that are contributing to him feeling agitation    Homework: Partially completed      Episode of Care Goals: Minimal progress - PREPARATION (Decided to change - considering how); Intervened by negotiating a change plan and determining options / strategies for behavior change, identifying triggers, exploring social supports, and working towards setting a date to begin behavior change     Current / Ongoing Stressors and Concerns:   Patient shared that over the weekend he experienced an increase in his blood pressure which has him concerned about a variety of health related issues.  Patient shared when he goes to the doctor to discuss these issues he feels as though he has shut down and he is told to focus on his weight and managing symptoms of anxiety.  Patient reports feeling frustration and agitation at not being listened to.  Patient shared that he will research things online to try to better understand what is going on with him physically.  Patient and therapist explored what is within his control versus what is out of his control within the situations.  Patient and therapist also discussed healthy choices that he can make for himself such as increasing his water intake, pausing 5 times during his day to take 5 deep breaths, identifying 3 things that have gone well each day, and challenging anxious thoughts.     Treatment Objective(s) Addressed in This Session:   Decrease frequency and intensity of feeling down, depressed, hopeless  The mind-body connection  Explored coping skills and strategies     Intervention:   CBT: CBT triangle, cognitive distortions    ACT: Value based living   DBT: Validated patient's emotions and experiences  Client-centered: Self-care strategies      ASSESSMENT: Current Emotional / Mental Status (status of significant symptoms):   Risk status (Self / Other harm or suicidal  ideation)   Patient denies current fears or concerns for personal safety.   Patient denies current or recent suicidal ideation or behaviors.   Patient denies current or recent homicidal ideation or behaviors.   Patient denies current or recent self injurious behavior or ideation.   Patient denies other safety concerns.   Patient reports there has been no change in risk factors since their last session.     Patient reports there has been no change in protective factors since their last session.     Recommended that patient call 911 or go to the local ED should there be a change in any of these risk factors.     Appearance:   Appropriate    Eye Contact:   Fair    Psychomotor Behavior: Agitated    Attitude:   Interested   Orientation:   All   Speech    Rate / Production: Talkative    Volume:  Normal    Mood:    Agitated   Affect:    Appropriate  Tearful   Thought Content:  Perservative    Thought Form:  Goal Directed    Insight:    Fair      Medication Review:   No changes to current psychiatric medication(s)     Medication Compliance:   Yes     Changes in Health Issues:   Yes: Ongoing health related issues without specific diagnoses at this time, Associated Psychological Distress     Chemical Use Review:    Substance Use: Chemical use reviewed, no active concerns identified      Tobacco Use: No change in amount of tobacco use since last session.  Not reviewed during session    Diagnosis:  1. PTSD (post-traumatic stress disorder)        Collateral Reports Completed:   Not Applicable    PLAN: (Patient Tasks / Therapist Tasks / Other)  Patient will focus on a regular self-care practice which includes focusing on what is within his control.  Patient and therapist will meet again in a week.        Meri Rich, LICSW 1/04/22                                                           ______________________________________________________________________    Treatment Plan    Patient's Name: Higinio JOANNE Wallace  Date Of  Birth: 1990    Date: November 23, 2021    DSM5 Diagnoses: 309.81 (F43.10) Posttraumatic Stress Disorder (includes Posttraumatic Stress Disorder for Children 6 Years and Younger)  With dissociative symptoms  Psychosocial / Contextual Factors: History of trauma, significant mental health concerns, physical health concerns, financial stressors  WHODAS:   WHODAS 2.0 Total Score 10/6/2020 11/8/2021   Total Score 39 32   Total Score MyChart - 32       Referral / Collaboration:  Was/were discussed and client will pursue.    Anticipated number of session or this episode of care: 8-12      MeasurableTreatment Goal(s) related to diagnosis / functional impairment(s)  Goal 1: Patient will focus on decreasing his mental health symptomology as evidenced by a PHQ-9 and ANITA-7 score of 10 or less    I will know I've met my goal when I feel less stressed and increased happiness and motivation.      Objective #A (Patient Action)    Patient will learn about trauma and the impact that can have on him both emotionally and physically.  Status: New - Date: November 23, 2021     Intervention(s)  Therapist will provide educational materials on The impact that trauma can have on the mind, the body, the spirit, and emotions.    Objective #B  Patient will Decrease frequency and intensity of feeling down, depressed, hopeless  Feel less tired and more energy during the day   Identify negative self-talk and behaviors: challenge core beliefs, myths, and actions.  Status: New - Date: November 23, 2021     Intervention(s)  Therapist will teach emotional regulation skills. Patient and therapist will focus on increasing his awareness and recognition of symptoms in the moment as well as developing coping skills and strategies to manage the symptoms.  The skills and strategies could include positive affirmations, movement, engaging in the creative process, and developing a positive sense of self.    Objective #C  Patient will Explore with therapist  how his trauma has impacted his thoughts and how his thoughts impact his behaviors and emotional wellbeing.  Status: New - Date: November 23, 2021     Intervention(s)  Therapist will teach distraction skills. As well as ways to challenge negative thoughts and self talk..      Patient has reviewed and agreed to the above plan.      Meri Rich, [facility]  November 23, 2021  Answers for HPI/ROS submitted by the patient on 1/4/2022  If you checked off any problems, how difficult have these problems made it for you to do your work, take care of things at home, or get along with other people?: Extremely difficult  PHQ9 TOTAL SCORE: 23  ANITA 7 TOTAL SCORE: 21

## 2022-01-04 NOTE — LETTER
01/04/22        Higinio Wallace has a medical condition that requires that he have the following work restriction in place from this date until 04/04/2022 at which time he will be reevaluated.    Working normal duties as a  for no more than 4 hours per day.  Walking no more than 30 minutes at a time  After walking for 30 minutes he will need to rest for 5 minutes in a sitting position before resuming walking.      Raza Franklin MD, MD

## 2022-01-04 NOTE — PROGRESS NOTES
"Higinio Wallace  /84   Pulse 75   Ht 1.93 m (6' 4\")   Wt (!) 153.3 kg (338 lb)   SpO2 98%   BMI 41.14 kg/m       Assessment/Plan:                Higinio was seen today for form request, er f/u, anxiety, abdominal pain and musculoskeletal problem.    Diagnoses and all orders for this visit:    Multiple joint pain    PTSD (post-traumatic stress disorder)    Dissociative identity disorder (H)    Myalgia    Fatigue, unspecified type         DISCUSSION  FMLA paperwork completed.  Please see discussion below.  Follow-up with me in 2 weeks to continue to work on all of these issues outlined.  Subjective:     HPI:    Higinio Wallace is a 31 year old male with medical history that includes PTSD, dissociative identity disorder, prediabetes, malrotation of the intestines, positive RAMANA, multiple areas of joint pain, myalgias and chronic left knee pain.    Utilizes the emergency room for healthcare frequently.    Discussed with him today appropriate use of the emergency department.    He has fragmented healthcare seeing multiple different providers and specialty providers currently to work through a multitude of complaints.  He is requesting to see additional specialty providers today in an effort to evaluate symptomatic complaints, discussed the role of specialty providers in healthcare.    Currently has a work restriction due to musculoskeletal pain symptoms causing limitations.    He was recently seen at [facility] emergency department for a multitude of complaints many of which we have been discussing over time.    The work-up included ruling out DVT for complaint of left calf pain.    Discussed devising an agenda for a visit today.  He was receptive to this but has great difficulty being able to remain focused to discuss an issue in a fashion that allows for evaluation and an exchange to give advice.  He tends to continue to bring up complaints and then resorts to complaining about how other healthcare providers " have handled his previous concerns.    I discussed with him the importance of regular close follow-up along with waiting to discuss symptom concerns until those follow-up visits unless he is certain that it is a serious consideration that requires immediate evaluation.  He is invited to call to discuss this with the triage nurse if necessary.    He has an appointment scheduled with endocrinology to evaluate cushingoid features.    He needs to be evaluated by his sleep specialist and we discussed how if he does have sleep apnea it could be a contributing factor to multiple different symptom concerns.    He has multiple complaints regarding his digestive system.  He wants to complain to me about how his gastroenterologist has handled his concerns, but agrees that he should discuss his concerns further with his gastroenterologist.  He was to have a follow-up appointment but he has not done so he is encouraged to schedule this follow-up appointment.    He requests referral to an infectious disease provider under the idea that maybe he has an infection process causing symptoms.  Discussed with him that this is not something we would do at this point as he has no indication that he has an infection process.    We discussed how different aspects of his health can affect him in a physical sense including mental health.  He does state he feels that everybody blames his anxiety on all of his symptom concerns.    He did have an elevated blood pressure noted in the emergency department but his blood pressure is quite reasonable at this time he is encouraged to continue to monitor.    From the standpoint of musculoskeletal limitations he has been on a work restriction but I have supported and provided documentation for.  He works at [employer].  Due to muscle pain and stiffness combined with weakness and fatigue he has difficulty being able to work a full work shift.  We have agreed on working 4 hours/day currently with some  limitations to the amount of his walking.  He has many specific scheduling considerations and specific duty considerations that are not appropriate to limit based on his situation and this is discussed today.  I think it is reasonable to set forth that time to continue this restriction of working no more than 4 hours/day walking no more than 30 minutes at a time without a 5-minute rest until he can undergo further evaluation and/or see his situation improve.  We will continue to maintain close follow-up and consider strongly having him do physical therapy for reconditioning in the near future pending his evaluation through endocrinology.  His restriction will allow for him to be able to attend appointments and minimize his symptom burden.            ROS:  Complete review of systems is obtained.  Other than the specific considerations noted above complete review of systems is negative.          Objective:   Medications:  Current Outpatient Medications   Medication     acetaminophen (TYLENOL) 500 MG tablet     albuterol (ALBUTEROL) 108 (90 BASE) MCG/ACT Inhaler     alum & mag hydroxide-simethicone (MAALOX MAX) 400-400-40 MG/5ML SUSP suspension     cyclobenzaprine (FLEXERIL) 10 MG tablet     lamoTRIgine (LAMICTAL) 25 MG tablet     metFORMIN (GLUCOPHAGE) 500 MG tablet     omeprazole (PRILOSEC) 40 MG DR capsule     QUEtiapine (SEROQUEL) 25 MG tablet     sucralfate (CARAFATE) 1 GM tablet     No current facility-administered medications for this visit.        Allergies:     Allergies   Allergen Reactions     Bee Venom Anaphylaxis and Other (See Comments)     Swelling  Large local reactions/ swelling       Fruit Acid Concentrate [Fruit Extracts] Swelling     Lips swell and crust over little bit- tongue gets numb     Liquid Adhesive Dermatitis     And band aid       Monosodium Glutamate Hives        Social History     Socioeconomic History     Marital status: Single     Spouse name: Not on file     Number of children: Not on  file     Years of education: Not on file     Highest education level: Not on file   Occupational History     Not on file   Tobacco Use     Smoking status: Current Some Day Smoker     Packs/day: 0.25     Types: Cigarettes     Start date: 2001     Smokeless tobacco: Never Used     Tobacco comment: 1 cig a day trying to quit   Substance and Sexual Activity     Alcohol use: Not Currently     Drug use: Yes     Types: Marijuana     Comment: daily     Sexual activity: Yes     Partners: Female     Birth control/protection: Condom   Other Topics Concern     Parent/sibling w/ CABG, MI or angioplasty before 65F 55M? Not Asked   Social History Narrative     Not on file     Social Determinants of Health     Financial Resource Strain: Not on file   Food Insecurity: Not on file   Transportation Needs: No Transportation Needs     Lack of Transportation (Medical): No     Lack of Transportation (Non-Medical): No   Physical Activity: Not on file   Stress: Not on file   Social Connections: Not on file   Intimate Partner Violence: Not on file   Housing Stability: Low Risk      Unable to Pay for Housing in the Last Year: No     Number of Places Lived in the Last Year: 1     Unstable Housing in the Last Year: No       Family History   Problem Relation Age of Onset     Other - See Comments Mother         PONV     Cerebrovascular Disease Brother      Atrial fibrillation Brother      Other - See Comments Brother         cardiac autoimmune deficiency        Most Recent Immunizations   Administered Date(s) Administered     COVID-19,PF,Moderna 05/21/2021     TDAP Vaccine (Boostrix) 07/03/2019     Tdap (Adacel,Boostrix) 07/03/2019        Wt Readings from Last 3 Encounters:   01/04/22 (!) 153.3 kg (338 lb)   11/19/21 (!) 156.9 kg (346 lb)   11/15/21 (!) 158.7 kg (349 lb 12.8 oz)        BP Readings from Last 6 Encounters:   01/04/22 136/84   11/19/21 131/85   11/15/21 130/83   10/24/21 131/74   10/09/21 132/79   09/03/21 (!) 147/97     "    Hemoglobin A1C POCT   Date Value Ref Range Status   11/04/2020 5.4 0 - 5.6 % Final     Comment:     Normal <5.7% Prediabetes 5.7-6.4%  Diabetes 6.5% or higher - adopted from ADA   consensus guidelines.       Hemoglobin A1C   Date Value Ref Range Status   08/17/2021 5.5 0.0 - 5.6 % Final     Comment:     Normal <5.7%   Prediabetes 5.7-6.4%    Diabetes 6.5% or higher     Note: Adopted from ADA consensus guidelines.              PHYSICAL EXAM:    /84   Pulse 75   Ht 1.93 m (6' 4\")   Wt (!) 153.3 kg (338 lb)   SpO2 98%   BMI 41.14 kg/m       General: He is in no physical distress, difficulty focusing on topics during the course of conversation.    Musculoskeletal: He is able to walk with a normal gait.  He is able to heel and toe walk, is able to squat return to a standing position.  Forward flexion is mildly limited with complaint of stiffness and some discomfort.  Extension not limited nor is there any complaint of discomfort.  Side to side bend not limited nor is there a complaint of discomfort.  Specific evaluation of the left lower leg reveals no swelling redness or pain on palpation.    Neurologic: There is no evidence of any focal neurologic deficit.                                    "

## 2022-01-05 ASSESSMENT — ANXIETY QUESTIONNAIRES: GAD7 TOTAL SCORE: 21

## 2022-01-05 ASSESSMENT — PATIENT HEALTH QUESTIONNAIRE - PHQ9: SUM OF ALL RESPONSES TO PHQ QUESTIONS 1-9: 23

## 2022-01-06 ENCOUNTER — NURSE TRIAGE (OUTPATIENT)
Dept: NURSING | Facility: CLINIC | Age: 32
End: 2022-01-06

## 2022-01-06 NOTE — TELEPHONE ENCOUNTER
Pt reporting, within the last 24 hours.       Profuse vomiting and diarrhea.      Pt has had about 6-8 bouts of diarrhea and vomiting.   Severe abdominal cramping and excessive gas.  Pt is not able to keep fluids down.       Every time he drinks some water.  It comes right back up.   Hot sweats, cold sweats and the chills. Has not taking his temperature.  Congestive cough, sore throat and increased weakness.   Has not been around anybody that has been sick with Covid.   Not sleeping because he is in the bathroom with loose stools and vomiting.   Pt is miserable.       I suggested he go to the [facility] in [place] for an evaluation for Pt care.     Pt agreed with plan of care and will go to the [facility] for an evaluation for Pt care.    Funmilayo Carlos RN  Central Triage Red Flags/Med Refills      Reason for Disposition    Patient sounds very sick or weak to the triager    Additional Information    Negative: Shock suspected (e.g., cold/pale/clammy skin, too weak to stand, low BP, rapid pulse)    Negative: Sounds like a life-threatening emergency to the triager    Negative: Nausea or vomiting and pregnancy < 20 weeks    Negative: Menstrual Period - Missed or Late (i.e., pregnancy suspected)    Negative: Heat exhaustion suspected (i.e., dehydration from heat exposure)    Negative: Anxiety or stress suspected (i.e., nausea with anxiety attacks or stressful situations)    Negative: Traumatic Brain Injury (TBI) suspected    Negative: Vomiting occurs    Negative: Other symptom is present, see that guideline.  (e.g., chest pain, headache, dizziness, abdominal pain, colds, sore throat, etc.).    Negative: Unable to walk, or can only walk with assistance (e.g., requires support)    Negative: Difficulty breathing    Negative: Insulin-dependent diabetes (Type I) and glucose > 400 mg/dL (22 mmol/L)    Negative: Drinking very little and dehydration suspected (e.g., no urine > 12 hours, very dry mouth, very  lightheaded)    Protocols used: NAUSEA-A-OH

## 2022-01-07 ENCOUNTER — PATIENT OUTREACH (OUTPATIENT)
Dept: NURSING | Facility: CLINIC | Age: 32
End: 2022-01-07
Payer: COMMERCIAL

## 2022-01-07 SDOH — ECONOMIC STABILITY: FOOD INSECURITY: WITHIN THE PAST 12 MONTHS, THE FOOD YOU BOUGHT JUST DIDN'T LAST AND YOU DIDN'T HAVE MONEY TO GET MORE.: SOMETIMES TRUE

## 2022-01-07 SDOH — ECONOMIC STABILITY: FOOD INSECURITY: WITHIN THE PAST 12 MONTHS, YOU WORRIED THAT YOUR FOOD WOULD RUN OUT BEFORE YOU GOT MONEY TO BUY MORE.: SOMETIMES TRUE

## 2022-01-07 ASSESSMENT — ACTIVITIES OF DAILY LIVING (ADL): DEPENDENT_IADLS:: INDEPENDENT

## 2022-01-07 ASSESSMENT — SOCIAL DETERMINANTS OF HEALTH (SDOH): HOW HARD IS IT FOR YOU TO PAY FOR THE VERY BASICS LIKE FOOD, HOUSING, MEDICAL CARE, AND HEATING?: SOMEWHAT HARD

## 2022-01-07 NOTE — PROGRESS NOTES
Clinic Care Coordination Contact    Follow Up Progress Note      Assessment: Patient was diagnosed with Covid yesterday at [facility]. Is not feeling too badly, though lost his taste and smell. Having excessive gas when he moves, a lot of burping and some diarrhea.  Spent a lot of time reviewing his work situation.  They are scheduling him to work his four hours at closing and he doesn't want that shift.  He has asked his PCP to state that on his restrictions and PCP fills out the paperwork as required and it does not direct his employer to schedule him for certain shifts.  He will continue to work with his supervisor.  He is able to get paid for time away during his Covid recovery.  He wants to move and asked for help with locating housing.  Gave him website of [employer] and apartment [address]  His brother is currently living with them.  Patient does not have a car at this time, but his work is near his home. His girlfriend has a car and has found work.  He did have appt with psychiatrist but didn't like that she wants him to take medication that causes him kidney failure. He is unsure when his next appointment is and gave him the phone number for [facility]      He asked for help for their financial stresses and was given number for [employer] Assistance which they got last year.  Provided phone number for [employer] and details on the food shelf.  They will follow up.  He does not have FMLA left to pay for time off for his appointments.  Would like a new job.  Would like to move out of state.     Has trouble communicating to people in authority.  Would like his girlfriend to be with him during stressful times, like at the ED. Discussed that the current restrictions are because of covid and that when the pandemic is over, he will most likely be able to have her by his side at the ED.  Encouraged him to avoid the ED and bring his concerns to his PCP.      Care Gaps:    Health Maintenance Due  "  Topic Date Due     PREVENTIVE CARE VISIT  Never done     ADVANCE CARE PLANNING  Never done     DEPRESSION ACTION PLAN  Never done     Pneumococcal Vaccine: Pediatrics (0 to 5 Years) and At-Risk Patients (6 to 64 Years) (1 of 2 - PPSV23) Never done     HIV SCREENING  Never done     HEPATITIS C SCREENING  Never done     INFLUENZA VACCINE (1) Never done     COVID-19 Vaccine (3 - Booster for Moderna series) 11/21/2021       Postponed to next PCP appointment     Goals addressed this encounter:   Goals Addressed                    This Visit's Progress       Patient Stated       1. Mental Health Management (pt-stated)   10%      Goal Statement: I will have stable mental health and have a therapist and psychiatrist within 2 months.  Date Goal set: 11-  Barriers: Feeling overwhelmed, stressed.  Strengths: has insurance, already connected to therapist.   Date to Achieve By: 1-  Patient expressed understanding of goal: yes  Action steps to achieve this goal:  1. I will attend appointments with my therapists.  2. I will meet with new psychiatrist on 12-9 at 1:00 with [facility] with Kyle \"Ana Paula\" Staci Macias NP  3. I will report progress towards this goal at scheduled outreach telephone calls from the [facility] team.  1-7 discussed, met with Ana Paula on 12-9 and will call to see when next appt is.            2. Financial Wellbeing (pt-stated)   10%      Goal Statement: I will have financial stability and am able to pay my bills within 6 months.  Date Goal set: 12-1-2021  Barriers: trouble at current job, only able to work part time.  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   Date to Achieve By: 5-1-2022  Patient expressed understanding of goal: yes  Action steps to achieve this goal:  1. I will continue to work with my current employer to have income while I look for a new job.  2. I will review [employer] [facility]  and [employer] resources for help.  3. I will report progress towards this " goal at scheduled outreach telephone calls from the [facility] team.    1-7 still working at [employer] and frustrated with schedule.           3. Other (pt-stated)   10%      Goal Statement: I will find a new place to live with my girlfriend that is affordable within one year.  Date Goal set:12-1-2021  Barriers: hard to find affordable apartments.  Strengths: have a place to live now.   Date to Achieve By: 12-1-2022  Patient expressed understanding of goal: yes  Action steps to achieve this goal:  1. I will look at [facility] for options.   2. I will continue to pay rent at my current apartment.  3. I will report progress towards this goal at scheduled outreach telephone calls from the [facility] team.  1-7 discussed, want to move out in May when lease is up              Intervention/Education provided during outreach: Resources for food and energy assistance.       Outreach Frequency: monthly    Plan:   Delegating to CHW to call in less than 30 days.   Care Coordinator will follow up in 6 weeks and as needed.   Dana Kumar,   [facility]  [phone]

## 2022-01-11 ENCOUNTER — PATIENT OUTREACH (OUTPATIENT)
Dept: CARE COORDINATION | Facility: CLINIC | Age: 32
End: 2022-01-11
Payer: COMMERCIAL

## 2022-01-11 NOTE — PROGRESS NOTES
Patient spoke with [facility] SW on 1/7/22 and discussed goals     CHW delegations: Delegating to CHW to call in less than 30 days.     Next outreach due: 2/8/22

## 2022-01-15 ENCOUNTER — TELEPHONE (OUTPATIENT)
Dept: GASTROENTEROLOGY | Facility: CLINIC | Age: 32
End: 2022-01-15
Payer: COMMERCIAL

## 2022-01-15 ENCOUNTER — TRANSFERRED RECORDS (OUTPATIENT)
Dept: HEALTH INFORMATION MANAGEMENT | Facility: CLINIC | Age: 32
End: 2022-01-15
Payer: COMMERCIAL

## 2022-01-15 LAB
CREATININE (EXTERNAL): 0.85 MG/DL (ref 0.72–1.25)
GFR ESTIMATED (EXTERNAL): >60 ML/MIN/1.73M2
GFR ESTIMATED (IF AFRICAN AMERICAN) (EXTERNAL): >60 ML/MIN/1.73M2
GLUCOSE (EXTERNAL): 142 MG/DL (ref 65–100)
POTASSIUM (EXTERNAL): 4.2 MMOL/L (ref 3.5–5)

## 2022-01-16 NOTE — TELEPHONE ENCOUNTER
30M h/o multiple functional GI disorders including rumination syndrome, IBS with diarrhea, GERD, Congenital malrotation of the bowel who was last seen by Dr. Holland in 12/2020. He is calling today because has been having worsening symptoms of GERD while on omeprazole 40mg with diffuse abd discomfort with bloating. He was supposed to be seen by Dr. Holland again but did not see any appt scheduled in his chart. I advised the patient to try to OTC Gas X for bloating and Maalox as needed for his GERD. Will send a message to GI clinic coordinator.

## 2022-01-18 ENCOUNTER — PRE VISIT (OUTPATIENT)
Dept: ENDOCRINOLOGY | Facility: CLINIC | Age: 32
End: 2022-01-18

## 2022-01-18 ENCOUNTER — OFFICE VISIT (OUTPATIENT)
Dept: ENDOCRINOLOGY | Facility: CLINIC | Age: 32
End: 2022-01-18
Attending: INTERNAL MEDICINE
Payer: COMMERCIAL

## 2022-01-18 ENCOUNTER — LAB (OUTPATIENT)
Dept: LAB | Facility: CLINIC | Age: 32
End: 2022-01-18

## 2022-01-18 ENCOUNTER — VIRTUAL VISIT (OUTPATIENT)
Dept: BEHAVIORAL HEALTH | Facility: CLINIC | Age: 32
End: 2022-01-18
Payer: COMMERCIAL

## 2022-01-18 VITALS
DIASTOLIC BLOOD PRESSURE: 87 MMHG | HEART RATE: 96 BPM | BODY MASS INDEX: 41.76 KG/M2 | SYSTOLIC BLOOD PRESSURE: 148 MMHG | WEIGHT: 315 LBS

## 2022-01-18 DIAGNOSIS — F43.10 PTSD (POST-TRAUMATIC STRESS DISORDER): Primary | ICD-10-CM

## 2022-01-18 DIAGNOSIS — E66.812 CLASS 2 SEVERE OBESITY DUE TO EXCESS CALORIES WITH SERIOUS COMORBIDITY AND BODY MASS INDEX (BMI) OF 37.0 TO 37.9 IN ADULT (H): Primary | ICD-10-CM

## 2022-01-18 DIAGNOSIS — R63.5 EXCESSIVE WEIGHT GAIN: ICD-10-CM

## 2022-01-18 DIAGNOSIS — E24.0 CUSHING'S DISEASE (H): ICD-10-CM

## 2022-01-18 DIAGNOSIS — R52 BODY ACHES: ICD-10-CM

## 2022-01-18 DIAGNOSIS — E66.812 CLASS 2 SEVERE OBESITY DUE TO EXCESS CALORIES WITH SERIOUS COMORBIDITY AND BODY MASS INDEX (BMI) OF 37.0 TO 37.9 IN ADULT (H): ICD-10-CM

## 2022-01-18 DIAGNOSIS — E66.01 CLASS 2 SEVERE OBESITY DUE TO EXCESS CALORIES WITH SERIOUS COMORBIDITY AND BODY MASS INDEX (BMI) OF 37.0 TO 37.9 IN ADULT (H): ICD-10-CM

## 2022-01-18 DIAGNOSIS — E66.01 CLASS 2 SEVERE OBESITY DUE TO EXCESS CALORIES WITH SERIOUS COMORBIDITY AND BODY MASS INDEX (BMI) OF 37.0 TO 37.9 IN ADULT (H): Primary | ICD-10-CM

## 2022-01-18 PROCEDURE — 90834 PSYTX W PT 45 MINUTES: CPT | Mod: GT,95 | Performed by: SOCIAL WORKER

## 2022-01-18 PROCEDURE — 99215 OFFICE O/P EST HI 40 MIN: CPT | Performed by: INTERNAL MEDICINE

## 2022-01-18 ASSESSMENT — ANXIETY QUESTIONNAIRES
7. FEELING AFRAID AS IF SOMETHING AWFUL MIGHT HAPPEN: SEVERAL DAYS
3. WORRYING TOO MUCH ABOUT DIFFERENT THINGS: MORE THAN HALF THE DAYS
2. NOT BEING ABLE TO STOP OR CONTROL WORRYING: MORE THAN HALF THE DAYS
GAD7 TOTAL SCORE: 13
GAD7 TOTAL SCORE: 13
5. BEING SO RESTLESS THAT IT IS HARD TO SIT STILL: MORE THAN HALF THE DAYS
7. FEELING AFRAID AS IF SOMETHING AWFUL MIGHT HAPPEN: SEVERAL DAYS
4. TROUBLE RELAXING: MORE THAN HALF THE DAYS
1. FEELING NERVOUS, ANXIOUS, OR ON EDGE: MORE THAN HALF THE DAYS
GAD7 TOTAL SCORE: 13
6. BECOMING EASILY ANNOYED OR IRRITABLE: MORE THAN HALF THE DAYS

## 2022-01-18 ASSESSMENT — PATIENT HEALTH QUESTIONNAIRE - PHQ9
10. IF YOU CHECKED OFF ANY PROBLEMS, HOW DIFFICULT HAVE THESE PROBLEMS MADE IT FOR YOU TO DO YOUR WORK, TAKE CARE OF THINGS AT HOME, OR GET ALONG WITH OTHER PEOPLE: VERY DIFFICULT
SUM OF ALL RESPONSES TO PHQ QUESTIONS 1-9: 15
SUM OF ALL RESPONSES TO PHQ QUESTIONS 1-9: 15

## 2022-01-18 ASSESSMENT — PAIN SCALES - GENERAL: PAINLEVEL: NO PAIN (0)

## 2022-01-18 NOTE — NURSING NOTE
"Chief Complaint   Patient presents with     Thyroid Problem     Vital signs:      BP: (!) 148/87 Pulse: 96             Weight: (!) 155.6 kg (343 lb 1.6 oz)  Estimated body mass index is 41.76 kg/m  as calculated from the following:    Height as of 1/4/22: 1.93 m (6' 4\").    Weight as of this encounter: 155.6 kg (343 lb 1.6 oz).        "

## 2022-01-18 NOTE — PROGRESS NOTES
Higinio is a 31 year old male presents today for Thyroid Problem    HPI  31-year-old male who has been referred by Dr. Go for work-up of possible Cushing syndrome.  Extensive medical records reviewed including notes from rheumatology,  primary care and mental health.  Evaluation for Cushing's was considered due to patient's history of obesity, muscle weakness, sleep apnea, easy bruising and abdominal striae.  Patient reports multiple ongoing symptoms including diffuse muscle and joint pains, anxiety, worsening mental health related symptoms, weight gain, high blood pressure.  He is undergoing evaluation for sleep apnea and plans to get sleep study done in early February.    Patient denies current or previous chronic use of steroid.  Denies any current use of oral, injectable, topical or inhaled steroids.  Denies any use of herbal medications.  Patient reports that his maximum body weight was around the time of high school graduation and he reports that he was around 350 pounds at that time.  He reports that he was able to lose weight subsequently and his weight improved to around 230 pounds but reports he has regained weight over the last several years.    He was diagnosed with COVID-19 about 2 weeks ago.  Complains of feeling more anxious today.  History of prediabetes which he attributes to mood related medications.  Reports easy bruising  Smokes cigarettes and reports that he is trying to quit, occasional use of marijuana  Patient does not have a regular sleep pattern.  Reports that he usually goes to sleeps around 3-4 AM    Past medical history  PTSD, dissociative identity disorder, prediabetes, malrotation of the intestines, positive RAMANA, multiple areas of joint pain, myalgias and chronic left knee pain.     Past Medical History:   Diagnosis Date     Anxiety      Class 2 obesity due to excess calories in adult      Complication of anesthesia      Depression      Gastroesophageal reflux disease with esophagitis       History of nephrolithiasis      Irritable bowel syndrome      Major depression, recurrent (H)      Malrotation of intestine      Manic disorder (H)      Panic disorder      Patellofemoral instability of left knee with pain      Pre-diabetes      Psychosis (H)      PTSD (post-traumatic stress disorder)      Tobacco use      Patient Active Problem List   Diagnosis     Irritable bowel syndrome     Class 2 severe obesity due to excess calories with serious comorbidity and body mass index (BMI) of 37.0 to 37.9 in adult (H)     Patellofemoral instability of left knee with pain     Tobacco abuse     Suicidal ideation     Moderate episode of recurrent major depressive disorder (H)     Allergies  Allergies   Allergen Reactions     Bee Venom Anaphylaxis and Other (See Comments)     Swelling  Large local reactions/ swelling       Fruit Acid Concentrate [Fruit Extracts] Swelling     Lips swell and crust over little bit- tongue gets numb     Liquid Adhesive Dermatitis     And band aid       Monosodium Glutamate Hives     Medications  Current Outpatient Medications   Medication Sig Dispense Refill     acetaminophen (TYLENOL) 500 MG tablet Take 2 tablets (1,000 mg) by mouth every 6 hours as needed for mild pain 28 tablet 0     albuterol (ALBUTEROL) 108 (90 BASE) MCG/ACT Inhaler Inhale 2 puffs into the lungs every 4 hours as needed for shortness of breath / dyspnea or wheezing 1 Inhaler 0     alum & mag hydroxide-simethicone (MAALOX MAX) 400-400-40 MG/5ML SUSP suspension Take 30 mLs by mouth every 4 hours as needed for indigestion or heartburn 100 mL 0     cyclobenzaprine (FLEXERIL) 10 MG tablet Take 1 tablet (10 mg) by mouth 3 times daily as needed for muscle spasms 30 tablet 0     lamoTRIgine (LAMICTAL) 25 MG tablet        metFORMIN (GLUCOPHAGE) 500 MG tablet Take 500 mg by mouth 2 times daily (with meals)        omeprazole (PRILOSEC) 40 MG DR capsule Take 1 capsule (40 mg) by mouth daily 90 capsule 3     QUEtiapine  (SEROQUEL) 25 MG tablet        sucralfate (CARAFATE) 1 GM tablet Take 1 tablet (1 g) by mouth 4 times daily 28 tablet 0     Family History  family history includes Atrial fibrillation in his brother; Cerebrovascular Disease in his brother; Other - See Comments in his brother and mother.  Social History     Socioeconomic History     Marital status: Single     Spouse name: Not on file     Number of children: Not on file     Years of education: Not on file     Highest education level: Not on file   Occupational History     Not on file   Tobacco Use     Smoking status: Current Some Day Smoker     Packs/day: 0.25     Types: Cigarettes     Start date: 2001     Smokeless tobacco: Never Used     Tobacco comment: 1 cig a day trying to quit   Substance and Sexual Activity     Alcohol use: Not Currently     Drug use: Yes     Types: Marijuana     Comment: daily     Sexual activity: Yes     Partners: Female     Birth control/protection: Condom   Other Topics Concern     Parent/sibling w/ CABG, MI or angioplasty before 65F 55M? Not Asked   Social History Narrative     Not on file     Social Determinants of Health     Financial Resource Strain: Medium Risk     Difficulty of Paying Living Expenses: Somewhat hard   Food Insecurity: Food Insecurity Present     Worried About Running Out of Food in the Last Year: Sometimes true     Ran Out of Food in the Last Year: Sometimes true   Transportation Needs: No Transportation Needs     Lack of Transportation (Medical): No     Lack of Transportation (Non-Medical): No   Physical Activity: Not on file   Stress: Not on file   Social Connections: Not on file   Intimate Partner Violence: Not on file   Housing Stability: Low Risk      Unable to Pay for Housing in the Last Year: No     Number of Places Lived in the Last Year: 1     Unstable Housing in the Last Year: No       ROS   ROS: 10 point ROS neg other than the symptoms noted above in the HPI.    Physical Exam  BP (!) 148/87 (BP Location:  Right arm, Patient Position: Sitting, Cuff Size: Adult Regular)   Pulse 96   Wt (!) 155.6 kg (343 lb 1.6 oz)   BMI 41.76 kg/m    Body mass index is 41.76 kg/m .    Constitutional: no distress, comfortable, pleasant   Eyes: anicteric, normal extra-ocular movements, No lig lag, retraction or proptosis  Neck: supple, no thyromegaly.  Prominent dorsal fat pad  Cardiovascular: regular rate and rhythm, normal S1 and S2, no murmurs  Respiratory: clear to auscultation, no wheezes or crackles, normal breath sounds   Gastrointestinal: Obese, nontender  Musculoskeletal: no edema   Skin: Pale around 1 cm or less striae over abdomen  Neurological: cranial nerves intact, strength grossly normal  Psychological: appropriate mood   Lymphatic: no cervical lymphadenopathy    RESULTS  Laboratory data reviewed and EMR including care everywhere  Adrenal glands were noted to be normal on recent abdomen CT in October 2021      Assessment and plan    Question of Cushing's syndrome: Patient was counseled about features and diagnostic testing for Cushing's.  Discussed hypercortisolism can occur in the setting of obesity, anxiety/depression and untreated sleep apnea and its potential impact on diagnostic testing for Cushing's.   will initially screen with 24-hour urinary cortisol.  Patient was counseled about 24-hour urine collection  Further work-up and recommendation based on pending lab result    Orders Placed This Encounter   Procedures     Creatinine timed urine     Cortisol free urine     ROSALVA Rabago    Note: Chart documentation done in part with Dragon Voice Recognition software. Although reviewed after completion, some word and grammatical errors may remain.  Please consider this when interpreting information in this chart    Time: I spent 45 minutes on the date of the encounter preparing to see patient (including chart review and preparation), obtaining and or reviewing additional medical history, performing an evaluation,  documenting clinical information in the electronic health record, independently interpreting results, communicating results to the patient, and/or coordinating care.

## 2022-01-18 NOTE — PROGRESS NOTES
3:00 PM                                             Progress Note    Patient Name: Higinio Wallace  Date: 1/18/22         Service Type: Individual      Session Start Time: 1:01 PM session End Time:  1:52 PM     Session Length: 51 minutes    Session #: 8    Attendees: Client and His girlfriend was in the background    Service Modality:  Video Visit:      Provider verified identity through the following two step process.  Patient provided:  Patient is known previously to provider     Telemedicine Visit: The patient's condition can be safely assessed and treated via synchronous audio and visual telemedicine encounter.       Reason for Telemedicine Visit: Services only offered telehealth     Originating Site (Patient Location): Patient's other His car     Distant Site (Provider Location): Provider Remote Setting- Home Office     Consent:  The patient/guardian has verbally consented to: the potential risks and benefits of telemedicine (video visit) versus in person care; bill my insurance or make self-payment for services provided; and responsibility for payment of non-covered services.      Patient would like the video invitation sent by:  Send to e-mail at: [email or web address]     Mode of Communication:  Video Conference via well     As the provider I attest to compliance with applicable laws and regulations related to telemedicine.     Treatment Plan Last Reviewed: 11/23/2021  PHQ-9 / ANITA-7 :   PHQ 11/23/2021 1/4/2022 1/18/2022   PHQ-9 Total Score 18 23 15   Q9: Thoughts of better off dead/self-harm past 2 weeks More than half the days Several days Not at all   F/U: Thoughts of suicide or self-harm Yes Yes -   F/U: Self harm-plan No No -   F/U: Self-harm action No No -   F/U: Safety concerns No No -     ANITA-7 SCORE 1/4/2022 1/4/2022 1/18/2022   Total Score - 21 (severe anxiety) 13 (moderate anxiety)   Total Score 21 21 13         DATA  Interactive Complexity: No  Crisis: No       Progress Since Last Session  (Related to Symptoms / Goals / Homework):   Symptoms: No change Continued physical symptomology that impacts his emotional health    Homework: Partially completed      Episode of Care Goals: Minimal progress - PREPARATION (Decided to change - considering how); Intervened by negotiating a change plan and determining options / strategies for behavior change, identifying triggers, exploring social supports, and working towards setting a date to begin behavior change     Current / Ongoing Stressors and Concerns:   Patient shared that he continues to struggle with his physical health.  Patient reports that last week he fell and hurt himself which has led to him being unable to work.  Patient identified a variety of obstacles that he faces in his day-to-day life.  Therapist worked with patient to focus on the here and now and what he can control versus focusing on what is out of his control or future worries.  Therapist shared with patient that as he focuses on future worries he also displays more agitated behaviors.  Patient and therapist were able to identify that basic self-care he can focus on such as drinking water, taking his medications, hygiene such as regular showers and brushing of his teeth, and introducing foods that work with his diet such as carrots cucumbers and eggs.     Treatment Objective(s) Addressed in This Session:   Decrease frequency and intensity of feeling down, depressed, hopeless  The mind-body connection  Explored coping skills and strategies     Intervention:   CBT: CBT triangle, cognitive distortions    ACT: Value based living   DBT: Validated patient's emotions and experiences  Client-centered: Self-care strategies      ASSESSMENT: Current Emotional / Mental Status (status of significant symptoms):   Risk status (Self / Other harm or suicidal ideation)   Patient denies current fears or concerns for personal safety.   Patient denies current or recent suicidal ideation or behaviors.   Patient  denies current or recent homicidal ideation or behaviors.   Patient denies current or recent self injurious behavior or ideation.   Patient denies other safety concerns.   Patient reports there has been no change in risk factors since their last session.     Patient reports there has been no change in protective factors since their last session.     Recommended that patient call 911 or go to the local ED should there be a change in any of these risk factors.     Appearance:   Appropriate    Eye Contact:   Fair    Psychomotor Behavior: Agitated    Attitude:   Interested Evasive   Orientation:   All   Speech    Rate / Production: Talkative    Volume:  Normal    Mood:    Agitated    Affect:    Appropriate    Thought Content:  Perservative    Thought Form:  Goal Directed    Insight:    Fair      Medication Review:   No changes to current psychiatric medication(s)     Medication Compliance:   Yes     Changes in Health Issues:   Yes: Ongoing health related issues without specific diagnoses at this time, Associated Psychological Distress     Chemical Use Review:    Substance Use: Chemical use reviewed, no active concerns identified      Tobacco Use: No change in amount of tobacco use since last session.  Not reviewed during session    Diagnosis:  1. PTSD (post-traumatic stress disorder)        Collateral Reports Completed:   Not Applicable    PLAN: (Patient Tasks / Therapist Tasks / Other)  Patient will focus on a regular self-care practice which includes drinking water, taking his medications, hygiene, and introducing foods that work for him.  Patient was also encouraged to focus on the here and now and what is within his control versus what is not within his control.  Patient and therapist will meet again in a week.        Meri Rich, LICSW 1/18/22                                                           ______________________________________________________________________    Treatment Plan    Patient's Name:  Higinio Wallace  YOB: 1990    Date: November 23, 2021    DSM5 Diagnoses: 309.81 (F43.10) Posttraumatic Stress Disorder (includes Posttraumatic Stress Disorder for Children 6 Years and Younger)  With dissociative symptoms  Psychosocial / Contextual Factors: History of trauma, significant mental health concerns, physical health concerns, financial stressors  WHODAS:   WHODAS 2.0 Total Score 10/6/2020 11/8/2021   Total Score 39 32   Total Score MyChart - 32       Referral / Collaboration:  Was/were discussed and client will pursue.    Anticipated number of session or this episode of care: 8-12      MeasurableTreatment Goal(s) related to diagnosis / functional impairment(s)  Goal 1: Patient will focus on decreasing his mental health symptomology as evidenced by a PHQ-9 and ANITA-7 score of 10 or less    I will know I've met my goal when I feel less stressed and increased happiness and motivation.      Objective #A (Patient Action)    Patient will learn about trauma and the impact that can have on him both emotionally and physically.  Status: New - Date: November 23, 2021     Intervention(s)  Therapist will provide educational materials on The impact that trauma can have on the mind, the body, the spirit, and emotions.    Objective #B  Patient will Decrease frequency and intensity of feeling down, depressed, hopeless  Feel less tired and more energy during the day   Identify negative self-talk and behaviors: challenge core beliefs, myths, and actions.  Status: New - Date: November 23, 2021     Intervention(s)  Therapist will teach emotional regulation skills. Patient and therapist will focus on increasing his awareness and recognition of symptoms in the moment as well as developing coping skills and strategies to manage the symptoms.  The skills and strategies could include positive affirmations, movement, engaging in the creative process, and developing a positive sense of self.    Objective #C  Patient will  Explore with therapist how his trauma has impacted his thoughts and how his thoughts impact his behaviors and emotional wellbeing.  Status: New - Date: November 23, 2021     Intervention(s)  Therapist will teach distraction skills. As well as ways to challenge negative thoughts and self talk..      Patient has reviewed and agreed to the above plan.      Meri Rich, [facility]  November 23, 2021  Answers for HPI/ROS submitted by the patient on 1/4/2022  If you checked off any problems, how difficult have these problems made it for you to do your work, take care of things at home, or get along with other people?: Extremely difficult  PHQ9 TOTAL SCORE: 23  ANITA 7 TOTAL SCORE: 21    Answers for HPI/ROS submitted by the patient on 1/18/2022  If you checked off any problems, how difficult have these problems made it for you to do your work, take care of things at home, or get along with other people?: Very difficult  PHQ9 TOTAL SCORE: 15  ANITA 7 TOTAL SCORE: 13

## 2022-01-18 NOTE — LETTER
1/18/2022       RE: Higinio Wallace  [address]  [facility] [address]     Dear Colleague,    Thank you for referring your patient, Higinio Wallace, to the [facility] [place] [facility]. Please see a copy of my visit note below.    Higinio is a 31 year old male presents today for Thyroid Problem    HPI  31-year-old male who has been referred by Dr. Go for work-up of possible Cushing syndrome.  Extensive medical records reviewed including notes from rheumatology,  primary care and mental health.  Evaluation for Cushing's was considered due to patient's history of obesity, muscle weakness, sleep apnea, easy bruising and abdominal striae.  Patient reports multiple ongoing symptoms including diffuse muscle and joint pains, anxiety, worsening mental health related symptoms, weight gain, high blood pressure.  He is undergoing evaluation for sleep apnea and plans to get sleep study done in early February.    Patient denies current or previous chronic use of steroid.  Denies any current use of oral, injectable, topical or inhaled steroids.  Denies any use of herbal medications.  Patient reports that his maximum body weight was around the time of high school graduation and he reports that he was around 350 pounds at that time.  He reports that he was able to lose weight subsequently and his weight improved to around 230 pounds but reports he has regained weight over the last several years.    He was diagnosed with COVID-19 about 2 weeks ago.  Complains of feeling more anxious today.  History of prediabetes which he attributes to mood related medications.  Reports easy bruising  Smokes cigarettes and reports that he is trying to quit, occasional use of marijuana  Patient does not have a regular sleep pattern.  Reports that he usually goes to sleeps around 3-4 AM    Past medical history  PTSD, dissociative identity disorder, prediabetes,  malrotation of the intestines, positive RAMANA, multiple areas of joint pain, myalgias and chronic left knee pain.     Past Medical History:   Diagnosis Date     Anxiety      Class 2 obesity due to excess calories in adult      Complication of anesthesia      Depression      Gastroesophageal reflux disease with esophagitis      History of nephrolithiasis      Irritable bowel syndrome      Major depression, recurrent (H)      Malrotation of intestine      Manic disorder (H)      Panic disorder      Patellofemoral instability of left knee with pain      Pre-diabetes      Psychosis (H)      PTSD (post-traumatic stress disorder)      Tobacco use      Patient Active Problem List   Diagnosis     Irritable bowel syndrome     Class 2 severe obesity due to excess calories with serious comorbidity and body mass index (BMI) of 37.0 to 37.9 in adult (H)     Patellofemoral instability of left knee with pain     Tobacco abuse     Suicidal ideation     Moderate episode of recurrent major depressive disorder (H)     Allergies  Allergies   Allergen Reactions     Bee Venom Anaphylaxis and Other (See Comments)     Swelling  Large local reactions/ swelling       Fruit Acid Concentrate [Fruit Extracts] Swelling     Lips swell and crust over little bit- tongue gets numb     Liquid Adhesive Dermatitis     And band aid       Monosodium Glutamate Hives     Medications  Current Outpatient Medications   Medication Sig Dispense Refill     acetaminophen (TYLENOL) 500 MG tablet Take 2 tablets (1,000 mg) by mouth every 6 hours as needed for mild pain 28 tablet 0     albuterol (ALBUTEROL) 108 (90 BASE) MCG/ACT Inhaler Inhale 2 puffs into the lungs every 4 hours as needed for shortness of breath / dyspnea or wheezing 1 Inhaler 0     alum & mag hydroxide-simethicone (MAALOX MAX) 400-400-40 MG/5ML SUSP suspension Take 30 mLs by mouth every 4 hours as needed for indigestion or heartburn 100 mL 0     cyclobenzaprine (FLEXERIL) 10 MG tablet Take 1 tablet  (10 mg) by mouth 3 times daily as needed for muscle spasms 30 tablet 0     lamoTRIgine (LAMICTAL) 25 MG tablet        metFORMIN (GLUCOPHAGE) 500 MG tablet Take 500 mg by mouth 2 times daily (with meals)        omeprazole (PRILOSEC) 40 MG DR capsule Take 1 capsule (40 mg) by mouth daily 90 capsule 3     QUEtiapine (SEROQUEL) 25 MG tablet        sucralfate (CARAFATE) 1 GM tablet Take 1 tablet (1 g) by mouth 4 times daily 28 tablet 0     Family History  family history includes Atrial fibrillation in his brother; Cerebrovascular Disease in his brother; Other - See Comments in his brother and mother.  Social History     Socioeconomic History     Marital status: Single     Spouse name: Not on file     Number of children: Not on file     Years of education: Not on file     Highest education level: Not on file   Occupational History     Not on file   Tobacco Use     Smoking status: Current Some Day Smoker     Packs/day: 0.25     Types: Cigarettes     Start date: 2001     Smokeless tobacco: Never Used     Tobacco comment: 1 cig a day trying to quit   Substance and Sexual Activity     Alcohol use: Not Currently     Drug use: Yes     Types: Marijuana     Comment: daily     Sexual activity: Yes     Partners: Female     Birth control/protection: Condom   Other Topics Concern     Parent/sibling w/ CABG, MI or angioplasty before 65F 55M? Not Asked   Social History Narrative     Not on file     Social Determinants of Health     Financial Resource Strain: Medium Risk     Difficulty of Paying Living Expenses: Somewhat hard   Food Insecurity: Food Insecurity Present     Worried About Running Out of Food in the Last Year: Sometimes true     Ran Out of Food in the Last Year: Sometimes true   Transportation Needs: No Transportation Needs     Lack of Transportation (Medical): No     Lack of Transportation (Non-Medical): No   Physical Activity: Not on file   Stress: Not on file   Social Connections: Not on file   Intimate Partner Violence:  Not on file   Housing Stability: Low Risk      Unable to Pay for Housing in the Last Year: No     Number of Places Lived in the Last Year: 1     Unstable Housing in the Last Year: No       ROS   ROS: 10 point ROS neg other than the symptoms noted above in the HPI.    Physical Exam  BP (!) 148/87 (BP Location: Right arm, Patient Position: Sitting, Cuff Size: Adult Regular)   Pulse 96   Wt (!) 155.6 kg (343 lb 1.6 oz)   BMI 41.76 kg/m    Body mass index is 41.76 kg/m .    Constitutional: no distress, comfortable, pleasant   Eyes: anicteric, normal extra-ocular movements, No lig lag, retraction or proptosis  Neck: supple, no thyromegaly.  Prominent dorsal fat pad  Cardiovascular: regular rate and rhythm, normal S1 and S2, no murmurs  Respiratory: clear to auscultation, no wheezes or crackles, normal breath sounds   Gastrointestinal: Obese, nontender  Musculoskeletal: no edema   Skin: Pale around 1 cm or less striae over abdomen  Neurological: cranial nerves intact, strength grossly normal  Psychological: appropriate mood   Lymphatic: no cervical lymphadenopathy    RESULTS  Laboratory data reviewed and EMR including care everywhere  Adrenal glands were noted to be normal on recent abdomen CT in October 2021      Assessment and plan    Question of Cushing's syndrome: Patient was counseled about features and diagnostic testing for Cushing's.  Discussed hypercortisolism can occur in the setting of obesity, anxiety/depression and untreated sleep apnea and its potential impact on diagnostic testing for Cushing's.   will initially screen with 24-hour urinary cortisol.  Patient was counseled about 24-hour urine collection  Further work-up and recommendation based on pending lab result    Orders Placed This Encounter   Procedures     Creatinine timed urine     Cortisol free urine     ROSALVA Rabago    Note: Chart documentation done in part with Dragon Voice Recognition software. Although reviewed after completion, some  word and grammatical errors may remain.  Please consider this when interpreting information in this chart    Time: I spent 45 minutes on the date of the encounter preparing to see patient (including chart review and preparation), obtaining and or reviewing additional medical history, performing an evaluation, documenting clinical information in the electronic health record, independently interpreting results, communicating results to the patient, and/or coordinating care.

## 2022-01-19 ASSESSMENT — ANXIETY QUESTIONNAIRES: GAD7 TOTAL SCORE: 13

## 2022-01-19 ASSESSMENT — PATIENT HEALTH QUESTIONNAIRE - PHQ9: SUM OF ALL RESPONSES TO PHQ QUESTIONS 1-9: 15

## 2022-01-19 NOTE — PROGRESS NOTES
"The patient has been notified of the following:      \"We have found that certain health care needs can be provided without the need for a face to face visit.  This service lets us provide the care you need with a phone conversation.       I will have full access to your [place] medical record during this entire phone call.   I will be taking notes for your medical record.      Since this is like an office visit, we will bill your insurance company for this service.       There are potential benefits and risks of telephone visits (e.g. limits to patient confidentiality) that differ from in-person visits.?  Confidentiality still applies for telephone services, and nobody will record the visit.  It is important to be in a quiet, private space that is free of distractions (including cell phone or other devices) during the visit.??      If during the course of the call I believe a telephone visit is not appropriate, you will not be charged for this service\"     Consent has been obtained for this service by care team member: Yes     Health Psychology                  Clinic    Department of Medicine  Odessa Beltran, PhD, LP [phone]                          Clinics and Surgery Center  [facility] Denice Brown, PhD, LP [phone]                  3rd [facility] Mail Code 741   Topher Antunez, PhD, ABPP, LP [phone]     [address] [place] [address], [address],  Franchesca Hernandez,  PhD, LP [phone]            [place] [address] Matilde Jackson, PhD, LP [phone]    Health Psychology Follow-Up Note    SUBJECTIVE:  Higinio Wallace is a 30 year old male with medical history significant for prediabetes, depression, anxiety, PTSD, psychogenic rumination, IBS-D, and congenital malrotation of colon who was seen for individual psychotherapy. Reviewed emotional and physical health since our last session. The patient reported he is recovering from an orthopedic surgery " - appt time delayed today as he was returning from an ortho visit. He endorses weight loss of 25 lbs via increased activity, is losing weight to help prevent GI obstruction due to malrotation. Aims to maintain a weight around 230 lbs. Current weight is around 300 lbs. Reviewed GI symptoms -  regurgitation was reported to have gone away. Attributes this to diet changes - eliminated dairy - but also acknowledges life has been less stressful lately as he has had time away from work. Plans on reconnecting with his mental health provider soon, but not seeing them at this time. No treatment plan conducted at this visit as he reported he had no ongoing needs for GI health psychology. Practiced breathing and found benefit for stress reduction.    OBJECTIVE:  Appearance/Behavior/Orientation:  Alert and oriented to person, place, time, and situation.   Cooperation/Reliability: Patient was open and cooperative throughout the session.    Speech/Language: Speech was clear, coherent, and of normal rate, rhythm and volume.   Thought Form: Overall logical and organized.   Mood/Affect: Mood euthymic; affect mood congruent     ASSESSMENT:  Regurgitation was reported to be minimal - no ongoing need for behavioral interventions.     DIAGNOSIS:  Psychological factors affecting medical condition  Posttraumatic stress disorder  Depressive disorder, unspecified; rule out bipolar mood disorder  Anxiety disorder, unspecified      PLAN:  1. Return for GI behavioral health as needed as future concerns arise.   2.  Continue practice of breathing exercise for rumination as needed.   3. Follow-up with general mental health care team.     Time in: 2:28p  Time out: 2:46p  Extended session due to complexity of case and length of interval.    Franchesca Hernandez, PhD,   Clinical Health Psychologist    Tx plan completed: 12/08/20  Tx plan due:  n/a    *no letter    This note was completed using Dragon voice recognition software.  Although reviewed after  completion, some word and grammatical errors may occur.      used

## 2022-01-22 LAB
COLLECT DURATION TIME UR: 24 H
CREAT 24H UR-MRATE: 2.82 G/SPEC (ref 1–2)
CREAT UR-MCNC: 185 MG/DL
SPECIMEN VOL UR: 1525 ML

## 2022-01-22 PROCEDURE — 81050 URINALYSIS VOLUME MEASURE: CPT | Performed by: INTERNAL MEDICINE

## 2022-01-22 PROCEDURE — 82530 CORTISOL FREE: CPT | Performed by: INTERNAL MEDICINE

## 2022-01-24 ENCOUNTER — PATIENT OUTREACH (OUTPATIENT)
Dept: NURSING | Facility: CLINIC | Age: 32
End: 2022-01-24
Payer: COMMERCIAL

## 2022-01-24 RX ORDER — ARIPIPRAZOLE 15 MG/1
15 TABLET ORAL
COMMUNITY
Start: 2020-09-16 | End: 2022-02-08

## 2022-01-24 NOTE — PROGRESS NOTES
Clinic Care Coordination Contact    Follow Up Progress Note      Assessment: Was in Urgency Room on 1- for bloating. Was examined and released and to follow up with PCP and specialty care.  Patient continues to have the same concerns and muscle weakness. He learned from endocrinologist that his Creatine is high which may explain some of his symptoms.  They are continuing to run some tests to determine if he has kidney problems.  Is frustrated with medical providers who seem to him that they blame his mental illness and weight for his physical concerns.  He hardly eats anything as he vomits often.  He is having a lot ongoing pain which may have begun when he received the moderna vaccine in May.      He has  to work with his complaint about [employer] not letting him donate plasma.  This continues.      He is not able to work due to pain and muscle weakness.  He has paper work for PCP to complete to help him try to get long term disability through his work. He has appt tomorrow with PCP to discuss.  His orthopedic specialist gave him referral to Dr. Echols with [facility] if he wants to work with a different PCP who may have more time as he is a new provider there. He has appt set up for later this week.      His girlfriend is working three jobs and he feels badly that he can't earn much except to help her when she does [employer].  He continues to struggle with finances and with trying to locate new housing.  Resources were provided during last call.  He did follow up with his psychiatrist.      Care Gaps:    Health Maintenance Due   Topic Date Due     PREVENTIVE CARE VISIT  Never done     ADVANCE CARE PLANNING  Never done     DEPRESSION ACTION PLAN  Never done     Pneumococcal Vaccine: Pediatrics (0 to 5 Years) and At-Risk Patients (6 to 64 Years) (1 of 2 - PPSV23) Never done     HIV SCREENING  Never done     HEPATITIS C SCREENING  Never done     INFLUENZA VACCINE (1) Never done     COVID-19 Vaccine  "(3 - Booster for Moderna series) 10/21/2021       Scheduled 1-25      Goals addressed this encounter:   Goals Addressed                    This Visit's Progress       Patient Stated       1. Mental Health Management (pt-stated)         Goal Statement: I will have stable mental health and have a therapist and psychiatrist within 2 months.  Date Goal set: 11-  Barriers: Feeling overwhelmed, stressed.  Strengths: has insurance, already connected to therapist.   Date to Achieve By: 1-  Patient expressed understanding of goal: yes  Action steps to achieve this goal:  1. I will attend appointments with my therapists.  2. I will meet with new psychiatrist on 12-9 at 1:00 with [facility] with Kyle \"Ana Paula\" Staci Macias NP  3. I will report progress towards this goal at scheduled outreach telephone calls from the [facility] team.  1-7 discussed, met with Ana Paula on 12-9 and will call to see when next appt is. 1-24-20222 discussed           2. Financial Wellbeing (pt-stated)         Goal Statement: I will have financial stability and am able to pay my bills within 6 months.  Date Goal set: 12-1-2021  Barriers: trouble at current job, only able to work part time.  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   Date to Achieve By: 5-1-2022  Patient expressed understanding of goal: yes  Action steps to achieve this goal:  1. I will continue to work with my current employer to have income while I look for a new job.  2. I will review [employer] [facility]  and [facility] resources for help.  3. I will report progress towards this goal at scheduled outreach telephone calls from the [facility] team.    1-7 still working at [employer] and frustrated with schedule. 1-24 not able to work at [employer] now.           3. Other (pt-stated)         Goal Statement: I will find a new place to live with my girlfriend that is affordable within one year.  Date Goal set:12-1-2021  Barriers: hard to find affordable " apartments.  Strengths: have a place to live now.   Date to Achieve By: 12-1-2022  Patient expressed understanding of goal: yes  Action steps to achieve this goal:  1. I will look at [employer] for options.   2. I will continue to pay rent at my current apartment.  3. I will report progress towards this goal at scheduled outreach telephone calls from the [facility] team.  1-7 discussed, want to move out in May when lease is up,  1-24 discussed.              Intervention/Education provided during outreach: support, resources.       Outreach Frequency: monthly    Plan: Delegating to [facility] to contact in less than 30 days.   Care Coordinator will follow up in 6 weeks and as needed.

## 2022-01-25 ENCOUNTER — OFFICE VISIT (OUTPATIENT)
Dept: FAMILY MEDICINE | Facility: CLINIC | Age: 32
End: 2022-01-25
Payer: COMMERCIAL

## 2022-01-25 VITALS
SYSTOLIC BLOOD PRESSURE: 136 MMHG | WEIGHT: 315 LBS | DIASTOLIC BLOOD PRESSURE: 86 MMHG | OXYGEN SATURATION: 98 % | BODY MASS INDEX: 38.36 KG/M2 | HEIGHT: 76 IN | HEART RATE: 111 BPM

## 2022-01-25 DIAGNOSIS — R53.83 FATIGUE, UNSPECIFIED TYPE: ICD-10-CM

## 2022-01-25 DIAGNOSIS — M25.50 MULTIPLE JOINT PAIN: ICD-10-CM

## 2022-01-25 DIAGNOSIS — F43.10 PTSD (POST-TRAUMATIC STRESS DISORDER): Primary | ICD-10-CM

## 2022-01-25 DIAGNOSIS — F44.81 DISSOCIATIVE IDENTITY DISORDER (H): ICD-10-CM

## 2022-01-25 DIAGNOSIS — M54.50 CHRONIC BILATERAL LOW BACK PAIN WITHOUT SCIATICA: ICD-10-CM

## 2022-01-25 DIAGNOSIS — M79.10 MYALGIA: ICD-10-CM

## 2022-01-25 DIAGNOSIS — G89.29 CHRONIC BILATERAL LOW BACK PAIN WITHOUT SCIATICA: ICD-10-CM

## 2022-01-25 DIAGNOSIS — Q43.3 MALROTATION OF INTESTINE (H): ICD-10-CM

## 2022-01-25 DIAGNOSIS — M79.662 PAIN OF LEFT CALF: ICD-10-CM

## 2022-01-25 LAB
ANNOTATION COMMENT IMP: ABNORMAL
CORTIS F 24H UR HPLC-MCNC: 58.5 UG/L
CORTIS F 24H UR-MRATE: 89.2 UG/D
CORTIS F/CREAT 24H UR: 34.01 UG/G CRT
CREAT 24H UR-MRATE: 2623 MG/D
CREAT UR-MCNC: 172 MG/DL

## 2022-01-25 PROCEDURE — 99214 OFFICE O/P EST MOD 30 MIN: CPT | Performed by: FAMILY MEDICINE

## 2022-01-25 ASSESSMENT — MIFFLIN-ST. JEOR: SCORE: 2645.02

## 2022-01-25 NOTE — PROGRESS NOTES
"Higinio Wallace  /86   Pulse 111   Ht 1.93 m (6' 4\")   Wt (!) 157.9 kg (348 lb)   SpO2 98%   BMI 42.36 kg/m       Assessment/Plan:                Higinio was seen today for headache, er f/u, form request and anxiety.    Diagnoses and all orders for this visit:    PTSD (post-traumatic stress disorder)    Dissociative identity disorder (H)    Multiple joint pain    Myalgia    Fatigue, unspecified type    Malrotation of intestine    Chronic bilateral low back pain without sciatica    Pain of left calf         DISCUSSION  We spent time today discussing his complex health situation as outlined below.    Due to the conglomeration of factors and primarily uncontrolled mental health symptoms I do not feel he can work in any capacity at this time.  I do not think that this is a permanent limitation but his mental health requires further treatment and his physical symptoms require further evaluation prior to formulation of a treatment plan.  That plan is as outlined below.    I have provided documentation outlining that at this time I do not recommend that he work in any capacity.  I recommend he follow through with the treatment plan as outlined including seeing his mental health therapist and his psychiatrist as soon as possible to work out a medication treatment plan for his significant mental health concerns.    I recommend follow-up no more than every 2 weeks with me to assess his progress and to decide on his work ability.    As he completes his work-up with endocrinology, orthopedic specialty provider and follow-up with his mental health providers will give strong consideration to physical therapy for reconditioning.  Subjective:     HPI:    Higinio Wallace is a 31 year old male with a complex health situation is here today for follow-up.    He has diagnosis of PTSD and dissociative identity disorder.  He follows with a psychiatrist and sees a therapist weekly.     He has a history of malrotation of the " intestines and has symptoms consistent with irritable bowel syndrome.  He follows with gastroenterology.    He has had ongoing symptoms including multiple areas of joint pain with myalgias as well as chronic knee pain.  More recently he has struggled with low back pain and a persistent pain in his left calf.    He is currently undergoing an evaluation for cushingoid features through endocrinology.  He has completed a 24-hour urine collection and is awaiting the results.    He has seen a orthopedic spine specialist at [facility], Dr. Vizcrara.  Will undergo an MRI scan of his low back today to evaluate his low back symptoms.    He reports he is not taking medications for management of his mental health due to concerns of side effects currently.    He currently works at [employer].  He has been on a work restriction of working 4 hours/day with some other limitations.    He currently has significant concerns with ongoing dysfunction despite his work limitation.  He reports pain with standing.  Some days he cannot stand for any length of time without significant pain occurring in the anterior lower trunk low back and upper legs.  Standing will often exacerbate symptoms of irritable bowel syndrome.  He reports weakness of the legs.  He has been seen in the emergency department for concerns of difficulty with balance and falls.  He becomes more out of breath with activity because his overall activity has decreased significantly.  Prolonged sitting such as having to drive for more than 2 hours results in significant pain and stiffness.  He reports significantly overall heightened anxiety and difficulty being able to concentrate and focus to accomplish tasks appropriately.  The symptoms affect his work performance significantly but also his home life.  He reports difficulty with food preparation and cleaning and maintaining his house.      ROS:  Complete review of systems is obtained.  Other than the specific  considerations noted above complete review of systems is negative.      Objective:   Medications:  Current Outpatient Medications   Medication     acetaminophen (TYLENOL) 500 MG tablet     albuterol (ALBUTEROL) 108 (90 BASE) MCG/ACT Inhaler     alum & mag hydroxide-simethicone (MAALOX MAX) 400-400-40 MG/5ML SUSP suspension     ARIPiprazole (ABILIFY) 15 MG tablet     cyclobenzaprine (FLEXERIL) 10 MG tablet     lamoTRIgine (LAMICTAL) 25 MG tablet     metFORMIN (GLUCOPHAGE) 500 MG tablet     omeprazole (PRILOSEC) 40 MG DR capsule     QUEtiapine (SEROQUEL) 25 MG tablet     sucralfate (CARAFATE) 1 GM tablet     No current facility-administered medications for this visit.        Allergies:     Allergies   Allergen Reactions     Bee Venom Anaphylaxis and Other (See Comments)     Swelling  Large local reactions/ swelling       Fruit Acid Concentrate [Fruit Extracts] Swelling     Lips swell and crust over little bit- tongue gets numb     Liquid Adhesive Dermatitis     And band aid       Monosodium Glutamate Hives        Social History     Socioeconomic History     Marital status: Single     Spouse name: Not on file     Number of children: Not on file     Years of education: Not on file     Highest education level: Not on file   Occupational History     Not on file   Tobacco Use     Smoking status: Current Some Day Smoker     Packs/day: 0.25     Types: Cigarettes     Start date: 2001     Smokeless tobacco: Never Used     Tobacco comment: 1 cig a day trying to quit   Substance and Sexual Activity     Alcohol use: Not Currently     Drug use: Yes     Types: Marijuana     Comment: daily     Sexual activity: Yes     Partners: Female     Birth control/protection: Condom   Other Topics Concern     Parent/sibling w/ CABG, MI or angioplasty before 65F 55M? Not Asked   Social History Narrative     Not on file     Social Determinants of Health     Financial Resource Strain: Medium Risk     Difficulty of Paying Living Expenses: Somewhat  "hard   Food Insecurity: Food Insecurity Present     Worried About Running Out of Food in the Last Year: Sometimes true     Ran Out of Food in the Last Year: Sometimes true   Transportation Needs: No Transportation Needs     Lack of Transportation (Medical): No     Lack of Transportation (Non-Medical): No   Physical Activity: Not on file   Stress: Not on file   Social Connections: Not on file   Intimate Partner Violence: Not on file   Housing Stability: Low Risk      Unable to Pay for Housing in the Last Year: No     Number of Places Lived in the Last Year: 1     Unstable Housing in the Last Year: No       Family History   Problem Relation Age of Onset     Other - See Comments Mother         PONV     Cerebrovascular Disease Brother      Atrial fibrillation Brother      Other - See Comments Brother         cardiac autoimmune deficiency        Most Recent Immunizations   Administered Date(s) Administered     COVID-19,PF,Moderna 05/21/2021     TDAP Vaccine (Boostrix) 07/03/2019     Tdap (Adacel,Boostrix) 07/03/2019        Wt Readings from Last 3 Encounters:   01/25/22 (!) 157.9 kg (348 lb)   01/18/22 (!) 155.6 kg (343 lb 1.6 oz)   01/04/22 (!) 153.3 kg (338 lb)        BP Readings from Last 6 Encounters:   01/25/22 136/86   01/18/22 (!) 148/87   01/04/22 136/84   11/19/21 131/85   11/15/21 130/83   10/24/21 131/74        Hemoglobin A1C POCT   Date Value Ref Range Status   11/04/2020 5.4 0 - 5.6 % Final     Comment:     Normal <5.7% Prediabetes 5.7-6.4%  Diabetes 6.5% or higher - adopted from ADA   consensus guidelines.       Hemoglobin A1C   Date Value Ref Range Status   08/17/2021 5.5 0.0 - 5.6 % Final     Comment:     Normal <5.7%   Prediabetes 5.7-6.4%    Diabetes 6.5% or higher     Note: Adopted from ADA consensus guidelines.            PHYSICAL EXAM:    /86   Pulse 111   Ht 1.93 m (6' 4\")   Wt (!) 157.9 kg (348 lb)   SpO2 98%   BMI 42.36 kg/m       General: Patient alert no signs of distress, as prior " some difficulty maintaining focus and discussion but he is directable.    He walks with a normal gait.    Reports pain with forward flexion in the posterior legs, this is bilateral he is mildly limited in his range of motion in this regard.  No limitation with extension.  Mild low back pain resulting from this.  Reports pain and stiffness in the flank region on each side with side to side bend.    He can heel and toe walk but reports pain in his lower legs with this maneuver on both sides.    He can squat partially return to a standing position.    Range of motion the shoulders with forward flexion and reaching overhead is limited to a mild extent secondary to pain and discomfort.

## 2022-01-25 NOTE — LETTER
January 25, 2022          Higinio Wallace   1990        Due to severe medical problems Higinio Wallace is not able to work in any capacity at this time.  A plan for evaluation and treatment has been initiated to address these concerns and to develop a return to work plan.  The anticipated return to work is expected to exeed 1 month from this date.  Higinio Wallace will be evaluated every 2 weeks to measure the progress.        Raza Franklin MD, MD

## 2022-01-27 ENCOUNTER — TELEPHONE (OUTPATIENT)
Dept: GASTROENTEROLOGY | Facility: CLINIC | Age: 32
End: 2022-01-27
Payer: COMMERCIAL

## 2022-01-27 NOTE — TELEPHONE ENCOUNTER
Per request of nurse and Dr. Allen attempted to call patient and schedule for feb 15 at 1:40 with Dr. Holland. Patient did not answer left a voicemail with writers number [phone] for callback.

## 2022-02-03 ENCOUNTER — TELEPHONE (OUTPATIENT)
Dept: GASTROENTEROLOGY | Facility: CLINIC | Age: 32
End: 2022-02-03

## 2022-02-03 NOTE — TELEPHONE ENCOUNTER
"1-27-22 @ 1544-Left patient message to call back.    2-3-22 @ 0901- left patient message to call back.    Patient returned call and discussed needing appointment. He stated he would like listed about him trying to take leave from his work \" I'm trying to fortify my life\" he stated he is on the brink of loosing his job and trying to get things back in order. He does want to see Dr. Holland and does not know if this is a system wide thing because he thought this was already scheduled. Informed we do not have anything for him. He stated this always happens to him that he scheduled and nothing shows up it just not here its even with his primary. Informed that we can get him scheduled while on phone. He is scheduled for February 15th at 1:40 Pm for a video visit with Dr. Holland. Informed he will be getting a call about 1/2 hour prior for check in and that way its a reminder he has a appointment as well. He was ok with this plan.  "

## 2022-02-03 NOTE — TELEPHONE ENCOUNTER
----- Message from Nicole Flores RN sent at 1/27/2022  3:19 PM CST -----  Iker webb  ----- Message -----  From: Eda Shah LPN  Sent: 1/27/2022   3:07 PM CST  To: Eda Shah LPN, Nicole Flores RN    Which provider? I am guessing you ment to say a no show-er?     Eda Shah LPN  ----- Message -----  From: Nicole Flores RN  Sent: 1/27/2022   3:01 PM CST  To: Eda Shah LPN, Nicole Flores RN    Please call this patient and schedule for feb 15 at 1:40.  Please hold the spot if he doesn't answer I would like you to talk to this marcelina as he a no shower.      Thanks    N  ----- Message -----  From: Alejandro Huynh MD  Sent: 1/15/2022   9:47 PM CST  To: SEVERIANO Orourke,     Please see my note on on 1/15. Can anyone at the clinic contact him and see if he can be seen sooner? Thank you very much.     Alejandro Vargas

## 2022-02-04 ENCOUNTER — MYC MEDICAL ADVICE (OUTPATIENT)
Dept: OTHER | Age: 32
End: 2022-02-04

## 2022-02-08 ENCOUNTER — OFFICE VISIT (OUTPATIENT)
Dept: RHEUMATOLOGY | Facility: CLINIC | Age: 32
End: 2022-02-08
Attending: INTERNAL MEDICINE
Payer: COMMERCIAL

## 2022-02-08 ENCOUNTER — OFFICE VISIT (OUTPATIENT)
Dept: FAMILY MEDICINE | Facility: CLINIC | Age: 32
End: 2022-02-08
Payer: COMMERCIAL

## 2022-02-08 VITALS
SYSTOLIC BLOOD PRESSURE: 130 MMHG | BODY MASS INDEX: 38.36 KG/M2 | HEART RATE: 64 BPM | HEIGHT: 76 IN | DIASTOLIC BLOOD PRESSURE: 95 MMHG | WEIGHT: 315 LBS | RESPIRATION RATE: 18 BRPM | TEMPERATURE: 98.3 F | OXYGEN SATURATION: 95 %

## 2022-02-08 VITALS
TEMPERATURE: 98.3 F | SYSTOLIC BLOOD PRESSURE: 134 MMHG | WEIGHT: 315 LBS | BODY MASS INDEX: 42.01 KG/M2 | HEART RATE: 75 BPM | OXYGEN SATURATION: 98 % | DIASTOLIC BLOOD PRESSURE: 84 MMHG

## 2022-02-08 DIAGNOSIS — F44.81 DISSOCIATIVE IDENTITY DISORDER (H): ICD-10-CM

## 2022-02-08 DIAGNOSIS — M54.50 CHRONIC BILATERAL LOW BACK PAIN WITHOUT SCIATICA: ICD-10-CM

## 2022-02-08 DIAGNOSIS — M79.10 MYALGIA: ICD-10-CM

## 2022-02-08 DIAGNOSIS — F43.10 PTSD (POST-TRAUMATIC STRESS DISORDER): Primary | ICD-10-CM

## 2022-02-08 DIAGNOSIS — G89.29 CHRONIC BILATERAL LOW BACK PAIN WITHOUT SCIATICA: ICD-10-CM

## 2022-02-08 DIAGNOSIS — R53.83 FATIGUE, UNSPECIFIED TYPE: ICD-10-CM

## 2022-02-08 DIAGNOSIS — Q43.3 MALROTATION OF INTESTINE (H): ICD-10-CM

## 2022-02-08 DIAGNOSIS — M25.50 MULTIPLE JOINT PAIN: ICD-10-CM

## 2022-02-08 DIAGNOSIS — M79.10 MYALGIA: Primary | ICD-10-CM

## 2022-02-08 PROCEDURE — 99214 OFFICE O/P EST MOD 30 MIN: CPT | Performed by: INTERNAL MEDICINE

## 2022-02-08 PROCEDURE — 99213 OFFICE O/P EST LOW 20 MIN: CPT | Performed by: FAMILY MEDICINE

## 2022-02-08 PROCEDURE — G0463 HOSPITAL OUTPT CLINIC VISIT: HCPCS

## 2022-02-08 ASSESSMENT — MIFFLIN-ST. JEOR: SCORE: 2634.79

## 2022-02-08 ASSESSMENT — PAIN SCALES - GENERAL: PAINLEVEL: MODERATE PAIN (5)

## 2022-02-08 NOTE — LETTER
2/8/2022      RE: Higinio Wallace  [address]  [facility] [address]       Rheumatology consultation note    CC: Follow-up for myofascial pain syndrome.    HPI: Higinio reports that he has been doing okay.  He had Covid in the beginning of January, but he has recovered for the most part.  He emphasized the fact that he was appreciative of the last visit because he felt that he was really being listened to.  At that visit we planned on pursuing a sleep study to rule out sleep apnea, and also an endocrine consultation for Cushing's.  He is scheduled for a sleep study in March/April.  And he recently saw the endocrinologist with further work-up in progress.    His symptoms have been about the same, being muscle pain all over in different areas at different times.    Physical examination  Vital signs are normal, BMI 42  There is no synovitis in the hands or wrists.  He can move all 4 extremities.    I reviewed his laboratory test results and he was found to have an increased free cortisol in the urine.    Impression/plan  1.  Myalgias that are still not completely explained.  However work-up is in progress by endocrinology as well as a planned sleep study to rule out obstructive sleep apnea.  2.  Recent Covid infection that seems to be resolved.  3.  It may well be that his myalgias are secondary to obstructive sleep apnea or Cushing's syndrome.  4.  We did not schedule follow-up visit at that time, but he is welcome to return after the work-up is completed and if there are still no clear diagnosis.    I spent 30 minutes face to face with the patient, with 25 minutes on counseling and coordination of care.      Waqar Go MD

## 2022-02-08 NOTE — NURSING NOTE
"Chief Complaint   Patient presents with     RECHECK     Muscle pain     Vital signs:  Temp: 98.3  F (36.8  C) Temp src: Oral BP: (!) 130/95 Pulse: 64   Resp: 18 SpO2: 95 %     Height: 193 cm (6' 3.98\") Weight: (!) 156.9 kg (345 lb 12.8 oz)  Estimated body mass index is 42.11 kg/m  as calculated from the following:    Height as of this encounter: 1.93 m (6' 3.98\").    Weight as of this encounter: 156.9 kg (345 lb 12.8 oz).      Verito Beaulieu, [facility]  2/8/2022 9:54 AM      "

## 2022-02-08 NOTE — LETTER
2/8/2022       RE: Higinio Wallace  [address]  [facility] [address]     Dear Colleague,    Thank you for referring your patient, Higinio Wallace, to the [facility] [place] [facility]. Please see a copy of my visit note below.    Rheumatology consultation note    CC: Follow-up for myofascial pain syndrome.    HPI: Higinio reports that he has been doing okay.  He had Covid in the beginning of January, but he has recovered for the most part.  He emphasized the fact that he was appreciative of the last visit because he felt that he was really being listened to.  At that visit we planned on pursuing a sleep study to rule out sleep apnea, and also an endocrine consultation for Cushing's.  He is scheduled for a sleep study in March/April.  And he recently saw the endocrinologist with further work-up in progress.    His symptoms have been about the same, being muscle pain all over in different areas at different times.    Physical examination  Vital signs are normal, BMI 42  There is no synovitis in the hands or wrists.  He can move all 4 extremities.    I reviewed his laboratory test results and he was found to have an increased free cortisol in the urine.    Impression/plan  1.  Myalgias that are still not completely explained.  However work-up is in progress by endocrinology as well as a planned sleep study to rule out obstructive sleep apnea.  2.  Recent Covid infection that seems to be resolved.  3.  It may well be that his myalgias are secondary to obstructive sleep apnea or Cushing's syndrome.  4.  We did not schedule follow-up visit at that time, but he is welcome to return after the work-up is completed and if there are still no clear diagnosis.    I spent 30 minutes face to face with the patient, with 25 minutes on counseling and coordination of care.    Again, thank you for allowing me to participate in the care of your  patient.      Sincerely,    Waqar Go MD

## 2022-02-08 NOTE — PROGRESS NOTES
Higinio Wallace  /84   Pulse 75   Temp 98.3  F (36.8  C)   Wt (!) 156.5 kg (345 lb)   SpO2 98%   BMI 42.01 kg/m       Assessment/Plan:                Higinio was seen today for recheck and forms.    Diagnoses and all orders for this visit:    PTSD (post-traumatic stress disorder)    Dissociative identity disorder (H)    Multiple joint pain    Myalgia    Fatigue, unspecified type    Malrotation of intestine    Chronic bilateral low back pain without sciatica         DISCUSSION  see discussion below.  Date corrected for FMLA paperwork.  Forms to be resent.  Priority is to schedule follow-up with psychiatrist.  Other appointments set at this point in time.  Follow-up with me in 2 weeks to reevaluate.  Subjective:     HPI:    Higinio Wallace is a 31 year old male with a complex health situation is here today for follow-up.    At our last office visit we completed FMLA paperwork.  The wrong date was inadvertently placed as leave was to begin on January 20, 2022 this date should have been January 14, 2022.  The wrong date was inadvertently reported by patient on this date has then been corrected in the documentation which will be resent.    He has PTSD and dissociative identity disorder.  Follows with psychiatry and sees a therapist weekly.  He will see his therapist today.  He is working to set up an appointment with his psychiatrist to consider medication therapy that he can tolerate as he is currently not taking medication.    He has a history of malrotation of the intestines and symptoms consistent with irritable bowel.  He follows with gastroenterology.  He has a follow-up visit scheduled.  Is working on dietary improvement.    He has symptoms of multiple areas of joint pain and myalgias as well as chronic knee pain.  He has seen rheumatology and had a follow-up visit.  Has degenerative changes but no other significant findings thus far.  We'll give strong consideration to beginning physical therapy upon  receiving information from orthopedic specialty provider.    He has cushingoid features and has been undergoing evaluation through endocrinology.  Needs to set up follow-up with endocrinology to discuss results and decide on further course of action based on his work-up thus far.    He has back pain and has been seen at [facility].  We need to obtain records.    He has scheduled a visit with a sleep specialist to evaluate for possible underlying sleep apnea.    Overall having difficulty with the disability claim from his employer and spending time doing this but is making progress with setting up appointments as he should.  Reiterated priorities for following through with all of these complex issues.        ROS:  Complete review of systems is obtained.  Other than the specific considerations noted above complete review of systems is negative.    Objective:   Medications:  Current Outpatient Medications   Medication     acetaminophen (TYLENOL) 500 MG tablet     albuterol (ALBUTEROL) 108 (90 BASE) MCG/ACT Inhaler     alum & mag hydroxide-simethicone (MAALOX MAX) 400-400-40 MG/5ML SUSP suspension     cyclobenzaprine (FLEXERIL) 10 MG tablet     metFORMIN (GLUCOPHAGE) 500 MG tablet     omeprazole (PRILOSEC) 40 MG DR capsule     sucralfate (CARAFATE) 1 GM tablet     No current facility-administered medications for this visit.        Allergies:  Allergies   Allergen Reactions     Bee Venom Anaphylaxis and Other (See Comments)     Swelling  Large local reactions/ swelling       Fruit Acid Concentrate [Fruit Extracts] Swelling     Lips swell and crust over little bit- tongue gets numb     Liquid Adhesive Dermatitis     And band aid       Monosodium Glutamate Hives        Social History     Socioeconomic History     Marital status: Single     Spouse name: Not on file     Number of children: Not on file     Years of education: Not on file     Highest education level: Not on file   Occupational History     Not on  file   Tobacco Use     Smoking status: Current Some Day Smoker     Packs/day: 0.25     Types: Cigarettes     Start date: 2001     Smokeless tobacco: Never Used     Tobacco comment: 1-2 cig a day trying to quit   Substance and Sexual Activity     Alcohol use: Not Currently     Drug use: Yes     Types: Marijuana     Comment: daily     Sexual activity: Yes     Partners: Female     Birth control/protection: Condom   Other Topics Concern     Parent/sibling w/ CABG, MI or angioplasty before 65F 55M? Not Asked   Social History Narrative     Not on file     Social Determinants of Health     Financial Resource Strain: Medium Risk     Difficulty of Paying Living Expenses: Somewhat hard   Food Insecurity: Food Insecurity Present     Worried About Running Out of Food in the Last Year: Sometimes true     Ran Out of Food in the Last Year: Sometimes true   Transportation Needs: No Transportation Needs     Lack of Transportation (Medical): No     Lack of Transportation (Non-Medical): No   Physical Activity: Not on file   Stress: Not on file   Social Connections: Not on file   Intimate Partner Violence: Not on file   Housing Stability: Low Risk      Unable to Pay for Housing in the Last Year: No     Number of Places Lived in the Last Year: 1     Unstable Housing in the Last Year: No       Family History   Problem Relation Age of Onset     Other - See Comments Mother         PONV     Cerebrovascular Disease Brother      Atrial fibrillation Brother      Other - See Comments Brother         cardiac autoimmune deficiency        Most Recent Immunizations   Administered Date(s) Administered     COVID-19,PF,Moderna 05/21/2021     TDAP Vaccine (Boostrix) 07/03/2019     Tdap (Adacel,Boostrix) 07/03/2019        Wt Readings from Last 3 Encounters:   02/08/22 (!) 156.5 kg (345 lb)   02/08/22 (!) 156.9 kg (345 lb 12.8 oz)   01/25/22 (!) 157.9 kg (348 lb)        BP Readings from Last 6 Encounters:   02/08/22 134/84   02/08/22 (!) 130/95    01/25/22 136/86   01/18/22 (!) 148/87   01/04/22 136/84   11/19/21 131/85        Hemoglobin A1C POCT   Date Value Ref Range Status   11/04/2020 5.4 0 - 5.6 % Final     Comment:     Normal <5.7% Prediabetes 5.7-6.4%  Diabetes 6.5% or higher - adopted from ADA   consensus guidelines.       Hemoglobin A1C   Date Value Ref Range Status   08/17/2021 5.5 0.0 - 5.6 % Final     Comment:     Normal <5.7%   Prediabetes 5.7-6.4%    Diabetes 6.5% or higher     Note: Adopted from ADA consensus guidelines.      PHYSICAL EXAM:    /84   Pulse 75   Temp 98.3  F (36.8  C)   Wt (!) 156.5 kg (345 lb)   SpO2 98%   BMI 42.01 kg/m       General: Patient no signs of distress    Remainder of today's visit was spent in counseling coronation of care regarding the complex issues as noted above.

## 2022-02-09 ENCOUNTER — MYC MEDICAL ADVICE (OUTPATIENT)
Dept: ENDOCRINOLOGY | Facility: CLINIC | Age: 32
End: 2022-02-09
Payer: COMMERCIAL

## 2022-02-09 DIAGNOSIS — E66.812 CLASS 2 SEVERE OBESITY DUE TO EXCESS CALORIES WITH SERIOUS COMORBIDITY AND BODY MASS INDEX (BMI) OF 37.0 TO 37.9 IN ADULT (H): Primary | ICD-10-CM

## 2022-02-09 DIAGNOSIS — E66.01 CLASS 2 SEVERE OBESITY DUE TO EXCESS CALORIES WITH SERIOUS COMORBIDITY AND BODY MASS INDEX (BMI) OF 37.0 TO 37.9 IN ADULT (H): Primary | ICD-10-CM

## 2022-02-09 DIAGNOSIS — R63.5 EXCESSIVE WEIGHT GAIN: ICD-10-CM

## 2022-02-10 ENCOUNTER — VIRTUAL VISIT (OUTPATIENT)
Dept: BEHAVIORAL HEALTH | Facility: CLINIC | Age: 32
End: 2022-02-10
Payer: COMMERCIAL

## 2022-02-10 DIAGNOSIS — F43.10 PTSD (POST-TRAUMATIC STRESS DISORDER): Primary | ICD-10-CM

## 2022-02-10 PROCEDURE — 90834 PSYTX W PT 45 MINUTES: CPT | Mod: TEL,95 | Performed by: SOCIAL WORKER

## 2022-02-10 NOTE — PROGRESS NOTES
"3:00 PM                                             Progress Note    Patient Name: Higinio Wallace  Date: 2/10/22         Service Type: Individual      Session Start Time: 3:34 PM session End Time:  4:26 PM     Session Length: 52 minutes    Session #: 9    Attendees: Client and His girlfriend was in the background     Service Modality:  Phone Visit:      Provider verified identity through the following two step process.  Patient provided:  Patient is known previously to provider    The patient has been notified of the following:      \"We have found that certain health care needs can be provided without the need for a face to face visit.  This service lets us provide the care you need with a phone conversation.       I will have full access to your [facility] medical record during this entire phone call.   I will be taking notes for your medical record.      Since this is like an office visit, we will bill your insurance company for this service.       There are potential benefits and risks of telephone visits (e.g. limits to patient confidentiality) that differ from in-person visits.?Confidentiality still applies for telephone services, and nobody will record the visit.  It is important to be in a quiet, private space that is free of distractions (including cell phone or other devices) during the visit.??      If during the course of the call I believe a telephone visit is not appropriate, you will not be charged for this service\"     Consent has been obtained for this service by care team member: Yes      Treatment Plan Last Reviewed: 11/23/2021  PHQ-9 / ANITA-7 :   PHQ 11/23/2021 1/4/2022 1/18/2022   PHQ-9 Total Score 18 23 15   Q9: Thoughts of better off dead/self-harm past 2 weeks More than half the days Several days Not at all   F/U: Thoughts of suicide or self-harm Yes Yes -   F/U: Self harm-plan No No -   F/U: Self-harm action No No -   F/U: Safety concerns No No -     ANITA-7 SCORE 1/4/2022 1/4/2022 " "1/18/2022   Total Score - 21 (severe anxiety) 13 (moderate anxiety)   Total Score 21 21 13         DATA  Interactive Complexity: No  Crisis: No       Progress Since Last Session (Related to Symptoms / Goals / Homework):   Symptoms: No change Physical and financial stress that impact his emotional wellbeing    Homework: Partially completed      Episode of Care Goals: Minimal progress - PREPARATION (Decided to change - considering how); Intervened by negotiating a change plan and determining options / strategies for behavior change, identifying triggers, exploring social supports, and working towards setting a date to begin behavior change     Current / Ongoing Stressors and Concerns:  Patient shared that his physical health and wellbeing continue to be struggles.  Patient also shared a variety of different financial concerns/struggles he experiences.  At times during the appointment patient wanted to focus primarily on his financial stress as he stated until he sees improvements in that area it will be difficult to focus consistently on his emotional and physical wellbeing.  At other points in his session patient shared that if he does not focus on all of these different areas that he becomes stressed that the other areas are slipping.  Therapist pointed out to patient it can be difficult to support him when he is asking for contradictory supports.  Patient and therapist also explored the leave he wants to take from work and what therapist can and cannot say to his work.  Patient and therapist ended by identifying the statement \"I am enough\" that he can say to himself on a daily basis to support a positive sense of self.     Treatment Objective(s) Addressed in This Session:   Decrease frequency and intensity of feeling down, depressed, hopeless  Identify negative self-talk and behaviors: challenge core beliefs, myths, and actions  Learn and utilize assertive communication/interpersonal skills.  Patient will implement " strategies of self-care and ADLs on a daily basis     Intervention:   CBT: CBT triangle, cognitive distortions    ACT: Value based living   DBT: Validated patient's emotions and experiences  Client-centered: Self-care strategies      ASSESSMENT: Current Emotional / Mental Status (status of significant symptoms):   Risk status (Self / Other harm or suicidal ideation)   Patient denies current fears or concerns for personal safety.   Patient denies current or recent suicidal ideation or behaviors.   Patient denies current or recent homicidal ideation or behaviors.   Patient denies current or recent self injurious behavior or ideation.   Patient denies other safety concerns.   Patient reports there has been no change in risk factors since their last session.     Patient reports there has been no change in protective factors since their last session.     Recommended that patient call 911 or go to the local ED should there be a change in any of these risk factors.     Appearance:   NA    Eye Contact:   NA    Psychomotor Behavior: Agitated    Attitude:   Cooperative  Uncooperative    Orientation:   All   Speech    Rate / Production: Talkative    Volume:  Normal    Mood:    Irritable  Agitated    Affect:    NA    Thought Content:  Rumination    Thought Form:  Circumstantial   Insight:    Poor      Medication Review:   No changes to current psychiatric medication(s)     Medication Compliance:   Yes     Changes in Health Issues:   Yes: Ongoing health related issues without specific diagnoses at this time, Associated Psychological Distress     Chemical Use Review:    Substance Use: Chemical use reviewed, no active concerns identified      Tobacco Use: No change in amount of tobacco use since last session.  Not reviewed during session    Diagnosis:  1. PTSD (post-traumatic stress disorder)        Collateral Reports Completed:   Patient provided verbal consent for this writer to speak with his employer regarding a leave of  "absence to support his mental health and wellbeing    PLAN: (Patient Tasks / Therapist Tasks / Other)  Patient will focus on a regular self-care practice and each day he will tell himself \"I Onesimo off.\"  Patient and therapist will meet again in a week.        Meri Rich, [facility] Provider Oversight:  Dr. Flaco Salazar  2/10/22                                                           ______________________________________________________________________    Treatment Plan    Patient's Name: Higinio Wallace  YOB: 1990    Date: November 23, 2021    DSM5 Diagnoses: 309.81 (F43.10) Posttraumatic Stress Disorder (includes Posttraumatic Stress Disorder for Children 6 Years and Younger)  With dissociative symptoms  Psychosocial / Contextual Factors: History of trauma, significant mental health concerns, physical health concerns, financial stressors  WHODAS:   WHODAS 2.0 Total Score 10/6/2020 11/8/2021   Total Score 39 32   Total Score MyChart - 32       Referral / Collaboration:  Was/were discussed and client will pursue.    Anticipated number of session or this episode of care: 8-12      MeasurableTreatment Goal(s) related to diagnosis / functional impairment(s)  Goal 1: Patient will focus on decreasing his mental health symptomology as evidenced by a PHQ-9 and ANITA-7 score of 10 or less    I will know I've met my goal when I feel less stressed and increased happiness and motivation.      Objective #A (Patient Action)    Patient will learn about trauma and the impact that can have on him both emotionally and physically.  Status: New - Date: November 23, 2021     Intervention(s)  Therapist will provide educational materials on The impact that trauma can have on the mind, the body, the spirit, and emotions.    Objective #B  Patient will Decrease frequency and intensity of feeling down, depressed, hopeless  Feel less tired and more energy during the day   Identify negative self-talk and behaviors: challenge " core beliefs, myths, and actions.  Status: New - Date: November 23, 2021     Intervention(s)  Therapist will teach emotional regulation skills. Patient and therapist will focus on increasing his awareness and recognition of symptoms in the moment as well as developing coping skills and strategies to manage the symptoms.  The skills and strategies could include positive affirmations, movement, engaging in the creative process, and developing a positive sense of self.    Objective #C  Patient will Explore with therapist how his trauma has impacted his thoughts and how his thoughts impact his behaviors and emotional wellbeing.  Status: New - Date: November 23, 2021     Intervention(s)  Therapist will teach distraction skills. As well as ways to challenge negative thoughts and self talk..      Patient has reviewed and agreed to the above plan.      Meri Rich, [facility]  November 23, 2021      Answers for HPI/ROS submitted by the patient on 1/4/2022  If you checked off any problems, how difficult have these problems made it for you to do your work, take care of things at home, or get along with other people?: Extremely difficult  PHQ9 TOTAL SCORE: 23  ANITA 7 TOTAL SCORE: 21    Answers for HPI/ROS submitted by the patient on 1/18/2022  If you checked off any problems, how difficult have these problems made it for you to do your work, take care of things at home, or get along with other people?: Very difficult  PHQ9 TOTAL SCORE: 15  ANITA 7 TOTAL SCORE: 13

## 2022-02-12 NOTE — PROGRESS NOTES
Rheumatology consultation note    CC: Follow-up for myofascial pain syndrome.    HPI: Higinio reports that he has been doing okay.  He had Covid in the beginning of January, but he has recovered for the most part.  He emphasized the fact that he was appreciative of the last visit because he felt that he was really being listened to.  At that visit we planned on pursuing a sleep study to rule out sleep apnea, and also an endocrine consultation for Cushing's.  He is scheduled for a sleep study in March/April.  And he recently saw the endocrinologist with further work-up in progress.    His symptoms have been about the same, being muscle pain all over in different areas at different times.    Physical examination  Vital signs are normal, BMI 42  There is no synovitis in the hands or wrists.  He can move all 4 extremities.    I reviewed his laboratory test results and he was found to have an increased free cortisol in the urine.    Impression/plan  1.  Myalgias that are still not completely explained.  However work-up is in progress by endocrinology as well as a planned sleep study to rule out obstructive sleep apnea.  2.  Recent Covid infection that seems to be resolved.  3.  It may well be that his myalgias are secondary to obstructive sleep apnea or Cushing's syndrome.  4.  We did not schedule follow-up visit at that time, but he is welcome to return after the work-up is completed and if there are still no clear diagnosis.    I spent 30 minutes face to face with the patient, with 25 minutes on counseling and coordination of care.

## 2022-02-14 ENCOUNTER — TELEPHONE (OUTPATIENT)
Dept: FAMILY MEDICINE | Facility: CLINIC | Age: 32
End: 2022-02-14

## 2022-02-14 NOTE — TELEPHONE ENCOUNTER
Reason for Call:  Other call back    Detailed comments: Recvd call from pt/Higinio, he is calling in regards to a form that was sent over from his employer. [facility] Records are stating that they have not recvd the form to release info to his employer. Higinio is not calling us as his employer is stating that the form was sent over to [facility] [address].    Pls verify that we have recvd this form [facility] and fax it back to [facility] at [phone]. Dates that Higinio has been out of work is 12.01.2021 - present    Pls reach out to Higinio at [phone]     Phone Number Patient can be reached at: Home number on file [phone] (home)    Best Time: anytime    Can we leave a detailed message on this number? YES    Call taken on 2/14/2022 at 11:05 AM by Estelita Liu

## 2022-02-15 ENCOUNTER — VIRTUAL VISIT (OUTPATIENT)
Dept: BEHAVIORAL HEALTH | Facility: CLINIC | Age: 32
End: 2022-02-15
Payer: COMMERCIAL

## 2022-02-15 ENCOUNTER — PATIENT OUTREACH (OUTPATIENT)
Dept: NURSING | Facility: CLINIC | Age: 32
End: 2022-02-15
Payer: COMMERCIAL

## 2022-02-15 ENCOUNTER — VIRTUAL VISIT (OUTPATIENT)
Dept: GASTROENTEROLOGY | Facility: CLINIC | Age: 32
End: 2022-02-15
Payer: COMMERCIAL

## 2022-02-15 DIAGNOSIS — Q43.3 CONGENITAL MALROTATION OF INTESTINE (H): ICD-10-CM

## 2022-02-15 DIAGNOSIS — K58.0 IRRITABLE BOWEL SYNDROME WITH DIARRHEA: Primary | ICD-10-CM

## 2022-02-15 DIAGNOSIS — K21.9 GASTROESOPHAGEAL REFLUX DISEASE WITHOUT ESOPHAGITIS: ICD-10-CM

## 2022-02-15 DIAGNOSIS — F43.10 PTSD (POST-TRAUMATIC STRESS DISORDER): Primary | ICD-10-CM

## 2022-02-15 PROCEDURE — 99215 OFFICE O/P EST HI 40 MIN: CPT | Mod: 95 | Performed by: INTERNAL MEDICINE

## 2022-02-15 PROCEDURE — 90834 PSYTX W PT 45 MINUTES: CPT | Mod: TEL,95 | Performed by: SOCIAL WORKER

## 2022-02-15 ASSESSMENT — ENCOUNTER SYMPTOMS
ARTHRALGIAS: 1
HEMATURIA: 0
VOMITING: 1
MUSCLE WEAKNESS: 1
MYALGIAS: 1
TREMORS: 0
TINGLING: 1
DIFFICULTY URINATING: 1
PARALYSIS: 0
NUMBNESS: 0
WEAKNESS: 0
BLOATING: 1
ABDOMINAL PAIN: 1
HYPERTENSION: 1
PALPITATIONS: 0
LOSS OF CONSCIOUSNESS: 0
DISTURBANCES IN COORDINATION: 0
SYNCOPE: 0
DYSURIA: 0
EXERCISE INTOLERANCE: 1
STIFFNESS: 0
POOR WOUND HEALING: 1
SPEECH CHANGE: 0
MEMORY LOSS: 0
HEADACHES: 1
HYPOTENSION: 0
SWOLLEN GLANDS: 0
ORTHOPNEA: 0
NAIL CHANGES: 0
DIZZINESS: 0
SLEEP DISTURBANCES DUE TO BREATHING: 0
DIARRHEA: 1
LEG PAIN: 1
JOINT SWELLING: 1
FLANK PAIN: 1
NECK PAIN: 1
NAUSEA: 1
SEIZURES: 0
MUSCLE CRAMPS: 1
BRUISES/BLEEDS EASILY: 1
SKIN CHANGES: 1
HEARTBURN: 1
BACK PAIN: 1

## 2022-02-15 NOTE — LETTER
2/15/2022         RE: Higinio Wallace  [address]  [facility] [address]        Dear Colleague,    Thank you for referring your patient, Higinio Wallace, to the [facility] [place]. Please see a copy of my visit note below.    GI CLINIC VISIT    CC/REFERRING MD:  No ref. provider found  REASON FOR CONSULTATION:   No ref. provider found for   Chief Complaint   Patient presents with     Follow Up       ASSESSMENT/PLAN:    Nghia is a 31-year-old gentleman with history of congenital malrotation of the bowel, rumination syndrome, and irritable bowel syndrome here today to follow-up in GI clinic.  Overall health is complicated by significant anxiety.    1.  Abdominal bloating-this is likely multifactorial and may be related to underlying carbohydrate intolerance (such as lactose intolerance and FODMAP intolerance) and visceral hypersensitivity in the setting of irritable bowel syndrome.  He has had extensive evaluation with EGD, colonoscopy, and cross-sectional imaging.  He may also have small test of bacterial overgrowth in the setting of his congenital malrotation.    For the next 3 months I really like him to focus on regularly following up with our nutritionist and actually committing to a trial of a low FODMAP diet.  If he is able to commit to this we will see if it makes a difference or not.  If there is not adequate improvement we can obtain a hydrogen breath test to further evaluate for small test of bacterial growth    -Follow-up regularly with nutrition and follow low FODMAP diet.  -Take omeprazole 40 mg regularly  -Consider hydrogen breath test to evaluate for small intestinal bacterial overgrowth.    2.  Irritable bowel syndrome with diarrhea-was better controlled prior to the last 6 months.  Follow FODMAP diet as above.  Consider evaluation for small intestinal bacterial overgrowth in the future.  At future visit we will again discuss whether he is taking fiber.   History was difficult today given tangential nature.  We will readdress this in the future.    3.  Rumination syndrome/GERD-it will be important for him to take the omeprazole without missing doses.  I think he would benefit from follow-up with GI psychology in the future.  He did benefit from this in the past.  We will discuss this again in the future.    4.  Congenital malrotation of the bowel-I do not think this is directly contributing to his symptoms.  Multiple CT scans have been unremarkable.  He is at risk for bowel obstruction but he has not had any evidence of this.  He will eventually need to follow-up with colorectal surgery but has had issues with keeping appointments.  This likely does put him at risk for small intestinal bacterial overgrowth.  We will consider testing for this in the future    5.  2 small tubular adenomas of the colon-follow-up colonoscopy in 2025.    RTC 3 months    Thank you for this consultation.  It was a pleasure to participate in the care of this patient; please contact us with any further questions.  A total of 40 minutes during the day of encounter spent in chart review, care coordination, patient counseling, and documentation    This note was created with voice recognition software, and while reviewed for accuracy, typos may remain.     Donell Holland MD  Inflammatory Bowel Disease Program  Division of Gastroenterology, Hepatology and Nutrition  [facility]  Pager: [phone]      HPI:    Higinio is here today to follow-up in GI clinic.  I directed to my prior office note dated 12/8/2020.  He is here for multiple reasons including chronic abdominal bloating, irritable bowel syndrome, and congenital malrotation of the bowel.    Today, Higinio notes that things have not been going well for last several months.  He has had definite increased anxiety that is worsening his overall symptoms.  He relates this worsening anxiety to issues with myalgias and lower back pain that have  really been plaguing him for the last several months.  He has seen multiple providers to try to find reasons for these symptoms but none has been clear.  He is currently being worked up for Cushing syndrome as well as obstructive sleep apnea.  He has met with a rheumatologist who found no reasons for his symptoms.    He notes that when he does get this lower back pain and myalgias it seems to increase his anxiety which definitely makes worse his underlying rumination syndrome and his irritable bowel syndrome.    Due to issues with work and money he has had issues with keeping up with a regular diet.  He knows that his bloating and GI symptoms are better if he follows a low FODMAP diet.  However, he has really had trouble sticking with this so was not sure how long lasting his symptoms will improve.    He remains focused today on the fact that he would like to take time off work to really focus on his mental health and other medical issues.  However, his work is making all of his healthcare providers provide documentation of ongoing health issues.  He repeatedly states that he really needs time off work to get a handle on all of his medical issues.    In addition to irregular dietary intake he also has been missing doses of his omeprazole.  He notes that when his back pain and myalgias a more significant he also has increased regurgitation of acid contents.    In addition to eating multiple different types of foods he also notes that he is not eating regular meals.  He often will skip breakfast and lunch and eat a heavier dinner.  His girlfriend likes him to cook chicken nuggets which she notes definitely make his symptoms worse.    History is tangential.    ROS:    No fevers or chills  No weight loss  No blurry vision, double vision or change in vision  No sore throat  No lymphadenopathy  No headache, paraesthesias, or weakness in a limb  No shortness of breath or wheezing  No chest pain or pressure  No arthralgias  or myalgias  No rashes or skin changes  No odynophagia or dysphagia  No BRBPR, hematochezia, melena  No dysuria, frequency or urgency  No hot/cold intolerance or polyria  No anxiety or depression    PREVIOUS ENDOSCOPY:  Prior upper endoscopy unremarkable in 8/2019.  Normal esophagus, normal stomach, normal duodenum.    Colonoscopy 10/7/2020-normal ileum and normal colon mucosa.  Normal colon biopsies of right colon, left colon, rectum.  1 small tubular adenoma and one sessile serrated adenoma.    PERTINENT RELEVANT IMAGING OR LABS:  CRP, TTG, TSH, LFTs, BMP, CBC all normal.  Fecal calprotectin mildly elevated but colonoscopy unremarkable.    Multiple CT scans with congenital malrotation noted.  However, no other abnormalities noted.    ALLERGIES:     Allergies   Allergen Reactions     Bee Venom Anaphylaxis and Other (See Comments)     Swelling  Large local reactions/ swelling       Fruit Acid Concentrate [Fruit Extracts] Swelling     Lips swell and crust over little bit- tongue gets numb     Liquid Adhesive Dermatitis     And band aid       Monosodium Glutamate Hives       PERTINENT MEDICATIONS:    Current Outpatient Medications:      acetaminophen (TYLENOL) 500 MG tablet, Take 2 tablets (1,000 mg) by mouth every 6 hours as needed for mild pain, Disp: 28 tablet, Rfl: 0     albuterol (ALBUTEROL) 108 (90 BASE) MCG/ACT Inhaler, Inhale 2 puffs into the lungs every 4 hours as needed for shortness of breath / dyspnea or wheezing, Disp: 1 Inhaler, Rfl: 0     alum & mag hydroxide-simethicone (MAALOX MAX) 400-400-40 MG/5ML SUSP suspension, Take 30 mLs by mouth every 4 hours as needed for indigestion or heartburn, Disp: 100 mL, Rfl: 0     metFORMIN (GLUCOPHAGE) 500 MG tablet, Take 500 mg by mouth 2 times daily (with meals) , Disp: , Rfl:      omeprazole (PRILOSEC) 40 MG DR capsule, Take 1 capsule (40 mg) by mouth daily, Disp: 90 capsule, Rfl: 3     sucralfate (CARAFATE) 1 GM tablet, Take 1 tablet (1 g) by mouth 4 times daily  (Patient taking differently: Take 1 g by mouth 4 times daily as needed ), Disp: 28 tablet, Rfl: 0     cyclobenzaprine (FLEXERIL) 10 MG tablet, Take 1 tablet (10 mg) by mouth 3 times daily as needed for muscle spasms (Patient not taking: Reported on 2/15/2022), Disp: 30 tablet, Rfl: 0    PROBLEM LIST  Patient Active Problem List    Diagnosis Date Noted     Patellofemoral instability of left knee with pain 10/14/2020     Priority: Medium     Added automatically from request for surgery [id number]       Class 2 severe obesity due to excess calories with serious comorbidity and body mass index (BMI) of 37.0 to 37.9 in adult (H) 09/15/2020     Priority: Medium     Irritable bowel syndrome 08/27/2020     Priority: Medium     Suicidal ideation 09/16/2019     Priority: Medium     Moderate episode of recurrent major depressive disorder (H) 09/16/2019     Priority: Medium     Tobacco abuse 04/20/2017     Priority: Medium       PERTINENT PAST MEDICAL HISTORY:  Past Medical History:   Diagnosis Date     Anxiety      Class 2 obesity due to excess calories in adult      Complication of anesthesia      Depression      Gastroesophageal reflux disease with esophagitis      History of nephrolithiasis      Irritable bowel syndrome      Major depression, recurrent (H)      Malrotation of intestine      Manic disorder (H)      Panic disorder      Patellofemoral instability of left knee with pain      Pre-diabetes      Psychosis (H)      PTSD (post-traumatic stress disorder)      Tobacco use        PREVIOUS SURGERIES:  Past Surgical History:   Procedure Laterality Date     ARTHROSCOPY KNEE WITH PATELLAR REALIGNMENT Left 11/13/2020    Procedure: Knee Arthroscopy, Medial Patello-femoral Ligament Reconstruction with Allograft, Distal Tibial Tubercle Osteotomy, Lateral Retinacular Lengthening;  Surgeon: Sourav Keating MD;  Location: [facility] OR     ARTHROTOMY TIBIAL TUBERCLE SHIFT Left 11/13/2020    Procedure: tibial tubercle Osteotomy ;   Surgeon: Sourav Keating MD;  Location: [facility] OR     COLONOSCOPY N/A 10/07/2020    Procedure: COLONOSCOPY, WITH POLYPECTOMY AND BIOPSY;  Surgeon: Donell Holland MD;  Location: [facility] OR     deviated septum  2018     KNEE SURGERY Left      OPEN REDUCTION INTERNAL FIXATION RODDING INTRAMEDULLARY TIBIA Left 11/13/2020    Procedure: plus derotation tibial osteotomy;  Surgeon: Sourav Keating MD;  Location: [facility] OR       SOCIAL HISTORY:  Social History     Socioeconomic History     Marital status: Single     Spouse name: Not on file     Number of children: Not on file     Years of education: Not on file     Highest education level: Not on file   Occupational History     Not on file   Tobacco Use     Smoking status: Current Some Day Smoker     Packs/day: 0.25     Types: Cigarettes     Start date: 2001     Smokeless tobacco: Never Used     Tobacco comment: 1-2 cig a day trying to quit   Substance and Sexual Activity     Alcohol use: Not Currently     Drug use: Yes     Types: Marijuana     Comment: daily     Sexual activity: Yes     Partners: Female     Birth control/protection: Condom   Other Topics Concern     Parent/sibling w/ CABG, MI or angioplasty before 65F 55M? Not Asked   Social History Narrative     Not on file     Social Determinants of Health     Financial Resource Strain: Medium Risk     Difficulty of Paying Living Expenses: Somewhat hard   Food Insecurity: Food Insecurity Present     Worried About Running Out of Food in the Last Year: Sometimes true     Ran Out of Food in the Last Year: Sometimes true   Transportation Needs: No Transportation Needs     Lack of Transportation (Medical): No     Lack of Transportation (Non-Medical): No   Physical Activity: Not on file   Stress: Not on file   Social Connections: Not on file   Intimate Partner Violence: Not on file   Housing Stability: Low Risk      Unable to Pay for Housing in the Last Year: No     Number of Places Lived in the Last Year: 1      Unstable Housing in the Last Year: No       FAMILY HISTORY:  Family History   Problem Relation Age of Onset     Other - See Comments Mother         PONV     Cerebrovascular Disease Brother      Atrial fibrillation Brother      Other - See Comments Brother         cardiac autoimmune deficiency       Past/family/social history reviewed and no changes    PHYSICAL EXAMINATION:  Constitutional: aaox3, cooperative, pleasant, not dyspneic/diaphoretic, no acute distress  Vitals reviewed: There were no vitals taken for this visit.  Wt:   Wt Readings from Last 2 Encounters:   02/08/22 (!) 156.5 kg (345 lb)   02/08/22 (!) 156.9 kg (345 lb 12.8 oz)      Eyes: Sclera anicteric/injected  Respiratory: Unlabored breathing  Skin: no jaundice  Psych: Normal affect    Answers for HPI/ROS submitted by the patient on 2/15/2022  General Symptoms: No  Skin Symptoms: Yes  HENT Symptoms: No  EYE SYMPTOMS: No  HEART SYMPTOMS: Yes  LUNG SYMPTOMS: No  INTESTINAL SYMPTOMS: Yes  URINARY SYMPTOMS: Yes  REPRODUCTIVE SYMPTOMS: Yes  SKELETAL SYMPTOMS: Yes  BLOOD SYMPTOMS: Yes  NERVOUS SYSTEM SYMPTOMS: Yes  MENTAL HEALTH SYMPTOMS: No  Changes in hair: No  Changes in moles/birth marks: Yes  Itching: Yes  Rashes: No  Changes in nails: No  Acne: No  Change in facial hair: No  Warts: No  Non-healing sores: Yes  Scarring: Yes  Flaking of skin: No  Color changes of hands/feet in cold : Yes  Sun sensitivity: No  Skin thickening: No  Chest pain or pressure: No  Fast or irregular heartbeat: No  Pain in legs with walking: Yes  Trouble breathing while lying down: No  Fingers or toes appear blue: No  High blood pressure: Yes  Low blood pressure: No  Fainting: No  Wake up at night with shortness of breath: No  Exercise intolerance: Yes  Heart burn or indigestion: Yes  Nausea: Yes  Vomiting: Yes  Abdominal pain: Yes  Bloating: Yes  Diarrhea: Yes  Black stools: Yes  Trouble holding urine or incontinence: Yes  Pain or burning: No  Trouble starting or stopping:  Yes  Increased frequency of urination: Yes  Blood in urine: No  Frequent nighttime urination: Yes  Flank pain: Yes  Difficulty emptying bladder: Yes  Scrotal pain or swelling: Yes  Erectile dysfunction: No  Penile discharge: No  Genital ulcers: No  Reduced libido: No  Back pain: Yes  Muscle aches: Yes  Neck pain: Yes  Swollen joints: Yes  Joint pain: Yes  Bone pain: No  Muscle cramps: Yes  Muscle weakness: Yes  Joint stiffness: No  Bone fracture: No  Edema or swelling: Yes  Anemia: No  Swollen glands: No  Easy bleeding or bruising: Yes  Trouble with coordination: No  Dizziness or trouble with balance: No  Fainting or black-out spells: No  Memory loss: No  Headache: Yes  Seizures: No  Speech problems: No  Tingling: Yes  Tremor: No  Weakness: No  Difficulty walking: Yes  Paralysis: No  Numbness: No      Donell Holland MD

## 2022-02-15 NOTE — PROGRESS NOTES
GI CLINIC VISIT    CC/REFERRING MD:  No ref. provider found  REASON FOR CONSULTATION:   No ref. provider found for   Chief Complaint   Patient presents with     Follow Up       ASSESSMENT/PLAN:    Nghia is a 31-year-old gentleman with history of congenital malrotation of the bowel, rumination syndrome, and irritable bowel syndrome here today to follow-up in GI clinic.  Overall health is complicated by significant anxiety.    1.  Abdominal bloating-this is likely multifactorial and may be related to underlying carbohydrate intolerance (such as lactose intolerance and FODMAP intolerance) and visceral hypersensitivity in the setting of irritable bowel syndrome.  He has had extensive evaluation with EGD, colonoscopy, and cross-sectional imaging.  He may also have small test of bacterial overgrowth in the setting of his congenital malrotation.    For the next 3 months I really like him to focus on regularly following up with our nutritionist and actually committing to a trial of a low FODMAP diet.  If he is able to commit to this we will see if it makes a difference or not.  If there is not adequate improvement we can obtain a hydrogen breath test to further evaluate for small test of bacterial growth    -Follow-up regularly with nutrition and follow low FODMAP diet.  -Take omeprazole 40 mg regularly  -Consider hydrogen breath test to evaluate for small intestinal bacterial overgrowth.    2.  Irritable bowel syndrome with diarrhea-was better controlled prior to the last 6 months.  Follow FODMAP diet as above.  Consider evaluation for small intestinal bacterial overgrowth in the future.  At future visit we will again discuss whether he is taking fiber.  History was difficult today given tangential nature.  We will readdress this in the future.    3.  Rumination syndrome/GERD-it will be important for him to take the omeprazole without missing doses.  I think he would benefit from follow-up with GI psychology in the future.   He did benefit from this in the past.  We will discuss this again in the future.    4.  Congenital malrotation of the bowel-I do not think this is directly contributing to his symptoms.  Multiple CT scans have been unremarkable.  He is at risk for bowel obstruction but he has not had any evidence of this.  He will eventually need to follow-up with colorectal surgery but has had issues with keeping appointments.  This likely does put him at risk for small intestinal bacterial overgrowth.  We will consider testing for this in the future    5.  2 small tubular adenomas of the colon-follow-up colonoscopy in 2025.    RTC 3 months    Thank you for this consultation.  It was a pleasure to participate in the care of this patient; please contact us with any further questions.  A total of 40 minutes during the day of encounter spent in chart review, care coordination, patient counseling, and documentation    This note was created with voice recognition software, and while reviewed for accuracy, typos may remain.     Donell Holland MD  Inflammatory Bowel Disease Program  Division of Gastroenterology, Hepatology and Nutrition  [facility]  Pager: [phone]      HPI:    Higinio is here today to follow-up in GI clinic.  I directed to my prior office note dated 12/8/2020.  He is here for multiple reasons including chronic abdominal bloating, irritable bowel syndrome, and congenital malrotation of the bowel.    Today, Higinio notes that things have not been going well for last several months.  He has had definite increased anxiety that is worsening his overall symptoms.  He relates this worsening anxiety to issues with myalgias and lower back pain that have really been plaguing him for the last several months.  He has seen multiple providers to try to find reasons for these symptoms but none has been clear.  He is currently being worked up for Cushing syndrome as well as obstructive sleep apnea.  He has met with a rheumatologist  who found no reasons for his symptoms.    He notes that when he does get this lower back pain and myalgias it seems to increase his anxiety which definitely makes worse his underlying rumination syndrome and his irritable bowel syndrome.    Due to issues with work and money he has had issues with keeping up with a regular diet.  He knows that his bloating and GI symptoms are better if he follows a low FODMAP diet.  However, he has really had trouble sticking with this so was not sure how long lasting his symptoms will improve.    He remains focused today on the fact that he would like to take time off work to really focus on his mental health and other medical issues.  However, his work is making all of his healthcare providers provide documentation of ongoing health issues.  He repeatedly states that he really needs time off work to get a handle on all of his medical issues.    In addition to irregular dietary intake he also has been missing doses of his omeprazole.  He notes that when his back pain and myalgias a more significant he also has increased regurgitation of acid contents.    In addition to eating multiple different types of foods he also notes that he is not eating regular meals.  He often will skip breakfast and lunch and eat a heavier dinner.  His girlfriend likes him to cook chicken nuggets which she notes definitely make his symptoms worse.    History is tangential.    ROS:    No fevers or chills  No weight loss  No blurry vision, double vision or change in vision  No sore throat  No lymphadenopathy  No headache, paraesthesias, or weakness in a limb  No shortness of breath or wheezing  No chest pain or pressure  No arthralgias or myalgias  No rashes or skin changes  No odynophagia or dysphagia  No BRBPR, hematochezia, melena  No dysuria, frequency or urgency  No hot/cold intolerance or polyria  No anxiety or depression    PREVIOUS ENDOSCOPY:  Prior upper endoscopy unremarkable in 8/2019.  Normal  esophagus, normal stomach, normal duodenum.    Colonoscopy 10/7/2020-normal ileum and normal colon mucosa.  Normal colon biopsies of right colon, left colon, rectum.  1 small tubular adenoma and one sessile serrated adenoma.    PERTINENT RELEVANT IMAGING OR LABS:  CRP, TTG, TSH, LFTs, BMP, CBC all normal.  Fecal calprotectin mildly elevated but colonoscopy unremarkable.    Multiple CT scans with congenital malrotation noted.  However, no other abnormalities noted.    ALLERGIES:     Allergies   Allergen Reactions     Bee Venom Anaphylaxis and Other (See Comments)     Swelling  Large local reactions/ swelling       Fruit Acid Concentrate [Fruit Extracts] Swelling     Lips swell and crust over little bit- tongue gets numb     Liquid Adhesive Dermatitis     And band aid       Monosodium Glutamate Hives       PERTINENT MEDICATIONS:    Current Outpatient Medications:      acetaminophen (TYLENOL) 500 MG tablet, Take 2 tablets (1,000 mg) by mouth every 6 hours as needed for mild pain, Disp: 28 tablet, Rfl: 0     albuterol (ALBUTEROL) 108 (90 BASE) MCG/ACT Inhaler, Inhale 2 puffs into the lungs every 4 hours as needed for shortness of breath / dyspnea or wheezing, Disp: 1 Inhaler, Rfl: 0     alum & mag hydroxide-simethicone (MAALOX MAX) 400-400-40 MG/5ML SUSP suspension, Take 30 mLs by mouth every 4 hours as needed for indigestion or heartburn, Disp: 100 mL, Rfl: 0     metFORMIN (GLUCOPHAGE) 500 MG tablet, Take 500 mg by mouth 2 times daily (with meals) , Disp: , Rfl:      omeprazole (PRILOSEC) 40 MG DR capsule, Take 1 capsule (40 mg) by mouth daily, Disp: 90 capsule, Rfl: 3     sucralfate (CARAFATE) 1 GM tablet, Take 1 tablet (1 g) by mouth 4 times daily (Patient taking differently: Take 1 g by mouth 4 times daily as needed ), Disp: 28 tablet, Rfl: 0     cyclobenzaprine (FLEXERIL) 10 MG tablet, Take 1 tablet (10 mg) by mouth 3 times daily as needed for muscle spasms (Patient not taking: Reported on 2/15/2022), Disp: 30  tablet, Rfl: 0    PROBLEM LIST  Patient Active Problem List    Diagnosis Date Noted     Patellofemoral instability of left knee with pain 10/14/2020     Priority: Medium     Added automatically from request for surgery [id number]       Class 2 severe obesity due to excess calories with serious comorbidity and body mass index (BMI) of 37.0 to 37.9 in adult (H) 09/15/2020     Priority: Medium     Irritable bowel syndrome 08/27/2020     Priority: Medium     Suicidal ideation 09/16/2019     Priority: Medium     Moderate episode of recurrent major depressive disorder (H) 09/16/2019     Priority: Medium     Tobacco abuse 04/20/2017     Priority: Medium       PERTINENT PAST MEDICAL HISTORY:  Past Medical History:   Diagnosis Date     Anxiety      Class 2 obesity due to excess calories in adult      Complication of anesthesia      Depression      Gastroesophageal reflux disease with esophagitis      History of nephrolithiasis      Irritable bowel syndrome      Major depression, recurrent (H)      Malrotation of intestine      Manic disorder (H)      Panic disorder      Patellofemoral instability of left knee with pain      Pre-diabetes      Psychosis (H)      PTSD (post-traumatic stress disorder)      Tobacco use        PREVIOUS SURGERIES:  Past Surgical History:   Procedure Laterality Date     ARTHROSCOPY KNEE WITH PATELLAR REALIGNMENT Left 11/13/2020    Procedure: Knee Arthroscopy, Medial Patello-femoral Ligament Reconstruction with Allograft, Distal Tibial Tubercle Osteotomy, Lateral Retinacular Lengthening;  Surgeon: Sourav Keating MD;  Location: [facility]     ARTHROTOMY TIBIAL TUBERCLE SHIFT Left 11/13/2020    Procedure: tibial tubercle Osteotomy ;  Surgeon: Sourav Keating MD;  Location: [facility]     COLONOSCOPY N/A 10/07/2020    Procedure: COLONOSCOPY, WITH POLYPECTOMY AND BIOPSY;  Surgeon: Donell Holland MD;  Location: [facility] OR     deviated septum  2018     KNEE SURGERY Left      OPEN REDUCTION  INTERNAL FIXATION RODDING INTRAMEDULLARY TIBIA Left 11/13/2020    Procedure: plus derotation tibial osteotomy;  Surgeon: Sourav Keating MD;  Location: [facility] OR       SOCIAL HISTORY:  Social History     Socioeconomic History     Marital status: Single     Spouse name: Not on file     Number of children: Not on file     Years of education: Not on file     Highest education level: Not on file   Occupational History     Not on file   Tobacco Use     Smoking status: Current Some Day Smoker     Packs/day: 0.25     Types: Cigarettes     Start date: 2001     Smokeless tobacco: Never Used     Tobacco comment: 1-2 cig a day trying to quit   Substance and Sexual Activity     Alcohol use: Not Currently     Drug use: Yes     Types: Marijuana     Comment: daily     Sexual activity: Yes     Partners: Female     Birth control/protection: Condom   Other Topics Concern     Parent/sibling w/ CABG, MI or angioplasty before 65F 55M? Not Asked   Social History Narrative     Not on file     Social Determinants of Health     Financial Resource Strain: Medium Risk     Difficulty of Paying Living Expenses: Somewhat hard   Food Insecurity: Food Insecurity Present     Worried About Running Out of Food in the Last Year: Sometimes true     Ran Out of Food in the Last Year: Sometimes true   Transportation Needs: No Transportation Needs     Lack of Transportation (Medical): No     Lack of Transportation (Non-Medical): No   Physical Activity: Not on file   Stress: Not on file   Social Connections: Not on file   Intimate Partner Violence: Not on file   Housing Stability: Low Risk      Unable to Pay for Housing in the Last Year: No     Number of Places Lived in the Last Year: 1     Unstable Housing in the Last Year: No       FAMILY HISTORY:  Family History   Problem Relation Age of Onset     Other - See Comments Mother         PONV     Cerebrovascular Disease Brother      Atrial fibrillation Brother      Other - See Comments Brother          cardiac autoimmune deficiency       Past/family/social history reviewed and no changes    PHYSICAL EXAMINATION:  Constitutional: aaox3, cooperative, pleasant, not dyspneic/diaphoretic, no acute distress  Vitals reviewed: There were no vitals taken for this visit.  Wt:   Wt Readings from Last 2 Encounters:   02/08/22 (!) 156.5 kg (345 lb)   02/08/22 (!) 156.9 kg (345 lb 12.8 oz)      Eyes: Sclera anicteric/injected  Respiratory: Unlabored breathing  Skin: no jaundice  Psych: Normal affect            Answers for HPI/ROS submitted by the patient on 2/15/2022  General Symptoms: No  Skin Symptoms: Yes  HENT Symptoms: No  EYE SYMPTOMS: No  HEART SYMPTOMS: Yes  LUNG SYMPTOMS: No  INTESTINAL SYMPTOMS: Yes  URINARY SYMPTOMS: Yes  REPRODUCTIVE SYMPTOMS: Yes  SKELETAL SYMPTOMS: Yes  BLOOD SYMPTOMS: Yes  NERVOUS SYSTEM SYMPTOMS: Yes  MENTAL HEALTH SYMPTOMS: No  Changes in hair: No  Changes in moles/birth marks: Yes  Itching: Yes  Rashes: No  Changes in nails: No  Acne: No  Change in facial hair: No  Warts: No  Non-healing sores: Yes  Scarring: Yes  Flaking of skin: No  Color changes of hands/feet in cold : Yes  Sun sensitivity: No  Skin thickening: No  Chest pain or pressure: No  Fast or irregular heartbeat: No  Pain in legs with walking: Yes  Trouble breathing while lying down: No  Fingers or toes appear blue: No  High blood pressure: Yes  Low blood pressure: No  Fainting: No  Wake up at night with shortness of breath: No  Exercise intolerance: Yes  Heart burn or indigestion: Yes  Nausea: Yes  Vomiting: Yes  Abdominal pain: Yes  Bloating: Yes  Diarrhea: Yes  Black stools: Yes  Trouble holding urine or incontinence: Yes  Pain or burning: No  Trouble starting or stopping: Yes  Increased frequency of urination: Yes  Blood in urine: No  Frequent nighttime urination: Yes  Flank pain: Yes  Difficulty emptying bladder: Yes  Scrotal pain or swelling: Yes  Erectile dysfunction: No  Penile discharge: No  Genital ulcers: No  Reduced  libido: No  Back pain: Yes  Muscle aches: Yes  Neck pain: Yes  Swollen joints: Yes  Joint pain: Yes  Bone pain: No  Muscle cramps: Yes  Muscle weakness: Yes  Joint stiffness: No  Bone fracture: No  Edema or swelling: Yes  Anemia: No  Swollen glands: No  Easy bleeding or bruising: Yes  Trouble with coordination: No  Dizziness or trouble with balance: No  Fainting or black-out spells: No  Memory loss: No  Headache: Yes  Seizures: No  Speech problems: No  Tingling: Yes  Tremor: No  Weakness: No  Difficulty walking: Yes  Paralysis: No  Numbness: No

## 2022-02-15 NOTE — PROGRESS NOTES
"Community Health Worker Follow Up    Care Gaps:     Health Maintenance Due   Topic Date Due     PREVENTIVE CARE VISIT  Never done     ADVANCE CARE PLANNING  Never done     DEPRESSION ACTION PLAN  Never done     Pneumococcal Vaccine: Pediatrics (0 to 5 Years) and At-Risk Patients (6 to 64 Years) (1 of 2 - PPSV23) Never done     HIV SCREENING  Never done     HEPATITIS C SCREENING  Never done     INFLUENZA VACCINE (1) Never done     COVID-19 Vaccine (3 - Booster for Moderna series) 10/21/2021       Will address at next PCP visit.     Goals:   Goals Addressed as of 2/15/2022 at 11:59 AM                    Today    1/7/22       1. Mental Health Management (pt-stated)   20%  10%    Added 11/24/21 by Dana Kumar LSW      Goal Statement: I will have stable mental health and have a therapist and psychiatrist within 2 months.  Date Goal set: 11-  Barriers: Feeling overwhelmed, stressed.  Strengths: has insurance, already connected to therapist.   Date to Achieve By: 1-  Patient expressed understanding of goal: yes  Action steps to achieve this goal:  1. I will attend appointments with my therapists.  2. I will meet with new psychiatrist on 12-9 at 1:00 with [facility] with Mekhit \"Ana Paula\" Staci Macias NP  3. I will report progress towards this goal at scheduled outreach telephone calls from the [facility] team.    Discussed 2/15/22           2. Financial Wellbeing (pt-stated)   20%  10%    Added 12/1/21 by Dana Kumar LSW      Goal Statement: I will have financial stability and am able to pay my bills within 6 months.  Date Goal set: 12-1-2021  Barriers: trouble at current job, only able to work part time.  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   Date to Achieve By: 5-1-2022  Patient expressed understanding of goal: yes  Action steps to achieve this goal:  1. I will continue to work with my current employer to have income while I look for a new job.  2. I will review [employer] [facility] resources for help.  3. I will report progress towards this goal at scheduled outreach telephone calls from the [facility] team.    Discussed 2/15/22           3. Other (pt-stated)   20%  10%    Added 12/1/21 by Dana Kumar LSW      Goal Statement: I will find a new place to live with my girlfriend that is affordable within one year.  Date Goal set:12-1-2021  Barriers: hard to find affordable apartments.  Strengths: have a place to live now.   Date to Achieve By: 12-1-2022  Patient expressed understanding of goal: yes  Action steps to achieve this goal:  1. I will look at housinglink for options.   2. I will continue to pay rent at my current apartment.  3. I will report progress towards this goal at scheduled outreach telephone calls from the [facility] team.  1-7 discussed, want to move out in May when lease is up,      Discussed 2/15/22              Intervention and Education during outreach: Patient has not collected any pay in at least 5 weeks. PCP sent [employer] paperwork again on 2/14 and he is going to contact them again for his long term disability payments.    He would like to speak with CARLENE LIU again to discuss his financial situation and how he can get assistance since he has not had an income for some time.     He is seeing his mental health providers on a regular basis as well as him primary care and would like to come up with a plan with CARLENE LIU to get a plan in place to get stable again.    CHW Plan: CHW will continue to support patient with goals through routine scheduled outreach.     Next outreach due: 3/15/22

## 2022-02-15 NOTE — PROGRESS NOTES
Video-Visit Details    Type of service:  Video Visit    Video Start Time:1:45 PM    Video End Time: 2:16 PM    Originating Location (pt. Location): Home    Distant Location (provider location):  [facility] [place]     Platform used for Video Visit: [email or web address]       Answers for HPI/ROS submitted by the patient on 2/15/2022  General Symptoms: No  Skin Symptoms: Yes  HENT Symptoms: No  EYE SYMPTOMS: No  HEART SYMPTOMS: Yes  LUNG SYMPTOMS: No  INTESTINAL SYMPTOMS: Yes  URINARY SYMPTOMS: Yes  REPRODUCTIVE SYMPTOMS: Yes  SKELETAL SYMPTOMS: Yes  BLOOD SYMPTOMS: Yes  NERVOUS SYSTEM SYMPTOMS: Yes  MENTAL HEALTH SYMPTOMS: No  Changes in hair: No  Changes in moles/birth marks: Yes  Itching: Yes  Rashes: No  Changes in nails: No  Acne: No  Change in facial hair: No  Warts: No  Non-healing sores: Yes  Scarring: Yes  Flaking of skin: No  Color changes of hands/feet in cold : Yes  Sun sensitivity: No  Skin thickening: No  Chest pain or pressure: No  Fast or irregular heartbeat: No  Pain in legs with walking: Yes  Trouble breathing while lying down: No  Fingers or toes appear blue: No  High blood pressure: Yes  Low blood pressure: No  Fainting: No  Wake up at night with shortness of breath: No  Exercise intolerance: Yes  Heart burn or indigestion: Yes  Nausea: Yes  Vomiting: Yes  Abdominal pain: Yes  Bloating: Yes  Diarrhea: Yes  Black stools: Yes  Trouble holding urine or incontinence: Yes  Pain or burning: No  Trouble starting or stopping: Yes  Increased frequency of urination: Yes  Blood in urine: No  Frequent nighttime urination: Yes  Flank pain: Yes  Difficulty emptying bladder: Yes  Scrotal pain or swelling: Yes  Erectile dysfunction: No  Penile discharge: No  Genital ulcers: No  Reduced libido: No  Back pain: Yes  Muscle aches: Yes  Neck pain: Yes  Swollen joints: Yes  Joint pain: Yes  Bone pain: No  Muscle cramps: Yes  Muscle weakness: Yes  Joint stiffness: No  Bone fracture: No  Edema  or swelling: Yes  Anemia: No  Swollen glands: No  Easy bleeding or bruising: Yes  Trouble with coordination: No  Dizziness or trouble with balance: No  Fainting or black-out spells: No  Memory loss: No  Headache: Yes  Seizures: No  Speech problems: No  Tingling: Yes  Tremor: No  Weakness: No  Difficulty walking: Yes  Paralysis: No  Numbness: No

## 2022-02-15 NOTE — PATIENT INSTRUCTIONS
It was good to meet with you again during your visit.  I have outlined my recommendations below.    Let spend the next 3 months focusing on dietary modifications.  Please make sure to follow-up with Abran to work on the low FODMAP diet.    Please take your omeprazole 40 mg daily.  Take it 30 minutes before breakfast.    Please continue to follow-up with your mental health providers to really maximize treatment of your anxiety.    Follow-up in 3 months.

## 2022-02-15 NOTE — PROGRESS NOTES
"3:00 PM                                             Progress Note    Patient Name: Higinio Wallace  Date: 2/15/22         Service Type: Individual      Session Start Time: 4:10 PM session End Time:  4:58 PM     Session Length: 48 minutes    Session #: 10    Attendees: Client and His girlfriend was in the background     Service Modality:  Phone Visit:      Provider verified identity through the following two step process.  Patient provided:  Patient is known previously to provider    The patient has been notified of the following:      \"We have found that certain health care needs can be provided without the need for a face to face visit.  This service lets us provide the care you need with a phone conversation.       I will have full access to your [facility] medical record during this entire phone call.   I will be taking notes for your medical record.      Since this is like an office visit, we will bill your insurance company for this service.       There are potential benefits and risks of telephone visits (e.g. limits to patient confidentiality) that differ from in-person visits.?Confidentiality still applies for telephone services, and nobody will record the visit.  It is important to be in a quiet, private space that is free of distractions (including cell phone or other devices) during the visit.??      If during the course of the call I believe a telephone visit is not appropriate, you will not be charged for this service\"     Consent has been obtained for this service by care team member: Yes      Treatment Plan Last Reviewed: 11/23/2021  PHQ-9 / ANITA-7 :   PHQ 11/23/2021 1/4/2022 1/18/2022   PHQ-9 Total Score 18 23 15   Q9: Thoughts of better off dead/self-harm past 2 weeks More than half the days Several days Not at all   F/U: Thoughts of suicide or self-harm Yes Yes -   F/U: Self harm-plan No No -   F/U: Self-harm action No No -   F/U: Safety concerns No No -     ANITA-7 SCORE 1/4/2022 1/4/2022 " 1/18/2022   Total Score - 21 (severe anxiety) 13 (moderate anxiety)   Total Score 21 21 13         DATA  Interactive Complexity: No  Crisis: No       Progress Since Last Session (Related to Symptoms / Goals / Homework):   Symptoms: No change Patient continues to report ongoing stressors that impact his ability to focus on his emotional wellbeing    Homework: Did not complete      Episode of Care Goals: Minimal progress - PREPARATION (Decided to change - considering how); Intervened by negotiating a change plan and determining options / strategies for behavior change, identifying triggers, exploring social supports, and working towards setting a date to begin behavior change     Current / Ongoing Stressors and Concerns:  Patient shared that he has been unable to focus on his emotional wellbeing as he is experiencing physical health stressors and financial stressors.  Patient expressed frustration with suggestions to focus on either one thing at a time or his mental health/emotional wellbeing as he shared when he has done that in the past the other areas of his life have suffered.  Therapist shared that he appears to be stuck and overwhelmed by the stressors that exist for him to which patient agreed.  Patient also appeared to struggle in identifying options for except options offered by therapist for how he could get unstuck.  Patient did not eventually identify that he would like to focus on decreasing his level of frustration.     Treatment Objective(s) Addressed in This Session:   Decrease frequency and intensity of feeling down, depressed, hopeless  Identify negative self-talk and behaviors: challenge core beliefs, myths, and actions  Learn and utilize assertive communication/interpersonal skills.  Patient will implement strategies of self-care and ADLs on a daily basis     Intervention:   CBT: CBT triangle, cognitive distortions    ACT: Value based living   DBT: Validated patient's emotions and  experiences  Client-centered: Self-care strategies      ASSESSMENT: Current Emotional / Mental Status (status of significant symptoms):   Risk status (Self / Other harm or suicidal ideation)   Patient denies current fears or concerns for personal safety.   Patient denies current or recent suicidal ideation or behaviors.   Patient denies current or recent homicidal ideation or behaviors.   Patient denies current or recent self injurious behavior or ideation.   Patient denies other safety concerns.   Patient reports there has been no change in risk factors since their last session.     Patient reports there has been no change in protective factors since their last session.     Recommended that patient call 911 or go to the local ED should there be a change in any of these risk factors.     Appearance:   NA    Eye Contact:   NA    Psychomotor Behavior: Agitated    Attitude:   Uncooperative    Orientation:   All   Speech    Rate / Production: Talkative    Volume:  Normal    Mood:    Irritable  Agitated    Affect:    NA    Thought Content:  Rumination    Thought Form:  Circumstantial   Insight:    Poor      Medication Review:   No changes to current psychiatric medication(s)     Medication Compliance:   Yes     Changes in Health Issues:   Yes: Ongoing health related issues without specific diagnoses at this time, Associated Psychological Distress     Chemical Use Review:    Substance Use: Chemical use reviewed, no active concerns identified      Tobacco Use: No change in amount of tobacco use since last session.  Not reviewed during session    Diagnosis:  1. PTSD (post-traumatic stress disorder)        Collateral Reports Completed:   Patient provided verbal consent for this writer to speak with his employer regarding a leave of absence to support his mental health and wellbeing    PLAN: (Patient Tasks / Therapist Tasks / Other)  Patient will focus on building awareness around his frustrations.  Patient and therapist will  meet again in a week.        Meri Rich [facility] Provider Oversight:  Dr. Flaco Salazar  2/15/22                                                           ______________________________________________________________________    Treatment Plan    Patient's Name: Higinio Wallace  YOB: 1990    Date: November 23, 2021    DSM5 Diagnoses: 309.81 (F43.10) Posttraumatic Stress Disorder (includes Posttraumatic Stress Disorder for Children 6 Years and Younger)  With dissociative symptoms  Psychosocial / Contextual Factors: History of trauma, significant mental health concerns, physical health concerns, financial stressors  WHODAS:   WHODAS 2.0 Total Score 10/6/2020 11/8/2021   Total Score 39 32   Total Score MyChart - 32       Referral / Collaboration:  Was/were discussed and client will pursue.    Anticipated number of session or this episode of care: 8-12      MeasurableTreatment Goal(s) related to diagnosis / functional impairment(s)  Goal 1: Patient will focus on decreasing his mental health symptomology as evidenced by a PHQ-9 and ANITA-7 score of 10 or less    I will know I've met my goal when I feel less stressed and increased happiness and motivation.      Objective #A (Patient Action)    Patient will learn about trauma and the impact that can have on him both emotionally and physically.  Status: New - Date: November 23, 2021     Intervention(s)  Therapist will provide educational materials on The impact that trauma can have on the mind, the body, the spirit, and emotions.    Objective #B  Patient will Decrease frequency and intensity of feeling down, depressed, hopeless  Feel less tired and more energy during the day   Identify negative self-talk and behaviors: challenge core beliefs, myths, and actions.  Status: New - Date: November 23, 2021     Intervention(s)  Therapist will teach emotional regulation skills. Patient and therapist will focus on increasing his awareness and recognition of symptoms  in the moment as well as developing coping skills and strategies to manage the symptoms.  The skills and strategies could include positive affirmations, movement, engaging in the creative process, and developing a positive sense of self.    Objective #C  Patient will Explore with therapist how his trauma has impacted his thoughts and how his thoughts impact his behaviors and emotional wellbeing.  Status: New - Date: November 23, 2021     Intervention(s)  Therapist will teach distraction skills. As well as ways to challenge negative thoughts and self talk..      Patient has reviewed and agreed to the above plan.      Meri Rich, [facility]  November 23, 2021      Answers for HPI/ROS submitted by the patient on 1/4/2022  If you checked off any problems, how difficult have these problems made it for you to do your work, take care of things at home, or get along with other people?: Extremely difficult  PHQ9 TOTAL SCORE: 23  ANITA 7 TOTAL SCORE: 21    Answers for HPI/ROS submitted by the patient on 1/18/2022  If you checked off any problems, how difficult have these problems made it for you to do your work, take care of things at home, or get along with other people?: Very difficult  PHQ9 TOTAL SCORE: 15  ANITA 7 TOTAL SCORE: 13

## 2022-02-17 ENCOUNTER — PATIENT OUTREACH (OUTPATIENT)
Dept: NURSING | Facility: CLINIC | Age: 32
End: 2022-02-17
Payer: COMMERCIAL

## 2022-02-17 NOTE — PROGRESS NOTES
Clinic Care Coordination Contact    Follow Up Progress Note      Assessment: Talked to patient who is struggling with trying to get paper work to his work place for short term disability.  Everyone says it is sent, but they don't get it.  He has talked to medical records and thinks he may have gotten it solved.  He would like SW to talk to disability department to explain his disability and his work accommodations. Explained that is not role of SW, but he should ask therapist and PCP for that help.  He already has. He did not check on resources given for food or energy assistance that was given to him during last call. Gave him phone number for [employer] and [place] food shelf.  He wants to find food he can eat at the food shelf with his diet restrictions. He decided to stay with Dr. Franklin as his primary care physician.  His girlfriend got a job working in school district. His brother is looking to move out and find his own place.        Care Gaps:    Health Maintenance Due   Topic Date Due     PREVENTIVE CARE VISIT  Never done     ADVANCE CARE PLANNING  Never done     DEPRESSION ACTION PLAN  Never done     Pneumococcal Vaccine: Pediatrics (0 to 5 Years) and At-Risk Patients (6 to 64 Years) (1 of 2 - PPSV23) Never done     HIV SCREENING  Never done     HEPATITIS C SCREENING  Never done     INFLUENZA VACCINE (1) Never done     COVID-19 Vaccine (3 - Booster for Moderna series) 10/21/2021       Postponed to next PCP appointment     Goals addressed this encounter:   Goals Addressed                    This Visit's Progress       Patient Stated       1. Mental Health Management (pt-stated)   20%      Goal Statement: I will have stable mental health and have a therapist and psychiatrist within 2 months.  Date Goal set: 11-  Barriers: Feeling overwhelmed, stressed.  Strengths: has insurance, already connected to therapist.   Date to Achieve By: 1-  Patient expressed understanding of goal: yes  Action steps to  "achieve this goal:  1. I will attend appointments with my therapists.  2. I will meet with new psychiatrist on 12-9 at 1:00 with [facility] with Mekhit \"Ana Paula\" Staci Macias NP  3. I will report progress towards this goal at scheduled outreach telephone calls from the CCC team.    Discussed 2/15/22           2. Financial Wellbeing (pt-stated)   20%      Goal Statement: I will have financial stability and am able to pay my bills within 6 months.  Date Goal set: 12-1-2021  Barriers: trouble at current job, only able to work part time.  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   Date to Achieve By: 5-1-2022  Patient expressed understanding of goal: yes  Action steps to achieve this goal:  1. I will continue to work with my current employer to have income while I look for a new job.  2. I will review [employer] [facility]  and [employer] resources for help.  3. I will report progress towards this goal at scheduled outreach telephone calls from the CCC team.    Discussed 2/15/22              Intervention/Education provided during outreach: Resources to help financially.      Outreach Frequency: monthly    Plan:   Delegating to [facility]W to contact in less than 30 days.   Care Coordinator will follow up in as needed and in 6 weeks.  Dana Kumar,   [facility]  [phone]    "

## 2022-02-21 ENCOUNTER — TELEPHONE (OUTPATIENT)
Dept: GASTROENTEROLOGY | Facility: CLINIC | Age: 32
End: 2022-02-21
Payer: COMMERCIAL

## 2022-02-24 ENCOUNTER — TELEPHONE (OUTPATIENT)
Dept: FAMILY MEDICINE | Facility: CLINIC | Age: 32
End: 2022-02-24
Payer: COMMERCIAL

## 2022-02-24 NOTE — TELEPHONE ENCOUNTER
Received call from pt and he states he needs a call from Dr. Franklin's assistant in regards for paperwork for benefits and needs clarification and dates changed.     Pt has paperwork

## 2022-02-24 NOTE — TELEPHONE ENCOUNTER
Spoke with patient.  Is trying to gather and obtain records.   I did try to help explain to him how to get records from his visits.  I will also fax his last 3 ov's w/ Dr Franklin to [facility].    Also, f/up appt made for 2/28/22 at 2pm for f/up

## 2022-02-28 ENCOUNTER — PATIENT OUTREACH (OUTPATIENT)
Dept: GASTROENTEROLOGY | Facility: CLINIC | Age: 32
End: 2022-02-28
Payer: COMMERCIAL

## 2022-02-28 NOTE — TELEPHONE ENCOUNTER
Patient returns call from Dr Holland  Patient states he is ready for a referral to colon and rectal  Forward to Dr Holland   ? Colon and rectal surgeon referral

## 2022-03-01 ENCOUNTER — VIRTUAL VISIT (OUTPATIENT)
Dept: BEHAVIORAL HEALTH | Facility: CLINIC | Age: 32
End: 2022-03-01
Payer: COMMERCIAL

## 2022-03-01 DIAGNOSIS — F43.10 PTSD (POST-TRAUMATIC STRESS DISORDER): Primary | ICD-10-CM

## 2022-03-01 PROCEDURE — 90834 PSYTX W PT 45 MINUTES: CPT | Mod: TEL,95 | Performed by: SOCIAL WORKER

## 2022-03-01 NOTE — TELEPHONE ENCOUNTER
----- Message from Eda Shah LPN sent at 2/28/2022  2:25 PM CST -----  Get mailing label and mail when in office tomorrow.  ----- Message -----  From: Eda Shah LPN  Sent: 2/28/2022   2:24 PM CST  To: Eda Shah LPN, Nydia Corral RN, #    Done.    Eda Shah LPN  ----- Message -----  From: Nydia Corral RN  Sent: 2/28/2022   2:14 PM CST  To: Eda Shah LPN, Jocelyne Mcgowan, [facility]    Please print copy of Dr Holland's note and sent copy via mail   Thanks nydia   ----- Message -----  From: Donell Holland MD  Sent: 2/28/2022   8:19 AM CST  To: Nydia Corral RN, Lora Thrasher    Note is signed.    Nydia,  Can you send a copy of this note to the patient?    J  ----- Message -----  From: Lora Thrasher  Sent: 2/25/2022  10:17 AM CST  To: Donell Holland MD    Please complete and sign note from 02/15/2022, patient needs a copy of this. Thanks Lora

## 2022-03-02 NOTE — PROGRESS NOTES
"      [facility]                                     Progress Note    Patient Name: Higinio Wallace  Date: 3/01/22         Service Type: Individual      Session Start Time: 6:15 PM  Session End Time: 6:59 PM     Session Length: 44 minutes    Session #: 11    Attendees: Client attended alone    Service Modality:  Phone Visit:      Provider verified identity through the following two step process.  Patient provided:  Patient is known previously to provider    The patient has been notified of the following:      \"We have found that certain health care needs can be provided without the need for a face to face visit.  This service lets us provide the care you need with a phone conversation.       I will have full access to your [facility] medical record during this entire phone call.   I will be taking notes for your medical record.      Since this is like an office visit, we will bill your insurance company for this service.       There are potential benefits and risks of telephone visits (e.g. limits to patient confidentiality) that differ from in-person visits.?Confidentiality still applies for telephone services, and nobody will record the visit.  It is important to be in a quiet, private space that is free of distractions (including cell phone or other devices) during the visit.??      If during the course of the call I believe a telephone visit is not appropriate, you will not be charged for this service\"     Consent has been obtained for this service by care team member: Yes     DATA  Interactive Complexity: No  Crisis: No        Progress Since Last Session (Related to Symptoms / Goals / Homework):   Symptoms: Improving Less stressed and felling more hopeful    Homework: Partially completed      Episode of Care Goals: Minimal progress - PREPARATION (Decided to change - considering how); Intervened by negotiating a change plan and determining options / strategies for behavior change, " identifying triggers, exploring social supports, and working towards setting a date to begin behavior change     Current / Ongoing Stressors and Concerns:   Patient shared that he was feeling optimistic and ready to build some positive habits for himself especially since today is the first of the month.  Patient would like to develop consistency with his medication, eating healthy, and being physically active/physical fitness.  Patient also shared regarding some insecurities that can come up with his relationship on both his and his girlfriends and.  Finally patient shared some struggles that he experiences and shifting patterns and roles within his sibling relationships.     Treatment Objective(s) Addressed in This Session:   Decrease frequency and intensity of feeling down, depressed, hopeless  Feel less tired and more energy during the day   Identify negative self-talk and behaviors: challenge core beliefs, myths, and actions.   Patient will recognize limits and implement self-care strategies daily.     Intervention:   CBT: CBT triangle, cognitive distorations    ACT: Value based living              DBT: Validated patient's emotions and experiences   Client-centered: Self-care strategies      Assessments completed prior to visit:  The following assessments were completed by patient for this visit:  PHQ2:   PHQ-2 ( 1999 [employer]) 1/5/2021 5/26/2020   Q1: Little interest or pleasure in doing things 0 2   Q2: Feeling down, depressed or hopeless 0 1   PHQ-2 Score 0 3   PHQ-2 Total Score (12-17 Years)- Positive if 3 or more points; Administer PHQ-A if positive 0 3     PHQ9:   PHQ-9 SCORE 11/4/2020 4/6/2021 8/17/2021 11/8/2021 11/23/2021 1/4/2022 1/18/2022   PHQ-9 Total Score MyChart - - 16 (Moderately severe depression) 14 (Moderate depression) 18 (Moderately severe depression) 23 (Severe depression) 15 (Moderately severe depression)   PHQ-9 Total Score 20 0 16 14 18 23 15     GAD2:   ANITA-2 1/4/2022 1/4/2022 1/18/2022    Feeling nervous, anxious, or on edge 3 3 2   Not being able to stop or control worrying 3 3 2   ANITA-2 Total Score 6 6 4     GAD7:   ANITA-7 SCORE 9/16/2020 10/6/2020 11/4/2020 11/8/2021 1/4/2022 1/4/2022 1/18/2022   Total Score - - - 14 (moderate anxiety) - 21 (severe anxiety) 13 (moderate anxiety)   Total Score 7 20 13 14 21 21 13         ASSESSMENT: Current Emotional / Mental Status (status of significant symptoms):   Risk status (Self / Other harm or suicidal ideation)   Patient denies current fears or concerns for personal safety.   Patient denies current or recent suicidal ideation or behaviors.   Patient denies current or recent homicidal ideation or behaviors.   Patient denies current or recent self injurious behavior or ideation.   Patient denies other safety concerns.   Patient reports there has been no change in risk factors since their last session.     Patient reports there has been no change in protective factors since their last session.     Recommended that patient call 911 or go to the local ED should there be a change in any of these risk factors.     Appearance:   NA    Eye Contact:   NA    Psychomotor Behavior: Normal    Attitude:   Cooperative    Orientation:   All   Speech    Rate / Production: Normal     Volume:  Normal    Mood:    Normal   Affect:    NA    Thought Content:  Clear    Thought Form:  Coherent  Logical    Insight:    Fair      Medication Review:   No changes to current psychiatric medication(s)     Medication Compliance:   Yes Working on developing consistency     Changes in Health Issues:   Yes: A variety of health issues that lead to distress     Chemical Use Review:   Substance Use: Chemical use reviewed, no active concerns identified      Tobacco Use: No current tobacco use.      Diagnosis:  1. PTSD (post-traumatic stress disorder)        Collateral Reports Completed:   Not Applicable    PLAN: (Patient Tasks / Therapist Tasks / Other)  Patient will continue to focus on  developing consistency with self-care strategies including nutrition, medication consistency, and movement and exercise.  Patient and therapist will meet again in a week.        Meri Rich, [facility] Provider Oversight:  Dr. Flaco Salazar  3/01/22                                                       ______________________________________________________________________    Individual Treatment Plan    Patient's Name: Higinio Wallace  YOB: 1990    Date of Creation: 11/23/2021  Date Treatment Plan Last Reviewed/Revised: 3/1/2022    DSM5 Diagnoses: 309.81 (F43.10) Posttraumatic Stress Disorder (includes Posttraumatic Stress Disorder for Children 6 Years and Younger)  With dissociative symptoms  Psychosocial / Contextual Factors: History of trauma, significant mental health concerns, physical health concerns, financial stressors  PROMIS (reviewed every 90 days):     Referral / Collaboration:  Referral to another professional/service is not indicated at this time..    Anticipated number of session for this episode of care: 12  Anticipation frequency of session: Weekly  Anticipated Duration of each session: 38-52 minutes  Treatment plan will be reviewed in 90 days or when goals have been changed.       MeasurableTreatment Goal(s) related to diagnosis / functional impairment(s)  Goal 1: Patient will focus on decreasing his mental health symptomology as evidenced by a PHQ-9 and ANITA-7 score of 10 or less    I will know I've met my goal when I feel less stressed and increased happiness and motivation.      Objective #A (Patient Action)    Patient will Patient will learn about trauma and the impact that can have on him both emotionally and physically..  Status: Continued - Date(s): 3/1/2022    Intervention(s)  Therapist will provide educational materials on  The impact that trauma can have on the mind, the body, the spirit, and emotions..    Objective #B  Patient will Patient will Decrease frequency and  intensity of feeling down, depressed, hopeless.  Feel less tired and more energy during the day   Identify negative self-talk and behaviors: challenge core beliefs, myths, and actions.  Status: Continued - Date(s): 3/1/2022    Intervention(s)  Therapist will teach emotional regulation skills. Patient and therapist will focus on increasing his awareness and recognition of symptoms in the moment as well as developing coping skills and strategies to manage the symptoms.  The skills and strategies could include positive affirmations, movement, engaging in the creative process, and developing a positive sense of self..    Objective #C  Patient will Patient will Explore with therapist how his trauma has impacted his thoughts and how his thoughts impact his behaviors and emotional wellbeing..  Status: Continued - Date(s): 3/1/2022    Intervention(s)  Therapist will teach distraction skills. As well as ways to challenge negative thoughts and self talk...      Patient has reviewed and agreed to the above plan.      Meri Rich, [facility]  March 1, 2022

## 2022-03-09 ENCOUNTER — DOCUMENTATION ONLY (OUTPATIENT)
Dept: BEHAVIORAL HEALTH | Facility: CLINIC | Age: 32
End: 2022-03-09
Payer: COMMERCIAL

## 2022-03-09 NOTE — PROGRESS NOTES
Patient had an appointment scheduled for 9 AM today by video.  This writer logged into the video appointment as well as reached out to patient twice by phone and was unable to connect with him.  His voicemail box was full therefore in my chart message was sent.

## 2022-03-22 ENCOUNTER — VIRTUAL VISIT (OUTPATIENT)
Dept: FAMILY MEDICINE | Facility: CLINIC | Age: 32
End: 2022-03-22
Payer: COMMERCIAL

## 2022-03-22 DIAGNOSIS — F43.10 PTSD (POST-TRAUMATIC STRESS DISORDER): Primary | ICD-10-CM

## 2022-03-22 DIAGNOSIS — M79.10 MYALGIA: ICD-10-CM

## 2022-03-22 DIAGNOSIS — Q43.3 MALROTATION OF INTESTINE (H): ICD-10-CM

## 2022-03-22 DIAGNOSIS — R53.83 FATIGUE, UNSPECIFIED TYPE: ICD-10-CM

## 2022-03-22 DIAGNOSIS — G89.29 CHRONIC BILATERAL LOW BACK PAIN WITHOUT SCIATICA: ICD-10-CM

## 2022-03-22 DIAGNOSIS — M25.50 MULTIPLE JOINT PAIN: ICD-10-CM

## 2022-03-22 DIAGNOSIS — F44.81 DISSOCIATIVE IDENTITY DISORDER (H): ICD-10-CM

## 2022-03-22 DIAGNOSIS — M54.50 CHRONIC BILATERAL LOW BACK PAIN WITHOUT SCIATICA: ICD-10-CM

## 2022-03-22 PROCEDURE — 99213 OFFICE O/P EST LOW 20 MIN: CPT | Mod: 95 | Performed by: FAMILY MEDICINE

## 2022-03-22 NOTE — PROGRESS NOTES
Higinio is a 31 year old who is being evaluated via a billable video visit.      How would you like to obtain your AVS? MyChart  If the video visit is dropped, the invitation should be resent by: Text to cell phone: [phone]  Will anyone else be joining your video visit? No    Video Start Time: 2:14PM    Diagnoses and all orders for this visit:    PTSD (post-traumatic stress disorder)    Dissociative identity disorder (H)    Multiple joint pain    Myalgia    Fatigue, unspecified type    Malrotation of intestine    Chronic bilateral low back pain without sciatica           Subjective   Higinio is a 31 year old who is evaluated today due to complex health situations causing disability.    My last visit with him was February 8, 2022.  He has PTSD and dissociative identity disorder.  He follows with a therapist currently.  He has not seen a psychiatrist recently and is not on medications for mental health management.  He states financial difficulties as his limitation for not being able to establish care with psychiatry.  His mental health is the most major consideration in regard to his disability that requires absence from work.  With worsening social stressors he reports increasing difficulties.  He does not describe any considerations that would result in self-harm or harm to others in relation to his mental health.  His mental health makes it very difficult to cope with physical symptoms, described in more detail below well at work.    He has a history of malrotation of the intestines.  He is seeing gastroenterology.  Most recent consultation note reviewed.  He is working to improve diet.  No acute issue is identified.  This condition causes abdominal discomfort and digestive system dysfunction which has historically caused difficulties at work.    He has multiple years of joint pain as well as myalgias.  He has some nonspecific lab test abnormalities.  He has been evaluate by rheumatology.  It was felt he did not  have an underlying autoimmune or rheumatologic disorder.  He was evaluated for cushingoid features.  He does not have Cushing syndrome.  He has been evaluated by orthopedic specialty provider.  There is recommendation for physical therapy.  Knee pain, generalized joint and muscle pain and chronic back pain are physically limiting factors at work.  His mental health concerns make it difficult to cope with these physical symptoms.  He is planning to do physical therapy.  He cites financial limitations as to delays in starting physical therapy.    He is a high probability of underlying sleep apnea based on symptoms and other risk factors.  He is scheduled to undergo a sleep evaluation.  He has fatigue and this is a possible cause for fatigue.  Will await test results to determine if additional action is necessary.  Fatigue is a significant limiting factor on top of the other concerns as previously mentioned and discussed.    I recommend he remain on disability due to the conglomeration of the concerns as discussed in detail above.  I recommend he follow-up with me every 2 to 4 weeks for reassessment.  I recommend he utilize resources including care coordination here at our clinic to help with financial and housing difficulties.      Review of Systems   Complete review of systems is obtained.  Other than the specific considerations noted above complete review of systems is negative.        Objective           Vitals:  No vitals were obtained today due to virtual visit.    Physical Exam   GENERAL: Healthy, alert and no distress  EYES: Eyes grossly normal to inspection.  No discharge or erythema, or obvious scleral/conjunctival abnormalities.  RESP: No audible wheeze, cough, or visible cyanosis.  No visible retractions or increased work of breathing.    SKIN: Visible skin clear. No significant rash, abnormal pigmentation or lesions.  NEURO: Cranial nerves grossly intact.  Mentation and speech appropriate for age.  PSYCH:  Mentation appears normal, affect normal/bright, judgement and insight intact, normal speech and appearance well-groomed.                Video-Visit Details    Type of service:  Video Visit    Video End Time:2:28 PM    Originating Location (pt. Location): Home    Distant Location (provider location):  [facility]     Platform used for Video Visit: AmandaLocalOn

## 2022-03-24 ENCOUNTER — OFFICE VISIT (OUTPATIENT)
Dept: SLEEP MEDICINE | Facility: CLINIC | Age: 32
End: 2022-03-24
Payer: COMMERCIAL

## 2022-03-24 ENCOUNTER — VIRTUAL VISIT (OUTPATIENT)
Dept: BEHAVIORAL HEALTH | Facility: CLINIC | Age: 32
End: 2022-03-24
Payer: COMMERCIAL

## 2022-03-24 DIAGNOSIS — F43.10 PTSD (POST-TRAUMATIC STRESS DISORDER): Primary | ICD-10-CM

## 2022-03-24 DIAGNOSIS — R06.83 SNORING: Primary | ICD-10-CM

## 2022-03-24 PROCEDURE — 90834 PSYTX W PT 45 MINUTES: CPT | Mod: 95 | Performed by: SOCIAL WORKER

## 2022-03-24 PROCEDURE — G0399 HOME SLEEP TEST/TYPE 3 PORTA: HCPCS | Mod: 52 | Performed by: INTERNAL MEDICINE

## 2022-03-24 NOTE — PROGRESS NOTES
[facility]                                     Progress Note    Patient Name: Higinio Wallace  Date: 3/24/22         Service Type: Individual      Session Start Time: 3:40 PM  session End Time: 4:29 PM     Session Length: 49 minutes    Session #: 12    Attendees: Client attended alone    Service Modality: Video Visit:      Provider verified identity through the following two step process.  Patient provided:  Patient is known previously to provider    Telemedicine Visit: The patient's condition can be safely assessed and treated via synchronous audio and visual telemedicine encounter.      Reason for Telemedicine Visit: Services only offered telehealth    Originating Site (Patient Location): Patient's other His car    Distant Site (Provider Location): Provider Remote Setting- Home Office    Consent:  The patient/guardian has verbally consented to: the potential risks and benefits of telemedicine (video visit) versus in person care; bill my insurance or make self-payment for services provided; and responsibility for payment of non-covered services.     Patient would like the video invitation sent by:  Send to e-mail at: [email or web address]    Mode of Communication:  Video Conference via D[employer]imity    As the provider I attest to compliance with applicable laws and regulations related to telemedicine.    DATA  Interactive Complexity: No  Crisis: No        Progress Since Last Session (Related to Symptoms / Goals / Homework):   Symptoms: Improving Patient shared that he is less frustrated and more focused on the direction he would like to move towards    Homework: Partially completed      Episode of Care Goals: Minimal progress - PREPARATION (Decided to change - considering how); Intervened by negotiating a change plan and determining options / strategies for behavior change, identifying triggers, exploring social supports, and working towards setting a date to begin behavior change     Current /  Ongoing Stressors and Concerns:   Patient shared that he has been feeling less frustrated as a result of not listening to people's perspective or opinions that can keep him down or stop and rather is focusing on what he wants and what will move him forward in life.  Patient provided an update on where he is at with the SSI paperwork as well as how he would like to change his environment.  Therapist encouraged patient to think about steps that he can take now to help support him in creating the life he would like to live.     Treatment Objective(s) Addressed in This Session:   Decrease frequency and intensity of feeling down, depressed, hopeless  Identify negative self-talk and behaviors: challenge core beliefs, myths, and actions.   Patient will recognize limits and implement self-care strategies daily.     Intervention:   CBT: CBT triangle, cognitive distorations    ACT: Value based living              DBT: Validated patient's emotions and experiences   Client-centered: Self-care strategies      Assessments completed prior to visit:  The following assessments were completed by patient for this visit:  PHQ2:   PHQ-2 ( 1999 [employer]) 1/5/2021 5/26/2020   Q1: Little interest or pleasure in doing things 0 2   Q2: Feeling down, depressed or hopeless 0 1   PHQ-2 Score 0 3   PHQ-2 Total Score (12-17 Years)- Positive if 3 or more points; Administer PHQ-A if positive 0 3     PHQ9:   PHQ-9 SCORE 4/6/2021 8/17/2021 11/8/2021 11/23/2021 1/4/2022 1/18/2022 3/24/2022   PHQ-9 Total Score MyChart - 16 (Moderately severe depression) 14 (Moderate depression) 18 (Moderately severe depression) 23 (Severe depression) 15 (Moderately severe depression) 16 (Moderately severe depression)   PHQ-9 Total Score 0 16 14 18 23 15 16     GAD2:   ANITA-2 1/4/2022 1/4/2022 1/18/2022   Feeling nervous, anxious, or on edge 3 3 2   Not being able to stop or control worrying 3 3 2   ANITA-2 Total Score 6 6 4     GAD7:   ANITA-7 SCORE 9/16/2020 10/6/2020  11/4/2020 11/8/2021 1/4/2022 1/4/2022 1/18/2022   Total Score - - - 14 (moderate anxiety) - 21 (severe anxiety) 13 (moderate anxiety)   Total Score 7 20 13 14 21 21 13         ASSESSMENT: Current Emotional / Mental Status (status of significant symptoms):   Risk status (Self / Other harm or suicidal ideation)   Patient denies current fears or concerns for personal safety.   Patient denies current or recent suicidal ideation or behaviors.   Patient denies current or recent homicidal ideation or behaviors.   Patient denies current or recent self injurious behavior or ideation.   Patient denies other safety concerns.   Patient reports there has been no change in risk factors since their last session.     Patient reports there has been no change in protective factors since their last session.     Recommended that patient call 911 or go to the local ED should there be a change in any of these risk factors.     Appearance:   Appropriate    Eye Contact:   Fair    Psychomotor Behavior: Normal    Attitude:   Interested   Orientation:   All   Speech    Rate / Production: Talkative    Volume:  Normal    Mood:    Normal   Affect:    Appropriate    Thought Content:  Clear    Thought Form:  Coherent  Logical    Insight:    Fair      Medication Review:   No changes to current psychiatric medication(s)     Medication Compliance:   Yes Working on developing consistency     Changes in Health Issues:   Yes: A variety of health issues that lead to distress     Chemical Use Review:   Substance Use: Chemical use reviewed, no active concerns identified      Tobacco Use: No current tobacco use.      Diagnosis:  1. PTSD (post-traumatic stress disorder)        Collateral Reports Completed:   Not Applicable    PLAN: (Patient Tasks / Therapist Tasks / Other)  Patient will continue to focus on a regular self-care practice as well as identifying steps he can take in the present to help him build the life he wants for the future.        Meri  JUANCHO Rich, [facility] Provider Oversight:  Dr. Flaco Salazar  3/24/22                                                       ______________________________________________________________________    Individual Treatment Plan    Patient's Name: Higinio Wallace  YOB: 1990    Date of Creation: 11/23/2021  Date Treatment Plan Last Reviewed/Revised: 3/1/2022    DSM5 Diagnoses: 309.81 (F43.10) Posttraumatic Stress Disorder (includes Posttraumatic Stress Disorder for Children 6 Years and Younger)  With dissociative symptoms  Psychosocial / Contextual Factors: History of trauma, significant mental health concerns, physical health concerns, financial stressors  PROMIS (reviewed every 90 days):     Referral / Collaboration:  Referral to another professional/service is not indicated at this time..    Anticipated number of session for this episode of care: 12  Anticipation frequency of session: Weekly  Anticipated Duration of each session: 38-52 minutes  Treatment plan will be reviewed in 90 days or when goals have been changed.       MeasurableTreatment Goal(s) related to diagnosis / functional impairment(s)  Goal 1: Patient will focus on decreasing his mental health symptomology as evidenced by a PHQ-9 and ANITA-7 score of 10 or less    I will know I've met my goal when I feel less stressed and increased happiness and motivation.      Objective #A (Patient Action)    Patient will Patient will learn about trauma and the impact that can have on him both emotionally and physically..  Status: Continued - Date(s): 3/1/2022    Intervention(s)  Therapist will provide educational materials on  The impact that trauma can have on the mind, the body, the spirit, and emotions..    Objective #B  Patient will Patient will Decrease frequency and intensity of feeling down, depressed, hopeless.  Feel less tired and more energy during the day   Identify negative self-talk and behaviors: challenge core beliefs, myths, and  actions.  Status: Continued - Date(s): 3/1/2022    Intervention(s)  Therapist will teach emotional regulation skills. Patient and therapist will focus on increasing his awareness and recognition of symptoms in the moment as well as developing coping skills and strategies to manage the symptoms.  The skills and strategies could include positive affirmations, movement, engaging in the creative process, and developing a positive sense of self..    Objective #C  Patient will Patient will Explore with therapist how his trauma has impacted his thoughts and how his thoughts impact his behaviors and emotional wellbeing..  Status: Continued - Date(s): 3/1/2022    Intervention(s)  Therapist will teach distraction skills. As well as ways to challenge negative thoughts and self talk...      Patient has reviewed and agreed to the above plan.      Meri Rich, [facility]  March 1, 2022  Answers for HPI/ROS submitted by the patient on 3/24/2022  If you checked off any problems, how difficult have these problems made it for you to do your work, take care of things at home, or get along with other people?: Very difficult  PHQ9 TOTAL SCORE: 16

## 2022-03-25 ENCOUNTER — DOCUMENTATION ONLY (OUTPATIENT)
Dept: SLEEP MEDICINE | Facility: CLINIC | Age: 32
End: 2022-03-25
Payer: COMMERCIAL

## 2022-03-25 ASSESSMENT — PATIENT HEALTH QUESTIONNAIRE - PHQ9: SUM OF ALL RESPONSES TO PHQ QUESTIONS 1-9: 16

## 2022-03-28 NOTE — PROGRESS NOTES
HST POST-STUDY QUESTIONNAIRE    1. What time did you go to bed?  4 am  2. How long do you think it took to fall asleep?  30 min to hr  3. What time did you wake up to start the day?  9:00  4. Did you get up during the night at all?  yes  5. If you woke up, do you remember approximately what time(s)? three times  6. Did you have any difficulty with the equipment?  No  7. Did you us any type of treatment with this study?  None  8. Was the head of the bed elevated? No  9. Did you sleep in a recliner?  No  10. Did you stop using CPAP at least 3 days before this test?  NA  11. Any other information you'd like us to know? No    This HSAT was performed using a Noxturnal T3 device which recorded snore, sound, movement activity, body position, nasal pressure, oronasal thermal airflow, pulse, oximetry and both chest and abdominal respiratory effort. HSAT data was restricted to the time patient states they were in bed.     HSAT was scored using 1B 4% hypopnea rule.     HST AHI (Non-PAT): 14  Snoring was reported as intermittent.  Time with SpO2 below 89% was 4.4 minutes.   Overall signal quality was good     Pt will follow up with sleep provider to determine appropriate therapy.

## 2022-03-29 ENCOUNTER — PATIENT OUTREACH (OUTPATIENT)
Dept: CARE COORDINATION | Facility: CLINIC | Age: 32
End: 2022-03-29
Payer: COMMERCIAL

## 2022-03-29 ENCOUNTER — VIRTUAL VISIT (OUTPATIENT)
Dept: BEHAVIORAL HEALTH | Facility: CLINIC | Age: 32
End: 2022-03-29
Payer: COMMERCIAL

## 2022-03-29 DIAGNOSIS — F43.10 PTSD (POST-TRAUMATIC STRESS DISORDER): Primary | ICD-10-CM

## 2022-03-29 PROCEDURE — 90834 PSYTX W PT 45 MINUTES: CPT | Mod: 95 | Performed by: SOCIAL WORKER

## 2022-03-29 NOTE — PROCEDURES
"HOME SLEEP STUDY INTERPRETATION    Patient: Higinio Wallace  MRN: [id number]  YOB: 1990  Study Date: 3/24/2022  PCP/Referring Provider: Raza Franklin;   Ordering Provider: Bennett Goltz, PA-C     Indications for Home Study: Higinio Wallace is a 31 year old male who presents with symptoms suggestive of obstructive sleep apnea.    Estimated body mass index is 42.01 kg/m  as calculated from the following:    Height as of 22: 1.93 m (6' 3.98\").    Weight as of 22: 156.5 kg (345 lb).  Total score - [place]: 11 (10/18/2021 11:00 AM)  STOP-BAN/8    Data: A full night home sleep study was performed recording the standard physiologic parameters including body position, movement, sound, nasal pressure, thermal oral airflow, chest and abdominal movements with respiratory inductance plethysmography, and oxygen saturation by pulse oximetry. Pulse rate was estimated by oximetry recording. This study was considered adequate based on > 4 hours of quality oximetry and respiratory recording. As specified by the AASM Manual for the Scoring of Sleep and Associated events, version 2.3, Rule VIII.D 1B, 4% oxygen desaturation scoring for hypopneas is used as a standard of care on all home sleep apnea testing.    Analysis Time:  330.5 minutes    Respiration:   Sleep Associated Hypoxemia: sustained hypoxemia was not present. Baseline oxygen saturation was 95%.  Time with saturation less than or equal to 88% was 2.3 minutes. The lowest oxygen saturation was 83%.   Snoring: Snoring was present.  Respiratory events: The home study revealed a presence of 4 obstructive apneas and 3 mixed and central apneas. There were 70 hypopneas resulting in a combined apnea/hypopnea index [AHI] of 14 events per hour.  AHI was 18.3 per hour supine, - per hour prone, 4.5 per hour on left side, and 30 per hour on right side.   Pattern: Excluding events noted above, respiratory rate and pattern was Normal.    Position: Percent of time " spent: supine - 67.4%, prone - 0%, on left - 32%, on right - 0.6%.    Heart Rate: By pulse oximetry normal rate was noted.     Assessment:   Mild obstructive sleep apnea.  Sleep associated hypoxemia was not present.    Recommendations:  Depending on clinical indications for treatment of mild obstructive sleep apnea, consider following options. If there is excessive daytime sleepiness or other qualifying medical comorbidity, consider  auto-CPAP at 5-15 cmH2O. Alternatively, patient can consider oral appliance therapy or upper airway surgical intervention.   Suggest optimizing sleep hygiene and avoiding sleep deprivation.  Weight management.    Diagnosis Code(s): Obstructive Sleep Apnea G47.33    Vincenzo Noe MD, March 29, 2022   Diplomate, American Board of Psychiatry and Neurology, Sleep Medicine

## 2022-03-29 NOTE — PROGRESS NOTES
Clinic Care Coordination Contact  [facility]/Voicemail       Clinical Data: CHW Outreach    Outreach attempted x 1. Spoke to Patient (Higinio Briefly)      CHW Introduced intent of call regarding monthly follow up.     Patients reports he is unable to talk at the moment. Requesting to be called later time.     CHW acknowledged and reminded Patient on his upcoming appointment today (Adult Psychotherapy). Patient acknowledged and expressed that he forgot about the appointment and stated that he will need to join the video visit and will call CHW/ CCC Team at a later time.     CHW acknowledged and provided Patient with call back information.    Chart Review    Monthly Follow Up     Address Goal(s):   o Has Patient been able to get paper work to his work place regarding short term disability? Was Patient able to take to medical records to get it resolved?    o Was Patient able to communicate with PCP and or Therapist regarding support in Patient wanting someone to speak to Disability department in informing them on Patient's disability and Patient's work accommodations?   o Has Patient been able to contact [employer]?      Address Care Gap(s):   o Encourage Patient to schedule appointment for Influenza Vaccine and  Preventive Care Visit via [employer] or to contact the clinic.    Plan: CHW will make another attempt to reach the patient via phone or [employer].    CHW outreach in the next 2 weeks.      VALENTE Michelle  Clinic Care Coordination   [facility]   Phone: [phone]  [email or web address]

## 2022-03-29 NOTE — PROGRESS NOTES
[facility]                                     Progress Note    Patient Name: Higinio Wallace  Date: 3/29/22         Service Type: Individual      Session Start Time: 3:01 PM  session End Time: 3:50 PM     Session Length: 49 minutes    Session #: 13    Attendees: Client attended alone    Service Modality: Video Visit:      Provider verified identity through the following two step process.  Patient provided:  Patient is known previously to provider    Telemedicine Visit: The patient's condition can be safely assessed and treated via synchronous audio and visual telemedicine encounter.      Reason for Telemedicine Visit: Services only offered telehealth    Originating Site (Patient Location): Patient's other His car    Distant Site (Provider Location): Provider Remote Setting- Home Office    Consent:  The patient/guardian has verbally consented to: the potential risks and benefits of telemedicine (video visit) versus in person care; bill my insurance or make self-payment for services provided; and responsibility for payment of non-covered services.     Patient would like the video invitation sent by:  Send to e-mail at: [email or web address]    Mode of Communication:  Video Conference via [employer]    As the provider I attest to compliance with applicable laws and regulations related to telemedicine.    DATA  Interactive Complexity: No  Crisis: No        Progress Since Last Session (Related to Symptoms / Goals / Homework):   Symptoms: Improving Patient reports things are moving in a positive direction towards his disability claim    Homework: Partially completed      Episode of Care Goals: Minimal progress - PREPARATION (Decided to change - considering how); Intervened by negotiating a change plan and determining options / strategies for behavior change, identifying triggers, exploring social supports, and working towards setting a date to begin behavior change     Current / Ongoing Stressors  "and Concerns:   Patient provided an update on the progress he is made towards his disability claim.  Patient shared that this has been a huge relief for him and he feels that is freed up space from within to focus on his mental health and wellbeing.  Patient was able to identify personal strengths he processes as well as different things in his life he is grateful for.  Therapist reinforced his strengths and his gratitude practice.  Patient and therapist explored the possibility of adding a daily affirmation and the impact that could have on his mental health and wellbeing.  Patient agreed to try the affirmation \"I release anything that did not go well today\" at the end of each day to give him a fresh start the next day.     Treatment Objective(s) Addressed in This Session:   Decrease frequency and intensity of feeling down, depressed, hopeless  Identify negative self-talk and behaviors: challenge core beliefs, myths, and actions.   Patient will recognize limits and implement self-care strategies daily.     Intervention:   CBT: CBT triangle, cognitive distorations    ACT: Value based living              DBT: Validated patient's emotions and experiences   Client-centered: Self-care strategies and strengths based    Assessments completed prior to visit:  The following assessments were completed by patient for this visit:  PHQ2:   PHQ-2 ( 1999 [employer]) 1/5/2021 5/26/2020   Q1: Little interest or pleasure in doing things 0 2   Q2: Feeling down, depressed or hopeless 0 1   PHQ-2 Score 0 3   PHQ-2 Total Score (12-17 Years)- Positive if 3 or more points; Administer PHQ-A if positive 0 3     PHQ9:   PHQ-9 SCORE 8/17/2021 11/8/2021 11/23/2021 1/4/2022 1/18/2022 3/24/2022 3/29/2022   PHQ-9 Total Score Arabella 16 (Moderately severe depression) 14 (Moderate depression) 18 (Moderately severe depression) 23 (Severe depression) 15 (Moderately severe depression) 16 (Moderately severe depression) 14 (Moderate depression)   PHQ-9 " Total Score 16 14 18 23 15 16 14     GAD2:   ANITA-2 1/4/2022 1/4/2022 1/18/2022   Feeling nervous, anxious, or on edge 3 3 2   Not being able to stop or control worrying 3 3 2   ANITA-2 Total Score 6 6 4     GAD7:   ANITA-7 SCORE 9/16/2020 10/6/2020 11/4/2020 11/8/2021 1/4/2022 1/4/2022 1/18/2022   Total Score - - - 14 (moderate anxiety) - 21 (severe anxiety) 13 (moderate anxiety)   Total Score 7 20 13 14 21 21 13         ASSESSMENT: Current Emotional / Mental Status (status of significant symptoms):   Risk status (Self / Other harm or suicidal ideation)   Patient denies current fears or concerns for personal safety.   Patient denies current or recent suicidal ideation or behaviors.   Patient denies current or recent homicidal ideation or behaviors.   Patient denies current or recent self injurious behavior or ideation.   Patient denies other safety concerns.   Patient reports there has been no change in risk factors since their last session.     Patient reports there has been no change in protective factors since their last session.     Recommended that patient call 911 or go to the local ED should there be a change in any of these risk factors.     Appearance:   Appropriate    Eye Contact:   Good    Psychomotor Behavior: Normal    Attitude:   Cooperative  Interested   Orientation:   All   Speech    Rate / Production: Talkative    Volume:  Normal    Mood:    Normal   Affect:    Appropriate    Thought Content:  Clear    Thought Form:  Goal Directed    Insight:    Fair      Medication Review:   No changes to current psychiatric medication(s)     Medication Compliance:   Yes Working on developing consistency     Changes in Health Issues:   Yes: A variety of health issues that lead to distress     Chemical Use Review:   Substance Use: Chemical use reviewed, no active concerns identified      Tobacco Use: No current tobacco use.      Diagnosis:  1. PTSD (post-traumatic stress disorder)        Collateral Reports  "Completed:   Not Applicable    PLAN: (Patient Tasks / Therapist Tasks / Other)  Patient will continue to focus on a regular self-care practice and will begin to incorporate a daily affirmation each evening which is \"I release anything that did not go well today.\"        Meri Rich, [facility] Provider Oversight:  Dr. Flaco Salazar  3/29/22                                                       ______________________________________________________________________    Individual Treatment Plan    Patient's Name: Higinio Wallace  YOB: 1990    Date of Creation: 11/23/2021  Date Treatment Plan Last Reviewed/Revised: 3/1/2022    DSM5 Diagnoses: 309.81 (F43.10) Posttraumatic Stress Disorder (includes Posttraumatic Stress Disorder for Children 6 Years and Younger)  With dissociative symptoms  Psychosocial / Contextual Factors: History of trauma, significant mental health concerns, physical health concerns, financial stressors  PROMIS (reviewed every 90 days):     Referral / Collaboration:  Referral to another professional/service is not indicated at this time..    Anticipated number of session for this episode of care: 12  Anticipation frequency of session: Weekly  Anticipated Duration of each session: 38-52 minutes  Treatment plan will be reviewed in 90 days or when goals have been changed.       MeasurableTreatment Goal(s) related to diagnosis / functional impairment(s)  Goal 1: Patient will focus on decreasing his mental health symptomology as evidenced by a PHQ-9 and ANITA-7 score of 10 or less    I will know I've met my goal when I feel less stressed and increased happiness and motivation.      Objective #A (Patient Action)    Patient will Patient will learn about trauma and the impact that can have on him both emotionally and physically..  Status: Continued - Date(s): 3/1/2022    Intervention(s)  Therapist will provide educational materials on  The impact that trauma can have on the mind, the body, the " spirit, and emotions..    Objective #B  Patient will Patient will Decrease frequency and intensity of feeling down, depressed, hopeless.  Feel less tired and more energy during the day   Identify negative self-talk and behaviors: challenge core beliefs, myths, and actions.  Status: Continued - Date(s): 3/1/2022    Intervention(s)  Therapist will teach emotional regulation skills. Patient and therapist will focus on increasing his awareness and recognition of symptoms in the moment as well as developing coping skills and strategies to manage the symptoms.  The skills and strategies could include positive affirmations, movement, engaging in the creative process, and developing a positive sense of self..    Objective #C  Patient will Patient will Explore with therapist how his trauma has impacted his thoughts and how his thoughts impact his behaviors and emotional wellbeing..  Status: Continued - Date(s): 3/1/2022    Intervention(s)  Therapist will teach distraction skills. As well as ways to challenge negative thoughts and self talk...      Patient has reviewed and agreed to the above plan.      Meri Rich, [facility]  March 1, 2022  Answers for HPI/ROS submitted by the patient on 3/24/2022  If you checked off any problems, how difficult have these problems made it for you to do your work, take care of things at home, or get along with other people?: Very difficult  PHQ9 TOTAL SCORE: 16        Answers for HPI/ROS submitted by the patient on 3/29/2022  If you checked off any problems, how difficult have these problems made it for you to do your work, take care of things at home, or get along with other people?: Very difficult  PHQ9 TOTAL SCORE: 14

## 2022-03-30 ASSESSMENT — PATIENT HEALTH QUESTIONNAIRE - PHQ9: SUM OF ALL RESPONSES TO PHQ QUESTIONS 1-9: 14

## 2022-03-31 ENCOUNTER — PATIENT OUTREACH (OUTPATIENT)
Dept: CARE COORDINATION | Facility: CLINIC | Age: 32
End: 2022-03-31

## 2022-03-31 DIAGNOSIS — R06.83 SNORING: ICD-10-CM

## 2022-03-31 DIAGNOSIS — R40.0 SLEEPINESS: ICD-10-CM

## 2022-03-31 PROCEDURE — G0399 HOME SLEEP TEST/TYPE 3 PORTA: HCPCS | Performed by: INTERNAL MEDICINE

## 2022-03-31 NOTE — PROGRESS NOTES
"Care Coordination Clinician Chart Review     Situation: Patient chart reviewed by care coordinator.?     Background: Initial assessment and enrollment to Care Coordination was 11-.?? Patient centered goals were developed with participation from patient.? Lead CC handed patient off to CHW for continued outreach every 30 days.??     Assessment: Per chart review, patient outreach completed by CC CHW on 3-.? Patient is actively working to accomplish goal(s).? Patient's goal(s) remain(s) appropriate at this time.? Patient is due for updated Plan of Care.? Annual assessment will be due 11-.     Goals        Patient Stated       1. Mental Health Management (pt-stated)       Goal Statement: I will have stable mental health and have a therapist and psychiatrist within 2 months.  Date Goal set: 11-  Barriers: Feeling overwhelmed, stressed.  Strengths: has insurance, already connected to therapist.   Date to Achieve By: 1-  Patient expressed understanding of goal: yes  Action steps to achieve this goal:  1. I will attend appointments with my therapists.  2. I will meet with new psychiatrist on 12-9 at 1:00 with [facility] with Mekhit \"Ana Paula\" Staci Macias NP  3. I will report progress towards this goal at scheduled outreach telephone calls from the CCC team.    Discussed 2/15/22           2. Financial Wellbeing (pt-stated)       Goal Statement: I will have financial stability and am able to pay my bills within 6 months.  Date Goal set: 12-1-2021  Barriers: trouble at current job, only able to work part time.  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   Date to Achieve By: 5-1-2022  Patient expressed understanding of goal: yes  Action steps to achieve this goal:  1. I will continue to work with my current employer to have income while I look for a new job.  2. I will review [employer] [facility]  and [facility] resources for help.  3. I will report progress towards this " goal at scheduled outreach telephone calls from the CCC team.    Discussed 2/15/22           3. Other (pt-stated)       Goal Statement: I will find a new place to live with my girlfriend that is affordable within one year.  Date Goal set:12-1-2021  Barriers: hard to find affordable apartments.  Strengths: have a place to live now.   Date to Achieve By: 12-1-2022  Patient expressed understanding of goal: yes  Action steps to achieve this goal:  1. I will look at [facility] for options.   2. I will continue to pay rent at my current apartment.  3. I will report progress towards this goal at scheduled outreach telephone calls from the [facility] team.  1-7 discussed, want to move out in May when lease is up,      Discussed 2/15/22          ??     Plan/Recommendations: The patient will continue working with Care Coordination to achieve above goal(s).? CHW will involve Lead CC as needed or if patient is ready to move to maintenance.? Lead CC will continue to monitor CHW s monthly outreaches and progress to goal(s) every 6 weeks.?     Plan of Care updated and sent to patient: Yes, via My Chart.

## 2022-04-01 NOTE — PROGRESS NOTES
Higinio is a 31 year old who is being evaluated via a billable telephone visit.      What phone number would you like to be contacted at? [phone]  How would you like to obtain your AVS? Arabella Sylvester was seen today for forms.    Diagnoses and all orders for this visit:    PTSD (post-traumatic stress disorder)    Dissociative identity disorder (H)    Multiple joint pain    Myalgia    Fatigue, unspecified type    Malrotation of intestine    Chronic bilateral low back pain without sciatica         Subjective   Higinio is a 31 year old who is evaluated today due to complex health situations causing disability.    Last visit 03/22/2022    He has PTSD and dissociative identity disorder.  He follows with a therapist currently.  He has not seen a psychiatrist recently and is not on medications for mental health management.  He states financial difficulties as his limitation for not being able to establish care with psychiatry.  His mental health is the most major consideration in regard to his disability that requires absence from work.  With worsening social stressors he reports increasing difficulties.  He does not describe any considerations that would result in self-harm or harm to others in relation to his mental health.  He does experience and more frequently intrusive thoughts and intrusive voices. His mental health makes it very difficult to cope with physical symptoms, described in more detail below, while at work.  He has impaired interpersonal communication and ability to understand complex instructions.  His mood makes memory and concentration extremely difficult and impairers function to a significant degree.  Due to the intrusive nature of thoughts and voices his ability to be able to concentrate to even perform simple duties at this point is significantly impaired.     He has a history of malrotation of the intestines.  He is seeing gastroenterology.  Most recent consultation note reviewed.  He is working  to improve diet.  No acute issue is identified.  This condition causes abdominal discomfort and digestive system dysfunction which has historically caused difficulties at work.     He has multiple areas of joint pain as well as myalgias.  He has some nonspecific lab test abnormalities.  He has been evaluated by rheumatology.  It was felt he did not have an underlying autoimmune or rheumatologic disorder.  He was evaluated by endocrinology for cushingoid features.  He does not have Cushing syndrome.  He has been evaluated by orthopedic specialty provider.  There is recommendation for physical therapy.  Knee pain, generalized joint and muscle pain and chronic back pain are physically limiting factors at work.  His mental health concerns make it difficult to cope with these physical symptoms.  He is planning to do physical therapy.  He cites financial limitations as to delays in starting physical therapy.     He has underlying sleep apnea based on recent test.  He has fatigue and this is a possible cause for fatigue. He needs to discuss results and treatment options with sleep specialist.  Fatigue is a significant limiting factor on top of the other concerns as previously mentioned and discussed.     I recommend he remain on disability due to the conglomeration of the concerns as discussed in detail above.  I recommend he follow-up with me every 2 to 4 weeks for reassessment.  I recommend he utilize resources including care coordination here at our clinic to help with financial and housing difficulties.        Review of Systems   Complete review of systems is obtained.  Other than the specific considerations noted above complete review of systems is negative.        Objective         Vitals:  No vitals were obtained today due to virtual visit.    Physical Exam   healthy, alert and no distress  PSYCH: Alert and oriented times 3; coherent speech, normal   rate and volume, able to articulate logical thoughts, able   to  abstract reason, no tangential thoughts, no hallucinations   or delusions  His affect is normal  RESP: No cough, no audible wheezing, able to talk in full sentences  Remainder of exam unable to be completed due to telephone visits        Phone call duration: 16 minutes

## 2022-04-03 ENCOUNTER — HOSPITAL ENCOUNTER (EMERGENCY)
Facility: CLINIC | Age: 32
Discharge: HOME | End: 2022-04-03
Attending: EMERGENCY MEDICINE | Admitting: EMERGENCY MEDICINE
Payer: COMMERCIAL

## 2022-04-03 VITALS
SYSTOLIC BLOOD PRESSURE: 137 MMHG | HEART RATE: 95 BPM | RESPIRATION RATE: 20 BRPM | OXYGEN SATURATION: 97 % | DIASTOLIC BLOOD PRESSURE: 76 MMHG | WEIGHT: 315 LBS | BODY MASS INDEX: 40.19 KG/M2 | TEMPERATURE: 98 F

## 2022-04-03 DIAGNOSIS — R11.10 VOMITING AND DIARRHEA: ICD-10-CM

## 2022-04-03 DIAGNOSIS — T62.0X4A: ICD-10-CM

## 2022-04-03 DIAGNOSIS — R19.7 VOMITING AND DIARRHEA: ICD-10-CM

## 2022-04-03 LAB
ALBUMIN SERPL-MCNC: 4 G/DL (ref 3.5–5)
ALP SERPL-CCNC: 58 U/L (ref 45–120)
ALT SERPL W P-5'-P-CCNC: 51 U/L (ref 0–45)
ANION GAP SERPL CALCULATED.3IONS-SCNC: 14 MMOL/L (ref 5–18)
AST SERPL W P-5'-P-CCNC: 30 U/L (ref 0–40)
BASOPHILS # BLD AUTO: 0.1 10E3/UL (ref 0–0.2)
BASOPHILS NFR BLD AUTO: 1 %
BILIRUB SERPL-MCNC: 0.5 MG/DL (ref 0–1)
BUN SERPL-MCNC: 12 MG/DL (ref 8–22)
CALCIUM SERPL-MCNC: 9.6 MG/DL (ref 8.5–10.5)
CHLORIDE BLD-SCNC: 102 MMOL/L (ref 98–107)
CO2 SERPL-SCNC: 23 MMOL/L (ref 22–31)
CREAT SERPL-MCNC: 1.17 MG/DL (ref 0.7–1.3)
EOSINOPHIL # BLD AUTO: 0.2 10E3/UL (ref 0–0.7)
EOSINOPHIL NFR BLD AUTO: 3 %
ERYTHROCYTE [DISTWIDTH] IN BLOOD BY AUTOMATED COUNT: 12.4 % (ref 10–15)
GFR SERPL CREATININE-BSD FRML MDRD: 87 ML/MIN/1.73M2
GLUCOSE BLD-MCNC: 150 MG/DL (ref 70–125)
HCT VFR BLD AUTO: 48.7 % (ref 40–53)
HGB BLD-MCNC: 17 G/DL (ref 13.3–17.7)
IMM GRANULOCYTES # BLD: 0.1 10E3/UL
IMM GRANULOCYTES NFR BLD: 1 %
LYMPHOCYTES # BLD AUTO: 1.2 10E3/UL (ref 0.8–5.3)
LYMPHOCYTES NFR BLD AUTO: 15 %
MCH RBC QN AUTO: 30.6 PG (ref 26.5–33)
MCHC RBC AUTO-ENTMCNC: 34.9 G/DL (ref 31.5–36.5)
MCV RBC AUTO: 88 FL (ref 78–100)
MONOCYTES # BLD AUTO: 0.6 10E3/UL (ref 0–1.3)
MONOCYTES NFR BLD AUTO: 7 %
NEUTROPHILS # BLD AUTO: 5.9 10E3/UL (ref 1.6–8.3)
NEUTROPHILS NFR BLD AUTO: 73 %
NRBC # BLD AUTO: 0 10E3/UL
NRBC BLD AUTO-RTO: 0 /100
PLATELET # BLD AUTO: 197 10E3/UL (ref 150–450)
POTASSIUM BLD-SCNC: 3.4 MMOL/L (ref 3.5–5)
PROT SERPL-MCNC: 7 G/DL (ref 6–8)
RBC # BLD AUTO: 5.56 10E6/UL (ref 4.4–5.9)
SODIUM SERPL-SCNC: 139 MMOL/L (ref 136–145)
WBC # BLD AUTO: 7.9 10E3/UL (ref 4–11)

## 2022-04-03 PROCEDURE — 96374 THER/PROPH/DIAG INJ IV PUSH: CPT

## 2022-04-03 PROCEDURE — 85025 COMPLETE CBC W/AUTO DIFF WBC: CPT | Performed by: EMERGENCY MEDICINE

## 2022-04-03 PROCEDURE — 250N000011 HC RX IP 250 OP 636: Performed by: EMERGENCY MEDICINE

## 2022-04-03 PROCEDURE — 96361 HYDRATE IV INFUSION ADD-ON: CPT

## 2022-04-03 PROCEDURE — 258N000003 HC RX IP 258 OP 636: Performed by: EMERGENCY MEDICINE

## 2022-04-03 PROCEDURE — 99284 EMERGENCY DEPT VISIT MOD MDM: CPT | Mod: 25

## 2022-04-03 PROCEDURE — 80053 COMPREHEN METABOLIC PANEL: CPT | Performed by: EMERGENCY MEDICINE

## 2022-04-03 PROCEDURE — 36415 COLL VENOUS BLD VENIPUNCTURE: CPT | Performed by: EMERGENCY MEDICINE

## 2022-04-03 RX ORDER — SODIUM CHLORIDE 9 MG/ML
INJECTION, SOLUTION INTRAVENOUS CONTINUOUS
Status: DISCONTINUED | OUTPATIENT
Start: 2022-04-03 | End: 2022-04-03 | Stop reason: HOSPADM

## 2022-04-03 RX ORDER — ONDANSETRON 2 MG/ML
4 INJECTION INTRAMUSCULAR; INTRAVENOUS EVERY 30 MIN PRN
Status: DISCONTINUED | OUTPATIENT
Start: 2022-04-03 | End: 2022-04-03 | Stop reason: HOSPADM

## 2022-04-03 RX ADMIN — SODIUM CHLORIDE 1000 ML: 9 INJECTION, SOLUTION INTRAVENOUS at 05:32

## 2022-04-03 RX ADMIN — ONDANSETRON 4 MG: 2 INJECTION INTRAMUSCULAR; INTRAVENOUS at 05:32

## 2022-04-03 ASSESSMENT — ENCOUNTER SYMPTOMS
NAUSEA: 1
DIARRHEA: 0
VOMITING: 0
HALLUCINATIONS: 0

## 2022-04-03 NOTE — ED NOTES
"Introduced self to patient. White board updated. Expected length of stay explained to patient. Patient has call light within reach. Side rails up. Pain assessed. Patient requesting his stomach be pumped. Patient c/o stomach \"burning\" -- informed we will treat his symptoms with fluids and IV antiemetic.   "

## 2022-04-03 NOTE — ED PROVIDER NOTES
EMERGENCY DEPARTMENT ENCOUNTER      NAME: Higinio Wallace  AGE: 31 year old male  YOB: 1990  MRN: [id number]  EVALUATION DATE & TIME: No admission date for patient encounter.    PCP: Raza Franklin    ED PROVIDER: Eduin Mcgill M.D.      Chief Complaint   Patient presents with     Abdominal Pain         FINAL IMPRESSION:  1. Toxic effect of ingested mushrooms, undetermined, initial encounter    2. Vomiting and diarrhea          ED COURSE & MEDICAL DECISION MAKING:    Pertinent Labs & Imaging studies reviewed. (See chart for details)  31 year old male presents to the Emergency Department for evaluation of abdominal pain nausea and diarrhea.  Patient did ingest mushrooms.  He was able to show me a picture of the motions.  Does not appear to be amanita type mushrooms.   LFTs normal.  CBC normal.  Patient feeling better after IV fluids and IV Zofran.  Will discharge home.  Will return for any worsening symptoms.  I highly doubt that this is a toxic long-term ingestion.  He will return for any worsening symptoms peer    5:00 AM I met with the patient to gather history and to perform my initial exam. I discussed the plan for care while in the Emergency Department. PPE: n95 mask and glasses.     At the conclusion of the encounter I discussed the results of all of the tests and the disposition. The questions were answered. The patient or family acknowledged understanding and was agreeable with the care plan.           MEDICATIONS GIVEN IN THE EMERGENCY:  Medications   0.9% sodium chloride BOLUS (1,000 mLs Intravenous New Bag 4/3/22 0532)     Followed by   sodium chloride 0.9% infusion (has no administration in time range)   ondansetron (ZOFRAN) injection 4 mg (4 mg Intravenous Given 4/3/22 0532)       NEW PRESCRIPTIONS STARTED AT TODAY'S ER VISIT  New Prescriptions    No medications on file          =================================================================    HPI    Patient information was obtained  "from: Patient    Use of : N/A        Higinio Wallace is a 31 year old male with a pertinent history of IBS, obesity, suicidal ideation, depression who presents to this ED via walk-in for evaluation of abdominal pain and body feeling really warm.     Patient reports he took mushrooms at 12:30AM this morning. He describes the mushrooms as small and white. He thinks he took 2 grams. Currently, patient feels \"weird\" and is nauseous. Denies hallucinations, vomiting, and diarrhea. Is only on mediation for IBS.       REVIEW OF SYSTEMS   Review of Systems   Gastrointestinal: Positive for nausea. Negative for diarrhea and vomiting.   Psychiatric/Behavioral: Negative for hallucinations.        Postive for feeling \"weird\" after taking mushrooms.    All other systems reviewed and are negative.      PAST MEDICAL HISTORY:  Past Medical History:   Diagnosis Date     Anxiety      Class 2 obesity due to excess calories in adult      Complication of anesthesia      Depression      Gastroesophageal reflux disease with esophagitis      History of nephrolithiasis      Irritable bowel syndrome      Major depression, recurrent (H)      Malrotation of intestine      Manic disorder (H)      Panic disorder      Patellofemoral instability of left knee with pain      Pre-diabetes      Psychosis (H)      PTSD (post-traumatic stress disorder)      Tobacco use        PAST SURGICAL HISTORY:  Past Surgical History:   Procedure Laterality Date     ARTHROSCOPY KNEE WITH PATELLAR REALIGNMENT Left 11/13/2020    Procedure: Knee Arthroscopy, Medial Patello-femoral Ligament Reconstruction with Allograft, Distal Tibial Tubercle Osteotomy, Lateral Retinacular Lengthening;  Surgeon: Sourav Keating MD;  Location: [facility]     ARTHROTOMY TIBIAL TUBERCLE SHIFT Left 11/13/2020    Procedure: tibial tubercle Osteotomy ;  Surgeon: Sourav Keating MD;  Location: [facility]     COLONOSCOPY N/A 10/07/2020    Procedure: COLONOSCOPY, WITH POLYPECTOMY " AND BIOPSY;  Surgeon: Donell Holland MD;  Location: [facility] OR     deviated septum  2018     KNEE SURGERY Left      OPEN REDUCTION INTERNAL FIXATION RODDING INTRAMEDULLARY TIBIA Left 11/13/2020    Procedure: plus derotation tibial osteotomy;  Surgeon: Sourav Keating MD;  Location: [facility] OR           CURRENT MEDICATIONS:    Current Facility-Administered Medications   Medication     0.9% sodium chloride BOLUS    Followed by     sodium chloride 0.9% infusion     ondansetron (ZOFRAN) injection 4 mg     Current Outpatient Medications   Medication     acetaminophen (TYLENOL) 500 MG tablet     albuterol (ALBUTEROL) 108 (90 BASE) MCG/ACT Inhaler     alum & mag hydroxide-simethicone (MAALOX MAX) 400-400-40 MG/5ML SUSP suspension     cyclobenzaprine (FLEXERIL) 10 MG tablet     metFORMIN (GLUCOPHAGE) 500 MG tablet     omeprazole (PRILOSEC) 40 MG DR capsule     sucralfate (CARAFATE) 1 GM tablet         ALLERGIES:  Allergies   Allergen Reactions     Bee Venom Anaphylaxis and Other (See Comments)     Swelling  Large local reactions/ swelling       Fruit Acid Concentrate [Fruit Extracts] Swelling     Lips swell and crust over little bit- tongue gets numb     Liquid Adhesive Dermatitis     And band aid       Monosodium Glutamate Hives       FAMILY HISTORY:  Family History   Problem Relation Age of Onset     Other - See Comments Mother         PONV     Cerebrovascular Disease Brother      Atrial fibrillation Brother      Other - See Comments Brother         cardiac autoimmune deficiency       SOCIAL HISTORY:   Social History     Socioeconomic History     Marital status: Single     Spouse name: Not on file     Number of children: Not on file     Years of education: Not on file     Highest education level: Not on file   Occupational History     Not on file   Tobacco Use     Smoking status: Current Some Day Smoker     Packs/day: 0.25     Types: Cigarettes     Start date: 2001     Smokeless tobacco: Never Used     Tobacco  comment: 1-2 cig a day trying to quit   Substance and Sexual Activity     Alcohol use: Not Currently     Drug use: Yes     Types: Marijuana     Comment: daily     Sexual activity: Yes     Partners: Female     Birth control/protection: Condom   Other Topics Concern     Parent/sibling w/ CABG, MI or angioplasty before 65F 55M? Not Asked   Social History Narrative     Not on file     Social Determinants of Health     Financial Resource Strain: Medium Risk     Difficulty of Paying Living Expenses: Somewhat hard   Food Insecurity: Food Insecurity Present     Worried About Running Out of Food in the Last Year: Sometimes true     Ran Out of Food in the Last Year: Sometimes true   Transportation Needs: No Transportation Needs     Lack of Transportation (Medical): No     Lack of Transportation (Non-Medical): No   Physical Activity: Not on file   Stress: Not on file   Social Connections: Not on file   Intimate Partner Violence: Not on file   Housing Stability: Low Risk      Unable to Pay for Housing in the Last Year: No     Number of Places Lived in the Last Year: 1     Unstable Housing in the Last Year: No       VITALS:  BP (!) 171/102   Pulse 96   Temp 98  F (36.7  C) (Oral)   Resp 20   Wt 149.7 kg (330 lb)   SpO2 96%   BMI 40.19 kg/m      PHYSICAL EXAM    Physical Exam  Constitutional:       General: He is not in acute distress.     Appearance: He is not diaphoretic.   HENT:      Head: Atraumatic.   Eyes:      General: No scleral icterus.     Pupils: Pupils are equal, round, and reactive to light.   Cardiovascular:      Heart sounds: Normal heart sounds.   Pulmonary:      Effort: No respiratory distress.      Breath sounds: Normal breath sounds.   Abdominal:      General: Bowel sounds are normal.      Palpations: Abdomen is soft.      Tenderness: There is no abdominal tenderness.   Musculoskeletal:         General: No tenderness.   Skin:     General: Skin is warm.      Findings: No rash.           LAB:  All pertinent  labs reviewed and interpreted.  Labs Ordered and Resulted from Time of ED Arrival to Time of ED Departure   COMPREHENSIVE METABOLIC PANEL - Abnormal       Result Value    Sodium 139      Potassium 3.4 (*)     Chloride 102      Carbon Dioxide (CO2) 23      Anion Gap 14      Urea Nitrogen 12      Creatinine 1.17      Calcium 9.6      Glucose 150 (*)     Alkaline Phosphatase 58      AST 30      ALT 51 (*)     Protein Total 7.0      Albumin 4.0      Bilirubin Total 0.5      GFR Estimate 87     CBC WITH PLATELETS AND DIFFERENTIAL    WBC Count 7.9      RBC Count 5.56      Hemoglobin 17.0      Hematocrit 48.7      MCV 88      MCH 30.6      MCHC 34.9      RDW 12.4      Platelet Count 197      % Neutrophils 73      % Lymphocytes 15      % Monocytes 7      % Eosinophils 3      % Basophils 1      % Immature Granulocytes 1      NRBCs per 100 WBC 0      Absolute Neutrophils 5.9      Absolute Lymphocytes 1.2      Absolute Monocytes 0.6      Absolute Eosinophils 0.2      Absolute Basophils 0.1      Absolute Immature Granulocytes 0.1      Absolute NRBCs 0.0         RADIOLOGY:  Reviewed all pertinent imaging. Please see official radiology report.  No orders to display           I, Rhea Lovett, am serving as a scribe to document services personally performed by Dr. Eduin Mcgill, based on my observation and the provider's statements to me. I, Eduin Mcgill MD attest that Rhea Lovett is acting in a scribe capacity, has observed my performance of the services and has documented them in accordance with my direction.    Eduin Mcgill M.D.  Emergency Medicine  [facility] EMERGENCY ROOM  [address] [id number] [phone]  Dept: [phone]       Eduin Mcgill MD  04/03/22 0624

## 2022-04-03 NOTE — ED TRIAGE NOTES
"Patient reports he tried microdosing mushrooms for the first time tonight around midnight. Thought it would help hs IBS. States generalized abdominal \"warmth\" started around 0230. Denies nausea, vomiting, Reports extremities feel weak. Pt appears anxious in triage.   "

## 2022-04-04 ENCOUNTER — VIRTUAL VISIT (OUTPATIENT)
Dept: FAMILY MEDICINE | Facility: CLINIC | Age: 32
End: 2022-04-04
Payer: COMMERCIAL

## 2022-04-04 DIAGNOSIS — G89.29 CHRONIC BILATERAL LOW BACK PAIN WITHOUT SCIATICA: ICD-10-CM

## 2022-04-04 DIAGNOSIS — F44.81 DISSOCIATIVE IDENTITY DISORDER (H): ICD-10-CM

## 2022-04-04 DIAGNOSIS — F43.10 PTSD (POST-TRAUMATIC STRESS DISORDER): Primary | ICD-10-CM

## 2022-04-04 DIAGNOSIS — R53.83 FATIGUE, UNSPECIFIED TYPE: ICD-10-CM

## 2022-04-04 DIAGNOSIS — M79.10 MYALGIA: ICD-10-CM

## 2022-04-04 DIAGNOSIS — Q43.3 MALROTATION OF INTESTINE (H): ICD-10-CM

## 2022-04-04 DIAGNOSIS — M54.50 CHRONIC BILATERAL LOW BACK PAIN WITHOUT SCIATICA: ICD-10-CM

## 2022-04-04 DIAGNOSIS — M25.50 MULTIPLE JOINT PAIN: ICD-10-CM

## 2022-04-04 PROCEDURE — 99213 OFFICE O/P EST LOW 20 MIN: CPT | Mod: TEL | Performed by: FAMILY MEDICINE

## 2022-04-05 ENCOUNTER — PATIENT OUTREACH (OUTPATIENT)
Dept: CARE COORDINATION | Facility: CLINIC | Age: 32
End: 2022-04-05
Payer: COMMERCIAL

## 2022-04-05 NOTE — PROGRESS NOTES
Contact  [facility]/Voicemail    Referral Source: Care Team  Clinical Data:  Outreach  Outreach attempted x 1. Unable to leave message, fast busy.    4-7 Outreach attempted x2, fast busy, unable to leave message.     Post ED call.  Had appointment with PCP yesterday after ED.  Plan:   will send unable to contact letter via My Chart. Delegating to [facility] to try in 10 business days.    CC available as needed and in one month.  Dana Kumar,   [facility]  [phone]

## 2022-04-05 NOTE — LETTER
[facility]ORDIN[facility]  [facility]    April 7, 2022    Higinio Wallace  [address]   [place] [address]      Dear Higinio,    I have been attempting to reach you since our last contact. I would like to continue to work with you and provide any additional support you may need on achieving your health care related goals. I would appreciate if you would give me a call at [phone] [facility] to let me know if you would like to continue working together. I know that there are many things that can affect our ability to communicate and I hope we can continue to work together.    All of us at the [facility] are invested in your health and are here to assist you in meeting your goals.     Sincerely,    FUNMILAYO Gomez

## 2022-04-13 ENCOUNTER — DOCUMENTATION ONLY (OUTPATIENT)
Dept: BEHAVIORAL HEALTH | Facility: CLINIC | Age: 32
End: 2022-04-13
Payer: COMMERCIAL

## 2022-04-13 NOTE — PROGRESS NOTES
Patient had an appointment scheduled at 12:30 PM today by video.  This writer logged into the video appointment and waited until 12:45 PM as well as reached out to patient twice by phone and was unable to connect with him.  This writer was unable to leave a voice message as his mailbox was full therefore my chart message was sent.

## 2022-04-19 NOTE — PROGRESS NOTES
Sleep Follow-Up Visit:    Date on this visit: 4/20/2022    Higinio Wallace comes in today for follow-up of his sleep study done on 3/24/22. Higinio Wallace was initially seen for daily fatigue and muscle/joint aches for a couple of years. His medical history is significant for depression/anxiety, tobacco abuse, obesity (BMI 42), IBS, multiple joint pain, PTSD.     HST Results:  Weight: 345 pounds  Analysis Time:  330.5 minutes     Respiration:   Sleep Associated Hypoxemia: sustained hypoxemia was not present. Baseline oxygen saturation was 95%.  Time with saturation less than or equal to 88% was 2.3 minutes. The lowest oxygen saturation was 83%.   Snoring: Snoring was present.  Respiratory events: The home study revealed a presence of 4 obstructive apneas and 3 mixed and central apneas. There were 70 hypopneas resulting in a combined apnea/hypopnea index [AHI] of 14 events per hour.  AHI was 18.3 per hour supine, - per hour prone, 4.5 per hour on left side, and 30 per hour on right side.   Pattern: Excluding events noted above, respiratory rate and pattern was Normal.     Position: Percent of time spent: supine - 67.4%, prone - 0%, on left - 32%, on right - 0.6%.     Heart Rate: By pulse oximetry normal rate was noted.     He says he gets a muscle fatigue after a mild degree of activity. He says it started after having knee surgery. When at work, after an hour of work, he gets back pain and then his IBS is triggered and he vomits. He says he can't do 5 squats without feeling it for 5-6 days.  He says he has gained 100 pounds in the last year, which is mostly fluid retention.   He also has a roaming arthritis and constant joint pain.     He sometimes gets only a couple hours of sleep some nights. Other nights, he gets 4-6 hours.     Bedtime: tries to go to bed at 1 AM, but often does not get tired until 4 AM. Lately, he has been trying to go to bed closer to 3 AM. When working at [employer], he was waking 6-10 AM depending  on his shift. He says he has a hard time with his thoughts. He has vivid dreams and flashbacks that make it hard to want to go to sleep.   His girlfriend hears him yell and have nightmares sometimes.   He has been referred to psychiatry.    He attributes his delayed sleep preference to working nights at [employer]. When working for [employer], he used to get tired around 4-5 AM. He was also going to school full time.      Past medical/surgical history, family history, social history, medications and allergies were reviewed.      Problem List:  Patient Active Problem List    Diagnosis Date Noted     Patellofemoral instability of left knee with pain 10/14/2020     Priority: Medium     Added automatically from request for surgery [id number]       Class 2 severe obesity due to excess calories with serious comorbidity and body mass index (BMI) of 37.0 to 37.9 in adult (H) 09/15/2020     Priority: Medium     Irritable bowel syndrome 08/27/2020     Priority: Medium     Suicidal ideation 09/16/2019     Priority: Medium     Moderate episode of recurrent major depressive disorder (H) 09/16/2019     Priority: Medium     Tobacco abuse 04/20/2017     Priority: Medium        Impression/Plan:    (G47.33) CARSON (obstructive sleep apnea)  (primary encounter diagnosis)  Comment: This study showed mild CARSON, AHI 14/hr. His apnea appeared largely positional, supine AHI 18/hr, non-supine AHI was <5/hr. It is unclear how much of the study was spent awake, he felt he was awake in the middle of the night for a while. These results could be underestimated. He has a number of somatic complaints including muscle fatigue, IBS, migrating arthralgias/myalgias. He spent some time today discussing getting FMLA or disability paperwork and what the role of his sleep may be in that. Certainly fatigue could be exacerbated by untreated apnea and other sleep scheduling inconsistencies. How it contributes to his inability to function at work is difficult to say.  Once on CPAP, at least that variable will be out of the equation.    Plan: Comprehensive DME        I am prescribing auto CPAP 5-15 cm, heated humidifier and compliance meter. The patient was informed of the mask exchange policy and the compliance goals. They were also informed that they would be followed by the sleep therapy management team during the first month of CPAP use.     (F51.5,  F43.12) Nightmares associated with chronic post-traumatic stress disorder  Comment: He alluded to avoiding sleep to avoid nightmares related to his PTSD. He has been referred to psychiatry and is waiting to get called to schedule.  Plan: prazosin (MINIPRESS) 1 MG capsule        Take 1 capsule at bedtime. After 3 days, may increase to 2 capsules if needed.    (G47.21) Delayed sleep phase syndrome  Comment: He works second shift (and has recently been off of work), so his delayed sleep schedule is not as problematic as it could be were he working day shift. He likely has other insomnia related to mental health and possibly some poor sleep hygiene (lots of time in bed).  Plan: He was advised to pick an 8 hour window of time to be in bed for sleep, keeping a consistent bedtime and wake time. Avoid resting, napping or being in bed outside of that schedule. We will work on more specific instructions at his follow up.        He will follow up with me in about 2 month(s).     52 minutes were spent on the date of the encounter doing chart review, history and exam, documentation and further activities as noted above.     Bennett Goltz, PA-C    CC: No ref. provider found

## 2022-04-20 ENCOUNTER — VIRTUAL VISIT (OUTPATIENT)
Dept: SLEEP MEDICINE | Facility: CLINIC | Age: 32
End: 2022-04-20
Payer: COMMERCIAL

## 2022-04-20 VITALS — HEIGHT: 76 IN | WEIGHT: 315 LBS | BODY MASS INDEX: 38.36 KG/M2

## 2022-04-20 DIAGNOSIS — G47.21 DELAYED SLEEP PHASE SYNDROME: ICD-10-CM

## 2022-04-20 DIAGNOSIS — F43.12 NIGHTMARES ASSOCIATED WITH CHRONIC POST-TRAUMATIC STRESS DISORDER: ICD-10-CM

## 2022-04-20 DIAGNOSIS — G47.33 OSA (OBSTRUCTIVE SLEEP APNEA): Primary | ICD-10-CM

## 2022-04-20 DIAGNOSIS — F51.5 NIGHTMARES ASSOCIATED WITH CHRONIC POST-TRAUMATIC STRESS DISORDER: ICD-10-CM

## 2022-04-20 PROCEDURE — 99215 OFFICE O/P EST HI 40 MIN: CPT | Mod: 95 | Performed by: PHYSICIAN ASSISTANT

## 2022-04-20 RX ORDER — PRAZOSIN HYDROCHLORIDE 1 MG/1
CAPSULE ORAL
Qty: 60 CAPSULE | Refills: 0 | Status: SHIPPED | OUTPATIENT
Start: 2022-04-20 | End: 2022-07-12

## 2022-04-20 ASSESSMENT — SLEEP AND FATIGUE QUESTIONNAIRES
HOW LIKELY ARE YOU TO NOD OFF OR FALL ASLEEP WHEN YOU ARE A PASSENGER IN A CAR FOR AN HOUR WITHOUT A BREAK: HIGH CHANCE OF DOZING
HOW LIKELY ARE YOU TO NOD OFF OR FALL ASLEEP IN A CAR, WHILE STOPPED FOR A FEW MINUTES IN TRAFFIC: SLIGHT CHANCE OF DOZING
HOW LIKELY ARE YOU TO NOD OFF OR FALL ASLEEP WHILE SITTING AND TALKING TO SOMEONE: MODERATE CHANCE OF DOZING
HOW LIKELY ARE YOU TO NOD OFF OR FALL ASLEEP WHILE WATCHING TV: HIGH CHANCE OF DOZING
HOW LIKELY ARE YOU TO NOD OFF OR FALL ASLEEP WHILE LYING DOWN TO REST IN THE AFTERNOON WHEN CIRCUMSTANCES PERMIT: HIGH CHANCE OF DOZING
HOW LIKELY ARE YOU TO NOD OFF OR FALL ASLEEP WHILE SITTING INACTIVE IN A PUBLIC PLACE: HIGH CHANCE OF DOZING
HOW LIKELY ARE YOU TO NOD OFF OR FALL ASLEEP WHILE SITTING QUIETLY AFTER LUNCH WITHOUT ALCOHOL: MODERATE CHANCE OF DOZING
HOW LIKELY ARE YOU TO NOD OFF OR FALL ASLEEP WHILE SITTING AND READING: MODERATE CHANCE OF DOZING

## 2022-04-20 NOTE — PATIENT INSTRUCTIONS
[facility]  Address: [address] [facility] [address]   [address] [place] [address]   Phone: [phone]   Fax: [phone]      You will be provided with an auto-titrating CPAP with a pressure range of 5-15 cm with heated humidity to limit nasal congestion. Adjust the heat level on humidifier to find a setting that prevents dry nose but does not cause condensation in the hose or mask. Use distilled water in the humidifier.  The CPAP has a ramp function that starts the pressure lower than your prescribed pressure and gradually increases it over a number of minutes.  This may make it easier to fall asleep.    Try to use the CPAP every-night, all night (minimum of 4 hours). Many insurances require that we prove you are using the CPAP at least 4 hours on at least 70% of nights over a 30 day period. We have 90 days to meet those criteria.            Discussed weight management and the impact of weight gain on sleep apnea.  Let me know if you snore or feel the pressure is too high.    You can get new supplies (mask, hose and filter) for your CPAP every 3-6 months, covered by insurance. You do not need to get supplies that often, but they are available if you would like them.  You may exchange the mask once within the first month if you feel the initial mask does not fit well.  Contact your medical equipment provider for equipment issues.  Please let me know if you have any return of snoring, daytime sleepiness or poor sleep quality. We will want to make sure your CPAP is adequately treating your apnea.  There is a website called CPAP.com that has accessories that may make CPAP use easier. Please visit it at your convenience.  Our phone number is [phone].    Follow-up 2 month.  Bring your CPAP machine with you to the follow up appointment.      For nightmares: Start 1 mg prazosin at bedtime. After 3 days, you can go up to 2 capsules at bedtime if needed. Side effects may include feeling  light-headedness and dizziness.    For your difficulty sleeping: Pick an 8 hour window of time to be in bed for sleep, keeping a consistent bedtime and wake time. Avoid resting, napping or being in bed outside of that schedule.

## 2022-04-20 NOTE — PROGRESS NOTES
Higinio is a 31 year old who is being evaluated via a billable video visit.      How would you like to obtain your AVS? MyChart  If the video visit is dropped, the invitation should be resent by: Text to cell phone: [phone]  Will anyone else be joining your video visit? Zabrina Cardenas      Video Start Time: 9:59 AM  Video-Visit Details    Type of service:  Video Visit    Video End Time:10:43 AM    Originating Location (pt. Location): Home    Distant Location (provider location):  [facility] SLEEP [facility]     Platform used for Video Visit: Galazar   1.8

## 2022-04-22 ENCOUNTER — VIRTUAL VISIT (OUTPATIENT)
Dept: BEHAVIORAL HEALTH | Facility: CLINIC | Age: 32
End: 2022-04-22
Payer: COMMERCIAL

## 2022-04-22 ENCOUNTER — NURSE TRIAGE (OUTPATIENT)
Dept: NURSING | Facility: CLINIC | Age: 32
End: 2022-04-22
Payer: COMMERCIAL

## 2022-04-22 DIAGNOSIS — F43.10 PTSD (POST-TRAUMATIC STRESS DISORDER): Primary | ICD-10-CM

## 2022-04-22 PROCEDURE — 90834 PSYTX W PT 45 MINUTES: CPT | Mod: TEL | Performed by: SOCIAL WORKER

## 2022-04-22 NOTE — PROGRESS NOTES
"      [facility]                                     Progress Note    Patient Name: Higinio Wallace  Date: 4/22/22         Service Type: Individual      Session Start Time: 12:05 PM  session End Time: 12:56 PM     Session Length: 51 minutes    Session #: 14    Attendees: Client attended alone    Service Modality: Phone Visit:      Provider verified identity through the following two step process.  Patient provided:  Patient is known previously to provider    The patient has been notified of the following:      \"We have found that certain health care needs can be provided without the need for a face to face visit.  This service lets us provide the care you need with a phone conversation.       I will have full access to your [facility] medical record during this entire phone call.   I will be taking notes for your medical record.      Since this is like an office visit, we will bill your insurance company for this service.       There are potential benefits and risks of telephone visits (e.g. limits to patient confidentiality) that differ from in-person visits.?Confidentiality still applies for telephone services, and nobody will record the visit.  It is important to be in a quiet, private space that is free of distractions (including cell phone or other devices) during the visit.??      If during the course of the call I believe a telephone visit is not appropriate, you will not be charged for this service\"     Consent has been obtained for this service by care team member: Yes     DATA  Interactive Complexity: No  Crisis: No        Progress Since Last Session (Related to Symptoms / Goals / Homework):   Symptoms: Worsening ruminating/negative thoughts    Homework: Partially completed      Episode of Care Goals: Minimal progress - CONTEMPLATION (Considering change and yet undecided); Intervened by assessing the negative and positive thinking (ambivalence) about behavior change     Current / " Ongoing Stressors and Concerns:   Patient provided an update on life events and stressors.  Within this patient shared regarding a variety of obstacles he has that interfere with him being healthy.  Patient identified these obstacles as his health and diet, family and those individuals around him, and his work environment.  Patient shared that yesterday he decided to quit his job and that he wants to focus on moving out of state with his girlfriend.  Therapist challenged patient and that he appears to be stuck within the obstacles that are presented to him, patient responded by restating the obstacles.  Patient appeared to ruminate within these obstacles and struggles to become unstuck.     Treatment Objective(s) Addressed in This Session:   Decrease frequency and intensity of feeling down, depressed, hopeless  Identify negative self-talk and behaviors: challenge core beliefs, myths, and actions.   Patient will recognize limits and implement self-care strategies daily.     Intervention:   CBT: CBT triangle, cognitive distorations    ACT: Value based living              DBT: Validated patient's emotions and experiences   Client-centered: Self-care strategies and strengths based    Assessments completed prior to visit:  The following assessments were completed by patient for this visit:  PHQ2:   PHQ-2 ( 1999 [employer]) 1/5/2021 5/26/2020   Q1: Little interest or pleasure in doing things 0 2   Q2: Feeling down, depressed or hopeless 0 1   PHQ-2 Score 0 3   PHQ-2 Total Score (12-17 Years)- Positive if 3 or more points; Administer PHQ-A if positive 0 3     PHQ9:   PHQ-9 SCORE 8/17/2021 11/8/2021 11/23/2021 1/4/2022 1/18/2022 3/24/2022 3/29/2022   PHQ-9 Total Score MyChart 16 (Moderately severe depression) 14 (Moderate depression) 18 (Moderately severe depression) 23 (Severe depression) 15 (Moderately severe depression) 16 (Moderately severe depression) 14 (Moderate depression)   PHQ-9 Total Score 16 14 18 23 15 16 14      GAD2:   ANITA-2 1/4/2022 1/4/2022 1/18/2022   Feeling nervous, anxious, or on edge 3 3 2   Not being able to stop or control worrying 3 3 2   ANITA-2 Total Score 6 6 4     GAD7:   ANITA-7 SCORE 9/16/2020 10/6/2020 11/4/2020 11/8/2021 1/4/2022 1/4/2022 1/18/2022   Total Score - - - 14 (moderate anxiety) - 21 (severe anxiety) 13 (moderate anxiety)   Total Score 7 20 13 14 21 21 13         ASSESSMENT: Current Emotional / Mental Status (status of significant symptoms):   Risk status (Self / Other harm or suicidal ideation)   Patient denies current fears or concerns for personal safety.   Patient denies current or recent suicidal ideation or behaviors.   Patient denies current or recent homicidal ideation or behaviors.   Patient denies current or recent self injurious behavior or ideation.   Patient denies other safety concerns.   Patient reports there has been no change in risk factors since their last session.     Patient reports there has been no change in protective factors since their last session.     Recommended that patient call 911 or go to the local ED should there be a change in any of these risk factors.     Appearance:   NA    Eye Contact:   NA    Psychomotor Behavior: Agitated    Attitude:   Suspicious    Orientation:   All   Speech    Rate / Production: Talkative    Volume:  Normal    Mood:    Agitated   Affect:    NA    Thought Content:  Rumination    Thought Form:  Circumstantial   Insight:    Poor      Medication Review:   No changes to current psychiatric medication(s)     Medication Compliance:   Yes Working on developing consistency     Changes in Health Issues:   Yes: A variety of health issues that lead to distress     Chemical Use Review:   Substance Use: Chemical use reviewed, no active concerns identified      Tobacco Use: No current tobacco use.      Diagnosis:  1. PTSD (post-traumatic stress disorder)        Collateral Reports Completed:   Not Applicable    PLAN: (Patient Tasks / Therapist Tasks  / Other)  Patient will focus on a regular self-care practice.        Meri Rich, [facility]  4/22/22                                                       ______________________________________________________________________    Individual Treatment Plan    Patient's Name: Higinio Wallace  YOB: 1990    Date of Creation: 11/23/2021  Date Treatment Plan Last Reviewed/Revised: 3/1/2022    DSM5 Diagnoses: 309.81 (F43.10) Posttraumatic Stress Disorder (includes Posttraumatic Stress Disorder for Children 6 Years and Younger)  With dissociative symptoms  Psychosocial / Contextual Factors: History of trauma, significant mental health concerns, physical health concerns, financial stressors  PROMIS (reviewed every 90 days):     Referral / Collaboration:  Referral to another professional/service is not indicated at this time..    Anticipated number of session for this episode of care: 12  Anticipation frequency of session: Weekly  Anticipated Duration of each session: 38-52 minutes  Treatment plan will be reviewed in 90 days or when goals have been changed.       MeasurableTreatment Goal(s) related to diagnosis / functional impairment(s)  Goal 1: Patient will focus on decreasing his mental health symptomology as evidenced by a PHQ-9 and ANITA-7 score of 10 or less    I will know I've met my goal when I feel less stressed and increased happiness and motivation.      Objective #A (Patient Action)    Patient will Patient will learn about trauma and the impact that can have on him both emotionally and physically..  Status: Continued - Date(s): 3/1/2022    Intervention(s)  Therapist will provide educational materials on  The impact that trauma can have on the mind, the body, the spirit, and emotions..    Objective #B  Patient will Patient will Decrease frequency and intensity of feeling down, depressed, hopeless.  Feel less tired and more energy during the day   Identify negative self-talk and behaviors: challenge  core beliefs, myths, and actions.  Status: Continued - Date(s): 3/1/2022    Intervention(s)  Therapist will teach emotional regulation skills. Patient and therapist will focus on increasing his awareness and recognition of symptoms in the moment as well as developing coping skills and strategies to manage the symptoms.  The skills and strategies could include positive affirmations, movement, engaging in the creative process, and developing a positive sense of self..    Objective #C  Patient will Patient will Explore with therapist how his trauma has impacted his thoughts and how his thoughts impact his behaviors and emotional wellbeing..  Status: Continued - Date(s): 3/1/2022    Intervention(s)  Therapist will teach distraction skills. As well as ways to challenge negative thoughts and self talk...      Patient has reviewed and agreed to the above plan.      Meri Rich, [facility]  March 1, 2022  Answers for HPI/ROS submitted by the patient on 3/24/2022  If you checked off any problems, how difficult have these problems made it for you to do your work, take care of things at home, or get along with other people?: Very difficult  PHQ9 TOTAL SCORE: 16        Answers for HPI/ROS submitted by the patient on 3/29/2022  If you checked off any problems, how difficult have these problems made it for you to do your work, take care of things at home, or get along with other people?: Very difficult  PHQ9 TOTAL SCORE: 14

## 2022-04-22 NOTE — TELEPHONE ENCOUNTER
Has IBS   Has been having bloating and having fluid retention and gas     Pain is on left side by hip area  Rates pain 5-6/10  Constant   It is a sharp pain and dull ache  Heat is making it worse     Gas puts him into hypertension     Seeing weird colors  Mouth is dry and water is not helping   -always thirsty     When laying down the pain gets worse and the left side of body is going numb     Thinks he is also constipated and thinks he might have an obstruction   -tried to have a BM this morning and it was hard     Goes to the ED and they always tell him it is his anxiety     Sleep apnea but that doctor says that his fatigue does not come from his sleep apnea    Patient asks if he should go to the ED to get antiacid medication as he does not have any at home  -advised it would be better if all he needs is the antacid medication to see if he can get it from a 24 hour pharmacy or gas station    Pt spouts all this information out very quickly   Most of it contradicts each other   When asking for clarification on something, pt will start talking about another ailment.   -was not able to really preform any sort of triage for the patient     Patient then states that he thinks this is all the chronic stuff he has been dealing with, just his anxiety is really bad tonight.     States his anxiety has been very bad as he had to quit his job today due to his medical problems     States he is loosing his house due to his medical issues and the fact that his first try to get disability was denied.     States he just needed someone to talk to and for someone to listen to him.     He is feeling better and thinks he will be able to sleep now.     Provided reassurance as able     Advised patient follow up with his normal providers in clinic if this is all his normal chronic issues. Patient is agreeable, but states that due to financial issues he is not really able to keep up the his medical stuff.     COVID 19 Nurse Triage  Plan/Patient Instructions    Please be aware that novel coronavirus (COVID-19) may be circulating in the community. If you develop symptoms such as fever, cough, or SOB or if you have concerns about the presence of another infection including coronavirus (COVID-19), please contact your health care provider or visit [email or web address].     Disposition/Instructions    Home care recommended. Follow home care protocol based instructions.    Thank you for taking steps to prevent the spread of this virus.  o Limit your contact with others.  o Wear a simple mask to cover your cough.  o Wash your hands well and often.    Resources    [facility]: About COVID-19: [email or web address]    CDC: What to Do If You're Sick: www.cdc.gov/coronavirus/2019-ncov/about/steps-when-sick.html    CDC: Ending Home Isolation: www.cdc.gov/coronavirus/2019-ncov/hcp/disposition-in-home-patients.html     CDC: Caring for Someone: www.cdc.gov/coronavirus/2019-ncov/if-you-are-sick/care-for-someone.html     [facility]: Interim Guidance for Hospital Discharge to Home: www.health.[facility].[place]/diseases/coronavirus/hcp/hospdischarge.pdf    [facility] clinical trials (COVID-19 research studies): [email or web address].[email or web address]-clinical-trials     Below are the COVID-19 hotlines at the [place] Department of Health ([facility]). Interpreters are available.   o For health questions: Call 806-238-5532 or 1-512.605.9226 (7 a.m. to 7 p.m.)  o For questions about schools and childcare: Call 022-159-7672 or 1-160.805.1447 (7 a.m. to 7 p.m.)     Reason for Disposition    Symptoms interfere with work or school    Protocols used: ANXIETY AND PANIC ATTACK-LAINEY-CHARLEY De Santiago RN on 4/22/2022 at 3:23 AM

## 2022-04-23 ENCOUNTER — MYC MEDICAL ADVICE (OUTPATIENT)
Dept: GASTROENTEROLOGY | Facility: CLINIC | Age: 32
End: 2022-04-23
Payer: MEDICAID

## 2022-04-23 DIAGNOSIS — K21.9 GASTROESOPHAGEAL REFLUX DISEASE WITHOUT ESOPHAGITIS: ICD-10-CM

## 2022-04-25 RX ORDER — OMEPRAZOLE 40 MG/1
40 CAPSULE, DELAYED RELEASE ORAL DAILY
Qty: 90 CAPSULE | Refills: 3 | Status: SHIPPED | OUTPATIENT
Start: 2022-04-25 | End: 2022-07-12

## 2022-04-28 ENCOUNTER — PATIENT OUTREACH (OUTPATIENT)
Dept: CARE COORDINATION | Facility: CLINIC | Age: 32
End: 2022-04-28
Payer: MEDICAID

## 2022-04-28 ENCOUNTER — VIRTUAL VISIT (OUTPATIENT)
Dept: BEHAVIORAL HEALTH | Facility: CLINIC | Age: 32
End: 2022-04-28
Payer: MEDICAID

## 2022-04-28 DIAGNOSIS — F43.10 PTSD (POST-TRAUMATIC STRESS DISORDER): Primary | ICD-10-CM

## 2022-04-28 PROCEDURE — 90834 PSYTX W PT 45 MINUTES: CPT | Mod: 95 | Performed by: SOCIAL WORKER

## 2022-04-28 NOTE — PROGRESS NOTES
"      [facility]                                     Progress Note    Patient Name: Higinio Wallace  Date: 4/28/22         Service Type: Individual      Session Start Time: 1:35 PM  session End Time: 2:23 PM     Session Length: 48 minutes    Session #: 15    Attendees: Client attended alone    Service Modality: Phone Visit:      Provider verified identity through the following two step process.  Patient provided:  Patient is known previously to provider    The patient has been notified of the following:      \"We have found that certain health care needs can be provided without the need for a face to face visit.  This service lets us provide the care you need with a phone conversation.       I will have full access to your [facility] medical record during this entire phone call.   I will be taking notes for your medical record.      Since this is like an office visit, we will bill your insurance company for this service.       There are potential benefits and risks of telephone visits (e.g. limits to patient confidentiality) that differ from in-person visits.?Confidentiality still applies for telephone services, and nobody will record the visit.  It is important to be in a quiet, private space that is free of distractions (including cell phone or other devices) during the visit.??      If during the course of the call I believe a telephone visit is not appropriate, you will not be charged for this service\"     Consent has been obtained for this service by care team member: Yes     DATA  Interactive Complexity: No  Crisis: No        Progress Since Last Session (Related to Symptoms / Goals / Homework):   Symptoms: Improving Feeling more optimistic    Homework: Partially completed      Episode of Care Goals: Minimal progress - CONTEMPLATION (Considering change and yet undecided); Intervened by assessing the negative and positive thinking (ambivalence) about behavior change     Current / Ongoing " Stressors and Concerns:   Patient and therapist explored challenges that he can have and navigating conflict.  Within this patient and therapist discussed the value of I statements, recognizing the quality of the relationship and sharing accordingly, utilizing assertive communication, establishing and maintaining boundaries, and offering help when people are open to and wanting help.  Patient expressed financial concern regarding not currently working, not sure when unemployment will kick in, and how these things can impact housing.     Treatment Objective(s) Addressed in This Session:   Decrease frequency and intensity of feeling down, depressed, hopeless  Identify negative self-talk and behaviors: challenge core beliefs, myths, and actions.   Patient will recognize limits and implement self-care strategies daily.     Intervention:   CBT: CBT triangle, cognitive distorations    ACT: Value based living              DBT: Validated patient's emotions and experiences   Client-centered: Self-care strategies and strengths based    Assessments completed prior to visit:  The following assessments were completed by patient for this visit:  PHQ2:   PHQ-2 ( 1999 Pfizer) 1/5/2021 5/26/2020   Q1: Little interest or pleasure in doing things 0 2   Q2: Feeling down, depressed or hopeless 0 1   PHQ-2 Score 0 3   PHQ-2 Total Score (12-17 Years)- Positive if 3 or more points; Administer PHQ-A if positive 0 3     PHQ9:   PHQ-9 SCORE 8/17/2021 11/8/2021 11/23/2021 1/4/2022 1/18/2022 3/24/2022 3/29/2022   PHQ-9 Total Score MyChart 16 (Moderately severe depression) 14 (Moderate depression) 18 (Moderately severe depression) 23 (Severe depression) 15 (Moderately severe depression) 16 (Moderately severe depression) 14 (Moderate depression)   PHQ-9 Total Score 16 14 18 23 15 16 14     GAD2:   ANITA-2 1/4/2022 1/4/2022 1/18/2022   Feeling nervous, anxious, or on edge 3 3 2   Not being able to stop or control worrying 3 3 2   ANTIA-2 Total Score 6  6 4     GAD7:   ANITA-7 SCORE 9/16/2020 10/6/2020 11/4/2020 11/8/2021 1/4/2022 1/4/2022 1/18/2022   Total Score - - - 14 (moderate anxiety) - 21 (severe anxiety) 13 (moderate anxiety)   Total Score 7 20 13 14 21 21 13         ASSESSMENT: Current Emotional / Mental Status (status of significant symptoms):   Risk status (Self / Other harm or suicidal ideation)   Patient denies current fears or concerns for personal safety.   Patient denies current or recent suicidal ideation or behaviors.   Patient denies current or recent homicidal ideation or behaviors.   Patient denies current or recent self injurious behavior or ideation.   Patient denies other safety concerns.   Patient reports there has been no change in risk factors since their last session.     Patient reports there has been no change in protective factors since their last session.     Recommended that patient call 911 or go to the local ED should there be a change in any of these risk factors.     Appearance:   NA    Eye Contact:   NA    Psychomotor Behavior: Normal    Attitude:   Interested   Orientation:   All   Speech    Rate / Production: Talkative    Volume:  Normal    Mood:    Agitated   Affect:    NA    Thought Content:  Clear    Thought Form:  Circumstantial   Insight:    Fair      Medication Review:   No changes to current psychiatric medication(s)     Medication Compliance:   Yes Working on developing consistency     Changes in Health Issues:   Yes: A variety of health issues that lead to distress     Chemical Use Review:   Substance Use: Chemical use reviewed, no active concerns identified      Tobacco Use: No current tobacco use.      Diagnosis:  1. PTSD (post-traumatic stress disorder)        Collateral Reports Completed:   Not Applicable    PLAN: (Patient Tasks / Therapist Tasks / Other)  Patient will focus on self-care, assertive communication, and establishing and maintaining boundaries.        Meri Rich, [facility]  4/28/22                                                        ______________________________________________________________________    Individual Treatment Plan    Patient's Name: Higinio Wallace  YOB: 1990    Date of Creation: 11/23/2021  Date Treatment Plan Last Reviewed/Revised: 3/1/2022    DSM5 Diagnoses: 309.81 (F43.10) Posttraumatic Stress Disorder (includes Posttraumatic Stress Disorder for Children 6 Years and Younger)  With dissociative symptoms  Psychosocial / Contextual Factors: History of trauma, significant mental health concerns, physical health concerns, financial stressors  PROMIS (reviewed every 90 days):     Referral / Collaboration:  Referral to another professional/service is not indicated at this time..    Anticipated number of session for this episode of care: 12  Anticipation frequency of session: Weekly  Anticipated Duration of each session: 38-52 minutes  Treatment plan will be reviewed in 90 days or when goals have been changed.       MeasurableTreatment Goal(s) related to diagnosis / functional impairment(s)  Goal 1: Patient will focus on decreasing his mental health symptomology as evidenced by a PHQ-9 and ANITA-7 score of 10 or less    I will know I've met my goal when I feel less stressed and increased happiness and motivation.      Objective #A (Patient Action)    Patient will Patient will learn about trauma and the impact that can have on him both emotionally and physically..  Status: Continued - Date(s): 3/1/2022    Intervention(s)  Therapist will provide educational materials on  The impact that trauma can have on the mind, the body, the spirit, and emotions..    Objective #B  Patient will Patient will Decrease frequency and intensity of feeling down, depressed, hopeless.  Feel less tired and more energy during the day   Identify negative self-talk and behaviors: challenge core beliefs, myths, and actions.  Status: Continued - Date(s): 3/1/2022    Intervention(s)  Therapist will teach  emotional regulation skills. Patient and therapist will focus on increasing his awareness and recognition of symptoms in the moment as well as developing coping skills and strategies to manage the symptoms.  The skills and strategies could include positive affirmations, movement, engaging in the creative process, and developing a positive sense of self..    Objective #C  Patient will Patient will Explore with therapist how his trauma has impacted his thoughts and how his thoughts impact his behaviors and emotional wellbeing..  Status: Continued - Date(s): 3/1/2022    Intervention(s)  Therapist will teach distraction skills. As well as ways to challenge negative thoughts and self talk...      Patient has reviewed and agreed to the above plan.      Meri Rich, [facility]  March 1, 2022  Answers for HPI/ROS submitted by the patient on 3/24/2022  If you checked off any problems, how difficult have these problems made it for you to do your work, take care of things at home, or get along with other people?: Very difficult  PHQ9 TOTAL SCORE: 16        Answers for HPI/ROS submitted by the patient on 3/29/2022  If you checked off any problems, how difficult have these problems made it for you to do your work, take care of things at home, or get along with other people?: Very difficult  PHQ9 TOTAL SCORE: 14

## 2022-04-28 NOTE — PROGRESS NOTES
Clinic Care Coordination Contact  [facility]/Voicemail    Clinical Data: Care Coordinator Outreach  Outreach attempted x 3.  Unable to leave voicemail as patients mailbox was full.    Plan: Care Coordinator will route patients chart to [facility] to review for disenrollment.    Donna Livingston  Community Health Worker   [facility] Care Coordination  [facility]   [email or web address]  Office: [phone]

## 2022-04-29 ENCOUNTER — PATIENT OUTREACH (OUTPATIENT)
Dept: CARE COORDINATION | Facility: CLINIC | Age: 32
End: 2022-04-29
Payer: MEDICAID

## 2022-04-29 ENCOUNTER — PATIENT OUTREACH (OUTPATIENT)
Dept: NURSING | Facility: CLINIC | Age: 32
End: 2022-04-29
Payer: MEDICAID

## 2022-04-29 NOTE — PROGRESS NOTES
"Clinic Care Coordination Contact    Follow Up Progress Note      Assessment: lengthy call with patient who would like to continue with care coordination.  His phone was off for a bit when CC team was trying to reach him. He got the letter from . He has decided to quit his job as he was tired of trying to get all the paper work done each week for disability. He is now going to apply for SNAP and MA and to try to get disability.  He understands the process and will work with [employer] to complete application.  He has worked with [facility] Mental Health crisis and thinks he should be able to get a mental health advocate. Discussed that many supports are available for people mental health, but often a person needs to be on MA. The Mental Health crisis team has offered to help him find a new apartment.  His brother has moved out. His girlfriend is working three jobs.  He is getting unemployment income now.      He continues to work with therapy and wants to concentrate on his own health.  He wonders if he has Asperger's as he has trouble reading people's body language. With his trauma history, he is easily discouraged that people don't want to help him.  He is afraid to get help. As his parents' did not help him when he was a child. He is afraid that someone is going to \"put him away.\"  His girlfriend did tell her parents that she wants to be with him and that they are working together.      Care Gaps:    Health Maintenance Due   Topic Date Due     PREVENTIVE CARE VISIT  Never done     ADVANCE CARE PLANNING  Never done     DEPRESSION ACTION PLAN  Never done     Pneumococcal Vaccine: Pediatrics (0 to 5 Years) and At-Risk Patients (6 to 64 Years) (1 - PCV) Never done     HIV SCREENING  Never done     HEPATITIS C SCREENING  Never done     INFLUENZA VACCINE (1) Never done     COVID-19 Vaccine (3 - Booster for Moderna series) 10/21/2021       Postponed to next PCP appt.      Goals addressed this " "encounter:    Goals Addressed                    This Visit's Progress       Patient Stated       1. Mental Health Management (pt-stated)         Goal Statement: I will have stable mental health and have a therapist and psychiatrist within 2 months.  Date Goal set: 11-  Barriers: Feeling overwhelmed, stressed.  Strengths: has insurance, already connected to therapist.   Date to Achieve By: 1-  Patient expressed understanding of goal: yes  Action steps to achieve this goal:  1. I will attend appointments with my therapists.  2. I will meet with new psychiatrist on 12-9 at 1:00 with [facility] with Mekhit \"Ana Paula\" Staci Macias NP  3. I will report progress towards this goal at scheduled outreach telephone calls from the CCC team.               2. Financial Wellbeing (pt-stated)         Goal Statement: I will have financial stability and am able to pay my bills within 6 months.  Date Goal set: 12-1-2021  Barriers: trouble at current job, only able to work part time.  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   Date to Achieve By: 5-1-2022  Patient expressed understanding of goal: yes  Action steps to achieve this goal:  1. I will continue to work with my current employer to have income while I look for a new job.  2. I will review [employer] [facility]  and [employer] resources for help.  3. I will report progress towards this goal at scheduled outreach telephone calls from the CCC team.                 3. Other (pt-stated)         Goal Statement: I will find a new place to live with my girlfriend that is affordable within one year.  Date Goal set:12-1-2021  Barriers: hard to find affordable apartments.  Strengths: have a place to live now.   Date to Achieve By: 12-1-2022  Patient expressed understanding of goal: yes  Action steps to achieve this goal:  1. I will look at [employer] for options.   2. I will continue to pay rent at my current apartment.  3. I will report progress " towards this goal at scheduled outreach telephone calls from the CCC team.                      Intervention/Education provided during outreach: Provided supportive listening.       Outreach Frequency: monthly    Plan:   Delegating to CHW to contact in less than 30 days.   Care Coordinator will follow up in 6 weeks and as needed.  Dana Kumar,   [facility]  [phone]

## 2022-04-29 NOTE — LETTER
[facility] CARE [facility]ORDINATION  [facility]    April 29, 2022    Higinio Wallace  [address]   [place] [address]      Dear Higinio,    I have been unsuccessful in reaching you since our last contact. At this time the Care Coordination team will make no further attempts to reach you, however this does not change your ability to continue receiving care from your providers at your primary care clinic. If you need additional support from a care coordinator in the future please contact Dana  at [phone].    All of us at [facility] are invested in your health and are here to assist you in meeting your goals.     Sincerely,    Dana Kumar,   [facility]  [phone]

## 2022-04-29 NOTE — PROGRESS NOTES
Contact   Chart Review     Situation: Patient chart reviewed by .    Background: enrolled in care coordination with several goals.     Assessment: Patient has not responded to several voice messages and letter sent.     Plan/Recommendations: Will close to care coordination.  Sending disenrollment letter. Informed PCP.    Dana Kumar,   [facility]  [phone]

## 2022-05-02 ENCOUNTER — TELEPHONE (OUTPATIENT)
Dept: SLEEP MEDICINE | Facility: CLINIC | Age: 32
End: 2022-05-02
Payer: MEDICAID

## 2022-05-02 NOTE — TELEPHONE ENCOUNTER
Called to let patient know his insurance [employer] P1 is out of network with [facility]. Patient let me know he stopped working and the coverage will end soon. He also let me know he applied for MA. I let patient know he can call us back once he gets approved for MA and we can put him on the wait list for a CPAP machine.

## 2022-05-05 ENCOUNTER — DOCUMENTATION ONLY (OUTPATIENT)
Dept: BEHAVIORAL HEALTH | Facility: CLINIC | Age: 32
End: 2022-05-05
Payer: MEDICAID

## 2022-05-05 NOTE — PROGRESS NOTES
Patient has an appointment scheduled for 8 AM this morning by video.  This writer logged into the video appointment and reached out to patient by phone at 8:05 AM.  Patient reported he was unable to keep the appointment due to insurance issues.  Therapist shared that this appointment would be considered a no-show and patient and therapist agreed to cancel the appointment scheduled for May 12.  Patient reports he will be able to schedule future appointments when insurance is worked out either through [facility] or by calling.

## 2022-05-11 ENCOUNTER — PATIENT OUTREACH (OUTPATIENT)
Dept: CARE COORDINATION | Facility: CLINIC | Age: 32
End: 2022-05-11
Payer: MEDICAID

## 2022-05-11 NOTE — PROGRESS NOTES
Clinic Care Coordination Contact    Situation: Patient chart reviewed by care coordinator.    Background: Care Coordination initial assessment and enrollment to Care Coordination was on 11/24/2021. Patient centered goals were developed with participation from patient. CCC SW handed patient off to CHW for continued outreach every 30 days.      Assessment: Per chart review, patient outreach completed by CCC SW on 4/29/22.      Plan/Recommendations: CCC SW will continue to monitor, support patient with current goals and will be available to assist as needs arise. CHW will outreach in less than one month on 5/24/22.      Donna Livingston  Community Health Worker   [facility] Care Coordination  [facility]   [email or web address]  Office: [phone]

## 2022-05-16 ENCOUNTER — OFFICE VISIT (OUTPATIENT)
Dept: FAMILY MEDICINE | Facility: CLINIC | Age: 32
End: 2022-05-16
Payer: MEDICAID

## 2022-05-16 VITALS
OXYGEN SATURATION: 98 % | HEART RATE: 84 BPM | HEIGHT: 76 IN | DIASTOLIC BLOOD PRESSURE: 82 MMHG | BODY MASS INDEX: 38.36 KG/M2 | WEIGHT: 315 LBS | RESPIRATION RATE: 18 BRPM | SYSTOLIC BLOOD PRESSURE: 138 MMHG

## 2022-05-16 DIAGNOSIS — M25.50 MULTIPLE JOINT PAIN: ICD-10-CM

## 2022-05-16 DIAGNOSIS — F43.10 PTSD (POST-TRAUMATIC STRESS DISORDER): Primary | ICD-10-CM

## 2022-05-16 DIAGNOSIS — Q43.3 MALROTATION OF INTESTINE (H): ICD-10-CM

## 2022-05-16 DIAGNOSIS — F44.81 DISSOCIATIVE IDENTITY DISORDER (H): ICD-10-CM

## 2022-05-16 DIAGNOSIS — R53.83 FATIGUE, UNSPECIFIED TYPE: ICD-10-CM

## 2022-05-16 PROCEDURE — 99214 OFFICE O/P EST MOD 30 MIN: CPT | Performed by: FAMILY MEDICINE

## 2022-05-16 ASSESSMENT — PAIN SCALES - GENERAL: PAINLEVEL: MODERATE PAIN (4)

## 2022-05-16 NOTE — PROGRESS NOTES
"Higinio Wallace  /82   Pulse 84   Resp 18   Ht 1.93 m (6' 4\")   Wt (!) 152.4 kg (336 lb)   SpO2 98%   BMI 40.90 kg/m       Assessment/Plan:                Higinio was seen today for recheck, forms and pain.    Diagnoses and all orders for this visit:    PTSD (post-traumatic stress disorder)    Dissociative identity disorder (H)    Multiple joint pain    Fatigue, unspecified type    Malrotation of intestine         DISCUSSION  Overall no acute changes identified needs ongoing follow-up for the multitude of concerns as discussed in more detail below.  Subjective:     HPI:    Higinio Wallace is a 31 year old male with a complex medical history currently out of work due to his significant dysfunction caused by multiple medical problems.    He has PTSD and disassociative identity disorder.  He sees a therapist regularly but has insurance issues so has had to forego recent visits.  Was working to establish with a psychiatrist but due to insurance issues has not been able to do so.  He has significant dysfunction due to his mental health disorder.  He is out of work currently.  Patient is encouraged to continue to follow with his therapist and to establish with a psychiatrist as this is his primary concern regarding dysfunction.  Documentation is completed for state and county level assistance.  He is connected to care coordination however has proven difficult to reach but does know the name of his care coordinator and he is encouraged to utilize this resource regularly to help with difficulties he has including housing insecurity, food insecurity, financial difficulties in general and navigating the healthcare system.    He has other concerns from a more physical standpoint which include diffuse musculoskeletal pain, fatigue, gastrointestinal symptoms and snoring.    He has been seen by orthopedic specialty provider.  He has done some physical therapy on a limited basis.  Insurance coverage issues have proven to " cause difficulties with this aspect of treatment as well.  He has been seen by rheumatology with last visit being in February 2022 no further follow-up was planned at that time.  He has been seen by endocrine.  There was some thought that he may have hypercortisolism.  Unfortunately has not followed through with recommended testing.  Does plan to follow-up with endocrinology.  He sees gastroenterology.  He has a history of malrotation of the intestines.  He has a lot of digestive system symptoms.  Last consultation note reviewed.  He was encouraged to follow with nutritionist, take omeprazole, follow FODMAP diet and consider additional testing along with regular follow-up.  He was diagnosed with obstructive sleep apnea.  Due to the multitude of complaints patient informs me that he was given other advice regarding his fatigue which was thought not to be due entirely to his sleep apnea.    He is encouraged to follow the advice of specialty providers.  Has had a lot of difficulty with follow-through more recently.    ROS:  Complete review of systems is obtained.  Other than the specific considerations noted above complete review of systems is negative.          Objective:   Medications:  Current Outpatient Medications   Medication     albuterol (ALBUTEROL) 108 (90 BASE) MCG/ACT Inhaler     alum & mag hydroxide-simethicone (MAALOX MAX) 400-400-40 MG/5ML SUSP suspension     metFORMIN (GLUCOPHAGE) 500 MG tablet     omeprazole (PRILOSEC) 40 MG DR capsule     prazosin (MINIPRESS) 1 MG capsule     sucralfate (CARAFATE) 1 GM tablet     No current facility-administered medications for this visit.        Allergies:     Allergies   Allergen Reactions     Bee Venom Anaphylaxis and Other (See Comments)     Swelling  Large local reactions/ swelling       Fruit Acid Concentrate [Fruit Extracts] Swelling     Lips swell and crust over little bit- tongue gets numb     Liquid Adhesive Dermatitis     And band aid       Monosodium  Glutamate Hives        Social History     Socioeconomic History     Marital status: Single     Spouse name: Not on file     Number of children: Not on file     Years of education: Not on file     Highest education level: Not on file   Occupational History     Not on file   Tobacco Use     Smoking status: Current Some Day Smoker     Packs/day: 0.25     Types: Cigarettes     Start date: 2001     Smokeless tobacco: Never Used     Tobacco comment: 1-2 cig a day trying to quit   Substance and Sexual Activity     Alcohol use: Not Currently     Drug use: Yes     Types: Marijuana     Comment: daily     Sexual activity: Yes     Partners: Female     Birth control/protection: Condom   Other Topics Concern     Parent/sibling w/ CABG, MI or angioplasty before 65F 55M? Not Asked   Social History Narrative     Not on file     Social Determinants of Health     Financial Resource Strain: Medium Risk     Difficulty of Paying Living Expenses: Somewhat hard   Food Insecurity: Food Insecurity Present     Worried About Running Out of Food in the Last Year: Sometimes true     Ran Out of Food in the Last Year: Sometimes true   Transportation Needs: No Transportation Needs     Lack of Transportation (Medical): No     Lack of Transportation (Non-Medical): No   Physical Activity: Not on file   Stress: Not on file   Social Connections: Not on file   Intimate Partner Violence: Not on file   Housing Stability: Low Risk      Unable to Pay for Housing in the Last Year: No     Number of Places Lived in the Last Year: 1     Unstable Housing in the Last Year: No       Family History   Problem Relation Age of Onset     Other - See Comments Mother         PONV     Cerebrovascular Disease Brother      Atrial fibrillation Brother      Other - See Comments Brother         cardiac autoimmune deficiency        Most Recent Immunizations   Administered Date(s) Administered     COVID-19,PF,Moderna 05/21/2021     TDAP Vaccine (Boostrix) 07/03/2019     Tdap  "(Adacel,Boostrix) 07/03/2019        Wt Readings from Last 3 Encounters:   05/16/22 (!) 152.4 kg (336 lb)   04/20/22 (!) 154.2 kg (340 lb)   04/03/22 149.7 kg (330 lb)        BP Readings from Last 6 Encounters:   05/16/22 138/82   04/03/22 137/76   02/08/22 134/84   02/08/22 (!) 130/95   01/25/22 136/86   01/18/22 (!) 148/87        Hemoglobin A1C POCT   Date Value Ref Range Status   11/04/2020 5.4 0 - 5.6 % Final     Comment:     Normal <5.7% Prediabetes 5.7-6.4%  Diabetes 6.5% or higher - adopted from ADA   consensus guidelines.       Hemoglobin A1C   Date Value Ref Range Status   08/17/2021 5.5 0.0 - 5.6 % Final     Comment:     Normal <5.7%   Prediabetes 5.7-6.4%    Diabetes 6.5% or higher     Note: Adopted from ADA consensus guidelines.              PHYSICAL EXAM:    /82   Pulse 84   Resp 18   Ht 1.93 m (6' 4\")   Wt (!) 152.4 kg (336 lb)   SpO2 98%   BMI 40.90 kg/m       General: Patient alert no signs of distress.  Has some difficulty being able to focus his conversation during the course of our visit today but he is redirectable.                          "

## 2022-05-23 ENCOUNTER — PATIENT OUTREACH (OUTPATIENT)
Dept: CARE COORDINATION | Facility: CLINIC | Age: 32
End: 2022-05-23
Payer: MEDICAID

## 2022-05-23 NOTE — PROGRESS NOTES
Clinic Care Coordination Contact  [facility]/Voicemail    Clinical Data: Care Coordinator Outreach  Outreach attempted x 1.  Unable to leave message on patient's voicemail, phone appears to be disconnected at this time.    Plan: Care Coordinator will try to reach patient again in 10 business days or on 6/6/22.      Donna Livingston  Community Health Worker   [facility] Care Coordination  [facility]   [email or web address]  Office: [phone]

## 2022-05-24 ENCOUNTER — TELEPHONE (OUTPATIENT)
Dept: FAMILY MEDICINE | Facility: CLINIC | Age: 32
End: 2022-05-24
Payer: MEDICAID

## 2022-05-24 NOTE — TELEPHONE ENCOUNTER
Received a message from patient.    Can we offer him an appointment in later this week or early next week with any provider? (Dr Franklin is overfull).    He needs to complete a couple forms for disability.  He should bring forms with to appointment. I also have a form on my desk that we received for him.    Thank you

## 2022-06-03 ENCOUNTER — PATIENT OUTREACH (OUTPATIENT)
Dept: CARE COORDINATION | Facility: CLINIC | Age: 32
End: 2022-06-03
Payer: MEDICAID

## 2022-06-03 NOTE — PROGRESS NOTES
Clinic Care Coordination Contact  [facility]/Voicemail    Clinical Data: Care Coordinator Outreach  Outreach attempted x 2.  Left message on patient's voicemail with call back information and requested return call.    Plan: Care Coordinator will send unable to contact letter with care coordinator contact information via [employer]. Care Coordinator will try to reach patient again in 1 month on 6/30/22.      Donna Livingston  Community Health Worker   [facility] Care Coordination  [facility]   [email or web address]  Office: [phone]

## 2022-06-03 NOTE — LETTER
[facility] CARE COORDINATION  [facility]  Yohana 3, 2022    Higinio Wallace  [address]   [place] [address]      Dear Higinio,    I have been attempting to reach you since our last contact. I would like to continue to work with you and provide any additional support you may need on achieving your health care related goals. I would appreciate if you would give me a call at [phone] to let me know if you would like to continue working together. I know that there are many things that can affect our ability to communicate and I hope we can continue to work together.    All of us at the [facility] are invested in your health and are here to assist you in meeting your goals.     Sincerely,    Donna Livingston  Community Health Worker   [facility] Care Coordination  [facility]   [email or web address]  Office: [phone]

## 2022-06-08 ENCOUNTER — PATIENT OUTREACH (OUTPATIENT)
Dept: CARE COORDINATION | Facility: CLINIC | Age: 32
End: 2022-06-08
Payer: MEDICAID

## 2022-06-08 NOTE — PROGRESS NOTES
"Care Coordination Clinician Chart Review     Situation: Patient chart reviewed by care coordinator.?     Background: Initial assessment and enrollment to Care Coordination was 11-.?? Patient centered goals were developed with participation from patient.? Lead CC handed patient off to CHW for continued outreach every 30 days.??     Assessment: Per chart review, patient outreach completed by CC CHW on 6-3 leaving VM x2.? Patient is actively working to accomplish goal(s).? Patient's goal(s) remain(s) appropriate at this time.? Patient is not due for updated Plan of Care.? Annual assessment will be due 11-.      Goals        Patient Stated       1. Mental Health Management (pt-stated)       Goal Statement: I will have stable mental health and have a therapist and psychiatrist within 2 months.  Date Goal set: 11-  Barriers: Feeling overwhelmed, stressed.  Strengths: has insurance, already connected to therapist.   Date to Achieve By: 1-  Patient expressed understanding of goal: yes  Action steps to achieve this goal:  1. I will attend appointments with my therapists.  2. I will meet with new psychiatrist on 12-9 at 1:00 with [facility] with Kyle \"Ana Paula\" Staci Macias NP  3. I will report progress towards this goal at scheduled outreach telephone calls from the CCC team.               2. Financial Wellbeing (pt-stated)       Goal Statement: I will have financial stability and am able to pay my bills within 6 months.  Date Goal set: 12-1-2021  Barriers: trouble at current job, only able to work part time.  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   Date to Achieve By: 5-1-2022  Patient expressed understanding of goal: yes  Action steps to achieve this goal:  1. I will continue to work with my current employer to have income while I look for a new job.  2. I will review [employer]  and [employer] resources for help.  3. I will report progress towards this goal " at scheduled outreach telephone calls from the CCC team.                 3. Other (pt-stated)       Goal Statement: I will find a new place to live with my girlfriend that is affordable within one year.  Date Goal set:12-1-2021  Barriers: hard to find affordable apartments.  Strengths: have a place to live now.   Date to Achieve By: 12-1-2022  Patient expressed understanding of goal: yes  Action steps to achieve this goal:  1. I will look at [facility] for options.   2. I will continue to pay rent at my current apartment.  3. I will report progress towards this goal at scheduled outreach telephone calls from the CCC team.                  ??     Plan/Recommendations: The patient will continue working with Care Coordination to achieve above goal(s).? CHW will involve Lead CC as needed or if patient is ready to move to maintenance.? Lead CC will continue to monitor CHW s monthly outreaches and progress to goal(s) every 6 weeks.?     Plan of Care updated and sent to patient: No

## 2022-06-23 ENCOUNTER — PATIENT OUTREACH (OUTPATIENT)
Dept: CARE COORDINATION | Facility: CLINIC | Age: 32
End: 2022-06-23

## 2022-06-23 NOTE — PROGRESS NOTES
"Clinic Care Coordination Contact    Community Health Worker Follow Up    Care Gaps:     Health Maintenance Due   Topic Date Due     PREVENTIVE CARE VISIT  Never done     ADVANCE CARE PLANNING  Never done     DEPRESSION ACTION PLAN  Never done     Pneumococcal Vaccine: Pediatrics (0 to 5 Years) and At-Risk Patients (6 to 64 Years) (1 - PCV) Never done     HIV SCREENING  Never done     HEPATITIS C SCREENING  Never done     COVID-19 Vaccine (3 - Booster for Moderna series) 10/21/2021       Goals:    Goals Addressed as of 6/23/2022 at 3:15 PM                    Today    2/17/22       1. Mental Health Management (pt-stated)   20%  20%    Added 11/24/21 by Dana Kumar LSW      Goal Statement: I will have stable mental health and have a therapist and psychiatrist within 2 months.  Date Goal set: 11-  Barriers: Feeling overwhelmed, stressed.  Strengths: has insurance, already connected to therapist.   Date to Achieve By: 1-  Patient expressed understanding of goal: yes  Action steps to achieve this goal:  1. I will attend appointments with my therapists.  2. I will meet with new psychiatrist on 12-9 at 1:00 with [facility] with Mekhit \"Ana Paula\" Staci Macias NP  3. I will report progress towards this goal at scheduled outreach telephone calls from the CCC team.    Discussed: 6/23/22           2. Financial Wellbeing (pt-stated)   20%  20%    Added 12/1/21 by Dana Kumar LSW      Goal Statement: I will have financial stability and am able to pay my bills within 6 months.  Date Goal set: 12-1-2021  Barriers: trouble at current job, only able to work part time.  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   Date to Achieve By: 5-1-2022  Patient expressed understanding of goal: yes  Action steps to achieve this goal:  1. I will continue to work with my current employer to have income while I look for a new job.  2. I will review [employer]  and [employer] resources for help.  3. " I will report progress towards this goal at scheduled outreach telephone calls from the CCC team.    Discussed 6/23/22             3. Other (pt-stated)   20%      Added 12/1/21 by Dana Kumar LSW      Goal Statement: I will find a new place to live with my girlfriend that is affordable within one year.  Date Goal set:12-1-2021  Barriers: hard to find affordable apartments.  Strengths: have a place to live now.   Date to Achieve By: 12-1-2022  Patient expressed understanding of goal: yes  Action steps to achieve this goal:  [facility]. [facility]   2. I will continue to pay rent at my current apartment.  3. I will report progress towards this goal at scheduled outreach telephone calls from the CCC team.      Discussed 6/23/22                Intervention and Education during outreach: CHW scheduled [facility] SW assessment with Dana on 7/8/22 at 9AM for assistance with Social Security Disability and housing resources.    CHW Note:    CHW received VM from patient requesting return call.    CHW called patient back who stated he was in need of assistance applying for Social Security Disability.     Patient shared that he is currently living with his girlfriend [place]. Patient states he might be moving out of state, but if he does not move he would also like assistance finding housing.    Patient shared that he used to work at [employer] and filled for Unemployment after leaving. Patient shared that [employer] is fighting the Unemployment claim and he is due in court on 6/28/22. Patient plans on contacting the court house to get date pushed out. Patient would like to get his paperwork together before having the court appearance.     Patient shared that he has a lot going on, and his mental health is suffering. Patient expressed he has been feeling overwhelmed with the application for Social Security Disability, finding hosing and the upcoming court case. Patient states he currently does not have insurance  and has been unable to see his therapist. Patient expressed that his girlfriend is his main support and they make a good team. CHW scheduled patient with lead CCC ALEXA Cortez to assist with Social Security Disability, housing/insurance options.    CHW Plan: CHW will continue to support patient with goals through routine scheduled outreach. CHW will outreach in one month on 7/25/22.      Donna Livingston  Community Health Worker   [facility] Care Coordination  [facility]   [email or web address]  Office: [phone]

## 2022-07-05 ENCOUNTER — TELEPHONE (OUTPATIENT)
Dept: FAMILY MEDICINE | Facility: CLINIC | Age: 32
End: 2022-07-05

## 2022-07-05 NOTE — TELEPHONE ENCOUNTER
Received a form on Higinio and Dr Franklin is asking for a visit to complete it. It can be virtual or office visit.    If virtual I am thinking perhaps we could schedule something on 7/12/22 end of day.    Thank you.

## 2022-07-08 ENCOUNTER — PATIENT OUTREACH (OUTPATIENT)
Dept: NURSING | Facility: CLINIC | Age: 32
End: 2022-07-08

## 2022-07-08 NOTE — PROGRESS NOTES
Clinic Care Coordination Contact    Follow Up Progress Note      Assessment: Patient is now living in a trailer with her girlfriend. They left their apartment as he had no income and they couldn't pay the rent and didn't want to get evicted. She continues to work, but he was let go from [employer] and they are not paying for unemployment. He feels this is wrong. He missed court date last week as he wants to provide information to court on his disability.  Not sure what his next steps are. He thought that writer was helping him with his application for Social Security. He is working with a different Dana at [facility] who is helping him, but he doesn't have her phone number. Gave him main number as he wants to follow up with her. He thinks he has an appointment in August to discuss Social Security disability.     He does not have health insurance so cannot attend any appointments of get his medications.  Would like help with applying for county benefits and referral to [facility] done.  He is available anytime for calls.  He thought he applied for county benefits three months ago, but hasn't received anything yet. He has spent his 401 K and doesn't have any other savings. Is frustrated that people assume he can find another job, but he has disabilities that prevent him from working, such as he cannot stand for over 4 hours.      Care Gaps:    Health Maintenance Due   Topic Date Due     PREVENTIVE CARE VISIT  Never done     ADVANCE CARE PLANNING  Never done     DEPRESSION ACTION PLAN  Never done     Pneumococcal Vaccine: Pediatrics (0 to 5 Years) and At-Risk Patients (6 to 64 Years) (1 - PCV) Never done     HIV SCREENING  Never done     HEPATITIS C SCREENING  Never done     COVID-19 Vaccine (3 - Booster for Moderna series) 10/21/2021       Declined due to not having insurance.      Goals addressed this encounter:    Goals Addressed                    This Visit's Progress       Patient Stated       1. Mental Health  "Management (pt-stated)         Goal Statement: I will have stable mental health and have a therapist and psychiatrist within 2 months.  Date Goal set: 11-  Barriers: Feeling overwhelmed, stressed.  Strengths: has insurance, already connected to therapist.   Date to Achieve By: 1-, updated to 10-1-2022  Patient expressed understanding of goal: yes  Action steps to achieve this goal:  1. I will attend appointments with my therapists.  2. I will meet with new psychiatrist on 12-9 at 1:00 with [facility] with Mekhit \"Ana Paula\" Staci Macias NP  3. I will report progress towards this goal at scheduled outreach telephone calls from the CCC team.    Discussed: 7-7/22           2. Financial Wellbeing (pt-stated)         Goal Statement: I will have financial stability and am able to pay my bills within 6 months.  Date Goal set: 12-1-2021  Barriers: trouble at current job, only able to work part time.  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   Date to Achieve By: 5-1-2022, updated to 1-8-2023  Patient expressed understanding of goal: yes  Action steps to achieve this goal:  1. I will continue to work with my current employer to have income while I look for a new job.  2. I will review [employer]  and [employer] resources for help.  3. I will report progress towards this goal at scheduled outreach telephone calls from the CCC team.    Discussed 7-7/22             3. Other (pt-stated)         Goal Statement: I will find a new place to live with my girlfriend that is affordable within one year.  Date Goal set:12-1-2021  Barriers: hard to find affordable apartments.  Strengths: have a place to live now.   Date to Achieve By: 12-1-2022  Patient expressed understanding of goal: yes  Action steps to achieve this goal:  1. I will look at [employer] for options.   2. I will continue to pay rent at my current apartment.  3. I will report progress towards this goal at scheduled outreach " telephone calls from the CCC team.      Discussed 7-8-22             4. Functional (pt-stated)         Goal Statement: I will apply for [facility] Benefits within the next 1-2 weeks if I am eligible.   Date Goal Set:  7-8-2022  Strengths: wants to get support.  Barriers:  lots of stress and worry, lost job and health insurance.   Date to Achieve By: 10-8-2022  Patient expressed understanding of goal: yes  Action steps to achieve this goal:  1. I understand a referral was placed to the Financial Resource Worker, I will receive a call within the next 3 business days.    2. I understand the financial worker will make two attempts to call me. If I still need help with this goal, I will connect with my Community Health Worker Donna at [phone].                 Intervention/Education provided during outreach: help with applying for [facility] benefits. Locating his worker Dana at [facility]. Reviewed what care coordination support is and isn't.      Outreach Frequency: monthly    Plan:   [facility] SW will continue to monitor, support patient with current goals and will be available to assist as needs arise. [facility] CHW will reach out to patient on a monthly basis to discuss progression of goals.      [facility] SW will perform Chart Review in 45 days.      and Financial Resource Worker Screening    [facility] Benefits  Is patient requesting help applying for [facility] benefits?: Yes  Have you recently applied for any [facility] benefits?: No  How many people in your household?: 2  Do you buy/eat food together?: Yes    Insurance:  [place] No  Is this an insurance renewal?: No  Is this a new insurance application request?: Yes  Have you recently applied for insurance?: No  How many people in your household? : 2  Do you file taxes?: Yes  How many dependents do you claim?: 0    Any other information for the FRW?: lives with his girlfriend- she doesn't make much money, but i didn't ask how much.  [place] living in  [place] as he did not get unemployment, has no health insurance.  Please help with SNAP, [place], cash.  He gets overwhelmed, so go slowly.  He has worked with someone at the [facility] who he thought [facility] but that was 3 months ago and he hasn't heard anything.    Care Coordination team will tell patient:   Thank you for answering all the questions, based on screening questions, our Financial Resource Worker will reach out to you with additional questions and next steps.    Dana Kumar,   [facility]  [phone]

## 2022-07-11 ENCOUNTER — PATIENT OUTREACH (OUTPATIENT)
Dept: CARE COORDINATION | Facility: CLINIC | Age: 32
End: 2022-07-11

## 2022-07-11 NOTE — PROGRESS NOTES
Clinic Care Coordination Contact  Program[place] / [place] benefits    [place] [facility] Case #:  [facility] Worker:   Adam #:   Subscriber #:   Renewal:  Date Applied:     FR Outreach:   2022: FRW called and patient and completed screenings and    SNAP/CASH Application Screenin. Have you had [facility] benefits before? No  2. How many people in the household, do you eat/buy food together? 2  3. What is your monthly income (include all tax members)? 2,000.00  4. Do you have a bank account?   5. Do you have any utility bills (electricity, rent, mortgage, phone, insurance, medical bills, etc.)?  Yes   6. Do you have social security cards and/or green cards? Yes   Health Insurance Screening: ADAM   1. Do you currently have health insurance, did you receive a renewal? No   2. [facility], do you know your username/password? No   3. How many people in the household? 1  4. Do you file taxes, who do you file with? Yes   5. What is your monthly income (include all tax members)? 0   6. Do you have access to insurance through an employer (if yes, need EIN)? No   7. Do you have social security cards and/or green cards? Yes       Health Insurance:      Referral/Screening:  FR Screening    Row Name 22 1020         County Benefits     Is patient requesting help applying for [facility] benefits? Yes         Have you recently applied for any [facility] benefits? No         How many people in your household? 2         Do you buy/eat food together? Yes                   Insurance:     [place]? No         Is this an insurance renewal? No         Is this a new insurance application request? Yes         Have you recently applied for insurance? No         How many people in your household?  2         Do you file taxes? Yes         How many dependents do you claim? 0                   OTHER     Is this a carlos enrique care application? No         Any other information for the  CONCEPCION? lives with his girlfriend- she doesn't make much money, but i didn't ask how much.  Currently living in [place] as he did not get unemployment, has no health insurance.  Please help with SNAP, [place], cash.  He gets overwhelmed, so go slowly.  He has worked with someone at the [facility] who he thought helped him apply for benefits at the [facility] but that was 3 months ago and he hasn't heard anything.               Goals Addressed:

## 2022-07-12 ENCOUNTER — PATIENT OUTREACH (OUTPATIENT)
Dept: CARE COORDINATION | Facility: CLINIC | Age: 32
End: 2022-07-12

## 2022-07-12 ENCOUNTER — VIRTUAL VISIT (OUTPATIENT)
Dept: FAMILY MEDICINE | Facility: CLINIC | Age: 32
End: 2022-07-12
Payer: MEDICAID

## 2022-07-12 ENCOUNTER — TELEPHONE (OUTPATIENT)
Dept: FAMILY MEDICINE | Facility: CLINIC | Age: 32
End: 2022-07-12

## 2022-07-12 DIAGNOSIS — F43.10 PTSD (POST-TRAUMATIC STRESS DISORDER): Primary | ICD-10-CM

## 2022-07-12 DIAGNOSIS — G89.29 CHRONIC BILATERAL LOW BACK PAIN WITHOUT SCIATICA: ICD-10-CM

## 2022-07-12 DIAGNOSIS — M54.50 CHRONIC BILATERAL LOW BACK PAIN WITHOUT SCIATICA: ICD-10-CM

## 2022-07-12 DIAGNOSIS — M25.50 MULTIPLE JOINT PAIN: ICD-10-CM

## 2022-07-12 DIAGNOSIS — M79.10 MYALGIA: ICD-10-CM

## 2022-07-12 DIAGNOSIS — F44.81 DISSOCIATIVE IDENTITY DISORDER (H): ICD-10-CM

## 2022-07-12 DIAGNOSIS — R53.83 FATIGUE, UNSPECIFIED TYPE: ICD-10-CM

## 2022-07-12 PROCEDURE — 99442 PR PHYSICIAN TELEPHONE EVALUATION 11-20 MIN: CPT | Performed by: FAMILY MEDICINE

## 2022-07-12 NOTE — TELEPHONE ENCOUNTER
Can we try to get Higinio into see Dr Franklin sometime? First available 40 minute spot. In person would be best. Okay to use anything as long as its 40 minutes. Thank you.

## 2022-07-12 NOTE — PROGRESS NOTES
Higinio is a 31 year old who is being evaluated via a billable telephone visit.      What phone number would you like to be contacted at? [phone]  How would you like to obtain your AVS? Arabella Sylvester was seen today for recheck and forms.    Diagnoses and all orders for this visit:    PTSD (post-traumatic stress disorder)    Dissociative identity disorder (H)    Multiple joint pain    Fatigue, unspecified type    Myalgia    Chronic bilateral low back pain without sciatica           Subjective   Higinio is a 31 year old significant health issues including both physical and mental health issues which have prevented him from being able to work.  He has seen multiple specialty providers but has had difficulties with insurance coverage so has not had recent visits.  His complex situation of needing to get health insurance as well as difficulties with navigating his disability insurance program.    He is fixated today on issues related to paperwork.  He is asking me questions and continuing to talk about difficulties with paperwork and processing paperwork and lack of understanding of who needs white information.  He has worked with social workers here in our clinic and has apparently had help from outside sources as well.  I talked with him that I am not a resource to be able to help with the paperwork because simply I do not have expertise to know who needs what and when he needs to work on straightening that out with help from other resources.  We need to arrange for an in person visit as soon as possible to reassess his health issues and decide on an appropriate course of action.        Review of Systems   Complete review of systems is obtained.  Other than the specific considerations noted above complete review of systems is negative.        Objective           Vitals:  No vitals were obtained today due to virtual visit.    Physical Exam   healthy, alert and no distress  PSYCH: Alert and oriented times 3; coherent  speech, normal   rate and volume, able to articulate logical thoughts, able   to abstract reason, no tangential thoughts, no hallucinations   or delusions  His affect is normal  RESP: No cough, no audible wheezing, able to talk in full sentences  Remainder of exam unable to be completed due to telephone visits                Phone call duration: 15 minutes    .  ..

## 2022-07-12 NOTE — PROGRESS NOTES
Clinic Care Coordination Contact  Program: [place] benefits    County: [facility] Case #:  [facility] Worker:   Adam #:   Subscriber #:   Renewal:  Date Applied:     FRCAIO Outreach:   2022 FRW called and completed [place] with the patient over the phone. Higinio appears to qualify for Medical Assistance. He will get a health care notice showing your final eligibility results. FRW to follow up in 2-3 weeks.   2022: FRW called and patient and completed screenings and set up time to complete application tomorrow.   SNAP/CASH Application Screenin. Have you had county benefits before? No  2. How many people in the household, do you eat/buy food together? 2  3. What is your monthly income (include all tax members)? 2,000.00  4. Do you have a bank account?   5. Do you have any utility bills (electricity, rent, mortgage, phone, insurance, medical bills, etc.)?  Yes   6. Do you have social security cards and/or green cards? Yes   Health Insurance Screening: ADAM   1. Do you currently have health insurance, did you receive a renewal? No   2. If you applied through [employer], do you know your username/password? No   3. How many people in the household? 1  4. Do you file taxes, who do you file with? Yes   5. What is your monthly income (include all tax members)? 0   6. Do you have access to insurance through an employer (if yes, need EIN)? No   7. Do you have social security cards and/or green cards? Yes       Health Insurance:      Referral/Screening:  FRW Screening    Row Name 22 1020         County Benefits     Is patient requesting help applying for [facility] benefits? Yes         Have you recently applied for any [facility] benefits? No         How many people in your household? 2         Do you buy/eat food together? Yes                   Insurance:     [place]? No         Is this an insurance renewal? No         Is this a new insurance  application request? Yes         Have you recently applied for insurance? No         How many people in your household?  2         Do you file taxes? Yes         How many dependents do you claim? 0                   OTHER     Is this a carlos enrique care application? No         Any other information for the FRW? lives with his girlfriend- she doesn't make much money, but i didn't ask how much.  Currently living in [place] as he did not get unemployment, has no health insurance.  Please help with SNAP, MA, cash.  He gets overwhelmed, so go slowly.  He has worked with someone at the [facility] who he thought helped him apply for benefits at the [facility] but that was 3 months ago and he hasn't heard anything.               Goals Addressed:

## 2022-07-13 ENCOUNTER — MYC MEDICAL ADVICE (OUTPATIENT)
Dept: FAMILY MEDICINE | Facility: CLINIC | Age: 32
End: 2022-07-13

## 2022-07-20 NOTE — TELEPHONE ENCOUNTER
[employer] message sent with the number to the billing office. Bola Clark RN on 7/20/2022 at 7:41 AM

## 2022-07-22 ENCOUNTER — TELEPHONE (OUTPATIENT)
Dept: PSYCHOLOGY | Facility: CLINIC | Age: 32
End: 2022-07-22

## 2022-07-25 ENCOUNTER — PATIENT OUTREACH (OUTPATIENT)
Dept: CARE COORDINATION | Facility: CLINIC | Age: 32
End: 2022-07-25

## 2022-07-25 NOTE — PROGRESS NOTES
Clinic Care Coordination Contact    Situation: Patient chart reviewed by care coordinator.    Background: Care Coordination initial assessment and enrollment to Care Coordination was on 11/24/21. Patient centered goals were developed with participation from patient. CCC SW handed patient off to CHW for continued outreach every 30 days.      Assessment: Per chart review, patient outreach completed by CCC SW on 7/8/22.      Plan/Recommendations: CCC SW will continue to monitor, support patient with current goals and will be available to assist as needs arise. CHW will outreach in one month on 8/4/22.      Donna Livingston  Community Health Worker   [facility] Care Coordination  [facility]   [email or web address]  Office: [phone]

## 2022-08-02 ENCOUNTER — OFFICE VISIT (OUTPATIENT)
Dept: FAMILY MEDICINE | Facility: CLINIC | Age: 32
End: 2022-08-02
Payer: MEDICAID

## 2022-08-02 VITALS
HEIGHT: 76 IN | SYSTOLIC BLOOD PRESSURE: 134 MMHG | BODY MASS INDEX: 38.11 KG/M2 | DIASTOLIC BLOOD PRESSURE: 82 MMHG | OXYGEN SATURATION: 98 % | WEIGHT: 313 LBS | RESPIRATION RATE: 18 BRPM | HEART RATE: 79 BPM

## 2022-08-02 DIAGNOSIS — F43.10 PTSD (POST-TRAUMATIC STRESS DISORDER): ICD-10-CM

## 2022-08-02 DIAGNOSIS — R73.03 PREDIABETES: Primary | ICD-10-CM

## 2022-08-02 DIAGNOSIS — F44.81 DISSOCIATIVE IDENTITY DISORDER (H): ICD-10-CM

## 2022-08-02 DIAGNOSIS — Q43.3 MALROTATION OF INTESTINE (H): ICD-10-CM

## 2022-08-02 DIAGNOSIS — M79.10 MYALGIA: ICD-10-CM

## 2022-08-02 DIAGNOSIS — G89.29 CHRONIC BILATERAL LOW BACK PAIN WITHOUT SCIATICA: ICD-10-CM

## 2022-08-02 DIAGNOSIS — R53.83 FATIGUE, UNSPECIFIED TYPE: ICD-10-CM

## 2022-08-02 DIAGNOSIS — M54.50 CHRONIC BILATERAL LOW BACK PAIN WITHOUT SCIATICA: ICD-10-CM

## 2022-08-02 DIAGNOSIS — G89.29 CHRONIC PAIN OF LEFT KNEE: ICD-10-CM

## 2022-08-02 DIAGNOSIS — M25.562 CHRONIC PAIN OF LEFT KNEE: ICD-10-CM

## 2022-08-02 DIAGNOSIS — E87.6 HYPOKALEMIA: ICD-10-CM

## 2022-08-02 DIAGNOSIS — R07.9 CHEST PAIN, UNSPECIFIED TYPE: ICD-10-CM

## 2022-08-02 DIAGNOSIS — M25.50 MULTIPLE JOINT PAIN: ICD-10-CM

## 2022-08-02 LAB
ANION GAP SERPL CALCULATED.3IONS-SCNC: 13 MMOL/L (ref 7–15)
BUN SERPL-MCNC: 12.7 MG/DL (ref 6–20)
CALCIUM SERPL-MCNC: 9.8 MG/DL (ref 8.6–10)
CHLORIDE SERPL-SCNC: 105 MMOL/L (ref 98–107)
CREAT SERPL-MCNC: 0.97 MG/DL (ref 0.67–1.17)
DEPRECATED HCO3 PLAS-SCNC: 22 MMOL/L (ref 22–29)
GFR SERPL CREATININE-BSD FRML MDRD: >90 ML/MIN/1.73M2
GLUCOSE SERPL-MCNC: 96 MG/DL (ref 70–99)
HBA1C MFR BLD: 5.5 % (ref 0–5.6)
POTASSIUM SERPL-SCNC: 3.8 MMOL/L (ref 3.4–5.3)
SODIUM SERPL-SCNC: 140 MMOL/L (ref 136–145)

## 2022-08-02 PROCEDURE — 36415 COLL VENOUS BLD VENIPUNCTURE: CPT | Performed by: FAMILY MEDICINE

## 2022-08-02 PROCEDURE — 99214 OFFICE O/P EST MOD 30 MIN: CPT | Performed by: FAMILY MEDICINE

## 2022-08-02 PROCEDURE — 80048 BASIC METABOLIC PNL TOTAL CA: CPT | Performed by: FAMILY MEDICINE

## 2022-08-02 PROCEDURE — 83036 HEMOGLOBIN GLYCOSYLATED A1C: CPT | Performed by: FAMILY MEDICINE

## 2022-08-02 ASSESSMENT — PAIN SCALES - GENERAL: PAINLEVEL: NO PAIN (0)

## 2022-08-02 NOTE — PROGRESS NOTES
"Higinio Wallace  /82   Pulse 79   Resp 18   Ht 1.93 m (6' 4\")   Wt 142 kg (313 lb)   SpO2 98%   BMI 38.10 kg/m       Assessment/Plan:                Higinio was seen today for recheck.    Diagnoses and all orders for this visit:    Prediabetes  -     Hemoglobin A1c    Hypokalemia  -     Basic metabolic panel    PTSD (post-traumatic stress disorder)  -     Adult Mental Health  Referral; Future  -     Adult Mental Health  Referral; Future    Dissociative identity disorder (H)  -     Adult Mental Health  Referral; Future  -     Adult Mental Health  Referral; Future    Multiple joint pain    Fatigue, unspecified type    Myalgia    Chronic bilateral low back pain without sciatica    Malrotation of intestine    Chronic pain of left knee    Chest pain, unspecified type         DISCUSSION  Continue to try to eat healthy and maintain a consistent physical activity level to help manage all symptoms.  Do not feel any additional referrals or laboratory tests are needed at this time other than follow-up on his prediabetes with a repeat A1c today and to follow-up on a low potassium from lab evaluation yesterday in the emergency department.  Follow-up with me in 1 month.  Continue self-directed care and establish with mental health specialty providers.  Subjective:     HPI:    Higinio Wallace is a 31 year old male has a complicated mental health and physical health history.  He was recently just received health insurance and been able to start reestablishing care.    He has a diagnosis of PTSD and dissociative identity disorder.  He is followed with psychiatry as well as a therapist on a regular basis.  He reports his mental health is doing reasonably well at this point in time but he has not had recent follow-up.  He wishes to reestablish care.  He is out of work primarily because of the decompensation in his mental health over the past year.  He has had a great deal of difficulty " dealing with managing his life and managing physical health symptoms because of it.  He previously worked at [email or web address].    He has been seeing various specialty providers over the course of the past year including endocrinology, orthopedic specialist, gastroenterologist, rheumatologist.  There is no overall unifying medical diagnosis but he suffers from multiple areas of joint pain, chronic fatigue, myalgias, chronic low back pain, digestive system symptoms consistent with irritable bowel syndrome and a history of malrotation of the intestines, he has chronic pain of the left knee with a prior surgical intervention.    He reports to me that he has made the decision to become more physically active.  His weight has gone down.  He feels better both physically and mentally having done this.  Patient is encouraged in this regard.    He was seen Emergency Department yesterday after having a meal out at [employer] for evaluation of chest pain.  Clearly from the evaluation that I reviewed from the emergency department and from his description and absence of symptoms at this time this was a gastrointestinal symptom and it has resolved.  I do not feel additional cardiac work-up or referrals are necessary at this time.    Regarding digestion this is still one of his biggest ongoing concerns.  He has confused irritable bowel syndrome with inflammatory bowel syndrome.  We spent some time clarifying this.  He is encouraged to follow-up with his gastroenterologist to further discuss dietary modification as had been recommended.  He is encouraged to follow advice of his gastroenterologist.    ROS:  Complete review of systems is obtained.  Other than the specific considerations noted above complete review of systems is negative.        Objective:   Medications:  No current outpatient medications on file.     No current facility-administered medications for this visit.        Allergies:     Allergies   Allergen Reactions     Bee Venom  Anaphylaxis and Other (See Comments)     Swelling  Large local reactions/ swelling       Fruit Acid Concentrate [Fruit Extracts] Swelling     Lips swell and crust over little bit- tongue gets numb     Liquid Adhesive Dermatitis     And band aid       Monosodium Glutamate Hives        Social History     Socioeconomic History     Marital status: Single     Spouse name: Not on file     Number of children: Not on file     Years of education: Not on file     Highest education level: Not on file   Occupational History     Not on file   Tobacco Use     Smoking status: Current Some Day Smoker     Packs/day: 0.25     Types: Cigarettes     Start date: 2001     Smokeless tobacco: Never Used     Tobacco comment: 1-2 cig a day trying to quit   Substance and Sexual Activity     Alcohol use: Not Currently     Drug use: Yes     Types: Marijuana     Comment: daily     Sexual activity: Yes     Partners: Female     Birth control/protection: Condom   Other Topics Concern     Parent/sibling w/ CABG, MI or angioplasty before 65F 55M? Not Asked   Social History Narrative     Not on file     Social Determinants of Health     Financial Resource Strain: Medium Risk     Difficulty of Paying Living Expenses: Somewhat hard   Food Insecurity: Food Insecurity Present     Worried About Running Out of Food in the Last Year: Sometimes true     Ran Out of Food in the Last Year: Sometimes true   Transportation Needs: No Transportation Needs     Lack of Transportation (Medical): No     Lack of Transportation (Non-Medical): No   Physical Activity: Not on file   Stress: Not on file   Social Connections: Not on file   Intimate Partner Violence: Not on file   Housing Stability: Low Risk      Unable to Pay for Housing in the Last Year: No     Number of Places Lived in the Last Year: 1     Unstable Housing in the Last Year: No       Family History   Problem Relation Age of Onset     Other - See Comments Mother         PONV     Cerebrovascular Disease Brother  "     Atrial fibrillation Brother      Other - See Comments Brother         cardiac autoimmune deficiency        Most Recent Immunizations   Administered Date(s) Administered     COVID-19,PF,Moderna 05/21/2021     TDAP Vaccine (Boostrix) 07/03/2019     Tdap (Adacel,Boostrix) 07/03/2019        Wt Readings from Last 3 Encounters:   08/02/22 142 kg (313 lb)   05/16/22 (!) 152.4 kg (336 lb)   04/20/22 (!) 154.2 kg (340 lb)        BP Readings from Last 6 Encounters:   08/02/22 134/82   05/16/22 138/82   04/03/22 137/76   02/08/22 134/84   02/08/22 (!) 130/95   01/25/22 136/86        Hemoglobin A1C   Date Value Ref Range Status   08/17/2021 5.5 0.0 - 5.6 % Final     Comment:     Normal <5.7%   Prediabetes 5.7-6.4%    Diabetes 6.5% or higher     Note: Adopted from ADA consensus guidelines.   11/04/2020 5.4 0 - 5.6 % Final     Comment:     Normal <5.7% Prediabetes 5.7-6.4%  Diabetes 6.5% or higher - adopted from ADA   consensus guidelines.          PHYSICAL EXAM:    /82   Pulse 79   Resp 18   Ht 1.93 m (6' 4\")   Wt 142 kg (313 lb)   SpO2 98%   BMI 38.10 kg/m       General: Patient alert no signs of distress    Lungs clear without wheeze or crackle    Heart: Regular rate and rhythm no murmur    Abdomen soft nondistended no organomegaly or masses patient reports diffuse tenderness but does not react to suggest significant discomfort on abdominal exam.    Examination of his movement indicates that he has good forward flexion without significant limitation or pain at the hips.  Extension of the back does cause some discomfort and is mildly limited.  Side to side bending of the back does cause mild discomfort but is not significantly limited.  He can heel and toe walk without concern.  He walks with an otherwise normal gait.  He can raise from a seated position without difficulty.                          "

## 2022-08-03 ENCOUNTER — PATIENT OUTREACH (OUTPATIENT)
Dept: CARE COORDINATION | Facility: CLINIC | Age: 32
End: 2022-08-03

## 2022-08-03 NOTE — PROGRESS NOTES
"Clinic Care Coordination Contact    Community Health Worker Follow Up    Care Gaps:     Health Maintenance Due   Topic Date Due     PREVENTIVE CARE VISIT  Never done     ADVANCE CARE PLANNING  Never done     DEPRESSION ACTION PLAN  Never done     Pneumococcal Vaccine: Pediatrics (0 to 5 Years) and At-Risk Patients (6 to 64 Years) (1 - PCV) Never done     HIV SCREENING  Never done     HEPATITIS C SCREENING  Never done     NICOTINE/TOBACCO CESSATION COUNSELING Q 1 YR  09/04/2021     COVID-19 Vaccine (3 - Booster for Moderna series) 10/21/2021         Goals:    Goals Addressed as of 8/3/2022 at 11:31 AM                    Today    Today       1. Mental Health Management (pt-stated)   30%      Added 11/24/21 by Dana Kumar LSW      Goal Statement: I will have stable mental health and have a therapist and psychiatrist within 2 months.  Date Goal set: 11-  Barriers: Feeling overwhelmed, stressed.  Strengths: has insurance, already connected to therapist.   Date to Achieve By: 1-, updated to 10-1-2022  Patient expressed understanding of goal: yes  Action steps to achieve this goal:  1. I will attend appointments with my therapists.  2. I will meet with new psychiatrist on 12-9 at 1:00 with [facility] with Kyle \"Ana Paula\" Staci Macias NP  3. I will report progress towards this goal at scheduled outreach telephone calls from the CCC team.    Discussed: 8/3/22           2. Financial Wellbeing (pt-stated)   30%      Added 12/1/21 by Dana Kumar LSW      Goal Statement: I will have financial stability and am able to pay my bills within 6 months.  Date Goal set: 12-1-2021  Barriers: trouble at current job, only able to work part time.  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   Date to Achieve By: 5-1-2022, updated to 1-8-2023  Patient expressed understanding of goal: yes  Action steps to achieve this goal:  1. I will continue to work with my current employer to have income while I " look for a new job.  2. I will review [employer] [facility]  and [facility] resources for help.  3. I will report progress towards this goal at scheduled outreach telephone calls from the CCC team.    Discussed 8/3/22             3. Other (pt-stated)   20%      Added 12/1/21 by Dana Kumar LSW      Goal Statement: I will find a new place to live with my girlfriend that is affordable within one year.  Date Goal set:12-1-2021  Barriers: hard to find affordable apartments.  Strengths: have a place to live now.   Date to Achieve By: 12-1-2022  Patient expressed understanding of goal: yes  Action steps to achieve this goal:  1. I will look at \[facility] [place] [facility]\"" for options.   2. I will continue to pay rent at my current apartment.  3. I will report progress towards this goal at scheduled outreach telephone calls from the CCC team.      Discussed 8/3/22             4. Functional (pt-stated)   80%  80%    Added 7/8/22 by Dana Kumar LSW      Goal Statement: I will apply for [employer] Benefits within the next 1-2 weeks if I am eligible.   Date Goal Set:  7-8-2022  Strengths: wants to get support.  Barriers:  lots of stress and worry, lost job and health insurance.   Date to Achieve By: 10-8-2022  Patient expressed understanding of goal: yes  Action steps to achieve this goal:  1. I understand a referral was placed to the Financial Resource Worker, I will receive a call within the next 3 business days.    2. I understand the financial worker will make two attempts to call me. If I still need help with this goal, I will connect with my Community Health Worker Donna at [phone].       Discussed 8/3/22              Intervention and Education during outreach: CHW gave patient contact information and encouraged patient to reach out with any questions or concerns.    CHW Note:    CHW contacted patient to review/update CCC goals.     Patient stated that he has been in touch with the FRW and recently learned he qualified for Medical  Assistance and SNAP. Patient states he has already started using his MA and is happy to have insurance. Patient plans on calling the clinic to make an appointment with a Psychiatrist. Patient will update CCC team at next scheduled outreach.     Patient states he continues to live with his girlfriend and hopes he can get a new place. Patient states he completed his Social Security Disability interview a few days ago and is waiting to hear the outcome. Patient will report progress at next [facility] outreach.    Patient states he is doing good overall and is pleased to be getting assistance. Patient denied any other needs or concerns but will reach out to CCC team as needed.    CHW Plan: CHW will continue to support patient with goals through routine scheduled outreach. CHW will outreach in one month on 9/6/22.      Donna Livingston  Community Health Worker   [facility]  Clinic Care Coordination  [facility]   [email or web address]  Office: [phone]

## 2022-08-03 NOTE — PROGRESS NOTES
Clinic Care Coordination Contact  [place]    County: [facility] Case #:  [facility] Worker:   Adam #:   Subscriber #:   Renewal:  Date Applied: 2022    FRW Outreach:   8/3/2022: FRW called and patient was approved for MA and he also was approved for SNAP benefits he is still waiting to see about the CASH benefits but he is talking to his pcp to fill out the form that the [facility] needs.   2022 FRW called and completed [place] the patient over the phone. Higinio appears to qualify for Medical Assistance. He will get a health care notice showing your final eligibility results. FRW to follow up in 2-3 weeks.   2022: FRW called and patient and completed screenings and set up time to complete application tomorrow.   SNAP/CASH Application Screenin. Have you had [facility] benefits before? No  2. How many people in the household, do you eat/buy food together? 2  3. What is your monthly income (include all tax members)? 2,000.00  4. Do you have a bank account?   5. Do you have any utility bills (electricity, rent, mortgage, phone, insurance, medical bills, etc.)?  Yes   6. Do you have social security cards and/or green cards? Yes   Health Insurance Screening: ADAM   1. Do you currently have health insurance, did you receive a renewal? No   2. If you applied through [employer], do you know your username/password? No   3. How many people in the household? 1  4. Do you file taxes, who do you file with? Yes   5. What is your monthly income (include all tax members)? 0   6. Do you have access to insurance through an employer (if yes, need EIN)? No   7. Do you have social security cards and/or green cards? Yes       Health Insurance:  This subscriber has eligibility for MA: Medical Assistance.  Elig Type AX: MA Early Expansion  Eligibility Begin Date: 2022  Eligibility End Date: --/--/----  This subscriber is eligible for the following service types:  Medical Care ,  Chiropractic ,  Dental Care ,  Hospital ,  Hospital - Inpatient ,  Hospital - Outpatient ,  Emergency Services ,  Pharmacy ,  Professional (Physician) Visit - Office ,  Vision (Optometry) ,  Mental Health ,  Urgent Care    Referral/Screening:  CONCEPCION Screening    Row Name 07/08/22 1020         [facility] Benefits     Is patient requesting help applying for [facility] benefits? Yes         Have you recently applied for any [facility] benefits? No         How many people in your household? 2         Do you buy/eat food together? Yes                   Insurance:     [place]? No         Is this an insurance renewal? No         Is this a new insurance application request? Yes         Have you recently applied for insurance? No         How many people in your household?  2         Do you file taxes? Yes         How many dependents do you claim? 0                   OTHER     Is this a carlos enrique care application? No         Any other information for the CONCEPCION? lives with his girlfriend- she doesn't make much money, but i didn't ask how much.  Currently living in [place] as he did not get unemployment, has no health insurance.  Please help with SNAP, MA, cash.  He gets overwhelmed, so go slowly.  He has worked with someone at the [facility] who he thought helped him apply for benefits at the [facility] but that was 3 months ago and he hasn't heard anything.               Goals Addressed:

## 2022-08-17 ENCOUNTER — PATIENT OUTREACH (OUTPATIENT)
Dept: CARE COORDINATION | Facility: CLINIC | Age: 32
End: 2022-08-17

## 2022-08-17 NOTE — PROGRESS NOTES
Clinic Care Coordination Contact  [place]    County: [facility] Case #:  [facility] Worker:   Adam #:   Subscriber #:   Renewal:  Date Applied: 2022    FRW Outreach:   2022 FRW updated chart due to Epic upgrade   8/3/2022: FRW called and patient was approved for MA and he also was approved for SNAP benefits he is still waiting to see about the CASH benefits but he is talking to his pcp to fill out the form that the [facility] needs.   2022 FRW called and completed [place] with the patient over the phone. Higinio appears to qualify for Medical Assistance. He will get a health care notice showing your final eligibility results. FRW to follow up in 2-3 weeks.   2022: FRW called and patient and completed screenings and set up time to complete application tomorrow.   SNAP/CASH Application Screenin. Have you had [facility] benefits before? No  2. How many people in the household, do you eat/buy food together? 2  3. What is your monthly income (include all tax members)? 2,000.00  4. Do you have a bank account?   5. Do you have any utility bills (electricity, rent, mortgage, phone, insurance, medical bills, etc.)?  Yes   6. Do you have social security cards and/or green cards? Yes   Health Insurance Screening: ADAM   1. Do you currently have health insurance, did you receive a renewal? No   2. If you applied through [employer], do you know your username/password? No   3. How many people in the household? 1  4. Do you file taxes, who do you file with? Yes   5. What is your monthly income (include all tax members)? 0   6. Do you have access to insurance through an employer (if yes, need EIN)? No   7. Do you have social security cards and/or green cards? Yes       Health Insurance:  This subscriber has eligibility for MA: Medical Assistance.  Elig Type AX: MA Early Expansion  Eligibility Begin Date: 2022  Eligibility End Date: --/--/----  This subscriber  is eligible for the following service types: Medical Care ,  Chiropractic ,  Dental Care ,  Hospital ,  Hospital - Inpatient ,  Hospital - Outpatient ,  Emergency Services ,  Pharmacy ,  Professional (Physician) Visit - Office ,  Vision (Optometry) ,  Mental Health ,  Urgent Care    Referral/Screening:  CONCEPCION Screening    Row Name 07/08/22 1020         [facility] Benefits     Is patient requesting help applying for [facility] benefits? Yes         Have you recently applied for any [facility] benefits? No         How many people in your household? 2         Do you buy/eat food together? Yes                   Insurance:     [place]? No         Is this an insurance renewal? No         Is this a new insurance application request? Yes         Have you recently applied for insurance? No         How many people in your household?  2         Do you file taxes? Yes         How many dependents do you claim? 0                   OTHER     Is this a carlos enrique care application? No         Any other information for the FRW? lives with his girlfriend- she doesn't make much money, but i didn't ask how much.  Currently living in [place] as he did not get unemployment, has no health insurance.  Please help with SNAP, MA, cash.  He gets overwhelmed, so go slowly.  He has worked with someone at the [facility] who he thought helped him apply for benefits at the [facility] but that was 3 months ago and he hasn't heard anything.               Goals Addressed:

## 2022-08-19 ENCOUNTER — PATIENT OUTREACH (OUTPATIENT)
Dept: CARE COORDINATION | Facility: CLINIC | Age: 32
End: 2022-08-19

## 2022-08-19 ENCOUNTER — TELEPHONE (OUTPATIENT)
Dept: FAMILY MEDICINE | Facility: CLINIC | Age: 32
End: 2022-08-19

## 2022-08-19 NOTE — LETTER
[facility] CARE COORDINATION  [facility]    August 19, 2022        Higinio Isaac  [address] [facility] [address]          Dear Higinio,     Attached is an updated Complex Care Plan for your continued enrollment in Care Coordination. Please let us know if you have additional questions, concerns, or goals that we can assist with.    Sincerely,    Dana Kumar,   [facility]  [phone]        [facility]  Patient Centered Plan of Care  About Me:        Patient Name:  Higinio Isaac    YOB: 1990  Age:         31 year old   [facility] MRN:    [id number] Telephone Information:  Home Phone [phone]   Mobile [phone]       Address:  [address] [facility] [address] Email address:  [email or web address]      Emergency Contact(s)    Name Relationship Lgl Grd Work Phone Home Phone Mobile Phone   1. ALLEN ISAAC Mother   [phone]   2. JESSICA NARANJO Significant ot*    [phone]           Primary language:  English     needed? No   [employer]:  [phone] op. 1  Other communication barriers:None    Preferred Method of Communication:  Mail  Current living arrangement: Other (Lives with girlfriend)    Mobility Status/ Medical Equipment: Independent        Health Maintenance  Health Maintenance Reviewed: Due/Overdue   Health Maintenance Due   Topic Date Due     PREVENTIVE CARE VISIT  Never done     ADVANCE CARE PLANNING  Never done     DEPRESSION ACTION PLAN  Never done     Pneumococcal Vaccine: Pediatrics (0 to 5 Years) and At-Risk Patients (6 to 64 Years) (1 - PCV) Never done     HIV SCREENING  Never done     HEPATITIS C SCREENING  Never done     NICOTINE/TOBACCO CESSATION COUNSELING Q 1 YR  09/04/2021     COVID-19 Vaccine (3 - Booster for Moderna series) 10/21/2021           My Access Plan  Medical Emergency 911   Primary Clinic Line [phone]   24 Hour Appointment Line [phone]  or  [phone]) (toll-free)   24 Hour Nurse Line [phone] (toll-free)   Preferred Urgent Care [facility], [phone]     [facility]  [phone]     Preferred Pharmacy [id number] [address] [facility]      Behavioral Health Crisis Line The National Suicide Prevention Lifeline at 1-477.629.6306 or Text/Call 988             My Care Team Members  Patient Care Team       Relationship Specialty Notifications Start End    Raza Franklin MD PCP - General Family Medicine  2/15/22     Phone: [phone] Fax: [phone] [address] [address]    Lela Han MD MD Orthopedics  1/2/20     Phone: [phone] Fax: [phone]         [address] [place] [address]    Jamie Carr MD MD Orthopaedic Surgery  1/2/20     Phone: [phone] Fax: [phone]         [facility] ORTHOPEDICS [address] [facility] [address]    Bloch, Lauren Turner, [facility] Pharmacist Pharmacist  10/5/20     Phone: [phone]          [address] [place] [address]    Donell Holland MD Assigned Gastroenterology Provider   10/23/20     Phone: [phone] Fax: [phone]         [address] [place] [address]    Lela Han MD Assigned Musculoskeletal Provider   10/23/20     Phone: [phone] Fax: [phone]         [address] [place] [address]    Raza Franklin MD Assigned PCP   6/16/21     Phone: [phone] Fax: [phone] [address] [facility] [address] [facility] [address]    Goltz, Bennett Ezra, PA-C Assigned Neuroscience Provider   10/24/21     Phone: [phone] Fax: [phone] [address] [facility] [address] [facility] [address]    Waqar Go MD MD Rheumatology  11/15/21     Phone: [phone] Fax: [phone]         [address] [place] [address]    Franchesca Young MD MD Endocrinology, Diabetes, and Metabolism   11/18/21     Phone: [phone] Fax: [phone]         [facility] [place] [address]    Waqar Go MD Assigned Rheumatology Provider   11/21/21     Phone: [phone] Fax: [phone]         [address] [place] [address]    Mark Knapp MD MD Endocrinology, Diabetes, and Metabolism  12/10/21     Phone: [phone] Fax: [phone]         [address] [place] [address]    Mark Knapp MD Assigned Endocrinology Provider   1/23/22     Phone: [phone] Fax: [phone]         [address] [place] [address]    Dana Kumar LSW Lead Care Coordinator Primary Care - CC Admissions 4/29/22     Phone: [phone]         Donna Livingston MA Medical Assistant   4/29/22     Donna Livingston MA Community Health Worker  Admissions 4/29/22     Emily Walker MA Financial Resource Worker   7/8/22             My Care Plans  Self Management and Treatment Plan  Care Plan       Action Plans on File:                       Advance Care Plans/Directives Type:   No data recorded    My Medical and Care Information  Problem List   Patient Active Problem List   Diagnosis     Irritable bowel syndrome     Class 2 severe obesity due to excess calories with serious comorbidity and body mass index (BMI) of 37.0 to 37.9 in adult (H)     Patellofemoral instability of left knee with pain     Tobacco abuse     Suicidal ideation     Moderate episode of recurrent major depressive disorder (H)     CARSON (obstructive sleep apnea)      Current Medications and Allergies:  No current outpatient medications      Care Coordination Start Date: 11/24/2021   Frequency of Care Coordination: monthly     Form Last Updated: 08/19/2022

## 2022-08-19 NOTE — PROGRESS NOTES
"Clinic Care Coordination - Chart Review Only    Situation/Background: Patient chart reviewed by care coordinator related to [employer] conversion.    Assessment: Patient continues to be followed by Clinic Care Coordination.    Plan: Patient's chart updated to align with [employer] program for ongoing patient management.    Care Coordination Clinician Chart Review     Situation: Patient chart reviewed by care coordinator.?     Background: Initial assessment and enrollment to Care Coordination was 11-.?? Patient centered goals were developed with participation from patient.? Lead CC handed patient off to CHW for continued outreach every 30 days.??     Assessment: Per chart review, patient outreach completed by CC CHW on 8-6-2022.? Patient is actively working to accomplish goal(s).? Patient's goal(s) remain(s) appropriate at this time.? Patient is due for updated Plan of Care.? Annual assessment will be due 11-.      Goals        Patient Stated       1. Mental Health Management (pt-stated)       Goal Statement: I will have stable mental health and have a therapist and psychiatrist within 2 months.  Date Goal set: 11-  Barriers: Feeling overwhelmed, stressed.  Strengths: has insurance, already connected to therapist.   Date to Achieve By: 1-, updated to 10-1-2022  Patient expressed understanding of goal: yes  Action steps to achieve this goal:  1. I will attend appointments with my therapists.  2. I will meet with new psychiatrist on 12-9 at 1:00 with [facility] with Mekhit \"Ana Paula\" Staci Macias NP  3. I will report progress towards this goal at scheduled outreach telephone calls from the CCC team.    Discussed: 8/3/22           2. Financial Wellbeing (pt-stated)       Goal Statement: I will have financial stability and am able to pay my bills within 6 months.  Date Goal set: 12-1-2021  Barriers: trouble at current job, only able to work part time.  Strengths: would like to " find job in customer service.   Date to Achieve By: 5-1-2022, updated to 1-8-2023  Patient expressed understanding of goal: yes  Action steps to achieve this goal:  1. I will continue to work with my current employer to have income while I look for a new job.  2. I will review [employer]  and [facility] resources for help.  3. I will report progress towards this goal at scheduled outreach telephone calls from the CCC team.    Discussed 8/3/22             3. Other (pt-stated)       Goal Statement: I will find a new place to live with my girlfriend that is affordable within one year.  Date Goal set:12-1-2021  Barriers: hard to find affordable apartments.  Strengths: have a place to live now.   Date to Achieve By: 12-1-2022  Patient expressed understanding of goal: yes  Action steps to achieve this goal:  1. I will look at [facility] for options.   2. I will continue to pay rent at my current apartment.  3. I will report progress towards this goal at scheduled outreach telephone calls from the CCC team.      Discussed 8/3/22             4. Functional (pt-stated)       Goal Statement: I will apply for County Benefits within the next 1-2 weeks if I am eligible.   Date Goal Set:  7-8-2022  Strengths: wants to get support.  Barriers:  lots of stress and worry, lost job and health insurance.   Date to Achieve By: 10-8-2022  Patient expressed understanding of goal: yes  Action steps to achieve this goal:  1. I understand a referral was placed to the Financial Resource Worker, I will receive a call within the next 3 business days.    2. I understand the financial worker will make two attempts to call me. If I still need help with this goal, I will connect with my Community Health Worker Donna at [phone].       Discussed 8/3/22          ??     Plan/Recommendations: The patient will continue working with Care Coordination to achieve above goal(s).? CHW will involve Lead CC as needed or if patient is ready to  move to maintenance.? Lead CC will continue to monitor CHW s monthly outreaches and progress to goal(s) every 6 weeks.?     Plan of Care updated and sent to patient: Yes, via my chart.

## 2022-08-19 NOTE — PROGRESS NOTES
Clinic Care Coordination Contact  Program: [place] / [place] benefits    County: [facility] Case #:  [facility] Worker:   Adam #:   Subscriber #:   Renewal:  Date Applied: 2022    FRW Outreach:   2022 FRW called the patient and he needs to have a form filled out for the Cash assistance but he informed the FRW that he is going to move in the 1st week in September Due to this FRW is removing herself form the Care team.  2022 FRW updated chart due to Epic upgrade   8/3/2022: FRW called and patient was approved for MA and he also was approved for SNAP benefits he is still waiting to see about the CASH benefits but he is talking to his pcp to fill out the form that the [facility] needs.   2022 FRW called and completed [place] patient over the phone. Higinio appears to qualify for Medical Assistance. He will get a health care notice showing your final eligibility results. FRW to follow up in 2-3 weeks.   2022: FRW called and patient and completed screenings and set up time to complete application tomorrow.   SNAP/CASH Application Screenin. Have you had county benefits before? No  2. How many people in the household, do you eat/buy food together? 2  3. What is your monthly income (include all tax members)? 2,000.00  4. Do you have a bank account?   5. Do you have any utility bills (electricity, rent, mortgage, phone, insurance, medical bills, etc.)?  Yes   6. Do you have social security cards and/or green cards? Yes   Health Insurance Screening: [place]   1. Do you currently have health insurance, did you receive a renewal? No   2. If you applied through [place], do you know your username/password? No   3. How many people in the household? 1  4. Do you file taxes, who do you file with? Yes   5. What is your monthly income (include all tax members)? 0   6. Do you have access to insurance through an employer (if yes, need EIN)? No   7. Do you have social security cards  and/or green cards? Yes       Health Insurance:  This subscriber has eligibility for MA: Medical Assistance.  Elig Type AX: MA Early Expansion  Eligibility Begin Date: 04/01/2022  Eligibility End Date: --/--/----  This subscriber is eligible for the following service types: Medical Care ,  Chiropractic ,  Dental Care ,  Hospital ,  Hospital - Inpatient ,  Hospital - Outpatient ,  Emergency Services ,  Pharmacy ,  Professional (Physician) Visit - Office ,  Vision (Optometry) ,  Mental Health ,  Urgent Care    Referral/Screening:  [facility] Screening    Row Name 07/08/22 1020         [facility] Benefits     Is patient requesting help applying for [facility] benefits? Yes         Have you recently applied for any [facility] benefits? No         How many people in your household? 2         Do you buy/eat food together? Yes                   Insurance:     [place] No         Is this an insurance renewal? No         Is this a new insurance application request? Yes         Have you recently applied for insurance? No         How many people in your household?  2         Do you file taxes? Yes         How many dependents do you claim? 0                   OTHER     Is this a carlos enrique care application? No         Any other information for the FRW? lives with his girlfriend- she doesn't make much money, but i didn't ask how much.  Currently living in [place] as he did not get unemployment, has no health insurance.  Please help with SNAP, MA, cash.  He gets overwhelmed, so go slowly.  He has worked with someone at the [facility] who he thought helped him apply for benefits at the [facility] but that was 3 months ago and he hasn't heard anything.               Goals Addressed:

## 2022-08-19 NOTE — PROGRESS NOTES
Clinic Care Coordination Contact  Program: [place] / [place] benefits    County: [facility] Case #:  [facility] Worker:   Adam #:   Subscriber #:   Renewal:  Date Applied: 2022    FRW Outreach:   2022 FRW called the patient and he needs to have a form filled out for the Cash assistance but he informed the FRW that he is going to move in the 1st week in September Due to this FRW is removing herself form the Care team.  2022 FRW updated chart due to Epic upgrade   8/3/2022: FRW called and patient was approved for MA and he also was approved for SNAP benefits he is still waiting to see about the CASH benefits but he is talking to his pcp to fill out the form that the [facility] needs.   2022 FRW called and completed [place] the patient over the phone. Higinio appears to qualify for Medical Assistance. He will get a health care notice showing your final eligibility results. FRW to follow up in 2-3 weeks.   2022: FRW called and patient and completed screenings and set up time to complete application tomorrow.   SNAP/CASH Application Screenin. Have you had county benefits before? No  2. How many people in the household, do you eat/buy food together? 2  3. What is your monthly income (include all tax members)? 2,000.00  4. Do you have a bank account?   5. Do you have any utility bills (electricity, rent, mortgage, phone, insurance, medical bills, etc.)?  Yes   6. Do you have social security cards and/or green cards? Yes   Health Insurance Screening: [place]   1. Do you currently have health insurance, did you receive a renewal? No   2. If you applied through Mnsure, do you know your username/password? No   3. How many people in the household? 1  4. Do you file taxes, who do you file with? Yes   5. What is your monthly income (include all tax members)? 0   6. Do you have access to insurance through an employer (if yes, need EIN)? No   7. Do you have social security cards  and/or green cards? Yes       Health Insurance:  This subscriber has eligibility for MA: Medical Assistance.  Elig Type AX: MA Early Expansion  Eligibility Begin Date: 04/01/2022  Eligibility End Date: --/--/----  This subscriber is eligible for the following service types: Medical Care ,  Chiropractic ,  Dental Care ,  Hospital ,  Hospital - Inpatient ,  Hospital - Outpatient ,  Emergency Services ,  Pharmacy ,  Professional (Physician) Visit - Office ,  Vision (Optometry) ,  Mental Health ,  Urgent Care    Referral/Screening:  [facility] Screening    Row Name 07/08/22 1020         [facility] Benefits     Is patient requesting help applying for [facility] benefits? Yes         Have you recently applied for any [facility] benefits? No         How many people in your household? 2         Do you buy/eat food together? Yes                   Insurance:     [place]? No         Is this an insurance renewal? No         Is this a new insurance application request? Yes         Have you recently applied for insurance? No         How many people in your household?  2         Do you file taxes? Yes         How many dependents do you claim? 0                   OTHER     Is this a carlos enrique care application? No         Any other information for the FRW? lives with his girlfriend- she doesn't make much money, but i didn't ask how much.  Currently living in [facility] as he did not get unemployment, has no health insurance.  Please help with SNAP, MA, cash.  He gets overwhelmed, so go slowly.  He has worked with someone at the [facility] who he thought helped him apply for benefits at the [facility] but that was 3 months ago and he hasn't heard anything.               Goals Addressed:

## 2022-09-01 ENCOUNTER — HOSPITAL ENCOUNTER (EMERGENCY)
Facility: CLINIC | Age: 32
Discharge: LEFT BEFORE DISCHARGE | End: 2022-09-01
Payer: COMMERCIAL

## 2022-09-01 VITALS
TEMPERATURE: 98 F | OXYGEN SATURATION: 99 % | RESPIRATION RATE: 20 BRPM | DIASTOLIC BLOOD PRESSURE: 111 MMHG | HEART RATE: 103 BPM | SYSTOLIC BLOOD PRESSURE: 166 MMHG

## 2022-09-02 ENCOUNTER — OFFICE VISIT (OUTPATIENT)
Dept: FAMILY MEDICINE | Facility: CLINIC | Age: 32
End: 2022-09-02
Payer: COMMERCIAL

## 2022-09-02 VITALS
OXYGEN SATURATION: 98 % | SYSTOLIC BLOOD PRESSURE: 142 MMHG | HEIGHT: 76 IN | RESPIRATION RATE: 18 BRPM | BODY MASS INDEX: 37.63 KG/M2 | HEART RATE: 75 BPM | WEIGHT: 309 LBS | DIASTOLIC BLOOD PRESSURE: 84 MMHG

## 2022-09-02 DIAGNOSIS — M25.50 MULTIPLE JOINT PAIN: ICD-10-CM

## 2022-09-02 DIAGNOSIS — R76.8 POSITIVE ANA (ANTINUCLEAR ANTIBODY): ICD-10-CM

## 2022-09-02 DIAGNOSIS — M79.10 MYALGIA: ICD-10-CM

## 2022-09-02 DIAGNOSIS — F43.10 PTSD (POST-TRAUMATIC STRESS DISORDER): Primary | ICD-10-CM

## 2022-09-02 DIAGNOSIS — G89.29 CHRONIC BILATERAL LOW BACK PAIN WITHOUT SCIATICA: ICD-10-CM

## 2022-09-02 DIAGNOSIS — G47.33 OSA (OBSTRUCTIVE SLEEP APNEA): ICD-10-CM

## 2022-09-02 DIAGNOSIS — Q43.3 MALROTATION OF INTESTINE (H): ICD-10-CM

## 2022-09-02 DIAGNOSIS — F44.81 DISSOCIATIVE IDENTITY DISORDER (H): ICD-10-CM

## 2022-09-02 DIAGNOSIS — M54.50 CHRONIC BILATERAL LOW BACK PAIN WITHOUT SCIATICA: ICD-10-CM

## 2022-09-02 PROCEDURE — 99213 OFFICE O/P EST LOW 20 MIN: CPT | Performed by: FAMILY MEDICINE

## 2022-09-02 ASSESSMENT — PAIN SCALES - GENERAL: PAINLEVEL: MILD PAIN (2)

## 2022-09-02 NOTE — ED TRIAGE NOTES
"Pt aox4, ABCs intact. Pt states that [place] and he talked to an EMT at the fair because his side was hurting and they told him he was dehydrating. Patient states that he drank a gallon of water as well as 12 bottles of water. Pt states that he has dissociative disorder and when he is thinking he feels like his left chest and arm is hurting. Patient states that he was playing games with a friend and then around 2300 he started having left chest and arm pain. Pt states \"I just want my blood pressure checked, my girlfriend is working till 0100 and I need to get checked.\" Pt states that normally when he is here he hears voices and is anxious but he is calm and happy right now.       "

## 2022-09-02 NOTE — PROGRESS NOTES
"Higinio Wallace  BP (!) 142/84   Pulse 75   Resp 18   Ht 1.93 m (6' 4\")   Wt 140.2 kg (309 lb)   SpO2 98%   BMI 37.61 kg/m       Assessment/Plan:                Higinio was seen today for form request, recheck, hypertension and er f/u.    Diagnoses and all orders for this visit:    PTSD (post-traumatic stress disorder)    Dissociative identity disorder (H)    Multiple joint pain    Myalgia    Chronic bilateral low back pain without sciatica    Malrotation of intestine    Positive RAMANA (antinuclear antibody)    CARSON (obstructive sleep apnea)         DISCUSSION  Ultimately from a physical standpoint symptoms seem to be under good control, his weight loss and increased physical activity are probably helpful in this regard.  He still struggles from a mental health standpoint.  Given his complexity of the situation it is difficult to gauge but he as stated below seems to have more hypomanic behavior currently.  He is able to convey to some extent a logical pathway for his planned move to [place] in terms of reestablishing with  and healthcare systems there. Forms are completed today.  He is encouraged to call with concerns or if I can be of further assistance.  Subjective:     HPI:    Higinio Wallace is a 32 year old male with a complicated medical and social history.    He is here today for follow-up.    He has a diagnosis of PTSD and dissociative identity disorder.  He had been followed by psychiatry and seen therapist on a regular basis.  Due to insurance issues he has been overdue for follow-up with his specialty providers.  He is also in the midst of moving to [place] he informs me today.  He states currently he has housing insecurity and it is living in a campground.    He is established with the [facility]  system he does have general assistance as well as snap benefits.  He is in the process of applying for Social Security disability.    Today he is a little bit hypomanic, he " has a more positive upbeat attitude today but is hyperverbal and a little bit tangential.    The significance of his mental health concerns are his biggest source of disability.  I completed forms for him today.  I also stressed the importance of reestablishing with specialty providers he seems to grasp this concept.    He has had a multitude of other physical symptoms and seen various primary care, emergency and specialty providers for various physical symptoms over the past 1 to 2 years.  These have included multiple areas of joint pain, muscle aches and pains, back pain, digestive system symptoms.  He has had extensive work-ups with laboratory testing as well specialty consults.  These have included endocrinology, orthopedic, gastroenterology, rheumatology, sleep specialist.    He does have a diagnosis of a sleep apnea but is not treated with CPAP currently.  Does report he is sleeping every night but has poor quality sleep.    He does not report any significant concerns related to musculoskeletal complaints or digestive system symptoms at this time.  He has made dietary changes and is getting more regular exercise, seemingly from what he describes almost to an obsessive degree.  He has had significant weight loss.  His weight is at a high of about 360 at 1 point but is down to 309 currently.    He does report some increased water consumption with hot weather, difficult to gauge if there is any concern about this but again he seems to be a bit obsessive about making sure he is drinking water constantly.    It is noted that he went to an emergency department yesterday over concern of chest pain but does not have any mention of chest pain today during the course of our visit.    ROS:  Complete review of systems is obtained.  Other than the specific considerations noted above complete review of systems is negative.          Objective:   Medications:  No current outpatient medications on file.     No current  facility-administered medications for this visit.        Allergies:     Allergies   Allergen Reactions     Bee Venom Anaphylaxis and Other (See Comments)     Swelling  Large local reactions/ swelling       Fruit Acid Concentrate [Fruit Extracts] Swelling     Lips swell and crust over little bit- tongue gets numb     Liquid Adhesive Dermatitis     And band aid       Monosodium Glutamate Hives        Social History     Socioeconomic History     Marital status: Single     Spouse name: Not on file     Number of children: Not on file     Years of education: Not on file     Highest education level: Not on file   Occupational History     Not on file   Tobacco Use     Smoking status: Current Some Day Smoker     Packs/day: 0.25     Types: Cigarettes     Start date: 2001     Smokeless tobacco: Never Used     Tobacco comment: 1-2 cig a day trying to quit   Substance and Sexual Activity     Alcohol use: Not Currently     Drug use: Yes     Types: Marijuana     Comment: daily     Sexual activity: Yes     Partners: Female     Birth control/protection: Condom   Other Topics Concern     Parent/sibling w/ CABG, MI or angioplasty before 65F 55M? Not Asked   Social History Narrative     Not on file     Social Determinants of Health     Financial Resource Strain: Medium Risk     Difficulty of Paying Living Expenses: Somewhat hard   Food Insecurity: Food Insecurity Present     Worried About Running Out of Food in the Last Year: Sometimes true     Ran Out of Food in the Last Year: Sometimes true   Transportation Needs: No Transportation Needs     Lack of Transportation (Medical): No     Lack of Transportation (Non-Medical): No   Physical Activity: Not on file   Stress: Not on file   Social Connections: Not on file   Intimate Partner Violence: Not on file   Housing Stability: Low Risk      Unable to Pay for Housing in the Last Year: No     Number of Places Lived in the Last Year: 1     Unstable Housing in the Last Year: No       Family  "History   Problem Relation Age of Onset     Other - See Comments Mother         PONV     Cerebrovascular Disease Brother      Atrial fibrillation Brother      Other - See Comments Brother         cardiac autoimmune deficiency        Most Recent Immunizations   Administered Date(s) Administered     COVID-19,PF,Moderna 05/21/2021     TDAP Vaccine (Boostrix) 07/03/2019     Tdap (Adacel,Boostrix) 07/03/2019        Wt Readings from Last 3 Encounters:   09/02/22 140.2 kg (309 lb)   08/02/22 142 kg (313 lb)   05/16/22 (!) 152.4 kg (336 lb)        BP Readings from Last 6 Encounters:   09/02/22 (!) 142/84   09/01/22 (!) 166/111   08/02/22 134/82   05/16/22 138/82   04/03/22 137/76   02/08/22 134/84        Hemoglobin A1C   Date Value Ref Range Status   08/02/2022 5.5 0.0 - 5.6 % Final     Comment:     Normal <5.7%   Prediabetes 5.7-6.4%    Diabetes 6.5% or higher     Note: Adopted from ADA consensus guidelines.   08/17/2021 5.5 0.0 - 5.6 % Final     Comment:     Normal <5.7%   Prediabetes 5.7-6.4%    Diabetes 6.5% or higher     Note: Adopted from ADA consensus guidelines.   11/04/2020 5.4 0 - 5.6 % Final     Comment:     Normal <5.7% Prediabetes 5.7-6.4%  Diabetes 6.5% or higher - adopted from ADA   consensus guidelines.                PHYSICAL EXAM:    BP (!) 142/84   Pulse 75   Resp 18   Ht 1.93 m (6' 4\")   Wt 140.2 kg (309 lb)   SpO2 98%   BMI 37.61 kg/m       General: Alert no signs of distress, as mentioned above hyperverbal today, somewhat tangential.    Lungs: Good air movement no wheeze or crackle    Heart: Regular rate and rhythm no murmur.                          "

## 2022-09-06 ENCOUNTER — PATIENT OUTREACH (OUTPATIENT)
Dept: CARE COORDINATION | Facility: CLINIC | Age: 32
End: 2022-09-06

## 2022-09-06 NOTE — PROGRESS NOTES
Clinic Care Coordination Contact  [facility]/Voicemail    Clinical Data: Care Coordinator Outreach  Outreach attempted x 1.  Left message on patient's voicemail with call back information and requested return call.    Plan: Care Coordinator will try to reach patient again in 10 business days.      Donna Livingston  Community Health Worker   [facility] Care Coordination  [facility]   [email or web address]  Office: [phone]

## 2022-09-13 ENCOUNTER — PATIENT OUTREACH (OUTPATIENT)
Dept: CARE COORDINATION | Facility: CLINIC | Age: 32
End: 2022-09-13

## 2022-09-13 NOTE — PROGRESS NOTES
Contact  [facility]/Voicemail    Referral Source: Care Team  Clinical Data:  Outreach  Outreach attempted x 2.  Left message on voicemail with call back information and requested return call.  Plan:Delegating to CHW- if no call back by next outreach attempt, send to  CC for decision about staying in care coordination.  As patient has indicated to several staff people that he was moving out of state, will close file if he does not call back.  CC available as needed and in two weeks.   Dana Kumar,   [facility]  [phone]

## 2022-09-20 ENCOUNTER — PATIENT OUTREACH (OUTPATIENT)
Dept: CARE COORDINATION | Facility: CLINIC | Age: 32
End: 2022-09-20

## 2022-09-20 NOTE — PROGRESS NOTES
Clinic Care Coordination Contact  [facility]/Voicemail       Clinical Data: CHW Outreach    Outreach attempted x 2. Left message on patient's voicemail with call back information and requested return call.    Plan: CHW will route a message to Lead CC to determine whether a third attempt should be made to Patient or if Patient needs to be sent a disenrollment letter to patient. No further outreach attempts will be made. Should they return my call or answer my letter, I will discuss clinic care coordination re-enrollment.    VALENTE Michelle  Clinic Care Coordination   [facility]   Phone: [phone]  [email or web address]

## 2022-09-21 ENCOUNTER — PATIENT OUTREACH (OUTPATIENT)
Dept: CARE COORDINATION | Facility: CLINIC | Age: 32
End: 2022-09-21

## 2022-09-21 NOTE — LETTER
[facility] CARE COORDINATION  [facility]    September 21, 2022    Higinio Wallace  [address] [address]      Dear Higinio,    I have been unsuccessful in reaching you since our last contact. At this time the Care Coordination team will make no further attempts to reach you, however this does not change your ability to continue receiving care from your providers at your primary care clinic. If you need additional support from a care coordinator in the future please contact Dana at [phone].    All of us at [facility] are invested in your health and are here to assist you in meeting your goals.     Sincerely,    Dana Kumar,   [facility]  [phone]

## 2022-09-21 NOTE — PROGRESS NOTES
Contact  [facility]/Voicemail    Referral Source: Care Team  Clinical Data:  Outreach  Outreach attempted x 3.  Left message on voicemail with call back information and requested return call.  Plan:  will send disenrollment letter with care coordinator contact information.  will do no further outreaches at this time.  Dana Kumar,   [facility]  [phone]

## 2022-10-03 ENCOUNTER — DOCUMENTATION ONLY (OUTPATIENT)
Dept: PSYCHOLOGY | Facility: CLINIC | Age: 32
End: 2022-10-03

## 2022-10-03 NOTE — PROGRESS NOTES
Discharge Summary  Multiple Sessions    Client Name: Higinio Wallace MRN#: [id number] YOB: 1990      Intake / Discharge Date: November 8, 2021- April 28, 2022      DSM5 Diagnoses: (Sustained by DSM5 Criteria Listed Above)  Diagnoses: 309.81 (F43.10) Posttraumatic Stress Disorder (includes Posttraumatic Stress Disorder for Children 6 Years and Younger)  Without dissociative symptoms  Psychosocial & Contextual Factors: Patient is a multicultural male, history of trauma, struggles with both mental health and physical health which impacts his ability to work, financial stressors.  WHODAS 2.0 (12 item) Score:   WHODAS 2.0 Total Score 10/6/2020 11/8/2021   Total Score 39 32   Total Score MyChart - 32           Presenting Concern:  Patient was seeking out services for the second time with this provider, the previous time being in 2020.  Patient presented with a variety of different mental and physical health related challenges.  He had experienced trauma within his family of origin and continued to create great distress for him.  He was also struggling with financial stressors.      Reason for Discharge:  Insurance: Patient was attempting to resolve insurance issues      Disposition at Time of Last Encounter:   Comments:   Patient was experiencing stressors related to finances that could have a negative impact on his housing as well as work-related stressors.  Patient noted it was difficult for him to navigate conflictual situations and patient and therapist explored different ways that he could do this.     Risk Management:   Client has had a history of suicidal ideation: Past not current  and other safety concerns including: Physical health and nutritional distress  Recommended that patient call 911 or go to the local ED should there be a change in any of these risk factors.      Referred To:  Patient was encouraged to seek out care with a primary care physician rather than working with his  local emergency department.  He should continue to work with his psychiatrist regarding all medication related issues and follow recommendations.  And patient should seek out therapeutic supports when insurance issues resolve.        Meri Rich, LICSW   10/3/2022

## 2022-11-19 ENCOUNTER — HEALTH MAINTENANCE LETTER (OUTPATIENT)
Age: 32
End: 2022-11-19

## 2022-12-06 ENCOUNTER — HOSPITAL ENCOUNTER (EMERGENCY)
Facility: CLINIC | Age: 32
Discharge: HOME | End: 2022-12-06
Attending: EMERGENCY MEDICINE | Admitting: EMERGENCY MEDICINE
Payer: COMMERCIAL

## 2022-12-06 VITALS
TEMPERATURE: 97.7 F | HEART RATE: 74 BPM | OXYGEN SATURATION: 99 % | RESPIRATION RATE: 18 BRPM | DIASTOLIC BLOOD PRESSURE: 93 MMHG | SYSTOLIC BLOOD PRESSURE: 160 MMHG

## 2022-12-06 DIAGNOSIS — Z71.1 MENTAL HEALTH-RELATED COMPLAINT: ICD-10-CM

## 2022-12-06 DIAGNOSIS — R19.5 DARK STOOLS: ICD-10-CM

## 2022-12-06 DIAGNOSIS — F43.10 PTSD (POST-TRAUMATIC STRESS DISORDER): ICD-10-CM

## 2022-12-06 LAB
ANION GAP SERPL CALCULATED.3IONS-SCNC: 11 MMOL/L (ref 7–15)
BASOPHILS # BLD AUTO: 0.1 10E3/UL (ref 0–0.2)
BASOPHILS NFR BLD AUTO: 1 %
BUN SERPL-MCNC: 8.7 MG/DL (ref 6–20)
CALCIUM SERPL-MCNC: 9.8 MG/DL (ref 8.6–10)
CHLORIDE SERPL-SCNC: 102 MMOL/L (ref 98–107)
CREAT SERPL-MCNC: 0.87 MG/DL (ref 0.67–1.17)
DEPRECATED HCO3 PLAS-SCNC: 26 MMOL/L (ref 22–29)
EOSINOPHIL # BLD AUTO: 0.1 10E3/UL (ref 0–0.7)
EOSINOPHIL NFR BLD AUTO: 1 %
ERYTHROCYTE [DISTWIDTH] IN BLOOD BY AUTOMATED COUNT: 12.6 % (ref 10–15)
GFR SERPL CREATININE-BSD FRML MDRD: >90 ML/MIN/1.73M2
GLUCOSE SERPL-MCNC: 115 MG/DL (ref 70–99)
HCT VFR BLD AUTO: 46.8 % (ref 40–53)
HGB BLD-MCNC: 16.2 G/DL (ref 13.3–17.7)
IMM GRANULOCYTES # BLD: 0 10E3/UL
IMM GRANULOCYTES NFR BLD: 0 %
LYMPHOCYTES # BLD AUTO: 1.1 10E3/UL (ref 0.8–5.3)
LYMPHOCYTES NFR BLD AUTO: 15 %
MAGNESIUM SERPL-MCNC: 2 MG/DL (ref 1.7–2.3)
MCH RBC QN AUTO: 30.5 PG (ref 26.5–33)
MCHC RBC AUTO-ENTMCNC: 34.6 G/DL (ref 31.5–36.5)
MCV RBC AUTO: 88 FL (ref 78–100)
MONOCYTES # BLD AUTO: 0.5 10E3/UL (ref 0–1.3)
MONOCYTES NFR BLD AUTO: 7 %
NEUTROPHILS # BLD AUTO: 5.9 10E3/UL (ref 1.6–8.3)
NEUTROPHILS NFR BLD AUTO: 76 %
NRBC # BLD AUTO: 0 10E3/UL
NRBC BLD AUTO-RTO: 0 /100
PLATELET # BLD AUTO: 211 10E3/UL (ref 150–450)
POTASSIUM SERPL-SCNC: 3.6 MMOL/L (ref 3.4–5.3)
RBC # BLD AUTO: 5.32 10E6/UL (ref 4.4–5.9)
SODIUM SERPL-SCNC: 139 MMOL/L (ref 136–145)
WBC # BLD AUTO: 7.7 10E3/UL (ref 4–11)

## 2022-12-06 PROCEDURE — 90791 PSYCH DIAGNOSTIC EVALUATION: CPT

## 2022-12-06 PROCEDURE — 85025 COMPLETE CBC W/AUTO DIFF WBC: CPT | Performed by: EMERGENCY MEDICINE

## 2022-12-06 PROCEDURE — 80048 BASIC METABOLIC PNL TOTAL CA: CPT | Performed by: EMERGENCY MEDICINE

## 2022-12-06 PROCEDURE — 99285 EMERGENCY DEPT VISIT HI MDM: CPT | Mod: 25

## 2022-12-06 PROCEDURE — 250N000013 HC RX MED GY IP 250 OP 250 PS 637: Performed by: EMERGENCY MEDICINE

## 2022-12-06 PROCEDURE — 36415 COLL VENOUS BLD VENIPUNCTURE: CPT | Performed by: EMERGENCY MEDICINE

## 2022-12-06 PROCEDURE — 83735 ASSAY OF MAGNESIUM: CPT | Performed by: EMERGENCY MEDICINE

## 2022-12-06 RX ORDER — IBUPROFEN 600 MG/1
600 TABLET, FILM COATED ORAL ONCE
Status: COMPLETED | OUTPATIENT
Start: 2022-12-06 | End: 2022-12-06

## 2022-12-06 RX ADMIN — IBUPROFEN 600 MG: 600 TABLET, FILM COATED ORAL at 18:39

## 2022-12-06 ASSESSMENT — COLUMBIA-SUICIDE SEVERITY RATING SCALE - C-SSRS
1. IN THE PAST MONTH, HAVE YOU WISHED YOU WERE DEAD OR WISHED YOU COULD GO TO SLEEP AND NOT WAKE UP?: YES
TOTAL  NUMBER OF INTERRUPTED ATTEMPTS LIFETIME: NO
4. HAVE YOU HAD THESE THOUGHTS AND HAD SOME INTENTION OF ACTING ON THEM?: NO
5. HAVE YOU STARTED TO WORK OUT OR WORKED OUT THE DETAILS OF HOW TO KILL YOURSELF? DO YOU INTEND TO CARRY OUT THIS PLAN?: NO
TOTAL  NUMBER OF ABORTED OR SELF INTERRUPTED ATTEMPTS LIFETIME: NO
2. HAVE YOU ACTUALLY HAD ANY THOUGHTS OF KILLING YOURSELF?: YES
ATTEMPT LIFETIME: NO
3. HAVE YOU BEEN THINKING ABOUT HOW YOU MIGHT KILL YOURSELF?: NO
2. HAVE YOU ACTUALLY HAD ANY THOUGHTS OF KILLING YOURSELF?: YES
5. HAVE YOU STARTED TO WORK OUT OR WORKED OUT THE DETAILS OF HOW TO KILL YOURSELF? DO YOU INTEND TO CARRY OUT THIS PLAN?: NO
REASONS FOR IDEATION LIFETIME: MOSTLY TO GET ATTENTION, REVENGE, OR A REACTION FROM OTHERS
1. HAVE YOU WISHED YOU WERE DEAD OR WISHED YOU COULD GO TO SLEEP AND NOT WAKE UP?: YES
REASONS FOR IDEATION PAST MONTH: MOSTLY TO GET ATTENTION, REVENGE, OR A REACTION FROM OTHERS
6. HAVE YOU EVER DONE ANYTHING, STARTED TO DO ANYTHING, OR PREPARED TO DO ANYTHING TO END YOUR LIFE?: NO

## 2022-12-06 ASSESSMENT — ENCOUNTER SYMPTOMS
HALLUCINATIONS: 0
HYPERACTIVE: 1
ABDOMINAL PAIN: 1
HEADACHES: 1

## 2022-12-06 ASSESSMENT — ACTIVITIES OF DAILY LIVING (ADL)
ADLS_ACUITY_SCORE: 35
ADLS_ACUITY_SCORE: 35
ADLS_ACUITY_SCORE: 33
ADLS_ACUITY_SCORE: 35

## 2022-12-06 NOTE — ED TRIAGE NOTES
"\"My mom wants me to get an evaluation\". States he needs to \"get the proper diagnosis\". Patient stating he has been put on medications in the past and his body cannot digest these things. Patient exhibiting very tangential thoughts during triage. Difficult to get a history. Patient is by himself. Patient has a \"walkng stick\" with him. States he normally has a hard time walking so he needs this stick. Patient sees a psychiatrist. States he was seeing one in [facility]. States \"they said I was aggressive\". ABCs intact.  A&Ox4.       "

## 2022-12-06 NOTE — CONSULTS
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 states this has been helpful for his digestion and \"slows my mind down\", he states he is interested in establishing with medical marijuana but is ambivalent as he does not want to be labeled as \"a drug user'.       Was a urine drug screen or blood alcohol level obtained: No- not Utox or BAL completed       Mental Status Exam     Affect: Dramatic   Appearance: Disheveled    Attention Span/Concentration: Attentive  Eye Contact: Engaged   Fund of Knowledge: Appropriate    Language /Speech Content: Fluent   Language /Speech Volume: Loud and Normal    Language /Speech Rate/Productions: Articulate and Hyperverbal    Recent Memory: Variable   Remote Memory: Intact   Mood: Anxious, Euphoric and Sad    Orientation to Person: Yes    Orientation to Place: Yes   Orientation to Time of Day: Yes    Orientation to Date: Yes    Situation (Do they understand why they are here?): Yes    Psychomotor Behavior: Normal    Thought Content: Delusions and Hallucinations   Thought Form: Flight of Ideas, Obsessive/Perseverative and Tangential      History of commitment: No       Medication    Psychotropic medications: No current medications but a history of Abilify, Hydroxyzine.     Medication changes made in the last two weeks: Yes- patient had a recent psychiatric admission in " [place], he stated his medications were adjusted/changed, he only could remember taking Abilify, he noted he believed digestive issues were related to the medication, so he stopped taking the medication.  Today he declined scheduling outpatient medication management appointment.  He did schedule therapy, and said if the therapist recommends psychiatry, he is open to the recomendation    No current facility-administered medications for this encounter.     No current outpatient medications on file.          Current Care Team    Primary Care Provider: Raza Franklin [facility] (previously established)  Psychiatrist: Jane Cohn, CNP,PMROSENDOP,RN [facility]  (previously established)  Therapist: No  : No     CTSS or ARMHS: No  ACT Team: No  Other: No       Diagnosis    309.81 (F43.10) Posttraumatic Stress Disorder (includes Posttraumatic Stress Disorder for Children 6 Years and Younger)  With dissociative symptoms- by history         Clinical Summary and Substantiation of Recommendations    After therapeutic assessment, intervention and aftercare planning by mental health clinicianED care team and in consultation with attending provider, the patient's circumstances and mental state were appropriate for outpatient management. It is the recommendation of this clinician that pt discharge with OP MH support. A this time the pt is not presenting as an acute risk to self or others due to the following factors:  reduction in the intensity of mood/anxiety symptoms that preceded the admission, denial of suicidal thoughts, denies feeling helpless or hopeless, not currently under the influence of alcohol or illicit substances, denies experiencing command hallucinations and no immediate access to firearms. Protective factors include: family support, voluntarily seeking mental health support, displays resiliency , scheduled both outpatient individual therapy and IOP/PHP appointments  today, future focused thinking, displays insight, expresses desire to engage in treatment, and sense of obligation to people/pets.     Disposition    Recommended disposition: Individual Therapy, Medication Management, Programmatic Care: IOP/PHP and Other: patient was given a list of resources for housing: shelter and crisis residences        Reviewed case and recommendations with attending provider. Attending Name: Florencio Cuba MD     Attending concurs with disposition: Yes       Patient concurs with disposition: Yes       Guardian concurs with disposition: NA      Final disposition: Individual therapy  and Programmatic care: IOP/PHP.     Inpatient Details (if applicable):   Is patient admitted voluntarily:NA      Patient aware of potential for transfer if there is not appropriate placement? NA       Patient is willing to travel outside of the [facility] for placement? NA      Behavioral Intake Notified? NA     Outpatient Details (if applicable):   Aftercare plan and appointments placed in the AVS and provided to patient: Yes. Given to patient by LMHP and RN    Was lethal means counseling provided as a part of aftercare planning? Yes - describe patient denies having access to firearms, weapons, or medications/substances       Assessment Details    Patient interview started at: 3:10pm and completed at: 6:05pm.     Total duration spent on the patient case in minutes: 3.00 hrs      CPT code(s) utilized: 41223 - Psychotherapy for Crisis - 60 (30-74*) min and 74908 - Psychotherapy for Crisis (Each additional 30 minutes) - 30 min        FANTASMA Blevins, , Psychotherapist  [facility] - Triage & Transition Services  Callback: [phone]    Aftercare Plan     If I am feeling unsafe or I am in a crisis, I will:   Contact my established care providers   Call the National Suicide Prevention Lifeline: 988  Go to the nearest emergency room   Call 919      Scheduled Appointments:    Therapy (In Person):    Date: 12/8/2022  Time: 4:00  PM  Location: [facility]  [facility]ley  [address] [place] [address] [place] [address]  [phone]    IOP/Day Tx/PHP: (Virtual) * patient can access appointment information and link through [employer]  You have been scheduled the following appointments:     A programmatic care assessment has been scheduled for you on 12/08/22 at 12:00P with Joaquina Morrison V. This appointment will help determine if an intensive outpatient or partial hospitalization program would be the best fit. The team will review your insurance benefits to see what is covered but we also recommend that you contact your insurance provider to discuss coverage for programming.     [facility] SCHEDULING:  Today you were seen by a licensed mental health professional through [facility] ([facility])  for a crisis assessment in the Emergency Department at [facility].  It is recommended that you follow up with your estabished providers (psychiatrist, mental health therapist, and/or primary care doctor - as relevant) as soon as possible. Coordinators from [facility] will be calling you in the next 24-48 hours to ensure that you have the resources you need.  You can also contact [facility] coordinators directly at [phone].    [facility] maintains an extensive network of licensed behavioral health providers to connect patients with the services they need.  We do not charge providers a fee to participate in our referral network.  We match patients with providers based on a patient s specific needs, insurance coverage, and location.  Our first effort will be to refer you to a provider within your care system, and will utilize providers outside your care system as needed.     *patient given a list of Crisis Residences with contact information      Warning signs that I or other people might notice when a crisis is developing for me: I am having increasing suicidal thoughts, these thoughts turn to plans with intent or means; I am  having urges to self-harm; my emotions are of hopelessness; feeling like there's no way out; increased rage or anger; engaging in risky activities without thinking; withdrawing from family/friends; dramatic mood swings; drastic personality changes; use of alcohol or drugs; neglect of personal hygiene or cares        Things I am able to do on my own or with others to cope or help me feel better: Spending quality time with loved ones, Staying hydrated, Eating balanced meals, Going for a walk every day, Take care of daily responsibilities/needs, Focus on positive self-talk vs negative self-talk, listen to music, practice deep breathing, meditations, write in a journal, self-regulate, self check-in, ask for help when needed        Things I can use or do for distraction: Reach out to/spend time with family and friends, take a warm shower or bath, Exercise, chores or do a project, listen to music, watch movie/TV, journaling, reading a book, meditating, call a friend     Changes I can make to support my mental health and wellness:   -I will abstain from all mood altering chemicals not currently prescribed to me        -I will attend scheduled mental health therapy and psychiatric appointments and follow all recommendations       -I will commit to 30 minutes of self care daily - this can be as simple as taking a shower, going for a walk, cooking a meal, read, writing, etc       -I will practice square breathing when I begin to feel anxious - in breath through the nose for the count of 4 and the first line on the square. Out breath through the mouth for the count of 4 for the second line of the square. Repeat to complete the square. Repeat the square as many times as needed.       - I will use distraction skills of: going for walks, watching TV, spending time outside, calling a friend or family member, spending time with my pets        -Use community resources, including hotline numbers, [facility] crisis and support meetings        -Maintain a daily schedule/routine       -Practice deep breathing skills       -Download a meditation felicity and spend 15-20 minutes per day mediating/relaxing. Some apps to download include: Calm, Headspace and Insight Timer. All 3 of these apps have free version       People in my life that I can ask for help: my Mom, my friends (Huber), therapist       Your [facility] has a mental health crisis team you can call 24/7: [facility] Crisis Line Number: [phone]; [facility] Crisis Line Number: [phone]     Other things that are important when I'm in crisis: Remember help is available, follow up with established providers, attend all appointments, take medications as prescribed??      Additional resources and information:      TIPP stands for Temperature, Intense exercise, Paced breathing, and Paired muscle relaxation:      TEMPERATURE        When we re upset, our bodies often feel hot. To counter this, splash your face with cold water, hold an ice cube, or let the car s AC blow on your face. Changing your body temperature will help you cool down--both physically and emotionally.        INTENSE EXERCISE        Do intense exercise to match your intense emotion. You re not a marathon runner? That s okay, you don t need to be. Sprint down to the end of the street, jump in the pool for a few laps, or do jumping jacks until you ve tired yourself out. Increasing oxygen flow helps decrease stress levels. Plus, it s hard to stay dangerously upset when you re exhausted.        PACED BREATHING        Even something as simple as controlling your breath can have a profound impact on reducing emotional pain. There are many different types of breathing exercises. If you have a favorite, breathe it out. If you don t, try a technique called  box breathing . Each breath interval will be four seconds long. Take in air four seconds, hold it in four seconds, breathe out four, and hold four. And then start again. Continue to  "focus on this breathing pattern until you feel more calm. Steady breathing reduces your body s fight or flight response.        PAIRED MUSCLE RELAXATION        The science of paired muscle relaxation is fascinating. When you tighten a voluntary muscle, relax it, and allow it to rest, the muscle will become more relaxed than it was before it was tightened. Relaxed muscles require less oxygen, so your breathing and heart rate will slow down. Try this technique by focusing on a group of muscles, such as the muscles in your arms. Tighten the muscles as much as you can for five seconds. Then let go of the tension. Let the muscles relax, and you ll begin to relax, as well     Ways to [place]:      Take prescribed medications as scheduled     Keep follow-up appointments     Talk to others about your concerns     Get regular exercise     Eat a healthy diet     Use community CardSpring      Crisis Lines  Crisis Text Line  Text 264775  You will be connected with a trained live crisis counselor to provide support.     Por celestina, texto  SANDY a 877489 o texto a 442-AYUDAME en WhatsApp     [employer] (LGBTQ Youth Crisis Line)  3.100.344.9440  text START to 059-260        mymxlog  Fast Tracker  Linking people to mental health and substance use disorder resources  Avidia.org      [place] Mental [facility] Warm Line  Peer to peer support  Monday thru Saturday, 12 pm to 10 pm  864.704.5121 or 8.906.201.3729  Text \"Support\" to 36518     National [place] on Mental Illness (RADHA)  307.316.0762 or 1.888.RADHA.HELPS        Mental Health Apps  My3  https://Aliopartis.org/     VirtualHopeBox  https://[employer].org/apps/virtual-hope-box/     Emergency Shelter Services      [facility] Hotline: 1-221.523.3973    [facility] sponsors the First Call for Help referral services; they have listings for a wide variety of community support services and can be reached at 211.       [facility]  Overnight " emergency shelter for men and women is provided at [facility] [place]  Address:  [facility]  [address] [place] [address]  [phone]      [facility]  [address] [facility]  Phone: [phone]  Emergency Housing: Provides shelter, personal hygiene items and meals for homeless single adults who are receiving [place], social security or other benefits. Access through [facility] Shelter Team at [address] [facility] (5-11 pm daily) or [address] [place] [address]. Open to men and women.    [facility]: A secure shelter for single homeless women. Beds are available first come, first serve each night, starting at 4 pm. Open to women only.    [facility]  Amanda Duke [address] [place] [address] Street  Phone: [phone]  Provides: Overnight shelter for adult men (18 and up). Doors open at 7:15 pm and close at 8:00 am.  Remarks: Men need to have resided in [facility] for the past 30 days to be eligible. They need to have no financial resources to house themselves. There is a total of 20 beds in the shelter.  Resources: Independent Living Skills classes, Computer Lab access, meal, laundry    [facility], [phone]  [address] [place] [address]  9a-5:30p Monday through Friday or 1p-5:30p on Saturdays and Sundays.  Must bring a photo ID.  Staff will help with a community card and assign a shelter.    [facility] ([employer] [employer])  During the day call [phone] or after 5pm call [phone] (men), [phone] (women)  [address] [place] [address]    [facility] ([facility])  [phone] ext. 11  [address] [facility]    [facility] ([employer]), [phone]  [address] [facility]      Housing Resources by [facility]:  Call 211 to inquire about openings.  https://[email or web address].[email or web address]/  650-522-4659  [address] [facility]  [facility] [address], [place] [address]  Emergency and transitional housing for homeless adults; no alcohol or drugs; charge of 30% of income up to $28/night but will be considered if no income      [facility] (ages 18-24 only) - [place] open Monday - Friday 9am-5pm  [address] [facility] [place] Open M-F 9am-4pm in 0 degrees or colder  [address] [place] [address] [place] available weekdays from 8:30 a.m.-4:30 p.m.  [address] [facility] ([phone])    [facility]    www.detoxone.org  [phone]    [address], [place]   A sober homeless shelter for those men ages 18 & up, who have recently completed chemical dependency treatment, but are not ready to enter day to day living experiences; men being released from retirement or MCC; men who have found themselves unable to find employment or have recently lost a job; men who are on probation.      [facility] ([employer])  www.[facility].org  [phone]  [address] [facility]   Shelter for families    [facility] [place] available weekdays from 8:30 a.m.-4:30 p.m.  [address] [facility] ([phone])      [facility]  [address] [facility]  [phone]  Physical Health:  urgent care, health screenings, primary care referrals, prescriptions and medication management.  Human Services:  housing, cash, and food support, parenting education, employment resources, disability and veterans resources.  Mental Health & Addiction:  mental health screening and diagnostic assessments, comprehensive screening for addiction disorders, case management and care coordination, support from people with lived experience, help in a crisis  including 3-10 day stay at the crisis stabilization program, withdrawal support from drugs and alcohol.    [facility] ([facility])  www.Fototwicspm.org  881-197-8136  [address] [place] [address]  Drop in center for homeless adults; supports basic needs, personal empowerment and transitional services; see web site for hours    [facility] and Pay-for-Stay Shelter ([facility])  www.Fototwicspm.org  875.668.7929  [facility]  Beds for men, both programs offer light breakfast and supper and showers; Pay-for-Stay program also has lockers, charges $6/night which is held in escrow for deposit on permanent housing    [facility] ([facility])  www.Fototwicspm.org  [phone]  [address] [facility]  The [facility] offers 88 units of single room occupancy permanent housing for independent, single adults experiencing homelessness. Facilities are available for 72 men and 16 women, who each pay $360 a month. Workshops on money management and empowerment help residents develop their potential. Optional spiritual guidance, onsite Alcoholics Anonymous meetings and a men's support group help residents confront their barriers to self-sufficiency.    [facility] ([facility])  www.Fototwicspm.org  [phone]  [address] [place] [address]  The [facility] provides low-cost single-room occupancy transitional housing to single men and women in [facility]. Residents work toward permanent housing by using the site's supportive services, including empowerment groups and case management to meet residents' mental health and emotional needs. Residents establish a rental history and can stay in the program for up to two years; $350 monthly rent for transitional housing units; all other units covered under Group Residential Housing Plan assistance; Alcohol and chemical use prohibited     [facility] ([facility])     www.[facility].org   [phone]   [address] [facility] offers 80 units of permanent housing to late-stage, chronic alcoholics, based on a model known as harm reduction. Many low-income, homeless, chronic alcoholics have struggled to attain sobriety. [facility] serves chronic alcoholics in [facility] with repeated admissions to detoxification centers and a history of failure in traditional chemical dependency treatment programs. At least 75 percent of residents have mental health issues that contribute to the cycle of alcoholism [facility] provides residential care in a homelike environment for its tenants, regardless of level of intoxication, although alcohol is not allowed in the building. This cost-effective and compassionate housing option costs $47.25 a night. A large portion of the rent can be subsidized.     [facility] Men[facility] Emergency [facility] ([employer]), [phone]   www.[facility].org  [address] [facility]  Housing for single, homeless men and women; both short term and long term (maximum 2 years) cost 30% of income, income cannot exceed $26,800/year; see program requirements for waiting list rules.    [facility], [phone]  https://[email or web address]/  [address], [place]   Transitional housing for single women and women with children for up to 2 years; must be unemployed and not in school, no more than 4 children ages 10 and under; fees determined by .    [facility] ([facility]), [phone]  www.[facility].org  [address] [place] [address]  [facility] provides intake and assessment services for any family seeking emergency shelter in [facility]. This address is a day program, will bus those needing overnight shelter to a Catholic, location of overnight housing changes monthly.    [facility] ([facility]),  [phone]  www.Definigenpm.org  [address] [facility] provides permanent housing with supportive services to 70 single men and women who commit to building better lives for themselves. [facility] offers a critical stepping stone for people with low incomes, allowing them to grow, advance in skills and reach self sufficiency.    [facility] ([employer]), [phone]  www.Definigenpm.org  [facility]  For single men and women; Portion of lodging expenses may be subsidized, Applicants must meet the definition of long-term homelessness and have an annual income of $14,800 or less, Desire to live in an alcohol- and drug-free environment.    [facility] ([employer]), [phone]  www.Definigenpm.org  [address] [facility] provides a fresh start for single mothers and their children with a supportive, respectful, affordable housing situation. [facility] contains 16 two- and three-bedroom apartment units in a convenient [place] location.    [facility] ([employer])    www.Definigenpm.org   543.308.1208   [address] [facility] provides permanent housing for late-stage chronic alcoholic men in [facility] with repeated admissions to detoxification centers and a history of failure in traditional chemical dependency treatment programs. This cost-effective and compassionate housing option costs less than $50 a night.

## 2022-12-06 NOTE — ED PROVIDER NOTES
History     Chief Complaint:    Mental Health Problem      HPI   Higinio Wallace is a 32 year old male who presents with concerns for his mental health.  The patient also states that he has had black tarry stools and the last bowel movement he noted was 4 days ago.  He does note some abdominal cramping but no focal pain.  He states that over the last 3 days he drove in his RV from [facility].  He arrived and talked with his mother who is at bedside about his symptoms and she was unsure how to help so they came to the ED.    The patient states that he had been hospitalized in [place] [facility] for 72 hours and was given several diagnoses as well as many medications.  He states to me that he felt he was able to get better by fasting, losing weight, and he felt that he was eventually able to get control of the voices in his head.  He has attributed his digestive issues to his mental health medications and states that he is able to digest natural things but processed chemicals do not seem to digest as well.  He also states that the mental medications seem to give him a frontal central headache.    The patient states he has not taken any mental health medication for the last 3 days.    Review of Systems   Gastrointestinal: Positive for abdominal pain (cramping).   Neurological: Positive for headaches.   Psychiatric/Behavioral: Negative for hallucinations and suicidal ideas. The patient is hyperactive.    All other systems reviewed and are negative.    Allergies:  Bee Venom  Fruit Acid Concentrate [Fruit Extracts]  Liquid Adhesive  Monosodium Glutamate    Medications:    No current outpatient medications on file.       Past Medical History:   Past Surgical History:     Past Medical History:   Diagnosis Date     Anxiety      Class 2 obesity due to excess calories in adult      Complication of anesthesia      Depression      Gastroesophageal reflux disease with esophagitis      History of nephrolithiasis      Irritable  bowel syndrome      Major depression, recurrent (H)      Malrotation of intestine      Manic disorder (H)      Panic disorder      Patellofemoral instability of left knee with pain      Pre-diabetes      Psychosis (H)      PTSD (post-traumatic stress disorder)      Tobacco use     Past Surgical History:   Procedure Laterality Date     ARTHROSCOPY KNEE WITH PATELLAR REALIGNMENT Left 11/13/2020    Procedure: Knee Arthroscopy, Medial Patello-femoral Ligament Reconstruction with Allograft, Distal Tibial Tubercle Osteotomy, Lateral Retinacular Lengthening;  Surgeon: Sourav Keating MD;  Location: [facility] OR     ARTHROTOMY TIBIAL TUBERCLE SHIFT Left 11/13/2020    Procedure: tibial tubercle Osteotomy ;  Surgeon: Sourav Keating MD;  Location: [facility] OR     COLONOSCOPY N/A 10/07/2020    Procedure: COLONOSCOPY, WITH POLYPECTOMY AND BIOPSY;  Surgeon: Donell Holland MD;  Location: [facility] OR     deviated septum  2018     KNEE SURGERY Left      OPEN REDUCTION INTERNAL FIXATION RODDING INTRAMEDULLARY TIBIA Left 11/13/2020    Procedure: plus derotation tibial osteotomy;  Surgeon: Sourav Keating MD;  Location: [facility] OR      Patient Active Problem List    Diagnosis Date Noted     CARSON (obstructive sleep apnea) 04/20/2022     Priority: Medium     Patellofemoral instability of left knee with pain 10/14/2020     Priority: Medium     Added automatically from request for surgery [id number]       Class 2 severe obesity due to excess calories with serious comorbidity and body mass index (BMI) of 37.0 to 37.9 in adult (H) 09/15/2020     Priority: Medium     Irritable bowel syndrome 08/27/2020     Priority: Medium     Suicidal ideation 09/16/2019     Priority: Medium     Moderate episode of recurrent major depressive disorder (H) 09/16/2019     Priority: Medium     Tobacco abuse 04/20/2017     Priority: Medium          Family History  Family History   Problem Relation Age of Onset     Other - See Comments Mother         PONDEYANIRA      Cerebrovascular Disease Brother      Atrial fibrillation Brother      Other - See Comments Brother         cardiac autoimmune deficiency       Social History:  PCP: Raza Franklin  Presents to the ED with his mother    Physical Exam     Patient Vitals for the past 24 hrs:   BP Temp Temp src Pulse Resp SpO2   12/06/22 1127 (!) 142/107 -- -- -- -- --   12/06/22 1123 -- 97.7  F (36.5  C) Temporal 89 18 100 %       Physical Exam  Constitutional: Vital signs reviewed as above.   HEENT:    Head: No external signs of trauma noted.    Eyes: Conjunctivae are normal. Pupils are equal, round, and reactive to light.    Cardiovascular: Normal rate, regular rhythm and normal heart sounds.    Pulmonary/Chest: Effort normal and breath sounds normal. No respiratory distress. Patient has no wheezes.   Gastrointestinal: Soft, no focal tenderness, non-distended.  Musculoskeletal: No gross deformities noted. Normal tone  Skin: Skin is warm and dry. No diaphoresis noted.   Neurological: Patient is alert and oriented to person, place, and time.   Psychiatric: The patient seems somewhat hyperverbal.  He does not seem to have grossly tangential thinking but is trying to convey, I think, his recent mental health struggles.      Emergency Department Course   Laboratory:  Labs Ordered and Resulted from Time of ED Arrival to Time of ED Departure   BASIC METABOLIC PANEL - Abnormal       Result Value    Sodium 139      Potassium 3.6      Chloride 102      Carbon Dioxide (CO2) 26      Anion Gap 11      Urea Nitrogen 8.7      Creatinine 0.87      Calcium 9.8      Glucose 115 (*)     GFR Estimate >90     MAGNESIUM - Normal    Magnesium 2.0     CBC WITH PLATELETS AND DIFFERENTIAL    WBC Count 7.7      RBC Count 5.32      Hemoglobin 16.2      Hematocrit 46.8      MCV 88      MCH 30.5      MCHC 34.6      RDW 12.6      Platelet Count 211      % Neutrophils 76      % Lymphocytes 15      % Monocytes 7      % Eosinophils 1      % Basophils 1      %  Immature Granulocytes 0      NRBCs per 100 WBC 0      Absolute Neutrophils 5.9      Absolute Lymphocytes 1.1      Absolute Monocytes 0.5      Absolute Eosinophils 0.1      Absolute Basophils 0.1      Absolute Immature Granulocytes 0.0      Absolute NRBCs 0.0         Procedures:      Emergency Department Course:    Mental Health Risk Assessment      PSS-3    Date and Time Over the past 2 weeks have you felt down, depressed, or hopeless? Over the past 2 weeks have you had thoughts of killing yourself? Have you ever attempted to kill yourself? When did this last happen? User   12/06/22 1127 no no yes more than 6 months ago MMS              Suicide assessment completed by mental health (D.E.C., LCSW, etc.)  Reviewed:    I reviewed nursing notes, vitals and past history    Assessments/Consults:   ED Course as of 12/06/22 1527   Tue Dec 06, 2022   1239 Dr. Kerr' evaluation.       Interventions:  Medications   ibuprofen (ADVIL/MOTRIN) tablet 600 mg (has no administration in time range)       Disposition:  Care of the patient was transferred to my colleague, Dr. Cuba, pending DEC assessment.    Impression & Plan      CMS Diagnoses:         Medical Decision Making:  This 40-year-old male patient presents to the ED due to dark stools and mental health concerns.  Please see the HPI and exam for specifics.    The patient does seem to be somewhat manic and probably did not have tangential thinking, he did have some pressured speech.  I could follow his thoughts though.  Additionally, he stated that he had a bowel movement 4 days ago that was very tarry and has not had a bowel movement since.  His mother notes that he drove here in his RV over the last 3 days from [place].    The patient's laboratory studies appear reassuring.  His hemoglobin is normal and I think that it is unlikely he has a hemorrhage.    The patient states that he feels he cannot digest chemicals and synthetic things but is able to digest foods.    The  patient appears goal-directed and does not appear to be suicidal.  He is interested in obtaining mental health care.  I think a metabolic evaluation is reasonable.  The patient be signed over to my colleague, Dr. Cuba, for disposition pending DEC evaluation.    Diagnosis:  No diagnosis found.    Discharge Medications:  New Prescriptions    No medications on file              CiriloIrwin,   12/06/22 1537

## 2022-12-07 NOTE — DISCHARGE INSTRUCTIONS
Aftercare Plan     If I am feeling unsafe or I am in a crisis, I will:   Contact my established care providers   Call the National Suicide Prevention Lifeline: 988  Go to the nearest emergency room   Call 911      Scheduled Appointments:    Therapy (In Person):    Date: 12/8/2022  Time: 4:00 PM  Location: [facility]  Sal Barkley  [address] [place] [address] [place] [address]  [phone]    IOP/Day Tx/PHP: (Virtual) * patient can access appointment information and link through [employer]  You have been scheduled the following appointments:     A programmatic care assessment has been scheduled for you on 12/08/22 at 12:00P with Joaquina ROJAS This appointment will help determine if an intensive outpatient or partial hospitalization program would be the best fit. The team will review your insurance benefits to see what is covered but we also recommend that you contact your insurance provider to discuss coverage for programming.     [facility] SCHEDULING:  Today you were seen by a licensed mental health professional through [facility] ([facility])  for a crisis assessment in the Emergency Department at [facility].  It is recommended that you follow up with your estabished providers (psychiatrist, mental health therapist, and/or primary care doctor - as relevant) as soon as possible. Coordinators from [facility] will be calling you in the next 24-48 hours to ensure that you have the resources you need.  You can also contact [facility] coordinators directly at [phone].    [facility] maintains an extensive network of licensed behavioral health providers to connect patients with the services they need.  We do not charge providers a fee to participate in our referral network.  We match patients with providers based on a patient s specific needs, insurance coverage, and location.  Our first effort will be to refer you to a provider within your care system, and will utilize providers  outside your care system as needed.     *patient given a list of Crisis Residences with contact information      Warning signs that I or other people might notice when a crisis is developing for me: I am having increasing suicidal thoughts, these thoughts turn to plans with intent or means; I am having urges to self-harm; my emotions are of hopelessness; feeling like there's no way out; increased rage or anger; engaging in risky activities without thinking; withdrawing from family/friends; dramatic mood swings; drastic personality changes; use of alcohol or drugs; neglect of personal hygiene or cares        Things I am able to do on my own or with others to cope or help me feel better: Spending quality time with loved ones, Staying hydrated, Eating balanced meals, Going for a walk every day, Take care of daily responsibilities/needs, Focus on positive self-talk vs negative self-talk, listen to music, practice deep breathing, meditations, write in a journal, self-regulate, self check-in, ask for help when needed        Things I can use or do for distraction: Reach out to/spend time with family and friends, take a warm shower or bath, Exercise, chores or do a project, listen to music, watch movie/TV, journaling, reading a book, meditating, call a friend     Changes I can make to support my mental health and wellness:   -I will abstain from all mood altering chemicals not currently prescribed to me        -I will attend scheduled mental health therapy and psychiatric appointments and follow all recommendations       -I will commit to 30 minutes of self care daily - this can be as simple as taking a shower, going for a walk, cooking a meal, read, writing, etc       -I will practice square breathing when I begin to feel anxious - in breath through the nose for the count of 4 and the first line on the square. Out breath through the mouth for the count of 4 for the second line of the square. Repeat to complete the square.  Repeat the square as many times as needed.       - I will use distraction skills of: going for walks, watching TV, spending time outside, calling a friend or family member, spending time with my pets        -Use community resources, including hotline numbers, [facility] crisis and support meetings       -Maintain a daily schedule/routine       -Practice deep breathing skills       -Download a meditation felicity and spend 15-20 minutes per day mediating/relaxing. Some apps to download include: Calm, Headspace and Insight Timer. All 3 of these apps have free version       People in my life that I can ask for help: my Mom, my friends ([place]), therapist       Your [facility] has a mental health crisis team you can call 24/7: [facility] Crisis Line Number: [phone]; [facility] Crisis Line Number: [phone]     Other things that are important when I'm in crisis: Remember help is available, follow up with established providers, attend all appointments, take medications as prescribed??      Additional resources and information:      TIPP stands for Temperature, Intense exercise, Paced breathing, and Paired muscle relaxation:      TEMPERATURE        When we re upset, our bodies often feel hot. To counter this, splash your face with cold water, hold an ice cube, or let the car s AC blow on your face. Changing your body temperature will help you cool down--both physically and emotionally.        INTENSE EXERCISE        Do intense exercise to match your intense emotion. You re not a marathon runner? That s okay, you don t need to be. Sprint down to the end of the street, jump in the pool for a few laps, or do jumping jacks until you ve tired yourself out. Increasing oxygen flow helps decrease stress levels. Plus, it s hard to stay dangerously upset when you re exhausted.        PACED BREATHING        Even something as simple as controlling your breath can have a profound impact on reducing emotional pain. There are many  "different types of breathing exercises. If you have a favorite, breathe it out. If you don t, try a technique called  box breathing . Each breath interval will be four seconds long. Take in air four seconds, hold it in four seconds, breathe out four, and hold four. And then start again. Continue to focus on this breathing pattern until you feel more calm. Steady breathing reduces your body s fight or flight response.        PAIRED MUSCLE RELAXATION        The science of paired muscle relaxation is fascinating. When you tighten a voluntary muscle, relax it, and allow it to rest, the muscle will become more relaxed than it was before it was tightened. Relaxed muscles require less oxygen, so your breathing and heart rate will slow down. Try this technique by focusing on a group of muscles, such as the muscles in your arms. Tighten the muscles as much as you can for five seconds. Then let go of the tension. Let the muscles relax, and you ll begin to relax, as well     Ways to [place]:      Take prescribed medications as scheduled     Keep follow-up appointments     Talk to others about your concerns     Get regular exercise     Eat a healthy diet     Use community resources      Crisis Lines  Crisis Text Line  Text 116144  You will be connected with a trained live crisis counselor to provide support.     Por espanol, texto  SANDY a 957564 o texto a 442-AYUDAME en WhatsApp     The [employer]ema Project (LGBTQ Youth Crisis Line)  4.817.246.5347  text START to 742-490        [employer]  Fast Tracker  Linking people to mental health and substance use disorder resources  fasttrackermn.org      [place] Mental Health Warm Line  Peer to peer support  Monday thru Saturday, 12 pm to 10 pm  782.089.7651 or 2.879.759.2633  Text \"Support\" to 36239     National [place] on Mental Illness (RADHA)  299.414.4925 or 1.888.RADHA.HELPS        Mental Health Apps  My3  https://myEvotec.org/     Explara  " https://MaSpatule.com/apps/virtual-hope-box/     Emergency Shelter Services      [facility] Hotline: 1-501.630.5690    [facility] sponsors the First Call for Help referral services; they have listings for a wide variety of community support services and can be reached at 211.       [facility]  Overnight emergency shelter for men and women is provided at [facility] [place]  Address:  [facility]  [address] [place] [address]  [phone]      [facility]  [address] [facility]  Phone: [phone]  Emergency Housing: Provides shelter, personal hygiene items and meals for homeless single adults who are receiving GA, social security or other benefits. Access through [facility] Shelter Team at [address] [facility] (5-11 pm daily) or [address] [facility]. Open to men and women.    [facility]: A secure shelter for single homeless women. Beds are available first come, first serve each night, starting at 4 pm. Open to women only.    [facility]  [address]  Phone: [phone]  Provides: Overnight shelter for adult men (18 and up). Doors open at 7:15 pm and close at 8:00 am.  Remarks: Men need to have resided in [facility] for the past 30 days to be eligible. They need to have no financial resources to house themselves. There is a total of 20 beds in the shelter.  Resources: Independent Living Skills classes, Computer Lab access, meal, laundry    [facility], [phone]  [address] [place] [address]  9a-5:30p Monday through Friday or 1p-5:30p on Saturdays and Sundays.  Must bring a photo ID.  Staff will help with a community card and assign a shelter.    [facility] ([employer])  During the day call [phone] or after 5pm call [phone] (men), [phone] (women)  [address] [place] [address]    [facility] ([employer])  [phone] ext. [phone]  [facility] ([employer]), [phone]  [address] [facility]      Housing Resources by [facility]:  Call 211 to inquire about openings.  https://www.[facility].org/  283.631.5221 [address] [place] [address] [address], [place] [address]  Emergency and transitional housing for homeless adults; no alcohol or drugs; charge of 30% of income up to $28/night but will be considered if no income      [facility] for [facility] (ages 18-24 only) - [place] open Monday - Friday 9am-5pm  [address] [facility] [place] Open M-F 9am-4pm in 0 degrees or colder  [facility]    [facility] [place] available weekdays from 8:30 a.m.-4:30 p.m.  [address] [facility] ([phone])    [facility]    www.detoxone.org  [phone]    [address] [place] [address], [address], [place]   A sober homeless shelter for those men ages 18 & up, who have recently completed chemical dependency treatment, but are not ready to enter day to day living experiences; men being released from detention or residential; men who have found themselves unable to find employment or have recently lost a job; men who are on probation.      [facility] ([employer])  www.[facility].org  775.276.2065  [address] [facility]   Shelter for families    [facility] [place] available weekdays from 8:30 a.m.-4:30 p.m.  [address] [facility] [place] ([phone])      [facility]  [address] [facility]  [phone]  Physical Health:  urgent care, health screenings, primary care referrals, prescriptions and medication management.  Human Services:  housing, cash, and food support,  parenting education, employment resources, disability and veterans resources.  Mental Health & Addiction:  mental health screening and diagnostic assessments, comprehensive screening for addiction disorders, case management and care coordination, support from people with lived experience, help in a crisis including 3-10 day stay at the crisis stabilization program, withdrawal support from drugs and alcohol.    [facility] ([employer])  www.[employer] [facility].org  778-575-4274  [address] [place] [address]  Drop in center for homeless adults; supports basic needs, personal empowerment and transitional services; see web site for hours    Secure Waiting Space and Pay-for-Stay Shelter ([employer])  www.[employer] [facility].org  350.459.5982  [facility]  Beds for men, both programs offer light breakfast and supper and showers; Pay-for-Stay program also has lockers, charges $6/night which is held in escrow for deposit on permanent housing    [facility] ([employer])  www.[employer]m.org  317-846-7257  [address] [facility]  [facility] offers 88 units of single room occupancy permanent housing for independent, single adults experiencing homelessness. Facilities are available for 72 men and 16 women, who each pay $360 a month. Workshops on money management and empowerment help residents develop their potential. Optional spiritual guidance, onsite Alcoholics Anonymous meetings and a men's support group help residents confront their barriers to self-sufficiency.    [facility] ([employer])  www.[employer].org  540.765.9722  [address] [place] [address]  The [facility] provides low-cost single-room occupancy transitional housing to single men and women in [facility]. Residents work toward permanent housing by using the site's supportive services, including empowerment groups and case management to meet residents' mental health and emotional  needs. Residents establish a rental history and can stay in the program for up to two years; $350 monthly rent for transitional housing units; all other units covered under Group Residential Housing Plan assistance; Alcohol and chemical use prohibited     [facility] ([employer])    www.[facility].org   236.561.2751   [address] [facility] offers 80 units of permanent housing to late-stage, chronic alcoholics, based on a model known as harm reduction. Many low-income, homeless, chronic alcoholics have struggled to attain sobriety. [facility] serves chronic alcoholics in [facility] with repeated admissions to detoxification centers and a history of failure in traditional chemical dependency treatment programs. At least 75 percent of residents have mental health issues that contribute to the cycle of alcoholism [facility] provides residential care in a homelike environment for its tenants, regardless of level of intoxication, although alcohol is not allowed in the building. This cost-effective and compassionate housing option costs $47.25 a night. A large portion of the rent can be subsidized.     [facility] ([employer]), [phone]   www.[facility].org  [address] [facility]  Housing for single, homeless men and women; both short term and long term (maximum 2 years) cost 30% of income, income cannot exceed $26,800/year; see program requirements for waiting list rules.    [facility], [phone]  https://[facility].org/  [address], [place]   Transitional housing for single women and women with children for up to 2 years; must be unemployed and not in school, no more than 4 children ages 10 and under; fees determined by .    [facility] ([facility]), [phone]  www.[facility].org  [address] [facility]  The  [facility] provides intake and assessment services for any family seeking emergency shelter in [place]. This address is a day program, will bus those needing overnight shelter to a Mu-ism, location of overnight housing changes monthly.    [facility] ([employer]), [phone]  www.DotNetNukepm.org  [address] [facility] provides permanent housing with supportive services to 70 single men and women who commit to building better lives for themselves. [facility] offers a critical stepping stone for people with low incomes, allowing them to grow, advance in skills and reach self sufficiency.    [facility] ([employer]), [phone]  www.DotNetNukepm.org  [address] [place] [address]  For single men and women; Portion of lodging expenses may be subsidized, Applicants must meet the definition of long-term homelessness and have an annual income of $14,800 or less, Desire to live in an alcohol- and drug-free environment.    [facility] ([employer]), [phone]  www.DotNetNukepm.org  [address] [facility]  [facility] provides a fresh start for single mothers and their children with a supportive, respectful, affordable housing situation. [facility] contains 16 two- and three-bedroom apartment units in a convenient [place] location.    [facility] ([employer])    www.DotNetNukepm.org   700.714.9892   [address] [facility] provides permanent housing for late-stage chronic alcoholic men in [place] with repeated admissions to detoxification centers and a history of failure in traditional chemical dependency treatment programs. This cost-effective and compassionate housing option costs less than $50 a night.

## 2022-12-08 ENCOUNTER — TELEPHONE (OUTPATIENT)
Dept: BEHAVIORAL HEALTH | Facility: CLINIC | Age: 32
End: 2022-12-08

## 2022-12-08 NOTE — TELEPHONE ENCOUNTER
Patient have a video appointment today at 12pm with [facility]. Writer placed a call this morning at 11:30am to check in. Unable to get a hold of patient. Writer left a voicemail with writer's call back number.     Write placed second call at 11:40am to check in. Unable to get a hold of patient. Writer's call went straight to patient's voicemail box. Writer left a second vm with writer's call back number. Writer included in voicemail that the latest time to check for video appt today is 12:15pm. If patient miss appt today, patient can reschedule with Intake. Writer also left Intake's number in voicemail Writer informed patient's .

## 2022-12-09 ENCOUNTER — TELEPHONE (OUTPATIENT)
Dept: BEHAVIORAL HEALTH | Facility: CLINIC | Age: 32
End: 2022-12-09

## 2022-12-09 NOTE — TELEPHONE ENCOUNTER
First attempt to contact pt. Writer left a VM with TC contact info and encouraged a phone call back to schedule initial therapy appointment. Writer will postpone for tomorrow.    Lisseth Draper  12/09/22  912    ----- Message from Georgette Cobb sent at 12/8/2022  4:25 PM CST -----  Regarding: Therapy appt  Transition Clinic Referral   [place]         Please Check Type of Referral Requested:       XX   THERAPY: The Transition clinic is able to schedule patients without current medical insurance; these patient will be referred to our Social Work Care Coordinator for Medical Insurance              Assistance. We are open for referral for psychotherapy. Patient is referred from:  [facility]      ____MEDICATION:  Referrals for Medication are ONLY accepted from the following areas (select): Emergency Department/Urgent Care                                       Suboxone and Opioid Management Referrals are automatically denied. TC Psychiatry cannot see patient without active medical insurance.         Referring Provider Contact Name: Nancy Renae ; Phone Number: [phone]    Reason for Transition Clinic Referral: Patient was scheduled for 12/8 4:00pm w/ [facility]. He drove there for appt, was told he was not on their schedule. I can see in CC that he was in fact scheduled for 12/8 4:00pm. I called them to inquire and see if they could get him in tomorrow 12/9 but had to leave a VM. I cannot schedule next day appts in CC so could not schedule w/ him. The next appts with that clinic are a week out. Patient would like to be seen ASAP.     Next Level of Care Patient Will Be Transitioned To: TBD    What Would Be Helpful from the Transition Clinic: Please see above     Needs: NO    Does Patient Have Access to Technology: Yes    Patient E-mail Address: [email or web address]    Current Patient Phone Number: [phone];     Clinician Gender Preference (if applicable): NO    Patient location  preference: In person    Georgette Cobb

## 2022-12-10 ENCOUNTER — TELEPHONE (OUTPATIENT)
Dept: BEHAVIORAL HEALTH | Facility: CLINIC | Age: 32
End: 2022-12-10

## 2022-12-10 NOTE — TELEPHONE ENCOUNTER
Writer spoke with patient on que and provided TC appointment information for in person appointment 12/19/22 @ 8:30 am.    Lisseth Draper  12/10/22  [phone]

## 2022-12-10 NOTE — TELEPHONE ENCOUNTER
Writer spoke with pt and scheduled initial TC therapy appointment on 12/19/22 @ 8:30 am. Writer sent intake documents via eToro. Writer will reply to referral source.Tracker completed.    Lisseth Draper  12/10/22  1008    ----- Message from Georgette Cobb sent at 12/8/2022  4:25 PM CST -----  Regarding: Therapy appt  Transition Clinic Referral   [place]         Please Check Type of Referral Requested:       XX   THERAPY: The Transition clinic is able to schedule patients without current medical insurance; these patient will be referred to our Social Work Care Coordinator for Medical Insurance              Assistance. We are open for referral for psychotherapy. Patient is referred from:  [facility]      ____MEDICATION:  Referrals for Medication are ONLY accepted from the following areas (select): Emergency Department/Urgent Care                                       Suboxone and Opioid Management Referrals are automatically denied. TC Psychiatry cannot see patient without active medical insurance.         Referring Provider Contact Name: Nancy Renae ; Phone Number: [phone]    Reason for Transition Clinic Referral: Patient was scheduled for 12/8 4:00pm w/ [facility]. He drove there for appt, was told he was not on their schedule. I can see in CC that he was in fact scheduled for 12/8 4:00pm. I called them to inquire and see if they could get him in tomorrow 12/9 but had to leave a . I cannot schedule next day appts in CC so could not schedule w/ him. The next appts with that clinic are a week out. Patient would like to be seen ASAP.     Next Level of Care Patient Will Be Transitioned To: TBD    What Would Be Helpful from the Transition Clinic: Please see above     Needs: NO    Does Patient Have Access to Technology: Yes    Patient E-mail Address: [email or web address]    Current Patient Phone Number: [phone];     Clinician Gender Preference (if applicable):  NO    Patient location preference: In person    Georgette Cobb

## 2022-12-19 ENCOUNTER — TELEPHONE (OUTPATIENT)
Dept: EMERGENCY MEDICINE | Facility: CLINIC | Age: 32
End: 2022-12-19

## 2022-12-19 ENCOUNTER — VIRTUAL VISIT (OUTPATIENT)
Dept: BEHAVIORAL HEALTH | Facility: CLINIC | Age: 32
End: 2022-12-19
Payer: COMMERCIAL

## 2022-12-19 DIAGNOSIS — F29 SCHIZOPHRENIA SPECTRUM DISORDER WITH PSYCHOTIC DISORDER TYPE NOT YET DETERMINED (H): Primary | ICD-10-CM

## 2022-12-19 DIAGNOSIS — F43.10 PTSD (POST-TRAUMATIC STRESS DISORDER): ICD-10-CM
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
## 2022-12-19 NOTE — TELEPHONE ENCOUNTER
This writer f/u w/ pt re: VM left. Pt requested info on appointment for this day. This writer informed pt that appointment is scheduled for this day, at 8:30am via phone.     Bon NAVARRO   12/19/2022  0821

## 2022-12-20 ENCOUNTER — PATIENT OUTREACH (OUTPATIENT)
Dept: CARE COORDINATION | Facility: CLINIC | Age: 32
End: 2022-12-20

## 2022-12-20 NOTE — PROGRESS NOTES
Clinic Care Coordination Contact  [facility]/Voicemail       Clinical Data: Care Coordinator Outreach  Outreach attempted x 1.  Left message on patient's voicemail with call back information and requested return call.  Plan: Care Coordinator will try to reach patient again in 1-2 business days.    FUNMILAYO Martin  Clinic Care Coordination  Pronouns: she/her/hers  Transitions and Extended Care Clinics  [facility]  [email or web address]  [phone]

## 2022-12-22 ENCOUNTER — TELEPHONE (OUTPATIENT)
Dept: BEHAVIORAL HEALTH | Facility: CLINIC | Age: 32
End: 2022-12-22

## 2022-12-22 NOTE — TELEPHONE ENCOUNTER
Pt scheduled for a follow up session with the [facility] on 12/22 @ 2230. Pt didn't answer his phone. Left a message for pt to return call to schedule or be seen.     Celsa Ricci, [facility]  Transition Clinic  [phone]

## 2022-12-23 ENCOUNTER — PATIENT OUTREACH (OUTPATIENT)
Dept: CARE COORDINATION | Facility: CLINIC | Age: 32
End: 2022-12-23

## 2022-12-23 NOTE — PROGRESS NOTES
"Clinic Care Coordination Contact    Clinic Care Coordination Contact  OUTREACH    Referral Information: Referral from TC therapist.    [facility] called patient for outreach. Spoke with patient and patient seemed to be somewhat manic. Continued making metaphoric statements about who was driving (believe he was referring to the people in his life and their relationships to one another), and talking about following his heart to [place], where his ex girlfriend lives with their pets. Asked [facility] \"Wouldn't you follow your heart?\" [facility] explained that it would depend on how safe that option was, and tried to follow up with questions, but pt interupted. [facility] asked pt if he was currently safe and he stated yes. He reported currently he is staying with a friend, but is considering going to [place] in the next 4 days. Pt asked [facility] if there were services/resources in [place] and SW explained SW is unsure as SW hasn't worked there before. Pt reported having a friend there who was also homeless and getting by. [facility] asked pt where he was sleeping tonight, patient talked over [facility] and after multiple attempts at the question he reported he didn't know for sure. Reported he hasn't had luck with shelter options and that he's staying with a friend, but that friend \"drives too fast.\" [facility] asked pt about working with TC therapist and if that is something he wanted to continue and he reported \"you guys can always reach my by phone.\" [facility] asked if  could give resources for shelters if needed and patient reported SW could. Will call back with resources as that is the best way to communicate with pt.    Searched online for resources for homeless shelters.    [facility] called patient back 10 minutes after above call and got voicemail. Left message with phone for adult shelter connect in [place] [phone] call after 10am, and for [facility] in [place], [phone] press 7.       (Unable to complete assessment/questions with pt due to current MH " state)    Chief Complaint   Patient presents with     Clinic Care Coordination - Follow-up        Universal Utilization: Has Raza Franklin as PCP.     Utilization    Hospital Admissions  0             ED Visits  3             No Show Count (past year)  7                Current as of: 12/23/2022 12:00 AM              Clinical Concerns:  Current Medical Concerns:  Unknown    Current Behavioral Concerns: Seemed to be currently struggling with MH. Consulted therapist working with pt from  and she also reported that he hasn't been taking his medications and was somewhat manic.      Education Provided to patient: Gave resources for homeless shelters.      Health Maintenance Reviewed:    Clinical Pathway: None    Medication Management:  (Unable to assess due to MH symptoms)      Functional Status:       Living Situation:       Lifestyle & Psychosocial Needs:    Social Determinants of Health     Tobacco Use: High Risk     Smoking Tobacco Use: Some Days     Smokeless Tobacco Use: Never     Passive Exposure: Not on file   Alcohol Use: Not on file   Financial Resource Strain: Medium Risk     Difficulty of Paying Living Expenses: Somewhat hard   Food Insecurity: Food Insecurity Present     Worried About Running Out of Food in the Last Year: Sometimes true     Ran Out of Food in the Last Year: Sometimes true   Transportation Needs: No Transportation Needs     Lack of Transportation (Medical): No     Lack of Transportation (Non-Medical): No   Physical Activity: Not on file   Stress: Not on file   Social Connections: Not on file   Intimate Partner Violence: Not on file   Depression: Not at risk     PHQ-2 Score: 2   Housing Stability: Not on file                   Resources and Interventions:  Current Resources: Sees therapist at , though missed this weeks apt. Was set up for [facility] but missed that appt this week as well.                     Care Plan:      Patient/Caregiver understanding: Patient verbalized understanding, engaged  in [facility] communication during patient encounter.    Outreach Frequency: monthly      Plan: [facility] provided pt with homeless shelter resources. Pt will reach out to those resources if needed, continue staying with his friend in [place], or move out to [place] as mentioned. CC will follow up with pt again next week.    Debi Davis [facility]  Clinic Care Coordination  Pronouns: she/her/hers  Transitions and Extended Care Clinics  [facility]  [email or web address]  [phone]

## 2022-12-27 ENCOUNTER — HOSPITAL ENCOUNTER (EMERGENCY)
Facility: HOSPITAL | Age: 32
Discharge: HOME | End: 2022-12-27
Attending: STUDENT IN AN ORGANIZED HEALTH CARE EDUCATION/TRAINING PROGRAM | Admitting: STUDENT IN AN ORGANIZED HEALTH CARE EDUCATION/TRAINING PROGRAM
Payer: COMMERCIAL

## 2022-12-27 ENCOUNTER — APPOINTMENT (OUTPATIENT)
Dept: CT IMAGING | Facility: HOSPITAL | Age: 32
End: 2022-12-27
Attending: STUDENT IN AN ORGANIZED HEALTH CARE EDUCATION/TRAINING PROGRAM
Payer: COMMERCIAL

## 2022-12-27 VITALS
SYSTOLIC BLOOD PRESSURE: 152 MMHG | RESPIRATION RATE: 16 BRPM | HEART RATE: 95 BPM | TEMPERATURE: 99.1 F | OXYGEN SATURATION: 98 % | DIASTOLIC BLOOD PRESSURE: 92 MMHG

## 2022-12-27 DIAGNOSIS — R10.30 LOWER ABDOMINAL PAIN: ICD-10-CM

## 2022-12-27 DIAGNOSIS — Z59.00 HOMELESSNESS: ICD-10-CM

## 2022-12-27 LAB
ALBUMIN SERPL BCG-MCNC: 4.2 G/DL (ref 3.5–5.2)
ALP SERPL-CCNC: 61 U/L (ref 40–129)
ALT SERPL W P-5'-P-CCNC: 38 U/L (ref 10–50)
ANION GAP SERPL CALCULATED.3IONS-SCNC: 9 MMOL/L (ref 7–15)
AST SERPL W P-5'-P-CCNC: 33 U/L (ref 10–50)
BASOPHILS # BLD AUTO: 0.1 10E3/UL (ref 0–0.2)
BASOPHILS NFR BLD AUTO: 1 %
BILIRUB DIRECT SERPL-MCNC: <0.2 MG/DL (ref 0–0.3)
BILIRUB SERPL-MCNC: 0.4 MG/DL
BUN SERPL-MCNC: 8.7 MG/DL (ref 6–20)
CALCIUM SERPL-MCNC: 9.7 MG/DL (ref 8.6–10)
CHLORIDE SERPL-SCNC: 103 MMOL/L (ref 98–107)
CREAT SERPL-MCNC: 0.94 MG/DL (ref 0.67–1.17)
DEPRECATED HCO3 PLAS-SCNC: 30 MMOL/L (ref 22–29)
EOSINOPHIL # BLD AUTO: 0.1 10E3/UL (ref 0–0.7)
EOSINOPHIL NFR BLD AUTO: 1 %
ERYTHROCYTE [DISTWIDTH] IN BLOOD BY AUTOMATED COUNT: 13.3 % (ref 10–15)
GFR SERPL CREATININE-BSD FRML MDRD: >90 ML/MIN/1.73M2
GLUCOSE SERPL-MCNC: 126 MG/DL (ref 70–99)
HCT VFR BLD AUTO: 48.2 % (ref 40–53)
HGB BLD-MCNC: 16.3 G/DL (ref 13.3–17.7)
IMM GRANULOCYTES # BLD: 0 10E3/UL
IMM GRANULOCYTES NFR BLD: 0 %
LIPASE SERPL-CCNC: 34 U/L (ref 13–60)
LYMPHOCYTES # BLD AUTO: 1.3 10E3/UL (ref 0.8–5.3)
LYMPHOCYTES NFR BLD AUTO: 16 %
MCH RBC QN AUTO: 30.5 PG (ref 26.5–33)
MCHC RBC AUTO-ENTMCNC: 33.8 G/DL (ref 31.5–36.5)
MCV RBC AUTO: 90 FL (ref 78–100)
MONOCYTES # BLD AUTO: 0.5 10E3/UL (ref 0–1.3)
MONOCYTES NFR BLD AUTO: 7 %
NEUTROPHILS # BLD AUTO: 5.9 10E3/UL (ref 1.6–8.3)
NEUTROPHILS NFR BLD AUTO: 75 %
NRBC # BLD AUTO: 0 10E3/UL
NRBC BLD AUTO-RTO: 0 /100
PLATELET # BLD AUTO: 194 10E3/UL (ref 150–450)
POTASSIUM SERPL-SCNC: 3.8 MMOL/L (ref 3.4–5.3)
PROT SERPL-MCNC: 6.9 G/DL (ref 6.4–8.3)
RBC # BLD AUTO: 5.35 10E6/UL (ref 4.4–5.9)
SODIUM SERPL-SCNC: 142 MMOL/L (ref 136–145)
WBC # BLD AUTO: 7.8 10E3/UL (ref 4–11)

## 2022-12-27 PROCEDURE — 74177 CT ABD & PELVIS W/CONTRAST: CPT

## 2022-12-27 PROCEDURE — 250N000011 HC RX IP 250 OP 636: Performed by: STUDENT IN AN ORGANIZED HEALTH CARE EDUCATION/TRAINING PROGRAM

## 2022-12-27 PROCEDURE — 83690 ASSAY OF LIPASE: CPT | Performed by: STUDENT IN AN ORGANIZED HEALTH CARE EDUCATION/TRAINING PROGRAM

## 2022-12-27 PROCEDURE — 36415 COLL VENOUS BLD VENIPUNCTURE: CPT | Performed by: STUDENT IN AN ORGANIZED HEALTH CARE EDUCATION/TRAINING PROGRAM

## 2022-12-27 PROCEDURE — 82248 BILIRUBIN DIRECT: CPT | Performed by: STUDENT IN AN ORGANIZED HEALTH CARE EDUCATION/TRAINING PROGRAM

## 2022-12-27 PROCEDURE — 80053 COMPREHEN METABOLIC PANEL: CPT | Performed by: STUDENT IN AN ORGANIZED HEALTH CARE EDUCATION/TRAINING PROGRAM

## 2022-12-27 PROCEDURE — 85025 COMPLETE CBC W/AUTO DIFF WBC: CPT | Performed by: STUDENT IN AN ORGANIZED HEALTH CARE EDUCATION/TRAINING PROGRAM

## 2022-12-27 PROCEDURE — 82310 ASSAY OF CALCIUM: CPT | Performed by: STUDENT IN AN ORGANIZED HEALTH CARE EDUCATION/TRAINING PROGRAM

## 2022-12-27 PROCEDURE — 99285 EMERGENCY DEPT VISIT HI MDM: CPT | Mod: 25

## 2022-12-27 RX ORDER — IOPAMIDOL 755 MG/ML
100 INJECTION, SOLUTION INTRAVASCULAR ONCE
Status: COMPLETED | OUTPATIENT
Start: 2022-12-27 | End: 2022-12-27

## 2022-12-27 RX ADMIN — IOPAMIDOL 100 ML: 755 INJECTION, SOLUTION INTRAVENOUS at 23:11

## 2022-12-27 SDOH — ECONOMIC STABILITY - HOUSING INSECURITY: HOMELESSNESS UNSPECIFIED: Z59.00

## 2022-12-27 ASSESSMENT — ACTIVITIES OF DAILY LIVING (ADL): ADLS_ACUITY_SCORE: 35
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
## 2022-12-28 NOTE — ED TRIAGE NOTES
Patient arrives c/o lower abdominal pain that worsens with bowel movements. Patient has been constipated. Patient denies nausea.     Patient also seeking help with placement in a warming house due to being homeless and his schizophrenia dx.

## 2022-12-28 NOTE — ED NOTES
Discharged waiting room to call for warming space and or homeless shelter    Pt discharged with all belongings, pt is ambulatory with no assisted device and leaving with family member via private vehicle and/or other.     Discharge instructions provided and patient acknowledges appropriately. Prescriptions sent none.  No further questions or concerns at this time. Pt has all his belongings, including his suitcase and cane.

## 2022-12-30 ENCOUNTER — PATIENT OUTREACH (OUTPATIENT)
Dept: CARE COORDINATION | Facility: CLINIC | Age: 32
End: 2022-12-30

## 2022-12-30 NOTE — PROGRESS NOTES
Clinic Care Coordination Contact  [facility]/Voicemail       Clinical Data: Care Coordinator Outreach  Outreach attempted x 1.  Left message on patient's voicemail with call back information and requested return call.  Plan: Care Coordinator will try to reach patient again in 3-5 business days.    FUNMILAYO Martin  Clinic Care Coordination  Pronouns: she/her/hers  Transitions and Extended Care Clinics  [facility]  [email or web address]  [phone]

## 2023-01-06 ENCOUNTER — PATIENT OUTREACH (OUTPATIENT)
Dept: CARE COORDINATION | Facility: CLINIC | Age: 33
End: 2023-01-06

## 2023-01-06 NOTE — PROGRESS NOTES
Clinic Care Coordination Contact  [facility]/Voicemail       Clinical Data: Care Coordinator Outreach  Outreach attempted x 2.  Left message on patient's voicemail with call back information and requested return call.  Plan: Pt doesn't have current address for letter and hasnt used mychart in months. Care Coordinator will try to reach patient again in 3-5 business days.    FUNMILAYO Martin  Clinic Care Coordination  Pronouns: she/her/hers  Transitions and Extended Care Clinics  [facility]  [email or web address]  [phone]

## 2023-01-16 ENCOUNTER — PATIENT OUTREACH (OUTPATIENT)
Dept: CARE COORDINATION | Facility: CLINIC | Age: 33
End: 2023-01-16
Payer: COMMERCIAL

## 2023-01-16 NOTE — PROGRESS NOTES
Clinic Care Coordination Contact  [facility]/Voicemail       Clinical Data: Care Coordinator Outreach  Outreach attempted x 3.  Left message on patient's voicemail with call back information and requested return call.  Plan: No stable address for mail and doesn't check my chart. Care Coordinator will do no further outreaches at this time.    Debi Davis CAIO  Clinic Care Coordination  Pronouns: she/her/hers  Transitions and Extended Care Clinics  [facility]  [email or web address]  [phone]

## 2023-04-09 ENCOUNTER — HEALTH MAINTENANCE LETTER (OUTPATIENT)
Age: 33
End: 2023-04-09

## 2023-06-06 RX ORDER — OMEPRAZOLE 20 MG/1
TABLET, DELAYED RELEASE ORAL
Qty: 168 TABLET | Refills: 0 | OUTPATIENT
Start: 2023-06-06

## 2023-06-06 NOTE — TELEPHONE ENCOUNTER
omeprazole (PRILOSEC) 20 MG   Last written  10/19/21  Last Fill Quantity: 30,   # refills: 0  Last Office Visit : 2/15/22  Future Office visit:  None    Scheduling has been notified to contact the pt for appointment.      Routing refill request to provider for review/approval because:end date  1/4/22

## 2024-02-17 ENCOUNTER — TELEPHONE (OUTPATIENT)
Dept: BEHAVIORAL HEALTH | Facility: CLINIC | Age: 34
End: 2024-02-17

## 2024-02-17 ENCOUNTER — HOSPITAL ENCOUNTER (INPATIENT)
Facility: CLINIC | Age: 34
LOS: 28 days | Discharge: NURSING, REHAB OR LONG-TERM CARE | DRG: 885 | End: 2024-03-18
Attending: EMERGENCY MEDICINE | Admitting: PSYCHIATRY & NEUROLOGY
Payer: COMMERCIAL

## 2024-02-17 ENCOUNTER — TELEPHONE (OUTPATIENT)
Dept: BEHAVIORAL HEALTH | Facility: CLINIC | Age: 34
End: 2024-02-17
Payer: COMMERCIAL

## 2024-02-17 DIAGNOSIS — F25.9 SCHIZOPHRENIA, SCHIZOAFFECTIVE, CHRONIC WITH ACUTE EXACERBATION (H): ICD-10-CM

## 2024-02-17 DIAGNOSIS — F20.9 SCHIZOPHRENIA, UNSPECIFIED TYPE (H): ICD-10-CM

## 2024-02-17 DIAGNOSIS — F12.90 MARIJUANA USE: ICD-10-CM

## 2024-02-17 DIAGNOSIS — Z11.52 ENCOUNTER FOR SCREENING FOR COVID-19: Primary | ICD-10-CM

## 2024-02-17 PROBLEM — F29 UNSPECIFIED PSYCHOSIS NOT DUE TO A SUBSTANCE OR KNOWN PHYSIOLOGICAL CONDITION (H): Status: ACTIVE | Noted: 2024-02-17

## 2024-02-17 LAB
ALBUMIN SERPL BCG-MCNC: 4.4 G/DL (ref 3.5–5.2)
ALP SERPL-CCNC: 49 U/L (ref 40–150)
ALT SERPL W P-5'-P-CCNC: 6 U/L (ref 0–70)
AMPHETAMINES UR QL SCN: ABNORMAL
ANION GAP SERPL CALCULATED.3IONS-SCNC: 10 MMOL/L (ref 7–15)
AST SERPL W P-5'-P-CCNC: 11 U/L (ref 0–45)
BARBITURATES UR QL SCN: ABNORMAL
BASOPHILS # BLD AUTO: 0 10E3/UL (ref 0–0.2)
BASOPHILS NFR BLD AUTO: 1 %
BENZODIAZ UR QL SCN: ABNORMAL
BILIRUB SERPL-MCNC: 1.3 MG/DL
BUN SERPL-MCNC: 6.2 MG/DL (ref 6–20)
BZE UR QL SCN: ABNORMAL
CALCIUM SERPL-MCNC: 9.4 MG/DL (ref 8.6–10)
CANNABINOIDS UR QL SCN: ABNORMAL
CHLORIDE SERPL-SCNC: 104 MMOL/L (ref 98–107)
CREAT SERPL-MCNC: 0.88 MG/DL (ref 0.67–1.17)
DEPRECATED HCO3 PLAS-SCNC: 26 MMOL/L (ref 22–29)
EGFRCR SERPLBLD CKD-EPI 2021: >90 ML/MIN/1.73M2
EOSINOPHIL # BLD AUTO: 0.1 10E3/UL (ref 0–0.7)
EOSINOPHIL NFR BLD AUTO: 1 %
ERYTHROCYTE [DISTWIDTH] IN BLOOD BY AUTOMATED COUNT: 11.9 % (ref 10–15)
FENTANYL UR QL: ABNORMAL
GLUCOSE SERPL-MCNC: 104 MG/DL (ref 70–99)
HCT VFR BLD AUTO: 46.5 % (ref 40–53)
HGB BLD-MCNC: 16.9 G/DL (ref 13.3–17.7)
IMM GRANULOCYTES # BLD: 0 10E3/UL
IMM GRANULOCYTES NFR BLD: 0 %
LYMPHOCYTES # BLD AUTO: 0.7 10E3/UL (ref 0.8–5.3)
LYMPHOCYTES NFR BLD AUTO: 13 %
MAGNESIUM SERPL-MCNC: 1.9 MG/DL (ref 1.7–2.3)
MCH RBC QN AUTO: 30.8 PG (ref 26.5–33)
MCHC RBC AUTO-ENTMCNC: 36.3 G/DL (ref 31.5–36.5)
MCV RBC AUTO: 85 FL (ref 78–100)
MONOCYTES # BLD AUTO: 0.4 10E3/UL (ref 0–1.3)
MONOCYTES NFR BLD AUTO: 7 %
NEUTROPHILS # BLD AUTO: 4.2 10E3/UL (ref 1.6–8.3)
NEUTROPHILS NFR BLD AUTO: 78 %
NRBC # BLD AUTO: 0 10E3/UL
NRBC BLD AUTO-RTO: 0 /100
OPIATES UR QL SCN: ABNORMAL
PCP QUAL URINE (ROCHE): ABNORMAL
PLATELET # BLD AUTO: 232 10E3/UL (ref 150–450)
POTASSIUM SERPL-SCNC: 3.6 MMOL/L (ref 3.4–5.3)
PROT SERPL-MCNC: 7 G/DL (ref 6.4–8.3)
RBC # BLD AUTO: 5.49 10E6/UL (ref 4.4–5.9)
SODIUM SERPL-SCNC: 140 MMOL/L (ref 135–145)
WBC # BLD AUTO: 5.4 10E3/UL (ref 4–11)

## 2024-02-17 PROCEDURE — 85025 COMPLETE CBC W/AUTO DIFF WBC: CPT | Performed by: EMERGENCY MEDICINE

## 2024-02-17 PROCEDURE — 93010 ELECTROCARDIOGRAM REPORT: CPT | Performed by: PSYCHIATRY & NEUROLOGY

## 2024-02-17 PROCEDURE — 99223 1ST HOSP IP/OBS HIGH 75: CPT | Performed by: CLINICAL NURSE SPECIALIST

## 2024-02-17 PROCEDURE — 99418 PROLNG IP/OBS E/M EA 15 MIN: CPT | Performed by: CLINICAL NURSE SPECIALIST

## 2024-02-17 PROCEDURE — 250N000013 HC RX MED GY IP 250 OP 250 PS 637: Performed by: EMERGENCY MEDICINE

## 2024-02-17 PROCEDURE — 80307 DRUG TEST PRSMV CHEM ANLYZR: CPT | Performed by: FAMILY MEDICINE

## 2024-02-17 PROCEDURE — 80053 COMPREHEN METABOLIC PANEL: CPT | Performed by: EMERGENCY MEDICINE

## 2024-02-17 PROCEDURE — 83735 ASSAY OF MAGNESIUM: CPT | Performed by: EMERGENCY MEDICINE

## 2024-02-17 PROCEDURE — 36415 COLL VENOUS BLD VENIPUNCTURE: CPT | Performed by: EMERGENCY MEDICINE

## 2024-02-17 PROCEDURE — 99285 EMERGENCY DEPT VISIT HI MDM: CPT | Performed by: EMERGENCY MEDICINE

## 2024-02-17 RX ORDER — DIPHENHYDRAMINE HCL 25 MG
50 TABLET ORAL DAILY PRN
Status: ON HOLD | COMMUNITY
End: 2024-03-16

## 2024-02-17 RX ORDER — LOPERAMIDE HCL 2 MG
2 CAPSULE ORAL EVERY 8 HOURS PRN
Status: ON HOLD | COMMUNITY
End: 2024-03-16

## 2024-02-17 RX ORDER — NICOTINE 21 MG/24HR
1 PATCH, TRANSDERMAL 24 HOURS TRANSDERMAL EVERY 24 HOURS
Status: ON HOLD | COMMUNITY
End: 2024-03-16

## 2024-02-17 RX ORDER — LANOLIN ALCOHOL/MO/W.PET/CERES
3 CREAM (GRAM) TOPICAL
Status: ON HOLD | COMMUNITY
End: 2024-03-16

## 2024-02-17 RX ORDER — IBUPROFEN 200 MG
1 CAPSULE ORAL DAILY
Status: ON HOLD | COMMUNITY
End: 2024-03-16

## 2024-02-17 RX ORDER — POLYETHYLENE GLYCOL 3350 17 G
2 POWDER IN PACKET (EA) ORAL
Status: ON HOLD | COMMUNITY
End: 2024-03-16

## 2024-02-17 RX ORDER — MULTIVITAMIN,THERAPEUTIC
1 TABLET ORAL DAILY
Status: ON HOLD | COMMUNITY
End: 2024-03-15

## 2024-02-17 RX ORDER — GUAIFENESIN 200 MG/10ML
60 LIQUID ORAL EVERY 4 HOURS PRN
Status: ON HOLD | COMMUNITY
End: 2024-03-16

## 2024-02-17 RX ORDER — RAMELTEON 8 MG/1
8 TABLET ORAL AT BEDTIME
Status: ON HOLD | COMMUNITY
End: 2024-03-16

## 2024-02-17 RX ORDER — OLANZAPINE 10 MG/1
10 TABLET ORAL AT BEDTIME
COMMUNITY
End: 2024-02-17

## 2024-02-17 RX ORDER — OLANZAPINE 15 MG/1
15 TABLET, ORALLY DISINTEGRATING ORAL AT BEDTIME
Status: DISCONTINUED | OUTPATIENT
Start: 2024-02-17 | End: 2024-03-01

## 2024-02-17 RX ORDER — DOCUSATE SODIUM 100 MG/1
100 CAPSULE, LIQUID FILLED ORAL 2 TIMES DAILY
Status: ON HOLD | COMMUNITY
End: 2024-03-16

## 2024-02-17 RX ORDER — MAGNESIUM HYDROXIDE/ALUMINUM HYDROXICE/SIMETHICONE 120; 1200; 1200 MG/30ML; MG/30ML; MG/30ML
15 SUSPENSION ORAL EVERY 4 HOURS PRN
Status: ON HOLD | COMMUNITY
End: 2024-03-16

## 2024-02-17 RX ORDER — ACETAMINOPHEN 325 MG/1
325-650 TABLET ORAL EVERY 4 HOURS PRN
Status: ON HOLD | COMMUNITY
End: 2024-03-15

## 2024-02-17 RX ORDER — OLANZAPINE 5 MG/1
5 TABLET ORAL DAILY PRN
Status: ON HOLD | COMMUNITY
End: 2024-03-16

## 2024-02-17 RX ORDER — OLANZAPINE 10 MG/1
10 TABLET, ORALLY DISINTEGRATING ORAL ONCE
Status: COMPLETED | OUTPATIENT
Start: 2024-02-17 | End: 2024-02-17

## 2024-02-17 RX ADMIN — OLANZAPINE 10 MG: 10 TABLET, ORALLY DISINTEGRATING ORAL at 15:10

## 2024-02-17 ASSESSMENT — ACTIVITIES OF DAILY LIVING (ADL)
ADLS_ACUITY_SCORE: 38
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
## 2024-02-17 NOTE — ED NOTES
Writer introduced self to patient, patient flat and withdrawn. Denies needs and able to contract for safety.
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
## 2024-02-17 NOTE — TELEPHONE ENCOUNTER
1:45 PM: Yuri from [facility] called to provide collateral. Pt just left [facility]. He has been experiencing psychotic sx and emotional distress. Pt has not been taking Vivance, [facility] was unaware if he was prescribed other psychotropic meds. Yuri informed that Pt is supposed to take Olanzapine. Pt has had significant weight loss while at [facility] in the past couple months. It is unclear as to why he has not been eating. He has persistent thoughts of having hurt others in the past and thinking that others know. Repeatedly saying he is unsure if things are real or not, such as writings on the floor. No SI today, has long hx. Concerns for aggressive behaviors and SIB behaviors. Denies substance use today.     [facility]: Yuri [phone]  [facility]: [phone]

## 2024-02-17 NOTE — CONSULTS
"Diagnostic Evaluation Consultation  Crisis Assessment    Patient Name: Higinio Wallace  Age:  33 year old  Legal Sex: male  Gender Identity: male  Pronouns:   Race:     or     Black or    or Other   White  Ethnicity:  or   Language: English      Patient was assessed: In person      Patient location: [facility] EMERGENCY DEPARTMENT                             ED16A    Referral Data and Chief Complaint  Higinio Wallace presents to the ED via EMS. Patient is presenting to the ED for the following concerns: Paranoia, Anxiety, Worsening psychosocial stress, Significant behavioral change.   Factors that make the mental health crisis life threatening or complex are:  Pt presents to ED for delusions, paranoia, decompensated MH. Pt is a poor historian, has difficulty responding responding to assessment questions. Pt mostly responds with, \"I'm unsure\" and \"I just don't know\". Pt appears to be responding to internal stimuli. Visibly distressed. [facility] from [facility]. They have concerns that pt is not taking care of himself and his mental status has deteriorated significantly. Pt has not been eating, lost several lbs over past few weeks. Pt experiencing a delusion that if he eats food he is harming other individuals. Pt malodorous and appears disheveled. Pt states he has not been taking his mental health medications for \"health reasons\", unable to specify further. Pt denies SI and HI. Collateral from [facility] notes pt may be at risk for aggressive behaviors and NSSIB but no further detail offered. Pt states he has used marijuna, cannot specify if he uses other substances. Has hx of admissions for psychotic episodes. Pt unable to confirm if he works with MH providers. Pt endorses auditory hallucinations..      Informed Consent and Assessment Methods  Explained the crisis assessment process, including applicable " information disclosures and limits to confidentiality, assessed understanding of the process, and obtained consent to proceed with the assessment.  Assessment methods included conducting a formal interview with patient, review of medical records, collaboration with medical staff, and obtaining relevant collateral information from family and community providers when available.  : done     Patient response to interventions: no evidence of understanding  Coping skills were attempted to reduce the crisis:  unable to utilize coping skills     History of the Crisis   Hx limited due to pt's altered mental status. Pt has hx of hospitilizations, most recently 2022 in [place] per chart review. Hx of chronic mental health concerns and psychotic episodes. Pt uses marijuana per chart review. Unclear if pt has MH providers at this time. Per chart review, pt has experienced SI in the past.    Brief Psychosocial History  Family:  Single, Children no  Support System:   (unknown)  Employment Status:   (unable to assess)  Source of Income:  unable to assess  Financial Environmental Concerns:   (unknown)  Current Hobbies:   (unable to assess)  Barriers in Personal Life:  mental health concerns    Significant Clinical History  Current Anxiety Symptoms:  anxious, excessive worry, racing thoughts  Current Depression/Trauma:  sadness, impaired decision making  Current Somatic Symptoms:     Current Psychosis/Thought Disturbance:  auditory hallucinations, visual hallucinations, inattentive  Current Eating Symptoms:  loss of appetite, recent weight loss  Chemical Use History:  Alcohol: None  Benzodiazepines: None  Opiates: None  Cocaine: None  Marijuana:  (states he uses marijuana)  Other Use: None   Past diagnosis:  Schizophrenia, PTSD  Family history:  No known history of mental health or chemical health concerns  Past treatment:  Case management, Inpatient Hospitalization, Residential Treatment, Psychiatric Medication Management  Details  of most recent treatment:  [facility]  Other relevant history:          Collateral Information  Is there collateral information: Yes     ave from [facility] called to provide collateral. Pt just left [facility]. He has been experiencing psychotic sx and emotional distress. Pt has not been taking Vivance, [facility] was unaware if he was prescribed other psychotropic meds. Yuri informed that Pt is supposed to take Olanzapine. Pt has had significant weight loss while at [facility] in the past couple months. It is unclear as to why he has not been eating. He has persistent thoughts of having hurt others in the past and thinking that others know. Repeatedly saying he is unsure if things are real or not, such as writings on the floor. No SI today, has long hx. Concerns for aggressive behaviors and SIB behaviors. Denies substance use today.      [facility]: Yuri [phone]  [facility]: [phone]     Risk Assessment  [place] Suicide Severity Rating Scale Full Clinical Version:  Suicidal Ideation  Q1 Wish to be Dead (Lifetime):  (unable to assess)  Q2 Non-Specific Active Suicidal Thoughts (Lifetime):  (unable to assess)     Suicidal Behavior (Lifetime)  Actual Attempt (Lifetime):  (unable to assess)  Has subject engaged in non-suicidal self-injurious behavior? (Lifetime):  (unable to assess)  Interrupted Attempts (Lifetime):  (unable to assess)  Aborted or Self-Interrupted Attempt (Lifetime):  (unable to assess)  Preparatory Acts or Behavior (Lifetime):  (unable to assess)    [place] Suicide Severity Rating Scale Recent:   Suicidal Ideation (Recent)  Q1 Wished to be Dead (Past Month): no  Q2 Suicidal Thoughts (Past Month): no     Suicidal Behavior (Recent)  Actual Attempt (Past 3 Months): No  Has subject engaged in non-suicidal self-injurious behavior? (Past 3 Months): No  Interrupted Attempts (Past 3 Months): No  Aborted or Self-Interrupted Attempt (Past 3 Months): No  Preparatory Acts or Behavior (Past 3  Months): No    Environmental or Psychosocial Events: other life stressors  Protective Factors: Protective Factors: lives in a responsibly safe and stable environment, help seeking    Does the patient have thoughts of harming others? Feels Like Hurting Others: no  Previous Attempt to Hurt Others: no  Is the patient engaging in sexually inappropriate behavior?: no    Is the patient engaging in sexually inappropriate behavior?  no        Mental Status Exam   Affect: Constricted  Appearance: Disheveled  Attention Span/Concentration: Inattentive  Eye Contact: Avoidant    Fund of Knowledge: Delayed   Language /Speech Content: Fluent  Language /Speech Volume: Soft  Language /Speech Rate/Productions: Minimally Responsive  Recent Memory: Poor  Remote Memory: Poor  Mood: Anxious, Sad  Orientation to Person: Yes   Orientation to Place: Yes  Orientation to Time of Day: Yes  Orientation to Date: Yes     Situation (Do they understand why they are here?): Yes  Psychomotor Behavior: Underactive  Thought Content: Hallucinations, Delusions, Paranoia  Thought Form: Paranoia         Medication  No current facility-administered medications for this encounter.     No current outpatient medications on file.           Current Care Team  Patient Care Team:  Raza Franklin MD as PCP - General (Family Medicine)  Lela Han MD as MD (Orthopedics)  Jamie Carr MD as MD (Orthopaedic Surgery)  Bloch, Lauren Turner, [facility] as Pharmacist (Pharmacist)  Raza Franklin MD as Assigned PCP  Waqar Go MD as MD (Rheumatology)  Franchesca Young MD as MD (Endocrinology, Diabetes, and Metabolism)  Mark Knapp MD as MD (Endocrinology, Diabetes, and Metabolism)    Diagnosis  Patient Active Problem List   Diagnosis Code    Irritable bowel syndrome K58.9    Class 2 severe obesity due to excess calories with serious comorbidity and body mass index (BMI) of 37.0 to 37.9 in adult (H) E66.01, Z68.37    Patellofemoral instability of left  knee with pain M25.362, M25.562    Tobacco abuse Z72.0    Suicidal ideation R45.851    Moderate episode of recurrent major depressive disorder (H) F33.1    CARSON (obstructive sleep apnea) G47.33    Unspecified psychosis not due to a substance or known physiological condition (H) F29       Primary Problem This Admission  Active Hospital Problems    *Unspecified psychosis not due to a substance or known physiological condition (H)        Clinical Summary and Substantiation of Recommendations   Pt presents to ED for altered mental status. Pt appears to be experiencing decompensating mental health. Responding to internal stimuli, disorganized, not caring for self. See initial assessment sections for further details. Recommending IP for safety and stabilization with medication. Pt unable to consent for treatment given current mental status, provider has placed a hold.          Severe psychiatric, behavioral or other comorbid conditions are appropriate for management at inpatient mental health as indicated by at least one of the following: Psychiatric Symptoms, Impaired impulse control, judgement, or insight, Symptoms of impact to function  Severe dysfunction in daily living is present as indicated by at least one of the following: Other evidence of severe dysfunction, Complete neglect of self care with associated impairment in physical status  Situation and expectations are appropriate for inpatient care: Voluntary treatment at lower level of care is not feasible, Patient management/treatment at lower level of care is not feasible or is inappropriate  Inpatient mental health services are necessary to meet patient needs and at least one of the following: Specific condition related to admission diagnosis is present and judged likely to deteriorate in absence of treatment at proposed level of care      Patient coping skills attempted to reduce the crisis:  unable to utilize coping skills    Disposition  Recommended  disposition: Inpatient Mental Health        Reviewed case and recommendations with attending provider. Attending Name: Dr. Wright       Attending concurs with disposition: yes       Patient and/or validated legal guardian concurs with disposition:   yes       Final disposition:  inpatient mental health    Legal status on admission: 72 Hour Hold    Assessment Details   Total duration spent with the patient: 33 min     CPT code(s) utilized: 37937 - Psychotherapy for Crisis - 60 (30-74*) min    FANTASMA [place], Psychotherapist  [facility] - Triage & Transition Services  Callback: [phone]

## 2024-02-17 NOTE — ED PROVIDER NOTES
"    [facility] EMERGENCY DEPARTMENT ([facility])    2/17/24      ED PROVIDER NOTE      History     Chief Complaint   Patient presents with    Psychiatric Evaluation     HPI  Higinio Wallace is a 33 year old male with a past medical history significant for schizophrenia, MDD with prior suicidal ideation, and PTSD who presents to the ED via EMS for psychiatric evaluation.  Patient is here from [facility].  Patient seems to be disorganized and speaking insensibly.  Per collateral history, patient has been becoming increasingly more delusional lately.  He has not been eating, due to the idea that he is harming others by eating.  This has caused him to lose a significant amount of weight.  He appears to be responding to internal stimuli.  Notes that he thinks he is having hallucinations, but \"does not know anymore\".  Notes that he is unsure if things are not real or not. He states that he is unsure of substance use, but states that his last substance used was marijuana.  Patient has not been showering, bathing, or completing necessary hygiene tasks.  He does appear to be malodorous. Jackie Bermudez is unsure if he is prescribed additional medications other than Vyvanse.  Patient denies HI or SI.    Past Medical History  Past Medical History:   Diagnosis Date    Anxiety     Class 2 obesity due to excess calories in adult     Complication of anesthesia     Depression     Gastroesophageal reflux disease with esophagitis     History of nephrolithiasis     Irritable bowel syndrome     Major depression, recurrent (H24)     Malrotation of intestine (H28)     Manic disorder (H)     Panic disorder     Patellofemoral instability of left knee with pain     Pre-diabetes     Psychosis (H)     PTSD (post-traumatic stress disorder)     Tobacco use      Past Surgical History:   Procedure Laterality Date    ARTHROSCOPY KNEE WITH PATELLAR REALIGNMENT Left 11/13/2020    Procedure: Knee Arthroscopy, Medial Patello-femoral Ligament " "Reconstruction with Allograft, Distal Tibial Tubercle Osteotomy, Lateral Retinacular Lengthening;  Surgeon: Sourav Keating MD;  Location: [facility] OR    ARTHROTOMY TIBIAL TUBERCLE SHIFT Left 11/13/2020    Procedure: tibial tubercle Osteotomy ;  Surgeon: Sourav Keating MD;  [facility]: [facility] OR    COLONOSCOPY N/A 10/07/2020    Procedure: COLONOSCOPY, WITH POLYPECTOMY AND BIOPSY;  Surgeon: Donell Holland MD;  Location: [facility] OR    deviated septum  2018    KNEE SURGERY Left     OPEN REDUCTION INTERNAL FIXATION RODDING INTRAMEDULLARY TIBIA Left 11/13/2020    Procedure: plus derotation tibial osteotomy;  Surgeon: Sourav Keating MD;  Location: [facility] OR     OLANZapine (ZYPREXA) 10 MG tablet      Allergies   Allergen Reactions    Bee Venom Anaphylaxis and Other (See Comments)     Swelling  Large local reactions/ swelling      Fruit Acid Concentrate [Fruit Extracts] Swelling     Lips swell and crust over little bit- tongue gets numb    Liquid Adhesive Dermatitis     And band aid      Monosodium Glutamate Hives     Family History  Family History   Problem Relation Age of Onset    Other - See Comments Mother         PONV    Cerebrovascular Disease Brother     Atrial fibrillation Brother     Other - See Comments Brother         cardiac autoimmune deficiency     Social History   Social History     Tobacco Use    Smoking status: Some Days     Packs/day: .25     Types: Cigarettes     Start date: 2001    Smokeless tobacco: Never    Tobacco comments:     Does not know how much he smokes   Substance Use Topics    Alcohol use: Not Currently    Drug use: Not Currently     Types: Marijuana      Past medical history, past surgical history, medications, allergies, family history, and social history were reviewed with the patient. No additional pertinent items.          Physical Exam   BP: (!) 147/88  Pulse: 72  Temp: 98.6  F (37  C)  Resp: 16  Height: 193 cm (6' 4\")  Weight: 96 kg (211 lb 9.6 oz)  SpO2: 97 %  Physical " Exam  Vitals and nursing note reviewed.   Constitutional:       Comments: Thin male, withdrawn, almost nonverbal, lying in bed, anxious   HENT:      Head: Normocephalic and atraumatic.      Nose: Nose normal.   Eyes:      Extraocular Movements: Extraocular movements intact.   Cardiovascular:      Rate and Rhythm: Normal rate.   Pulmonary:      Effort: Pulmonary effort is normal.   Musculoskeletal:         General: No deformity or signs of injury.      Cervical back: Normal range of motion.   Skin:     Coloration: Skin is not jaundiced or pale.   Neurological:      General: No focal deficit present.   Psychiatric:         Attention and Perception: He is inattentive. He perceives auditory hallucinations.         Mood and Affect: Mood is anxious. Affect is flat.         Speech: He is noncommunicative (minimal conversation).         Behavior: Behavior is withdrawn.         Thought Content: Thought content is paranoid and delusional.         Judgment: Judgment is inappropriate.           ED Course, Procedures, & Data      Procedures    Mental Health Risk Assessment        PSS-3      Date and Time Over the past 2 weeks have you felt down, depressed, or hopeless? Over the past 2 weeks have you had thoughts of killing yourself? Have you ever attempted to kill yourself? When did this last happen? User   02/17/24 1404 no no yes more than 6 months ago JAB          C-SSRS ([place])      Date and Time Q1 Wished to be Dead (Past Month) Q2 Suicidal Thoughts (Past Month) Q3 Suicidal Thought Method Q4 Suicidal Intent without Specific Plan Q5 Suicide Intent with Specific Plan Q6 Suicide Behavior (Lifetime) Within the Past 3 Months? RETIRED: Level of Risk per Screen Screening Not Complete User   02/17/24 1530 no no -- -- -- -- -- -- -- CK                       Results for orders placed or performed during the hospital encounter of 02/17/24   Urine Drug Screen Panel     Status: Abnormal   Result Value Ref Range    Amphetamines Urine  Screen Negative Screen Negative    Barbituates Urine Screen Negative Screen Negative    Benzodiazepine Urine Screen Negative Screen Negative    Cannabinoids Urine Screen Positive (A) Screen Negative    Cocaine Urine Screen Negative Screen Negative    Fentanyl Qual Urine Screen Negative Screen Negative    Opiates Urine Screen Negative Screen Negative    PCP Urine Screen Negative Screen Negative   Comprehensive metabolic panel     Status: Abnormal   Result Value Ref Range    Sodium 140 135 - 145 mmol/L    Potassium 3.6 3.4 - 5.3 mmol/L    Carbon Dioxide (CO2) 26 22 - 29 mmol/L    Anion Gap 10 7 - 15 mmol/L    Urea Nitrogen 6.2 6.0 - 20.0 mg/dL    Creatinine 0.88 0.67 - 1.17 mg/dL    GFR Estimate >90 >60 mL/min/1.73m2    Calcium 9.4 8.6 - 10.0 mg/dL    Chloride 104 98 - 107 mmol/L    Glucose 104 (H) 70 - 99 mg/dL    Alkaline Phosphatase 49 40 - 150 U/L    AST 11 0 - 45 U/L    ALT 6 0 - 70 U/L    Protein Total 7.0 6.4 - 8.3 g/dL    Albumin 4.4 3.5 - 5.2 g/dL    Bilirubin Total 1.3 (H) <=1.2 mg/dL   Magnesium     Status: Normal   Result Value Ref Range    Magnesium 1.9 1.7 - 2.3 mg/dL   CBC with platelets and differential     Status: Abnormal   Result Value Ref Range    WBC Count 5.4 4.0 - 11.0 10e3/uL    RBC Count 5.49 4.40 - 5.90 10e6/uL    Hemoglobin 16.9 13.3 - 17.7 g/dL    Hematocrit 46.5 40.0 - 53.0 %    MCV 85 78 - 100 fL    MCH 30.8 26.5 - 33.0 pg    MCHC 36.3 31.5 - 36.5 g/dL    RDW 11.9 10.0 - 15.0 %    Platelet Count 232 150 - 450 10e3/uL    % Neutrophils 78 %    % Lymphocytes 13 %    % Monocytes 7 %    % Eosinophils 1 %    % Basophils 1 %    % Immature Granulocytes 0 %    NRBCs per 100 WBC 0 <1 /100    Absolute Neutrophils 4.2 1.6 - 8.3 10e3/uL    Absolute Lymphocytes 0.7 (L) 0.8 - 5.3 10e3/uL    Absolute Monocytes 0.4 0.0 - 1.3 10e3/uL    Absolute Eosinophils 0.1 0.0 - 0.7 10e3/uL    Absolute Basophils 0.0 0.0 - 0.2 10e3/uL    Absolute Immature Granulocytes 0.0 <=0.4 10e3/uL    Absolute NRBCs 0.0 10e3/uL    Urine Drug Screen     Status: Abnormal    Narrative    The following orders were created for panel order Urine Drug Screen.  Procedure                               Abnormality         Status                     ---------                               -----------         ------                     Urine Drug Screen Panel[[id number]]      Abnormal            Final result                 Please view results for these tests on the individual orders.   CBC with platelets differential     Status: Abnormal    Narrative    The following orders were created for panel order CBC with platelets differential.  Procedure                               Abnormality         Status                     ---------                               -----------         ------                     CBC with platelets and d...[[id number]]  Abnormal            Final result                 Please view results for these tests on the individual orders.     Medications   OLANZapine zydis (zyPREXA) ODT tab 15 mg (has no administration in time range)   OLANZapine zydis (zyPREXA) ODT tab 10 mg (10 mg Oral $Given 2/17/24 1510)     Labs Ordered and Resulted from Time of ED Arrival to Time of ED Departure   URINE DRUG SCREEN PANEL - Abnormal       Result Value    Amphetamines Urine Screen Negative      Barbituates Urine Screen Negative      Benzodiazepine Urine Screen Negative      Cannabinoids Urine Screen Positive (*)     Cocaine Urine Screen Negative      Fentanyl Qual Urine Screen Negative      Opiates Urine Screen Negative      PCP Urine Screen Negative     COMPREHENSIVE METABOLIC PANEL - Abnormal    Sodium 140      Potassium 3.6      Carbon Dioxide (CO2) 26      Anion Gap 10      Urea Nitrogen 6.2      Creatinine 0.88      GFR Estimate >90      Calcium 9.4      Chloride 104      Glucose 104 (*)     Alkaline Phosphatase 49      AST 11      ALT 6      Protein Total 7.0      Albumin 4.4      Bilirubin Total 1.3 (*)    CBC WITH PLATELETS AND DIFFERENTIAL  - Abnormal    WBC Count 5.4      RBC Count 5.49      Hemoglobin 16.9      Hematocrit 46.5      MCV 85      MCH 30.8      MCHC 36.3      RDW 11.9      Platelet Count 232      % Neutrophils 78      % Lymphocytes 13      % Monocytes 7      % Eosinophils 1      % Basophils 1      % Immature Granulocytes 0      NRBCs per 100 WBC 0      Absolute Neutrophils 4.2      Absolute Lymphocytes 0.7 (*)     Absolute Monocytes 0.4      Absolute Eosinophils 0.1      Absolute Basophils 0.0      Absolute Immature Granulocytes 0.0      Absolute NRBCs 0.0     MAGNESIUM - Normal    Magnesium 1.9       No orders to display          Critical care was not performed.     Medical Decision Making  The patient's presentation was of high complexity (a chronic illness severe exacerbation, progression, or side effect of treatment).    The patient's evaluation involved:  review of 2 test result(s) ordered prior to this encounter (see separate area of note for details)  ordering and/or review of 3+ test(s) in this encounter (see separate area of note for details)  discussion of management or test interpretation with another health professional (dec )    The patient's management necessitated high risk (a decision regarding hospitalization).    Assessment & Plan    Higinio Wallace is a 33 year old male with a past medical history significant for schizophrenia who presents disorganized, minimal speech, withdrawn.  Dec assessment done and recommends mental health admission. 72 hour hold done given unable to consent for admission given his current state.  Hx given decreased po intake so labs checked and no acute concerns other then tbili of 1.3.  other liver tests normal.  He was placed on zyprexa by psychiatry consult service. I requested a tbili recheck in am which was normal.      I have reviewed the nursing notes. I have reviewed the findings, diagnosis, plan and need for follow up with the patient.    New Prescriptions    No medications on  file       Final diagnoses:   Schizophrenia, unspecified type (H)   Marijuana use   I, Caprice Willis, am serving as a trained medical scribe to document services personally performed by Pallavi Wright MD, based on the provider's statements to me.      IPallavi MD, was physically present and have reviewed and verified the accuracy of this note documented by Caprice Willis.       Pallavi Wright MD  [facility] EMERGENCY DEPARTMENT  2/17/2024     Pallavi Wright MD  02/18/24 2021

## 2024-02-17 NOTE — PLAN OF CARE
Higinio Wallace  February 17, 2024  Plan of Care Hand-off Note     Patient Care Path: inpatient mental health    Plan for Care:   Pt presents to ED for altered mental status. Pt appears to be experiencing decompensating mental health. Responding to internal stimuli, disorganized, not caring for self. See initial assessment sections for further details. Recommending [facility] for safety and stabilization with medication. Pt unable to consent for treatment given current mental status, provider has placed a hold.    Identified Goals and Safety Issues:      Overview:       Silvina Crook,     [phone]           Legal Status: Legal Status at Admission: 72 Hour Hold  72 Hour Hold - Date/Time Initiated: 2/17/24 @ 4:00 PM  72 Hour Hold - Date/Time Ends: 2/23/24 @ 4:00 PM    Psychiatry Consult: ordered       Updated rn, attending  regarding plan of care.           [place]

## 2024-02-17 NOTE — MEDICATION SCRIBE - ADMISSION MEDICATION HISTORY
Medication Scribe Admission Medication History    Admission medication history is complete. The information provided in this note is only as accurate as the sources available at the time of the update.    Information Source(s): Hospital records, Facility (U/NH/) medication list/MAR, and CareEverywhere/SureScripts via N/A    Pertinent Information: Unable to interview patient medication history completed using patients most recent discharge instructions and medication list.    Changes made to PTA medication list:  Added: zyprexa, benadryl, melatonin, nicotine, rozerem, imodium  Deleted: None  Changed: None    Allergies reviewed with patient and updates made in EHR: yes    Medication History Completed By: Thais Mcclain 2/17/2024 4:18 PM    PTA Med List   Medication Sig Last Dose    acetaminophen (TYLENOL) 325 MG tablet Take 325-650 mg by mouth every 4 hours as needed     alum & mag hydroxide-simethicone (MAALOX) 200-200-20 MG/5ML SUSP suspension Take 15 mLs by mouth every 4 hours as needed for indigestion     calcium carbonate 500 mg, elemental, (OSCAL 500) 1250 (500 Ca) MG TABS tablet Take 1 tablet by mouth daily     diphenhydrAMINE (BENADRYL) 25 MG tablet Take 50 mg by mouth daily as needed for itching or allergies     docusate sodium (COLACE) 100 MG capsule Take 100 mg by mouth 2 times daily     guaiFENesin (ROBITUSSIN) 20 mg/mL liquid Take 60 mLs by mouth every 4 hours as needed for cough     loperamide (IMODIUM) 2 MG capsule Take 2 mg by mouth every 8 hours as needed for diarrhea     magnesium hydroxide (MILK OF MAGNESIA) 400 MG/5ML suspension Take 60 mLs by mouth daily as needed for constipation or heartburn     melatonin 3 MG tablet Take 3 mg by mouth nightly as needed for sleep     multivitamin, therapeutic (THERA-VIT) TABS tablet Take 1 tablet by mouth daily     nicotine (COMMIT) 2 MG lozenge Place 2 mg inside cheek every hour as needed for nicotine withdrawal symptoms     nicotine (NICODERM CQ) 14  MG/24HR 24 hr patch Place 1 patch onto the skin every 24 hours     nicotine (NICODERM CQ) 21 MG/24HR 24 hr patch Place 1 patch onto the skin every 24 hours     nicotine (NICORETTE) 4 MG lozenge Place 4 mg inside cheek every hour as needed for nicotine withdrawal symptoms     OLANZapine (ZYPREXA) 5 MG tablet Take 5 mg by mouth daily as needed (hallucinations/anxiety)

## 2024-02-17 NOTE — ED NOTES
Bed: ED16A  Expected date: 2/17/24  Expected time:   Means of arrival:   Comments:  Dg 412   34 y/o male on hold  delusions

## 2024-02-18 ENCOUNTER — TELEPHONE (OUTPATIENT)
Dept: BEHAVIORAL HEALTH | Facility: CLINIC | Age: 34
End: 2024-02-18
Payer: COMMERCIAL

## 2024-02-18 LAB
ALBUMIN SERPL BCG-MCNC: 4.1 G/DL (ref 3.5–5.2)
ALP SERPL-CCNC: 44 U/L (ref 40–150)
ALT SERPL W P-5'-P-CCNC: 7 U/L (ref 0–70)
ANION GAP SERPL CALCULATED.3IONS-SCNC: 7 MMOL/L (ref 7–15)
AST SERPL W P-5'-P-CCNC: 13 U/L (ref 0–45)
ATRIAL RATE - MUSE: 65 BPM
BILIRUB SERPL-MCNC: 1.2 MG/DL
BUN SERPL-MCNC: 8.7 MG/DL (ref 6–20)
CALCIUM SERPL-MCNC: 9.2 MG/DL (ref 8.6–10)
CHLORIDE SERPL-SCNC: 108 MMOL/L (ref 98–107)
CREAT SERPL-MCNC: 0.94 MG/DL (ref 0.67–1.17)
DEPRECATED HCO3 PLAS-SCNC: 28 MMOL/L (ref 22–29)
DIASTOLIC BLOOD PRESSURE - MUSE: NORMAL MMHG
EGFRCR SERPLBLD CKD-EPI 2021: >90 ML/MIN/1.73M2
GLUCOSE SERPL-MCNC: 135 MG/DL (ref 70–99)
HOLD SPECIMEN: NORMAL
INTERPRETATION ECG - MUSE: NORMAL
P AXIS - MUSE: 47 DEGREES
POTASSIUM SERPL-SCNC: 3.8 MMOL/L (ref 3.4–5.3)
PR INTERVAL - MUSE: 146 MS
PROT SERPL-MCNC: 6.5 G/DL (ref 6.4–8.3)
QRS DURATION - MUSE: 100 MS
QT - MUSE: 398 MS
QTC - MUSE: 413 MS
R AXIS - MUSE: 79 DEGREES
SODIUM SERPL-SCNC: 143 MMOL/L (ref 135–145)
SYSTOLIC BLOOD PRESSURE - MUSE: NORMAL MMHG
T AXIS - MUSE: 44 DEGREES
VENTRICULAR RATE- MUSE: 65 BPM

## 2024-02-18 PROCEDURE — 99233 SBSQ HOSP IP/OBS HIGH 50: CPT | Performed by: CLINICAL NURSE SPECIALIST

## 2024-02-18 PROCEDURE — 36415 COLL VENOUS BLD VENIPUNCTURE: CPT | Performed by: EMERGENCY MEDICINE

## 2024-02-18 PROCEDURE — 82040 ASSAY OF SERUM ALBUMIN: CPT | Performed by: EMERGENCY MEDICINE

## 2024-02-18 PROCEDURE — 250N000013 HC RX MED GY IP 250 OP 250 PS 637: Performed by: EMERGENCY MEDICINE

## 2024-02-18 PROCEDURE — 93005 ELECTROCARDIOGRAM TRACING: CPT | Performed by: EMERGENCY MEDICINE

## 2024-02-18 PROCEDURE — 99418 PROLNG IP/OBS E/M EA 15 MIN: CPT | Performed by: CLINICAL NURSE SPECIALIST

## 2024-02-18 RX ADMIN — OLANZAPINE 15 MG: 15 TABLET, ORALLY DISINTEGRATING ORAL at 22:38

## 2024-02-18 ASSESSMENT — ACTIVITIES OF DAILY LIVING (ADL)
ADLS_ACUITY_SCORE: 38

## 2024-02-18 NOTE — TELEPHONE ENCOUNTER
R: [place]  Access Inpatient Bed Call Log  2/18/24 @ 1:00am   Intake has called facilities that have not updated their bed status within the last 12 hours.??     *METRO:  [place] -- [facility]: @ cap per website.  [place] -- [facility]:  @ cap per website.  [place] -- [facility]: @ cap per website.  [place] -- [facility]: @ cap per website. Low acuity only.  [place] -- [facility]: @ cap per website.  [facility]: @ cap per website.   [facility]/ beds - POSTING 3 BEDS. Ages 18-25, Voluntary only, NO aggression/physical/sexual assault, violence hx or drug abuse, or psychosis. Negative Covid.   [place] -- [facility]: @ cap per website.  [place] -- [facility]: @ cap per website.  [place] -- [facility]: @ cap per website. Not reviewing until 10AM.    *STATEWIDE (by distance):  [facility] - @ cap per website. Mixed unit/Low acuity only.   [facility] - @ cap per website. Low acuity, No aggression  [facility] -- @ cap per website.   [facility] - POSTING 1 BED. Low acuity only. No current aggression.  [facility] - POSTING 5 BEDS. Negative Covid. Lower acuity only.  [facility] - @ cap per website. Low acuity only. Prefer med-adjustment placements.  [facility] - @ cap per website.  No aggression.  [place] - [facility]: @ cap per website. No aggressive behaviors.   [place] -- [facility]: POSTING 7 BEDS.  No hx of aggression. No sexual offenders. Voluntary patients only.  [place] -- [facility]: POSTING 9 BEDS. Low acuity only. Must be able to do programming. No aggression/violent behavior in 2 years. No CD treatment.  [place] [facility]. Negative Covid test. Must be low acuity ONLY.  [place] -- [facility]: @ cap per website. Low acuity. Negative Covid.   [place] -- [facility]: POSTING 3 BEDS. Covid Neg. Voluntary only. Mixed unit; no  aggression/violent behavior. No registered sex offenders.   [place] -- [facility]: POSTING 6 BEDS. Negative Covid. LOW ACUITY ONLY AT THIS TIME  [facility]: @ [facility] per website. No hx of aggression/assault. No lines, drains or tubes. Does not provide detox or CD treatment.   [place] -- [facility]: POSTING 5 BEDS. Negative COVID. No medical devices.   **[place] -- [facility]: POSTING 16 BEDS. Out-of-State Facility.     Pt remains on waitlist pending appropriate placement availability

## 2024-02-18 NOTE — TELEPHONE ENCOUNTER
R: [facility] Access Inpatient Bed Call Log 2/18/2024 7:05:24 AM    Intake has called facilities that have not updated their bed status within the last 12 hours.    ADULTS:  *[facility] is posting 0 beds.              [facility] is posting 0 beds. [phone]    [facility] is posting 0 beds. [phone]              [facility] is posting 0 beds. [phone] 7:12a Recommends CB d/t nursing being in report.   [facility] is posting 0 beds. [phone]            [facility] is posting 0 beds. [phone]   [facility] (These are Young Adult beds, 18-26) is posting 3 beds. Negative COVID test required, no recent or significant aggression, violence, or sexual assault. [phone] 7:10a Per Laura, no child. Some dbl occupancy Adol. 3 YA.   [facility] is posting 0 beds. [phone]            [facility] is posting 0 beds. [phone]     [facility] is posting 0 beds. [phone]        Pt remains on work list pending appropriate bed availability.      *[facility] is posting 0 beds. Mixed unit 12+. Low acuity only. [phone]      [facility] is positing 0 beds. No aggression.  [phone]         [facility] is posting 0 beds. [phone] 7:15a Per Randi, no beds available.     [facility] is posting 5 beds. Low acuity only. [phone]      [facility] is posting 0 beds. Low acuity. No current aggression. [phone]   [facility] is posting 0 beds. Low acuity. [phone]   [facility] is posting 0 beds. 72 HH preferred. Low acuity. [phone] 7:18a Per Iza, @ capacity. Can CB tomorrow.   [facility] is posting 0 beds. Low acuity. [phone]     [facility] is posting 7 beds. Vol only, No Hx of aggression, violence, or assault. No sexual offenders. No 72 hr holds. [phone]     [facility] is posting 9 beds. (Must have the  cognitive ability to do programming. No aggressive or violent behavior or recent HX in the last 2 yrs. MH must be primary.) [phone]   [facility] is posting 3 beds. Low acuity only. Violence and aggression capped. [phone]     [facility] is posting 0 beds. Low acuity, Neg Covid. [phone] 7:19a Per Becky, 2 open beds available.   [facility] is posting 3 beds. Covid neg. Vol only. Adult unit. No aggressive or violent behavior. No registered sex offenders. [phone] 7:21a Per Hiren, beds available.   [facility] posting 6 beds. Negative covid.      [facility] is posting 0 beds. [phone] no wounds, lines, drains, C-paps, tubes and must be able to care for themselves; no heavy hx of aggression. Pt also needs a confirmed ride from facility upon d/c. 7:23a Per Eda @ [facility].   [facility] is posting 16 beds. Call for details. [phone] OUT OF STATE 7:13a Per Lela beds available for adol and adults, no child.   [facility] is posting 5 beds. Mixed unit.  [phone] 7:24a Per tari Collazo. High and low acuity available.     Pt remains on work list pending appropriate bed availability.

## 2024-02-18 NOTE — TELEPHONE ENCOUNTER
R: [place]  Access Inpatient Bed Call Log 2/17/2024 3:45 PM    Intake has called facilities that have not updated the bed status within the last 12 hours.                              [facility] is posting 0 beds.  [facility] is posting 0 beds. [phone]   [facility] is posting 0 beds. Negative covid required              [facility] is posting 0 beds. Neg covid. No high school/Michelle-psych. [phone];  [facility] is posting 0 beds. [phone]  [facility] is posting 0 beds. [phone]   [facility] is posting 3 beds for Young Adults aged 18-26. Negative covid. [phone];  [facility] is posting 0 beds           [facility] ([facility]) is posting 0 beds. [phone]      [facility] is posting 0 beds. LOW acuity ONLY. Mixed unit 12+. Negative covid- [phone]  [facility] has 1 bed posted. No aggression. Negative Covid. Low acuity    [facility] is posting 0 beds. Negative covid. [phone];  [facility] ([place] is posting 6 beds. Low acuity only. Negative covid.  [phone]   [facility] is posting 3 beds. Low acuity. No current aggression.    [facility] ([place]) is posting 3 bed; no acute beds available. Negative covid.  [phone].      [facility] is posting 1 bed. Low acuity. 72 HH hold preferred. Negative covid required. [phone];  [facility] ([place]) is posting 1 bed. Low acuity only. Negative covid.  [phone];  [facility] in [place] is posting 7 beds.  Negative covid required.   Vol only, No history of aggression, violence, or assault. No sexual offenders. No 72 HH holds. [phone];  [facility] is posting 9 beds. Negative covid required.  (Must have the cognitive ability to do programming. No aggressive or violent behavior or recent HX in the last 2 yrs. MH must be primary.) Always low acuity. [phone];  [facility]  [place] has 3 beds posted. Negative covid required.  Low acuity only. Violence and aggression capped.  [phone]   [facility] is posting 0 beds. Low acuity, Negative covid required. [phone];  [facility] is posting 3 beds. Unit is a combined unit (14+). No aggressive patients. Voluntary only. Must be accompanied by a guardian.  Negative covid. [phone];  [facility] is posting 6 beds.  Negative covid required.  [phone];  [facility] [place] is posting 0 beds. Negative covid. LOW acuity. (No lines, drains, or tubes, oxygen, CPAP, IV, etc.) Must Have a Ride Home. [phone];  [facility] is posting 5 beds. Negative covid. (No. lines, drains, or tubes, oxygen, CPAP, IV, etc.). [phone];  [facility] is posting 16 beds. No covid test required. [phone];     Pt remains on the work list pending appropriate bed availability.

## 2024-02-18 NOTE — ED NOTES
Bed: [id number]  Expected date: 2/18/24  Expected time:   Means of arrival:   Comments:  Higinio Wallace

## 2024-02-18 NOTE — ED NOTES
Pt provided a meal. Writer attempted to ask patient what he wanted to eat and patient was very slow to respond and was minimally able to verbalize needs. Pt states he doesn't want to eat but he knows he needs to. Writer offered patient multiple options to encourage intake due to patient's inability to advocate for self. Pt up to restroom independently and returned to room. Pt ate 100% of food given.
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
## 2024-02-18 NOTE — CONSULTS
"Adult Psychiatry Consultation     Higinio Wallace MRN# [id number]   Age: 33 year old YOB: 1990   Date of Admission to ED: 2/17/2024    In person visit Details:     Patient was assessed and interviewed face-to-face in person. Assessment methods included conducting a formal interview with patient, review of medical records, collaboration with medical staff, and obtaining relevant collateral information from family and community providers when available.      CARISSA Burns CNP            Contacts:   Attending Physician: Magui Cisse MD   Current Outpatient Psychiatrist:    Primary Care Provider: Raza Franklin           Subjective:     Higinio was in his room in the ED sitting up in bed getting his blood drawn for labs. He wanted to know what the labs are for, and I explained that we are looking for any issues that might need to be addressed, such as anemia or unseen infection. Also that we are checking to make sure that his liver and kidneys are working well.    He agreed to check in after the blood draw, but was visibly anxious about not having remembered the short conversation we had yesterday. He reported feeling confused and not sure what is happening. In response to the question about how he is feeling today, he states \"I don't know. I'm confused. I don't know. I don't know whether something bad --I'm not feeling that weird feeling anymore.\" He explained that he felt a warmth in his chest yesterday, and he is not sure if it is energy that he is sensing from others. He reports that \"weird things happen to me - I see things.\" He continued and said that \"I thought I heard a bunch of people but I didn't think I did.\"     He is more verbal and engaged today, and said that sometimes he needs to isolate himself because sometimes, \"there's just too much emotions around.\" He explains that he can sense emotions in spaces/places, and he did not like the energy in his room at [facility]. " "However, he is hoping that he can go back there.    He reports concerns regarding his being aware of emotions - his and that of others - and wonders if \"by me being more aware, if I've hurt people.\" He voiced intrusive thoughts about how certain things he does or thinks might actually be hurting other people. This is why he has not been eating or drinking much - he has these repetitive thoughts that doing so will cause pain to others, and he does not want people to suffer. He also has thoughts about energy, and that he tends to take in people's negative energy, but if he does not release that energy, then he is afraid that he could project that on to others.    He states that \"I've been going through so much hardships so I don't know if I'm making the right choices.\" He cites being homeless and not being able to function well as some of the hardships.    He reports that he has been doing a lot of thinking, and one of the things that he keeps thinking about is that his brain sometimes thinks that everything in the environment is him - the wall is him, the food is him, et cetera, so why would he want to eat the food when it is him? He said that in his current state of mind, \"things that are metaphorical are literal.\"    Of note, he reports ongoing issues with constipation that sometimes lasts 1-2 weeks.            Objective:     Patient has mostly slept since his arrival yesterday. He did not get the higher dose of Zyprexa last night as he was already asleep during med pass. He did receive 10 mg earlier in the day though. He did not recall much of what happened yesterday, and became very anxious when I said that we had spoken for a bit. His mentation is better today than yesterday, although he continues to be troubled by visual and auditory hallucinations. He also has intrusive thoughts centered around hurting other people or other sentient beings. He has not been eating or drinking much because he keeps thinking that " if he does, then other people have to suffer for that. He is preoccupied with spirituality and energy.    He denies any suicidal or homicidal ideation.    He had an EKG today due to his report that he had a warmth/weird sensation in his chest yesterday, and he has not had one since 2021. The EKG showed normal sinus rhythm.     He has previously tried Vyvanse, Adderall, Ritalin, Paxil, Risperdal (frontal central stabbing headache), and Seroquel.    Laboratory/Imaging:    Recent Results (from the past 336 hour(s))   Urine Drug Screen Panel    Collection Time: 02/17/24  1:58 PM   Result Value Ref Range    Amphetamines Urine Screen Negative Screen Negative    Barbituates Urine Screen Negative Screen Negative    Benzodiazepine Urine Screen Negative Screen Negative    Cannabinoids Urine Screen Positive (A) Screen Negative    Cocaine Urine Screen Negative Screen Negative    Fentanyl Qual Urine Screen Negative Screen Negative    Opiates Urine Screen Negative Screen Negative    PCP Urine Screen Negative Screen Negative   Comprehensive metabolic panel    Collection Time: 02/17/24  3:16 PM   Result Value Ref Range    Sodium 140 135 - 145 mmol/L    Potassium 3.6 3.4 - 5.3 mmol/L    Carbon Dioxide (CO2) 26 22 - 29 mmol/L    Anion Gap 10 7 - 15 mmol/L    Urea Nitrogen 6.2 6.0 - 20.0 mg/dL    Creatinine 0.88 0.67 - 1.17 mg/dL    GFR Estimate >90 >60 mL/min/1.73m2    Calcium 9.4 8.6 - 10.0 mg/dL    Chloride 104 98 - 107 mmol/L    Glucose 104 (H) 70 - 99 mg/dL    Alkaline Phosphatase 49 40 - 150 U/L    AST 11 0 - 45 U/L    ALT 6 0 - 70 U/L    Protein Total 7.0 6.4 - 8.3 g/dL    Albumin 4.4 3.5 - 5.2 g/dL    Bilirubin Total 1.3 (H) <=1.2 mg/dL   Magnesium    Collection Time: 02/17/24  3:16 PM   Result Value Ref Range    Magnesium 1.9 1.7 - 2.3 mg/dL   CBC with platelets and differential    Collection Time: 02/17/24  3:16 PM   Result Value Ref Range    WBC Count 5.4 4.0 - 11.0 10e3/uL    RBC Count 5.49 4.40 - 5.90 10e6/uL     "Hemoglobin 16.9 13.3 - 17.7 g/dL    Hematocrit 46.5 40.0 - 53.0 %    MCV 85 78 - 100 fL    MCH 30.8 26.5 - 33.0 pg    MCHC 36.3 31.5 - 36.5 g/dL    RDW 11.9 10.0 - 15.0 %    Platelet Count 232 150 - 450 10e3/uL    % Neutrophils 78 %    % Lymphocytes 13 %    % Monocytes 7 %    % Eosinophils 1 %    % Basophils 1 %    % Immature Granulocytes 0 %    NRBCs per 100 WBC 0 <1 /100    Absolute Neutrophils 4.2 1.6 - 8.3 10e3/uL    Absolute Lymphocytes 0.7 (L) 0.8 - 5.3 10e3/uL    Absolute Monocytes 0.4 0.0 - 1.3 10e3/uL    Absolute Eosinophils 0.1 0.0 - 0.7 10e3/uL    Absolute Basophils 0.0 0.0 - 0.2 10e3/uL    Absolute Immature Granulocytes 0.0 <=0.4 10e3/uL    Absolute NRBCs 0.0 10e3/uL              Collateral information from chart review and phone calls:     Reviewed DEC assessment, Initial ED consult, and ED notes.      Mental Status Exam:   Appearance:  awake, alert, casually dressed, and disheveled   Attitude:  cooperative  Eye Contact:  fair  Mood:  anxious  Affect:  mood congruent, intensity is blunted, and constricted mobility  Speech:   clear, somewhat coherent, with some rambling  Psychomotor Behavior:  no evidence of tardive dyskinesia, dystonia, or tics and fidgeting  Thought Process:  disorganized (though improved from yesterday)  Associations:  no loose associations  Thought Content:  no evidence of suicidal ideation or homicidal ideation, auditory hallucinations present, visual hallucinations present, and obsessions present  Insight:  partial  Judgment:  limited  Oriented to:  time, person, and place  Attention Span and Concentration:  fair  Recent and Remote Memory:  poor  Language: Able to name objects, Able to repeat phrases, and Able to read and write  Fund of Knowledge: appropriate  Muscle Strength and Tone: normal  Gait and Station:  not observed         Physical Exam:     /72   Pulse 72   Temp 97.7  F (36.5  C) (Oral)   Resp 16   Ht 1.93 m (6' 4\")   Wt 96 kg (211 lb 9.6 oz)   SpO2 97%   " "BMI 25.76 kg/m    Weight is 211 lbs 9.6 oz 6' 4\" Body mass index is 25.76 kg/m .      ROS: 10 point ROS neg other than the symptoms noted above in the subjective.            Impression:     Higinio Wallace is a 33 year old male with history notable for PTSD, chronic suicidal ideation since his teens, schizophrenia, and depression who presents to the ED via EMS today for an evaluation due to delusions, paranoia, hallucinations, and disorganized thinking. He had been staying at [facility] and collateral information received from them indicate that he has not been taking Vyvanse and they are uncertain if he has other psychotropic medications. He has not been eating and has lost a significant amount of weight in the 2 months that he has been at [facility]. They mentioned that he reports persistent thoughts of having hurt others in the past and thinking that others know and alluded to this as the reason for his not eating. They reported that he has been repeatedly saying that he is unsure if things are real or not.     Higinio is able to engage more in the assessment today than yesterday, and his thoughts are not as disorganized as they were. He does have some beliefs/thoughts that he struggles with, and there appears to be an obsessive quality to his thought process. He denies any compulsive behaviors that are meant to allay the intrusive thoughts, although the thoughts do in some way affect his actions. For instance, his thought about taking care of himself by eating or drinking causes people to suffer has led him to not eat or drink for prolonged periods of time.     He does have some awareness of his illness, and verbalizes that he would like to get better so that he can get a job and have a family someday, but he does not know how. He is willing to stay in the hospital for treatment, and hopes that when he is well, he can leave the state and live somewhere like the beach or forest.    Risk for harm " is moderate-high. Of note, he acknowledged that he is not in any shape to adequately take care of himself at this time were he to be discharged. He stated that he is vulnerable right now.     Based on interview with patient, records review, conversations with ED staff, P staff and ED attending, Higinio Wallace meets criteria for schizoaffective disorder, bipolar type and PTSD.      Brief Therapeutic Intervention(s):   Provided rapport building, active listening, unconditional positive regard, and validation.    Legal Status: 72-h Hold signed on 2/17/24 at 1600 (ends on 2/23/24)    Medications: risks/benefits discussed with patient    Current Facility-Administered Medications   Medication    OLANZapine zydis (zyPREXA) ODT tab 15 mg     Current Outpatient Medications   Medication Sig    acetaminophen (TYLENOL) 325 MG tablet Take 325-650 mg by mouth every 4 hours as needed    alum & mag hydroxide-simethicone (MAALOX) 200-200-20 MG/5ML SUSP suspension Take 15 mLs by mouth every 4 hours as needed for indigestion    calcium carbonate 500 mg, elemental, (OSCAL 500) 1250 (500 Ca) MG TABS tablet Take 1 tablet by mouth daily    diphenhydrAMINE (BENADRYL) 25 MG tablet Take 50 mg by mouth daily as needed for itching or allergies    docusate sodium (COLACE) 100 MG capsule Take 100 mg by mouth 2 times daily    guaiFENesin (ROBITUSSIN) 20 mg/mL liquid Take 60 mLs by mouth every 4 hours as needed for cough    loperamide (IMODIUM) 2 MG capsule Take 2 mg by mouth every 8 hours as needed for diarrhea    magnesium hydroxide (MILK OF MAGNESIA) 400 MG/5ML suspension Take 60 mLs by mouth daily as needed for constipation or heartburn    melatonin 3 MG tablet Take 3 mg by mouth nightly as needed for sleep    multivitamin, therapeutic (THERA-VIT) TABS tablet Take 1 tablet by mouth daily    nicotine (COMMIT) 2 MG lozenge Place 2 mg inside cheek every hour as needed for nicotine withdrawal symptoms    nicotine (NICODERM CQ) 14 MG/24HR 24 hr  patch Place 1 patch onto the skin every 24 hours    nicotine (NICODERM CQ) 21 MG/24HR 24 hr patch Place 1 patch onto the skin every 24 hours    nicotine (NICORETTE) 4 MG lozenge Place 4 mg inside cheek every hour as needed for nicotine withdrawal symptoms    OLANZapine (ZYPREXA) 5 MG tablet Take 5 mg by mouth daily as needed (hallucinations/anxiety)    ramelteon (ROZEREM) 8 MG tablet Take 8 mg by mouth at bedtime              Diagnoses:     Principal Diagnosis:   Schizoaffective disorder, bipolar type vs Schizophrenia, paranoid type   R/O OCD    Secondary psychiatric diagnoses of concern this admission:  PTSD  Cannabis use disorder     Current medical diagnosis being treated:   IBS          Recommendations:     Discussed the case and writer's recommendations with Dr. Magui Cisse, ED provider.    Recommend inpatient admission for stabilization.      Recommend continuing olanzapine (Zyprexa) 15 mg at bedtime for psychosis.    Consider fluvoxamine (Luvox) for obsessive thoughts. However, per [employer], Luvox can increase the serum level of Zyprexa. Other SSRIs together with Zyprexa increase risk for QT prolongation.    3.   Continue to consult psychiatry as needed.       Attestation:  Patient time: 43 minutes  Team/BHP/ED/EC staff time:10 minutes  Chart review: 10 minutes  Documentation: 30 minutes  Total time:  93 minutes spent on the date of the encounter.     I have provided critical care services at the bedside in the [facility] adult emergency department, evaluating the patient, reviewing notes and laboratory values and directing care. I have discussed recommendation regarding whether or not hospitalization is needed and recommendations for medications and laboratory testing with the attending emergency department provider. Lashaun Machado, Nay, MSN, APRN, CNP February 18, 2024.    Disclaimer: This note consists of symbols derived from keyboarding, dictation, and/or voice recognition software.  As a result, there may be errors in the script that have gone undetected.  Please consider this when interpreting information found in the chart.    Please call [facility]/DEC at [phone] if you have follow-up questions or wish to place another consult.

## 2024-02-18 NOTE — TELEPHONE ENCOUNTER
[place]: [place]  Access Inpatient Bed Call Log 2/18/2024 3:45 PM     Intake has called facilities that have not updated the bed status within the last 12 hours.                                 [facility] is posting 0 beds.    [facility] is posting 0 beds. [phone]     [facility] is posting 0 beds. Negative covid required               [facility] posted 0 beds. Neg covid. No high school/Michelle-psych. [phone];    [facility] is posting 0 beds. [phone]    [facility] is posting 0 beds. [phone]     [facility] is posting 3 beds for Young Adults aged 18-26. Negative covid. [phone];    [facility] is posting 0 beds            [facility] is posting 0 beds. [phone]         [facility] is posting 0 beds. LOW acuity ONLY. Mixed unit 12+. Negative covid- [phone]    [facility] has 0 beds posted. No aggression. Negative Covid. Low acuity     [facility] is posting 0 beds. Negative covid. [phone];    [facility] is posting 5 beds. Low acuity only. Negative covid.  [phone]     [facility] is posting 0 beds. Low acuity. No current aggression.      [facility] ([place]) is posting 0 beds; no acute beds available. Negative covid.  [phone].        [facility] is posting 0 bed. Low acuity. 72 HH hold preferred. Negative covid required. [phone];    [facility] ([place]) is posting 0 beds. Low acuity only. Negative covid.  [phone];   [facility] [place] is posting 7 beds.  Negative covid required.   Vol only, No history of aggression, violence, or assault. No sexual offenders. No 72 HH holds. [phone];    [facility] is posting 9 beds. Negative covid required.  (Must have the cognitive ability to do programming. No aggressive or violent behavior or recent HX in the last 2 yrs. MH must be primary.) Always low  acuity. [phone];    [facility] has 0 beds posted. Negative covid required.  Low acuity only. Violence and aggression capped.  [phone]     [facility] is posting 0 beds. Low acuity, Negative covid required. [phone];   [facility] is posting 3 beds. Unit is a combined unit (14+). No aggressive patients. Voluntary only. Must be accompanied by a guardian.  Negative covid. [phone];    [facility] posted 6 beds.  Negative covid required.  [phone];   [facility] is posting 0 beds. Negative covid. LOW acuity. (No lines, drains, or tubes, oxygen, CPAP, IV, etc.) Must Have a Ride Home. [phone];    [facility] is posting 5 beds. Negative covid. (No. lines, drains, or tubes, oxygen, CPAP, IV, etc.). [phone];            Pt remains on the work list pending appropriate bed availability.

## 2024-02-19 ENCOUNTER — TELEPHONE (OUTPATIENT)
Dept: BEHAVIORAL HEALTH | Facility: CLINIC | Age: 34
End: 2024-02-19
Payer: COMMERCIAL

## 2024-02-19 PROBLEM — F12.90 MARIJUANA USE: Status: ACTIVE | Noted: 2024-02-19

## 2024-02-19 PROBLEM — F20.9 SCHIZOPHRENIA, UNSPECIFIED TYPE (H): Status: ACTIVE | Noted: 2024-02-19

## 2024-02-19 LAB — SARS-COV-2 RNA RESP QL NAA+PROBE: NEGATIVE

## 2024-02-19 PROCEDURE — 250N000013 HC RX MED GY IP 250 OP 250 PS 637: Performed by: EMERGENCY MEDICINE

## 2024-02-19 PROCEDURE — 124N000002 HC R&B MH UMMC

## 2024-02-19 PROCEDURE — 250N000013 HC RX MED GY IP 250 OP 250 PS 637: Performed by: PSYCHIATRY & NEUROLOGY

## 2024-02-19 PROCEDURE — 87635 SARS-COV-2 COVID-19 AMP PRB: CPT | Performed by: EMERGENCY MEDICINE

## 2024-02-19 RX ORDER — ACETAMINOPHEN 325 MG/1
325-650 TABLET ORAL EVERY 4 HOURS PRN
Status: DISCONTINUED | OUTPATIENT
Start: 2024-02-19 | End: 2024-03-18 | Stop reason: HOSPADM

## 2024-02-19 RX ORDER — HYDROXYZINE HYDROCHLORIDE 25 MG/1
25 TABLET, FILM COATED ORAL EVERY 4 HOURS PRN
Status: DISCONTINUED | OUTPATIENT
Start: 2024-02-19 | End: 2024-03-18 | Stop reason: HOSPADM

## 2024-02-19 RX ORDER — AMOXICILLIN 250 MG
1 CAPSULE ORAL 2 TIMES DAILY PRN
Status: DISCONTINUED | OUTPATIENT
Start: 2024-02-19 | End: 2024-02-21

## 2024-02-19 RX ORDER — CLOMIPRAMINE HYDROCHLORIDE 50 MG/1
50 CAPSULE ORAL AT BEDTIME
Status: DISCONTINUED | OUTPATIENT
Start: 2024-02-19 | End: 2024-02-20

## 2024-02-19 RX ORDER — OLANZAPINE 10 MG/2ML
10 INJECTION, POWDER, FOR SOLUTION INTRAMUSCULAR 3 TIMES DAILY PRN
Status: DISCONTINUED | OUTPATIENT
Start: 2024-02-19 | End: 2024-03-01

## 2024-02-19 RX ORDER — LANOLIN ALCOHOL/MO/W.PET/CERES
3 CREAM (GRAM) TOPICAL
Status: DISCONTINUED | OUTPATIENT
Start: 2024-02-19 | End: 2024-03-12

## 2024-02-19 RX ORDER — OLANZAPINE 10 MG/1
10 TABLET ORAL 3 TIMES DAILY PRN
Status: DISCONTINUED | OUTPATIENT
Start: 2024-02-19 | End: 2024-03-01

## 2024-02-19 RX ORDER — MULTIVITAMIN,THERAPEUTIC
1 TABLET ORAL DAILY
Status: DISCONTINUED | OUTPATIENT
Start: 2024-02-20 | End: 2024-03-18 | Stop reason: HOSPADM

## 2024-02-19 RX ORDER — NICOTINE 21 MG/24HR
1 PATCH, TRANSDERMAL 24 HOURS TRANSDERMAL DAILY
Status: DISCONTINUED | OUTPATIENT
Start: 2024-02-20 | End: 2024-02-21

## 2024-02-19 RX ORDER — POLYETHYLENE GLYCOL 3350 17 G
2 POWDER IN PACKET (EA) ORAL
Status: DISCONTINUED | OUTPATIENT
Start: 2024-02-19 | End: 2024-03-18 | Stop reason: HOSPADM

## 2024-02-19 RX ORDER — MAGNESIUM HYDROXIDE/ALUMINUM HYDROXICE/SIMETHICONE 120; 1200; 1200 MG/30ML; MG/30ML; MG/30ML
15 SUSPENSION ORAL EVERY 4 HOURS PRN
Status: DISCONTINUED | OUTPATIENT
Start: 2024-02-19 | End: 2024-03-18 | Stop reason: HOSPADM

## 2024-02-19 RX ADMIN — OLANZAPINE 15 MG: 15 TABLET, ORALLY DISINTEGRATING ORAL at 20:37

## 2024-02-19 RX ADMIN — CLOMIPRAMINE HYDROCHLORIDE 50 MG: 50 CAPSULE ORAL at 20:35

## 2024-02-19 ASSESSMENT — ACTIVITIES OF DAILY LIVING (ADL)
ADLS_ACUITY_SCORE: 38
ADLS_ACUITY_SCORE: 51
ADLS_ACUITY_SCORE: 38
HYGIENE/GROOMING: INDEPENDENT
ADLS_ACUITY_SCORE: 38
ADLS_ACUITY_SCORE: 38
DRESS: SCRUBS (BEHAVIORAL HEALTH);INDEPENDENT
ORAL_HYGIENE: INDEPENDENT
ADLS_ACUITY_SCORE: 38

## 2024-02-19 NOTE — TELEPHONE ENCOUNTER
Called HERNÁN العلي Alan @ 01:45 to request Covid test for outside hospital reviews    No appropriate beds are currently available within the  system. Bed search update @ 12:15AM:          [facility]: @ cap per website   [facility]: @ cap per website   [facility]: @ cap per website. Per call @ 00:16, they only have low acuity beds  [facility]: @ cap per website   [facility]: @ cap per website   [facility]: Posting 2 beds (Young Adult unit: No recent aggressions, violence or sexual assault. Neg covid required). Pt not age appropriate     [facility]: @ cap per website   [facility]: @ cap per website   [facility]: @ cap per website   [facility]: @ cap per website  [facility]: @ cap per website   [facility]: @ cap per website   [facility]: @ cap per website   [facility]: Does not review after 10PM.     [facility]: Posting beds in [place] and [place]. Per Ellie @ 00:30, [place] and [place] have beds - Requires negative Covid test  [facility]: Posting 6 beds (No hx of aggression. No sexual offenders. Voluntary patients only). Meets exclusionary d/t 72 HH  [facility]: Posting 9 beds (Always low acuity only. Must have the cognitive ability to do programming. No aggressive or violent behavior or recent HX in the last 2 yrs.  must be primary). Requires negative Covid   [facility]: @ cap per website   [facility]: @ cap per website (Low acuity, Neg Covid).   [facility]: Posting 3 beds (Covid neg. Voluntary only. Mixed unit. No aggressive or violent behavior. No registered sex offenders). Meets exclusionary d/t 72 HH  [facility]: @ cap per website (No hx of aggression/assault. No lines, drains or tubes. Does not provide detox or CD treatment).   [facility]: Posting 16 beds. Facility is in [place]. Pt on a 72 HH; cannot be placed in [place]  [facility]: Posting 5 beds (Mixed unit/Low acuity). Per  [place] @ 00:33, some beds available. Requires negative Covid          Pt remains on work list until appropriate placement is available

## 2024-02-19 NOTE — ED NOTES
I received report on this patient and  will take over care at 11:30 pm.12:11 patient resting in bed with eyes closed chest rise and fall observed.00:28 am patient resting in bed with eyes closed and chest rise and fall .00:36 patient is  awake and  states he was having dream and and stated he must be dumb, I reoriented to unit and  he  has soft voice. Denies any SI/HI/ SIB. Offered something to drink or ate he said he was ok.2:53 am  patient was awake to use restroom. And while awake I did get Covid swab needed 6:10 am patient resting in bed calm eyes closed chest rise and fall observed.

## 2024-02-19 NOTE — TELEPHONE ENCOUNTER
[place]  Access Inpatient Bed Call Log 2/19/2024 8:19:52 AM    Intake has called facilities that have not updated their bed status within the last 12 hours. (Statewide)    [facility] is posting 0 beds.              [facility] is posting 0 beds. [phone]    [facility] is posting 0 beds. [phone]              [facility] is posting 0 beds. [phone] 8:34a Per Niurka, recommends CB at 9:30a.    [facility] is posting 0 beds. [phone]            [facility] is posting 0 beds. [phone]   [facility] (These are Young Adult beds, 18-26) is posting 2 beds. Negative COVID test required, no recent or significant aggression, violence, or sexual assault. [phone] 2/19 Pt not appropriate d/t unit age restrictions.  [facility] is posting 0 beds. [phone]            [facility] posting 0 beds. [phone]       [facility] is posting 0 beds. Mixed unit 12+. Low acuity only. [phone]      [facility] is positing 0 beds. No aggression.  [phone]         [facility] is posting 0 beds. [phone]   [facility] is posting 4 beds. Low acuity only. [phone]      [facility] is posting 0 beds. Low acuity. No current aggression. [phone]   [facility] is posting 0 beds. Low acuity. [phone]   [facility] is posting 1 beds. 72 HH preferred. Low acuity. [phone]   [facility] is posting 2 beds. Low acuity. [phone]       is posting 6 beds. Vol only, No Hx of aggression, violence, or assault. No sexual offenders. No 72 hr holds. [phone]   2/19 Pt not appropriate d/t [place] 72 HH.  [facility] is posting 9 beds. (Must have the cognitive ability to do programming. No aggressive or violent behavior or recent HX in the last 2 yrs. MH must be primary.) [phone]  [facility] is posting 3 beds. Low acuity only. Violence and  aggression capped. [phone]     [facility] is posting 0 beds. Low acuity, Neg Covid. [phone] 8:27a Per Eda, at capacity, recommends CB at 9:45AM.  [facility] is posting 5 beds. Covid neg. Vol only. Adult unit. No aggressive or violent behavior. No registered sex offenders. [phone] Pt not appropriate d/t [place] 72 HH.  [facility] [place] posting 6 beds. Negative covid.       [facility] is posting 0 beds. [phone] no wounds, lines, drains, C-paps, tubes and must be able to care for themselves; no heavy hx of aggression. Pt also needs a confirmed ride from facility upon d/c. 2/19 8:27a At Capacity.  [facility] is posting 16 beds. Call for details. [phone] OUT OF STATE 8:22a Per Randall, 15 adults, 4 adol (>13). 2/19 Pt not appropriate d/t [place] 72 HH.  [facility] is posting 5 beds. Mixed unit.  [phone] 8:26a Few beds available, case by case.     10:58 AM Per call to Steffen at [facility] can review for admission to fax clinical  [phone].  11:27 AM Fax sent to [facility] for review.    Pt remains on work list pending appropriate bed availability.

## 2024-02-19 NOTE — TELEPHONE ENCOUNTER
[place]: [place]  Access Inpatient Bed Call Log 2/18/2024 3:45 PM     Intake has called facilities that have not updated the bed status within the last 12 hours.                                           [facility] is posting 0 beds.              [facility] is posting 0 beds. [phone]               [facility] is posting 0 beds. Negative covid required                         [facility] posted 0 beds. Neg covid. No high school/Michelle-psych. [phone];              [facility] is posting 0 beds. [phone]              [facility] is posting 0 beds. [phone]               [facility] is posting 3 beds for Young Adults aged 18-26. Negative covid. [phone];              [facility] is posting 0 beds                      [facility] is posting 0 beds. [phone]                   [facility] is posting 0 beds. LOW acuity ONLY. Mixed unit 12+. Negative covid- [phone]              [facility] has 0 beds posted. No aggression. Negative Covid. Low acuity               [facility] is posting 0 beds. Negative covid. [phone];              [facility] is posting 5 beds. Low acuity only. Negative covid.  [phone]               [facility] is posting 0 beds. Low acuity. No current aggression.                [facility] ([place] is posting 0 beds; no acute beds available. Negative covid.  [phone].                  [facility] is posting 0 bed. Low acuity. 72 HH hold preferred. Negative covid required. [phone];              [facility] ([place] is posting 0 beds. Low acuity only. Negative covid.  [phone];             [facility] [place] is posting 7 beds.  Negative covid required.   Vol only, No history of aggression, violence, or assault. No sexual offenders. No 72 HH holds. [phone];              [facility] is  posting 9 beds. Negative covid required.  (Must have the cognitive ability to do programming. No aggressive or violent behavior or recent HX in the last 2 yrs.  must be primary.) Always low acuity. [phone];              [facility] has 0 beds posted. Negative covid required.  Low acuity only. Violence and aggression capped.  [phone]               [facility] is posting 0 beds. Low acuity, Negative covid required. [phone];             [facility] is posting 3 beds. Unit is a combined unit (14+). No aggressive patients. Voluntary only. Must be accompanied by a guardian.  Negative covid. [phone];              [facility].  Negative covid required.  [phone];             [facility] is posting 0 beds. Negative covid. LOW acuity. (No lines, drains, or tubes, oxygen, CPAP, IV, etc.) Must Have a Ride Home. [phone];              [facility] is posting 5 beds. Negative covid. (No. lines, drains, or tubes, oxygen, CPAP, IV, etc.). [phone];              [facility] is posting 16 beds. No covid test required. [phone];       Pt remains on the work list pending appropriate bed availability

## 2024-02-19 NOTE — PROGRESS NOTES
"Triage & Transition Services, Extended Care     Therapy Progress Note    Patient: Higinio goes by \"Higinio\"   Date of Service: February 19, 2024  Site of Service: [facility] EMERGENCY DEPARTMENT                             [id number]  Patient was seen - yes  Mode of Assessment: In person    Presentation Summary: Patient was at table in milieu coloring by himself. When asked if he'd be willing to talk, he was open to that. Patient was calm, kind, open and willing to talk. During the time we met, a therapy dog was visiting in the [facility]. Pt stated that he misses his do, that he hasn't seen in 2 years. Pt stated that his ex-girlfriend has the dog in [place].    Therapeutic Intervention(s) Provided: Engaged in cognitive restructuring/ reframing, looked at common cognitive distortions and challenged negative thoughts., Taught the link between thoughts, feelings, and behaviors., Identified and practiced coping skills., Explored motivation for behavioral change.    Current Symptoms: racing thoughts, excessive worry hoplessness, sense of doom racing thoughts, excessive worry forgetful, flight of ideas      Mental Status Exam   Affect: Appropriate  Appearance: Appropriate  Attention Span/Concentration: Attentive  Eye Contact: Engaged    Fund of Knowledge: Delayed   Language /Speech Content: Fluent  Language /Speech Volume: Normal  Language /Speech Rate/Productions: Articulate  Recent Memory: Intact  Remote Memory: Intact  Mood: Normal  Orientation to Person: Yes   Orientation to Place: Yes  Orientation to Time of Day: Yes  Orientation to Date: Yes     Situation (Do they understand why they are here?): Yes  Psychomotor Behavior: Normal  Thought Content: Clear  Thought Form: Flight of Ideas    Treatment Objective(s) Addressed: rapport building, processing feelings, identifying treatment goals, identifying additional supports    Patient Response to Interventions: eager to participate    Progress Towards Goals: Patient " Reports Symptoms Are: improving  Patient Progress Toward Goals: is making progress  Comment: Pt was able to talk about past loss, his lupe, things he believes make life and things difficult, and plans for after d/c and goals for future.  Next Step to Work Toward Discharge: symptom stabilization  Symptom Stabilization Comment: Pt appears to be in a better place emotionally and able to communicate his thoughts effectively. Pt reports struggling to remember things from recent memory, like therapy he had just yesterday.    Case Management: Case Management Included: skills work    Details on skills work: goal setting, identifying emotions, goal planning, positive coping skills, self-care    Summary of Interaction: Patient was talkative and able to articulate his thoughts. Pt talked about feeling hopeless that he has lost everything. When asked about losses, pt stated he's lost his support (friends/family), home, car, girlfriend, dog and cat. Pt was unable to conclude why he lost all of those things. Patient talked about how he is very aware of things and people around him, and wants to help. Pt believes that he needs to stop helping others so much, and allow others to help him. Pt also talked about his lupe as a Latter-day, sharing that the Bible was written to help people, and give people hope in suffering. He also shared that lupe is knowing the Creator provides things and people all around us. Pt used the example of therapists. Pt states that it has been difficult to trust people that have provided therapy throughout his life. When asked if pt has had a consistent therapist over the years, pt denies consistency and also stated that he's missed appts as well. Pt states his current therapist is Jennifer Apley with [facility] and he really likes her - but needs to be more consistent. Pt also talked at length about his plans for the future and goals he has for himself. Pt stated that he would return to [facility]  "at d/c. Pt reports that he wants to start his own business - and things about a non-profit (like [employer]) \"to help people like me.\" Pt also shared interest in a networking company. Therapist encouraged that he can begin seeking this goal by doing some research, talking to ppl at [employer], etc. Pt also spoke with therapist about resources pt had available to him to help and support him. Pt staed his has been approved for SSDI and receives checks, pt has [place] for insurance coverage, pt has a Cadi  to help with housing and other things (Norah). Pt states that he used to have a [facility] , and would like to have that support again. Resources through [facility] - but have bounced linh Pino and Mirza Sparks.    Plan: inpatient mental health  Yes, provider - Dr. Amado MD     Clinical Substantiation: Pt presents to the ED 2 days ago for altered mental status. Pt appears to be doing better, but still in need of IP MH. Recommending IP MH for stabilization of meds and safety planning.    Legal Status: Legal Status at Admission: 72 Hour Hold  72 Hour Hold - Date/Time Initiated: 2/17/24 @ 4:00 pm  72 Hour Hold - Date/Time Ends: 2/23/24 @ 4:00 pm    Session Status: Time session started: 1035  Session Duration (minutes): 35 minutes  Anticipated number of sessions or this episode of care: 2    Time Spent: 35 minutes    CPT Code: CPT Codes: 00545 - Psychotherapy (with patient) - 30 (16-37*) min    Diagnosis:   Patient Active Problem List   Diagnosis Code    Irritable bowel syndrome K58.9    Class 2 severe obesity due to excess calories with serious comorbidity and body mass index (BMI) of 37.0 to 37.9 in adult (H) E66.01, Z68.37    Patellofemoral instability of left knee with pain M25.362, M25.562    Tobacco abuse Z72.0    Suicidal ideation R45.851    Moderate episode of recurrent major depressive disorder (H) F33.1    CARSON (obstructive sleep apnea) G47.33    Unspecified psychosis not due to a " substance or known physiological condition (H) F29       Primary Problem This Admission: Active Hospital Problems    *Unspecified psychosis not due to a substance or known physiological condition (H)      Chiara Wheeler   Licensed Mental Health Professional (LMHP), [facility]  [phone]

## 2024-02-19 NOTE — ED PROVIDER NOTES
[facility] ED Mental Health Handoff Note:       Brief HPI:  This is a 33 year old male signed out to me.  See initial ED Provider note for full details of the presentation. Interval history is pertinent for ongoing ED boarding. No acute concerns today.    Home meds reviewed and ordered/administered: Yes    Medically stable for inpatient mental health admission: Yes.    Evaluated by mental health: Yes. The recommendation is for inpatient mental health treatment. Bed search in process    Safety concerns: At the time I received sign out, there were no safety concerns.    Hold Status:  Active Orders   Legal    Emergency Hospitalization Hold (72 Hr Hold)     Frequency: Effective Now     Start Date/Time: 02/17/24 1533      Number of Occurrences: Until Specified    Health Officer Authority to Detain (TOM)     Frequency: Effective Now     Start Date/Time: 02/17/24 1344      Number of Occurrences: Until Specified     Order Comments: Patient arrived on a health or  authority to detain.         Exam:   Patient Vitals for the past 24 hrs:   BP Temp Temp src Pulse Resp SpO2   02/19/24 0953 123/72 97.3  F (36.3  C) Oral 72 -- 99 %   02/18/24 1705 107/49 97.8  F (36.6  C) Oral 69 -- 98 %   02/18/24 1606 101/65 97.9  F (36.6  C) Oral 71 18 96 %       ED Course:    Medications   OLANZapine zydis (zyPREXA) ODT tab 15 mg (15 mg Oral $Given 2/18/24 2238)   OLANZapine zydis (zyPREXA) ODT tab 10 mg (10 mg Oral $Given 2/17/24 1510)            There were no significant events during my shift.      Impression:    ICD-10-CM    1. Schizophrenia, unspecified type (H)  F20.9       2. Marijuana use  F12.90           Plan:    Awaiting mental health evaluation/recommendations.      RESULTS:   Results for orders placed or performed during the hospital encounter of 02/17/24 (from the past 24 hour(s))   EKG 12-lead, tracing only     Status: None    Collection Time: 02/18/24  2:37 PM   Result Value Ref Range    Systolic Blood  Pressure  mmHg    Diastolic Blood Pressure  mmHg    Ventricular Rate 65 BPM    Atrial Rate 65 BPM    LA Interval 146 ms    QRS Duration 100 ms     ms    QTc 413 ms    P Axis 47 degrees    R AXIS 79 degrees    T Axis 44 degrees    Interpretation ECG       Sinus rhythm  Normal ECG  Unconfirmed report - interpretation of this ECG is computer generated - see medical record for final interpretation  Confirmed by - EMERGENCY ROOM, PHYSICIAN ([id number]),  MAGUE KERR ([id number]) on 2/18/2024 3:58:40 PM     Asymptomatic COVID-19 Virus (Coronavirus) by PCR Nasopharyngeal     Status: Normal    Collection Time: 02/19/24  2:49 AM    Specimen: Nasopharyngeal; Swab   Result Value Ref Range    SARS CoV2 PCR Negative Negative    Narrative    Testing was performed using the Xpert Xpress SARS-CoV-2 Assay on the Cepheid Gene-Xpert Instrument Systems. Additional information about this Emergency Use Authorization (EUA) assay can be found via the Lab Guide. This test should be ordered for the detection of SARS-CoV-2 in individuals who meet SARS-CoV-2 clinical and/or epidemiological criteria as well as from individuals without symptoms or other reasons to suspect COVID-19. Test performance for asymptomatic patients has only been established in anterior nasal swab specimens. This test is for in vitro diagnostic use under the FDA EUA for laboratories certified under CLIA to perform high complexity testing. This test has not been FDA cleared or approved. A negative result does not rule out the presence of PCR inhibitors in the specimen or target RNA concentration below the limit of detection for the assay. The possibility of a false negative should be considered if the patient's recent exposure or clinical presentation suggests COVID-19. This test was validated by the [facility]. This laboratory is certified under the Clinical Laboratory Improvement Amendments (CLIA) as qualified to perform high  complexity laboratory testing.               MD Amado Hutson Dung Hoang, MD  02/19/24 4096

## 2024-02-19 NOTE — ED NOTES
6:50 am.I did have this patient last shift and had report on this patient and will continue with care.12:00 patient in group with other patients.12:40 pm patient eating lunch.1:00 pm patient is doing crafts and coloring at the table 14:15 pm patient playing on tablet and relaxed an calm.

## 2024-02-19 NOTE — ED NOTES
Patient walking unit at beginning of shift.   Patient interacting with other patients, playing cards.  Patient during 1-1 patient identified that he struggles with being sad at times due to life stress, homelessness.  Patient reports that he hears at times people talking but not hearing them as much today.  Patient reports less confused since he has been here.   Patient denies SI, HI.   Patient Contracted for safety.   Patient ate approximately 25 % of dinner.   Medication compliant

## 2024-02-19 NOTE — PROGRESS NOTES
Triage & Transition Services, Extended Care     Client Name: Higinio Wallace    Date: February 19, 2024    Patient was seen: yes  Mode of Assessment: In person    Service Type: attended group session  Session Start Time:  1130 am   Session End Time: 1210 pm  Session Length: 40  Site Location: [facility] EMERGENCY DEPARTMENT                             [id number]  Total Number ofAttendees: 3    Topic: Strengths  emotions/expression, self-care activities  Response: able to recall/repeat info presented, confronted peers appropriately, discussed personal experience with topic, cooperative with task, expressed understanding of topic     Chiara Wheeler   Licensed Mental Health Professional (LMHP), Extended Care  [phone]

## 2024-02-20 LAB
CHOLEST SERPL-MCNC: 135 MG/DL
HBA1C MFR BLD: 4.8 %
HDLC SERPL-MCNC: 36 MG/DL
LDLC SERPL CALC-MCNC: 84 MG/DL
NONHDLC SERPL-MCNC: 99 MG/DL
TRIGL SERPL-MCNC: 75 MG/DL
TSH SERPL DL<=0.005 MIU/L-ACNC: 2.08 UIU/ML (ref 0.3–4.2)

## 2024-02-20 PROCEDURE — 90853 GROUP PSYCHOTHERAPY: CPT

## 2024-02-20 PROCEDURE — 99222 1ST HOSP IP/OBS MODERATE 55: CPT | Mod: AI

## 2024-02-20 PROCEDURE — 82306 VITAMIN D 25 HYDROXY: CPT

## 2024-02-20 PROCEDURE — 84443 ASSAY THYROID STIM HORMONE: CPT | Performed by: PSYCHIATRY & NEUROLOGY

## 2024-02-20 PROCEDURE — 83036 HEMOGLOBIN GLYCOSYLATED A1C: CPT | Performed by: PSYCHIATRY & NEUROLOGY

## 2024-02-20 PROCEDURE — G0177 OPPS/PHP; TRAIN & EDUC SERV: HCPCS

## 2024-02-20 PROCEDURE — 36415 COLL VENOUS BLD VENIPUNCTURE: CPT | Performed by: PSYCHIATRY & NEUROLOGY

## 2024-02-20 PROCEDURE — 250N000013 HC RX MED GY IP 250 OP 250 PS 637: Performed by: PSYCHIATRY & NEUROLOGY

## 2024-02-20 PROCEDURE — 82465 ASSAY BLD/SERUM CHOLESTEROL: CPT | Performed by: PSYCHIATRY & NEUROLOGY

## 2024-02-20 PROCEDURE — 124N000002 HC R&B MH UMMC

## 2024-02-20 RX ADMIN — OLANZAPINE 15 MG: 15 TABLET, ORALLY DISINTEGRATING ORAL at 21:56

## 2024-02-20 ASSESSMENT — ACTIVITIES OF DAILY LIVING (ADL)
ADLS_ACUITY_SCORE: 51
ORAL_HYGIENE: INDEPENDENT
ADLS_ACUITY_SCORE: 51
HYGIENE/GROOMING: INDEPENDENT
ADLS_ACUITY_SCORE: 51
DRESS: INDEPENDENT
ADLS_ACUITY_SCORE: 51

## 2024-02-20 NOTE — PROVIDER NOTIFICATION
02/20/24 1002   Individualization/Patient Specific Goals   Patient Personal Strengths expressive of emotions   Patient Vulnerabilities adverse childhood experience(s);history of unsuccessful treatment;housing insecurity;lacks insight into illness;substance abuse/addiction   Interprofessional Rounds   Summary New admit - admitted due to psychosis, medication noncompliance, and overall decompensation   Participants CTC;advanced practice nurse;nursing;psychotherapy   Behavioral Team Discussion   Participants Talita Cronin APRN; Trinity RN; Ariana KOHLI/therapist; Danyelle KOHLI   Progress N/A - new admit   Anticipated length of stay 3- days   Continued Stay Criteria/Rationale Needs clinical stabilization, medication management, and safe dispo plan   Medical/Physical See H&P   Precautions See below   Plan Medication management, mental health symptom stabilization, development of safe dispo plan, Petition for commitment due to history of multiple ED visits and level of recent decompensation   Safety Plan Per unit protocol   Anticipated Discharge Disposition IRTS     PRECAUTIONS AND SAFETY    Behavioral Orders   Procedures    Code 1 - Restrict to Unit    Routine Programming     As clinically indicated    Status 15     Every 15 minutes.       Safety  Safety WDL: WDL  Patient Location: patient room, own  Observed Behavior: calm, sleeping  Safety Measures: safety rounds completed  Suicidality: Status 15

## 2024-02-20 NOTE — PLAN OF CARE
Goal Outcome Evaluation:      Plan of Care Reviewed With: patient    Overall Patient Progress: no changeOverall Patient Progress: no change    Outcome Evaluation: See RD note 2/20    GINO Andrew, RD, LD  Mental Health Pager (M-F): [phone]  On Call Pager (weekends only): [phone]
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
## 2024-02-20 NOTE — PLAN OF CARE
Problem: Adult Behavioral Health Plan of Care  Goal: Plan of Care Review  Outcome: Progressing     Problem: Sleep Disturbance  Goal: Adequate Sleep/Rest  Outcome: Progressing   Goal Outcome Evaluation:       Pt appears to be sleeping comfortably with even and non labored respirations during all safety checks. Pt slept for 6.75 hours. No safety or behavioral concerns noted. Will continue with same plan of care.

## 2024-02-20 NOTE — PLAN OF CARE
"Pt has been withdrawn and keeps to self. Pt did attend group. Pt declined am medications. Pt was asked about fruit extract allergy and pt said it is mild and that is from high acidic food such as citric. Writer informed dietary regarding this. Pt states he wants to go back to [facility] because \"my stuff is there\" and \"they were finding me a new home\" \"I didn't know I had to stay her this long\" Pt is not asking to discharge.  Pt states his sleep is \"better\" Pt was seen by dietary.. Pt states he ate \"some\" Pt denies pain. Pt denies hallucinations, stating \"not today\" PT denies anxiety and depression. Pt appears suspicious and kept looking at writers notebook, Poor eye contact. Paranoid. Pt was offered several items such as crosswords and books and pt politely declined.     Goal Outcome Evaluation: ongoing                         "

## 2024-02-20 NOTE — ED NOTES
Pt alert , awake , up in milieu  playing cards and board games w/ a peer. Voice soft and responding appropriately to inquiries. Ate well. No C/O discomfort this shift. Pt to be admitted to Unit 10 .  Report given to receiving nurse. . Pt states he is not allergic to Zyprexa and has never taken anafronil . Explanation given to pt.'s level of understanding. Pt wearing gloves right now and states that he has been given a gift and he reads people's energy and helps them. Pt transferred to Unit 10 via  and belonging's returned.

## 2024-02-20 NOTE — PLAN OF CARE
BEH IP Unit Acuity Rating Score (UARS)  Patient is given one point for every criteria they meet.    CRITERIA SCORING   On a 72 hour hold, court hold, committed, stay of commitment, or revocation. 1    Patient LOS on BEH unit exceeds 20 days. 0  LOS: 1   Patient under guardianship, 55+, otherwise medically complex, or under age 11. 0   Suicide ideation without relief of precipitating factors. 0   Current plan for suicide. 0   Current plan for homicide. 0   Imminent risk or actual attempt to seriously harm another without relief of factors precipitating the attempt. 0   Severe dysfunction in daily living (ex: complete neglect for self care, extreme disruption in vegetative function, extreme deterioration in social interactions). 1   Recent (last 7 days) or current physical aggression in the ED or on unit. 0   Restraints or seclusion episode in past 72 hours. 0   Recent (last 7 days) or current verbal aggression, agitation, yelling, etc., while in the ED or unit. 0   Active psychosis. 1   Need for constant or near constant redirection (from leaving, from others, etc).  0   Intrusive or disruptive behaviors. 0   Patient requires 3 or more hours of individualized nursing care per 8-hour shift (i.e. for ADLs, meds, therapeutic interventions). 0   TOTAL 3

## 2024-02-20 NOTE — PLAN OF CARE
"He arrived to the unit from the [facility] and came into the hospital due to disorganized thought process, responding to internal stimuli, and not eating due to delusional belief that eating and drinking hurts others.  He is displaying disorganized thought process by having some difficulty expressing himself and needed reminders to take off his search gown but putting scrubs over them in initial assessment.  States he has depression and gets some ideas of not wanting to be alive, yet denied suicidal and homicidal ideation when asked.  He denies experiencing hallucinations, yet he will state at the floor and stare in conversations as it was noted having visual hallucinations before coming over to the unit.  States experiences racing thoughts and sleep difficulty.  He does wear a left knee brace for support and does have a history of numerous knee surgeries in the past.  States he is willing to return to [facility], yet is willing to go to another place, \" if its nicer,\" in discussions.  He did state that his belongings are at [facility] and would need to return there to get his items back.  He states he is wondering if he can wear his gloves due to reports that he has a gift to reading people, but was receptive of giving them to staff to be placed in his storage locker.  States he does have a history of SIB by punching the wall and head banging.  He agrees to contract for safety and come to staff for concerns.  The findings were reported to the charge RN in the shift.  He does have some light pink coloration on his chest area states its due to him itching it at times.  Writer obtained order of left knee brace for support as he has had numerous left knee surgeries in the past.  He was made aware of being NPO for labs tomorrow morning.  He asked about his medical hold and was informed it expires on 2/23/24 at 1600.       "

## 2024-02-20 NOTE — PLAN OF CARE
Team Note Due:  Tuesday    Assessment/Intervention/Current Symtoms and Care Coordination:  Chart review and met with team, discussed pt progress, symptomology, and response to treatment.  Discussed the discharge plan and any potential impediments to discharge.    Per provider in AM team meeting, she would like to petition for MI commitment with Mg given pt's multiple ED visits, medication noncompliance, and overall decompensation despite being housed at [facility]. Provider and writer completed respective forms.    Writer consulted with supervisor to determine CFR as pt is functionally homeless but has been residing at [facility] for the last 2 months. Supervisor advised to file with [facility] based on [facility].    Writer emailed commitment documents to [facility] PPS and called PPS/Amanda Kerr to notify as well.    Discharge Plan or Goal:  Pending further stabilization, continued compliance with medications, continued activities of daily living, and development of a safe discharge plan.      Barriers to Discharge:  Medication management and monitoring, symptom stabilization.     Referral Status:  None at this time     Legal Status:  72HH - expires 2/23 at 00:01  Petition for MI commitment and [place] on 2/20    County: [place]  File Number: TBD  Start and expiration date of commitment: TBD    [facility] PPS/Amanda Kerr phone: [phone]    Contacts:  Silvina Wallace (mother) - [phone]       Upcoming Meetings and Dates/Important Information and next steps:  CTC to follow commitment process
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
## 2024-02-20 NOTE — CARE PLAN
Occupational Therapy Group Note:     02/20/24 1300   Group Therapy Session   Group Attendance attended group session   Time Session Began 1330   Time Session Ended 1430   Total Time (minutes) 60   Total # Attendees 3   Group Type task skill;recreation   Group Topic Covered cognitive activities;leisure exploration/use of leisure time   Group Session Detail visual-spatial group game   Patient Response/Contribution cooperative with task;organized   Patient Participation Detail Pt actively participated in a structured occupational therapy group with a focus on a visual-spatial leisure task. Pt was quickly able to follow 2-step directions of the novel task, and demonstrated particularly strategic planning and problem solving throughout the task. Pt remained focused and engaged for the full duration of group. Observed actively planning ahead and tracking the task consistently. Gradually became more social with peers and writer as group progressed. Affect initially appeared flat, though appeared to brighten with social interaction throughout group. Calm, pleasant, and engaged. Will continue to assess.

## 2024-02-20 NOTE — PROGRESS NOTES
"CLINICAL NUTRITION SERVICES - ASSESSMENT NOTE     Nutrition Prescription    RECOMMENDATIONS FOR MDs/PROVIDERS TO ORDER:  None at this time    Malnutrition Status:    Moderate malnutrition in the context of chronic illness    Recommendations already ordered by Registered Dietitian (RD):  - Ensure Enlive BID (strawberry)  - Noted food preferences in [facility] (meal ordering system)    Future/Additional Recommendations:  Monitor appetite/po intake, wt trends, acceptance of supplements.     REASON FOR ASSESSMENT  Higinio Wallace is a/an 33 year old male assessed by the dietitian for Admission Nutrition Risk Screen for 14-23 lb wt loss but no decreased appetite and Provider Order - \"poor appetite and associated wt loss\" and \"Patient reports having specific diet needs such as prefers fish and vegetables. Also, patient has had poor PO intake leading up to current hospitalization. Patient reports interest in taking supplemental drinks.\"    CLINICAL HISTORY  History notable for PTSD, chronic suicidal ideation since his teens, schizophrenia, and depression who presents to the ED via EMS for an evaluation due to delusions, paranoia, hallucinations, and disorganized thinking.     NUTRITION HISTORY  - Per H&P: \"It is noteworthy to mention that he has been on Vyvanse until recently and likely, as a result had lost his appetite, has not been eating and lost significant amount of weight \"  - Per RN note 2/19: \"not eating due to delusional belief that eating and drinking hurts others\"  - Per RN note 2/17: \"Pt states he doesn't want to eat but he knows he needs to.\"    Pt reports having IBS and follows low fiber diet. Likes chicken/fish, and does not eat cauliflower. Preferences noted in [facility]. PTA, was having one meal/day d/t decreased appetite, but usual intake is 5 small meals/snacks daily. While here has been eating meals TID and has been able to find options that he enjoys. Does report that ONS would be helpful given " "consistent wt loss over the past 2 years of 140 lbs. Pt reports he would now like to maintain his weight.    CURRENT NUTRITION ORDERS  Diet: Regular  Intake/Tolerance: Per RN notes:   2/19 \"eating lunch\", \"ate well\"  2/18: 25% dinner  2/17 ate 100% of food    LABS  Labs reviewed    MEDICATIONS  Anafranil  Thera-Vit  Zyprexa    ANTHROPOMETRICS  Height: 193 cm (6' 3.984\")  Most Recent Weight: 95.2 kg (209 lb 14.4 oz)    IBW: 91.8 kg  BMI: Overweight BMI 25-29.9  Weight History:   Wt Readings from Last 15 Encounters:   02/19/24 95.2 kg (209 lb 14.4 oz)   01/19/23 125.2 kg (276 lb)   09/02/22 140.2 kg (309 lb)   08/02/22 142 kg (313 lb)   05/16/22 (!) 152.4 kg (336 lb)   04/20/22 (!) 154.2 kg (340 lb)   04/03/22 149.7 kg (330 lb)   02/08/22 (!) 156.5 kg (345 lb)   02/08/22 (!) 156.9 kg (345 lb 12.8 oz)   01/25/22 (!) 157.9 kg (348 lb)   01/18/22 (!) 155.6 kg (343 lb 1.6 oz)   01/04/22 (!) 153.3 kg (338 lb)   11/19/21 (!) 156.9 kg (346 lb)   11/15/21 (!) 158.7 kg (349 lb 12.8 oz)   11/09/21 (!) 155.6 kg (343 lb)   10/24/21 149.7 kg (330 lb)   Lack of recent wt hx within the past 2 years, although 24% wt loss noted in ~1 year    Dosing Weight: 95.2 kg (actual wt)    ASSESSED NUTRITION NEEDS  Estimated Energy Needs: 2380 - 2850 kcals/day (25 - 30 kcals/kg)  Justification: Maintenance  Estimated Protein Needs: 76 - 95 grams protein/day (0.8 - 1 grams of pro/kg)  Justification: Maintenance  Estimated Fluid Needs: 2380 - 2850 mL/day (1 mL/kcal)   Justification: Maintenance    PHYSICAL FINDINGS  See malnutrition section below.     MALNUTRITION  % Intake: < 75% for >/= 1 month (moderate)  % Weight Loss: > 20% in 1 year (severe)  Subcutaneous Fat Loss: None observed -> visual assessment  Muscle Loss: None observed -> visual assessment  Fluid Accumulation/Edema: None noted  Malnutrition Diagnosis: Moderate malnutrition in the context of chronic illness    NUTRITION DIAGNOSIS  Unintended weight loss related to decreased appetite " as evidenced by 24% wt loss in ~1 year.      INTERVENTIONS  Implementation  Nutrition Education: RD role in care, protein for muscle mass preservation   Medical food supplement therapy - Ensure BID  Food preferences noted in [facility]    Goals  Patient to consume % of nutritionally adequate meal trays TID, or the equivalent with supplements/snacks.     Monitoring/Evaluation  Progress toward goals will be monitored and evaluated per protocol.    GINO Andrew, RD, LD  Mental Health Pager (M-F): [phone]  On Call Pager (weekends only): [phone]

## 2024-02-20 NOTE — CARE PLAN
02/19/24 2120   Patient Belongings   Patient Belongings Put in Hospital Secure Location (Security or Locker, etc.) clothing   Belongings Search Yes   Clothing Search Yes   Second Staff Bruce S       Locker: 1x Jacket, 1x Long Sleeve shirt, 2x Pants, 1x Socks, 1x Shoes, 1x Underwear, 1x Gloves.      A               Admission:  I am responsible for any personal items that are not sent to the safe or pharmacy.  [facility] is not responsible for loss, theft or damage of any property in my possession.    Signature:  _________________________________ Date: _______  Time: _____                                              Staff Signature:  ____________________________ Date: ________  Time: _____      2nd Staff person, if patient is unable/unwilling to sign:    Signature: ________________________________ Date: ________  Time: _____     Discharge:  [facility] has returned all of my personal belongings:    Signature: _________________________________ Date: ________  Time: _____                                          Staff Signature:  ____________________________ Date: ________  Time: _____

## 2024-02-21 LAB — VIT D+METAB SERPL-MCNC: 10 NG/ML (ref 20–50)

## 2024-02-21 PROCEDURE — G0177 OPPS/PHP; TRAIN & EDUC SERV: HCPCS

## 2024-02-21 PROCEDURE — 250N000013 HC RX MED GY IP 250 OP 250 PS 637: Performed by: PSYCHIATRY & NEUROLOGY

## 2024-02-21 PROCEDURE — 124N000002 HC R&B MH UMMC

## 2024-02-21 PROCEDURE — 90853 GROUP PSYCHOTHERAPY: CPT

## 2024-02-21 PROCEDURE — 99232 SBSQ HOSP IP/OBS MODERATE 35: CPT

## 2024-02-21 RX ORDER — AMOXICILLIN 250 MG
1-2 CAPSULE ORAL 2 TIMES DAILY
Status: DISCONTINUED | OUTPATIENT
Start: 2024-02-22 | End: 2024-03-18 | Stop reason: HOSPADM

## 2024-02-21 RX ADMIN — OLANZAPINE 15 MG: 15 TABLET, ORALLY DISINTEGRATING ORAL at 21:06

## 2024-02-21 RX ADMIN — THERA TABS 1 TABLET: TAB at 08:50

## 2024-02-21 ASSESSMENT — ACTIVITIES OF DAILY LIVING (ADL)
DRESS: INDEPENDENT
ADLS_ACUITY_SCORE: 51
ORAL_HYGIENE: INDEPENDENT
ADLS_ACUITY_SCORE: 51
LAUNDRY: UNABLE TO COMPLETE
ADLS_ACUITY_SCORE: 51
HYGIENE/GROOMING: INDEPENDENT
ADLS_ACUITY_SCORE: 51
ADLS_ACUITY_SCORE: 51

## 2024-02-21 NOTE — PROGRESS NOTES
02/21/24 1542   Group Therapy Session   Group Attendance attended group session   Time Session Began 1415   Time Session Ended 1500   Total Time (minutes) 45   Total # Attendees 5   Group Type psychoeducation;psychotherapeutic   Group Topic Covered coping skills/lifestyle management;emotions/expression   Group Session Detail Group memebers practiced multliple coping skills and discussed beneifts of each. Pt's practice expressive writing for the first 10 minutes of the group the practice different vocal warm-up exercises using thier voice as away to work through emotions, Group members reviewed and discussed a worksheet for different coping skills for dealing with voices. Pt's then reviewed a list of 99 coping skills to find one belinda they gravitate towards, one they would  like to try sometime, and one they can do today. Finally, group members got to pick a song to listen to that helps them feel better. Participatns then listened to music, often singing along and dancing in their chairs.   Patient Response/Contribution cooperative with task;able to recall/repeat info presented;confronted peers appropriately;listened actively   Patient Participation Detail Pt was engaged in the activities of the group. During the vocal exercises pt felt silly about some of them so didn't engage, but would try  with more encouragement. Pt seemed to be enjoying the music part of group.

## 2024-02-21 NOTE — CARE PLAN
02/20/24 1918   Group Therapy Session   Group Attendance attended group session   Time Session Began 1615   Time Session Ended 1700   Total Time (minutes) 45   Total # Attendees 6   Group Type psychotherapeutic   Group Topic Covered cognitive therapy techniques   Group Session Detail DBT R.E.S.T skill, somatic mind body calming skill, and mindfulness, self soothe using senses   Patient Response/Contribution cooperative with task;discussed personal experience with topic;expressed understanding of topic;offered helpful suggestions to peers;listened actively   Patient Participation Detail mood ok, first day on the unit reported.     There was a group member who was agitated , writer tried to manage this behavior which seemed to be a trauma response of a male pt.  Higinio tried to calmly engage him in conversation as well as the other group members and writer. Writer did have to finally ask the male pt A to leave group with staff presence. After the peer left, the group practiced a somatic nervous system reset. He and group really liked this and were able to feel calm in the space after the activated peer left group.

## 2024-02-21 NOTE — PLAN OF CARE
"Alertness: Alert and oriented  Affect: flat  Mood: Calm and cooperative  Appearance: Showered  Thought Process: Patient verbalized he depresses his thought because he doesn't want people to think that he is \"crazy\" if he talks about the thoughts. Patient reported they are thoughts and not voices, stating. \" I depress my thoughts because sometimes I will like wake up and think I'm in hell and that their are dead bodies everywhere. I feel like my head is in a constant war, because this world is so divided and filled with so much hate. I just want to give nothing but happiness, and by expressing my thoughts I wouldn't be able to do that. It's almost like I'm living in a illusion.\" I stopped hearing voices 2 years ago because I was healed, but I'm not sure how I was healed. Patient reported that he needs a schedule or a routine, because he has no motivation. I feel at peace here, last night I could actually sleep because I didn't have other people projecting their emotions on to me.\" Patient reported that he feels like he constant was with him against the work and he is in constant survival mode because he is homeless.  Anxiety, depression, SI,SIB,HI: Patient reported anxiety and depression, but \" low\". He denied SI,SIB,HI  Social: He was social with peers.  Skin: Denied issues  Bowel/bladder:Denied issues  Breakfast and lunch: 100% of both in addition to ensure.  Vital signs/pain: BP was slightly elevated this morning 151/88. Patient reported he woke up \" kind of scared because he thought he was in hell.\" Recheck /84.  Medications: Refused Nicotine patch, but took his Multivitamin   PRN's this shift: none     "

## 2024-02-21 NOTE — PLAN OF CARE
Problem: Psychotic Signs/Symptoms  Goal: Improved Behavioral Control (Psychotic Signs/Symptoms)  Outcome: Not Progressing   Goal Outcome Evaluation:    Plan of Care Reviewed With: patient      Patient doing well during this shift, he is calm, restrictive and guarded, he avoids social contact. Patient seem to be paranoid and disorganized. Speech was soft quiet, patient is withdrawn and isolated. Patient is independent of all his cares. Patient denies any other discomfort, no pain, denies anxiety and depression, no SI/SIB/AVH during this shift. Patient is medication compliant no prn this shift, no stated side effects, meal consumption was adequate, will continue to monitor.

## 2024-02-21 NOTE — PROVIDER NOTIFICATION
02/21/24 0600   Sleep/Rest   Night Time # Hours 7 hours     Pt had a quiet night with no behavioral or safety concerns. Pt was observed sleeping the entire night, no acute events noted or reported.

## 2024-02-21 NOTE — CARE PLAN
Occupational Therapy Group Note:     02/21/24 1000   Group Therapy Session   Group Attendance attended group session   Time Session Began 1020   Time Session Ended 1200   Total Time (minutes) 100   Total # Attendees 3-8   Group Type expressive therapy   Group Topic Covered coping skills/lifestyle management   Group Session Detail OT clinic   Patient Response/Contribution cooperative with task;organized   Patient Participation Detail Pt actively participated in occupational therapy clinic to facilitate coping skill exploration, creative expression within personally meaningful activities, and clinical observation of social, cognitive, and kinesthetic performance skills. Pt response: Independent to initiate, gather materials, sequence, and adjust to workspace demands as needed. Demonstrated good focus, planning, and attention to detail for selected self-directed, creative expression task. Able to ask for assistance as needed, and socialized with peers and staff. Shared that he enjoys writing music, singing, and rapping, and finds it to be a helpful outlet. Calm, pleasant, and engaged. Limited eye contact observed in interactions, though affect appeared to brighten appropriate to context in interactions.

## 2024-02-21 NOTE — CARE PLAN
Occupational Therapy Group Note:     02/21/24 1600   Group Therapy Session   Group Attendance attended group session   Time Session Began 1320   Time Session Ended 1415   Total Time (minutes) 55   Total # Attendees 3   Group Type life skill;recreation   Group Topic Covered balanced lifestyle;leisure exploration/use of leisure time;structured socialization   Group Session Detail self-reflection group game   Patient Response/Contribution cooperative with task;discussed personal experience with topic;listened actively   Patient Participation Detail Pt actively participated in a structured occupational therapy group involving a self-reflecting, group leisure task. Pt appeared comfortable sharing positive past experiences and personal information with peers, and was respectful in listening and responding to peers. Pt was able to follow 3 step directions for the novel task and tracked the task consistently. Shared about the unique role each of his parents played in influencing his spiritual beliefs. When prompted to identify a cause he feels is important, he discussed environmental issues, and commented that he noticed a change in his hair texture, which he attributed to environmental changes. Pleasant and polite in all interactions.

## 2024-02-21 NOTE — PROGRESS NOTES
"PSYCHIATRY  PROGRESS NOTE     DATE OF SERVICE   02/21/24          CHIEF COMPLAINT   \" Seems great.\"       SUBJECTIVE   Nursing reports:    Patient doing well during this shift, he is calm, restrictive and guarded, he avoids social contact. Patient seem to be paranoid and disorganized. Speech was soft quiet, patient is withdrawn and isolated. Patient is independent of all his cares. Patient denies any other discomfort, no pain, denies anxiety and depression, no SI/SIB/AVH during this shift. Patient is medication compliant no prn this shift, no stated side effects, meal consumption was adequate, will continue to monitor.      Patient appeared to sleep 7 hours overnight.     reports:    Team Note Due:  Tuesday     Assessment/Intervention/Current Symtoms and Care Coordination:  Chart review and met with team, discussed pt progress, symptomology, and response to treatment.  Discussed the discharge plan and any potential impediments to discharge.     Per provider in AM team meeting, she would like to petition for MI commitment with Mg given pt's multiple ED visits, medication noncompliance, and overall decompensation despite being housed at [facility]. Provider and writer completed respective forms.     Writer consulted with supervisor to determine CFR as pt is functionally homeless but has been residing at [facility] for the last 2 months. Supervisor advised to file with [facility]     Writer emailed commitment documents to [facility] PPS and called PPS/Amanda Kerr to notify as well.     Discharge Plan or Goal:  Pending further stabilization, continued compliance with medications, continued activities of daily living, and development of a safe discharge plan.      Barriers to Discharge:  Medication management and monitoring, symptom stabilization.     Referral Status:  None at this time     Legal Status:  72HH - expires 2/23 at 00:01  Petition for MI " "commitment and [place] on 2/20     [facility]: [place]  File Number: TBD  Start and expiration date of commitment: TBD     [facility] PPS/Amanda Kerr phone: [phone]     Contacts:  Silvina Wallace (mother) - [phone]       Upcoming Meetings and Dates/Important Information and next steps:  CTC to follow commitment process          OBJECTIVE   Met with patient in OT room of unit 10 [address]. Upon patient interview, patient reports their mood as, \" Seems great.\"  Patient's affect appears blunted and guarded.  Patient's speech is slightly rapid.  Patient's thought process is circumstantial, tangential, difficult to follow, perseverative, and disorganized.  Patient also continues to endorse delusional thoughts that he is affected by other's emotions and that his presence has an impact on other people's thoughts and emotions.  Patient also endorses experiencing a fixation on specific numbers including 7 and 4.  He reports that he is seeing these numbers through movies and television.  Patient also reports that he is experiencing \"Seeing a lot of trends and synchronicity.\"  Patient denies auditory hallucinations or visual hallucinations today however per nursing staff documentation patient does appear to be responding to internal stimuli.  Patient denies depression or anxiety today.  Denies any thoughts of SI, SIB, SA, intent or plan. Denies HI. Patient able to contract for safety. Patient endorses sleeping well overnight and reports that he slept well in a room by himself and believes he benefits from having his own space due to being highly impacted by the emotions of others. Patient reports appetite continues to be decreased.  Patient has previously reported delusional thought that when he is eating food he believes he is \"eating himself.\"  Today, he reports these thoughts are ongoing however he chooses to ignore them and instead,  \" I tell myself that my DNA is in everything.\"  Per nursing report patient has been eating 100% " of meals and drinking Ensure.  Plan will be to continue to monitor p.o. intake closely.  Patient endorses last bowel movement was 2 days ago and reports this is normal for him.  He reports sometimes he goes without a bowel movement for 7 days.  Provider discussed plan to initiate senna-docusate 8.6-50 mg 1-2 tablets PO 2 times daily to address reported constipation symptoms.  Patient in agreement with this.  Patient denies any issues with urination..  Patient vital signs reviewed and noted to be stable. Patient denies any medical concerns today. Patient has no other questions or concerns. Today, plan will be to continue olanzapine ODT tablet 15 mg PO at bedtime to target psychosis and mood disorder symptoms.  Patient has been compliant with this medication.  Patient also reports that he does not believe he needs psychiatric medications and that he has been able to manage his mental health symptoms successfully outside of the hospital by controlling his own thoughts.      Provider completed [employer] for mental health commitment paperwork with [employer] order application for [facility] today as it was previously completed for [facility].  This was provided to unit [facility].       MEDICATIONS   Medications:  Scheduled Meds:    multivitamin, therapeutic  1 tablet Oral Daily     nicotine  1 patch Transdermal Daily    And     nicotine   Transdermal Q8H ENRIQUE     OLANZapine zydis  15 mg Oral At Bedtime     Continuous Infusions:  PRN Meds:.acetaminophen, alum & mag hydroxide-simethicone, hydrOXYzine HCl, melatonin, nicotine, OLANZapine **OR** OLANZapine, senna-docusate    Medication adherence issues: MS Med Adherence Y/N: Yes, patient reports history of noncompliance with psychiatric medications in the community.  Patient is cooperative with meds in the hospital currently  Medication side effects: MEDICATION SIDE EFFECTS: no side effects reported  Benefit: Yes / No: Yes       ROS   Pertinent items are noted in HPI.    "    MENTAL STATUS EXAM   Vitals: BP (!) 151/88 (BP Location: Right arm, Patient Position: Sitting, Cuff Size: Adult Regular)   Pulse 61   Temp 97.4  F (36.3  C) (Oral)   Resp 18   Ht 1.93 m (6' 3.98\")   Wt 95.2 kg (209 lb 14.4 oz)   SpO2 99%   BMI 25.56 kg/m      Appearance:  Disheveled  Mood: Reports, \"seems great.\"  Affect: blunted and guarded  was congruent to speech  Suicidal Ideation: PRESENT / ABSENT: absent   Homicidal Ideation: PRESENT / ABSENT: absent   Thought process: circumstantial, tangential, difficult to follow, perseverative, and disorganized   Thought content: significant for delusions, preoccupations, and paranoid ideation.   Fund of Knowledge: Average  Attention/Concentration: Poor  Language ability:  Intact, speech is slightly rapid  Memory: Intact  Insight:  limited.  Judgement: limited  Orientation: Yes, x4  Psychomotor Behavior: fidgety    Muscle Strength and Tone: MuscleStrength: Normal  Gait and Station: Normal       LABS   personally reviewed.     Vitamin D level noted to be low at 10 ng/mL; plan will be to initiate supplement to treat.     Latest Reference Range & Units 02/17/24 13:58 02/17/24 15:16 02/18/24 11:07 02/18/24 14:37 02/19/24 02:49 02/20/24 07:44   Sodium 135 - 145 mmol/L  140 143      Potassium 3.4 - 5.3 mmol/L  3.6 3.8      Chloride 98 - 107 mmol/L  104 108 (H)      Carbon Dioxide (CO2) 22 - 29 mmol/L  26 28      Urea Nitrogen 6.0 - 20.0 mg/dL  6.2 8.7      Creatinine 0.67 - 1.17 mg/dL  0.88 0.94      GFR Estimate >60 mL/min/1.73m2  >90 >90      Calcium 8.6 - 10.0 mg/dL  9.4 9.2      Anion Gap 7 - 15 mmol/L  10 7      Magnesium 1.7 - 2.3 mg/dL  1.9       Albumin 3.5 - 5.2 g/dL  4.4 4.1      Protein Total 6.4 - 8.3 g/dL  7.0 6.5      Alkaline Phosphatase 40 - 150 U/L  49 44      ALT 0 - 70 U/L  6 7      AST 0 - 45 U/L  11 13      Bilirubin Total <=1.2 mg/dL  1.3 (H) 1.2      Cholesterol <200 mg/dL      135   Glucose 70 - 99 mg/dL  104 (H) 135 (H)      HDL Cholesterol " ">=40 mg/dL      36 (L)   Hemoglobin A1C <5.7 %      4.8   LDL Cholesterol Calculated <=100 mg/dL      84   Non HDL Cholesterol <130 mg/dL      99   Triglycerides <150 mg/dL      75   TSH 0.30 - 4.20 uIU/mL      2.08   Vitamin D, Total (25-Hydroxy) 20 - 50 ng/mL      10 (L)   WBC 4.0 - 11.0 10e3/uL  5.4       Hemoglobin 13.3 - 17.7 g/dL  16.9       Hematocrit 40.0 - 53.0 %  46.5       Platelet Count 150 - 450 10e3/uL  232       RBC Count 4.40 - 5.90 10e6/uL  5.49       MCV 78 - 100 fL  85       MCH 26.5 - 33.0 pg  30.8       MCHC 31.5 - 36.5 g/dL  36.3       RDW 10.0 - 15.0 %  11.9       % Neutrophils %  78       % Lymphocytes %  13       % Monocytes %  7       % Eosinophils %  1       % Basophils %  1       Absolute Basophils 0.0 - 0.2 10e3/uL  0.0       Absolute Eosinophils 0.0 - 0.7 10e3/uL  0.1       Absolute Immature Granulocytes <=0.4 10e3/uL  0.0       Absolute Lymphocytes 0.8 - 5.3 10e3/uL  0.7 (L)       Absolute Monocytes 0.0 - 1.3 10e3/uL  0.4       % Immature Granulocytes %  0       Absolute Neutrophils 1.6 - 8.3 10e3/uL  4.2       Absolute NRBCs 10e3/uL  0.0       NRBCs per 100 WBC <1 /100  0       SARS CoV2 PCR Negative      Negative    Amphetamine Qual Urine Screen Negative  Screen Negative        Fentanyl Qual Urine Screen Negative  Screen Negative        Cocaine Urine Screen Negative  Screen Negative        Benzodiazepine Urine Screen Negative  Screen Negative        Opiates Qualitative Urine Screen Negative  Screen Negative        PCP Urine Screen Negative  Screen Negative        Cannabinoids Qual Urine Screen Negative  Screen Positive !        Barbiturates Qual Urine Screen Negative  Screen Negative        EKG 12-LEAD, TRACING ONLY     Rpt     (H): Data is abnormally high  (L): Data is abnormally low  !: Data is abnormal  Rpt: View report in Results Review for more information  No results found for: \"PHENYTOIN\", \"PHENOBARB\", \"VALPROATE\", \"CBMZ\"       DIAGNOSIS   Principal Problem:    Schizoaffective " "disorder, bipolar type, currently hypomanic  History of schizophrenia  PTSD  R/O OCD    History of generalized anxiety disorder with panic attacks  History of ADHD    Clinically Significant Risk Factors                         # Overweight: Estimated body mass index is 25.56 kg/m  as calculated from the following:    Height as of this encounter: 1.93 m (6' 3.98\").    Weight as of this encounter: 95.2 kg (209 lb 14.4 oz)., PRESENT ON ADMISSION  # Moderate Malnutrition: based on nutrition assessment, PRESENT ON ADMISSION         Active Problem List:  Patient Active Problem List   Diagnosis     Irritable bowel syndrome     Class 2 severe obesity due to excess calories with serious comorbidity and body mass index (BMI) of 37.0 to 37.9 in adult (H)     Patellofemoral instability of left knee with pain     Tobacco abuse     Suicidal ideation     Moderate episode of recurrent major depressive disorder (H)     CARSON (obstructive sleep apnea)     Unspecified psychosis not due to a substance or known physiological condition (H)     Marijuana use     Schizophrenia, unspecified type (H)          HOSPITAL COURSE AND ASSESSMENT   This is a 33 year old male with history of schizophrenia, MDD, suicidal ideation, and PTSD who presented to [facility] ED on 2/17/2024 via EMS for psychiatric evaluation.  [facility] experiencing symptoms of psychosis including disorganized, paranoid, and delusional thoughts.  Patient also exhibiting poor p.o. intake and not attending to ADLs such as bathing.  Patient has a history of Patellofemoral instability of left knee and requires a brace to stabilize.  Per chart review patient was not taking any psychiatric medications prior to admission.  Patient endorsed some cannabis use and urine drug screening negative for all except cannabinoids.  While admitted to the ED psychiatry provider was consulted and initiated olanzapine to target psychosis " symptoms.  Patient was also started on Anafranil to target OCD symptoms.  Patient was evaluated by medical provider in the ED and determined to be medically stable.  Patient subsequently admitted to inpatient psychiatry unit 10 N for further psychiatric stabilization.  Current legal status is 72-hour hold.  Olanzapine started in the ED to target psychosis symptoms and plan will be to titrate to therapeutic dose while monitoring for side effects.  Psychiatric provider initiated petition for mental health commitment with Mg on 2/20/2024.  Trial of Anafranil initiated in the ED to target OCD symptoms however this was discontinued due to risk for worsening psychosis symptoms.        PLAN   1. Ongoing education given regarding diagnostic and treatment options with risks, benefits and alternatives and adequate verbalization of understanding.  2.  Medications:  Continue multivitamin 1 tab PO daily  Continue olanzapine ODT tablet 15 mg PO at bedtime to target psychosis symptoms and mood disorder symptoms  PRN hydroxyzine tablet 25 mg PO every 4 hours as needed for anxiety  PRN olanzapine tablet 10 mg PO every 3 hours as needed for agitation, ordered linked with olanzapine IM injection  PRN melatonin tablet 3 mg PO at bedtime as needed for sleep  PRN nicotine lozenge 2 mg buccal every 1 hour as needed for nicotine withdrawal symptoms    3.  Labs/Imaging:  -Vitamin D level ordered as an add-on on 2/20/2024    4. Consults:  -Hospitalist to follow as needed    5. Precautions:  -Self-injury precautions, suicide precautions; risk moderate    6. Legal:  -72-hour hold    7. Discharge planning:  -Per unit CTC        Risk Assessment:  MHAC RISK ASSESSMENT: Patient able to contract for safety and Patient on precautions    Coordination of Care:   Treatment Plan reviewed and physician signed, Care discussed with Care/Treatment Team Members, Chart reviewed and Patient seen      Re-Certification I certify that the inpatient  psychiatric facility services furnished since the previous certification were, and continue to be, medically necessary for, either, treatment which could reasonably be expected to improve the patient s condition or diagnostic study and that the hospital records indicate that the services furnished were, either, intensive treatment services, admission and related services necessary for diagnostic study, or equivalent services.     I certify that the patient continues to need, on a daily basis, active treatment furnished directly by or requiring the supervision of inpatient psychiatric facility personnel.   I estimate 14-21 days of hospitalization is necessary for proper treatment of the patient. My plans for post-hospital care for this patient are   TBD     CARISSA Mendieta CNP    -     02/21/24       -     1:00 PM       Total time  35 minutes with > 50%spent on coordination of cares and psycho-education.    This note was created with help of Dragon dictation system. Grammatical / typing errors are not intentional.    CARISSA Mendieta CNP

## 2024-02-21 NOTE — PLAN OF CARE
BEH IP Unit Acuity Rating Score (UARS)  Patient is given one point for every criteria they meet.    CRITERIA SCORING   On a 72 hour hold, court hold, committed, stay of commitment, or revocation. 1    Patient LOS on BEH unit exceeds 20 days. 0  LOS: 2   Patient under guardianship, 55+, otherwise medically complex, or under age 11. 0   Suicide ideation without relief of precipitating factors. 0   Current plan for suicide. 0   Current plan for homicide. 0   Imminent risk or actual attempt to seriously harm another without relief of factors precipitating the attempt. 0   Severe dysfunction in daily living (ex: complete neglect for self care, extreme disruption in vegetative function, extreme deterioration in social interactions). 1   Recent (last 7 days) or current physical aggression in the ED or on unit. 0   Restraints or seclusion episode in past 72 hours. 0   Recent (last 7 days) or current verbal aggression, agitation, yelling, etc., while in the ED or unit. 0   Active psychosis. 1   Need for constant or near constant redirection (from leaving, from others, etc).  0   Intrusive or disruptive behaviors. 0   Patient requires 3 or more hours of individualized nursing care per 8-hour shift (i.e. for ADLs, meds, therapeutic interventions). 0   TOTAL 3

## 2024-02-22 ENCOUNTER — DOCUMENTATION ONLY (OUTPATIENT)
Dept: BEHAVIORAL HEALTH | Facility: CLINIC | Age: 34
End: 2024-02-22
Payer: COMMERCIAL

## 2024-02-22 PROCEDURE — 250N000013 HC RX MED GY IP 250 OP 250 PS 637

## 2024-02-22 PROCEDURE — 250N000013 HC RX MED GY IP 250 OP 250 PS 637: Performed by: PSYCHIATRY & NEUROLOGY

## 2024-02-22 PROCEDURE — 99232 SBSQ HOSP IP/OBS MODERATE 35: CPT

## 2024-02-22 PROCEDURE — G0177 OPPS/PHP; TRAIN & EDUC SERV: HCPCS

## 2024-02-22 PROCEDURE — 124N000002 HC R&B MH UMMC

## 2024-02-22 PROCEDURE — H2032 ACTIVITY THERAPY, PER 15 MIN: HCPCS

## 2024-02-22 RX ORDER — CHOLECALCIFEROL (VITAMIN D3) 1250 MCG
1250 CAPSULE ORAL
Status: DISCONTINUED | OUTPATIENT
Start: 2024-02-22 | End: 2024-03-18 | Stop reason: HOSPADM

## 2024-02-22 RX ORDER — POLYETHYLENE GLYCOL 3350 17 G/17G
17 POWDER, FOR SOLUTION ORAL 2 TIMES DAILY PRN
Status: DISCONTINUED | OUTPATIENT
Start: 2024-02-22 | End: 2024-02-23

## 2024-02-22 RX ORDER — POLYETHYLENE GLYCOL 3350 17 G/17G
17 POWDER, FOR SOLUTION ORAL DAILY PRN
Status: DISCONTINUED | OUTPATIENT
Start: 2024-02-22 | End: 2024-02-22

## 2024-02-22 RX ADMIN — Medication 1250 MCG: at 16:52

## 2024-02-22 RX ADMIN — SENNOSIDES AND DOCUSATE SODIUM 2 TABLET: 8.6; 5 TABLET ORAL at 19:43

## 2024-02-22 RX ADMIN — POLYETHYLENE GLYCOL 3350 17 G: 17 POWDER, FOR SOLUTION ORAL at 16:52

## 2024-02-22 RX ADMIN — SENNOSIDES AND DOCUSATE SODIUM 2 TABLET: 8.6; 5 TABLET ORAL at 08:25

## 2024-02-22 RX ADMIN — THERA TABS 1 TABLET: TAB at 08:25

## 2024-02-22 RX ADMIN — OLANZAPINE 15 MG: 15 TABLET, ORALLY DISINTEGRATING ORAL at 19:43

## 2024-02-22 ASSESSMENT — ACTIVITIES OF DAILY LIVING (ADL)
HYGIENE/GROOMING: INDEPENDENT
DRESS: INDEPENDENT
ADLS_ACUITY_SCORE: 51
LAUNDRY: UNABLE TO COMPLETE
ADLS_ACUITY_SCORE: 51
LAUNDRY: UNABLE TO COMPLETE
ADLS_ACUITY_SCORE: 51
ADLS_ACUITY_SCORE: 51
DRESS: INDEPENDENT
ADLS_ACUITY_SCORE: 51
HYGIENE/GROOMING: INDEPENDENT
ADLS_ACUITY_SCORE: 51
ORAL_HYGIENE: INDEPENDENT
ADLS_ACUITY_SCORE: 51
ORAL_HYGIENE: INDEPENDENT

## 2024-02-22 NOTE — PLAN OF CARE
02/21/24     Group Therapy Session   Group Attendance This patient attended this group session   Time Session Began 16:15   Time Session Ended 1700   Total Time (minutes) 45   Total # Attendees 5   Group Type Psychotherapy   Group Topic Covered Managing discomfort   Group Session Detail This psychotherapy group focused on each patient identifying their sources of discomfort while the group provided healthy feedback on ways to manage such discomforts. The facilitator introduced general sources of discomfort (mental health symptoms) based on evidence-based literature, and obtained feedback from each patient related to aspects that pertained to them. Following this, each patient took turns to describe their sources of discomfort. One was selected for processing. Patient received feedback in terms of additional psychological and social tools on managing the discomforts. Topics covered include big emotions, intrusive thoughts, hallucination, and family/relational conflicts.   Patient Response/Contribution The patient stayed for the entire group and participated.

## 2024-02-22 NOTE — PROGRESS NOTES
Prior Authorization **INITIATED**    Medication: PALIPERIDONE ER 6 MG PO TB24  Insurance Company: [employer] - Phone [phone] Fax [phone]  Pharmacy Filling the Rx: n/a - Patient has not yet been discharged, and there are no outpatient orders for this medication yet  Start Date: 2/22/2024  Reference #: CoverMyMeds Key: [id number] - [place] Case ID: [id number]  Comments:  Proactive Prior Authorization      Rosalia Seaman CPhT  Discharge Pharmacy Liaison  [facility]  Pronouns: She/Her/Hers    Securely message with [employer], Epic Secure Chat, or [employer]  Phone: [phone]  Fax: [phone]  [email or web address]

## 2024-02-22 NOTE — PLAN OF CARE
-Attending Provider: CARISSA Davis CNP  -Voicemail Code: [id number]  -Team Note Due: Tuesday  -Next Steps:    Coordinate with [facility] regarding petition for commitment.        Assessment/Intervention/Current Symtoms and Care Coordination:  Chart review and met with team, discussed pt progress, symptomology, and response to treatment.  Discussed the discharge plan and any potential impediments to discharge.  CTC coordinated with Justin from pre petition. CTC was informed by [place] that the commitment was going to be supported and the court order should arrive later in the afternoon.      Discharge Plan or Goal:  Pending further stabilization, continued compliance with medications, continued activities of daily living, and development of a safe discharge plan.   Considerations:     Barriers to Discharge:  Impact of mental health symptoms on well being   Impact of mental health symptoms on activities of daily living  Need for medication monitoring and medication management     Referral Status:       Legal Status:  Court Hold   County: [place]  File Number:   Start and expiration date of commitment:     Contacts:        Upcoming Meetings and Dates/Important Information:      -Still Needed on AVS:

## 2024-02-22 NOTE — CARE PLAN
02/22/24 1515   Group Therapy Session   Group Attendance attended group session   Time Session Began 1315   Time Session Ended 1400   Total Time (minutes) 45   Total # Attendees 3   Group Type Occupational therapy   Group Topic Covered balanced lifestyle;coping skills/lifestyle management;leisure exploration/use of leisure time;relaxation techniques;self-care activities   Group Session Detail OT Leisure and healthy movement group   Patient Participation Detail Intervention: Leisure Group with 2 peers.    Patient Response: Pt partcipated in group focused on leisure participation and exploration, socialization and healthy movement. Discussed how participation in leisure activities can be used as a healthy coping skill in symptom management and a strategy to reduce stress. Was an active participant in the active leisure activity for the duration of group. Appeared to brighten with the activity and social interaction with the peer also participating in the activity.     Mood/Affect: Pleasant      Plan: Patient encouraged to maintain attendance for continued ongoing support in working towards occupational therapy goals to support overall treatment/care.

## 2024-02-22 NOTE — CARE PLAN
02/22/24 1457   Group Therapy Session   Group Attendance attended group session   Time Session Began 1015   Time Session Ended 1200   Total Time (minutes) 75   Total # Attendees 6   Group Type Occupational Therapy   Group Topic Covered balanced lifestyle;coping skills/lifestyle management;leisure exploration/use of leisure time;relaxation techniques   Group Session Detail OT Clinic Group   Patient Participation Detail Intervention: OT Clinic with 5 peers. Pt participated in a OT Clinic group to facilitate coping skills exploration and creative expression through personally meaningful activities, and to encourage utilization of these healthy coping skills to promote overall health and wellness. Group included clinical observation of social, cognitive and kinesthetic performance skills to inform treatment and safe discharge planning.    Patient Response: Joined clinic group and initiated working on a project, ind to gather materials and begin working. Completed two projects, afterwards sharing with writer and other peers sitting nearby the meaning behind the project. Shared that one was a representation of what he sees while meditating and the other a landscape that the patient finds relaxing, noting to at times enjoy getting out of the city and spending time out in the country.     Mood/Affect: Pleasant       Plan: Patient encouraged to maintain attendance for continued ongoing support in working towards occupational therapy goals to support overall treatment/care.

## 2024-02-22 NOTE — PLAN OF CARE
Alertness: Alert and oriented   Affect: flat  Mood: calm and cooperative  Appearance: unkempt  Thought Process: his thoughts continue to be disorganized, but didn't seem paranoid this evening  Anxiety, depression, SI,SIB,HI: He denied all MH symptoms.  Social: Social with peers and went to evening group  Skin: WNL  Bowel/bladder: Patient reported that he is feeling constipated and reported abdominal pain. Oncall provider was paged and ordered Miralax BID PRN.   Dinner: 100%  Vital signs/pain: bp slightly elevated, but he was up and moving. 142/78. Patient reported abdominal pain r/t constipation.  Medications: medication compliant   PRN's this shift: Miralax, patient reported he is passing gas.  New Provider order: Court hold, Writer received a call from Dana who is with [facility] verbalized [place] is now on a court hold as of 2/22/2024 at 1608. Miralax BID PRN.

## 2024-02-22 NOTE — PROGRESS NOTES
received a call from Dana who is with [facility] courts verbalized [place] is now on a court hold as of 2/22/2024 at 1608.

## 2024-02-22 NOTE — PLAN OF CARE
"Alertness: Alert and oriented  Affect: flat  Mood: calm  Appearance: unkempt  Thought Process: Patient is disorganized and paranoid. Patient talk about how everyone is on different vibrations which causes different perceptions and emotions. He stated, \" I believe people who He is paranoid about what other peers think about about him stating, \" Sometimes I worry about what other people think of me, like if I talk to much. I really want to understand peoples body language so I know how to better communicate with others.\" Writer brought up our conversation that we had yesterday and how he felt like he woke up in hell and saw dead people. Patient verbalized he doesn't remember that, but also stated, \" I don't remember a lot because I try to suppress my thought because I know they aren't real. Some people with schizophrenia talk out loud about their thoughts, but I don't because I know what I think is not real.\" Patient reported feeling like \" an alien,\" because he thinks differently than other people. Patient admitted to auditory hallucinations, but refers them to as \" thoughts in his head that are his own.\" Patient reported visual hallucinations the day he came in, but denied visual hallucination today stating, \" When I came in I saw writing on the floor that said FUCK YOU and I LOVE YOU.\" Patient was inappropriately focused on discharge. Writer talked with him about his living situation, reminding him about resources we offer, he was at [facility], but if he wasn't their he would potentially by homeless. Patient agreed and stated, \" maybe I will pack up and start over somewhere else.\" Patient opened up about his family and how he doesn't have much of a support system stating, \" my father is in detention, my mom lives around here but her boyfriend doesn't like me, and my siblings have their own lives.\"  Anxiety, depression, SI,SIB,HI: Patient reported anxiety, but denied depression, SI,SIB,HI.   Social: social with peer " and going to group.  Skin: No issues reported  Bowel/bladder: patient reported he only has a BM once a week, that is why he requested senna.  Breakfast and lunch: 100% of both  Vital signs/pain: BP was slightly elevated 145/97. Recheck 120/77.  Medications: Med compliant  PRN's this shift: None

## 2024-02-22 NOTE — PLAN OF CARE
Problem: Psychotic Signs/Symptoms  Goal: Improved Mood Symptoms (Psychotic Signs/Symptoms)  Outcome: Not Progressing     Problem: Psychotic Signs/Symptoms  Goal: Optimal Cognitive Function (Psychotic Signs/Symptoms)  Intervention: Support and Promote Cognitive Ability  Recent Flowsheet Documentation  Taken 2/21/2024 2110 by Juan Carlos Curtis RN  Trust Relationship/Rapport:   care explained   choices provided   emotional support provided   questions encouraged   Goal Outcome Evaluation:    Plan of Care Reviewed With: patient          Patient was present in the unit. He is calm, restrictive and guarded, Patient seem to be paranoid and disorganized. Was flights of thoughts. Was talking having strong thought about Latter-day. Pt also mentioned of hearing voices of people how have caused pain to him at the past.  Speech was soft quiet, patient is withdrawn and isolated. Patient is independent of all his cares. Denies pain and discomfort. Denies SI/SIB/HI. Patient is medication compliant no prn this shift, no stated side effects, meal consumption was adequate.

## 2024-02-22 NOTE — PROGRESS NOTES
"PSYCHIATRY  PROGRESS NOTE     DATE OF SERVICE   02/22/24          CHIEF COMPLAINT   \"Good.\"       SUBJECTIVE   Nursing reports:    Patient was present in the unit. He is calm, restrictive and guarded, Patient seem to be paranoid and disorganized. Was flights of thoughts. Was talking having strong thought about Taoist. Pt also mentioned of hearing voices of people how have caused pain to him at the past.  Speech was soft quiet, patient is withdrawn and isolated. Patient is independent of all his cares. Denies pain and discomfort. Denies SI/SIB/HI. Patient is medication compliant no prn this shift, no stated side effects, meal consumption was adequate.    Patient slept all night, and did not seem to be in any distress during safety rounding. No pain was reported and no PRN was administered. He was calm throughout the shift. We will continue to observe and treat according to the dictates of his care plan.      reports:     -Attending Provider: CARISSA Davis CNP  -Voicemail Code: [id number]#  -Team Note Due: Tuesday  -Next Steps:     Coordinate with [facility] regarding petition for commitment.           Assessment/Intervention/Current Symtoms and Care Coordination:  Chart review and met with team, discussed pt progress, symptomology, and response to treatment.  Discussed the discharge plan and any potential impediments to discharge.  CTC coordinated with Justin from pre petition. CTC was informed by Justin that the commitment was going to be supported and the court order should arrive later in the afternoon.      Discharge Plan or Goal:  Pending further stabilization, continued compliance with medications, continued activities of daily living, and development of a safe discharge plan.   Considerations:      Barriers to Discharge:  Impact of mental health symptoms on well being   Impact of mental health symptoms on activities of daily living  Need for medication monitoring and medication " "management     Referral Status:        Legal Status:  Court Hold   County: [place]  File Number:   Start and expiration date of commitment:      Contacts:        Upcoming Meetings and Dates/Important Information:        -Still Needed on AVS:           OBJECTIVE   Met with patient in OT room of unit 10 [address]. Upon patient interview, patient reports their mood as, \" good.\"  Patient's affect appears blunted and guarded.  Patient's speech is slightly rapid.  Patient's thought process is circumstantial, tangential, difficult to follow, perseverative, and disorganized.  Patient also continues to endorse delusional thoughts that he is affected by other's emotions and that his presence has an impact on other people's thoughts and emotions.  Patient also reports that he is experiencing \"Seeing a lot of trends and synchronicity.\"  Patient also endorses preoccupations regarding a group of people holding a grudge against him in the community.  Patient reports for this reason that he hopes to move to the country or to a different location.  Patient reports that his only intention is to help people and denies any HI.  Patient does endorse experiencing \"lupe-based delusions.\"  Denies any thoughts of SI, SIB, SA, intent or plan. Patient able to contract for safety. Patient denies auditory hallucinations or visual hallucinations today however endorses that he has experienced this in the past.  Patient reports that he is able to manage his mental health symptoms by suppressing these thoughts.  Patient denies depression or anxiety today.  Patient endorses sleeping well overnight and reports that he slept well in a room by himself. Patient reports appetite continues to be decreased.  Patient has previously reported delusional thought that when he is eating food he believes he is \"eating himself.\"  Today, he reports these thoughts are no longer happening.  He does report that he attempts to restrict himself from eating in order to control " "his weight.  Provider discussed with patient that upon admission he has met criteria for moderate malnutrition; provider encouraged patient to eat three well-balanced meals with supplemental drinks per dietitian recommendation to address this.  Patient verbalizes understanding and is in agreement with this.  Per nursing report patient has been eating 100% of meals and drinking Ensure.  Plan will be to continue to monitor p.o. intake closely.  Patient endorses last bowel movement was 3 days ago; yesterday provider initiated senna-docusate 8.6-50 mg 1-2 tablets PO 2 times daily to address reported constipation symptoms.  Today added PRN MiraLAX 17 g BID to address and updated nursing staff of this. Patient denies any issues with urination.  Patient vital signs reviewed and noted to be stable. Patient denies any medical concerns today. Patient has no other questions or concerns.  Provider discussed initiation of a vitamin D supplement and patient in agreement with this.  Subsequently provider ordered vitamin D3 capsule 1250 mcg PO every 7 days for 8 doses to address vitamin D deficiency.  Patient denies any side effects of current medication regimen.  Patient also reports that since initiating olanzapine \"the feeling in my chest has gone away.\"  Patient reports that he recognizes this as a benefit of the medication.  Writer provided education regarding medication olanzapine and its indication for treating psychosis and mood disorder symptoms.  Today, plan will be to continue olanzapine ODT tablet 15 mg PO at bedtime to target psychosis and mood disorder symptoms.  Patient has been compliant with this medication.  Patient offers no other questions or concerns.  Patient does report that he would like to return to [facility] when psychiatrically stable and then has plans to move to \"[facility] homes.\"  Plan will be for unit CTC to follow-up with patient regarding discharge planning.    16:08: Provider " "completed petition for mental health commitment paperwork with [facility] order application for [facility] yesterday 2/21/2024.  Today, [facility] contacted unit [address] with update that they are supporting petition for mental health commitment with [employer].  Subsequently legal status updated to court hold.       MEDICATIONS   Medications:  Scheduled Meds:    cholecalciferol  1,250 mcg Oral Q7 Days     multivitamin, therapeutic  1 tablet Oral Daily     OLANZapine zydis  15 mg Oral At Bedtime     senna-docusate  1-2 tablet Oral BID     Continuous Infusions:  PRN Meds:.acetaminophen, alum & mag hydroxide-simethicone, hydrOXYzine HCl, melatonin, nicotine, OLANZapine **OR** OLANZapine    Medication adherence issues: MS Med Adherence Y/N: Yes, patient reports history of noncompliance with psychiatric medications in the community.  Patient is cooperative with meds in the hospital currently  Medication side effects: MEDICATION SIDE EFFECTS: no side effects reported  Benefit: Yes / No: Yes       ROS   Pertinent items are noted in HPI.       MENTAL STATUS EXAM   Vitals: /77   Pulse 77   Temp 97.3  F (36.3  C) (Oral)   Resp 18   Ht 1.93 m (6' 3.98\")   Wt 95.2 kg (209 lb 14.4 oz)   SpO2 100%   BMI 25.56 kg/m      Appearance:  Disheveled  Mood: Reports, \"good\"  Affect: blunted and guarded  was congruent to speech  Suicidal Ideation: PRESENT / ABSENT: absent   Homicidal Ideation: PRESENT / ABSENT: absent   Thought process: circumstantial, tangential, difficult to follow, perseverative, and disorganized   Thought content: significant for delusions, preoccupations, and paranoid ideation.   Fund of Knowledge: Average  Attention/Concentration: Poor  Language ability:  Intact, speech is slightly rapid  Memory: Intact  Insight:  limited.  Judgement: limited  Orientation: Yes, x4  Psychomotor Behavior: fidgety    Muscle Strength and Tone: MuscleStrength: Normal  Gait and Station: Normal       LABS   personally reviewed. "     Vitamin D level noted to be low at 10 ng/mL; supplement ordered to address vitamin D deficiency.     Latest Reference Range & Units 02/17/24 13:58 02/17/24 15:16 02/18/24 11:07 02/18/24 14:37 02/19/24 02:49 02/20/24 07:44   Sodium 135 - 145 mmol/L  140 143      Potassium 3.4 - 5.3 mmol/L  3.6 3.8      Chloride 98 - 107 mmol/L  104 108 (H)      Carbon Dioxide (CO2) 22 - 29 mmol/L  26 28      Urea Nitrogen 6.0 - 20.0 mg/dL  6.2 8.7      Creatinine 0.67 - 1.17 mg/dL  0.88 0.94      GFR Estimate >60 mL/min/1.73m2  >90 >90      Calcium 8.6 - 10.0 mg/dL  9.4 9.2      Anion Gap 7 - 15 mmol/L  10 7      Magnesium 1.7 - 2.3 mg/dL  1.9       Albumin 3.5 - 5.2 g/dL  4.4 4.1      Protein Total 6.4 - 8.3 g/dL  7.0 6.5      Alkaline Phosphatase 40 - 150 U/L  49 44      ALT 0 - 70 U/L  6 7      AST 0 - 45 U/L  11 13      Bilirubin Total <=1.2 mg/dL  1.3 (H) 1.2      Cholesterol <200 mg/dL      135   Glucose 70 - 99 mg/dL  104 (H) 135 (H)      HDL Cholesterol >=40 mg/dL      36 (L)   Hemoglobin A1C <5.7 %      4.8   LDL Cholesterol Calculated <=100 mg/dL      84   Non HDL Cholesterol <130 mg/dL      99   Triglycerides <150 mg/dL      75   TSH 0.30 - 4.20 uIU/mL      2.08   Vitamin D, Total (25-Hydroxy) 20 - 50 ng/mL      10 (L)   WBC 4.0 - 11.0 10e3/uL  5.4       Hemoglobin 13.3 - 17.7 g/dL  16.9       Hematocrit 40.0 - 53.0 %  46.5       Platelet Count 150 - 450 10e3/uL  232       RBC Count 4.40 - 5.90 10e6/uL  5.49       MCV 78 - 100 fL  85       MCH 26.5 - 33.0 pg  30.8       MCHC 31.5 - 36.5 g/dL  36.3       RDW 10.0 - 15.0 %  11.9       % Neutrophils %  78       % Lymphocytes %  13       % Monocytes %  7       % Eosinophils %  1       % Basophils %  1       Absolute Basophils 0.0 - 0.2 10e3/uL  0.0       Absolute Eosinophils 0.0 - 0.7 10e3/uL  0.1       Absolute Immature Granulocytes <=0.4 10e3/uL  0.0       Absolute Lymphocytes 0.8 - 5.3 10e3/uL  0.7 (L)       Absolute Monocytes 0.0 - 1.3 10e3/uL  0.4       % Immature  "Granulocytes %  0       Absolute Neutrophils 1.6 - 8.3 10e3/uL  4.2       Absolute NRBCs 10e3/uL  0.0       NRBCs per 100 WBC <1 /100  0       SARS CoV2 PCR Negative      Negative    Amphetamine Qual Urine Screen Negative  Screen Negative        Fentanyl Qual Urine Screen Negative  Screen Negative        Cocaine Urine Screen Negative  Screen Negative        Benzodiazepine Urine Screen Negative  Screen Negative        Opiates Qualitative Urine Screen Negative  Screen Negative        PCP Urine Screen Negative  Screen Negative        Cannabinoids Qual Urine Screen Negative  Screen Positive !        Barbiturates Qual Urine Screen Negative  Screen Negative        EKG 12-LEAD, TRACING ONLY     Rpt     (H): Data is abnormally high  (L): Data is abnormally low  !: Data is abnormal  Rpt: View report in Results Review for more information  No results found for: \"PHENYTOIN\", \"PHENOBARB\", \"VALPROATE\", \"CBMZ\"       DIAGNOSIS   Principal Problem:    Schizoaffective disorder, bipolar type, currently hypomanic  History of schizophrenia  PTSD  R/O OCD    History of generalized anxiety disorder with panic attacks  History of ADHD    Clinically Significant Risk Factors                         # Overweight: Estimated body mass index is 25.56 kg/m  as calculated from the following:    Height as of this encounter: 1.93 m (6' 3.98\").    Weight as of this encounter: 95.2 kg (209 lb 14.4 oz)., PRESENT ON ADMISSION  # Moderate Malnutrition: based on nutrition assessment, PRESENT ON ADMISSION         Active Problem List:  Patient Active Problem List   Diagnosis     Irritable bowel syndrome     Class 2 severe obesity due to excess calories with serious comorbidity and body mass index (BMI) of 37.0 to 37.9 in adult (H)     Patellofemoral instability of left knee with pain     Tobacco abuse     Suicidal ideation     Moderate episode of recurrent major depressive disorder (H)     CARSON (obstructive sleep apnea)     Unspecified psychosis not due to " a substance or known physiological condition (H)     Marijuana use     Schizophrenia, unspecified type (H)          HOSPITAL COURSE AND ASSESSMENT   This is a 33 year old male with history of schizophrenia, MDD, suicidal ideation, and PTSD who presented to [facility] ED on 2/17/2024 via EMS for psychiatric evaluation.  [facility] and was experiencing symptoms of psychosis including disorganized, paranoid, and delusional thoughts.  Patient also exhibiting poor p.o. intake and not attending to ADLs such as bathing.  Patient has a history of Patellofemoral instability of left knee and requires a brace to stabilize.  Per chart review patient was not taking any psychiatric medications prior to admission.  Patient endorsed some cannabis use and urine drug screening negative for all except cannabinoids.  While admitted to the ED psychiatry provider was consulted and initiated olanzapine to target psychosis symptoms.  Patient was also started on Anafranil to target OCD symptoms.  Patient was evaluated by medical provider in the ED and determined to be medically stable.  Patient subsequently admitted to inpatient psychiatry unit 10 N for further psychiatric stabilization.  Current legal status is 72-hour hold.  Olanzapine started in the ED to target psychosis symptoms and plan will be to titrate to therapeutic dose while monitoring for side effects.  Psychiatric provider initiated petition for mental health commitment with Mg on 2/20/2024.  Trial of Anafranil initiated in the ED to target OCD symptoms however this was discontinued due to risk for worsening psychosis symptoms.        PLAN   1. Ongoing education given regarding diagnostic and treatment options with risks, benefits and alternatives and adequate verbalization of understanding.  2.  Medications:  Continue multivitamin 1 tab PO daily  Initiate cholecalciferol (vitamin D3) capsule 1250 mcg PO every 7 days for 8  doses to target vitamin D deficiency, administer immediately after supper  Continue olanzapine ODT tablet 15 mg PO at bedtime to target psychosis symptoms and mood disorder symptoms  PRN hydroxyzine tablet 25 mg PO every 4 hours as needed for anxiety  PRN olanzapine tablet 10 mg PO every 3 hours as needed for agitation, ordered linked with olanzapine IM injection  PRN melatonin tablet 3 mg PO at bedtime as needed for sleep  PRN nicotine lozenge 2 mg buccal every 1 hour as needed for nicotine withdrawal symptoms    3.  Labs/Imaging:  -Vitamin D level ordered as an add-on on 2/20/2024    4. Consults:  -Hospitalist to follow as needed    5. Precautions:  -Self-injury precautions, suicide precautions; risk moderate    6. Legal:  - Court hold with [facility]    7. Discharge planning:  -Per unit CTC        Risk Assessment: [facility] RISK ASSESSMENT: Patient able to contract for safety and Patient on precautions    Coordination of Care:   Treatment Plan reviewed and physician signed, Care discussed with Care/Treatment Team Members, Chart reviewed and Patient seen      Re-Certification I certify that the inpatient psychiatric facility services furnished since the previous certification were, and continue to be, medically necessary for, either, treatment which could reasonably be expected to improve the patient s condition or diagnostic study and that the hospital records indicate that the services furnished were, either, intensive treatment services, admission and related services necessary for diagnostic study, or equivalent services.     I certify that the patient continues to need, on a daily basis, active treatment furnished directly by or requiring the supervision of inpatient psychiatric facility personnel.   I estimate 14-21 days of hospitalization is necessary for proper treatment of the patient. My plans for post-hospital care for this patient are   LOTTIE Cronin, CARISSA CNP    -     02/22/24       -      1:00 PM       Total time  35 minutes with > 50%spent on coordination of cares and psycho-education.    This note was created with help of Dragon dictation system. Grammatical / typing errors are not intentional.    CARISSA Mendieta CNP

## 2024-02-22 NOTE — CONSULTS
Consulted by Lily Cronin to run a test claim for Invega PO & AMADO.    Patient has pharmacy benefits through [employer]. Per insurance, the following are covered under the pharmacy benefits:     Invega Sustenna - $0     The following are not covered and requires prior authorization:  Paliperidone ER tabs  This process has been started--please see separate notes linked from Prior Authorization Encounter for details/updates regarding this PA.      If patient is receiving this injection in-clinic, medication will need to be billed under medical benefits. Discharge pharmacy liaison is unable to verify coverage under the medical benefit. To verify coverage under medical benefits, patient, SW, RNCC, CM, or other member of care team may::    Contact Member Services department of patient's insurance, OR   Complete a cost of care estimate request by calling [phone] or via [email or web address]  Only valid to check benefits if receiving injection at an [facility] or Infusion Site      When an outpatient order for this injection is placed in chart along with an appointment for administration at an [facility]/infusion center, coverage will be secured by the CAM and Infusion Finance teams. Additional questions should be directed to the CAM or Infusion Finance teams:     CAM Finance team Infusion Finance team   Epic Pool: P CAM  P INFUSION FINANCE SPECIALISTS   Email Group: DEPT-CAM-FINANCE-SPECIALIST DEPT-INF-FINANCE-[id number]   Phone: [phone]         Please feel free to contact me with any other test claims, prior authorizations, or insurance questions regarding outpatient medications.     Thanks!      Rosalia Seaman CPhT  Discharge Pharmacy Liaison  [facility]  Pronouns: She/Her/Hers    Securely message with iMusic[employer]weet, Epic Secure Chat, or [employer]  Teams  Phone: [phone]  Fax: [phone]  [email or web address]

## 2024-02-22 NOTE — PLAN OF CARE
Problem: Adult Inpatient Plan of Care  Goal: Optimal Comfort and Wellbeing  Outcome: Progressing     Problem: Adult Behavioral Health Plan of Care  Goal: Adheres to Safety Considerations for Self and Others  Intervention: Develop and Maintain Individualized Safety Plan  Recent Flowsheet Documentation  Taken 2/22/2024 0400 by Vangie Allan RN  Safety Measures: safety rounds completed  Goal: Absence of New-Onset Illness or Injury  Outcome: Progressing  Intervention: Identify and Manage Fall Risk  Recent Flowsheet Documentation  Taken 2/22/2024 0400 by Vangie Allan RN  Safety Measures: safety rounds completed     Problem: Psychotic Signs/Symptoms  Goal: Improved Sleep (Psychotic Signs/Symptoms)  Intervention: Promote Healthy Sleep Hygiene  Recent Flowsheet Documentation  Taken 2/22/2024 0400 by Vangie Allan RN  Sleep Hygiene Promotion: regular sleep pattern promoted     Problem: Anxiety Signs/Symptoms  Goal: Improved Sleep (Anxiety Signs/Symptoms)  Intervention: Promote Healthy Sleep Hygiene  Recent Flowsheet Documentation  Taken 2/22/2024 0400 by Vangie Allan RN  Sleep Hygiene Promotion: regular sleep pattern promoted     Problem: Suicide Risk  Goal: Absence of Self-Harm  Outcome: Progressing   Goal Outcome Evaluation:       Patient slept all night, and did not seem to be in any distress during safety rounding. No pain was reported and no PRN was administered. He was calm throughout the shift. We will continue to observe and treat according to the dictates of his care plan.

## 2024-02-22 NOTE — PLAN OF CARE
BEH IP Unit Acuity Rating Score (UARS)  Patient is given one point for every criteria they meet.    CRITERIA SCORING   On a 72 hour hold, court hold, committed, stay of commitment, or revocation. 1    Patient LOS on BEH unit exceeds 20 days. 0  LOS: 3   Patient under guardianship, 55+, otherwise medically complex, or under age 11. 0   Suicide ideation without relief of precipitating factors. 0   Current plan for suicide. 0   Current plan for homicide. 0   Imminent risk or actual attempt to seriously harm another without relief of factors precipitating the attempt. 0   Severe dysfunction in daily living (ex: complete neglect for self care, extreme disruption in vegetative function, extreme deterioration in social interactions). 1   Recent (last 7 days) or current physical aggression in the ED or on unit. 0   Restraints or seclusion episode in past 72 hours. 0   Recent (last 7 days) or current verbal aggression, agitation, yelling, etc., while in the ED or unit. 0   Active psychosis. 1   Need for constant or near constant redirection (from leaving, from others, etc).  0   Intrusive or disruptive behaviors. 0   Patient requires 3 or more hours of individualized nursing care per 8-hour shift (i.e. for ADLs, meds, therapeutic interventions). 0   TOTAL 3

## 2024-02-23 PROCEDURE — 124N000002 HC R&B MH UMMC

## 2024-02-23 PROCEDURE — 250N000013 HC RX MED GY IP 250 OP 250 PS 637: Performed by: PSYCHIATRY & NEUROLOGY

## 2024-02-23 PROCEDURE — 250N000013 HC RX MED GY IP 250 OP 250 PS 637

## 2024-02-23 PROCEDURE — G0177 OPPS/PHP; TRAIN & EDUC SERV: HCPCS

## 2024-02-23 PROCEDURE — 99232 SBSQ HOSP IP/OBS MODERATE 35: CPT

## 2024-02-23 RX ORDER — POLYETHYLENE GLYCOL 3350 17 G/17G
17 POWDER, FOR SOLUTION ORAL DAILY
Status: DISCONTINUED | OUTPATIENT
Start: 2024-02-23 | End: 2024-03-01

## 2024-02-23 RX ORDER — POLYETHYLENE GLYCOL 3350 17 G/17G
17 POWDER, FOR SOLUTION ORAL DAILY PRN
Status: DISCONTINUED | OUTPATIENT
Start: 2024-02-23 | End: 2024-03-18 | Stop reason: HOSPADM

## 2024-02-23 RX ADMIN — SENNOSIDES AND DOCUSATE SODIUM 1 TABLET: 8.6; 5 TABLET ORAL at 08:28

## 2024-02-23 RX ADMIN — SENNOSIDES AND DOCUSATE SODIUM 2 TABLET: 8.6; 5 TABLET ORAL at 19:32

## 2024-02-23 RX ADMIN — OLANZAPINE 15 MG: 15 TABLET, ORALLY DISINTEGRATING ORAL at 19:32

## 2024-02-23 RX ADMIN — THERA TABS 1 TABLET: TAB at 08:29

## 2024-02-23 ASSESSMENT — ACTIVITIES OF DAILY LIVING (ADL)
ADLS_ACUITY_SCORE: 51
ADLS_ACUITY_SCORE: 51
ADLS_ACUITY_SCORE: 45
ADLS_ACUITY_SCORE: 51
ADLS_ACUITY_SCORE: 45
ADLS_ACUITY_SCORE: 51
ORAL_HYGIENE: INDEPENDENT
ADLS_ACUITY_SCORE: 51
ADLS_ACUITY_SCORE: 45
HYGIENE/GROOMING: INDEPENDENT
ADLS_ACUITY_SCORE: 51
DRESS: SCRUBS (BEHAVIORAL HEALTH)
ADLS_ACUITY_SCORE: 45
ADLS_ACUITY_SCORE: 51

## 2024-02-23 NOTE — CARE PLAN
02/23/24 1200   Group Therapy Session   Group Attendance attended group session   Time Session Began 1015   Time Session Ended 1145   Total Time (minutes) 90   Total # Attendees 2-4   Group Type task skill;expressive therapy   Group Topic Covered problem-solving;emotions/expression   Group Session Detail OT Clinic   Patient Response/Contribution cooperative with task   Patient Participation Detail See Note     Pt actively participated in occupational therapy clinic to facilitate coping skill exploration, creative expression within personally meaningful activities, and clinical observation of social, cognitive, and kinesthetic performance skills. Pt response: Needed some assistance to initiate, gather materials, sequence, and adjust to workspace demands as needed. Demonstrated good focus, planning, and problem solving for selected canvas painting and watercolor task. Able to ask for assistance as needed, and social with peers and staff. Pt was not always on topic and appears to have limited insight into current issues at this time. Pt will continue to be encouraged to attend groups for further asssesssment and to address goals identified on plan of care.         Afternoon group:   [id number]  Pt attended wellness/exercise group independently.  Pt was cooperative with group, however left early and then had to take a phone call.  Pt not charged for this group.

## 2024-02-23 NOTE — PLAN OF CARE
BEH IP Unit Acuity Rating Score (UARS)  Patient is given one point for every criteria they meet.    CRITERIA SCORING   On a 72 hour hold, court hold, committed, stay of commitment, or revocation. 1    Patient LOS on BEH unit exceeds 20 days. 0  LOS: 4   Patient under guardianship, 55+, otherwise medically complex, or under age 11. 0   Suicide ideation without relief of precipitating factors. 0   Current plan for suicide. 0   Current plan for homicide. 0   Imminent risk or actual attempt to seriously harm another without relief of factors precipitating the attempt. 0   Severe dysfunction in daily living (ex: complete neglect for self care, extreme disruption in vegetative function, extreme deterioration in social interactions). 1   Recent (last 7 days) or current physical aggression in the ED or on unit. 0   Restraints or seclusion episode in past 72 hours. 0   Recent (last 7 days) or current verbal aggression, agitation, yelling, etc., while in the ED or unit. 0   Active psychosis. 1   Need for constant or near constant redirection (from leaving, from others, etc).  0   Intrusive or disruptive behaviors. 0   Patient requires 3 or more hours of individualized nursing care per 8-hour shift (i.e. for ADLs, meds, therapeutic interventions). 0   TOTAL 3

## 2024-02-23 NOTE — PROGRESS NOTES
Prior Authorization **APPROVED**    Authorization Effective Date: 1/23/2024  Authorization Expiration Date: 2/23/2025  Medication: PALIPERIDONE ER 6 MG PO TB24  Approved Dose/Quantity: 1 tablet daily  Reference #: CoverMyMeds Key: [id number] - [place] Case ID: [id number]   Insurance Company: [employer] - Phone [phone] Fax [phone]  Expected CoPay: $ 0    CoPay Card Available: No    Foundation Assistance Needed: -  Which Pharmacy is filling the prescription (Not needed for infusion/clinic administered): n/a - Patient has not yet been discharged, and there are no outpatient orders for this medication yet  Comments:  Proactive Prior Authorization          Rosalia Seaman [facility]  Discharge Pharmacy Liaison  [facility] [place]  Pronouns: She/Her/Hers    Securely message with [employer], Epic Secure Chat, or [employer]  Phone: [phone]  Fax: [phone]  [email or web address]

## 2024-02-23 NOTE — PROGRESS NOTES
02/22/24 1500   Group Therapy Session   Group Attendance attended group session   Time Session Began 1415   Time Session Ended 1500   Total Time (minutes) 45   Total # Attendees 5   Group Type psychotherapeutic;psychoeducation   Group Topic Covered anger/conflict management;coping skills/lifestyle management;emotions/expression   Group Session Detail Group members discussed anger, how it shows up in the body, how it gets expressed in emoitns and behaviors. Group talked about coping skills to work through the feelings of anger and the primary emotions related to the anger.   Patient Response/Contribution able to recall/repeat info presented;confronted peers appropriately;cooperative with task;discussed personal experience with topic;expressed understanding of topic;listened actively;organized   Patient Participation Detail Pt attended group and was engaged in the conversation, often sharing insightful responses to both the topic and with what peers shared. pt was pleasant andapprpriate during group.

## 2024-02-23 NOTE — PLAN OF CARE
-Attending Provider: CARISSA Davis CNP  -Voicemail Code: [id number]  -Team Note Due: Tuesday  -Next Steps:    Coordinate with [facility] regarding petition for commitment.  Coordinate referral to [employer] [facility]         Assessment/Intervention/Current Symtoms and Care Coordination:  Chart review and met with team, discussed pt progress, symptomology, and response to treatment.  Discussed the discharge plan and any potential impediments to discharge.  [facility] called [facility] to request copy of court order. [facility] received fax of court hold order and put copy if paper chart.   [facility] updated Higinio and offered to meet to discuss his concerns regarding his belongings. Higinio was in the middle of playing board game with a peer. Higinio contracted plan to follow up with [facility] when he was done playing game.       Discharge Plan or Goal:  Pending further stabilization, continued compliance with medications, continued activities of daily living, and development of a safe discharge plan.   Considerations:     Barriers to Discharge:  Impact of mental health symptoms on well being   Impact of mental health symptoms on activities of daily living  Need for medication monitoring and medication management     Referral Status:       Legal Status:  Court Hold   [facility]: Davis  File Number:   Start and expiration date of commitment:     Contacts:        Upcoming Meetings and Dates/Important Information:      -Still Needed on AVS:

## 2024-02-23 NOTE — PROGRESS NOTES
02/22/24 1800    Group Therapy Session   Time Session Began 1600   Time Session Ended 1650   Total Time (minutes) 40   Total # Attendees 4   Group Type expressive therapy   Group Topic Covered balanced lifestyle;relaxation techniques   Group Session Detail Mindfulness   Patient Response/Contribution cooperative with task   Patient Participation Detail Cooperatively engaged in Evening Music Relaxation group to decrease anxiety and promote Mindfulness.  Calm affect, appropriately engaged in session, responding well to the music.     Also engaged in playing the [employer] in group this afternoon, knows some basics and is working on building mastery and self-esteem through instrument play.

## 2024-02-23 NOTE — PROGRESS NOTES
"PSYCHIATRY  PROGRESS NOTE     DATE OF SERVICE   02/23/24          CHIEF COMPLAINT   \"I have delusional thoughts that are lupe-based, and I am not having that here.\"       SUBJECTIVE   Nursing reports:    Alertness: Alert and oriented   Affect: flat  Mood: calm and cooperative  Appearance: unkempt  Thought Process: his thoughts continue to be disorganized, but didn't seem paranoid this evening  Anxiety, depression, SI,SIB,HI: He denied all MH symptoms.  Social: Social with peers and went to evening group  Skin: WNL  Bowel/bladder: Patient reported that he is feeling constipated and reported abdominal pain. Oncall provider was paged and ordered Miralax BID PRN.   Dinner: 100%  Vital signs/pain: bp slightly elevated, but he was up and moving. 142/78. Patient reported abdominal pain r/t constipation.  Medications: medication compliant   PRN's this shift: Miralax, patient reported he is passing gas.  New Provider order: Court hold, Writer received a call from Dana who is with [facility] courts verbalized Higinio is now on a court hold as of 2/22/2024 at 1608. Miralax BID PRN.     Patient slept all night without any suicidal behavior. No pain was reported and, no PRN medication was administered. Safety checks were conducted every 15 minutes, and patient did not seem to be in any distress. We will continue to monitor and report changes.        reports:         -Attending Provider: CARISSA Davis CNP  -Voicemail Code: [id number]  -Team Note Due: Tuesday  -Next Steps:     Coordinate with [facility] regarding petition for commitment.  Coordinate referral to [employer] [facility]            Assessment/Intervention/Current Symtoms and Care Coordination:  Chart review and met with team, discussed pt progress, symptomology, and response to treatment.  Discussed the discharge plan and any potential impediments to discharge.  [facility] called [facility] to request copy of court order. [facility] received fax of court " "hold order and put copy if paper chart.   [facility] updated Higinio and offered to meet to discuss his concerns regarding his belongings. Higinio was in the middle of playing board game with a peer. Higinio contracted plan to follow up with [facility] when he was done playing game.       Discharge Plan or Goal:  Pending further stabilization, continued compliance with medications, continued activities of daily living, and development of a safe discharge plan.   Considerations:      Barriers to Discharge:  Impact of mental health symptoms on well being   Impact of mental health symptoms on activities of daily living  Need for medication monitoring and medication management     Referral Status:        Legal Status:  Court Hold   County: [place]  File Number:   Start and expiration date of commitment:      Contacts:        Upcoming Meetings and Dates/Important Information:        -Still Needed on AVS:                 OBJECTIVE   Met with patient in hospital room of unit [address]. Upon patient interview, patient reports, \" I have delusional thoughts that are lupe based and I am not having that here.\"  Patient's affect appears blunted and guarded however improving.  Patient's mood appears slightly anxious.  Patient's speech is slightly rapid.  Patient's thought process is circumstantial, tangential, difficult to follow, perseverative, and disorganized.  Patient  continues to endorse delusional thoughts that he is affected by other's emotions and that his presence has an impact on other people's thoughts and emotions.  Patient also reports that he is experiencing paranoid delusional thoughts regarding the government however does not elaborate on this.  Patient also endorses preoccupations and guilt as he reports belief that his presence causes bad things to happen to other people; patient also recognizes that these thoughts are not reality however he has thoughts of this nature.  Patient reports that his only intention is to help people and " "denies any HI.  Denies any thoughts of SI, SIB, SA, intent or plan. Patient able to contract for safety. Patient denies auditory hallucinations or visual hallucinations today however endorses that he has experienced this in the past.  Patient reports that he is able to manage his mental health symptoms by suppressing these thoughts.  Patient denies depression or anxiety today.  Patient endorses sleeping well overnight and reports that he slept well in a room by himself. Patient reports appetite continues to be decreased.  Patient has previously reported delusional thought that when he is eating food he believes he is \"eating himself.\"  Today, he reports these thoughts are no longer happening.  Today patient reports his appetite is decreased due to constipation symptoms.   Patient endorses last bowel movement was 3-4 days ago; provider previously initiated senna-docusate 8.6-50 mg 1-2 tablets PO 2 times daily to address reported constipation symptoms.  Today provider discussed option of scheduling MiraLAX 17 g once daily to address and updated nursing staff of this.  Patient in agreement with scheduled MiraLAX and does report that he is passing gas and denies abdominal pain currently.  Provider discussed option of completing an abdominal x-ray to further evaluate reported constipation however patient refuses.  Provider discussed with nursing staff that if patient does not endorse having a bowel movement by tomorrow recommendation will be to obtain internal medicine consult to address constipation further.  Patient does report that at baseline he experiences constipation.  Patient denies any issues with urination.  Patient vital signs reviewed and noted to be stable. Patient denies any other medical concerns today. Patient has no other questions or concerns.  Patient denies any side effects of current medication regimen.  Today, plan will be to continue olanzapine ODT tablet 15 mg PO at bedtime to target psychosis and " "mood disorder symptoms.  Today patient reports that he has been noncompliant with medications in the past due to, \"There is sugar in the pills.\"  Patient reports that due to this he is concerned that medications will cause diabetes or other medical complications.  Patient has been compliant with psychiatric medications in the hospital however continues to report that he has not needed psychiatric medications in the community due to his ability to \"control my thoughts.\"  Patient reports awareness that he is currently on a court hold and reports that if the mental health commitment is supported it may be helpful for him as he has been struggling in the community with obtaining stable housing as well as mental health support. Patient offers no other questions or concerns.         MEDICATIONS   Medications:  Scheduled Meds:    cholecalciferol  1,250 mcg Oral Q7 Days     multivitamin, therapeutic  1 tablet Oral Daily     OLANZapine zydis  15 mg Oral At Bedtime     polyethylene glycol  17 g Oral Daily     senna-docusate  1-2 tablet Oral BID     Continuous Infusions:  PRN Meds:.acetaminophen, alum & mag hydroxide-simethicone, hydrOXYzine HCl, melatonin, nicotine, OLANZapine **OR** OLANZapine, polyethylene glycol    Medication adherence issues: MS Med Adherence Y/N: Yes, patient reports history of noncompliance with psychiatric medications in the community.  Patient is cooperative with meds in the hospital currently  Medication side effects: MEDICATION SIDE EFFECTS: Patient reports constipation  Benefit: Yes / No: Yes       ROS   Pertinent items are noted in HPI.       MENTAL STATUS EXAM   Vitals: /73 (BP Location: Right arm, Patient Position: Sitting)   Pulse 69   Temp 97.2  F (36.2  C) (Oral)   Resp 16   Ht 1.93 m (6' 3.98\")   Wt 95.2 kg (209 lb 14.4 oz)   SpO2 99%   BMI 25.56 kg/m      Appearance:  Disheveled  Mood: Reports, \"good\"  Affect: blunted and guarded  was congruent to speech  Suicidal Ideation: " PRESENT / ABSENT: absent   Homicidal Ideation: PRESENT / ABSENT: absent   Thought process: circumstantial, tangential, difficult to follow, perseverative, and disorganized   Thought content: significant for delusions, preoccupations, and paranoid ideation.   Fund of Knowledge: Average  Attention/Concentration: Poor  Language ability:  Intact, speech is slightly rapid  Memory: Intact  Insight:  limited.  Judgement: limited  Orientation: Yes, x4  Psychomotor Behavior: fidgety    Muscle Strength and Tone: MuscleStrength: Normal  Gait and Station: Normal       LABS   personally reviewed.     Vitamin D level noted to be low at 10 ng/mL; supplement ordered 2/22/24 to address vitamin D deficiency.     Latest Reference Range & Units 02/17/24 13:58 02/17/24 15:16 02/18/24 11:07 02/18/24 14:37 02/19/24 02:49 02/20/24 07:44   Sodium 135 - 145 mmol/L  140 143      Potassium 3.4 - 5.3 mmol/L  3.6 3.8      Chloride 98 - 107 mmol/L  104 108 (H)      Carbon Dioxide (CO2) 22 - 29 mmol/L  26 28      Urea Nitrogen 6.0 - 20.0 mg/dL  6.2 8.7      Creatinine 0.67 - 1.17 mg/dL  0.88 0.94      GFR Estimate >60 mL/min/1.73m2  >90 >90      Calcium 8.6 - 10.0 mg/dL  9.4 9.2      Anion Gap 7 - 15 mmol/L  10 7      Magnesium 1.7 - 2.3 mg/dL  1.9       Albumin 3.5 - 5.2 g/dL  4.4 4.1      Protein Total 6.4 - 8.3 g/dL  7.0 6.5      Alkaline Phosphatase 40 - 150 U/L  49 44      ALT 0 - 70 U/L  6 7      AST 0 - 45 U/L  11 13      Bilirubin Total <=1.2 mg/dL  1.3 (H) 1.2      Cholesterol <200 mg/dL      135   Glucose 70 - 99 mg/dL  104 (H) 135 (H)      HDL Cholesterol >=40 mg/dL      36 (L)   Hemoglobin A1C <5.7 %      4.8   LDL Cholesterol Calculated <=100 mg/dL      84   Non HDL Cholesterol <130 mg/dL      99   Triglycerides <150 mg/dL      75   TSH 0.30 - 4.20 uIU/mL      2.08   Vitamin D, Total (25-Hydroxy) 20 - 50 ng/mL      10 (L)   WBC 4.0 - 11.0 10e3/uL  5.4       Hemoglobin 13.3 - 17.7 g/dL  16.9       Hematocrit 40.0 - 53.0 %  46.5      "  Platelet Count 150 - 450 10e3/uL  232       RBC Count 4.40 - 5.90 10e6/uL  5.49       MCV 78 - 100 fL  85       MCH 26.5 - 33.0 pg  30.8       MCHC 31.5 - 36.5 g/dL  36.3       RDW 10.0 - 15.0 %  11.9       % Neutrophils %  78       % Lymphocytes %  13       % Monocytes %  7       % Eosinophils %  1       % Basophils %  1       Absolute Basophils 0.0 - 0.2 10e3/uL  0.0       Absolute Eosinophils 0.0 - 0.7 10e3/uL  0.1       Absolute Immature Granulocytes <=0.4 10e3/uL  0.0       Absolute Lymphocytes 0.8 - 5.3 10e3/uL  0.7 (L)       Absolute Monocytes 0.0 - 1.3 10e3/uL  0.4       % Immature Granulocytes %  0       Absolute Neutrophils 1.6 - 8.3 10e3/uL  4.2       Absolute NRBCs 10e3/uL  0.0       NRBCs per 100 WBC <1 /100  0       SARS CoV2 PCR Negative      Negative    Amphetamine Qual Urine Screen Negative  Screen Negative        Fentanyl Qual Urine Screen Negative  Screen Negative        Cocaine Urine Screen Negative  Screen Negative        Benzodiazepine Urine Screen Negative  Screen Negative        Opiates Qualitative Urine Screen Negative  Screen Negative        PCP Urine Screen Negative  Screen Negative        Cannabinoids Qual Urine Screen Negative  Screen Positive !        Barbiturates Qual Urine Screen Negative  Screen Negative        EKG 12-LEAD, TRACING ONLY     Rpt     (H): Data is abnormally high  (L): Data is abnormally low  !: Data is abnormal  Rpt: View report in Results Review for more information  No results found for: \"PHENYTOIN\", \"PHENOBARB\", \"VALPROATE\", \"CBMZ\"       DIAGNOSIS   Principal Problem:    Schizoaffective disorder, bipolar type, currently hypomanic  History of schizophrenia  PTSD  R/O OCD    History of generalized anxiety disorder with panic attacks  History of ADHD    Clinically Significant Risk Factors                         # Overweight: Estimated body mass index is 25.56 kg/m  as calculated from the following:    Height as of this encounter: 1.93 m (6' 3.98\").    Weight as of " this encounter: 95.2 kg (209 lb 14.4 oz)., PRESENT ON ADMISSION  # Moderate Malnutrition: based on nutrition assessment, PRESENT ON ADMISSION         Active Problem List:  Patient Active Problem List   Diagnosis     Irritable bowel syndrome     Class 2 severe obesity due to excess calories with serious comorbidity and body mass index (BMI) of 37.0 to 37.9 in adult (H)     Patellofemoral instability of left knee with pain     Tobacco abuse     Suicidal ideation     Moderate episode of recurrent major depressive disorder (H)     CARSON (obstructive sleep apnea)     Unspecified psychosis not due to a substance or known physiological condition (H)     Marijuana use     Schizophrenia, unspecified type (H)          HOSPITAL COURSE AND ASSESSMENT   This is a 33 year old male with history of schizophrenia, MDD, suicidal ideation, and PTSD who presented to [facility] ED on 2/17/2024 via EMS for psychiatric evaluation.  [facility] and was experiencing symptoms of psychosis including disorganized, paranoid, and delusional thoughts.  Patient also exhibiting poor p.o. intake and not attending to ADLs such as bathing.  Patient has a history of Patellofemoral instability of left knee and requires a brace to stabilize.  Per chart review patient was not taking any psychiatric medications prior to admission.  Patient endorsed some cannabis use and urine drug screening negative for all except cannabinoids.  While admitted to the ED psychiatry provider was consulted and initiated olanzapine to target psychosis symptoms.  Patient was also started on Anafranil to target OCD symptoms.  Patient was evaluated by medical provider in the ED and determined to be medically stable.  Patient subsequently admitted to inpatient psychiatry unit 10 N for further psychiatric stabilization.  Current legal status is 72-hour hold.  Olanzapine started in the ED to target psychosis symptoms and plan will be to  titrate to therapeutic dose while monitoring for side effects.  Psychiatric provider initiated petition for mental health commitment with Mg on 2/20/2024.  Trial of Anafranil initiated in the ED to target OCD symptoms however this was discontinued due to risk for worsening psychosis symptoms.        PLAN   1. Ongoing education given regarding diagnostic and treatment options with risks, benefits and alternatives and adequate verbalization of understanding.  2.  Medications:  Continue multivitamin 1 tab PO daily  Continue cholecalciferol (vitamin D3) capsule 1250 mcg PO every 7 days for 8 doses to target vitamin D deficiency, administer immediately after supper  Continue olanzapine ODT tablet 15 mg PO at bedtime to target psychosis symptoms and mood disorder symptoms  PRN hydroxyzine tablet 25 mg PO every 4 hours as needed for anxiety  PRN olanzapine tablet 10 mg PO every 3 hours as needed for agitation, ordered linked with olanzapine IM injection  PRN melatonin tablet 3 mg PO at bedtime as needed for sleep  PRN nicotine lozenge 2 mg buccal every 1 hour as needed for nicotine withdrawal symptoms    3.  Labs/Imaging:  -N/A    4. Consults:  -Hospitalist to follow as needed    5. Precautions:  -Self-injury precautions, suicide precautions; risk moderate    6. Legal:  - Court hold with [facility]    7. Discharge planning:  -Per unit CTC        Risk Assessment: [facility] RISK ASSESSMENT: Patient able to contract for safety and Patient on precautions    Coordination of Care:   Treatment Plan reviewed and physician signed, Care discussed with Care/Treatment Team Members, Chart reviewed and Patient seen      Re-Certification I certify that the inpatient psychiatric facility services furnished since the previous certification were, and continue to be, medically necessary for, either, treatment which could reasonably be expected to improve the patient s condition or diagnostic study and that the hospital records  indicate that the services furnished were, either, intensive treatment services, admission and related services necessary for diagnostic study, or equivalent services.     I certify that the patient continues to need, on a daily basis, active treatment furnished directly by or requiring the supervision of inpatient psychiatric facility personnel.   I estimate 14-21 days of hospitalization is necessary for proper treatment of the patient. My plans for post-hospital care for this patient are   TBD     CARISSA Mendieta CNP    -     02/23/24       -     12:20 PM       Total time  35 minutes with > 50%spent on coordination of cares and psycho-education.    This note was created with help of Dragon dictation system. Grammatical / typing errors are not intentional.    CARISSA Mendieta CNP

## 2024-02-23 NOTE — PLAN OF CARE
Problem: Adult Inpatient Plan of Care  Goal: Absence of Hospital-Acquired Illness or Injury  Outcome: Progressing     Problem: Adult Behavioral Health Plan of Care  Goal: Adheres to Safety Considerations for Self and Others  Outcome: Progressing  Intervention: Develop and Maintain Individualized Safety Plan  Recent Flowsheet Documentation  Taken 2/23/2024 0330 by Vangie Allan RN  Safety Measures: safety rounds completed  Goal: Absence of New-Onset Illness or Injury  Intervention: Identify and Manage Fall Risk  Recent Flowsheet Documentation  Taken 2/23/2024 0330 by Vangie Allan RN  Safety Measures: safety rounds completed     Problem: Psychotic Signs/Symptoms  Goal: Improved Sleep (Psychotic Signs/Symptoms)  Intervention: Promote Healthy Sleep Hygiene  Recent Flowsheet Documentation  Taken 2/23/2024 0330 by Vangie Allan RN  Sleep Hygiene Promotion: regular sleep pattern promoted     Problem: Anxiety Signs/Symptoms  Goal: Improved Sleep (Anxiety Signs/Symptoms)  Intervention: Promote Healthy Sleep Hygiene  Recent Flowsheet Documentation  Taken 2/23/2024 0330 by Vangie Allan RN  Sleep Hygiene Promotion: regular sleep pattern promoted   Goal Outcome Evaluation:             Patient slept all night without any suicidal behavior. No pain was reported and, no PRN medication was administered. Safety checks were conducted every 15 minutes, and patient did not seem to be in any distress. We will continue to monitor and report changes.

## 2024-02-23 NOTE — PLAN OF CARE
"Higinio was up for breakast. He went back to bed after breakfast.     He was generally calm, cooperative, attended some groups.     He stated prior to coming where he was living he was \"absorbing the negative energy\" which caused him to have AH where he would see things on the ground.  He stated he has not encountered individuals since being in the hospital giving off such negative energy as before, and thus has not had an AH. He also stated that the zyprexa has also contributed to this improvement. His fear is that he does not want the zyprexa to \"take away all of my feeling\" and fears it will deaden other experiences like listing to music.     Appetite appropriate. Complaining of some constipation, medications forthcoming, continue to monitor symptoms        Problem: Psychotic Signs/Symptoms  Goal: Improved Behavioral Control (Psychotic Signs/Symptoms)  Outcome: Progressing       Goal Outcome Evaluation:                        "

## 2024-02-24 PROCEDURE — G0177 OPPS/PHP; TRAIN & EDUC SERV: HCPCS

## 2024-02-24 PROCEDURE — 250N000013 HC RX MED GY IP 250 OP 250 PS 637: Performed by: PSYCHIATRY & NEUROLOGY

## 2024-02-24 PROCEDURE — 90853 GROUP PSYCHOTHERAPY: CPT

## 2024-02-24 PROCEDURE — 250N000013 HC RX MED GY IP 250 OP 250 PS 637

## 2024-02-24 PROCEDURE — 124N000002 HC R&B MH UMMC

## 2024-02-24 RX ADMIN — OLANZAPINE 15 MG: 15 TABLET, ORALLY DISINTEGRATING ORAL at 19:55

## 2024-02-24 RX ADMIN — THERA TABS 1 TABLET: TAB at 08:19

## 2024-02-24 RX ADMIN — SENNOSIDES AND DOCUSATE SODIUM 2 TABLET: 8.6; 5 TABLET ORAL at 08:19

## 2024-02-24 RX ADMIN — POLYETHYLENE GLYCOL 3350 17 G: 17 POWDER, FOR SOLUTION ORAL at 08:19

## 2024-02-24 RX ADMIN — SENNOSIDES AND DOCUSATE SODIUM 2 TABLET: 8.6; 5 TABLET ORAL at 19:54

## 2024-02-24 ASSESSMENT — ACTIVITIES OF DAILY LIVING (ADL)
ADLS_ACUITY_SCORE: 45
HYGIENE/GROOMING: INDEPENDENT
ORAL_HYGIENE: INDEPENDENT
ADLS_ACUITY_SCORE: 45
DRESS: SCRUBS (BEHAVIORAL HEALTH)
ADLS_ACUITY_SCORE: 45

## 2024-02-24 NOTE — PLAN OF CARE
Problem: Suicide Risk  Goal: Absence of Self-Harm  Outcome: Progressing   Goal Outcome Evaluation:    Plan of Care Reviewed With: patient      Pt socialized appropriately with peers. He played board games on, and off with others. He denied all mental health symptoms, and pain. Oral intake was adequate. Pt took a shower with prompting. He was calm throughout this shift. There was no S.I behavior observed.

## 2024-02-24 NOTE — CARE PLAN
Occupational Therapy Group Note:     02/24/24 1254   Group Therapy Session   Group Attendance attended group session   Time Session Began 1015   Time Session Ended 1100   Total Time (minutes) 45   Total # Attendees 3   Group Type recreation   Group Topic Covered leisure exploration/use of leisure time;structured socialization   Group Session Detail OT Leisure Group: Sequence   Patient Response/Contribution confronted peers appropriately;cooperative with task;listened actively   Patient Participation Detail Patient actively engaged in a therapeutic leisure activity in order to promote: cognitive skills (attention, planning, sequencing), leisure exploration, and structured socialization. Patient was quickly able to learn and engage in an unfamiliar leisure activity. Patient listened actively to instructions and asked appropriate clarifying questions. Patient remained focused and attentive on task for the full duration of activity. Patient was able to respond to writer's therapeutic prompts appropriately. Minimal social interaction with peers observed. Patient did make one random comment regarding the manifestation of weather and the color of justo he perceives versus reality; patient was redirectable. Affect: blunted. Mood: calm, cooperative.

## 2024-02-24 NOTE — PLAN OF CARE
"Patient participated in OT group this morning. He presented with a calm mood. 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  Patient was medication compliant. Patient reported he has not had a bowel movement for 4 days, took scheduled Miralax and senna, and was also given he drank prune juice. He later on reported a good BM. /75   Pulse 64   Temp 98.2  F (36.8  C) (Oral)   Resp 16   Ht 1.93 m (6' 3.98\")   Wt 95.2 kg (209 lb 14.4 oz)   SpO2 98%   BMI 25.56 kg/m                 "

## 2024-02-24 NOTE — PROVIDER NOTIFICATION
02/24/24 0700   Sleep/Rest   Night Time # Hours 6.5 hours     Pt had a quiet night with no behavioral or safety concerns. Pt was observed sleeping the entire night, no acute events noted or reported.

## 2024-02-25 PROCEDURE — 250N000013 HC RX MED GY IP 250 OP 250 PS 637

## 2024-02-25 PROCEDURE — 250N000013 HC RX MED GY IP 250 OP 250 PS 637: Performed by: PSYCHIATRY & NEUROLOGY

## 2024-02-25 PROCEDURE — 124N000002 HC R&B MH UMMC

## 2024-02-25 RX ADMIN — OLANZAPINE 15 MG: 15 TABLET, ORALLY DISINTEGRATING ORAL at 19:17

## 2024-02-25 RX ADMIN — SENNOSIDES AND DOCUSATE SODIUM 2 TABLET: 8.6; 5 TABLET ORAL at 08:15

## 2024-02-25 RX ADMIN — THERA TABS 1 TABLET: TAB at 08:15

## 2024-02-25 RX ADMIN — POLYETHYLENE GLYCOL 3350 17 G: 17 POWDER, FOR SOLUTION ORAL at 08:15

## 2024-02-25 RX ADMIN — SENNOSIDES AND DOCUSATE SODIUM 1 TABLET: 8.6; 5 TABLET ORAL at 19:17

## 2024-02-25 ASSESSMENT — ACTIVITIES OF DAILY LIVING (ADL)
ADLS_ACUITY_SCORE: 45
HYGIENE/GROOMING: INDEPENDENT
ADLS_ACUITY_SCORE: 45
ORAL_HYGIENE: INDEPENDENT
ADLS_ACUITY_SCORE: 45
DRESS: INDEPENDENT
ADLS_ACUITY_SCORE: 45
ADLS_ACUITY_SCORE: 45
ORAL_HYGIENE: INDEPENDENT
ADLS_ACUITY_SCORE: 45
DRESS: INDEPENDENT;SCRUBS (BEHAVIORAL HEALTH)
HYGIENE/GROOMING: INDEPENDENT
ADLS_ACUITY_SCORE: 45

## 2024-02-25 NOTE — CARE PLAN
02/24/24 1936   Group Therapy Session   Group Attendance attended group session   Time Session Began 1430   Time Session Ended 1520   Total Time (minutes) 50   Total # Attendees 3   Group Type psychotherapeutic   Group Topic Covered structured socialization;other (see comments)   Group Session Detail Process. strengths and mindfulness   Patient Response/Contribution cooperative with task;discussed personal experience with topic;expressed readiness to alter behaviors;offered helpful suggestions to peers;listened actively   Patient Participation Detail mood ok, pt was a positive participant and stayed throughout group. He tried to reason with peer that was disrespectful but it didnt work. He is insightful and  reflective.

## 2024-02-25 NOTE — PLAN OF CARE
Problem: Adult Inpatient Plan of Care  Goal: Optimal Comfort and Wellbeing  Outcome: Progressing     Problem: Adult Behavioral Health Plan of Care  Goal: Adheres to Safety Considerations for Self and Others  Outcome: Progressing  Intervention: Develop and Maintain Individualized Safety Plan  Recent Flowsheet Documentation  Taken 2/25/2024 0300 by Vangie Allan RN  Safety Measures: safety rounds completed  Goal: Absence of New-Onset Illness or Injury  Intervention: Identify and Manage Fall Risk  Recent Flowsheet Documentation  Taken 2/25/2024 0300 by Vangie Allan RN  Safety Measures: safety rounds completed     Problem: Psychotic Signs/Symptoms  Goal: Improved Sleep (Psychotic Signs/Symptoms)  Intervention: Promote Healthy Sleep Hygiene  Recent Flowsheet Documentation  Taken 2/25/2024 0300 by Vangie Allan RN  Sleep Hygiene Promotion: regular sleep pattern promoted     Problem: Anxiety Signs/Symptoms  Goal: Improved Sleep (Anxiety Signs/Symptoms)  Intervention: Promote Healthy Sleep Hygiene  Recent Flowsheet Documentation  Taken 2/25/2024 0300 by Vangie Allan RN  Sleep Hygiene Promotion: regular sleep pattern promoted     Problem: Suicide Risk  Goal: Absence of Self-Harm  Outcome: Progressing   Goal Outcome Evaluation:  Patient remained calm throughout the night, no aggressive behavior was seen, and patient was observed to be sleeping. No pain was reported, and no PRN was given. No concerns were reported, and we will continue to assist patient in accordance with his care plan.     Sleep hours: 6.5

## 2024-02-25 NOTE — PLAN OF CARE
"Pt has been in the milieu. 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 Pt played a board game for several hours with the students, talking to them while they played. Pt states he has gained \"15lbs\" while being here. Pt weighted 211 on  2/17 in ed. 209 on 2/19 when admitted the to unit. Pt weighed 225 today (2/25). Pt states he has been eating well here and eats more here than at [facility]. Pt also states he used to be obese and that he was told he came in with malnutrition.  Pt states he does not like to waste food and that he wants protein for breakfast, snacks for lunch and full dinner. Pt states he had a large BM today but still is \"full of food and water\" Pt states he does not want to tell people what he is feeling because he does not want people to think he is odd or sick. Encouraged pt to always tell his provider how he feels because they change his medications based on how he is feeling. Pt then tells writer he thinks that he may not be human because 2 years ago he knew the brightest star was \":\"  and now it is sirus and he can look this up on the computer. States where he was 2 years ago was somewhere else and this change in the brightest star proves that. Pt hopes to get his own place and plans to continue working with roe mcmullen and or the committment team to find housing. Pt states his peer support from the community came to visit and he can tell how much she cares for him and that she even became tearful. Pt asks why his family who loves him does not visit or call or tell him how much they care about him like she does and she is not family and \"barely knows me\" Pt was medication compliant. Pt ate breakfast and lunch. Played [employer] in [place]. Pt read a book in the [employer]. Pt told writer that he never sleeps well at [facility] when they give him a roommate because they usually have nightmares. Pt also tells writer when he leaves a place and comes back something bad happens like he goes to " meet his support staff and someone throws a brick at the window or the people are more disruptive than they were before when he gets back.     Goal Outcome Evaluation: ONGOING

## 2024-02-25 NOTE — PLAN OF CARE
Problem: Anxiety Signs/Symptoms  Goal: Improved Mood Symptoms (Anxiety Signs/Symptoms)  Outcome: Progressing   Goal Outcome Evaluation:    Plan of Care Reviewed With: patient      Pt denied all mental health symptoms, and pain. He stated that he has not be seeing things as he was before coming to the hospital. He expressed some concerns about increased blood pressure. He said that he thought that vyvanse is causing problem with his blood pressure, and would like to stop taking the medication. Staff went through his med profile, and med education was done. Then he said that the problem is probably with his digestive issues. Pt socialized appropriately with peers. He played board games with others. Oral intake was adequate. He was calm throughout this shift. Pt trimmed his roberson after staff had a discussion about self care, and grooming with pt. There was no S.I behavior observed.                 Diagnostic wire removed.

## 2024-02-25 NOTE — PLAN OF CARE
"He is talkative and plays card, board games with others in the shift.  He denies problematic anxiety, depression, pain/discomfort, and cold, flu like symptoms when asked.  States that he is feeling better, \" due to being around happier people,\" and stated that when he's around a lot of negative, aggressive people that it affects him, causes him distress.  He rambles in conversations and said that, \" people are attracted to talk to me, everything I walk in the park by Jackie Bermudez, people would just come up to me and wanting to talk to me.\"  Informed writer that his can cause him problems due to saying that he doesn't want to talk to everyone all the time.  He is wearing his left knee brace in the shift, yet states its loose and needs a new one.  Informed writer he was fitted with knee brace before losing a significant amount of weight.  He displays some disorganized thought process by difficulty expressing himself and said, \" I don't know what I'm taking,\" and education provided on his scheduled evening medications. States that he is sleeping better and agrees to come to staff for concerns.            "

## 2024-02-26 PROCEDURE — 250N000013 HC RX MED GY IP 250 OP 250 PS 637: Performed by: PSYCHIATRY & NEUROLOGY

## 2024-02-26 PROCEDURE — 99232 SBSQ HOSP IP/OBS MODERATE 35: CPT

## 2024-02-26 PROCEDURE — 90832 PSYTX W PT 30 MINUTES: CPT

## 2024-02-26 PROCEDURE — 124N000002 HC R&B MH UMMC

## 2024-02-26 PROCEDURE — G0177 OPPS/PHP; TRAIN & EDUC SERV: HCPCS

## 2024-02-26 PROCEDURE — 250N000013 HC RX MED GY IP 250 OP 250 PS 637

## 2024-02-26 RX ADMIN — SENNOSIDES AND DOCUSATE SODIUM 2 TABLET: 8.6; 5 TABLET ORAL at 07:40

## 2024-02-26 RX ADMIN — OLANZAPINE 15 MG: 15 TABLET, ORALLY DISINTEGRATING ORAL at 19:16

## 2024-02-26 RX ADMIN — THERA TABS 1 TABLET: TAB at 07:40

## 2024-02-26 RX ADMIN — SENNOSIDES AND DOCUSATE SODIUM 2 TABLET: 8.6; 5 TABLET ORAL at 19:16

## 2024-02-26 RX ADMIN — POLYETHYLENE GLYCOL 3350 17 G: 17 POWDER, FOR SOLUTION ORAL at 07:40

## 2024-02-26 ASSESSMENT — ACTIVITIES OF DAILY LIVING (ADL)
ADLS_ACUITY_SCORE: 45
ORAL_HYGIENE: INDEPENDENT
ADLS_ACUITY_SCORE: 45
ADLS_ACUITY_SCORE: 45
ORAL_HYGIENE: INDEPENDENT
ADLS_ACUITY_SCORE: 45
DRESS: INDEPENDENT
ADLS_ACUITY_SCORE: 45
LAUNDRY: WITH SUPERVISION
DRESS: SCRUBS (BEHAVIORAL HEALTH);INDEPENDENT
ADLS_ACUITY_SCORE: 45
HYGIENE/GROOMING: INDEPENDENT
ADLS_ACUITY_SCORE: 45
HYGIENE/GROOMING: INDEPENDENT

## 2024-02-26 NOTE — PLAN OF CARE
-Attending Provider: CARISSA Davis CNP  -Voicemail Code: [id number]#  -Team Note Due: Tuesday  -Next Steps:    Coordinate with [facility] regarding petition for commitment.  Coordinate with [employer] [facility] regarding belongings        Assessment/Intervention/Current Symtoms and Care Coordination:  Chart review and met with team, discussed pt progress, symptomology, and response to treatment.  Discussed the discharge plan and any potential impediments to discharge.  [facility] called and spoke to Higinio's CADI  Norah through the [facility]. [facility] was informed that Higinio has been accepted to [facility] and he would be able to discharge to this place when the time comes.   [facility] received confirmation on court dates regarding commitment hearing.  [facility] met with Higinio to discuss discharge plans and commitment hearings. [facility] updated Higinio of commitment hearing dates. [facility] discussed [facility] and CADI services through the [facility]. Higinio expressed a willingness to continue to move forward with [facility]. Higinio expressed wanting to learn more about [facility] due to their program locations being closer to his mother and brother.       Discharge Plan or Goal:  Pending further stabilization, continued compliance with medications, continued activities of daily living, and development of a safe discharge plan.   Considerations:     Barriers to Discharge:  Impact of mental health symptoms on well being   Impact of mental health symptoms on activities of daily living  Need for medication monitoring and medication management     Referral Status:       Legal Status:  [facility]   County: [place]  File Number: [id number]  Start and expiration date of commitment:     Contacts:  Norah (CADI  through [facility]): [phone]  [email or web address]     Upcoming Meetings and Dates/Important Information:      -Still Needed on AVS:

## 2024-02-26 NOTE — PLAN OF CARE
"Goal Outcome Evaluation:    Plan of Care Reviewed With: patient          Pt and RN met in the lounge. Pt affect was flat. Pt denies any SI, HI, or SIB. Pt contracts for safety. Pt denies depression, anxiety, hallucinations or delusions.  Pt did not present as paranoid during this shift. Pt is visible in the milieu and social with peer. Pt played a game of cards with the peers and was watching TV with other peers. Pt is medication compliant. Appetite and fluid intake adequate. VSS. Pt denies acute physical pain. No medication side effects reported or observed this shift. Hygiene is adequate. No issues with bowel or bladder noted. Plan is to continue to monitor patients status q 15 mins, assess response to medication, and maintain the patients safety.     /80 (BP Location: Right arm)   Pulse 97   Temp 98.9  F (37.2  C) (Oral)   Resp 16   Ht 1.93 m (6' 3.98\")   Wt 102.2 kg (225 lb 5 oz)   SpO2 96%   BMI 27.44 kg/m       PRNs Given during shift:NONE    I and O: 90% OF DINNER  Pain: 0/10          "

## 2024-02-26 NOTE — CARE PLAN
02/26/24 1300   Group Therapy Session   Group Attendance attended group session   Time Session Began 1015   Time Session Ended 1145   Total Time (minutes) 90   Total # Attendees 2-3   Group Type task skill;expressive therapy   Group Topic Covered problem-solving;coping skills/lifestyle management;emotions/expression   Group Session Detail OT Clinic   Patient Response/Contribution cooperative with task   Patient Participation Detail See Note     Pt actively participated in occupational therapy clinic to facilitate coping skill exploration, creative expression within personally meaningful activities, and clinical observation of social, cognitive, and kinesthetic performance skills. Pt response: Needed some assistance to initiate, gather materials, sequence, and adjust to workspace demands as needed. Demonstrated fair focus, planning, and problem solving for selected canvas painting task. Able to ask for assistance as needed, and social with peers and staff.  Pt was often off topic and reported he had concerns that he was a demon due to his red hair.  He also reported that his kneecaps keep dislocating and that he is settled on the fact that he will be in a wheelchair eventually.  Pt talked about wanting to leave [place] and go somewhere that no one knows him.  He lacks insight into his current issues.  Pt will continue to be encouraged to attend groups for further asssesssment and to address goals identified on plan of care.

## 2024-02-26 NOTE — CONSULTS
"SPIRITUAL HEALTH SERVICES - Consult Note [facility] ([facility]) 10N     Referral Source/Reason for Visit:  Patient request at admission      Summary and Recommendations -  *   Brain may have some Sikh fixation, but generally finds spirituality supportive, positive and a motivational influence  Higinio expresses interest in a variety of Sikh expressions including [place] roots ([place] and his [place] ancestry.   We talked about how his medication might require a new learning about how to be in touch with his spirituality, but that it wouldn't prevent him from making those connections.     Plan:  [facility] remains available on request.  Please place a consult order via Epic.       Eda Solomon   Chaplain Resident  Pager [phone]    [facility] available 24/7 for emergent requests/referrals, either by paging the on-call  or by entering an ASAP/STAT consult in [employer], which will also page the on-call .     Assessment     Saw pt Higinio Wallace     Patient/Family Understanding of Illness and Goals of Care -  Brain shares that his medication is helping him not take on the emotional content of others in the room.     Distress and Loss -  Higinio expressed concern about several instances of spiritual advice.  He also expressed some concern that his medication would take away his ability to \"flow and connect\" with his music and spirituality     Strengths, Coping, and Resources -  Higinio spoke of wanting to reconnect with family, especially the Grandfather he hasn't seen in 10 years who has  roots.  He has a strong desire to \"do the right thing\" and \"Not harm anyone\"     Meaning, Beliefs, and Spirituality -  Higinio believes in a singular and judging God.  He expresses a strong desire to do what is right and hates having to chose sides. We talked about how things look different when your perspective changes and that's not necessarily \"wrong\" but may require some adjustment of interpretation.  We " talked about boundaries, and that his medication is serving as a boundary to keep him from taking on other people's feelings.  We talked about different ways to interpret spiritual advice that requires him to chose sides or that has aggressive implications.        * [facility] remains available 24/7 for emergent requests/referrals, either by having the switchboard page the on-call  or by entering an ASAP/STAT consult in Epic (this will also page the on-call ). Routine Epic consults receive an initial response within 24 hours.

## 2024-02-26 NOTE — PLAN OF CARE
"Adult Inpatient Safety Plan:     [facility] Adult Inpatient [facility] Units           [address]                                Higinio Wallace    SAFETY PLAN:  Step 1: Warning signs / cues (Thoughts, images, mood, situation, behavior) that a crisis may be developing:  Thoughts: \"I'm a burden\" and guilt  Images: flashbacks and visions of harm:    Thinking Processes: ruminations (can't stop thinking about my problems), intrusive thoughts (bothersome, unwanted thoughts that come out of nowhere), highly critical and negative thoughts: about others, and paranoia:   Mood: intense anger, intense worry, and disinhibited (not caring about things or consequences)  Behaviors: isolating/withdrawing , impulsive, reckless behaviors (acting without thinking):  , aggression, not taking care of my responsibilities, not sleeping enough, and increasing frequency and duration of dissociation  Situations: legal issues:  , trauma , relapse, financial stress, and other people's emotions      Step 2: Coping strategies - Things I can do to take my mind off of my problems without contacting another person (relaxation technique, physical activity):  Distress Tolerance Strategies:  relaxation activities: bath, arts and crafts: paint, make music, write poems and lyrics, play with my pet, listen to positive and upbeat music, sensory based activities/self-soothe with five senses: candles, watch a funny movie: or TV or videos, pray, read a book, change body temperature (ice pack/cold water), paced breathing/progressive muscle relaxation, and intense exercise: anything for 2-3 minutes   Physical Activities: go for a walk, exercise: lift weights, abs, outdoor jog, gardening, meditation, deep breathing, and stretching, baseball, basketball, swimming  Focus on helpful thoughts:  \"This is temporary\", \"I will get through this\", \"It always passes\", \"Ride the wave\", think about happy memories: family gatherings, and self-compassion statements: " "I am worthy, I am loved, I deserve happiness, I can do hard things, I am what I am     Step 3: Remind myself of people and things that are important to me and worth living for:    Family  Friends  Dog    Step 4: When I am in crisis, I can ask these people to help me use my safety plan:   Name: Mom   Name: Bhavik   Name: Inocencio    Name: Deniz    Step 5: Making the environment safe:   I will remove alcohol and drugs, secure my medications, dispose of old medications, remove access to firearms, remove things I could use to hurt myself, and identify supportive people  Other ways to make my environment safe: being around positive people    Step 6: Professionals or agencies I can contact during a crisis:  Crisis Intervention: 249.970.8563 or 536-650-3567 (TTY: 508.974.3463).  Call anytime for help.  National [place] on Mental Illness (www.[place].bre.org): 363.842.6162 or 054-038-8798.  Suicide Awareness Voices of Education (SAVE) (www.save.org): 276-524-DDTR (5584)  National Suicide Prevention Line (www.mentalhealthmn.org): 768-456-APQJ (0482)  Mental Health Consumer/Survivor Network of [place] (www.mhcsn.net): 610.743.1699 or 964-637-1407  Mental Health Association of [place] (www.mentalhealth.org): 586.478.7759 or 853-320-5631  Self- Management and Recovery Training., SMART-- Toll free: 385.777.3155  www.[employer].org  [facility] Crisis (COPE) Response - Adult 728 055-8962  Text 4 Life: txt \"LIFE\" to 03029 for immediate support and crisis intervention  Crisis text line: Text \"[place]\" to 721932. Free, confidential, 24/7.  Crisis Intervention: 734.897.6803 or 423-682-1100. Call anytime for help.   [facility] Mental Health Crisis Team - Child: [phone] 988 (Mental Health Emergency)    If unable to maintain safety despite working your plan, call 911 or go to my nearest emergency department.     Patient helped develop this safety plan and agreed to use it when needed.  Pt has been given a copy of this plan.  "     Today s date:  February 26, 2024  Completed by Clinician Name/ Credentials:  FANTASMA Fields        Adapted from Safety Plan Template 2008 Noelle Fernando and Arian Saleh is reprinted with the express permission of the authors.  No portion of the Safety Plan Template may be reproduced without the express, written permission.  You can contact the authors at [email or web address] or [email or web address].

## 2024-02-26 NOTE — PLAN OF CARE
"Individual Therapy Note      Date of Service: February 26, 2024    Patient: Higinio goes by \"Higinio,\" uses he/him pronouns    Individuals Present: FANTASMA Hernández    Session start: 1330  Session end: 1410  Session duration in minutes: 40    Patient Active Problem List   Diagnosis    Irritable bowel syndrome    Class 2 severe obesity due to excess calories with serious comorbidity and body mass index (BMI) of 37.0 to 37.9 in adult (H)    Patellofemoral instability of left knee with pain    Tobacco abuse    Suicidal ideation    Moderate episode of recurrent major depressive disorder (H)    CARSON (obstructive sleep apnea)    Unspecified psychosis not due to a substance or known physiological condition (H)    Marijuana use    Schizophrenia, unspecified type (H)         Modality Used:Person Centered, Brief Therapy, and Solution Focused    Goals: Safety planning    Patient Description of current symptoms: Pt expressed feeling misunderstood, no other mental health concerns today     Mental Status Exam:   Attitude: cooperative  Eye Contact: good  Mood: anxious  Affect: mood congruent  Speech: clear, coherent  Psychomotor Behavior: no evidence of tardive dyskinesia, dystonia, or tics  Thought Process:  linear and goal oriented  Associations: loosening of associations present  Thought Content: no evidence of suicidal ideation or homicidal ideation, no auditory hallucinations present, and no visual hallucinations present  Insight: limited  Judgement: limited  Attention Span and Concentration: fair    Pt progress: Met pt in interview room. Pt discussed there was some information in the commitment paperwork that he was concerned about, but when asked what it was he did not provide more than being diagnosed at 10 with ADHD then being told at 15 he was diagnosed with ADHD. Pt struggled to get his concerns out to me. Pt was willing to go through a safety plan. Pt had insight into symptoms of mental health struggle but " still has delusional thoughts that lack awareness. Pt was pleasant and kind during interview.     Treatment Objective(s) Addressed:   The focus of this session was on identifying and practicing coping strategies, safety planning, and identifying additional supports     Progress Towards Goals and Assessment of Patient:   Patient is making progress towards treatment goals as evidenced by some awareness of mental health .       Therapeutic Intervention(s):   Provided active listening, unconditional positive regard, and validation. Engaged in safety planning.  Engaged in cognitive restructuring/ reframing, looked at common cognitive distortions and challenged negative thoughts. Engaged in guided discovery, explored patient's perspectives and helped expand them through socratic dialogue.    Plan/next step: Continue to engage in unit programing, check in with therapist as needed.     13165 - Psychotherapy (with patient) - 45 (38-52*) min

## 2024-02-26 NOTE — PROVIDER NOTIFICATION
02/26/24 0600   Sleep/Rest   Night Time # Hours 7 hours     Pt had a quiet night with no behavioral or safety concerns. Pt was observed sleeping the entire night, no acute events noted or reported.

## 2024-02-26 NOTE — PLAN OF CARE
BEH IP Unit Acuity Rating Score (UARS)  Patient is given one point for every criteria they meet.    CRITERIA SCORING   On a 72 hour hold, court hold, committed, stay of commitment, or revocation. 1    Patient LOS on BEH unit exceeds 20 days. 0  LOS: 7   Patient under guardianship, 55+, otherwise medically complex, or under age 11. 0   Suicide ideation without relief of precipitating factors. 0   Current plan for suicide. 0   Current plan for homicide. 0   Imminent risk or actual attempt to seriously harm another without relief of factors precipitating the attempt. 0   Severe dysfunction in daily living (ex: complete neglect for self care, extreme disruption in vegetative function, extreme deterioration in social interactions). 0   Recent (last 7 days) or current physical aggression in the ED or on unit. 0   Restraints or seclusion episode in past 72 hours. 0   Recent (last 7 days) or current verbal aggression, agitation, yelling, etc., while in the ED or unit. 0   Active psychosis. 1   Need for constant or near constant redirection (from leaving, from others, etc).  0   Intrusive or disruptive behaviors. 0   Patient requires 3 or more hours of individualized nursing care per 8-hour shift (i.e. for ADLs, meds, therapeutic interventions). 0   TOTAL 2

## 2024-02-27 PROCEDURE — 250N000013 HC RX MED GY IP 250 OP 250 PS 637: Performed by: PSYCHIATRY & NEUROLOGY

## 2024-02-27 PROCEDURE — 90853 GROUP PSYCHOTHERAPY: CPT

## 2024-02-27 PROCEDURE — 250N000013 HC RX MED GY IP 250 OP 250 PS 637

## 2024-02-27 PROCEDURE — 99232 SBSQ HOSP IP/OBS MODERATE 35: CPT

## 2024-02-27 PROCEDURE — H2032 ACTIVITY THERAPY, PER 15 MIN: HCPCS

## 2024-02-27 PROCEDURE — 124N000002 HC R&B MH UMMC

## 2024-02-27 RX ADMIN — SENNOSIDES AND DOCUSATE SODIUM 2 TABLET: 8.6; 5 TABLET ORAL at 07:46

## 2024-02-27 RX ADMIN — SENNOSIDES AND DOCUSATE SODIUM 2 TABLET: 8.6; 5 TABLET ORAL at 19:55

## 2024-02-27 RX ADMIN — POLYETHYLENE GLYCOL 3350 17 G: 17 POWDER, FOR SOLUTION ORAL at 07:46

## 2024-02-27 RX ADMIN — OLANZAPINE 15 MG: 15 TABLET, ORALLY DISINTEGRATING ORAL at 19:55

## 2024-02-27 RX ADMIN — THERA TABS 1 TABLET: TAB at 07:46

## 2024-02-27 ASSESSMENT — ACTIVITIES OF DAILY LIVING (ADL)
LAUNDRY: WITH SUPERVISION
ADLS_ACUITY_SCORE: 45
ORAL_HYGIENE: INDEPENDENT
ADLS_ACUITY_SCORE: 45
ADLS_ACUITY_SCORE: 45
HYGIENE/GROOMING: INDEPENDENT
DRESS: INDEPENDENT
ADLS_ACUITY_SCORE: 45
HYGIENE/GROOMING: INDEPENDENT
ADLS_ACUITY_SCORE: 45
ORAL_HYGIENE: INDEPENDENT
ADLS_ACUITY_SCORE: 45
DRESS: INDEPENDENT

## 2024-02-27 NOTE — PROGRESS NOTES
"PSYCHIATRY  PROGRESS NOTE     DATE OF SERVICE   02/27/24          CHIEF COMPLAINT   \"Nervous.\"       SUBJECTIVE   Nursing reports:    Pt and RN met in the unge. Pt affect was flat. Pt denies any SI, HI, or SIB. Pt contracts for safety. Pt denies depression, anxiety, hallucinations or delusions.  Pt did not present as paranoid during this shift. Pt is visible in the milieu and social with peer. Pt played a game of cards with the peers and was watching TV with other peers. Pt is medication compliant. Appetite and fluid intake adequate. VSS. Pt denies acute physical pain. No medication side effects reported or observed this shift. Hygiene is adequate. No issues with bowel or bladder noted. Plan is to continue to monitor patients status q 15 mins, assess response to medication, and maintain the patients safety.     Patient slept for 6.75 hours.      reports:    -Attending Provider: CARISSA Davis CNP  -Voicemail Code: [id number]  -Team Note Due: Tuesday  -Next Steps:     Coordinate with [facility] regarding petition for commitment.  Coordinate with [facility] regarding belongings           Assessment/Intervention/Current Symtoms and Care Coordination:  Chart review and met with team, discussed pt progress, symptomology, and response to treatment.  Discussed the discharge plan and any potential impediments to discharge.  [facility] called and spoke to Higinio's CADI  Norah through the [facility]. [facility] was informed that Higinio has been accepted to [facility] and he would be able to discharge to this place when the time comes.   [facility] received confirmation on court dates regarding commitment hearing.  [facility] met with Higinio to discuss discharge plans and commitment hearings. [facility] updated Higinio of commitment hearing dates. [facility] discussed [facility] and [facility] and CADI services through the [facility]. Higinio expressed a willingness to continue to move forward with [facility] " "[facility]. Higinio expressed wanting to learn more about [facility] due to their program locations being closer to his mother and brother.         Discharge Plan or Goal:  Pending further stabilization, continued compliance with medications, continued activities of daily living, and development of a safe discharge plan.   Considerations:      Barriers to Discharge:  Impact of mental health symptoms on well being   Impact of mental health symptoms on activities of daily living  Need for medication monitoring and medication management     Referral Status:        Legal Status:  Court Hold   County: [place]  File Number: [id number]  Start and expiration date of commitment:      Contacts:  Norah (CADI  through [facility] [place]): [phone]  [email or web address]     Upcoming Meetings and Dates/Important Information:        -Still Needed on AVS:             OBJECTIVE   Met with patient in OT room of unit [address]. Upon patient interview, patient reports, \" I feel nervous about court today.\"  Patient also reports, \"I will try to be honest and not lie.\"  Patient expresses insight that if mental health commitment with Mg is supported he may benefit due to the need for more mental health support in the community.  Patient also expresses that he has concerns if he is committed as this, \"May lead to more interactions with the police.\"  Patient expresses fear of interactions with police due to previous experiences.  Patient's affect appears blunted and guarded however improving.  Patient's mood appears slightly anxious.  Patient's speech is slightly rapid.  Patient's thought process is circumstantial, tangential, difficult to follow, perseverative, and disorganized.  Patient  continues to endorse delusional thoughts that he is affected by other's emotions and that his presence has an impact on other people's thoughts and emotions.  Patient also reports that he is experiencing paranoid " "delusional thoughts   Patient also endorses preoccupations and guilt as he reports belief that his presence causes bad things to happen to other people.  Patient denies any HI.  Denies any thoughts of SI, SIB, SA, intent or plan. Patient able to contract for safety. Patient denies auditory hallucinations or visual hallucinations today. Patient continues to reports that he is able to manage his mental health symptoms by suppressing these thoughts.  Patient denies depression or anxiety today.  Patient endorses sleeping well overnight and reports that he benefits from being in a room by himself.  Patient reports appetite is decreased.  Patient does report that his appetite is increased at night and reports this is normal for him.  Patient has previously reported delusional thought that when he is eating food he believes he is \"eating himself.\"  Today, he reports these thoughts are improving. Patient does report that he notices the water available on the unit appears \"cloudy\" and that his preference would be bottled water.  Provider subsequently updated diet order to reflect this however patient reports he is not receiving bottled water on his tray.  Plan will be for provider to follow up with dietary regarding this.  Patient has previously reported constipation symptoms.  Today patient reports he had a bowel movement today which was soft.  Provider discussed the plan will be to continue to monitor this.  Patient in agreement and reports he benefits from scheduled stool softeners due to chronic constipation symptoms and to promote regular bowel movements.  Patient denies any issues with urination.  Patient vital signs reviewed and noted to be stable. Patient denies any other medical concerns today. Patient has no other questions or concerns.  Patient denies any side effects of current medication regimen.  Today, plan will be to continue olanzapine ODT tablet 15 mg PO at bedtime to target psychosis and mood disorder " "symptoms.   Patient has been compliant with psychiatric medications in the hospital. Patient reports awareness that he is currently on a court hold awaiting details of upcoming court hearing which is scheduled for this afternoon.  Patient offers no other questions or concerns.         MEDICATIONS   Medications:  Scheduled Meds:    cholecalciferol  1,250 mcg Oral Q7 Days     multivitamin, therapeutic  1 tablet Oral Daily     OLANZapine zydis  15 mg Oral At Bedtime     polyethylene glycol  17 g Oral Daily     senna-docusate  1-2 tablet Oral BID     Continuous Infusions:  PRN Meds:.acetaminophen, alum & mag hydroxide-simethicone, hydrOXYzine HCl, melatonin, nicotine, OLANZapine **OR** OLANZapine, polyethylene glycol    Medication adherence issues: MS Med Adherence Y/N: Yes, patient reports history of noncompliance with psychiatric medications in the community.  Patient is cooperative with meds in the hospital currently  Medication side effects: MEDICATION SIDE EFFECTS: Patient reports constipation  Benefit: Yes / No: Yes       ROS   Pertinent items are noted in HPI.       MENTAL STATUS EXAM   Vitals: /84   Pulse 68   Temp 98.8  F (37.1  C) (Oral)   Resp 14   Ht 1.93 m (6' 3.98\")   Wt 103.4 kg (228 lb)   SpO2 96%   BMI 27.76 kg/m      Appearance:  Disheveled  Mood: Reports, \"Nervous.\"  Affect: blunted and guarded  was congruent to speech  Suicidal Ideation: PRESENT / ABSENT: absent   Homicidal Ideation: PRESENT / ABSENT: absent   Thought process: circumstantial, tangential, difficult to follow, perseverative, and disorganized   Thought content: significant for delusions, preoccupations, and paranoid ideation.   Fund of Knowledge: Average  Attention/Concentration: Poor  Language ability:  Intact, speech is slightly rapid  Memory: Intact  Insight:  limited.  Judgement: limited  Orientation: Yes, x4  Psychomotor Behavior: fidgety    Muscle Strength and Tone: MuscleStrength: Normal  Gait and Station: Normal   "     LABS   personally reviewed.     Vitamin D level noted to be low at 10 ng/mL; supplement ordered 2/22/24 to address vitamin D deficiency.     Latest Reference Range & Units 02/17/24 13:58 02/17/24 15:16 02/18/24 11:07 02/18/24 14:37 02/19/24 02:49 02/20/24 07:44   Sodium 135 - 145 mmol/L  140 143      Potassium 3.4 - 5.3 mmol/L  3.6 3.8      Chloride 98 - 107 mmol/L  104 108 (H)      Carbon Dioxide (CO2) 22 - 29 mmol/L  26 28      Urea Nitrogen 6.0 - 20.0 mg/dL  6.2 8.7      Creatinine 0.67 - 1.17 mg/dL  0.88 0.94      GFR Estimate >60 mL/min/1.73m2  >90 >90      Calcium 8.6 - 10.0 mg/dL  9.4 9.2      Anion Gap 7 - 15 mmol/L  10 7      Magnesium 1.7 - 2.3 mg/dL  1.9       Albumin 3.5 - 5.2 g/dL  4.4 4.1      Protein Total 6.4 - 8.3 g/dL  7.0 6.5      Alkaline Phosphatase 40 - 150 U/L  49 44      ALT 0 - 70 U/L  6 7      AST 0 - 45 U/L  11 13      Bilirubin Total <=1.2 mg/dL  1.3 (H) 1.2      Cholesterol <200 mg/dL      135   Glucose 70 - 99 mg/dL  104 (H) 135 (H)      HDL Cholesterol >=40 mg/dL      36 (L)   Hemoglobin A1C <5.7 %      4.8   LDL Cholesterol Calculated <=100 mg/dL      84   Non HDL Cholesterol <130 mg/dL      99   Triglycerides <150 mg/dL      75   TSH 0.30 - 4.20 uIU/mL      2.08   Vitamin D, Total (25-Hydroxy) 20 - 50 ng/mL      10 (L)   WBC 4.0 - 11.0 10e3/uL  5.4       Hemoglobin 13.3 - 17.7 g/dL  16.9       Hematocrit 40.0 - 53.0 %  46.5       Platelet Count 150 - 450 10e3/uL  232       RBC Count 4.40 - 5.90 10e6/uL  5.49       MCV 78 - 100 fL  85       MCH 26.5 - 33.0 pg  30.8       MCHC 31.5 - 36.5 g/dL  36.3       RDW 10.0 - 15.0 %  11.9       % Neutrophils %  78       % Lymphocytes %  13       % Monocytes %  7       % Eosinophils %  1       % Basophils %  1       Absolute Basophils 0.0 - 0.2 10e3/uL  0.0       Absolute Eosinophils 0.0 - 0.7 10e3/uL  0.1       Absolute Immature Granulocytes <=0.4 10e3/uL  0.0       Absolute Lymphocytes 0.8 - 5.3 10e3/uL  0.7 (L)       Absolute Monocytes 0.0  "- 1.3 10e3/uL  0.4       % Immature Granulocytes %  0       Absolute Neutrophils 1.6 - 8.3 10e3/uL  4.2       Absolute NRBCs 10e3/uL  0.0       NRBCs per 100 WBC <1 /100  0       SARS CoV2 PCR Negative      Negative    Amphetamine Qual Urine Screen Negative  Screen Negative        Fentanyl Qual Urine Screen Negative  Screen Negative        Cocaine Urine Screen Negative  Screen Negative        Benzodiazepine Urine Screen Negative  Screen Negative        Opiates Qualitative Urine Screen Negative  Screen Negative        PCP Urine Screen Negative  Screen Negative        Cannabinoids Qual Urine Screen Negative  Screen Positive !        Barbiturates Qual Urine Screen Negative  Screen Negative        EKG 12-LEAD, TRACING ONLY     Rpt     (H): Data is abnormally high  (L): Data is abnormally low  !: Data is abnormal  Rpt: View report in Results Review for more information  No results found for: \"PHENYTOIN\", \"PHENOBARB\", \"VALPROATE\", \"CBMZ\"       DIAGNOSIS   Principal Problem:    Schizoaffective disorder, bipolar type, currently hypomanic  History of schizophrenia  PTSD  R/O OCD    History of generalized anxiety disorder with panic attacks  History of ADHD    Clinically Significant Risk Factors                         # Overweight: Estimated body mass index is 27.76 kg/m  as calculated from the following:    Height as of this encounter: 1.93 m (6' 3.98\").    Weight as of this encounter: 103.4 kg (228 lb).   # Moderate Malnutrition: based on nutrition assessment          Active Problem List:  Patient Active Problem List   Diagnosis     Irritable bowel syndrome     Class 2 severe obesity due to excess calories with serious comorbidity and body mass index (BMI) of 37.0 to 37.9 in adult (H)     Patellofemoral instability of left knee with pain     Tobacco abuse     Suicidal ideation     Moderate episode of recurrent major depressive disorder (H)     CARSON (obstructive sleep apnea)     Unspecified psychosis not due to a substance " or known physiological condition (H)     Marijuana use     Schizophrenia, unspecified type (H)          HOSPITAL COURSE AND ASSESSMENT   This is a 33 year old male with history of schizophrenia, MDD, suicidal ideation, and PTSD who presented to [facility] ED on 2/17/2024 via EMS for psychiatric evaluation[facility]  [facility] symptoms of psychosis including disorganized, paranoid, and delusional thoughts.  Patient also exhibiting poor p.o. intake and not attending to ADLs such as bathing.  Patient has a history of Patellofemoral instability of left knee and requires a brace to stabilize.  Per chart review patient was not taking any psychiatric medications prior to admission.  Patient endorsed some cannabis use and urine drug screening negative for all except cannabinoids.  While admitted to the ED psychiatry provider was consulted and initiated olanzapine to target psychosis symptoms.  Patient was also started on Anafranil to target OCD symptoms.  Patient was evaluated by medical provider in the ED and determined to be medically stable.  Patient subsequently admitted to inpatient psychiatry unit 10 N for further psychiatric stabilization.  Current legal status is 72-hour hold.  Olanzapine started in the ED to target psychosis symptoms and plan will be to titrate to therapeutic dose while monitoring for side effects.  Psychiatric provider initiated petition for mental health commitment with Mg on 2/20/2024.  Trial of Anafranil initiated in the ED to target OCD symptoms however this was discontinued due to risk for worsening psychosis symptoms.        PLAN   1. Ongoing education given regarding diagnostic and treatment options with risks, benefits and alternatives and adequate verbalization of understanding.  2.  Medications:  Continue multivitamin 1 tab PO daily  Continue cholecalciferol (vitamin D3) capsule 1250 mcg PO every 7 days for 8 doses to  target vitamin D deficiency, administer immediately after supper  Continue olanzapine ODT tablet 15 mg PO at bedtime to target psychosis symptoms and mood disorder symptoms  PRN hydroxyzine tablet 25 mg PO every 4 hours as needed for anxiety  PRN olanzapine tablet 10 mg PO every 3 hours as needed for agitation, ordered linked with olanzapine IM injection  PRN melatonin tablet 3 mg PO at bedtime as needed for sleep  PRN nicotine lozenge 2 mg buccal every 1 hour as needed for nicotine withdrawal symptoms    3.  Labs/Imaging:  -N/A    4. Consults:  -Hospitalist to follow as needed    5. Precautions:  -Self-injury precautions, suicide precautions; risk moderate    6. Legal:  - Court hold with [facility]    7. Discharge planning:  -Per unit CTC        Risk Assessment: [facility] RISK ASSESSMENT: Patient able to contract for safety and Patient on precautions    Coordination of Care:   Treatment Plan reviewed and physician signed, Care discussed with Care/Treatment Team Members, Chart reviewed and Patient seen      Re-Certification I certify that the inpatient psychiatric facility services furnished since the previous certification were, and continue to be, medically necessary for, either, treatment which could reasonably be expected to improve the patient s condition or diagnostic study and that the hospital records indicate that the services furnished were, either, intensive treatment services, admission and related services necessary for diagnostic study, or equivalent services.     I certify that the patient continues to need, on a daily basis, active treatment furnished directly by or requiring the supervision of inpatient psychiatric facility personnel.   I estimate 14-21 days of hospitalization is necessary for proper treatment of the patient. My plans for post-hospital care for this patient are   CARISSA Rivera CNP    -     02/27/24       -     11:40 AM       Total time  35 minutes with >  50%spent on coordination of cares and psycho-education.    This note was created with help of Dragon dictation system. Grammatical / typing errors are not intentional.    CARISSA Mendieta CNP

## 2024-02-27 NOTE — CARE PLAN
02/27/24 1416   Group Therapy Session   Group Attendance attended group session   Time Session Began 1315   Time Session Ended 1400   Total Time (minutes) 45   Total # Attendees 6   Group Type psychotherapeutic   Group Topic Covered self-care activities;balanced lifestyle   Group Session Detail Therapist provided education materials and lead discussion on healthy boundaries.   Patient Response/Contribution able to recall/repeat info presented;cooperative with task;expressed readiness to alter behaviors;listened actively;discussed personal experience with topic   Patient Participation Detail Patient listened to the presentation on healthy boundaries and was able to contribute to the discussion in a meaningful way as well as name specific boundary setting skills he plans to use in the future.

## 2024-02-27 NOTE — PROGRESS NOTES
CLINICAL NUTRITION SERVICES - REASSESSMENT NOTE     Nutrition Prescription    RECOMMENDATIONS FOR MDs/PROVIDERS TO ORDER:  None at this time    Malnutrition Status:    Patient does not meet two of the established criteria necessary for diagnosing malnutrition    Recommendations already ordered by Registered Dietitian (RD):  -Snacks: HB eggs @ 10am, veggies + hummus @ 2pm  -Ensure Enlive strawberry BID w/ breakfast/dinner    Future/Additional Recommendations:  RD to sign off at this time, but will remain available by consult if new nutrition problem arises.       EVALUATION OF THE PROGRESS TOWARD GOALS   Diet: Regular  ONS: Ensure Enlive BID  Intake: eating and drinking adequately per RN notes       NEW FINDINGS   Met with Higinio chan who reports adequate appetite and intake. Constipation has improved, now having a BM ~every 1-2 days. Pt maintains low-fiber diet and small, frequent meals at home. Pt agreeable to scheduled snacks between meals. He is drinking scheduled Ensure BID.     Labs:  Reviewed    Medications:  Vit D3 1250mcg, MVI, zyprexa, miralax, senokot-s    GI: Pt denied N/V/D/C, LBM 2/27 - on bowel regimen    Weights:  Wt Readings from Last 15 Encounters:   02/27/24 103.4 kg (228 lb)   02/25/24 102.2 kg (225 lb 5 oz)      02/19/24 95.2 kg (209 lb 14.4 oz)      02/17/24 96 kg (211 lb 9.6 oz)      01/19/23 125.2 kg (276 lb)   09/02/22 140.2 kg (309 lb)   08/02/22 142 kg (313 lb)   05/16/22 (!) 152.4 kg (336 lb)   04/20/22 (!) 154.2 kg (340 lb)   04/03/22 149.7 kg (330 lb)   02/08/22 (!) 156.5 kg (345 lb)   02/08/22 (!) 156.9 kg (345 lb 12.8 oz)   01/25/22 (!) 157.9 kg (348 lb)   01/18/22 (!) 155.6 kg (343 lb 1.6 oz)   01/04/22 (!) 153.3 kg (338 lb)   11/19/21 (!) 156.9 kg (346 lb)   11/15/21 (!) 158.7 kg (349 lb 12.8 oz)   11/09/21 (!) 155.6 kg (343 lb)   10/24/21 149.7 kg (330 lb)   +7.4 kg wt gain since admission    MALNUTRITION  % Intake: No decreased intake noted  % Weight Loss: None noted  Subcutaneous  Fat Loss: None observed  Muscle Loss: None observed  Fluid Accumulation/Edema: None noted  Malnutrition Diagnosis: Patient does not meet two of the established criteria necessary for diagnosing malnutrition    Previous Goals   Patient to consume % of nutritionally adequate meal trays TID, or the equivalent with supplements/snacks.   Evaluation: Met    Previous Nutrition Diagnosis  Unintended weight loss related to decreased appetite as evidenced by 24% wt loss in ~1 year.    Evaluation: Resolved    CURRENT NUTRITION DIAGNOSIS  No nutrition diagnosis at this time    INTERVENTIONS  Implementation  Medical food supplement therapy  Modify composition of meals/snacks    Goals  Patient to consume % of nutritionally adequate meal trays TID, or the equivalent with supplements/snacks.    Monitoring/Evaluation  RD to sign off at this time, but will remain available by consult if new nutrition problem arises.      Julia Evans, MPH, RDN, LD  Behavioral Health Adult & Pediatric Dietitian  Mental Health Pager (M-F): [phone]  On Call Pager (weekends only): [phone]

## 2024-02-27 NOTE — PLAN OF CARE
BEH IP Unit Acuity Rating Score (UARS)  Patient is given one point for every criteria they meet.    CRITERIA SCORING   On a 72 hour hold, court hold, committed, stay of commitment, or revocation. 1    Patient LOS on BEH unit exceeds 20 days. 0  LOS: 8   Patient under guardianship, 55+, otherwise medically complex, or under age 11. 0   Suicide ideation without relief of precipitating factors. 0   Current plan for suicide. 0   Current plan for homicide. 0   Imminent risk or actual attempt to seriously harm another without relief of factors precipitating the attempt. 0   Severe dysfunction in daily living (ex: complete neglect for self care, extreme disruption in vegetative function, extreme deterioration in social interactions). 0   Recent (last 7 days) or current physical aggression in the ED or on unit. 0   Restraints or seclusion episode in past 72 hours. 0   Recent (last 7 days) or current verbal aggression, agitation, yelling, etc., while in the ED or unit. 0   Active psychosis. 1   Need for constant or near constant redirection (from leaving, from others, etc).  0   Intrusive or disruptive behaviors. 0   Patient requires 3 or more hours of individualized nursing care per 8-hour shift (i.e. for ADLs, meds, therapeutic interventions). 0   TOTAL 2

## 2024-02-27 NOTE — CARE PLAN
Pt was in bed at the beginning of the shift. Patient slept for 6.75 hours. No medical or behavior concerns noted during this shift. Pt remains on 15 minutes safety check. Will continue to monitor.

## 2024-02-27 NOTE — PLAN OF CARE
"Pt has been active in the milieu. Pt attends all group. Pt is calm. Social with select peers. Pt has court today and hopes to discharge as soon as possible. Pt tells writer everyone here are \"nice\" but would rather be somewhere else. Pt asked writer if he should prepare for his court today. Writer encouraged pt to tell the truth and let them know his plan.  Pt had a BM today. Pt tells writer he does not have contact with his family. Pt ate breakfast and states he sleeps well. Pt denies hallucinations. Pt denies SI and SIB.     Goal Outcome Evaluation: ongoing                         "

## 2024-02-27 NOTE — PROGRESS NOTES
02/27/24 1110   Group Therapy Session   Group Attendance attended group session   Time Session Began 1015   Time Session Ended 1100   Total Time (minutes) 45   Total # Attendees 2   Group Type psychoeducation;psychotherapeutic   Group Topic Covered coping skills/lifestyle management;emotions/expression;relationship   Group Session Detail Group watched a video about the anatomy of trust from Clari Saleh. After the video group members discussed the different dynamics of trust that they had a reaction to.   Patient Response/Contribution able to recall/repeat info presented;cooperative with task;discussed personal experience with topic;listened actively   Patient Participation Detail Pt attended group, listened to the video, and had questions about how differnt parts of the video applied to situation in his life. Pt also asked writer about his ability to feel other peoples emtions being described as delusional. Writer provided feedback that empathy might be a little different than what he was exlplaining b/c with empathy there are boundaries where you don't take on other peoples emotions. Pt seemed to understand and thanked theresa for feedback. Pt was engage in the topic and enjoyed the converstaion around trust.

## 2024-02-27 NOTE — PROVIDER NOTIFICATION
02/27/24 1136   Individualization/Patient Specific Goals   Patient Personal Strengths expressive of emotions   Patient Vulnerabilities adverse childhood experience(s);history of unsuccessful treatment;housing insecurity;lacks insight into illness;substance abuse/addiction   Interprofessional Rounds   Summary Eating well, sleeping well, going to groups, social with peers and staff. Taking medications. Still presents with delusions, paranoia, and psychosis.   Participants CTC;advanced practice nurse;nursing;psychotherapy   Behavioral Team Discussion   Participants Talita Cronin APRN; Trinity RN, Inocencio CTC   Progress Responding well to miliue and medications.   Anticipated length of stay 7-18 days   Continued Stay Criteria/Rationale Needs clinical stabilization, medication management, and safe dispo plan   Medical/Physical See H&P   Precautions See below   Plan Medication management, mental health symptom stabilization, development of safe dispo plan, Petition for commitment due to history of multiple ED visits and level of recent decompensation   Anticipated Discharge Disposition IRTS;group home     PRECAUTIONS AND SAFETY    Behavioral Orders   Procedures    Code 1 - Restrict to Unit    Routine Programming     As clinically indicated    Self Injury Precaution    Status 15     Every 15 minutes.    Suicide precautions: Suicide Risk: MODERATE; Clinical rationale to override score: modification to the care environment, Collateral information supporting Suicidal/self-harm behaviors     Patients on Suicide Precautions should have a Combination Diet ordered that includes a Diet selection(s) AND a Behavioral Tray selection for Safe Tray - with utensils, or Safe Tray - NO utensils       Order Specific Question:   Suicide Risk     Answer:   MODERATE     Order Specific Question:   Clinical rationale to override score:     Answer:   modification to the care environment     Order Specific Question:   Clinical rationale to override  score:     Answer:   Collateral information supporting Suicidal/self-harm behaviors       Safety  Safety WDL: WDL  Patient Location: group room  Observed Behavior: sitting, calm  Safety Measures: environmental rounds completed, safety rounds completed, suicide assessment completed  Suicidality: Status 15, Minimal furniture in room, Minimal personal belongings in room

## 2024-02-28 ENCOUNTER — APPOINTMENT (OUTPATIENT)
Dept: GENERAL RADIOLOGY | Facility: CLINIC | Age: 34
End: 2024-02-28
Attending: PHYSICIAN ASSISTANT
Payer: COMMERCIAL

## 2024-02-28 LAB
ALBUMIN SERPL BCG-MCNC: 4.2 G/DL (ref 3.5–5.2)
ALP SERPL-CCNC: 60 U/L (ref 40–150)
ALT SERPL W P-5'-P-CCNC: 9 U/L (ref 0–70)
ANION GAP SERPL CALCULATED.3IONS-SCNC: 11 MMOL/L (ref 7–15)
AST SERPL W P-5'-P-CCNC: 22 U/L (ref 0–45)
BASOPHILS # BLD AUTO: 0.1 10E3/UL (ref 0–0.2)
BASOPHILS NFR BLD AUTO: 1 %
BILIRUB SERPL-MCNC: 0.3 MG/DL
BUN SERPL-MCNC: 14.9 MG/DL (ref 6–20)
CALCIUM SERPL-MCNC: 9.5 MG/DL (ref 8.6–10)
CHLORIDE SERPL-SCNC: 102 MMOL/L (ref 98–107)
CREAT SERPL-MCNC: 0.8 MG/DL (ref 0.67–1.17)
DEPRECATED HCO3 PLAS-SCNC: 28 MMOL/L (ref 22–29)
EGFRCR SERPLBLD CKD-EPI 2021: >90 ML/MIN/1.73M2
EOSINOPHIL # BLD AUTO: 0.2 10E3/UL (ref 0–0.7)
EOSINOPHIL NFR BLD AUTO: 4 %
ERYTHROCYTE [DISTWIDTH] IN BLOOD BY AUTOMATED COUNT: 12.3 % (ref 10–15)
GLUCOSE SERPL-MCNC: 93 MG/DL (ref 70–99)
HCT VFR BLD AUTO: 48.8 % (ref 40–53)
HGB BLD-MCNC: 16.7 G/DL (ref 13.3–17.7)
IMM GRANULOCYTES # BLD: 0 10E3/UL
IMM GRANULOCYTES NFR BLD: 0 %
LYMPHOCYTES # BLD AUTO: 1.4 10E3/UL (ref 0.8–5.3)
LYMPHOCYTES NFR BLD AUTO: 22 %
MAGNESIUM SERPL-MCNC: 2 MG/DL (ref 1.7–2.3)
MCH RBC QN AUTO: 30.8 PG (ref 26.5–33)
MCHC RBC AUTO-ENTMCNC: 34.2 G/DL (ref 31.5–36.5)
MCV RBC AUTO: 90 FL (ref 78–100)
MONOCYTES # BLD AUTO: 0.5 10E3/UL (ref 0–1.3)
MONOCYTES NFR BLD AUTO: 9 %
NEUTROPHILS # BLD AUTO: 3.9 10E3/UL (ref 1.6–8.3)
NEUTROPHILS NFR BLD AUTO: 64 %
NRBC # BLD AUTO: 0 10E3/UL
NRBC BLD AUTO-RTO: 0 /100
PHOSPHATE SERPL-MCNC: 4.2 MG/DL (ref 2.5–4.5)
PLATELET # BLD AUTO: 200 10E3/UL (ref 150–450)
POTASSIUM SERPL-SCNC: 3.9 MMOL/L (ref 3.4–5.3)
PROT SERPL-MCNC: 6.7 G/DL (ref 6.4–8.3)
RBC # BLD AUTO: 5.43 10E6/UL (ref 4.4–5.9)
SODIUM SERPL-SCNC: 141 MMOL/L (ref 135–145)
WBC # BLD AUTO: 6.1 10E3/UL (ref 4–11)

## 2024-02-28 PROCEDURE — 250N000013 HC RX MED GY IP 250 OP 250 PS 637: Performed by: PSYCHIATRY & NEUROLOGY

## 2024-02-28 PROCEDURE — 99232 SBSQ HOSP IP/OBS MODERATE 35: CPT

## 2024-02-28 PROCEDURE — 250N000013 HC RX MED GY IP 250 OP 250 PS 637

## 2024-02-28 PROCEDURE — 85025 COMPLETE CBC W/AUTO DIFF WBC: CPT

## 2024-02-28 PROCEDURE — H2032 ACTIVITY THERAPY, PER 15 MIN: HCPCS

## 2024-02-28 PROCEDURE — 124N000002 HC R&B MH UMMC

## 2024-02-28 PROCEDURE — 84100 ASSAY OF PHOSPHORUS: CPT

## 2024-02-28 PROCEDURE — G0177 OPPS/PHP; TRAIN & EDUC SERV: HCPCS

## 2024-02-28 PROCEDURE — 80053 COMPREHEN METABOLIC PANEL: CPT

## 2024-02-28 PROCEDURE — 83735 ASSAY OF MAGNESIUM: CPT

## 2024-02-28 PROCEDURE — 36415 COLL VENOUS BLD VENIPUNCTURE: CPT

## 2024-02-28 PROCEDURE — 73562 X-RAY EXAM OF KNEE 3: CPT | Mod: LT

## 2024-02-28 PROCEDURE — 90853 GROUP PSYCHOTHERAPY: CPT

## 2024-02-28 RX ORDER — BENZTROPINE MESYLATE 0.5 MG/1
0.5 TABLET ORAL 2 TIMES DAILY PRN
Status: DISCONTINUED | OUTPATIENT
Start: 2024-02-28 | End: 2024-03-18 | Stop reason: HOSPADM

## 2024-02-28 RX ADMIN — SENNOSIDES AND DOCUSATE SODIUM 2 TABLET: 8.6; 5 TABLET ORAL at 20:19

## 2024-02-28 RX ADMIN — SENNOSIDES AND DOCUSATE SODIUM 1 TABLET: 8.6; 5 TABLET ORAL at 08:18

## 2024-02-28 RX ADMIN — BENZTROPINE MESYLATE 0.5 MG: 0.5 TABLET ORAL at 16:42

## 2024-02-28 RX ADMIN — POLYETHYLENE GLYCOL 3350 17 G: 17 POWDER, FOR SOLUTION ORAL at 08:18

## 2024-02-28 RX ADMIN — OLANZAPINE 15 MG: 15 TABLET, ORALLY DISINTEGRATING ORAL at 20:19

## 2024-02-28 RX ADMIN — THERA TABS 1 TABLET: TAB at 08:18

## 2024-02-28 ASSESSMENT — ACTIVITIES OF DAILY LIVING (ADL)
ADLS_ACUITY_SCORE: 45
ORAL_HYGIENE: INDEPENDENT
ADLS_ACUITY_SCORE: 45
ADLS_ACUITY_SCORE: 45
LAUNDRY: WITH SUPERVISION
ADLS_ACUITY_SCORE: 45
DRESS: INDEPENDENT
ADLS_ACUITY_SCORE: 45
HYGIENE/GROOMING: INDEPENDENT
ADLS_ACUITY_SCORE: 45

## 2024-02-28 NOTE — CARE PLAN
Pt was a strong participant in dance/movement therapy (D/MT) in identifying therapeutic themes.  He was a  in both verbal and nonverbal psychotherapeutic interventions based in group cohesion and support.  Pt was able to express individuality as well as give and receive support the was flexible within shifting needs.  Pt was clear and articulate in verbal expression as well as organized and fluid in non-verbal expression.       02/28/24 1330   Expressive Therapy   Therapy Type dance/movement   Minutes of Treatment 30

## 2024-02-28 NOTE — PLAN OF CARE
"  Problem: Adult Inpatient Plan of Care  Goal: Plan of Care Review  Description: The Plan of Care Review/Shift note should be completed every shift.  The Outcome Evaluation is a brief statement about your assessment that the patient is improving, declining, or no change.  This information will be displayed automatically on your shift  note.  Outcome: Progressing   Goal Outcome Evaluation:    Plan of Care Reviewed With: patient      Pt was visible in the lounge for most of the shift. Attended the unit group. Was sociable with peers. Stated \" I need to talk to my doctor about discharge tomorrow.\" Pt denies all mental health symptoms including SI/SIB/HI. Denies auditory and visual hallucinations. Denies anxiety and depression. Described his mood as good. Denies any physical discomfort. Pt denies having any problem with eating or sleeping.   Plan: Continue to provide safe environment and therapeutic milieu.     "

## 2024-02-28 NOTE — CARE PLAN
Occupational Therapy     02/28/24 1300   Group Therapy Session   Group Attendance attended group session   Time Session Began 1110   Time Session Ended 1200   Total Time (minutes) 50   Total # Attendees 7   Group Type expressive therapy;task skill   Group Topic Covered cognitive activities;coping skills/lifestyle management;leisure exploration/use of leisure time;problem-solving;structured socialization   Group Session Detail OT: Education on healthy activity engagement and creative hands-on endeavor (OT clinic) to increase concentration, focus, attention to task/detail, decision making, problem solving, frustration tolerance, task follow through, coping with stress, healthy leisure engagement, creative expression, and social engagement   Patient Response/Contribution cooperative with task;other (see comments);offered helpful suggestions to peers  (pleasant; engaged)   Patient Participation Detail Pt sat among peers to complete preferred creative hands on endeavor and engaged in brief social interactions with peers; lack of socialization may have been due to pt and peer's focus on task. Pt independently gathered all supplies. Pt worked in a mostly neat and tidy manner to complete project and cleaned-up all supplies. Pt reported they enjoyed the activity and are looking forward to completing the task.

## 2024-02-28 NOTE — PLAN OF CARE
BEH IP Unit Acuity Rating Score (UARS)  Patient is given one point for every criteria they meet.    CRITERIA SCORING   On a 72 hour hold, court hold, committed, stay of commitment, or revocation. 1    Patient LOS on BEH unit exceeds 20 days. 0  LOS: 9   Patient under guardianship, 55+, otherwise medically complex, or under age 11. 0   Suicide ideation without relief of precipitating factors. 0   Current plan for suicide. 0   Current plan for homicide. 0   Imminent risk or actual attempt to seriously harm another without relief of factors precipitating the attempt. 0   Severe dysfunction in daily living (ex: complete neglect for self care, extreme disruption in vegetative function, extreme deterioration in social interactions). 0   Recent (last 7 days) or current physical aggression in the ED or on unit. 0   Restraints or seclusion episode in past 72 hours. 0   Recent (last 7 days) or current verbal aggression, agitation, yelling, etc., while in the ED or unit. 0   Active psychosis. 1   Need for constant or near constant redirection (from leaving, from others, etc).  0   Intrusive or disruptive behaviors. 0   Patient requires 3 or more hours of individualized nursing care per 8-hour shift (i.e. for ADLs, meds, therapeutic interventions). 0   TOTAL 2

## 2024-02-28 NOTE — CONSULTS
[facility]  Orthopedic Surgery Consult    Name: iHginio Wallace  Age: 33 year old  MRN: [id number]  YOB: 1990    Reason for Consult: L knee subluxation    Requesting Provider: Lily Cronin    Assessment and Plan:     Assessment:  Patient is a 32 y/o man with history of recurrent patellar instability and subluxations s/p L derotational tibial osteotomy, MPFL reconstruction and TTO with Dr. Han in 2020.     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  At this time patient said that he would like to defer all surgical management as he is worried about cost of procedures given his housing instability.  He currently is wearing a left knee patellar stabilizing brace which appears to be fitting appropriately.  We recommended that he continue using the brace while ambulatory as we do not have any other patellar restrictive braces in the inpatient setting.  Discussed having the patient follow-up with Dr. Saucedo in the outpatient setting that she may recommend additional exercises and/or nonoperative techniques that may help him with his recurrent left patella subluxation.  Patient agreed to this plan and all his questions were answered.  No operative indication at this time      Plan:  - Continue Patellar stabilizing brace while ambulatory  - Ice/Elevation/Compression as needed for swelling  - Outpatient follow-up with Dr. Han for discussion regarding non-surgical vs surgical options.   - No inpatient orthopedic surgical need at this time.       - Imaging: Completed  - Activity: Continue Brace use with activity. Avoid excessive cutting exercises/running.   - Weight bearing: WBAT LLE  - Pain control: Per primary  - Diet: Regular Diet  - Follow-up: Will arrange follow-up with Dr. Han in outpatient setting of review of options  - Disposition: Per primary    Staff: Trent Olea  MD    Respectfully,    Boston Ba MD  Orthopedic Surgery PGY1  [phone]    Please page me directly with any questions/concerns during regular weekday hours before 5 pm. If there is no response, if it is a weekend, or if it is during evening hours then please page the orthopedic surgery resident on call.    History of Present Illness:     Patient was seen and examined by me. History, PMH, Meds, SH, complete ROS (10 organ systems) and PE reviewed with patient and prior medical records.      Patient is a 33-year-old male with history of schizophrenia, MDD, suicidal ideation, and PTSD who was admitted to the [facility] inpatient psychiatric unit on 02/20 over concerns for psychosis.  Patient also has a known history of bilateral left greater than right patellar instability with multiple subluxation events of the left knee.  Patient states that earlier this morning he had a recurrent subluxation event while walking in the hallways.  Patient states that he had his socks on and slipped slightly and felt his left kneecap subluxed laterally.  Patient had multiple previous surgeries on the left leg including left derotational tibial osteotomy, MPFL reconstruction, and distal tibial tubercle osteotomy with Dr. Saucedo.  Patient states that surgery was successful in reducing his subluxation events for approximately 2 years postop, however, for the past years he has experienced recurrent subluxations.  Patient denies any significant discomfort at current time.  Patient has a moderate knee swelling which he says is slightly abnormal for him.  Patient is worsening no new numbness or tingling in the left lower extremity.     Past Medical History:     Past Medical History:   Diagnosis Date    Anxiety     Class 2 obesity due to excess calories in adult     Complication of anesthesia     Depression     Gastroesophageal reflux disease with esophagitis     History of nephrolithiasis     Irritable bowel syndrome     Major  depression, recurrent (H24)     Malrotation of intestine (H28)     Manic disorder (H)     Panic disorder     Patellofemoral instability of left knee with pain     Pre-diabetes     Psychosis (H)     PTSD (post-traumatic stress disorder)     Tobacco use        Past Surgical History:     Past Surgical History:   Procedure Laterality Date    ARTHROSCOPY KNEE WITH PATELLAR REALIGNMENT Left 11/13/2020    Procedure: Knee Arthroscopy, Medial Patello-femoral Ligament Reconstruction with Allograft, Distal Tibial Tubercle Osteotomy, Lateral Retinacular Lengthening;  Surgeon: Sourav Keating MD;  Location: [facility] OR    ARTHROTOMY TIBIAL TUBERCLE SHIFT Left 11/13/2020    Procedure: tibial tubercle Osteotomy ;  Surgeon: Sourav Keating MD;  Location: [facility] OR    COLONOSCOPY N/A 10/07/2020    Procedure: COLONOSCOPY, WITH POLYPECTOMY AND BIOPSY;  Surgeon: Donell Holland MD;  Location: [facility] OR    deviated septum  2018    KNEE SURGERY Left     OPEN REDUCTION INTERNAL FIXATION RODDING INTRAMEDULLARY TIBIA Left 11/13/2020    Procedure: plus derotation tibial osteotomy;  Surgeon: Sourav Keating MD;  Location: [facility] OR       Social History:     Social History     Socioeconomic History    Marital status: Single     Spouse name: None    Number of children: None    Years of education: None    Highest education level: None   Tobacco Use    Smoking status: Some Days     Packs/day: .25     Types: Cigarettes     Start date: 2001    Smokeless tobacco: Never    Tobacco comments:     Does not know how much he smokes   Substance and Sexual Activity    Alcohol use: Not Currently    Drug use: Not Currently     Types: Marijuana    Sexual activity: Not Currently     Partners: Female     Birth control/protection: Condom     Social Determinants of Health     Financial Resource Strain: Medium Risk (1/7/2022)    Overall Financial Resource Strain (CARDIA)     Difficulty of Paying Living Expenses: Somewhat hard   Food Insecurity: Food  Insecurity Present (1/7/2022)    Hunger Vital Sign     Worried About Running Out of Food in the Last Year: Sometimes true     Ran Out of Food in the Last Year: Sometimes true   Transportation Needs: No Transportation Needs (11/24/2021)    PRAPARE - Transportation     Lack of Transportation (Medical): No     Lack of Transportation (Non-Medical): No   Housing Stability: Low Risk  (11/24/2021)    Housing Stability Vital Sign     Unable to Pay for Housing in the Last Year: No     Number of Places Lived in the Last Year: 1     Unstable Housing in the Last Year: No       Family History:     Family History   Problem Relation Age of Onset    Other - See Comments Mother         PONV    Cerebrovascular Disease Brother     Atrial fibrillation Brother     Other - See Comments Brother         cardiac autoimmune deficiency       Medications:     Current Facility-Administered Medications   Medication    acetaminophen (TYLENOL) tablet 325-650 mg    alum & mag hydroxide-simethicone (MAALOX) suspension 15 mL    benztropine (COGENTIN) tablet 0.5 mg    cholecalciferol (VITAMIN D3) capsule 1,250 mcg    hydrOXYzine HCl (ATARAX) tablet 25 mg    melatonin tablet 3 mg    multivitamin, therapeutic (THERA-VIT) tablet 1 tablet    nicotine (COMMIT) lozenge 2 mg    OLANZapine (zyPREXA) tablet 10 mg    Or    OLANZapine (zyPREXA) injection 10 mg    OLANZapine zydis (zyPREXA) ODT tab 15 mg    polyethylene glycol (MIRALAX) Packet 17 g    polyethylene glycol (MIRALAX) Packet 17 g    senna-docusate (SENOKOT-S/PERICOLACE) 8.6-50 MG per tablet 1-2 tablet       Allergies:     Allergies   Allergen Reactions    Bee Venom Anaphylaxis and Other (See Comments)     Swelling  Large local reactions/ swelling      Fruit Acid Concentrate [Fruit Extracts] Swelling     Lips swell and crust over little bit- tongue gets numb    Liquid Adhesive Dermatitis     And band aid      Monosodium Glutamate Hives       Review of Systems:     A comprehensive 10 point review of  "systems (constitutional, ENT, cardiac, peripheral vascular, respiratory, GI, , musculoskeletal, skin, neurological) was performed and found to be negative except as described in this note.      Physical Exam:     Vital Signs: /72 (BP Location: Right arm, Patient Position: Sitting, Cuff Size: Adult Regular)   Pulse 77   Temp 98.1  F (36.7  C) (Oral)   Resp 18   Ht 1.93 m (6' 3.98\")   Wt 103.4 kg (228 lb)   SpO2 98%   BMI 27.76 kg/m    General: Resting comfortably in bed, awake, alert, no apparent distress, appears stated age.    Musculoskeletal:  LLE: No gross deformity.  Mild joint effusion present. Skin intact. Full active/passive ROM of all joints w minimal pain or crepitus. Positive J sign  Fires TA/Gastroc/EHL/FHL with 5/5 strength. SILT in femoral, sural, saphenous, deep peroneal, superficial peroneal, and tibial nerve distributions. Dorsalis pedis and posterior tibial arteries 2+ and foot warm and well-perfused with <2 seconds capillary refill.     Imaging:     Radiographs of the left knee taken today show postsurgical changes of tibial tubercle osteotomy and intramedullary nail fixation with mild degenerative changes within the patellofemoral joint but no new osseous abnormality or fracture.    Data:     CBC:  Lab Results   Component Value Date    WBC 5.4 02/17/2024    HGB 16.9 02/17/2024     02/17/2024     BMP:  Lab Results   Component Value Date     02/18/2024    POTASSIUM 3.8 02/18/2024    CHLORIDE 108 (H) 02/18/2024    CO2 28 02/18/2024    BUN 8.7 02/18/2024    CR 0.94 02/18/2024    ANIONGAP 7 02/18/2024    NEAL 9.2 02/18/2024     (H) 02/18/2024     Inflammatory Markers:  Lab Results   Component Value Date    WBC 5.4 02/17/2024    WBC 7.8 12/27/2022    WBC 7.7 12/06/2022    CRP <2.9 11/15/2021    CRP 2.2 08/04/2021    CRP <2.9 09/08/2020    SED 5 11/15/2021    SED 2 08/04/2021       "

## 2024-02-28 NOTE — PLAN OF CARE
"Goal Outcome Evaluation:    Problem: Psychotic Signs/Symptoms  Goal: Increased Participation and Engagement (Psychotic Signs/Symptoms)  Outcome: Progressing     Pt presents calm, cooperative, and pleasant. Pt denies SI/HI/SIB, hallucinations, depression, and anxiety, however endorses having \"depressing thoughts\". Pt reports that he he doesn't feel like he is \"in psychosis\" which puts makes him \"act childish\" and is hoping that he did not appear this way during his meeting with the court recently. Pt explains that he feels he is in a safe environment, but he can \"feel other peoples emotions\" and sometimes this is a place with lots of emotions. Pt reports that sometimes other people's negative emotions trigger memories about past trauma.    Pt reports concerns with being on psych medications because \"sometimes people don't care that they are causing worse symptoms\" and is worried about being committed as he doesn't want to be forced to take medications that make him feel worse. Pt wants to be able to take medications and receive treatment that improve his life and help treat past trauma, however he doesn't want medication that has too many side effects.      Pt split time between room and milieu and interacted appropriately and positively with staff and peers. Pt was seen playing card/board games with peers. Pt attended multiple Groups today and spoke with writer about enjoying the art he created.  Pt ate most of breakfast and lunch.    Pt does not report concerns with bowel/bladder. Pt denies pain. Pt denies need for PRN medication.  VSS, medication compliant, behaviorally in control.   "

## 2024-02-28 NOTE — PROGRESS NOTES
"PSYCHIATRY  PROGRESS NOTE     DATE OF SERVICE   02/28/24          CHIEF COMPLAINT   \"My left knee dislocated.\"       SUBJECTIVE   Nursing reports:    Pt was visible in the lounge for most of the shift. Attended the unit group. Was sociable with peers. Stated \" I need to talk to my doctor about discharge tomorrow.\" Pt denies all mental health symptoms including SI/SIB/HI. Denies auditory and visual hallucinations. Denies anxiety and depression. Described his mood as good. Denies any physical discomfort. Pt denies having any problem with eating or sleeping.         Pt was in bed at the beginning of the shift.Pt slept for 6.5 hours           reports:    -Attending Provider: CARISSA Davis CNP  -Voicemail Code: [id number]  -Team Note Due: Tuesday  -Next Steps:     Coordinate with [facility] regarding petition for commitment.  Coordinate with [facility] regarding belongings           Assessment/Intervention/Current Symtoms and Care Coordination:  Chart review and met with team, discussed pt progress, symptomology, and response to treatment.  Discussed the discharge plan and any potential impediments to discharge.  [facility] called and spoke to Higinio's CADI  Norah through the [facility]. [facility] was informed that Higinio has been accepted to [facility] and he would be able to discharge to this place when the time comes.   [facility] received confirmation on court dates regarding commitment hearing.  [facility] met with Higinio to discuss discharge plans and commitment hearings. [facility] updated Higinio of commitment hearing dates. [facility] discussed [facility] and [facility] and CADI services through the [facility]. Higinio expressed a willingness to continue to move forward with [facility]. Higinio expressed wanting to learn more about [facility] due to their program locations being closer to his mother and brother.         Discharge Plan or Goal:  Pending further stabilization, continued " "compliance with medications, continued activities of daily living, and development of a safe discharge plan.   Considerations:      Barriers to Discharge:  Impact of mental health symptoms on well being   Impact of mental health symptoms on activities of daily living  Need for medication monitoring and medication management     Referral Status:        Legal Status:  Court Hold   County: [place]  File Number: [id number]  Start and expiration date of commitment:      Contacts:  Norah (CADI  through [facility]): [phone]  [email or web address]     Upcoming Meetings and Dates/Important Information:        -Still Needed on AVS:             OBJECTIVE   Met with patient in OT room of unit 10 [address]. Upon patient interview, patient reports, \" my left knee dislocated.\"  Patient reports that his left knee dislocated this morning and endorses feeling numbness with slight swelling in his left knee and ankle.  Patient also reports experiencing numbness which he reports is baseline for his left lower extremity.  Patient reports having a surgery in the past and reports he currently wears a brace which does not fit anymore due to weight loss.  Provider discussed option of initiating an orthopedic consult to address patient's reported concerns further.  Patient verbalized agreement with this.  Provider also consulted with internal medicine team and x-ray of the left knee was ordered to address reported left knee dislocation further.  Patient's mood appears slightly anxious.  Patient's speech is slightly rapid.  Patient's thought process is circumstantial, tangential, difficult to follow, perseverative, and disorganized.  Patient  continues to endorse delusional thoughts that he is affected by other's emotions and that his presence has an impact on other people's thoughts and emotions.  Patient also reports that he is experiencing paranoid delusional thoughts  Patient denies any HI.  Denies any " "thoughts of SI, SIB, SA, intent or plan. Patient able to contract for safety. Patient denies auditory hallucinations or visual hallucinations today. Patient continues to reports that he is able to manage his mental health symptoms by suppressing these thoughts.  Patient denies depression or anxiety today.  Patient endorses sleeping well overnight and reports that he benefits from being in a room by himself.  Patient reports appetite is decreased.  Patient has previously reported delusional thought that when he is eating food he believes he is \"eating himself.\"  Today, he reports these thoughts are improving; he also reports that he manages these thoughts by, \"telling myself that my DNA is in everything.\"  Per nursing documentation, patient ate most of breakfast and lunch.  Patient has previously reported constipation symptoms.  Today patient reports he had a bowel movement today which was soft.  Provider discussed the plan will be to continue to monitor this.  Patient in agreement and reports he benefits from scheduled stool softeners due to chronic constipation symptoms and to promote regular bowel movements.  Patient denies any issues with urination.  Patient vital signs reviewed and noted to be stable. Patient denies any other medical concerns today. Patient has no other questions or concerns.  Patient denies any side effects of current medication regimen.  Today, plan will be to continue olanzapine ODT tablet 15 mg PO at bedtime to target psychosis and mood disorder symptoms.   Patient has been compliant with psychiatric medications in the hospital. Patient reports awareness that he is currently on a court hold awaiting details of upcoming final court hearing which is scheduled for this Friday, 3/1/2024.  Patient offers no other questions or concerns.      1600: Nursing staff report that patient endorsed muscle twitching in his arm.  CMP and CBC ordered.  Provider also ordered benztropine tablet 0.5 mg BO2 times " daily as needed for movement disorder symptoms.  Plan will be to follow up with patient tomorrow regarding this.      Orthopedic surgery consulted to address patient report of left knee subluxation, per documentation from orthopedic consult on 2/28/2024:    Assessment:  Patient is a 34 y/o man with history of recurrent patellar instability and subluxations s/p L derotational tibial osteotomy, MPFL reconstruction and TTO with Dr. Han in 2020.      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  At this time patient said that he would like to defer all surgical management as he is worried about cost of procedures given his housing instability.  He currently is wearing a left knee patellar stabilizing brace which appears to be fitting appropriately.  We recommended that he continue using the brace while ambulatory as we do not have any other patellar restrictive braces in the inpatient setting.  Discussed having the patient follow-up with Dr. Saucedo in the outpatient setting that she may recommend additional exercises and/or nonoperative techniques that may help him with his recurrent left patella subluxation.  Patient agreed to this plan and all his questions were answered.  No operative indication at this time        Plan:  - Continue Patellar stabilizing brace while ambulatory  - Ice/Elevation/Compression as needed for swelling  - Outpatient follow-up with Dr. Han for discussion regarding non-surgical vs surgical options.   - No inpatient orthopedic surgical need at this time.         - Imaging: Completed  - Activity: Continue Brace use with activity. Avoid excessive cutting exercises/running.   - Weight bearing: WBAT LLE  - Pain control: Per primary  - Diet: Regular Diet  - Follow-up: Will arrange follow-up with Dr. Han in outpatient setting of review of options  - Disposition: Per primary     MEDICATIONS   Medications:  Scheduled  "Meds:    cholecalciferol  1,250 mcg Oral Q7 Days     multivitamin, therapeutic  1 tablet Oral Daily     OLANZapine zydis  15 mg Oral At Bedtime     polyethylene glycol  17 g Oral Daily     senna-docusate  1-2 tablet Oral BID     Continuous Infusions:  PRN Meds:.acetaminophen, alum & mag hydroxide-simethicone, benztropine, hydrOXYzine HCl, melatonin, nicotine, OLANZapine **OR** OLANZapine, polyethylene glycol    Medication adherence issues: MS Med Adherence Y/N: Yes, patient reports history of noncompliance with psychiatric medications in the community.  Patient is cooperative with meds in the hospital currently  Medication side effects: MEDICATION SIDE EFFECTS: Patient does not report any side effects to writer today  Benefit: Yes / No: Yes       ROS   Pertinent items are noted in HPI.       MENTAL STATUS EXAM   Vitals: /76 (BP Location: Left arm, Patient Position: Sitting, Cuff Size: Adult Large)   Pulse 85   Temp 98.8  F (37.1  C) (Oral)   Resp 18   Ht 1.93 m (6' 3.98\")   Wt 103.4 kg (228 lb)   SpO2 97%   BMI 27.76 kg/m      Appearance:  Disheveled  Mood: Mood appears slightly anxious  Affect: blunted and guarded  was congruent to speech  Suicidal Ideation: PRESENT / ABSENT: absent   Homicidal Ideation: PRESENT / ABSENT: absent   Thought process: circumstantial, tangential, difficult to follow, perseverative, and disorganized   Thought content: significant for delusions, preoccupations, and paranoid ideation.   Fund of Knowledge: Average  Attention/Concentration: Poor  Language ability:  Intact, speech is slightly rapid  Memory: Intact  Insight:  limited.  Judgement: limited  Orientation: Yes, x4  Psychomotor Behavior: fidgety    Muscle Strength and Tone: MuscleStrength: Normal  Gait and Station: Normal       LABS   personally reviewed.     Provider reviewed results of XR KNEE LEFT 3 VIEWS completed on 2/28/2024. Results indicate: incompletely visualized postoperative changes of IM jhoana and screw " fixation of the tibia. No evidence for acute fracture around the knee joint. There is degenerative change within the patellofemoral compartment. No significant effusion.    Vitamin D level noted to be low at 10 ng/mL; supplement ordered 2/22/24 to address vitamin D deficiency.     Latest Reference Range & Units 02/17/24 13:58 02/17/24 15:16 02/18/24 11:07 02/18/24 14:37 02/19/24 02:49 02/20/24 07:44 02/28/24 15:48 02/28/24 18:49   Sodium 135 - 145 mmol/L  140 143     141   Potassium 3.4 - 5.3 mmol/L  3.6 3.8     3.9   Chloride 98 - 107 mmol/L  104 108 (H)     102   Carbon Dioxide (CO2) 22 - 29 mmol/L  26 28     28   Urea Nitrogen 6.0 - 20.0 mg/dL  6.2 8.7     14.9   Creatinine 0.67 - 1.17 mg/dL  0.88 0.94     0.80   GFR Estimate >60 mL/min/1.73m2  >90 >90     >90   Calcium 8.6 - 10.0 mg/dL  9.4 9.2     9.5   Anion Gap 7 - 15 mmol/L  10 7     11   Magnesium 1.7 - 2.3 mg/dL  1.9      2.0   Phosphorus 2.5 - 4.5 mg/dL        4.2   Albumin 3.5 - 5.2 g/dL  4.4 4.1     4.2   Protein Total 6.4 - 8.3 g/dL  7.0 6.5     6.7   Alkaline Phosphatase 40 - 150 U/L  49 44     60   ALT 0 - 70 U/L  6 7     9   AST 0 - 45 U/L  11 13     22   Bilirubin Total <=1.2 mg/dL  1.3 (H) 1.2     0.3   Cholesterol <200 mg/dL      135     Glucose 70 - 99 mg/dL  104 (H) 135 (H)     93   HDL Cholesterol >=40 mg/dL      36 (L)     Hemoglobin A1C <5.7 %      4.8     LDL Cholesterol Calculated <=100 mg/dL      84     Non HDL Cholesterol <130 mg/dL      99     Triglycerides <150 mg/dL      75     TSH 0.30 - 4.20 uIU/mL      2.08     Vitamin D, Total (25-Hydroxy) 20 - 50 ng/mL      10 (L)     WBC 4.0 - 11.0 10e3/uL  5.4      6.1   Hemoglobin 13.3 - 17.7 g/dL  16.9      16.7   Hematocrit 40.0 - 53.0 %  46.5      48.8   Platelet Count 150 - 450 10e3/uL  232      200   RBC Count 4.40 - 5.90 10e6/uL  5.49      5.43   MCV 78 - 100 fL  85      90   MCH 26.5 - 33.0 pg  30.8      30.8   MCHC 31.5 - 36.5 g/dL  36.3      34.2   RDW 10.0 - 15.0 %  11.9      12.3  "  % Neutrophils %  78      64   % Lymphocytes %  13      22   % Monocytes %  7      9   % Eosinophils %  1      4   % Basophils %  1      1   Absolute Basophils 0.0 - 0.2 10e3/uL  0.0      0.1   Absolute Eosinophils 0.0 - 0.7 10e3/uL  0.1      0.2   Absolute Immature Granulocytes <=0.4 10e3/uL  0.0      0.0   Absolute Lymphocytes 0.8 - 5.3 10e3/uL  0.7 (L)      1.4   Absolute Monocytes 0.0 - 1.3 10e3/uL  0.4      0.5   % Immature Granulocytes %  0      0   Absolute Neutrophils 1.6 - 8.3 10e3/uL  4.2      3.9   Absolute NRBCs 10e3/uL  0.0      0.0   NRBCs per 100 WBC <1 /100  0      0   SARS CoV2 PCR Negative      Negative      Amphetamine Qual Urine Screen Negative  Screen Negative          Fentanyl Qual Urine Screen Negative  Screen Negative          Cocaine Urine Screen Negative  Screen Negative          Benzodiazepine Urine Screen Negative  Screen Negative          Opiates Qualitative Urine Screen Negative  Screen Negative          PCP Urine Screen Negative  Screen Negative          Cannabinoids Qual Urine Screen Negative  Screen Positive !          Barbiturates Qual Urine Screen Negative  Screen Negative          XR KNEE LEFT 3 VIEWS        Rpt    EKG 12-LEAD, TRACING ONLY     Rpt       (H): Data is abnormally high  (L): Data is abnormally low  !: Data is abnormal  Rpt: View report in Results Review for more information  No results found for: \"PHENYTOIN\", \"PHENOBARB\", \"VALPROATE\", \"CBMZ\"       DIAGNOSIS   Principal Problem:    Schizoaffective disorder, bipolar type, currently hypomanic  History of schizophrenia  PTSD  R/O OCD    History of generalized anxiety disorder with panic attacks  History of ADHD    Clinically Significant Risk Factors                         # Overweight: Estimated body mass index is 27.76 kg/m  as calculated from the following:    Height as of this encounter: 1.93 m (6' 3.98\").    Weight as of this encounter: 103.4 kg (228 lb).   # Moderate Malnutrition: based on nutrition assessment    "       Active Problem List:  Patient Active Problem List   Diagnosis     Irritable bowel syndrome     Class 2 severe obesity due to excess calories with serious comorbidity and body mass index (BMI) of 37.0 to 37.9 in adult (H)     Patellofemoral instability of left knee with pain     Tobacco abuse     Suicidal ideation     Moderate episode of recurrent major depressive disorder (H)     CARSON (obstructive sleep apnea)     Unspecified psychosis not due to a substance or known physiological condition (H)     Marijuana use     Schizophrenia, unspecified type (H)          HOSPITAL COURSE AND ASSESSMENT   This is a 33 year old male with history of schizophrenia, MDD, suicidal ideation, and PTSD who presented to [facility] ED on 2/17/2024 via EMS for psychiatric evaluation.  Patient was at [facility] health [facility] and was experiencing symptoms of psychosis including disorganized, paranoid, and delusional thoughts.  Patient also exhibiting poor p.o. intake and not attending to ADLs such as bathing.  Patient has a history of Patellofemoral instability of left knee and requires a brace to stabilize.  Per chart review patient was not taking any psychiatric medications prior to admission.  Patient endorsed some cannabis use and urine drug screening negative for all except cannabinoids.  While admitted to the ED psychiatry provider was consulted and initiated olanzapine to target psychosis symptoms.  Patient was also started on Anafranil to target OCD symptoms.  Patient was evaluated by medical provider in the ED and determined to be medically stable.  Patient subsequently admitted to inpatient psychiatry unit 10 N for further psychiatric stabilization.  Current legal status is 72-hour hold.  Olanzapine started in the ED to target psychosis symptoms and plan will be to titrate to therapeutic dose while monitoring for side effects.  Psychiatric provider initiated petition for mental health commitment with Mg  on 2/20/2024.  Trial of Anafranil initiated in the ED to target OCD symptoms however this was discontinued due to risk for worsening psychosis symptoms.        PLAN   1. Ongoing education given regarding diagnostic and treatment options with risks, benefits and alternatives and adequate verbalization of understanding.  2.  Medications:  Continue multivitamin 1 tab PO daily  Continue cholecalciferol (vitamin D3) capsule 1250 mcg PO every 7 days for 8 doses to target vitamin D deficiency, administer immediately after supper  Continue olanzapine ODT tablet 15 mg PO at bedtime to target psychosis symptoms and mood disorder symptoms  Initiate PRN benztropine tablet 0.5 mg PO2 times daily as needed to target side effect of movement disorder  PRN hydroxyzine tablet 25 mg PO every 4 hours as needed for anxiety  PRN olanzapine tablet 10 mg PO every 3 hours as needed for agitation, ordered linked with olanzapine IM injection  PRN melatonin tablet 3 mg PO at bedtime as needed for sleep  PRN nicotine lozenge 2 mg buccal every 1 hour as needed for nicotine withdrawal symptoms    3.  Labs/Imaging:  -XR KNEE LEFT 3 VIEWS ordered 2/28/2024                 4. Consults:  -Orthopedic consult placed 2/28/2024 to address left knee subluxation    5. Precautions:  -Self-injury precautions, suicide precautions; risk moderate    6. Legal:  - Court hold with [facility]    7. Discharge planning:  -Per unit CTC        [facility] Patient able to contract for safety and Patient on precautions    Coordination of Care:   Treatment Plan reviewed and physician signed, Care discussed with Care/Treatment Team Members, Chart reviewed and Patient seen      Re-Certification I certify that the inpatient psychiatric facility services furnished since the previous certification were, and continue to be, medically necessary for, either, treatment which could reasonably be expected to improve the patient s condition or  diagnostic study and that the hospital records indicate that the services furnished were, either, intensive treatment services, admission and related services necessary for diagnostic study, or equivalent services.     I certify that the patient continues to need, on a daily basis, active treatment furnished directly by or requiring the supervision of inpatient psychiatric facility personnel.   I estimate 14-21 days of hospitalization is necessary for proper treatment of the patient. My plans for post-hospital care for this patient are   TBD     CARISSA Mendieta CNP    -     02/28/24       -     1:30 PM       Total time  35 minutes with > 50%spent on coordination of cares and psycho-education.    This note was created with help of Dragon dictation system. Grammatical / typing errors are not intentional.    CARISSA Mendieta CNP

## 2024-02-28 NOTE — PROGRESS NOTES
02/27/24 1900   Group Therapy Session   Group Attendance attended group session   Time Session Began 1600   Time Session Ended 1650   Total Time (minutes) 45   Total # Attendees 4   Group Type recreation   Group Topic Covered leisure exploration/use of leisure time   Group Session Detail TR leisure group   Patient Response/Contribution cooperative with task   Patient Participation Detail Pt attended the structured Therapeutic Recreation group, participating in a group activity. Pt participated in a leisure activity with social engagement to gain self-esteem, manage behaviors, improve social skills, decrease isolation, and reduce anxiety/depression.   Pt remained focused and engaged throughout group activity.  Pt was sociable and was appropriate with interactions with the others in group. Pt was an active participant, contributing to the clues and descriptions throughout the activity.

## 2024-02-28 NOTE — CARE PLAN
"Occupational Therapy     02/28/24 1200   Group Therapy Session   Group Attendance attended group session   Time Session Began 1015   Time Session Ended 1110   Total Time (minutes) 55   Total # Attendees 4   Group Type recreation   Group Topic Covered coping skills/lifestyle management;leisure exploration/use of leisure time;structured socialization   Group Session Detail OT: Education on physical and social wellness with physical movement (gentle exercise) and interactive social activity (Chat Pack) to increase concentration, attention to task, symptom management, coping with stress, sharing information about self, listening to others, reminiscing, physical wellness, social wellness, and overall well-being   Patient Response/Contribution cooperative with task;listened actively;offered helpful suggestions to peers;organized;other (see comments)  (pleasant; engaged)   Patient Participation Detail Pt reported during check-in that \"doing sit-ups and push-ups\" is something they enjoy to support their physical wellness. Pt sat among peers to complete presented activity and engaged in appropriate social interactions with peers. Pt engaged in all physical movement activities and answered all questions about themselves. Pt able to identify three things they are grateful for and one personal characteristic they love about themselves. Pt able to recall two new things they learned about a peer today.       "

## 2024-02-28 NOTE — CARE PLAN
Pt was in bed at the beginning of the shift.Pt slept for 6.5 hours . No Medical or behavior concerns noted during this shift.No PRN administered .Will continue to monitor.

## 2024-02-28 NOTE — PROGRESS NOTES
02/28/24 1529   Group Therapy Session   Group Attendance attended group session   Time Session Began 1430   Time Session Ended 1515   Total Time (minutes) 45   Total # Attendees 3   Group Type psychotherapeutic;psychoeducation   Group Topic Covered coping skills/lifestyle management;emotions/expression   Group Session Detail Group members check in with how they are feeling, how their day is going and if they need support with anything today. Pt then completed a graditude exercise, filling out a letter of graditude for someone in thier life. Pt's were able to choose someone important, themselved, or even their younger self. Pt's took turns sharing their letters, if desired. Group discussed graditude, how it can help mood, and the benefits of practing graditude as a way to help their mental health. Writer then lead the group in a Safe Calm Place exercise, practicing envisioning a place where they felt safe and calm, trying to see how it felt in the body and with all senses. Group then noticed the effects of engaging with the safe calm place had on their body and discussed how it can be a tool to help with stress/anxiety.   Patient Response/Contribution able to recall/repeat info presented;confronted peers appropriately;cooperative with task;discussed personal experience with topic;listened actively   Patient Participation Detail Pt attended group. Pt had an ice pack on his shin and said he dislocated it, but he's ok. Pt was pleanat and appropriate during group. Pt shared a letter he wrote to his mom who he shared graditude for the hard work she put in raising him. Pt struggled with the safe calm place exercise mentioning that the spot between his eyes always feels weird when he tried to meditate.

## 2024-02-29 PROCEDURE — 250N000013 HC RX MED GY IP 250 OP 250 PS 637: Performed by: PSYCHIATRY & NEUROLOGY

## 2024-02-29 PROCEDURE — G0177 OPPS/PHP; TRAIN & EDUC SERV: HCPCS

## 2024-02-29 PROCEDURE — 124N000002 HC R&B MH UMMC

## 2024-02-29 PROCEDURE — 250N000013 HC RX MED GY IP 250 OP 250 PS 637

## 2024-02-29 PROCEDURE — 99232 SBSQ HOSP IP/OBS MODERATE 35: CPT

## 2024-02-29 PROCEDURE — 90853 GROUP PSYCHOTHERAPY: CPT

## 2024-02-29 RX ADMIN — SENNOSIDES AND DOCUSATE SODIUM 1 TABLET: 8.6; 5 TABLET ORAL at 08:37

## 2024-02-29 RX ADMIN — Medication 1250 MCG: at 16:36

## 2024-02-29 RX ADMIN — SENNOSIDES AND DOCUSATE SODIUM 2 TABLET: 8.6; 5 TABLET ORAL at 19:34

## 2024-02-29 RX ADMIN — OLANZAPINE 15 MG: 15 TABLET, ORALLY DISINTEGRATING ORAL at 19:34

## 2024-02-29 RX ADMIN — THERA TABS 1 TABLET: TAB at 08:37

## 2024-02-29 RX ADMIN — POLYETHYLENE GLYCOL 3350 17 G: 17 POWDER, FOR SOLUTION ORAL at 08:37

## 2024-02-29 ASSESSMENT — ACTIVITIES OF DAILY LIVING (ADL)
ADLS_ACUITY_SCORE: 45
ORAL_HYGIENE: INDEPENDENT
ADLS_ACUITY_SCORE: 45
HYGIENE/GROOMING: INDEPENDENT
ADLS_ACUITY_SCORE: 45
LAUNDRY: WITH SUPERVISION
ADLS_ACUITY_SCORE: 45
ADLS_ACUITY_SCORE: 45
DRESS: INDEPENDENT
ADLS_ACUITY_SCORE: 45

## 2024-02-29 NOTE — PLAN OF CARE
Problem: Adult Inpatient Plan of Care  Goal: Plan of Care Review  Description: The Plan of Care Review/Shift note should be completed every shift.  The Outcome Evaluation is a brief statement about your assessment that the patient is improving, declining, or no change.  This information will be displayed automatically on your shift  note.  Outcome: Progressing   Goal Outcome Evaluation:    Pt had a decent shift. Attended the unit group. Played cards with his peers. Agreed to have x-ray done which was unremarkable as well as his labs.Received prn Cogentin for muscle spasms. Expressed some relief. Pt denies having any safety concerns including thoughts of harming self or others. Pt denies auditory and visual hallucinations and takes his medications as prescribed. No medication side effects reported or observed. Pt denies having any problem with eating or sleeping.   Plan: Continue to provide safe environment and therapeutic milieu.

## 2024-02-29 NOTE — PROGRESS NOTES
"PSYCHIATRY  PROGRESS NOTE     DATE OF SERVICE   02/29/24          CHIEF COMPLAINT   \" I talked to my  today.\"       SUBJECTIVE   Nursing reports:    Pt had a decent shift. Attended the unit group. Played cards with his peers. Agreed to have x-ray done which was unremarkable as well as his labs.Received prn Cogentin for muscle spasms. Expressed some relief. Pt denies having any safety concerns including thoughts of harming self or others. Pt denies auditory and visual hallucinations and takes his medications as prescribed. No medication side effects reported or observed. Pt denies having any problem with eating or sleeping.   Plan: Continue to provide safe environment and therapeutic milieu.      Pt slept for 7 hours      reports:    -Attending Provider: CARISSA Dvais CNP  -Voicemail Code: [id number]  -Team Note Due: Tuesday  -Next Steps:     Coordinate with [facility] regarding petition for commitment.  Coordinate with [facility] regarding belongings           Assessment/Intervention/Current Symtoms and Care Coordination:  Chart review and met with team, discussed pt progress, symptomology, and response to treatment.  Discussed the discharge plan and any potential impediments to discharge.  [facility] called and spoke to Higinio's CADI  Norah through the [facility]. [facility] was informed that Higinio has been accepted to [facility] and he would be able to discharge to this place when the time comes.   [facility] received confirmation on court dates regarding commitment hearing.  [facility] met with Higinio to discuss discharge plans and commitment hearings. [facility] updated Higinio of commitment hearing dates. [facility] discussed [facility] and [facility] and CADI services through the [facility]. Higinio expressed a willingness to continue to move forward with [facility]. Higinio expressed wanting to learn more about [facility] due to their program locations being closer to his " "mother and brother.         Discharge Plan or Goal:  Pending further stabilization, continued compliance with medications, continued activities of daily living, and development of a safe discharge plan.   Considerations:      Barriers to Discharge:  Impact of mental health symptoms on well being   Impact of mental health symptoms on activities of daily living  Need for medication monitoring and medication management     Referral Status:        Legal Status:  Court Hold   [facility]: [place]  File Number: [id number]  Start and expiration date of commitment:      Contacts:  Norah (CADI  through [facility]): [phone]  [email or web address]     Upcoming Meetings and Dates/Important Information:        -Still Needed on AVS:             OBJECTIVE   Met with patient in OT room of unit 10 [address]. Upon patient interview, patient reports, \" I talked to my  today.\"  Patient also reports that he signed a document agreeing with mental health commitment.  Provider discussed with patient that writer must review court documents from the [facility] reflecting outcome regarding mental health commitment and [place] orders.  Patient verbalizes understanding of this. Patient's mood appears slightly anxious.  Patient's speech is slightly rapid however improving.  Patient's thought process is circumstantial, tangential, difficult to follow, perseverative, and disorganized.  Patient  continues to endorse delusional thoughts that he is affected by other's emotions and that his presence has an impact on other people's thoughts and emotions.  Patient also reports that he is experiencing paranoid delusional thoughts  Patient denies any HI.  Denies any thoughts of SI, SIB, SA, intent or plan. Patient able to contract for safety. Patient denies auditory hallucinations or visual hallucinations today. Patient continues to reports that he is able to manage his mental health symptoms by suppressing these thoughts. " " Patient also states, \"I am an Empath.  Once I learn how to handle my emotions I will be all right.\"  Patient denies depression or anxiety today.  Patient does not endorse difficulty sleeping overnight.  Patient reports appetite is decreased.  Patient has previously reported delusional thought that when he is eating food he believes he is \"eating himself.\"  Today, he reports these thoughts are improving; he also reports that he manages these thoughts by, \"telling myself that my DNA is in everything.\"  Per nursing documentation, patient has been eating and drinking adequately.  Patient has previously reported chronic constipation however today reports he has been having regular bowel movements.  Provider discussed the plan will be to continue to monitor this.  Patient in agreement and reports he benefits from scheduled stool softeners due to chronic constipation symptoms and to promote regular bowel movements.  Patient denies any issues with urination.  Patient vital signs reviewed and noted to be stable.  Yesterday patient reported to nursing staff that he was experiencing a muscle twitch in his arm; subsequently provider ordered benztropine tablet 0.5 mg PO2  times daily as needed for movement disorder symptoms.  Patient reported this was effective for muscle twitching symptoms.  CMP and CBC were also completed and results were WNL.  Provider assessed patient and no movement disorder symptoms observed currently.  Provider discussed that plan will be to continue PRN benztropine and continue to monitor closely for any side effects of current medication regimen.  Today, plan will also be to continue olanzapine ODT tablet 15 mg PO at bedtime to target psychosis and mood disorder symptoms.  Patient has been compliant with psychiatric medications in the hospital. Patient reports awareness that he is currently on a court hold awaiting court documents from the [facility].  Patient offers no other questions or concerns.  " Patient denies any other medical concerns today.  Patient reports his left knee is improving today and less swollen. Yesterday, patient reported experiencing numbness which he reports is baseline for his left knee and lower extremity.  Patient reports having a surgery in the past and reports he currently wears a brace which does not fit anymore due to weight loss. Orthopedic consult was placed to address patient's reported concerns further.  Also an X-ray of the left knee was ordered to address reported left knee dislocation further.  See orthopedic surgery consult documentation below for details.      Orthopedic surgery consulted to address patient report of left knee subluxation, per documentation from orthopedic consult on 2/28/2024:    Assessment:  Patient is a 34 y/o man with history of recurrent patellar instability and subluxations s/p L derotational tibial osteotomy, MPFL reconstruction and TTO with Dr. Han in 2020.      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  At this time patient said that he would like to defer all surgical management as he is worried about cost of procedures given his housing instability.  He currently is wearing a left knee patellar stabilizing brace which appears to be fitting appropriately.  We recommended that he continue using the brace while ambulatory as we do not have any other patellar restrictive braces in the inpatient setting.  Discussed having the patient follow-up with Dr. Saucedo in the outpatient setting that she may recommend additional exercises and/or nonoperative techniques that may help him with his recurrent left patella subluxation.  Patient agreed to this plan and all his questions were answered.  No operative indication at this time        Plan:  - Continue Patellar stabilizing brace while ambulatory  - Ice/Elevation/Compression as needed for swelling  - Outpatient follow-up with  "Dr. Han for discussion regarding non-surgical vs surgical options.   - No inpatient orthopedic surgical need at this time.         - Imaging: Completed  - Activity: Continue Brace use with activity. Avoid excessive cutting exercises/running.   - Weight bearing: WBAT LLE  - Pain control: Per primary  - Diet: Regular Diet  - Follow-up: Will arrange follow-up with Dr. Han in outpatient setting of review of options  - Disposition: Per primary     MEDICATIONS   Medications:  Scheduled Meds:    cholecalciferol  1,250 mcg Oral Q7 Days     multivitamin, therapeutic  1 tablet Oral Daily     OLANZapine zydis  15 mg Oral At Bedtime     polyethylene glycol  17 g Oral Daily     senna-docusate  1-2 tablet Oral BID     Continuous Infusions:  PRN Meds:.acetaminophen, alum & mag hydroxide-simethicone, benztropine, hydrOXYzine HCl, melatonin, nicotine, OLANZapine **OR** OLANZapine, polyethylene glycol    Medication adherence issues: MS Med Adherence Y/N: Yes, patient reports history of noncompliance with psychiatric medications in the community.  Patient is cooperative with meds in the hospital currently  Medication side effects: MEDICATION SIDE EFFECTS: Patient does not report any side effects to writer today  Benefit: Yes / No: Yes       ROS   Pertinent items are noted in HPI.       MENTAL STATUS EXAM   Vitals: /71   Pulse 77   Temp 97.5  F (36.4  C) (Oral)   Resp 16   Ht 1.93 m (6' 3.98\")   Wt 104.8 kg (231 lb 1.6 oz)   SpO2 98%   BMI 28.14 kg/m      Appearance:  Disheveled  Mood: Mood appears slightly anxious  Affect: blunted and guarded  was congruent to speech  Suicidal Ideation: PRESENT / ABSENT: absent   Homicidal Ideation: PRESENT / ABSENT: absent   Thought process: circumstantial, tangential, difficult to follow, perseverative, and disorganized   Thought content: significant for delusions, preoccupations, and paranoid ideation.   Fund of Knowledge: Average  Attention/Concentration: Poor  Language ability: "  Intact, speech is slightly rapid  Memory: Intact  Insight:  limited.  Judgement: limited  Orientation: Yes, x4  Psychomotor Behavior: fidgety    Muscle Strength and Tone: MuscleStrength: Normal  Gait and Station: Normal       LABS   personally reviewed.     Provider reviewed results of XR KNEE LEFT 3 VIEWS completed on 2/28/2024. Results indicate: incompletely visualized postoperative changes of IM jhoana and screw fixation of the tibia. No evidence for acute fracture around the knee joint. There is degenerative change within the patellofemoral compartment. No significant effusion.    Vitamin D level noted to be low at 10 ng/mL; supplement ordered 2/22/24 to address vitamin D deficiency.     Latest Reference Range & Units 02/17/24 13:58 02/17/24 15:16 02/18/24 11:07 02/18/24 14:37 02/19/24 02:49 02/20/24 07:44 02/28/24 15:48 02/28/24 18:49   Sodium 135 - 145 mmol/L  140 143     141   Potassium 3.4 - 5.3 mmol/L  3.6 3.8     3.9   Chloride 98 - 107 mmol/L  104 108 (H)     102   Carbon Dioxide (CO2) 22 - 29 mmol/L  26 28     28   Urea Nitrogen 6.0 - 20.0 mg/dL  6.2 8.7     14.9   Creatinine 0.67 - 1.17 mg/dL  0.88 0.94     0.80   GFR Estimate >60 mL/min/1.73m2  >90 >90     >90   Calcium 8.6 - 10.0 mg/dL  9.4 9.2     9.5   Anion Gap 7 - 15 mmol/L  10 7     11   Magnesium 1.7 - 2.3 mg/dL  1.9      2.0   Phosphorus 2.5 - 4.5 mg/dL        4.2   Albumin 3.5 - 5.2 g/dL  4.4 4.1     4.2   Protein Total 6.4 - 8.3 g/dL  7.0 6.5     6.7   Alkaline Phosphatase 40 - 150 U/L  49 44     60   ALT 0 - 70 U/L  6 7     9   AST 0 - 45 U/L  11 13     22   Bilirubin Total <=1.2 mg/dL  1.3 (H) 1.2     0.3   Cholesterol <200 mg/dL      135     Glucose 70 - 99 mg/dL  104 (H) 135 (H)     93   HDL Cholesterol >=40 mg/dL      36 (L)     Hemoglobin A1C <5.7 %      4.8     LDL Cholesterol Calculated <=100 mg/dL      84     Non HDL Cholesterol <130 mg/dL      99     Triglycerides <150 mg/dL      75     TSH 0.30 - 4.20 uIU/mL      2.08     Vitamin D,  "Total (25-Hydroxy) 20 - 50 ng/mL      10 (L)     WBC 4.0 - 11.0 10e3/uL  5.4      6.1   Hemoglobin 13.3 - 17.7 g/dL  16.9      16.7   Hematocrit 40.0 - 53.0 %  46.5      48.8   Platelet Count 150 - 450 10e3/uL  232      200   RBC Count 4.40 - 5.90 10e6/uL  5.49      5.43   MCV 78 - 100 fL  85      90   MCH 26.5 - 33.0 pg  30.8      30.8   MCHC 31.5 - 36.5 g/dL  36.3      34.2   RDW 10.0 - 15.0 %  11.9      12.3   % Neutrophils %  78      64   % Lymphocytes %  13      22   % Monocytes %  7      9   % Eosinophils %  1      4   % Basophils %  1      1   Absolute Basophils 0.0 - 0.2 10e3/uL  0.0      0.1   Absolute Eosinophils 0.0 - 0.7 10e3/uL  0.1      0.2   Absolute Immature Granulocytes <=0.4 10e3/uL  0.0      0.0   Absolute Lymphocytes 0.8 - 5.3 10e3/uL  0.7 (L)      1.4   Absolute Monocytes 0.0 - 1.3 10e3/uL  0.4      0.5   % Immature Granulocytes %  0      0   Absolute Neutrophils 1.6 - 8.3 10e3/uL  4.2      3.9   Absolute NRBCs 10e3/uL  0.0      0.0   NRBCs per 100 WBC <1 /100  0      0   SARS CoV2 PCR Negative      Negative      Amphetamine Qual Urine Screen Negative  Screen Negative          Fentanyl Qual Urine Screen Negative  Screen Negative          Cocaine Urine Screen Negative  Screen Negative          Benzodiazepine Urine Screen Negative  Screen Negative          Opiates Qualitative Urine Screen Negative  Screen Negative          PCP Urine Screen Negative  Screen Negative          Cannabinoids Qual Urine Screen Negative  Screen Positive !          Barbiturates Qual Urine Screen Negative  Screen Negative          XR KNEE LEFT 3 VIEWS        Rpt    EKG 12-LEAD, TRACING ONLY     Rpt       (H): Data is abnormally high  (L): Data is abnormally low  !: Data is abnormal  Rpt: View report in Results Review for more information  No results found for: \"PHENYTOIN\", \"PHENOBARB\", \"VALPROATE\", \"CBMZ\"       DIAGNOSIS   Principal Problem:    Schizoaffective disorder, bipolar type, currently hypomanic  History of " "schizophrenia  PTSD  R/O OCD    History of generalized anxiety disorder with panic attacks  History of ADHD    Clinically Significant Risk Factors                         # Overweight: Estimated body mass index is 28.14 kg/m  as calculated from the following:    Height as of this encounter: 1.93 m (6' 3.98\").    Weight as of this encounter: 104.8 kg (231 lb 1.6 oz).   # Moderate Malnutrition: based on nutrition assessment          Active Problem List:  Patient Active Problem List   Diagnosis     Irritable bowel syndrome     Class 2 severe obesity due to excess calories with serious comorbidity and body mass index (BMI) of 37.0 to 37.9 in adult (H)     Patellofemoral instability of left knee with pain     Tobacco abuse     Suicidal ideation     Moderate episode of recurrent major depressive disorder (H)     CARSON (obstructive sleep apnea)     Unspecified psychosis not due to a substance or known physiological condition (H)     Marijuana use     Schizophrenia, unspecified type (H)          HOSPITAL COURSE AND ASSESSMENT   This is a 33 year old male with history of schizophrenia, MDD, suicidal ideation, and PTSD who presented to [facility] ED on 2/17/2024 via EMS for psychiatric evaluation.  [facility] and was experiencing symptoms of psychosis including disorganized, paranoid, and delusional thoughts.  Patient also exhibiting poor p.o. intake and not attending to ADLs such as bathing.  Patient has a history of Patellofemoral instability of left knee and requires a brace to stabilize.  Per chart review patient was not taking any psychiatric medications prior to admission.  Patient endorsed some cannabis use and urine drug screening negative for all except cannabinoids.  While admitted to the ED psychiatry provider was consulted and initiated olanzapine to target psychosis symptoms.  Patient was also started on Anafranil to target OCD symptoms.  Patient was evaluated by " medical provider in the ED and determined to be medically stable.  Patient subsequently admitted to inpatient psychiatry unit 10 N for further psychiatric stabilization.  Current legal status is 72-hour hold.  Olanzapine started in the ED to target psychosis symptoms and plan will be to titrate to therapeutic dose while monitoring for side effects.  Psychiatric provider initiated petition for mental health commitment with Mg on 2/20/2024.  Trial of Anafranil initiated in the ED to target OCD symptoms however this was discontinued due to risk for worsening psychosis symptoms.        PLAN   1. Ongoing education given regarding diagnostic and treatment options with risks, benefits and alternatives and adequate verbalization of understanding.  2.  Medications:  Continue multivitamin 1 tab PO daily  Continue cholecalciferol (vitamin D3) capsule 1250 mcg PO every 7 days for 8 doses to target vitamin D deficiency, administer immediately after supper  Continue olanzapine ODT tablet 15 mg PO at bedtime to target psychosis symptoms and mood disorder symptoms  Initiate PRN benztropine tablet 0.5 mg PO2 times daily as needed to target side effect of movement disorder  PRN hydroxyzine tablet 25 mg PO every 4 hours as needed for anxiety  PRN olanzapine tablet 10 mg PO every 3 hours as needed for agitation, ordered linked with olanzapine IM injection  PRN melatonin tablet 3 mg PO at bedtime as needed for sleep  PRN nicotine lozenge 2 mg buccal every 1 hour as needed for nicotine withdrawal symptoms    3.  Labs/Imaging:  -XR KNEE LEFT 3 VIEWS ordered 2/28/2024                 4. Consults:  -Orthopedic consult placed 2/28/2024 to address left knee subluxation    5. Precautions:  -Self-injury precautions, suicide precautions; risk moderate    6. Legal:  - Court hold with [facility]    7. Discharge planning:  -Per unit CTC        [facility] Patient able to contract for safety and Patient on  precautions    Coordination of Care:   Treatment Plan reviewed and physician signed, Care discussed with Care/Treatment Team Members, Chart reviewed and Patient seen      Re-Certification I certify that the inpatient psychiatric facility services furnished since the previous certification were, and continue to be, medically necessary for, either, treatment which could reasonably be expected to improve the patient s condition or diagnostic study and that the hospital records indicate that the services furnished were, either, intensive treatment services, admission and related services necessary for diagnostic study, or equivalent services.     I certify that the patient continues to need, on a daily basis, active treatment furnished directly by or requiring the supervision of inpatient psychiatric facility personnel.   I estimate 14-21 days of hospitalization is necessary for proper treatment of the patient. My plans for post-hospital care for this patient are   TBD     CARISSA Mendieta CNP    -     02/29/24       -     12:10 PM       Total time  35 minutes with > 50%spent on coordination of cares and psycho-education.    This note was created with help of Dragon dictation system. Grammatical / typing errors are not intentional.    CARISSA Mendieta CNP

## 2024-02-29 NOTE — PLAN OF CARE
BEH IP Unit Acuity Rating Score (UARS)  Patient is given one point for every criteria they meet.    CRITERIA SCORING   On a 72 hour hold, court hold, committed, stay of commitment, or revocation. 1    Patient LOS on BEH unit exceeds 20 days. 0  LOS: 10   Patient under guardianship, 55+, otherwise medically complex, or under age 11. 0   Suicide ideation without relief of precipitating factors. 0   Current plan for suicide. 0   Current plan for homicide. 0   Imminent risk or actual attempt to seriously harm another without relief of factors precipitating the attempt. 0   Severe dysfunction in daily living (ex: complete neglect for self care, extreme disruption in vegetative function, extreme deterioration in social interactions). 0   Recent (last 7 days) or current physical aggression in the ED or on unit. 0   Restraints or seclusion episode in past 72 hours. 0   Recent (last 7 days) or current verbal aggression, agitation, yelling, etc., while in the ED or unit. 0   Active psychosis. 1   Need for constant or near constant redirection (from leaving, from others, etc).  0   Intrusive or disruptive behaviors. 0   Patient requires 3 or more hours of individualized nursing care per 8-hour shift (i.e. for ADLs, meds, therapeutic interventions). 0   TOTAL 2

## 2024-02-29 NOTE — PLAN OF CARE
-Attending Provider: CARISSA Davis CNP  -Voicemail Code: [id number]  -Team Note Due: Tuesday  -Next Steps:    Continue to follow up with [facility] (trying to determine if she has records for HIV and TB test)  Facilitate getting test results to [facility] (through [facility] or having new test completed in hospital)        Assessment/Intervention/Current Symtoms and Care Coordination:  Chart review and met with team, discussed pt progress, symptomology, and response to treatment.  Discussed the discharge plan and any potential impediments to discharge.  [facility] called [facility] to confirm if Higinio has been accepted to [facility]. [facility] was informed they still needs test results. Specifically HIV and TB tests. [facility] confirmed if they received these results Higinio would be able to discharge from hospital to one of their houses.   [facility] met with Higinio to discuss status of [facility] referral. Higinio stated he still wanted to pursue [facility]. Higinio stated he already completed these tests, and expressed concern they were not received by [facility]. [facility] offered to have provider request new tests. Higinio requested that [facility] try and look in to why the test results were not sent to [facility]. [facility] offered to look on EP[facility] for test results and contact [facility] worker to see if they have test results they can send to .   [facility] looked on EP[facility] and was unable to see test results that Higinio stated he completed through [facility] in [place].   [facility] sent email to Norah asking if she has any information regarding test results that  requested.   [facility] received a fax from Higinio's  of a form for Higinio to waive his right to a hearing the following day. [facility] provided Higinio with the form, reviewed form with Higinio. Higinio decided to sign the form and have [facility] send it back to . [facility] sent signed form back to his .       Discharge Plan or Goal:  Pending further stabilization, continued compliance with  medications, continued activities of daily living, and development of a safe discharge plan.   Considerations:     Barriers to Discharge:  Impact of mental health symptoms on well being   Impact of mental health symptoms on activities of daily living  Need for medication monitoring and medication management     Referral Status:       Legal Status:  Court Hold   County: [place]  File Number: [id number]  Start and expiration date of commitment:     Contacts:  Norah ([facility]  through [facility]): [phone]  [email or web address]  [facility] (group home Higinio will be discharging to: [facility] [phone]        Phone: [phone]         FAX: [phone]     Upcoming Meetings and Dates/Important Information:      -Still Needed on AVS:

## 2024-02-29 NOTE — PLAN OF CARE
"RN Assessment    SI: pt denies  SIB: pt denies  HI: pt denies  AH: does not appear responding  VH: does not appear responding  Psychotic Symptoms: none observed by this writer  Anxiety: none observed  Depression: pt does not appear depressed  Sleep: pt slept well  Affect & Mood: full range affect, mood is calm  Behavior: pt is visible in the milieu, social and plays board games with peers, attends groups, cooperative with staff     Pt is compliant with medications    Medication SE: none observed or reported this shift, last night, pt experienced muscle spasms   Hygiene: adequate   VS: WDL  Pain: denies  Medical Concerns: pt c/o left knee/leg pain and states he \"dislocated it\" yesterday, XR showed no significant change, pt declines PRN medication for pain at this time but does accept an ice pack  Appetite & Fluid intake: adequate   Bowel & Bladder: no issues reported        "

## 2024-02-29 NOTE — PLAN OF CARE
02/29/24     Group Therapy Session   Group Attendance This patient attended this group session   Time Session Began 1602   Time Session Ended 1658   Total Time (minutes) 51   Total # Attendees 3   Group Type Psychotherapy   Group Topic Covered Recognizing cognitive triggers to anger seeking ways to prevent or mitigate them.   Group Session Detail This group started with each patient identifying the dominant emotions they have felt this day. Patients also identified an area (cognitive and emotional) that they intend to improve. Following this, a general, guided conversation focused on ways to recognize propensities to anger outburst was initiated. Patients discussed what they understand as the causes of anger, its manifestation, and ways it manifests (the build up to it). Following this, a focus on the anxious, emotional, and thinking parts of the brains were reviewed, where patients pointed to the diagram, identifying which of the three they would like to focus their thoughts of improvement on. Reviewed ways to delay negative responses, even when negative stimulus is projected, and ways to resolve anger-build up to avoid negative occurrences and consequences. Patients identified their experiences with anger, focusing on one episode, and how they were able to resolve it. Feedback focused on addressing maladaptive areas, and providing positive compliments for areas that were identified as helpful.   Patient Response/Contribution did not discuss personal experience;had euphoric recall of use;left the group on several occasions;listened actively;refused to participate   Patient Participation Detail This patient attended the group. Asked questions, and responded to questions. Used his experience to relate to the content. Reported that he had an episode today, and was appreciative of this session. Pt reported that there had been situations where he woke up and experienced bruises which could have been from anger  episodes, but had no awareness of them. Received feedback on how to pay attention to trigger warnings, and how to report to his nurses and other staff.    Luis Gatica, Ph.D., [facility].

## 2024-02-29 NOTE — CARE PLAN
02/29/24 1346   Group Therapy Session   Group Attendance attended group session   Time Session Began 1100   Time Session Ended 1150   Total Time (minutes) 50   Total # Attendees 5   Group Type Occupational Therapy   Group Topic Covered balanced lifestyle;coping skills/lifestyle management;leisure exploration/use of leisure time;relaxation techniques   Group Session Detail OT Clinic Group   Patient Participation Detail Intervention: OT Clinic with 4 peers. Pt participated in a OT Clinic group to facilitate coping skills exploration and creative expression through personally meaningful activities, and to encourage utilization of these healthy coping skills to promote overall health and wellness. Group included clinical observation of social, cognitive and kinesthetic performance skills to inform treatment and safe discharge planning.    Patient Response: Joined clinic group and ind gathered supplies and materials to begin working on detailed projects during this time. Did acknowledge at one point that he has some perfectionistic tendencies with projects, but does well with working to adapt and change them as opposed to scrapping the project and starting over or abandoning it all together. Took time to ask advice from another peer on their opinion of how to complete the project and was social off and on during this time. Thanked writer for their time at the end of group.     Mood/Affect: Pleasant    Plan: Patient encouraged to maintain attendance for continued ongoing support in working towards occupational therapy goals to support overall treatment/care.

## 2024-02-29 NOTE — CARE PLAN
02/29/24 1338   Group Therapy Session   Group Attendance attended group session   Time Session Began 1015   Time Session Ended 1100   Total Time (minutes) 45   Total # Attendees 3   Group Type Occupational therapy   Group Topic Covered balanced lifestyle;coping skills/lifestyle management;emotions/expression;leisure exploration/use of leisure time;relaxation techniques;self-care activities;structured socialization   Group Session Detail General Health and Coping   Patient Participation Detail Intervention: General Health and Coping Group with 2 peers.    Patient Response: Pt participated in Bingo activity with emphasis on healthy coping skills and different emotional regulation and self-care practices to support overall health and wellness through group discussion questions embedded within activity. Was an active participant for the duration of the activity, offering responses to each of the coping skills questions as well as polite responses and encouragement to others' responses. Noted music and poetry to be a skill of theirs as well as being a good friend. Notes specifically being a person people can't vent or talk to, knowing to ask whether people want him to just listen, or to offer feedback and advice. Also emphasizes the importance of spending time around positive people, that being around those who speak negatively of others is draining for him.     Mood/Affect: Pleasant      Plan: Patient encouraged to maintain attendance for continued ongoing support in working towards occupational therapy goals to support overall treatment/care.

## 2024-02-29 NOTE — PROGRESS NOTES
02/29/24 1523   Group Therapy Session   Group Attendance attended group session   Time Session Began 1315   Time Session Ended 1400   Total Time (minutes) 45   Total # Attendees 3   Group Type psychoeducation;psychotherapeutic   Group Topic Covered coping skills/lifestyle management;emotions/expression;relaxation techniques   Group Session Detail Group memebers checked in with an emotion, a success, and if they need any time to process. Group then discussed signs of anxiety and how they show up in the body and thoughts. The group ended with a breathing exercise to help calm the body.   Patient Response/Contribution able to recall/repeat info presented;cooperative with task;discussed personal experience with topic;expressed understanding of topic;listened actively   Patient Participation Detail Pt attended group and was open to discussing his expereince with anxiety. Pt discussed his expereince feeling other peoples emotions and how that works for him. Writer encouraged him to think if there is a way to but a boundary around that she doesn't have to feel so much all the time. Pt had explanations that he does it for a few reasons, to see who is in his evnironment, and to see if it will effect him, but also to figure out if the feelings he is having are his or someone else's. Pt was appropriate with peers.

## 2024-02-29 NOTE — PROGRESS NOTES
02/28/24 1068   Group Therapy Session   Group Attendance attended group session   Time Session Began 1605   Time Session Ended 1700   Total Time (minutes) 55   Total # Attendees 4   Group Type psychotherapeutic;recreation   Group Topic Covered coping skills/lifestyle management;emotions/expression   Group Session Detail Group members picked songs they wanted to hear and shared why they wanted to hear it, what it brought up for them, or what emotions it brought up. Pts also shared conversations about the music and how they feel about it. Some pts would sing along or dance in thier chairs. At the end of group writer discussed the benfits of music and how long lasting songs can be throughout life.   Patient Response/Contribution able to recall/repeat info presented;cooperative with task;discussed personal experience with topic;expressed understanding of topic;listened actively   Patient Participation Detail Pt attended group and seemed to enjoy the group. Pt sang along to most songs and had something to share about most songs. 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 Pt picked a shong that reminded him of boudnaries and another that reminded him of that past that he has enjoyed singing to for a long time. Pt was appropriate with peers and enjoyed talking with others in the group.

## 2024-03-01 PROCEDURE — 250N000013 HC RX MED GY IP 250 OP 250 PS 637

## 2024-03-01 PROCEDURE — 250N000013 HC RX MED GY IP 250 OP 250 PS 637: Performed by: PSYCHIATRY & NEUROLOGY

## 2024-03-01 PROCEDURE — 99232 SBSQ HOSP IP/OBS MODERATE 35: CPT

## 2024-03-01 PROCEDURE — G0177 OPPS/PHP; TRAIN & EDUC SERV: HCPCS

## 2024-03-01 PROCEDURE — 124N000002 HC R&B MH UMMC

## 2024-03-01 RX ORDER — OLANZAPINE 5 MG/1
5-10 TABLET ORAL 2 TIMES DAILY PRN
Status: DISCONTINUED | OUTPATIENT
Start: 2024-03-01 | End: 2024-03-18 | Stop reason: HOSPADM

## 2024-03-01 RX ORDER — POLYETHYLENE GLYCOL 3350 17 G/17G
17 POWDER, FOR SOLUTION ORAL DAILY PRN
Status: DISCONTINUED | OUTPATIENT
Start: 2024-03-01 | End: 2024-03-01

## 2024-03-01 RX ORDER — PALIPERIDONE 1.5 MG/1
1.5 TABLET, EXTENDED RELEASE ORAL DAILY
Status: DISCONTINUED | OUTPATIENT
Start: 2024-03-01 | End: 2024-03-04

## 2024-03-01 RX ORDER — OLANZAPINE 10 MG/2ML
10 INJECTION, POWDER, FOR SOLUTION INTRAMUSCULAR DAILY
Status: DISCONTINUED | OUTPATIENT
Start: 2024-03-01 | End: 2024-03-04

## 2024-03-01 RX ORDER — OLANZAPINE 10 MG/2ML
10 INJECTION, POWDER, FOR SOLUTION INTRAMUSCULAR AT BEDTIME
Status: DISCONTINUED | OUTPATIENT
Start: 2024-03-01 | End: 2024-03-05

## 2024-03-01 RX ORDER — OLANZAPINE 15 MG/1
15 TABLET, ORALLY DISINTEGRATING ORAL AT BEDTIME
Status: DISCONTINUED | OUTPATIENT
Start: 2024-03-01 | End: 2024-03-05

## 2024-03-01 RX ORDER — OLANZAPINE 10 MG/2ML
5-10 INJECTION, POWDER, FOR SOLUTION INTRAMUSCULAR 2 TIMES DAILY PRN
Status: DISCONTINUED | OUTPATIENT
Start: 2024-03-01 | End: 2024-03-18 | Stop reason: HOSPADM

## 2024-03-01 RX ADMIN — THERA TABS 1 TABLET: TAB at 08:37

## 2024-03-01 RX ADMIN — SENNOSIDES AND DOCUSATE SODIUM 2 TABLET: 8.6; 5 TABLET ORAL at 08:37

## 2024-03-01 RX ADMIN — POLYETHYLENE GLYCOL 3350 17 G: 17 POWDER, FOR SOLUTION ORAL at 08:37

## 2024-03-01 RX ADMIN — SENNOSIDES AND DOCUSATE SODIUM 2 TABLET: 8.6; 5 TABLET ORAL at 19:07

## 2024-03-01 RX ADMIN — OLANZAPINE 15 MG: 15 TABLET, ORALLY DISINTEGRATING ORAL at 19:07

## 2024-03-01 RX ADMIN — PALIPERIDONE 1.5 MG: 1.5 TABLET, EXTENDED RELEASE ORAL at 17:13

## 2024-03-01 ASSESSMENT — ACTIVITIES OF DAILY LIVING (ADL)
ADLS_ACUITY_SCORE: 45
ORAL_HYGIENE: INDEPENDENT
ADLS_ACUITY_SCORE: 45
DRESS: SCRUBS (BEHAVIORAL HEALTH);INDEPENDENT
ADLS_ACUITY_SCORE: 45
LAUNDRY: WITH SUPERVISION
ADLS_ACUITY_SCORE: 45
ADLS_ACUITY_SCORE: 45
HYGIENE/GROOMING: INDEPENDENT
ADLS_ACUITY_SCORE: 45

## 2024-03-01 NOTE — PLAN OF CARE
"RN Shift Summary     Patient presented with a calm affect. He was friendly and open in conversation. He stated that he has been more anxious and \"frustrated\" the past couple of days due to feeling \"triggered\" by a peer. He also stated, \"I am responsible for my own triggers\" and feels he has adequate coping skills to handle himself. He has been keeping himself calm by walking away as needed, socializing with peers, and attending group. Patient also stated, \"I really don't like meds\" but that he's been doing what he needs to do while he is here. He stated that his LBM was yesterday, 2/29, and he feels his bowel movements have been more regular lately. He requested to remove his Miralax from his morning regimen, which was communicated to his provider.    Vitals: B/P: 118/76, T: 97.9, P: 71, R: 16    "

## 2024-03-01 NOTE — PLAN OF CARE
Problem: Depressive Symptoms  Goal: Depressive Symptoms  Description: Signs and symptoms of listed problems will be absent or manageable.  Outcome: Progressing   Goal Outcome Evaluation:    Plan of Care Reviewed With: patient        [facility] Attends the unit group. Socializes with his peers. Does not make any paranoid or delusional statement. Pt denies having any safety concerns including thoughts of harming self or others. Pt denies auditory and visual hallucinations and takes his medications as prescribed. No medication side effects reported or observed. Denies pain. Denies having any problem with eating or sleeping.   Plan: Continue to provide safe environment and therapeutic milieu.

## 2024-03-01 NOTE — PLAN OF CARE
Goal Outcome Evaluation:         Pt was was observed   sleeping without distress. Pt slept for 7 hours. No behavior or medical concerns noted during this shift. Will continue to monitor.

## 2024-03-01 NOTE — CARE PLAN
03/01/24 1304   Group Therapy Session   Group Attendance attended group session   Time Session Began 1015   Time Session Ended 1115   Total Time (minutes) 60   Total # Attendees 4   Group Type task skill;recreation;life skill   Group Topic Covered balanced lifestyle;coping skills/lifestyle management;leisure exploration/use of leisure time;problem-solving;self-care activities;relaxation techniques   Group Session Detail OT Clinic: open task group focused on activities to promote self-expression, independence, and leisure exploration while working to demonstrate cognitive skills   Patient Participation Detail Pt participated in an open task group to work on a fuze beading project - exercising fine motor coordination, concentration, and problem-solving skills. Pt appeared engaged in the activity and was focused on completing their design. Pt was social and upbeat, initiated conversations with peers and shared personal thoughts and experiences with others while working. Pt's work was neat and organized.

## 2024-03-01 NOTE — PLAN OF CARE
BEH IP Unit Acuity Rating Score (UARS)  Patient is given one point for every criteria they meet.    CRITERIA SCORING   On a 72 hour hold, court hold, committed, stay of commitment, or revocation. 1    Patient LOS on BEH unit exceeds 20 days. 0  LOS: 11   Patient under guardianship, 55+, otherwise medically complex, or under age 11. 0   Suicide ideation without relief of precipitating factors. 0   Current plan for suicide. 0   Current plan for homicide. 0   Imminent risk or actual attempt to seriously harm another without relief of factors precipitating the attempt. 0   Severe dysfunction in daily living (ex: complete neglect for self care, extreme disruption in vegetative function, extreme deterioration in social interactions). 0   Recent (last 7 days) or current physical aggression in the ED or on unit. 0   Restraints or seclusion episode in past 72 hours. 0   Recent (last 7 days) or current verbal aggression, agitation, yelling, etc., while in the ED or unit. 0   Active psychosis. 1   Need for constant or near constant redirection (from leaving, from others, etc).  0   Intrusive or disruptive behaviors. 0   Patient requires 3 or more hours of individualized nursing care per 8-hour shift (i.e. for ADLs, meds, therapeutic interventions). 0   TOTAL 2

## 2024-03-01 NOTE — CARE PLAN
03/01/24 1521   Group Therapy Session   Group Attendance attended group session   Time Session Began 1315   Time Session Ended 1400   Total Time (minutes) 45   Total # Attendees 2   Group Type community;recreation;life skill   Group Topic Covered cognitive activities;balanced lifestyle;leisure exploration/use of leisure time;structured socialization   Group Session Detail OT Leisure Group: Group activities to exercise cognitive skills while exploring leisure and social opportunities   Patient Participation Detail Pt participated in a group activity playing a card game. Pt was familiar with the game and was an active participant in the group. Pt was social and upbeat with peers, sharing their interests in psychology and mental health. Pt was able to play independently and strategically.

## 2024-03-01 NOTE — CARE PLAN
03/01/24 1500   Group Therapy Session   Group Attendance attended group session   Time Session Began 1115   Time Session Ended 1200   Total Time (minutes) 45   Total # Attendees 4   Group Type life skill;psychoeducation   Group Topic Covered coping skills/lifestyle management;relationship;problem-solving;structured socialization;cognitive activities   Group Session Detail General Health and Coping Skills: group discussion on an article reading on apologies.   Patient Participation Detail Pt participated in a group discussion on apologies. Pt appeared engaged in the group discussion and would share thoughtful insight on the topic. Pt contributed responses to the discussion questions and was able to identify the benefits of apologizing to others.

## 2024-03-01 NOTE — PLAN OF CARE
-Attending Provider: CARISSA Davis CNP  -Voicemail Code: [id number]  -Team Note Due: Tuesday  -Next Steps:    Continue to follow up with [facility] (trying to determine if she has records for HIV and TB test)  Facilitate getting test results to [facility] (through [facility] or having new test completed in hospital)        Assessment/Intervention/Current Symtoms and Care Coordination:  Chart review and met with team, discussed pt progress, symptomology, and response to treatment.  Discussed the discharge plan and any potential impediments to discharge.  [facility] received copy of Commitment and [place] orders. CTC sent copies to provider and updated her of change in status. CTC provided copy to Higinio.       Discharge Plan or Goal:  Pending further stabilization, continued compliance with medications, continued activities of daily living, and development of a safe discharge plan.   Considerations:     Barriers to Discharge:  Impact of mental health symptoms on well being   Impact of mental health symptoms on activities of daily living  Need for medication monitoring and medication management     Referral Status:       Legal Status:  Commitment with [facility]  File Number: [id number]  Start: February 22 2024  Expiration: August 29th 2024     Contacts:  Norah ([facility]  through [facility]): [phone]  [email or web address]  [facility] (group home Higinio will be discharging to: [facility] [phone]        Phone: [phone]         FAX: [phone]     Upcoming Meetings and Dates/Important Information:      -Still Needed on AVS:

## 2024-03-01 NOTE — PROGRESS NOTES
"PSYCHIATRY  PROGRESS NOTE     DATE OF SERVICE   3/1/2024         CHIEF COMPLAINT   \"Upset for a little bit.\"       SUBJECTIVE   Nursing reports:    Pt has been active in the [facility]. Attends the unit group. Socializes with his peers. Does not make any paranoid or delusional statement. Pt denies having any safety concerns including thoughts of harming self or others. Pt denies auditory and visual hallucinations and takes his medications as prescribed. No medication side effects reported or observed. Denies pain. Denies having any problem with eating or sleeping.     Pt slept for 7 hours      reports:         -Attending Provider: CARISSA Davis CNP  -Voicemail Code: [id number]  -Team Note Due: Tuesday  -Next Steps:     Continue to follow up with [facility] (trying to determine if she has records for HIV and TB test)  Facilitate getting test results to [facility] (through [facility] or having new test completed in hospital)           Assessment/Intervention/Current Symtoms and Care Coordination:  Chart review and met with team, discussed pt progress, symptomology, and response to treatment.  Discussed the discharge plan and any potential impediments to discharge.  [facility] received copy of [employer] and Holliday orders. [facility] sent copies to provider and updated her of change in status. [facility] provided copy to Higinio.         Discharge Plan or Goal:  Pending further stabilization, continued compliance with medications, continued activities of daily living, and development of a safe discharge plan.   Considerations:      Barriers to Discharge:  Impact of mental health symptoms on well being   Impact of mental health symptoms on activities of daily living  Need for medication monitoring and medication management     Referral Status:        Legal Status[facility]  [facility] [place]  File Number: [id number]  Start: February 22 2024  Expiration: August 29th 2024      Contacts:  Norah ([facility] Case " "Manager through [facility]): [phone]  [email or web address]  [facility] (group home Higinio will be discharging to: Rev. Carlton [phone]        Phone: [phone]         FAX: [phone]     Upcoming Meetings and Dates/Important Information:        -Still Needed on AVS:                OBJECTIVE   Met with patient in OT room of unit 10 [address]. Upon patient interview, patient reports his mood as, \"upset for a little bit.\"  Patient reports his mood is related to missing his family.  Patient also reports that he has a history of PTSD and had a PTSD related flashback which he describes as, \"Negative, it made me think about ninja turtle thing I made with my brother.\"  Patient also reports that he feels hesitant to share further details regarding his trauma flashbacks and states, \"I am not sure if it is real or not.\"  Patient also reports interest in starting EMDR therapy in the future to treat PTSD in the future.  Patient's speech is slightly rapid however improving.  Patient's thought process is circumstantial, tangential, difficult to follow, perseverative, and disorganized.  Patient continues to endorse delusional thoughts that he is significantly affected by other's emotions and that his presence has an impact on other people's thoughts and emotions.  Patient also reports that he is experiencing lupe-based delusional thoughts.  Patient denies any HI.  Denies any thoughts of SI, SIB, SA, intent or plan. Patient able to contract for safety. Patient denies auditory hallucinations or visual hallucinations today. Patient continues to reports that he is able to manage his mental health symptoms by suppressing these thoughts. Patient denies depression or anxiety today.  Patient does not endorse difficulty sleeping overnight.  Patient reports appetite is decreased.  Patient has previously reported delusional thought that when he is eating food he believes he is \"eating himself.\"  Today, he " "reports these thoughts are improving; he also reports that he manages these thoughts by, \"telling myself that my DNA is in everything.\"  Per nursing documentation, patient has been eating and drinking adequately.  Patient has previously reported chronic constipation however today reports he has been having regular bowel movements with last one today.  Provider discussed the plan will be to continue to monitor this. Patient in agreement and reports he benefits from scheduled stool softeners due to chronic constipation symptoms and to promote regular bowel movements.  Patient denies any issues with urination.  Patient vital signs reviewed and noted to be stable.  Patient denies any medical concerns today.  Previously  patient reported to nursing staff that he was experiencing a muscle twitch in his arm; subsequently provider ordered benztropine tablet 0.5 mg PO2  times daily as needed for movement disorder symptoms.  Patient reported this was effective for muscle twitching symptoms.  CMP and CBC were also completed and results were WNL. No movement disorder symptoms observed currently.  Provider discussed that plan will be to continue PRN benztropine and continue to monitor closely for any side effects of current medication regimen.  Patient denies any side effects today of medication regimen.  Today, plan will be to continue olanzapine ODT tablet 15 mg PO at bedtime to target psychosis and mood disorder symptoms.  Patient verbalizes understanding of this.  Patient also reports that he signed a document agreeing with mental health commitment.  Provider discussed with patient that writer is currently awaiting court documents from the [facility] reflecting outcome regarding mental health commitment and Holliday orders.  Patient verbalized understanding and in agreement with this.  Patient has no other questions or concerns.  Writer informed patient that they will be off service next week.  Patient verbalized " understanding.    1630: Court documents supporting mental health commitment with Holliday order received from [facility].  Holliday order includes medications: Zyprexa (olanzapine), Haldol (haloperidol), Invega (paliperidone), and Seroquel (quetiapine).  Provider reviewed court documents and subsequently updated legal status to committed and entered Holliday order.  Provider discussed with patient by phone and subsequently updated olanzapine Zydis ODT tablet 15 mg PO at bedtime to include backup olanzapine 10 mg IM injection.  Discussed with patient the plan will be to continue olanzapine 15 mg p.o. at bedtime for now with plan to titrate down in upcoming days with initiation of medication paliperidone.  Also, discussed plan to initiate paliperidone (Invega) 24-hour tablet 1.5 mg PO daily.  Patient has Holliday order for this medication and must take.  Offer p.o. paliperidone first, if patient refuses then administer backup IM olanzapine 10 mg.  Patient verbalized understanding and is in agreement with this.  Provider updated nursing staff regarding mental health commitment with Holliday order and related psychiatric medication changes.  Nursing staff to offer patient educational materials regarding medication paliperidone (Invega).    Orthopedic surgery consulted to address patient report of left knee subluxation, per documentation from orthopedic consult on 2/28/2024:    Assessment:  Patient is a 32 y/o man with history of recurrent patellar instability and subluxations s/p L derotational tibial osteotomy, MPFL reconstruction and TTO with Dr. Han in 2020.      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  At this time patient said that he would like to defer all surgical management as he is worried about cost of procedures given his housing instability.  He currently is wearing a left knee patellar stabilizing brace which appears to be  fitting appropriately.  We recommended that he continue using the brace while ambulatory as we do not have any other patellar restrictive braces in the inpatient setting.  Discussed having the patient follow-up with Dr. Saucedo in the outpatient setting that she may recommend additional exercises and/or nonoperative techniques that may help him with his recurrent left patella subluxation.  Patient agreed to this plan and all his questions were answered.  No operative indication at this time        Plan:  - Continue Patellar stabilizing brace while ambulatory  - Ice/Elevation/Compression as needed for swelling  - Outpatient follow-up with Dr. Han for discussion regarding non-surgical vs surgical options.   - No inpatient orthopedic surgical need at this time.         - Imaging: Completed  - Activity: Continue Brace use with activity. Avoid excessive cutting exercises/running.   - Weight bearing: WBAT LLE  - Pain control: Per primary  - Diet: Regular Diet  - Follow-up: Will arrange follow-up with Dr. Han in outpatient setting of review of options  - Disposition: Per primary     MEDICATIONS   Medications:  Scheduled Meds:    cholecalciferol  1,250 mcg Oral Q7 Days     multivitamin, therapeutic  1 tablet Oral Daily     OLANZapine zydis  15 mg Oral At Bedtime     senna-docusate  1-2 tablet Oral BID     Continuous Infusions:  PRN Meds:.acetaminophen, alum & mag hydroxide-simethicone, benztropine, hydrOXYzine HCl, melatonin, nicotine, OLANZapine **OR** OLANZapine, polyethylene glycol    Medication adherence issues: MS Med Adherence Y/N: Yes, patient reports history of noncompliance with psychiatric medications in the community.  Patient is cooperative with meds in the hospital currently  Medication side effects: MEDICATION SIDE EFFECTS: Patient does not report any side effects to writer today  Benefit: Yes / No: Yes       ROS   Pertinent items are noted in HPI.       MENTAL STATUS EXAM   Vitals: /76 (BP Location:  "Left arm, Patient Position: Sitting, Cuff Size: Adult Large)   Pulse 71   Temp 97.9  F (36.6  C) (Oral)   Resp 16   Ht 1.93 m (6' 3.98\")   Wt 104.8 kg (231 lb 1.6 oz)   SpO2 98%   BMI 28.14 kg/m      Appearance:  Disheveled  Mood: Reports mood as, \"upset for a little bit.\"  Affect: blunted and guarded  was congruent to speech  Suicidal Ideation: PRESENT / ABSENT: absent   Homicidal Ideation: PRESENT / ABSENT: absent   Thought process: circumstantial, tangential, difficult to follow, perseverative, and disorganized   Thought content: significant for delusions, preoccupations, and paranoid ideation.   Fund of Knowledge: Average  Attention/Concentration: Poor  Language ability:  Intact, speech is slightly rapid however improving  Memory: Intact  Insight:  limited.  Judgement: limited  Orientation: Yes, x4  Psychomotor Behavior: Normal  Muscle Strength and Tone: MuscleStrength: Normal  Gait and Station: Normal       LABS   personally reviewed.     Provider reviewed results of XR KNEE LEFT 3 VIEWS completed on 2/28/2024. Results indicate: incompletely visualized postoperative changes of IM jhoana and screw fixation of the tibia. No evidence for acute fracture around the knee joint. There is degenerative change within the patellofemoral compartment. No significant effusion.    Vitamin D level noted to be low at 10 ng/mL; supplement ordered 2/22/24 to address vitamin D deficiency.     Latest Reference Range & Units 02/17/24 13:58 02/17/24 15:16 02/18/24 11:07 02/18/24 14:37 02/19/24 02:49 02/20/24 07:44 02/28/24 15:48 02/28/24 18:49   Sodium 135 - 145 mmol/L  140 143     141   Potassium 3.4 - 5.3 mmol/L  3.6 3.8     3.9   Chloride 98 - 107 mmol/L  104 108 (H)     102   Carbon Dioxide (CO2) 22 - 29 mmol/L  26 28     28   Urea Nitrogen 6.0 - 20.0 mg/dL  6.2 8.7     14.9   Creatinine 0.67 - 1.17 mg/dL  0.88 0.94     0.80   GFR Estimate >60 mL/min/1.73m2  >90 >90     >90   Calcium 8.6 - 10.0 mg/dL  9.4 9.2     9.5   Anion " Gap 7 - 15 mmol/L  10 7     11   Magnesium 1.7 - 2.3 mg/dL  1.9      2.0   Phosphorus 2.5 - 4.5 mg/dL        4.2   Albumin 3.5 - 5.2 g/dL  4.4 4.1     4.2   Protein Total 6.4 - 8.3 g/dL  7.0 6.5     6.7   Alkaline Phosphatase 40 - 150 U/L  49 44     60   ALT 0 - 70 U/L  6 7     9   AST 0 - 45 U/L  11 13     22   Bilirubin Total <=1.2 mg/dL  1.3 (H) 1.2     0.3   Cholesterol <200 mg/dL      135     Glucose 70 - 99 mg/dL  104 (H) 135 (H)     93   HDL Cholesterol >=40 mg/dL      36 (L)     Hemoglobin A1C <5.7 %      4.8     LDL Cholesterol Calculated <=100 mg/dL      84     Non HDL Cholesterol <130 mg/dL      99     Triglycerides <150 mg/dL      75     TSH 0.30 - 4.20 uIU/mL      2.08     Vitamin D, Total (25-Hydroxy) 20 - 50 ng/mL      10 (L)     WBC 4.0 - 11.0 10e3/uL  5.4      6.1   Hemoglobin 13.3 - 17.7 g/dL  16.9      16.7   Hematocrit 40.0 - 53.0 %  46.5      48.8   Platelet Count 150 - 450 10e3/uL  232      200   RBC Count 4.40 - 5.90 10e6/uL  5.49      5.43   MCV 78 - 100 fL  85      90   MCH 26.5 - 33.0 pg  30.8      30.8   MCHC 31.5 - 36.5 g/dL  36.3      34.2   RDW 10.0 - 15.0 %  11.9      12.3   % Neutrophils %  78      64   % Lymphocytes %  13      22   % Monocytes %  7      9   % Eosinophils %  1      4   % Basophils %  1      1   Absolute Basophils 0.0 - 0.2 10e3/uL  0.0      0.1   Absolute Eosinophils 0.0 - 0.7 10e3/uL  0.1      0.2   Absolute Immature Granulocytes <=0.4 10e3/uL  0.0      0.0   Absolute Lymphocytes 0.8 - 5.3 10e3/uL  0.7 (L)      1.4   Absolute Monocytes 0.0 - 1.3 10e3/uL  0.4      0.5   % Immature Granulocytes %  0      0   Absolute Neutrophils 1.6 - 8.3 10e3/uL  4.2      3.9   Absolute NRBCs 10e3/uL  0.0      0.0   NRBCs per 100 WBC <1 /100  0      0   SARS CoV2 PCR Negative      Negative      Amphetamine Qual Urine Screen Negative  Screen Negative          Fentanyl Qual Urine Screen Negative  Screen Negative          Cocaine Urine Screen Negative  Screen Negative         "  Benzodiazepine Urine Screen Negative  Screen Negative          Opiates Qualitative Urine Screen Negative  Screen Negative          PCP Urine Screen Negative  Screen Negative          Cannabinoids Qual Urine Screen Negative  Screen Positive !          Barbiturates Qual Urine Screen Negative  Screen Negative          XR KNEE LEFT 3 VIEWS        Rpt    EKG 12-LEAD, TRACING ONLY     Rpt       (H): Data is abnormally high  (L): Data is abnormally low  !: Data is abnormal  Rpt: View report in Results Review for more information  No results found for: \"PHENYTOIN\", \"PHENOBARB\", \"VALPROATE\", \"CBMZ\"       DIAGNOSIS   Principal Problem:    Schizoaffective disorder, bipolar type, currently hypomanic  History of schizophrenia  PTSD  R/O OCD    History of generalized anxiety disorder with panic attacks  History of ADHD    Clinically Significant Risk Factors                         # Overweight: Estimated body mass index is 28.14 kg/m  as calculated from the following:    Height as of this encounter: 1.93 m (6' 3.98\").    Weight as of this encounter: 104.8 kg (231 lb 1.6 oz).   # Moderate Malnutrition: based on nutrition assessment          Active Problem List:  Patient Active Problem List   Diagnosis     Irritable bowel syndrome     Class 2 severe obesity due to excess calories with serious comorbidity and body mass index (BMI) of 37.0 to 37.9 in adult (H)     Patellofemoral instability of left knee with pain     Tobacco abuse     Suicidal ideation     Moderate episode of recurrent major depressive disorder (H)     CARSON (obstructive sleep apnea)     Unspecified psychosis not due to a substance or known physiological condition (H)     Marijuana use     Schizophrenia, unspecified type (H)          HOSPITAL COURSE AND ASSESSMENT   This is a 33 year old male with history of schizophrenia, MDD, suicidal ideation, and PTSD who presented to [facility] ED on 2/17/2024 via EMS for psychiatric evaluation.  Patient was at [facility]/mental health facility and was experiencing symptoms of psychosis including disorganized, paranoid, and delusional thoughts.  Patient also exhibiting poor p.o. intake and not attending to ADLs such as bathing.  Patient has a history of Patellofemoral instability of left knee and requires a brace to stabilize.  Per chart review patient was not taking any psychiatric medications prior to admission.  Patient endorsed some cannabis use and urine drug screening negative for all except cannabinoids.  While admitted to the ED, psychiatry provider was consulted and initiated olanzapine to target psychosis symptoms.  Patient was also started on a trial of Anafranil to target OCD symptoms.  Patient was evaluated by medical provider in the ED and determined to be medically stable.  Patient subsequently admitted to inpatient psychiatry unit 10 N for further psychiatric stabilization.  Legal status at time of admission was: 72-hour hold.  Olanzapine started in the ED to target psychosis symptoms and continued during psychiatric hospitalization.  Psychiatric provider initiated petition for mental health commitment with Mg on 2/20/2024.  Trial of Anafranil initiated in the ED to target OCD symptoms however this was discontinued due to risk for worsening psychosis symptoms.  On 3/1/2024 court documents received supporting mental health commitment with Holliday order in [facility]. Holliday order includes medications: Zyprexa (olanzapine), Haldol (haloperidol), Invega (paliperidone), and Seroquel (quetiapine). Per chart review, patient previously tolerated paliperidone (Invega) medication well and this medication was effective for treating psychosis and mood disorder symptoms however Invega p.o. was not covered by insurance and subsequently was discontinued.  Pharmacy liaison consult completed during current hospitalization and prior auth completed for health insurance coverage of Invega p.o. tablets.  Invega Sustenna  long-acting injection also covered by health insurance.  Plan will be to continue olanzapine 15 mg p.o. at bedtime for now with plan to titrate down in upcoming days with initiation of medication paliperidone.  Provider initiated Holliday medication paliperidone (Invega) 24-hour tablet 1.5 mg PO daily with backup IM olanzapine 10 mg on 3/1/2024.   Plan will be to continue paliperidone (Invega) p.o. and titrate to therapeutic dose while monitoring for side effects.  If patient tolerating paliperidone p.o. plan will be to consider initiating long-acting injection Invega Sustenna prior to discharge due to patient's history of noncompliance with psychiatric medications as well as patient reporting directly to writer that he will be noncompliant with psychiatric medications in the community.  Plan will be for patient to discharge to crisis facility housing.  Unit CTC facilitating discharge plan.     PLAN   1. Ongoing education given regarding diagnostic and treatment options with risks, benefits and alternatives and adequate verbalization of understanding.  2.  Medications:  Initiate paliperidone (Invega) 24-hour tablet 1.5 mg PO daily to target psychosis symptoms and mood disorder symptoms with backup olanzapine 10 mg IM injection.  Patient has Holliday order for this medication and must take.  Offer p.o. paliperidone (Invega) first, if patient refuses then administer IM olanzapine.  Modify order of olanzapine ODT tablet 15 mg PO at bedtime to target psychosis symptoms and mood disorder symptoms, to include backup IM olanzapine 10 mg.  Patient now has a Holliday order for this medication and must take.  Offer p.o. olanzapine first.  If patient refuses then administer IM olanzapine.  Plan will be to titrate down on olanzapine with dose increases of paliperidone.  Continue multivitamin 1 tab PO daily  Continue cholecalciferol (vitamin D3) capsule 1250 mcg PO every 7 days for 8 doses to target vitamin D deficiency, administer  immediately after supper  PRN benztropine tablet 0.5 mg PO2 times daily as needed to target side effect of movement disorder  PRN hydroxyzine tablet 25 mg PO every 4 hours as needed for anxiety  PRN olanzapine tablet 10 mg PO every 3 hours as needed for agitation, ordered linked with olanzapine IM injection  PRN melatonin tablet 3 mg PO at bedtime as needed for sleep  PRN nicotine lozenge 2 mg buccal every 1 hour as needed for nicotine withdrawal symptoms    3.  Labs/Imaging:  -XR KNEE LEFT 3 VIEWS ordered 2/28/2024  -CMP and CBC completed on 2/28/2024                 4. Consults:  -Orthopedic consult placed 2/28/2024 to address left knee subluxation    5. Precautions:  -Self-injury precautions, suicide precautions; risk moderate    6. Legal:  - Court hold with [facility]    7. Discharge planning:  -Per unit CTC        Risk Assessment: IP [facility]AC RISK ASSESSMENT: Patient able to contract for safety and Patient on precautions    Coordination of Care:   Treatment Plan reviewed and physician signed, Care discussed with Care/Treatment Team Members, Chart reviewed and Patient seen      Re-Certification I certify that the inpatient psychiatric facility services furnished since the previous certification were, and continue to be, medically necessary for, either, treatment which could reasonably be expected to improve the patient s condition or diagnostic study and that the hospital records indicate that the services furnished were, either, intensive treatment services, admission and related services necessary for diagnostic study, or equivalent services.     I certify that the patient continues to need, on a daily basis, active treatment furnished directly by or requiring the supervision of inpatient psychiatric facility personnel.   I estimate 14-21 days of hospitalization is necessary for proper treatment of the patient. My plans for post-hospital care for this patient are   LOTTIE Cronin, APRN CNP    -      03/1/24       -     1:30 PM       Total time  35 minutes with > 50%spent on coordination of cares and psycho-education.    This note was created with help of Dragon dictation system. Grammatical / typing errors are not intentional.    CARISSA Mendieta CNP

## 2024-03-01 NOTE — PLAN OF CARE
"Goal Outcome Evaluation:    Plan of Care Reviewed With: patient        Pt and RN met in the [facility] area. Pt affect was flat. Pt denies any SI, HI, or SIB. Pt contracts for safety. Pt denies depression, anxiety, hallucinations or delusions.  PT endorsed aching pain in his left lower leg due to having a metal jhoana in that leg. PT requested ice pack for his leg. Pt can be seen in the [place] watching TV and social with select peers throughout the shift. Pt is medication compliant pt is committed through [facility] with a Holliday starting this afternoon. PT was made aware of this by nurse practitioner and began his Holliday medication this afternoon to which he was compliant with. Invega 1.5mg. Pt education on the medication was provided by RN and educational materials were offered to which pt declined.. Appetite and fluid intake adequate. VSS. No medication side effects reported or observed this shift. Hygiene is adequate. No issues with bowel or bladder noted. Plan is to continue to monitor patients status q 15 mins, assess response to medication, and maintain the patients safety.     /79 (BP Location: Left arm)   Pulse 76   Temp 98.6  F (37  C) (Oral)   Resp 16   Ht 1.93 m (6' 3.98\")   Wt 104.8 kg (231 lb 1.6 oz)   SpO2 98%   BMI 28.14 kg/m       PRNs Given during shift:    I and O: 90% OF DINNER  Pain: 2/10 ->left lower leg           "

## 2024-03-02 PROCEDURE — 250N000013 HC RX MED GY IP 250 OP 250 PS 637: Performed by: PSYCHIATRY & NEUROLOGY

## 2024-03-02 PROCEDURE — G0177 OPPS/PHP; TRAIN & EDUC SERV: HCPCS

## 2024-03-02 PROCEDURE — 250N000013 HC RX MED GY IP 250 OP 250 PS 637

## 2024-03-02 PROCEDURE — 124N000002 HC R&B MH UMMC

## 2024-03-02 PROCEDURE — 90853 GROUP PSYCHOTHERAPY: CPT

## 2024-03-02 RX ADMIN — OLANZAPINE 15 MG: 15 TABLET, ORALLY DISINTEGRATING ORAL at 19:20

## 2024-03-02 RX ADMIN — SENNOSIDES AND DOCUSATE SODIUM 2 TABLET: 8.6; 5 TABLET ORAL at 19:20

## 2024-03-02 RX ADMIN — SENNOSIDES AND DOCUSATE SODIUM 2 TABLET: 8.6; 5 TABLET ORAL at 08:14

## 2024-03-02 RX ADMIN — PALIPERIDONE 1.5 MG: 1.5 TABLET, EXTENDED RELEASE ORAL at 08:14

## 2024-03-02 RX ADMIN — THERA TABS 1 TABLET: TAB at 08:14

## 2024-03-02 ASSESSMENT — ACTIVITIES OF DAILY LIVING (ADL)
ADLS_ACUITY_SCORE: 45
ORAL_HYGIENE: INDEPENDENT
ADLS_ACUITY_SCORE: 45
HYGIENE/GROOMING: INDEPENDENT
ADLS_ACUITY_SCORE: 45
DRESS: SCRUBS (BEHAVIORAL HEALTH)
ADLS_ACUITY_SCORE: 45
ADLS_ACUITY_SCORE: 45
LAUNDRY: WITH SUPERVISION
ADLS_ACUITY_SCORE: 45

## 2024-03-02 NOTE — CARE PLAN
03/02/24 1220   Group Therapy Session   Group Attendance attended group session   Time Session Began 1015   Time Session Ended 1100   Total Time (minutes) 45   Total # Attendees 3   Group Type community;recreation;life skill   Group Topic Covered cognitive activities;balanced lifestyle;leisure exploration/use of leisure time;structured socialization   Group Session Detail OT Leisure Group: Group activities to exercise cognitive skills while exploring leisure and social opportunities   Patient Participation Detail Pt participated in a group activity playing a card game to exercise cognitive skills and opportunities to socialize. Pt was an active participant and was attentive to the game, playing strategically and thoughtfully. Pt was social and cooperative, appearing to be in good spirits.

## 2024-03-02 NOTE — PLAN OF CARE
Problem: Sleep Disturbance  Goal: Adequate Sleep/Rest  3/2/2024 0617 by Clarice Maldonado, RN  Outcome: Progressing  3/1/2024 1642 by Clarice Maldonado RN  Outcome: Progressing   Goal Outcome Evaluation:    Plan of Care Reviewed With: patient              NOC Shift Report   (3/1/24 into 03/02/24)    Pt in bed at beginning of shift, breathing quiet and unlabored. Pt slept through shift. Pt slept 6.75 hours.     No pt complaints or concerns at this time.     No PRNs requested or given.     Will continue to monitor via Q15 minute safety checks.     Will continue to monitor and assess.

## 2024-03-02 NOTE — PLAN OF CARE
Problem: Depressive Symptoms  Goal: Depressive Symptoms  Description: Signs and symptoms of listed problems will be absent or manageable.  Outcome: Progressing  Goal: Social and Therapeutic (Depression)  Description: Signs and symptoms of listed problems will be absent or manageable.  Outcome: Progressing   Goal Outcome Evaluation:  Patient had a descent shift. He attended OT group. Social with one specific patient.He presents with a flat affect. Mood is calm. Patient expressed his frustration with one particular patient that has been disruptive to the milieu. He stated 'I was very upset this morning with all the mean things he keeps on saying referencing to pt M.F but I get it, he is unwell. I won't do something stupid.'  Pt  is medication compliant. He denied all MH symptoms. Reported left leg pain, tylenol was offered but he declined. He reports that he does not have any appetite but has been eating 3 meals a day. No issues with bowel bladder. Sleep not the greatest because he is a light sleeper, wakes up a few times every night. Patient denied issues with bowel and bladder. Staff will continue to monitor.

## 2024-03-03 PROCEDURE — 250N000013 HC RX MED GY IP 250 OP 250 PS 637: Performed by: PSYCHIATRY & NEUROLOGY

## 2024-03-03 PROCEDURE — 250N000013 HC RX MED GY IP 250 OP 250 PS 637

## 2024-03-03 PROCEDURE — 90853 GROUP PSYCHOTHERAPY: CPT

## 2024-03-03 PROCEDURE — 124N000002 HC R&B MH UMMC

## 2024-03-03 RX ADMIN — PALIPERIDONE 1.5 MG: 1.5 TABLET, EXTENDED RELEASE ORAL at 07:53

## 2024-03-03 RX ADMIN — POLYETHYLENE GLYCOL 3350 17 G: 17 POWDER, FOR SOLUTION ORAL at 07:53

## 2024-03-03 RX ADMIN — SENNOSIDES AND DOCUSATE SODIUM 2 TABLET: 8.6; 5 TABLET ORAL at 07:54

## 2024-03-03 RX ADMIN — THERA TABS 1 TABLET: TAB at 07:54

## 2024-03-03 RX ADMIN — SENNOSIDES AND DOCUSATE SODIUM 2 TABLET: 8.6; 5 TABLET ORAL at 19:36

## 2024-03-03 RX ADMIN — OLANZAPINE 15 MG: 15 TABLET, ORALLY DISINTEGRATING ORAL at 19:36

## 2024-03-03 ASSESSMENT — ACTIVITIES OF DAILY LIVING (ADL)
ADLS_ACUITY_SCORE: 45
DRESS: SCRUBS (BEHAVIORAL HEALTH);INDEPENDENT
ADLS_ACUITY_SCORE: 45
ORAL_HYGIENE: INDEPENDENT
ADLS_ACUITY_SCORE: 45
ADLS_ACUITY_SCORE: 45
LAUNDRY: WITH SUPERVISION
ADLS_ACUITY_SCORE: 45
HYGIENE/GROOMING: INDEPENDENT
ADLS_ACUITY_SCORE: 45
ADLS_ACUITY_SCORE: 45

## 2024-03-03 NOTE — PLAN OF CARE
"Goal Outcome Evaluation:    Plan of Care Reviewed With: patient    Patient was visible in the milieu watching TV with peers. Patient socialized with select peers. Patient was observed playing different games in the dining area. Patient attended therapy group. Patient reported being anxious at the beginning of the shift due to select peers who were portraying unsafe behaviors. Patient did not want any prn. Patient's affect was flat with a calm mood. Patient reported that he has gained a lot of weight from eating. Patient reported feeling hungry in the middle of the night.Patient denies pain. Patient denies bowel and bladder issues. Patient denies anxiety, depression, auditory, and visual hallucinations. Contracts for safety. Patient denies bowel and bladder issues. Vitals are within normal limit.Patient agreed to have a roommate \"who is like me\". Charge nurse updated on call provider. Patient's room was unblocked.Patient was med compliant. Staff will continue with the same plan of care.               "

## 2024-03-03 NOTE — PLAN OF CARE
"Goal Outcome Evaluation:    Plan of Care Reviewed With: patient          [facility] Pt affect was flat, but brightens upon approach. Pt denies any SI, HI, or SIB. Pt contracts for safety. Pt denies depression, anxiety, hallucinations or delusions.  Pt discussed at length his plans to going to [place] to visit his dog with his friend after his 6 month commitment was over. Pt appeared excited and optimistic. Pt is social with peers and can be seen in [facility] playing board games and watching TV with peers. Pt is medication compliant. Appetite and fluid intake adequate. VSS. Pt denies acute physical pain. No medication side effects reported or observed this shift. Hygiene is adequate. No issues with bowel or bladder noted. Plan is to continue to monitor patients status q 15 mins, assess response to medication, and maintain the patients safety.     /79 (BP Location: Right arm)   Pulse 69   Temp 98.7  F (37.1  C) (Oral)   Resp 16   Ht 1.93 m (6' 3.98\")   Wt 107.1 kg (236 lb 3.2 oz)   SpO2 92%   BMI 28.76 kg/m       PRNs Given during shift: NONE      Pain: 0/10          "

## 2024-03-03 NOTE — PLAN OF CARE
03/02/24     Group Therapy Session   Group Attendance This participant attended the session   Time Session Began 1610   Time Session Ended 1705   Total Time (minutes) 50   Total # Attendees 3   Group Type Psychotherapy   Group Topic Covered Sympathetic and parasympathetic systems, and ways to regulate them for optimum functioning.   Group Session Detail During this session, the group reviewed the elementary elements of the sympathetic and parasympathetic systems, their connections with human cognition, feelings, and behavior. Reviewed and discussed specific strategies for emotion regulation. Pts replicated relaxation practices that were modeled by the facilitator - targeting emotion and cognitive regulation.   Patient Participation Detail This pt attended the group. Asked questions, and received feedback.     Luis Gatica, Ph.D., [facility]

## 2024-03-03 NOTE — PLAN OF CARE
Problem: Sleep Disturbance  Goal: Adequate Sleep/Rest  Outcome: Progressing   Goal Outcome Evaluation:             NOC Shift Report   (3/2/24 into 03/03/24)    Pt in bed at beginning of shift, breathing quiet and unlabored. Pt slept through shift. Pt slept 6.75 hours.     No pt complaints or concerns at this time.     No PRNs requested or given.     Will continue to monitor via Q15 minute safety checks.     Will continue to monitor and assess.

## 2024-03-03 NOTE — PLAN OF CARE
RN Shift Summary     Patient presented with a calm affect. He was present in the milieu throughout the day and social with peers. He denies SI/SIB/HI and no evidence of psychosis. Patient continues to express that he doesn't like to be on medication. However, he took all scheduled medications without issue. Patient stated that he is frustrated that he has gained weight since being in the hospital. Patient LBM was yesterday, and he requested PRN Miralax; he still would like to keep this medication PRN, rather than scheduled. Patient asked to go outside to the [place] today, but at this time, patients that are under Voluntary legal status with Code 3 orders were able to leave the unit. Patient is eager to leave the hospital so he can practice his singing and get fresh air. He is hopeful about a facility that he discussed with his CTC last week.    Vitals: B/P: 114/75, T: 97.2, P: 70, R: 16

## 2024-03-03 NOTE — PROGRESS NOTES
"Writer met with patient and patient agreed to have a room mate. Patient stated \"someone who's like me\" calm.    "

## 2024-03-04 PROBLEM — F25.9 SCHIZOPHRENIA, SCHIZOAFFECTIVE, CHRONIC WITH ACUTE EXACERBATION (H): Status: ACTIVE | Noted: 2024-03-04

## 2024-03-04 PROCEDURE — 250N000013 HC RX MED GY IP 250 OP 250 PS 637: Performed by: PSYCHIATRY & NEUROLOGY

## 2024-03-04 PROCEDURE — G0177 OPPS/PHP; TRAIN & EDUC SERV: HCPCS

## 2024-03-04 PROCEDURE — 124N000002 HC R&B MH UMMC

## 2024-03-04 PROCEDURE — H2032 ACTIVITY THERAPY, PER 15 MIN: HCPCS

## 2024-03-04 PROCEDURE — 250N000013 HC RX MED GY IP 250 OP 250 PS 637

## 2024-03-04 PROCEDURE — 99232 SBSQ HOSP IP/OBS MODERATE 35: CPT | Performed by: PSYCHIATRY & NEUROLOGY

## 2024-03-04 RX ORDER — PALIPERIDONE 3 MG/1
3 TABLET, EXTENDED RELEASE ORAL DAILY
Status: DISCONTINUED | OUTPATIENT
Start: 2024-03-04 | End: 2024-03-05

## 2024-03-04 RX ORDER — OLANZAPINE 10 MG/2ML
10 INJECTION, POWDER, FOR SOLUTION INTRAMUSCULAR DAILY
Status: DISCONTINUED | OUTPATIENT
Start: 2024-03-04 | End: 2024-03-05

## 2024-03-04 RX ADMIN — THERA TABS 1 TABLET: TAB at 07:41

## 2024-03-04 RX ADMIN — ACETAMINOPHEN 650 MG: 325 TABLET, FILM COATED ORAL at 15:50

## 2024-03-04 RX ADMIN — Medication 3 MG: at 20:25

## 2024-03-04 RX ADMIN — HYDROXYZINE HYDROCHLORIDE 25 MG: 25 TABLET, FILM COATED ORAL at 20:26

## 2024-03-04 RX ADMIN — SENNOSIDES AND DOCUSATE SODIUM 2 TABLET: 8.6; 5 TABLET ORAL at 07:41

## 2024-03-04 RX ADMIN — PALIPERIDONE 3 MG: 3 TABLET, EXTENDED RELEASE ORAL at 07:41

## 2024-03-04 RX ADMIN — OLANZAPINE 15 MG: 15 TABLET, ORALLY DISINTEGRATING ORAL at 19:00

## 2024-03-04 RX ADMIN — SENNOSIDES AND DOCUSATE SODIUM 2 TABLET: 8.6; 5 TABLET ORAL at 19:00

## 2024-03-04 RX ADMIN — HYDROXYZINE HYDROCHLORIDE 25 MG: 25 TABLET, FILM COATED ORAL at 15:50

## 2024-03-04 ASSESSMENT — ACTIVITIES OF DAILY LIVING (ADL)
ADLS_ACUITY_SCORE: 45
ADLS_ACUITY_SCORE: 45
ORAL_HYGIENE: INDEPENDENT
HYGIENE/GROOMING: INDEPENDENT
DRESS: SCRUBS (BEHAVIORAL HEALTH);INDEPENDENT
ADLS_ACUITY_SCORE: 45
ADLS_ACUITY_SCORE: 45
LAUNDRY: WITH SUPERVISION
ADLS_ACUITY_SCORE: 45
DRESS: SCRUBS (BEHAVIORAL HEALTH)
ADLS_ACUITY_SCORE: 45
ORAL_HYGIENE: INDEPENDENT
ADLS_ACUITY_SCORE: 45
HYGIENE/GROOMING: INDEPENDENT

## 2024-03-04 NOTE — CARE PLAN
"Occupational Therapy Group Note:     03/04/24 1530   Group Therapy Session   Group Attendance attended group session   Time Session Began 1315   Time Session Ended 1400   Total Time (minutes) 45   Total # Attendees 2   Group Type recreation   Group Topic Covered leisure exploration/use of leisure time;structured socialization   Group Session Detail OT Leisure Group: Skip-Alfred and Sequence   Patient Response/Contribution confronted peers appropriately;cooperative with task;listened actively;offered helpful suggestions to peers   Patient Participation Detail Patient actively engaged in therapeutic leisure activity in order to promote: cognitive skills (attention, planning, sequencing), leisure exploration, and structured socialization. Patient was an active and engaged participant in a leisure activity sampler group. Patient was able to successfully recall instructions of activities independently. Patient demonstrated good problem solving, attention, and understanding of strategy throughout both organized activities. Patient engaged in casual conversation with writer. Patient spoke about his interest in being a \"\" as he \"wants to help and motivate people.\" Patient also spoke about being a \"faithful person\" and appeared understanding of the importance of taking care of himself before he is able to focus on helping others. Patient also spoke about his interest in music and his hope to become a professional [employer] in the future. Patient was a positive and pleasant participant in group setting.          "

## 2024-03-04 NOTE — PLAN OF CARE
"He is active in the [facility] area and is talkative towards others.  He displays some disorganized thought process by having some difficulty expressing himself in discussions.  States that, \" for some people zyprexa makes you tired, but for me, it makes be hyperactive,\" in discussions.  He denies problematic anxiety, depression, suicidal and homicidal ideations when asked.  States having some itchiness and pain in his left knee area, yet stated that these symptoms have improved since taking his left knee brace off.  He does have a very light pink coloration around his left knee area.  PRN tylenol and atarax was given at 1550.  He is able to ambulate and transfer himself without difficulty in the shift.  States that he has not been sleeping well at night and reviewed prn medication options for him.  States that he is concerned about weight gain on zyprexa, but then said, \" they plan on switching me to invega anyway's.\"  He is aware that he is jarviced for zyprexa and agrees to take it orally and said, \" I don't want to take the shot.\"  He given support that the MD will assess his left knee area and discuss medication options tomorrow morning.   Left leg assessment was done with PA present.  PRN atarax, melatonin at 2025.     "

## 2024-03-04 NOTE — PROGRESS NOTES
Interim History:     Patient seen and chart reviewed. Case discussed in multi-disciplinary treatment team      According to Nursing report:  Patient is social with some peers and attends groups. He denies psychosis. He is compliant. He is less obsessional. He is more organized.        According to Nursing notes from yesterday:  Patient presented with a calm affect. He was present in the milieu throughout the day and social with peers. He denies SI/SIB/HI and no evidence of psychosis. Patient continues to express that he doesn't like to be on medication. However, he took all scheduled medications without issue. Patient stated that he is frustrated that he has gained weight since being in the hospital. Patient LBM was yesterday, and he requested PRN Miralax; he still would like to keep this medication PRN, rather than scheduled. Patient asked to go outside to the [place] today, but at this time, patients that are under Voluntary legal status with Code 3 orders were able to leave the unit. Patient is eager to leave the hospital so he can practice his singing and get fresh air. He is hopeful about a facility that he discussed with his CTC last week.   Pt and RN met in the [facility] [place], [place]. Pt affect was flat, but brightens upon approach. Pt denies any SI, HI, or SIB. Pt contracts for safety. Pt denies depression, anxiety, hallucinations or delusions.  Pt discussed at length his plans to going to [place] to visit his dog with his friend after his 6 month commitment was over. Pt appeared excited and optimistic. Pt is social with peers and [facility] and watching TV with peers. Pt is medication compliant. Appetite and fluid intake adequate. VSS. Pt denies acute physical pain. No medication side effects reported or observed this shift. Hygiene is adequate. No issues with bowel or bladder noted. Plan is to continue to monitor patients status q 15 mins, assess response to medication,  "and maintain the patients safety.      According to  :  [facility] Status:  [facility] [place]  File Number: [id number]  Start: February 22 2024  Expiration: August 29th 2024           On interview today:  Patient denies suicidal or homicidal thoughts and denies hallucinations. Patient is not revealing delusions on interview. Patient denies side effects to medications.   He is somewhat circumstantial. He has limited insight.  Patient states that he anticipates discharging to [facility].        ROS: Patient has no other physical complaints today.      Vital signs:  Temp: 98.7  F (37.1  C) Temp src: Oral BP: 138/79 Pulse: 69     SpO2: 92 % O2 Device: None (Room air)   Height: 193 cm (6' 3.98\") Weight: 107.1 kg (236 lb 3.2 oz)  Estimated body mass index is 28.76 kg/m  as calculated from the following:    Height as of this encounter: 1.93 m (6' 3.98\").    Weight as of this encounter: 107.1 kg (236 lb 3.2 oz).         MSE:  Appearance:  adequate hygiend  Mood: euthymic  Affect:mood congruent  Suicidal Ideation: PRESENT / ABSENT: absent   Homicidal Ideation: PRESENT / ABSENT: absent   Thought process: circumstantial, tangential, less scattered  Thought content: not revealing delusions on interview this morning   Fund of Knowledge: Average  Attention/Concentration: Poor  Language ability:  Intact, speech is slightly rapid however improving  Memory: Intact  Insight:  limited.  Judgement: limited  Orientation: Yes, x4  Psychomotor Behavior: Normal  Muscle Strength and Tone: MuscleStrength: Normal  Gait and Station: Normal      MEDICATIONS   Medications:  Scheduled Meds:    cholecalciferol  1,250 mcg Oral Q7 Days     multivitamin, therapeutic  1 tablet Oral Daily     OLANZapine zydis  15 mg Oral At Bedtime    Or     OLANZapine  10 mg Intramuscular At Bedtime     paliperidone  1.5 mg Oral Daily    Or     OLANZapine  10 mg Intramuscular Daily     senna-docusate  1-2 tablet Oral BID "     Continuous Infusions:  PRN Meds:.acetaminophen, alum & mag hydroxide-simethicone, benztropine, hydrOXYzine HCl, melatonin, nicotine, OLANZapine **OR** OLANZapine, polyethylene glycol    Medication adherence issues: MS Med Adherence Y/N: Yes, patient reports history of noncompliance with psychiatric medications in the community.  Patient is cooperative with meds in the hospital currently  Medication side effects: MEDICATION SIDE EFFECTS: Patient does not report any side effects to writer today  Benefit: Yes / No: Yes           LABS   No results found for this or any previous visit (from the past 72 hour(s)).      Provider reviewed results of XR KNEE LEFT 3 VIEWS completed on 2/28/2024. Results indicate: incompletely visualized postoperative changes of IM jhoana and screw fixation of the tibia. No evidence for acute fracture around the knee joint. There is degenerative change within the patellofemoral compartment. No significant effusion.    Vitamin D level noted to be low at 10 ng/mL; supplement ordered 2/22/24 to address vitamin D deficiency.     Latest Reference Range & Units 02/17/24 13:58 02/17/24 15:16 02/18/24 11:07 02/18/24 14:37 02/19/24 02:49 02/20/24 07:44 02/28/24 15:48 02/28/24 18:49   Sodium 135 - 145 mmol/L  140 143     141   Potassium 3.4 - 5.3 mmol/L  3.6 3.8     3.9   Chloride 98 - 107 mmol/L  104 108 (H)     102   Carbon Dioxide (CO2) 22 - 29 mmol/L  26 28     28   Urea Nitrogen 6.0 - 20.0 mg/dL  6.2 8.7     14.9   Creatinine 0.67 - 1.17 mg/dL  0.88 0.94     0.80   GFR Estimate >60 mL/min/1.73m2  >90 >90     >90   Calcium 8.6 - 10.0 mg/dL  9.4 9.2     9.5   Anion Gap 7 - 15 mmol/L  10 7     11   Magnesium 1.7 - 2.3 mg/dL  1.9      2.0   Phosphorus 2.5 - 4.5 mg/dL        4.2   Albumin 3.5 - 5.2 g/dL  4.4 4.1     4.2   Protein Total 6.4 - 8.3 g/dL  7.0 6.5     6.7   Alkaline Phosphatase 40 - 150 U/L  49 44     60   ALT 0 - 70 U/L  6 7     9   AST 0 - 45 U/L  11 13     22   Bilirubin Total <=1.2 mg/dL   1.3 (H) 1.2     0.3   Cholesterol <200 mg/dL      135     Glucose 70 - 99 mg/dL  104 (H) 135 (H)     93   HDL Cholesterol >=40 mg/dL      36 (L)     Hemoglobin A1C <5.7 %      4.8     LDL Cholesterol Calculated <=100 mg/dL      84     Non HDL Cholesterol <130 mg/dL      99     Triglycerides <150 mg/dL      75     TSH 0.30 - 4.20 uIU/mL      2.08     Vitamin D, Total (25-Hydroxy) 20 - 50 ng/mL      10 (L)     WBC 4.0 - 11.0 10e3/uL  5.4      6.1   Hemoglobin 13.3 - 17.7 g/dL  16.9      16.7   Hematocrit 40.0 - 53.0 %  46.5      48.8   Platelet Count 150 - 450 10e3/uL  232      200   RBC Count 4.40 - 5.90 10e6/uL  5.49      5.43   MCV 78 - 100 fL  85      90   MCH 26.5 - 33.0 pg  30.8      30.8   MCHC 31.5 - 36.5 g/dL  36.3      34.2   RDW 10.0 - 15.0 %  11.9      12.3   % Neutrophils %  78      64   % Lymphocytes %  13      22   % Monocytes %  7      9   % Eosinophils %  1      4   % Basophils %  1      1   Absolute Basophils 0.0 - 0.2 10e3/uL  0.0      0.1   Absolute Eosinophils 0.0 - 0.7 10e3/uL  0.1      0.2   Absolute Immature Granulocytes <=0.4 10e3/uL  0.0      0.0   Absolute Lymphocytes 0.8 - 5.3 10e3/uL  0.7 (L)      1.4   Absolute Monocytes 0.0 - 1.3 10e3/uL  0.4      0.5   % Immature Granulocytes %  0      0   Absolute Neutrophils 1.6 - 8.3 10e3/uL  4.2      3.9   Absolute NRBCs 10e3/uL  0.0      0.0   NRBCs per 100 WBC <1 /100  0      0   SARS CoV2 PCR Negative      Negative      Amphetamine Qual Urine Screen Negative  Screen Negative          Fentanyl Qual Urine Screen Negative  Screen Negative          Cocaine Urine Screen Negative  Screen Negative          Benzodiazepine Urine Screen Negative  Screen Negative          Opiates Qualitative Urine Screen Negative  Screen Negative          PCP Urine Screen Negative  Screen Negative          Cannabinoids Qual Urine Screen Negative  Screen Positive !          Barbiturates Qual Urine Screen Negative  Screen Negative          XR KNEE LEFT 3 VIEWS        Rpt   "  EKG 12-LEAD, TRACING ONLY     Rpt       (H): Data is abnormally high  (L): Data is abnormally low  !: Data is abnormal  Rpt: View report in Results Review for more information  No results found for: \"PHENYTOIN\", \"PHENOBARB\", \"VALPROATE\", \"CBMZ\"       DIAGNOSIS   Principal Problem:    Schizophrenia, schizoaffective, chronic with acute exacerbation (H)      Schizoaffective disorder, bipolar type, currently hypomanic  History of schizophrenia  PTSD  R/O OCD    History of generalized anxiety disorder with panic attacks  History of ADHD    Clinically Significant Risk Factors                         # Overweight: Estimated body mass index is 28.76 kg/m  as calculated from the following:    Height as of this encounter: 1.93 m (6' 3.98\").    Weight as of this encounter: 107.1 kg (236 lb 3.2 oz).   # Moderate Malnutrition: based on nutrition assessment          Active Problem List:  Patient Active Problem List   Diagnosis     Irritable bowel syndrome     Class 2 severe obesity due to excess calories with serious comorbidity and body mass index (BMI) of 37.0 to 37.9 in adult (H)     Patellofemoral instability of left knee with pain     Tobacco abuse     Suicidal ideation     Moderate episode of recurrent major depressive disorder (H)     CARSON (obstructive sleep apnea)     Unspecified psychosis not due to a substance or known physiological condition (H)     Marijuana use     Schizophrenia, unspecified type (H)          HOSPITAL COURSE AND ASSESSMENT      This is a 33 year old male with history of schizophrenia, MDD, suicidal ideation, and PTSD who presented to [facility] ED on 2/17/2024 via EMS for psychiatric evaluation.  [facility] and was experiencing symptoms of psychosis including disorganized, paranoid, and delusional thoughts.  Patient also exhibiting poor p.o. intake and not attending to ADLs such as bathing.  Patient has a history of Patellofemoral instability of left " knee and requires a brace to stabilize.  Per chart review patient was not taking any psychiatric medications prior to admission.  Patient endorsed some cannabis use and urine drug screening negative for all except cannabinoids.  While admitted to the ED, psychiatry provider was consulted and initiated olanzapine to target psychosis symptoms.  Patient was also started on a trial of Anafranil to target OCD symptoms.  Patient was evaluated by medical provider in the ED and determined to be medically stable.  Patient subsequently admitted to inpatient psychiatry unit 10 [address] for further psychiatric stabilization.  Legal status at time of admission was: 72-hour hold.  Olanzapine started in the ED to target psychosis symptoms and continued during psychiatric hospitalization.  Psychiatric provider initiated petition for mental health commitment with [facility] on 2/20/2024.  Trial of Anafranil initiated in the ED to target OCD symptoms however this was discontinued due to risk for worsening psychosis symptoms.  On 3/1/2024 court documents received supporting mental health commitment with [facility] order in [facility]. [employer] order includes medications: Zyprexa (olanzapine), Haldol (haloperidol), Invega (paliperidone), and Seroquel (quetiapine). Per chart review, patient previously tolerated paliperidone (Invega) medication well and this medication was effective for treating psychosis and mood disorder symptoms however Invega p.o. was not covered by insurance and subsequently was discontinued.  Pharmacy liaison consult completed during current hospitalization and prior auth completed for health insurance coverage of Invega p.o. tablets.  Invega Sustenna long-acting injection also covered by health insurance.  Plan will be to continue olanzapine 15 mg p.o. at bedtime for now with plan to titrate down in upcoming days with initiation of medication paliperidone.  Provider initiated [facility] medication paliperidone (Invega) 24-hour  tablet 1.5 mg PO daily with backup IM olanzapine 10 mg on 3/1/2024.   Plan will be to continue paliperidone (Invega) p.o. and titrate to therapeutic dose while monitoring for side effects.  If patient tolerating paliperidone p.o. plan will be to consider initiating long-acting injection Invega Sustenna prior to discharge due to patient's history of noncompliance with psychiatric medications as well as patient reporting directly to writer that he will be noncompliant with psychiatric medications in the community.  Plan will be for patient to discharge to crisis facility housing.  Unit CTC facilitating discharge plan.    Patient appears to be improving on current regimen and is voicing less delusions today. He is tolerating Invega so far.  PLAN      1. Ongoing education given regarding diagnostic and treatment options with risks, benefits and alternatives and adequate verbalization of understanding.  2.  Medications:  Increase paliperidone (Invega) 24-hour tablet to 3.0 mg PO daily to target psychosis symptoms and mood disorder symptoms with backup olanzapine 10 mg IM injection.  Patient has [facility] order for this medication and must take.  Offer p.o. paliperidone (Invega) first, if patient refuses then administer IM olanzapine.  Modify order of olanzapine ODT tablet 15 mg PO at bedtime to target psychosis symptoms and mood disorder symptoms, to include backup IM olanzapine 10 mg.  Patient now has a [facility] order for this medication and must take.  Offer p.o. olanzapine first.  If patient refuses then administer IM olanzapine.  Plan will be to titrate down on olanzapine with dose increases of paliperidone.  Continue multivitamin 1 tab PO daily  Continue cholecalciferol (vitamin D3) capsule 1250 mcg PO every 7 days for 8 doses to target vitamin D deficiency, administer immediately after supper  PRN benztropine tablet 0.5 mg PO2 times daily as needed to target side effect of movement disorder  PRN hydroxyzine tablet 25 mg  PO every 4 hours as needed for anxiety  PRN olanzapine tablet 10 mg PO every 3 hours as needed for agitation, ordered linked with olanzapine IM injection  PRN melatonin tablet 3 mg PO at bedtime as needed for sleep  PRN nicotine lozenge 2 mg buccal every 1 hour as needed for nicotine withdrawal symptoms    cholecalciferol  1,250 mcg Oral Q7 Days     multivitamin, therapeutic  1 tablet Oral Daily     paliperidone  3 mg Oral Daily    Or     OLANZapine  10 mg Intramuscular Daily     OLANZapine zydis  15 mg Oral At Bedtime    Or     OLANZapine  10 mg Intramuscular At Bedtime     senna-docusate  1-2 tablet Oral BID       3.  Labs/Imaging:  -CMP and CBC completed on 2/28/2024                 4. Consults:  -Orthopedic consult placed 2/28/2024 to address left knee subluxation    5. Precautions:  -Self-injury precautions, suicide precautions; risk moderate    6. Legal:  - Committment    7. Discharge planning:  -IRTS vs other residential option    No further change in treatment plan  Patient seen, chart reviewed, case reviewed with  and with nursing.   Case reviewed in multi-disciplinary treatment team.    Byron Calderon MD

## 2024-03-04 NOTE — PLAN OF CARE
-Attending Provider: CAIRSSA Davis CNP  -Voicemail Code: [id number]  -Team Note Due: Tuesday  -Next Steps:    Continue to follow up with [facility] CM (trying to determine if she has records for HIV and TB test)  Facilitate getting test results to [facility] (through [facility] CM or having new test completed in hospital)        Assessment/Intervention/Current Symtoms and Care Coordination:  Chart review and met with team, discussed pt progress, symptomology, and response to treatment.  Discussed the discharge plan and any potential impediments to discharge.  CTC called  and emailed  asking for support in tracking down blood tests for HIV and TB. No response.   CTC informed Higinio of unsuccessful efforts to connect with  regarding labs. Higinio was agreeable to completing new labs for [facility]. Higinio expressed concern around commitment process. CTC provided answers to his questions which he reported as helpful.   [facility] completed DA form for [facility] and return it to [facility] worker.       Discharge Plan or Goal:  Pending further stabilization, continued compliance with medications, continued activities of daily living, and development of a safe discharge plan.   Considerations:     Barriers to Discharge:  Impact of mental health symptoms on well being   Impact of mental health symptoms on activities of daily living  Need for medication monitoring and medication management     Referral Status:       Legal Status:  Commitment with [facility]: [place]  File Number: [id number]  Start: February 22 2024  Expiration: August 29th 2024     Contacts:  Norah ([facility]  through [facility]): [phone]  [email or web address]  [facility] (group home Higinio will be discharging to: [facility] [phone]        Phone: [phone]         FAX: [phone]     Upcoming Meetings and Dates/Important Information:      -Still Needed on AVS:

## 2024-03-04 NOTE — PLAN OF CARE
"Patient is alert and oriented x4. He is able to communicate needs. Mood is calm with a flat affect. He is social with some peers,. He participated in OT groups. Patient denied all MH symptoms. Patient reported poor sleep, denied experiencing nightmares, \"I wake up several times a night.\"    Patient has a rash on left leg at back of knee. He removed the brace and some redness/hives noted. Patient is trying to avoid scratching the area. Pt attended medication class/group that was conducted by pharmacist. He raised concerns of weight gain and developing diabetes from taking Olanzapine medication. Stated \"if I knew they were going to prescribe Zyprexa as my [facility] medication I could not have agreed to sign the papers.\" He is aware that this is a [facility] medication and if he refuses he has to get a shot, was asked to discuss this with the provider tomorrow.  Patient is eating and drinking adequately. No issues with B&B.Grooming is fair. /78 (BP Location: Right arm, Patient Position: Sitting, Cuff Size: Adult Regular)   Pulse 80   Temp 97.6  F (36.4  C) (Oral)   Resp 16   Ht 1.93 m (6' 3.98\")   Wt 107.1 kg (236 lb 3.2 oz)   SpO2 98%   BMI 28.76 kg/m          1510. Writer notified the provider regarding the rash behind pt's left knee and also pt's concern regarding Olanzapine medication. The provider will discuss medication with patient tomorrow. Regarding the rash patient should not wear the brace until he is seen tomorrow by the provider. Writer updated the patient.                "

## 2024-03-04 NOTE — PLAN OF CARE
BEH IP Unit Acuity Rating Score (UARS)  Patient is given one point for every criteria they meet.    CRITERIA SCORING   On a 72 hour hold, court hold, committed, stay of commitment, or revocation. 1    Patient LOS on BEH unit exceeds 20 days. 0  LOS: 14   Patient under guardianship, 55+, otherwise medically complex, or under age 11. 0   Suicide ideation without relief of precipitating factors. 0   Current plan for suicide. 0   Current plan for homicide. 0   Imminent risk or actual attempt to seriously harm another without relief of factors precipitating the attempt. 0   Severe dysfunction in daily living (ex: complete neglect for self care, extreme disruption in vegetative function, extreme deterioration in social interactions). 0   Recent (last 7 days) or current physical aggression in the ED or on unit. 0   Restraints or seclusion episode in past 72 hours. 0   Recent (last 7 days) or current verbal aggression, agitation, yelling, etc., while in the ED or unit. 0   Active psychosis. 1   Need for constant or near constant redirection (from leaving, from others, etc).  0   Intrusive or disruptive behaviors. 0   Patient requires 3 or more hours of individualized nursing care per 8-hour shift (i.e. for ADLs, meds, therapeutic interventions). 0   TOTAL 2

## 2024-03-04 NOTE — CARE PLAN
Occupational Therapy Group Note:     03/04/24 1524   Group Therapy Session   Group Attendance attended group session   Time Session Began 1015   Time Session Ended 1150   Total Time (minutes) 95   Total # Attendees 6   Group Type expressive therapy;task skill   Group Topic Covered cognitive activities;coping skills/lifestyle management;emotions/expression;leisure exploration/use of leisure time;relaxation techniques;structured socialization   Group Session Detail OT Clinic Group x2   Patient Response/Contribution confronted peers appropriately;cooperative with task;listened actively   Patient Participation Detail Pt actively participated in occupational therapy clinic to facilitate coping skill exploration, creative expression within personally meaningful activities, and clinical observation of social, cognitive, and kinesthetic performance skills. Pt response: supervision to initiate, gather materials, sequence, and adjust to workspace demands as needed. Demonstrated good focus, planning, and problem solving for selected fuse bead necklace and stained glass task. Patient was able to voice requests calmly and politely. Patient was receptive to ideas suggested by writer and peers. Patient demonstrated good frustration tolerance as evidenced by remaining in emotional control after accidentally dropping necklace beads on the floor. Able to ask for assistance as needed, and appropriate with peers and staff.

## 2024-03-04 NOTE — PROGRESS NOTES
03/03/24 1858   Group Therapy Session   Group Attendance attended group session   Time Session Began 1615   Time Session Ended 1700   Total Time (minutes) 45   Total # Attendees 7   Group Type psychoeducation;psychotherapeutic   Group Topic Covered coping skills/lifestyle management;anger/conflict management;emotions/expression   Group Session Detail Group members checked in with how they are feeling today, a success, and if they need to process anything. Group discussed values, personal values, values of their family, society, and cultural values. Group then considered how these could influence each other. Group then discussed how they want to live their values and if they are. Group then listened to a song before groups end.   Patient Response/Contribution able to recall/repeat info presented;cooperative with task;discussed personal experience with topic;expressed understanding of topic;listened actively;organized   Patient Participation Detail Pt attended group, shared that he was feeling hopeful. Pt shared his thoughts on values and was appropriate with peers.

## 2024-03-05 LAB
HIV 1+2 AB+HIV1 P24 AG SERPL QL IA: NONREACTIVE
HOLD SPECIMEN: NORMAL
HOLD SPECIMEN: NORMAL

## 2024-03-05 PROCEDURE — 86803 HEPATITIS C AB TEST: CPT | Performed by: PSYCHIATRY & NEUROLOGY

## 2024-03-05 PROCEDURE — 250N000013 HC RX MED GY IP 250 OP 250 PS 637: Performed by: PSYCHIATRY & NEUROLOGY

## 2024-03-05 PROCEDURE — 86481 TB AG RESPONSE T-CELL SUSP: CPT | Performed by: PSYCHIATRY & NEUROLOGY

## 2024-03-05 PROCEDURE — 99232 SBSQ HOSP IP/OBS MODERATE 35: CPT | Performed by: PSYCHIATRY & NEUROLOGY

## 2024-03-05 PROCEDURE — H2032 ACTIVITY THERAPY, PER 15 MIN: HCPCS

## 2024-03-05 PROCEDURE — G0177 OPPS/PHP; TRAIN & EDUC SERV: HCPCS

## 2024-03-05 PROCEDURE — 36415 COLL VENOUS BLD VENIPUNCTURE: CPT | Performed by: PSYCHIATRY & NEUROLOGY

## 2024-03-05 PROCEDURE — 250N000013 HC RX MED GY IP 250 OP 250 PS 637

## 2024-03-05 PROCEDURE — 87389 HIV-1 AG W/HIV-1&-2 AB AG IA: CPT | Performed by: PSYCHIATRY & NEUROLOGY

## 2024-03-05 PROCEDURE — 124N000002 HC R&B MH UMMC

## 2024-03-05 RX ORDER — PALIPERIDONE 6 MG/1
6 TABLET, EXTENDED RELEASE ORAL DAILY
Status: DISCONTINUED | OUTPATIENT
Start: 2024-03-05 | End: 2024-03-11

## 2024-03-05 RX ORDER — OLANZAPINE 10 MG/2ML
10 INJECTION, POWDER, FOR SOLUTION INTRAMUSCULAR DAILY
Status: DISCONTINUED | OUTPATIENT
Start: 2024-03-05 | End: 2024-03-11

## 2024-03-05 RX ORDER — OLANZAPINE 10 MG/1
10 TABLET, ORALLY DISINTEGRATING ORAL AT BEDTIME
Status: DISCONTINUED | OUTPATIENT
Start: 2024-03-05 | End: 2024-03-07

## 2024-03-05 RX ORDER — OLANZAPINE 10 MG/2ML
10 INJECTION, POWDER, FOR SOLUTION INTRAMUSCULAR AT BEDTIME
Status: DISCONTINUED | OUTPATIENT
Start: 2024-03-05 | End: 2024-03-07

## 2024-03-05 RX ADMIN — PALIPERIDONE 6 MG: 6 TABLET, EXTENDED RELEASE ORAL at 09:55

## 2024-03-05 RX ADMIN — THERA TABS 1 TABLET: TAB at 09:55

## 2024-03-05 RX ADMIN — SENNOSIDES AND DOCUSATE SODIUM 2 TABLET: 8.6; 5 TABLET ORAL at 19:46

## 2024-03-05 RX ADMIN — SENNOSIDES AND DOCUSATE SODIUM 2 TABLET: 8.6; 5 TABLET ORAL at 09:55

## 2024-03-05 RX ADMIN — OLANZAPINE 10 MG: 10 TABLET, ORALLY DISINTEGRATING ORAL at 19:46

## 2024-03-05 ASSESSMENT — ACTIVITIES OF DAILY LIVING (ADL)
DRESS: SCRUBS (BEHAVIORAL HEALTH)
ADLS_ACUITY_SCORE: 45
HYGIENE/GROOMING: INDEPENDENT
ADLS_ACUITY_SCORE: 45
DRESS: SCRUBS (BEHAVIORAL HEALTH)
LAUNDRY: WITH SUPERVISION
ORAL_HYGIENE: INDEPENDENT
ADLS_ACUITY_SCORE: 45
ADLS_ACUITY_SCORE: 45
HYGIENE/GROOMING: INDEPENDENT
ADLS_ACUITY_SCORE: 45
LAUNDRY: WITH SUPERVISION
ADLS_ACUITY_SCORE: 45
ORAL_HYGIENE: INDEPENDENT
ADLS_ACUITY_SCORE: 45

## 2024-03-05 NOTE — PLAN OF CARE
"Patient participated in OT groups. Patient reported feeling sad today. Denied depression. Affect is flat and blunted.  Patient denied SI/SIB/AVH. He made some  delusional remarks during check in. He talked about aliens, rockets, weapons. Stated he saw the sun and moon collide while he was staying at the other place before coming to the hospital. Talked about the angels and witchcraft. Thought process very disorganized. Pressured speech, tangential. He is medication compliant. Patient is eating adequately. Grooming is neglected, shower was encouraged. Bedtime olanzapine dose decreased to 10 mg and Invega was increased to 6 mg daily. . /74 (BP Location: Right arm, Patient Position: Sitting, Cuff Size: Adult Regular)   Pulse 80   Temp 97.4  F (36.3  C) (Oral)   Resp 16   Ht 1.93 m (6' 3.98\")   Wt 108.5 kg (239 lb 1.6 oz)   SpO2 97%   BMI 29.12 kg/m                         "

## 2024-03-05 NOTE — CARE PLAN
"Occupational Therapy     03/05/24 1400   Group Therapy Session   Group Attendance attended group session   Time Session Began 1015   Time Session Ended 1200   Total Time (minutes) 105   Total # Attendees 4   Group Type expressive therapy;task skill   Group Topic Covered cognitive activities;coping skills/lifestyle management;leisure exploration/use of leisure time;problem-solving;structured socialization   Group Session Detail OT: Education on healthy activity engagement and healthy leisure engagement with creative hands-on endeavor (yarn baskets) to increase concentration, focus, attention to task/detail, decision making, problem solving, frustration tolerance, task follow through, coping with stress, healthy leisure engagement, creative expression, and social engagement   Patient Response/Contribution cooperative with task;listened actively;other (see comments)  (pleasant; engaged)   Patient Participation Detail Pt reported during check-in he enjoys \"writing poems and lyrics\" as healthy leisure activities. Pt sat among peers to complete presented activity and engaged in brief reciprocal social interactions with peers; lack of socialization may have been due to pt's focus on task. Pt able to follow verbal directions and visual demonstration for novel hands on creative endeavor. Pt worked in a mostly neat and tidy manner to complete project and elected to add additional detail of switching colors of yarn. Pt able to recognize errors and self-correct. Pt reported his personality is such that he likes to finish something once he starts it and likes it to be completed correctly. Pt reported they enjoyed the project and are looking forward to using the project after discharge.        "

## 2024-03-05 NOTE — PROVIDER NOTIFICATION
03/05/24 1258   Individualization/Patient Specific Goals   Patient Personal Strengths expressive of emotions   Patient Vulnerabilities adverse childhood experience(s);history of unsuccessful treatment;housing insecurity;lacks insight into illness;substance abuse/addiction   Anxieties, Fears or Concerns Concerns around commmitment process and going to a group home.   Interprofessional Rounds   Summary Eating well, sleeping well, going to groups, social with peers and staff. Taking medications. Still presents with some paranoia and grandious ideas.   Participants CTC;physician;nursing   Behavioral Team Discussion   Participants Skyla Patton RN, In[facility] CTC   Progress Responding well to miliue and medications.   Anticipated length of stay 7-18 days   Continued Stay Criteria/Rationale Needs clinical stabilization, medication management, and safe dispo plan   Medical/Physical See H&P   Precautions See below   Plan Medication management, mental health symptom stabilization, development of safe dispo plan, Petition for commitment due to history of multiple ED visits and level of recent decompensation   Anticipated Discharge Disposition IRTS;group home     PRECAUTIONS AND SAFETY    Behavioral Orders   Procedures    Code 1 - Restrict to Unit    Code 2     To xray    Code 3    Routine Programming     As clinically indicated    Self Injury Precaution    Status 15     Every 15 minutes.    Suicide precautions: Suicide Risk: MODERATE; Clinical rationale to override score: modification to the care environment, Collateral information supporting Suicidal/self-harm behaviors     Patients on Suicide Precautions should have a Combination Diet ordered that includes a Diet selection(s) AND a Behavioral Tray selection for Safe Tray - with utensils, or Safe Tray - NO utensils       Order Specific Question:   Suicide Risk     Answer:   MODERATE     Order Specific Question:   Clinical rationale to override score:     Answer:    modification to the care environment     Order Specific Question:   Clinical rationale to override score:     Answer:   Collateral information supporting Suicidal/self-harm behaviors       Safety  Safety WDL: WDL  Patient Location: patient room, own  Observed Behavior: calm  Observed Behavior (Comment): watching TV  Safety Measures: safety rounds completed  Diversional Activity: television  Suicidality: Status 15

## 2024-03-05 NOTE — CARE PLAN
Occupational Therapy     03/05/24 1500   Group Therapy Session   Group Attendance attended group session   Time Session Began 1315   Time Session Ended 1415   Total Time (minutes) 60   Total # Attendees 3   Group Type recreation   Group Topic Covered cognitive activities;coping skills/lifestyle management;leisure exploration/use of leisure time;structured socialization   Group Session Detail OT: Education on healthy leisure engagement and interactive social activities (ping pong) to increase concentration, focus, attention to task/detail, memory recall, coping with stress, healthy distraction engagement, symptom management, healthy leisure engagement, social wellness, physical wellness, and cognitive wellness   Patient Response/Contribution cooperative with task;listened actively;organized;offered helpful suggestions to peers;other (see comments)  (pleasant; engaged)   Patient Participation Detail Pt initially elected to engage in familiar creative hands on endeavor he started during a previous session. Pt worked in a mostly neat and tidy manner to complete task and was able to self-correct when he made an error. Pt then elected to engage in interactive social activity to support physical wellness with a peer and engaged in reciprocal social interactions with peers. Pt appeared to enjoy the activity as he was observed to be smiling and laughing throughout activity. Pt thanked therapist for activity.

## 2024-03-05 NOTE — PROGRESS NOTES
"        Interim History:     Patient seen and chart reviewed. Case discussed in multi-disciplinary treatment team      According to Nursing report:  Patient is visible on milieu  and social with peers. He can be scattered at times, and some disorganized thoughts. He is not revealing delusions. He is cooperative with treatment and medications. He has some anxiety.    According to Nursing notes from yesterday:  Patient is alert and oriented x4. He is able to communicate needs. Mood is calm with a flat affect. He is social with some peers,. He participated in OT groups. Patient denied all MH symptoms. Patient reported poor sleep, denied experiencing nightmares, \"I wake up several times a night.\"    Patient has a rash on left leg at back of knee. He removed the brace and some redness/hives noted. Patient is trying to avoid scratching the area. Pt attended medication class/group that was conducted by pharmacist. He raised concerns of weight gain and developing diabetes from taking Olanzapine medication. Stated \"if I knew they were going to prescribe Zyprexa as my Holliday medication I could not have agreed to sign the papers.\" He is aware that this is a Holliday medication and if he refuses he has to get a shot, was asked to discuss this with the provider tomorrow.  Patient is eating and drinking adequately. No issues with B&B.Grooming is fair.  He is active in the lounge area and is talkative towards others.  He displays some disorganized thought process by having some difficulty expressing himself in discussions.  States that, \" for some people zyprexa makes you tired, but for me, it makes be hyperactive,\" in discussions.  He denies problematic anxiety, depression, suicidal and homicidal ideations when asked.  States having some itchiness and pain in his left knee area, yet stated that these symptoms have improved since taking his left knee brace off.  He does have a very light pink coloration around his left knee area.  " "PRN tylenol and atarax was given at 1550.  He is able to ambulate and transfer himself without difficulty in the shift.  States that he has not been sleeping well at night and reviewed prn medication options for him.  States that he is concerned about weight gain on zyprexa, but then said, \" they plan on switching me to invega anyway's.\"  He is aware that he is jarviced for zyprexa and agrees to take it orally and said, \" I don't want to take the shot.\"  He given support that the MD will assess his left knee area and discuss medication options tomorrow morning.   Left leg assessment was done with PA present.  PRN atarax, melatonin at 2025.          According to  :  Legal Status:  Commitment with Mg  [facility]: [place]  File Number: [id number]  Start: February 22 2024  Expiration: August 29th 2024         On interview today:  Patient denies suicidal or homicidal thoughts and denies hallucinations. Patient is not revealing delusions on interview. Patient denies side effects to medications.   Patient  is concerned about weight gain and we discussed how this can be avoided and  managed.      ROS: Patient has no other physical complaints today.      Vital signs:  Temp: 98.2  F (36.8  C) Temp src: Oral BP: 125/82 Pulse: 74     SpO2: 98 % O2 Device: None (Room air)     Weight: 107.1 kg (236 lb 3.2 oz)  Estimated body mass index is 28.76 kg/m  as calculated from the following:    Height as of this encounter: 1.93 m (6' 3.98\").    Weight as of this encounter: 107.1 kg (236 lb 3.2 oz).         MSE:  Appearance:  adequate hygiend  Mood: euthymic  Affect:mood congruent  Suicidal Ideation: PRESENT / ABSENT: absent   Homicidal Ideation: PRESENT / ABSENT: absent   Thought process: circumstantial, tangential, less scattered  Thought content: not revealing delusions on interview this morning   Fund of Knowledge: Average  Attention/Concentration: Poor  Language ability:  Intact, speech is slightly rapid however " improving  Memory: Intact  Insight:  limited.  Judgement: limited  Orientation: Yes, x4  Psychomotor Behavior: Normal  Muscle Strength and Tone: MuscleStrength: Normal  Gait and Station: Normal  Minimal change in mental status in the past 24 hours'      MEDICATIONS   Medications:  Scheduled Meds:    cholecalciferol  1,250 mcg Oral Q7 Days     multivitamin, therapeutic  1 tablet Oral Daily     paliperidone  3 mg Oral Daily    Or     OLANZapine  10 mg Intramuscular Daily     OLANZapine zydis  15 mg Oral At Bedtime    Or     OLANZapine  10 mg Intramuscular At Bedtime     senna-docusate  1-2 tablet Oral BID     Continuous Infusions:  PRN Meds:.acetaminophen, alum & mag hydroxide-simethicone, benztropine, hydrOXYzine HCl, melatonin, nicotine, OLANZapine **OR** OLANZapine, polyethylene glycol    Medication adherence issues: MS Med Adherence Y/N: Yes, patient reports history of noncompliance with psychiatric medications in the community.  Patient is cooperative with meds in the hospital currently  Medication side effects: MEDICATION SIDE EFFECTS: Patient does not report any side effects to writer today  Benefit: Yes / No: Yes           LABS   No results found for this or any previous visit (from the past 72 hour(s)).      Provider reviewed results of XR KNEE LEFT 3 VIEWS completed on 2/28/2024. Results indicate: incompletely visualized postoperative changes of IM jhoana and screw fixation of the tibia. No evidence for acute fracture around the knee joint. There is degenerative change within the patellofemoral compartment. No significant effusion.    Vitamin D level noted to be low at 10 ng/mL; supplement ordered 2/22/24 to address vitamin D deficiency.     Latest Reference Range & Units 02/17/24 13:58 02/17/24 15:16 02/18/24 11:07 02/18/24 14:37 02/19/24 02:49 02/20/24 07:44 02/28/24 15:48 02/28/24 18:49   Sodium 135 - 145 mmol/L  140 143     141   Potassium 3.4 - 5.3 mmol/L  3.6 3.8     3.9   Chloride 98 - 107 mmol/L  104  108 (H)     102   Carbon Dioxide (CO2) 22 - 29 mmol/L  26 28     28   Urea Nitrogen 6.0 - 20.0 mg/dL  6.2 8.7     14.9   Creatinine 0.67 - 1.17 mg/dL  0.88 0.94     0.80   GFR Estimate >60 mL/min/1.73m2  >90 >90     >90   Calcium 8.6 - 10.0 mg/dL  9.4 9.2     9.5   Anion Gap 7 - 15 mmol/L  10 7     11   Magnesium 1.7 - 2.3 mg/dL  1.9      2.0   Phosphorus 2.5 - 4.5 mg/dL        4.2   Albumin 3.5 - 5.2 g/dL  4.4 4.1     4.2   Protein Total 6.4 - 8.3 g/dL  7.0 6.5     6.7   Alkaline Phosphatase 40 - 150 U/L  49 44     60   ALT 0 - 70 U/L  6 7     9   AST 0 - 45 U/L  11 13     22   Bilirubin Total <=1.2 mg/dL  1.3 (H) 1.2     0.3   Cholesterol <200 mg/dL      135     Glucose 70 - 99 mg/dL  104 (H) 135 (H)     93   HDL Cholesterol >=40 mg/dL      36 (L)     Hemoglobin A1C <5.7 %      4.8     LDL Cholesterol Calculated <=100 mg/dL      84     Non HDL Cholesterol <130 mg/dL      99     Triglycerides <150 mg/dL      75     TSH 0.30 - 4.20 uIU/mL      2.08     Vitamin D, Total (25-Hydroxy) 20 - 50 ng/mL      10 (L)     WBC 4.0 - 11.0 10e3/uL  5.4      6.1   Hemoglobin 13.3 - 17.7 g/dL  16.9      16.7   Hematocrit 40.0 - 53.0 %  46.5      48.8   Platelet Count 150 - 450 10e3/uL  232      200   RBC Count 4.40 - 5.90 10e6/uL  5.49      5.43   MCV 78 - 100 fL  85      90   MCH 26.5 - 33.0 pg  30.8      30.8   MCHC 31.5 - 36.5 g/dL  36.3      34.2   RDW 10.0 - 15.0 %  11.9      12.3   % Neutrophils %  78      64   % Lymphocytes %  13      22   % Monocytes %  7      9   % Eosinophils %  1      4   % Basophils %  1      1   Absolute Basophils 0.0 - 0.2 10e3/uL  0.0      0.1   Absolute Eosinophils 0.0 - 0.7 10e3/uL  0.1      0.2   Absolute Immature Granulocytes <=0.4 10e3/uL  0.0      0.0   Absolute Lymphocytes 0.8 - 5.3 10e3/uL  0.7 (L)      1.4   Absolute Monocytes 0.0 - 1.3 10e3/uL  0.4      0.5   % Immature Granulocytes %  0      0   Absolute Neutrophils 1.6 - 8.3 10e3/uL  4.2      3.9   Absolute NRBCs 10e3/uL  0.0      0.0  "  NRBCs per 100 WBC <1 /100  0      0   SARS CoV2 PCR Negative      Negative      Amphetamine Qual Urine Screen Negative  Screen Negative          Fentanyl Qual Urine Screen Negative  Screen Negative          Cocaine Urine Screen Negative  Screen Negative          Benzodiazepine Urine Screen Negative  Screen Negative          Opiates Qualitative Urine Screen Negative  Screen Negative          PCP Urine Screen Negative  Screen Negative          Cannabinoids Qual Urine Screen Negative  Screen Positive !          Barbiturates Qual Urine Screen Negative  Screen Negative          XR KNEE LEFT 3 VIEWS        Rpt    EKG 12-LEAD, TRACING ONLY     Rpt       (H): Data is abnormally high  (L): Data is abnormally low  !: Data is abnormal  Rpt: View report in Results Review for more information  No results found for: \"PHENYTOIN\", \"PHENOBARB\", \"VALPROATE\", \"CBMZ\"       DIAGNOSIS   Principal Problem:    Schizophrenia, schizoaffective, chronic with acute exacerbation (H)      Schizoaffective disorder, bipolar type, currently hypomanic  History of schizophrenia  PTSD  R/O OCD    History of generalized anxiety disorder with panic attacks  History of ADHD    Clinically Significant Risk Factors                         # Overweight: Estimated body mass index is 28.76 kg/m  as calculated from the following:    Height as of this encounter: 1.93 m (6' 3.98\").    Weight as of this encounter: 107.1 kg (236 lb 3.2 oz).   # Moderate Malnutrition: based on nutrition assessment          Active Problem List:  Patient Active Problem List   Diagnosis     Irritable bowel syndrome     Class 2 severe obesity due to excess calories with serious comorbidity and body mass index (BMI) of 37.0 to 37.9 in adult (H)     Patellofemoral instability of left knee with pain     Tobacco abuse     Suicidal ideation     Moderate episode of recurrent major depressive disorder (H)     CARSON (obstructive sleep apnea)     Unspecified psychosis not due to a substance or " known physiological condition (H)     Marijuana use     Schizophrenia, unspecified type (H)     Schizophrenia, schizoaffective, chronic with acute exacerbation (H)          HOSPITAL COURSE AND ASSESSMENT      This is a 33 year old male with history of schizophrenia, MDD, suicidal ideation, and PTSD who presented to [facility] ED on 2/17/2024 via EMS for psychiatric evaluation.  Patient was at [facility] and was experiencing symptoms of psychosis including disorganized, paranoid, and delusional thoughts.  Patient also exhibiting poor p.o. intake and not attending to ADLs such as bathing.  Patient has a history of Patellofemoral instability of left knee and requires a brace to stabilize.  Per chart review patient was not taking any psychiatric medications prior to admission.  Patient endorsed some cannabis use and urine drug screening negative for all except cannabinoids.  While admitted to the ED, psychiatry provider was consulted and initiated olanzapine to target psychosis symptoms.  Patient was also started on a trial of Anafranil to target OCD symptoms.  Patient was evaluated by medical provider in the ED and determined to be medically stable.  Patient subsequently admitted to inpatient psychiatry unit 10 [address] for further psychiatric stabilization.  Legal status at time of admission was: 72-hour hold.  Olanzapine started in the ED to target psychosis symptoms and continued during psychiatric hospitalization.  Psychiatric provider initiated petition for mental health commitment with [facility] on 2/20/2024.  Trial of Anafranil initiated in the ED to target OCD symptoms however this was discontinued due to risk for worsening psychosis symptoms.  On 3/1/2024 court documents received supporting mental health commitment with Holliday order in [facility]. Holliday order includes medications: Zyprexa (olanzapine), Haldol (haloperidol), Invega (paliperidone), and Seroquel (quetiapine). Per  chart review, patient previously tolerated paliperidone (Invega) medication well and this medication was effective for treating psychosis and mood disorder symptoms however Invega p.o. was not covered by insurance and subsequently was discontinued.  Pharmacy liaison consult completed during current hospitalization and prior auth completed for health insurance coverage of Invega p.o. tablets.  Invega Sustenna long-acting injection also covered by health insurance.  Plan will be to continue olanzapine 15 mg p.o. at bedtime for now with plan to titrate down in upcoming days with initiation of medication paliperidone.  Provider initiated Holliday medication paliperidone (Invega) 24-hour tablet 1.5 mg PO daily with backup IM olanzapine 10 mg on 3/1/2024.   Plan will be to continue paliperidone (Invega) p.o. and titrate to therapeutic dose while monitoring for side effects.  If patient tolerating paliperidone p.o. plan will be to consider initiating long-acting injection Invega Sustenna prior to discharge due to patient's history of noncompliance with psychiatric medications as well as patient reporting directly to writer that he will be noncompliant with psychiatric medications in the community.  Plan will be for patient to discharge to crisis facility housing.  Unit CTC facilitating discharge plan.    Patient appears to be improving on current regimen and is voicing less delusions today. He is tolerating Invega so far.  PLAN      1. Ongoing education given regarding diagnostic and treatment options with risks, benefits and alternatives and adequate verbalization of understanding.  2.  Medications:  Increase paliperidone (Invega) 24-hour tablet to 6.0 mg PO daily to target psychosis symptoms and mood disorder symptoms with backup olanzapine 10 mg IM injection.  Patient has Holliday order for this medication and must take.  Offer p.o. paliperidone (Invega) first, if patient refuses then administer IM olanzapine.  Modify order  of olanzapine ODT tablet 10 mg PO at bedtime to target psychosis symptoms and mood disorder symptoms, to include backup IM olanzapine 10 mg.  Patient now has a Holliday order for this medication and must take.  Offer p.o. olanzapine first.  If patient refuses then administer IM olanzapine.  Plan will be to titrate down on olanzapine with dose increases of paliperidone.  Continue multivitamin 1 tab PO daily  Continue cholecalciferol (vitamin D3) capsule 1250 mcg PO every 7 days for 8 doses to target vitamin D deficiency, administer immediately after supper  PRN benztropine tablet 0.5 mg PO2 times daily as needed to target side effect of movement disorder  PRN hydroxyzine tablet 25 mg PO every 4 hours as needed for anxiety  PRN olanzapine tablet 10 mg PO every 3 hours as needed for agitation, ordered linked with olanzapine IM injection  PRN melatonin tablet 3 mg PO at bedtime as needed for sleep  PRN nicotine lozenge 2 mg buccal every 1 hour as needed for nicotine withdrawal symptoms    cholecalciferol  1,250 mcg Oral Q7 Days     multivitamin, therapeutic  1 tablet Oral Daily     paliperidone  3 mg Oral Daily    Or     OLANZapine  10 mg Intramuscular Daily     OLANZapine zydis  15 mg Oral At Bedtime    Or     OLANZapine  10 mg Intramuscular At Bedtime     senna-docusate  1-2 tablet Oral BID       3.  Labs/Imaging:  -CMP and CBC completed on 2/28/2024                 4. Consults:  -Orthopedic consult placed 2/28/2024 to address left knee subluxation    5. Precautions:  -Self-injury precautions, suicide precautions; risk moderate    6. Legal:  - Committment    7. Discharge planning:  -IRTS vs other residential option    No further change in treatment plan  Patient seen, chart reviewed, case reviewed with  and with nursing.   Case reviewed in multi-disciplinary treatment team.      Continue  further taper of Zyprexa, and anticipate FABIANO Geetha Calderon MD

## 2024-03-05 NOTE — PLAN OF CARE
Problem: Sleep Disturbance  Goal: Adequate Sleep/Rest  Outcome: Adequate for Care Transition   Goal Outcome Evaluation:    Patient slept 6.25 During the night. Safety checks done every 15 minute. No PRN was given. No concern noted. Will continue to monitor.

## 2024-03-05 NOTE — PLAN OF CARE
BEH IP Unit Acuity Rating Score (UARS)  Patient is given one point for every criteria they meet.    CRITERIA SCORING   On a 72 hour hold, court hold, committed, stay of commitment, or revocation. 1    Patient LOS on BEH unit exceeds 20 days. 0  LOS: 15   Patient under guardianship, 55+, otherwise medically complex, or under age 11. 0   Suicide ideation without relief of precipitating factors. 0   Current plan for suicide. 0   Current plan for homicide. 0   Imminent risk or actual attempt to seriously harm another without relief of factors precipitating the attempt. 0   Severe dysfunction in daily living (ex: complete neglect for self care, extreme disruption in vegetative function, extreme deterioration in social interactions). 0   Recent (last 7 days) or current physical aggression in the ED or on unit. 0   Restraints or seclusion episode in past 72 hours. 0   Recent (last 7 days) or current verbal aggression, agitation, yelling, etc., while in the ED or unit. 0   Active psychosis. 1   Need for constant or near constant redirection (from leaving, from others, etc).  0   Intrusive or disruptive behaviors. 0   Patient requires 3 or more hours of individualized nursing care per 8-hour shift (i.e. for ADLs, meds, therapeutic interventions). 0   TOTAL 2

## 2024-03-05 NOTE — PLAN OF CARE
-Attending Provider: CARISSA Davis CNP  -Voicemail Code: [id number]  -Team Note Due: Tuesday  -Next Steps:    Continue to follow up with [facility] (trying to determine if she has records for HIV and TB test)  Facilitate getting test results to [facility] (through [facility] or having new test completed in hospital)        Assessment/Intervention/Current Symtoms and Care Coordination:  Chart review and met with team, discussed pt progress, symptomology, and response to treatment.  Discussed the discharge plan and any potential impediments to discharge.  CTC coordinated with provider to order new tests for HIV and TB.       Discharge Plan or Goal:  Pending further stabilization, continued compliance with medications, continued activities of daily living, and development of a safe discharge plan.   Considerations:     Barriers to Discharge:  Impact of mental health symptoms on well being   Impact of mental health symptoms on activities of daily living  Need for medication monitoring and medication management     Referral Status:       Legal Status:  Commitment with [facility]: [place]  File Number: [id number]  Start: February 22 2024  Expiration: August 29th 2024     Contacts:  Norah ([facility]  through [facility]): [phone]  [email or web address]  [facility] (group home Higinio will be discharging to: [facility]. [facility] [phone]        Phone: [phone]         FAX: [phone]     Upcoming Meetings and Dates/Important Information:      -Still Needed on AVS:

## 2024-03-05 NOTE — PROGRESS NOTES
03/04/24 1800   Group Therapy Session   Group Attendance attended group session   Time Session Began 1600   Time Session Ended 1640   Total Time (minutes) 30   Total # Attendees 1   Group Type recreation   Group Topic Covered leisure exploration/use of leisure time   Group Session Detail TR leisure group   Patient Response/Contribution cooperative with task   Patient Participation Detail Pt participated in a structured Therapeutic Recreation group with a focus on leisure participation, stress reduction, and social engagement via a group game. Pt was unfamiliar with the activity, but didn't need much assistance each turn as he caught on quickly. Pt was sociable with this writer and shared some of his leisure interests.

## 2024-03-06 LAB
GAMMA INTERFERON BACKGROUND BLD IA-ACNC: 0.02 IU/ML
M TB IFN-G BLD-IMP: NEGATIVE
M TB IFN-G CD4+ BCKGRND COR BLD-ACNC: 9.98 IU/ML
MITOGEN IGNF BCKGRD COR BLD-ACNC: 0.01 IU/ML
MITOGEN IGNF BCKGRD COR BLD-ACNC: 0.02 IU/ML
QUANTIFERON MITOGEN: 10 IU/ML
QUANTIFERON NIL TUBE: 0.02 IU/ML
QUANTIFERON TB1 TUBE: 0.03 IU/ML
QUANTIFERON TB2 TUBE: 0.04

## 2024-03-06 PROCEDURE — G0177 OPPS/PHP; TRAIN & EDUC SERV: HCPCS

## 2024-03-06 PROCEDURE — H2032 ACTIVITY THERAPY, PER 15 MIN: HCPCS

## 2024-03-06 PROCEDURE — 124N000002 HC R&B MH UMMC

## 2024-03-06 PROCEDURE — 250N000013 HC RX MED GY IP 250 OP 250 PS 637: Performed by: PSYCHIATRY & NEUROLOGY

## 2024-03-06 PROCEDURE — 250N000013 HC RX MED GY IP 250 OP 250 PS 637

## 2024-03-06 PROCEDURE — 90853 GROUP PSYCHOTHERAPY: CPT

## 2024-03-06 PROCEDURE — 99232 SBSQ HOSP IP/OBS MODERATE 35: CPT | Performed by: PSYCHIATRY & NEUROLOGY

## 2024-03-06 RX ADMIN — PALIPERIDONE 6 MG: 6 TABLET, EXTENDED RELEASE ORAL at 07:52

## 2024-03-06 RX ADMIN — OLANZAPINE 10 MG: 10 TABLET, ORALLY DISINTEGRATING ORAL at 20:02

## 2024-03-06 RX ADMIN — SENNOSIDES AND DOCUSATE SODIUM 1 TABLET: 8.6; 5 TABLET ORAL at 20:02

## 2024-03-06 RX ADMIN — THERA TABS 1 TABLET: TAB at 07:52

## 2024-03-06 RX ADMIN — SENNOSIDES AND DOCUSATE SODIUM 2 TABLET: 8.6; 5 TABLET ORAL at 07:52

## 2024-03-06 ASSESSMENT — ACTIVITIES OF DAILY LIVING (ADL)
ADLS_ACUITY_SCORE: 45
ORAL_HYGIENE: INDEPENDENT
ADLS_ACUITY_SCORE: 45
DRESS: INDEPENDENT
ADLS_ACUITY_SCORE: 45
LAUNDRY: UNABLE TO COMPLETE
ADLS_ACUITY_SCORE: 45
ADLS_ACUITY_SCORE: 45
HYGIENE/GROOMING: INDEPENDENT
ADLS_ACUITY_SCORE: 45

## 2024-03-06 NOTE — CARE PLAN
03/06/24 1627   Group Therapy Session   Group Attendance attended group session   Time Session Began 1415   Time Session Ended 1500   Total Time (minutes) 45   Total # Attendees 3-5   Group Type psychotherapeutic   Group Topic Covered relaxation techniques;emotions/expression;structured socialization   Group Session Detail Process, House tree person assessment, empowerment discussion , mindfulness/ art   Patient Response/Contribution cooperative with task;discussed personal experience with topic;expressed readiness to alter behaviors;offered helpful suggestions to peers;listened actively   Patient Participation Detail mood, ok, missing a friend that overdosed, he wasn thinking of ways he wanted to memorialize him in the future. He and a peer pavan jovanny, writer then toward the end of group, asked them to add a house and person doing something, then we were able to have a house , tree , person, assessment drawing session. his narrative was about thinking aof grandfather and how he and his mom and siblings are estranged from grandfather, which was on his mind.

## 2024-03-06 NOTE — CARE PLAN
"Occupational Therapy     03/06/24 1500   Group Therapy Session   Group Attendance attended group session   Time Session Began 1110   Time Session Ended 1155   Total Time (minutes) 45   Total # Attendees 2   Group Type recreation   Group Topic Covered cognitive activities;coping skills/lifestyle management;leisure exploration/use of leisure time;structured socialization   Group Session Detail OT: Education on physical and intellectual wellness with physical movement (gentle exercise) and interactive social activity (Name 5) to increase concentration, attention to task, symptom management, coping with stress, sharing information about self, listening to others, physical wellness, intellectual wellness, social wellness, and overall well-being   Patient Response/Contribution cooperative with task;listened actively;offered helpful suggestions to peers;organized;other (see comments)  (pleasant; engaged)   Patient Participation Detail Pt reported during check-in he enjoys \"push-ups\" as a way to support his physical wellness. Pt sat with peer to complete presented activity and engaged in social interactions with peer. Pt engaged in all physical movement activities and answered all cognitive questions. Pt verbalized understanding of importance of physical and intellectual wellness in a healthy lifestyle.        "

## 2024-03-06 NOTE — PLAN OF CARE
BEH IP Unit Acuity Rating Score (UARS)  Patient is given one point for every criteria they meet.    CRITERIA SCORING   On a 72 hour hold, court hold, committed, stay of commitment, or revocation. 1    Patient LOS on BEH unit exceeds 20 days. 0  LOS: 16   Patient under guardianship, 55+, otherwise medically complex, or under age 11. 0   Suicide ideation without relief of precipitating factors. 0   Current plan for suicide. 0   Current plan for homicide. 0   Imminent risk or actual attempt to seriously harm another without relief of factors precipitating the attempt. 0   Severe dysfunction in daily living (ex: complete neglect for self care, extreme disruption in vegetative function, extreme deterioration in social interactions). 0   Recent (last 7 days) or current physical aggression in the ED or on unit. 0   Restraints or seclusion episode in past 72 hours. 0   Recent (last 7 days) or current verbal aggression, agitation, yelling, etc., while in the ED or unit. 0   Active psychosis. 1   Need for constant or near constant redirection (from leaving, from others, etc).  0   Intrusive or disruptive behaviors. 0   Patient requires 3 or more hours of individualized nursing care per 8-hour shift (i.e. for ADLs, meds, therapeutic interventions). 0   TOTAL 2

## 2024-03-06 NOTE — PLAN OF CARE
"Goal Outcome Evaluation:    Plan of Care Reviewed With: patient        Patient attended therapy group. Patient  engaged in various activities; played card games ,  and watched movie with peers. Patient presents with sad affect and guarded mood. Patient was minimally engaged with mental health assessment. Patient minimizes signs and symptoms. Patient told the writer \"I'm doing good, nothing is going on\". Patient appears unkept. Patient ate and drank adequate amount of meals and fluids. Patient was med compliant. Patient denies pain. Patient did not request prn's. Vitals were within normal limit.Staff will continue with the current plan of care.            "

## 2024-03-06 NOTE — PLAN OF CARE
Problem: Psychotic Signs/Symptoms  Goal: Improved Behavioral Control (Psychotic Signs/Symptoms)  Outcome: Progressing   Goal Outcome Evaluation:         Patient doing well during this shift, he is calm, restrictive and guarded. Spend most of the time in the dinning area playing card games with other patients and staffs.  Patient seem to be paranoid and disorganized at some point. Speech was soft quiet. Patient is independent of all his cares. Patient denies pain or any discomfort. Denies anxiety and depression. Denies SI/SIB/AVH during this shift. Patient is medication compliant no prn this shift, no stated side effects, meal consumption was adequate, will continue to monitor

## 2024-03-06 NOTE — CARE PLAN
"Occupational Therapy     03/06/24 1400   Group Therapy Session   Group Attendance attended group session   Time Session Began 1015   Time Session Ended 1110   Total Time (minutes) 55   Total # Attendees 6   Group Type expressive therapy;task skill   Group Topic Covered cognitive activities;coping skills/lifestyle management;leisure exploration/use of leisure time;problem-solving;structured socialization   Group Session Detail OT: Education on healthy activity engagement and creative hands-on endeavor (OT clinic) to increase concentration, focus, attention to task/detail, decision making, problem solving, frustration tolerance, task follow through, coping with stress, healthy leisure engagement, creative expression, and social engagement   Patient Response/Contribution cooperative with task;listened actively;other (see comments)  (pleasant; engaged)   Patient Participation Detail Pt reported during check-in that something that is going well is \"I might be discharging soon. Like at the end of the week or next week\" Pt sat among peers to complete familiar creative hands on endeavor and engaged in brief social interactions with peers; lack of socialization may have been due to pt and peer's focus on task. Pt demonstrated increased frustration tolerance as he was able to ignore an intrusive peer. Pt worked in a neat and tidy manner to complete project and reported he was pleased with the progress. Pt cleaned-up all his supplies and workspace.        "

## 2024-03-06 NOTE — CARE PLAN
03/05/24 1900   Group Therapy Session   Group Attendance attended group session   Time Session Began 1600   Time Session Ended 1645   Total Time (minutes) 45   Total # Attendees 4   Group Type expressive therapy   Group Topic Covered emotions/expression   Patient Response/Contribution cooperative with task     Art Therapy directive was to create a 2-D mask expressing strength and/or personal strength using drawing materials of pts choice.  Goals of directive: emotional expression, identifying aspects of self/personal strengths, media exploration.  Pt was an active, engaged participant, focused on task for the full duration of group. Pt completed mask and briefly verbally processed with author and group.  Pt created a panda style mask and described the black and white of the panda as an expression of pt being bi-racial. Pt created a bruise and cut on mask to symbolize pts struggles and a aron in another corner of mask to symbolize hope/growth.  Pts mood was calm, pleasant participant.

## 2024-03-06 NOTE — PLAN OF CARE
"Alertness: Alert and oriented  Affect: brightens upon approach  Mood: calm. He reported feeling \" hyper\" because another patient was hyperactive  Appearance: poor  Thought Process: He stated, \" I feel like I absorb peoples emotions and thoughts, but I don't like to talk about it because I feel like it's just used against me. I'm really not delusional like people think I am, I depress a lot of my thoughts.\"  Anxiety, depression, SI,SIB,HI: denied anxiety,depression, SI,SIB, and HI. He talked about missing his ex girlfriend.  Social: He was social with peers and going to groups  Skin: WNL  Bowel/bladder: WNL, he did report feeling gassy, but denied issues. Also, denied bowel and bladder issues.  Breakfast and lunch: 100%  Vital signs/pain: Vital signs stable and denied pain  Medications: med compliant      "

## 2024-03-06 NOTE — PLAN OF CARE
-Attending Provider: CARISSA Davis CNP  -Voicemail Code: [id number]  -Team Note Due: Tuesday  -Next Steps:     Confirm lab results from [facility] tests for HIV and TB   Send lab results to [facility] and coordinate admission date ASAP after 3/11        Assessment/Intervention/Current Symtoms and Care Coordination:  Chart review and met with team, discussed pt progress, symptomology, and response to treatment.  Discussed the discharge plan and any potential impediments to discharge.  CTC confirmed labs for HIV and TB have been drawn. HIV results in. TB pending.       Discharge Plan or Goal:  Pending further stabilization, continued compliance with medications, continued activities of daily living, and development of a safe discharge plan.   Considerations:     Barriers to Discharge:  Impact of mental health symptoms on well being   Impact of mental health symptoms on activities of daily living  Need for medication monitoring and medication management     Referral Status:       Legal Status:  Commitment with [facility]: [place]  File Number: [id number]  Start: February 22 2024  Expiration: August 29th 2024     Contacts:  Norah ([facility]  through [facility]): [phone]  [email or web address]  [facility] (group home Higinio will be discharging to: [facility] [phone]        Phone: [phone]         FAX: [phone]     Upcoming Meetings and Dates/Important Information:      -Still Needed on AVS:

## 2024-03-06 NOTE — PROVIDER NOTIFICATION
03/06/24 0600   Sleep/Rest   Night Time # Hours 7 hours     Pt had a quiet night with no behavioral or safety concerns. Pt was observed sleeping the entire night, no acute events noted or reported.

## 2024-03-06 NOTE — PROGRESS NOTES
"        Interim History:     Patient seen and chart reviewed. Case discussed in multi-disciplinary treatment team      According to Nursing report:  Patient has been medication compliant and has been appropriate in milieu. He is visible on unit and attends groups. He has had minimal change in mental status recently  and is generally organized.    According to Nursing notes from yesterday:  Patient participated in OT groups. Patient reported feeling sad today. Denied depression. Affect is flat and blunted.  Patient denied SI/SIB/AVH. He made some  delusional remarks during check in. He talked about aliens, rockets, weapons. Stated he saw the sun and moon collide while he was staying at the other place before coming to the hospital. Talked about the angels and witchcraft. Thought process very disorganized. Pressured speech, tangential. He is medication compliant. Patient is eating adequately. Grooming is neglected, shower was encouraged. Bedtime olanzapine dose decreased to 10 mg and Invega was increased to 6 mg daily.   Patient attended therapy group. Patient engaged in various activities; played card games , and watched movie with peers. Patient presents with sad affect and guarded mood. Patient was minimally engaged with mental health assessment. Patient minimizes signs and symptoms. Patient told the writer \"I'm doing good, nothing is going on\". Patient appears unkept. Patient ate and drank adequate amount of meals and fluids. Patient was med compliant. Patient denies pain. Patient did not request prn's. Vitals were within normal limit.Staff will continue with the current plan of care.     According to  :  Legal Status:  Commitment with [facility]: [place]  File Number: [id number]  Start: February 22 2024  Expiration: August 29th 2024         On interview today:  Patient denies suicidal or homicidal thoughts and denies hallucinations.  Patient denies side effects to medications.   Delusions " "were noted by staff yesterday but not revealed on interview and he appears to have some insight about delusions  Patient  is concerned about weight gain and we discussed how this can be avoided and  managed.      ROS: Patient has no other physical complaints today.      Vital signs:  Temp: 97.6  F (36.4  C) Temp src: Oral BP: 116/74 Pulse: 104   Resp: 16 SpO2: 97 % O2 Device: None (Room air)     Weight: 108.5 kg (239 lb 1.6 oz)  Estimated body mass index is 29.12 kg/m  as calculated from the following:    Height as of this encounter: 1.93 m (6' 3.98\").    Weight as of this encounter: 108.5 kg (239 lb 1.6 oz).         MSE:  Appearance:  adequate hygiend  Mood: euthymic  Affect:mood congruent  Suicidal Ideation: PRESENT / ABSENT: absent   Homicidal Ideation: PRESENT / ABSENT: absent   Thought process: circumstantial, tangential, less scattered  Thought content: not revealing delusions on interview this morning   Fund of Knowledge: Average  Attention/Concentration: Poor  Language ability:  Intact, speech is slightly rapid however improving  Memory: Intact  Insight:  limited.  Judgement: limited  Orientation: Yes, x4  Psychomotor Behavior: Normal  Muscle Strength and Tone: MuscleStrength: Normal  Gait and Station: Normal  Minimal change in mental status in the past 24 hours        MEDICATIONS   Medications:  Scheduled Meds:    cholecalciferol  1,250 mcg Oral Q7 Days     multivitamin, therapeutic  1 tablet Oral Daily     paliperidone ER  6 mg Oral Daily    Or     OLANZapine  10 mg Intramuscular Daily     OLANZapine zydis  10 mg Oral At Bedtime    Or     OLANZapine  10 mg Intramuscular At Bedtime     senna-docusate  1-2 tablet Oral BID     Continuous Infusions:  PRN Meds:.acetaminophen, alum & mag hydroxide-simethicone, benztropine, hydrOXYzine HCl, melatonin, nicotine, OLANZapine **OR** OLANZapine, polyethylene glycol    Medication adherence issues: MS Med Adherence Y/N: Yes, patient reports history of noncompliance with " psychiatric medications in the community.  Patient is cooperative with meds in the hospital currently  Medication side effects: MEDICATION SIDE EFFECTS: Patient does not report any side effects to writer today  Benefit: Yes / No: Yes           LABS   Recent Results (from the past 72 hour(s))   HIV Antigen Antibody Combo Cascade    Collection Time: 03/05/24  9:26 AM   Result Value Ref Range    HIV Antigen Antibody Combo Nonreactive Nonreactive   Extra Green Top (Lithium Heparin) Tube    Collection Time: 03/05/24  9:26 AM   Result Value Ref Range    Hold Specimen JIC    Extra Purple Top Tube    Collection Time: 03/05/24  9:26 AM   Result Value Ref Range    Hold Specimen JIC          Provider reviewed results of XR KNEE LEFT 3 VIEWS completed on 2/28/2024. Results indicate: incompletely visualized postoperative changes of IM jhoana and screw fixation of the tibia. No evidence for acute fracture around the knee joint. There is degenerative change within the patellofemoral compartment. No significant effusion.    Vitamin D level noted to be low at 10 ng/mL; supplement ordered 2/22/24 to address vitamin D deficiency.     Latest Reference Range & Units 02/17/24 13:58 02/17/24 15:16 02/18/24 11:07 02/18/24 14:37 02/19/24 02:49 02/20/24 07:44 02/28/24 15:48 02/28/24 18:49   Sodium 135 - 145 mmol/L  140 143     141   Potassium 3.4 - 5.3 mmol/L  3.6 3.8     3.9   Chloride 98 - 107 mmol/L  104 108 (H)     102   Carbon Dioxide (CO2) 22 - 29 mmol/L  26 28     28   Urea Nitrogen 6.0 - 20.0 mg/dL  6.2 8.7     14.9   Creatinine 0.67 - 1.17 mg/dL  0.88 0.94     0.80   GFR Estimate >60 mL/min/1.73m2  >90 >90     >90   Calcium 8.6 - 10.0 mg/dL  9.4 9.2     9.5   Anion Gap 7 - 15 mmol/L  10 7     11   Magnesium 1.7 - 2.3 mg/dL  1.9      2.0   Phosphorus 2.5 - 4.5 mg/dL        4.2   Albumin 3.5 - 5.2 g/dL  4.4 4.1     4.2   Protein Total 6.4 - 8.3 g/dL  7.0 6.5     6.7   Alkaline Phosphatase 40 - 150 U/L  49 44     60   ALT 0 - 70 U/L  6 7      9   AST 0 - 45 U/L  11 13     22   Bilirubin Total <=1.2 mg/dL  1.3 (H) 1.2     0.3   Cholesterol <200 mg/dL      135     Glucose 70 - 99 mg/dL  104 (H) 135 (H)     93   HDL Cholesterol >=40 mg/dL      36 (L)     Hemoglobin A1C <5.7 %      4.8     LDL Cholesterol Calculated <=100 mg/dL      84     Non HDL Cholesterol <130 mg/dL      99     Triglycerides <150 mg/dL      75     TSH 0.30 - 4.20 uIU/mL      2.08     Vitamin D, Total (25-Hydroxy) 20 - 50 ng/mL      10 (L)     WBC 4.0 - 11.0 10e3/uL  5.4      6.1   Hemoglobin 13.3 - 17.7 g/dL  16.9      16.7   Hematocrit 40.0 - 53.0 %  46.5      48.8   Platelet Count 150 - 450 10e3/uL  232      200   RBC Count 4.40 - 5.90 10e6/uL  5.49      5.43   MCV 78 - 100 fL  85      90   MCH 26.5 - 33.0 pg  30.8      30.8   MCHC 31.5 - 36.5 g/dL  36.3      34.2   RDW 10.0 - 15.0 %  11.9      12.3   % Neutrophils %  78      64   % Lymphocytes %  13      22   % Monocytes %  7      9   % Eosinophils %  1      4   % Basophils %  1      1   Absolute Basophils 0.0 - 0.2 10e3/uL  0.0      0.1   Absolute Eosinophils 0.0 - 0.7 10e3/uL  0.1      0.2   Absolute Immature Granulocytes <=0.4 10e3/uL  0.0      0.0   Absolute Lymphocytes 0.8 - 5.3 10e3/uL  0.7 (L)      1.4   Absolute Monocytes 0.0 - 1.3 10e3/uL  0.4      0.5   % Immature Granulocytes %  0      0   Absolute Neutrophils 1.6 - 8.3 10e3/uL  4.2      3.9   Absolute NRBCs 10e3/uL  0.0      0.0   NRBCs per 100 WBC <1 /100  0      0   SARS CoV2 PCR Negative      Negative      Amphetamine Qual Urine Screen Negative  Screen Negative          Fentanyl Qual Urine Screen Negative  Screen Negative          Cocaine Urine Screen Negative  Screen Negative          Benzodiazepine Urine Screen Negative  Screen Negative          Opiates Qualitative Urine Screen Negative  Screen Negative          PCP Urine Screen Negative  Screen Negative          Cannabinoids Qual Urine Screen Negative  Screen Positive !          Barbiturates Qual Urine Screen  "Negative  Screen Negative          XR KNEE LEFT 3 VIEWS        Rpt    EKG 12-LEAD, TRACING ONLY     Rpt       (H): Data is abnormally high  (L): Data is abnormally low  !: Data is abnormal  Rpt: View report in Results Review for more information  No results found for: \"PHENYTOIN\", \"PHENOBARB\", \"VALPROATE\", \"CBMZ\"       DIAGNOSIS   Principal Problem:    Schizophrenia, schizoaffective, chronic with acute exacerbation (H)      Schizoaffective disorder, bipolar type, currently hypomanic  History of schizophrenia  PTSD  R/O OCD    History of generalized anxiety disorder with panic attacks  History of ADHD    Clinically Significant Risk Factors                         # Overweight: Estimated body mass index is 29.12 kg/m  as calculated from the following:    Height as of this encounter: 1.93 m (6' 3.98\").    Weight as of this encounter: 108.5 kg (239 lb 1.6 oz).   # Moderate Malnutrition: based on nutrition assessment          Active Problem List:  Patient Active Problem List   Diagnosis     Irritable bowel syndrome     Class 2 severe obesity due to excess calories with serious comorbidity and body mass index (BMI) of 37.0 to 37.9 in adult (H)     Patellofemoral instability of left knee with pain     Tobacco abuse     Suicidal ideation     Moderate episode of recurrent major depressive disorder (H)     CARSON (obstructive sleep apnea)     Unspecified psychosis not due to a substance or known physiological condition (H)     Marijuana use     Schizophrenia, unspecified type (H)     Schizophrenia, schizoaffective, chronic with acute exacerbation (H)          HOSPITAL COURSE AND ASSESSMENT      This is a 33 year old male with history of schizophrenia, MDD, suicidal ideation, and PTSD who presented to [facility] ED on 2/17/2024 via EMS for psychiatric evaluation.  [facility] experiencing symptoms of psychosis including disorganized, paranoid, and delusional thoughts.  " Patient also exhibiting poor p.o. intake and not attending to ADLs such as bathing.  Patient has a history of Patellofemoral instability of left knee and requires a brace to stabilize.  Per chart review patient was not taking any psychiatric medications prior to admission.  Patient endorsed some cannabis use and urine drug screening negative for all except cannabinoids.  While admitted to the ED, psychiatry provider was consulted and initiated olanzapine to target psychosis symptoms.  Patient was also started on a trial of Anafranil to target OCD symptoms.  Patient was evaluated by medical provider in the ED and determined to be medically stable.  Patient subsequently admitted to inpatient psychiatry unit 10 [address] for further psychiatric stabilization.  Legal status at time of admission was: 72-hour hold.  Olanzapine started in the ED to target psychosis symptoms and continued during psychiatric hospitalization.  Psychiatric provider initiated petition for mental health commitment with Mg on 2/20/2024.  Trial of Anafranil initiated in the ED to target OCD symptoms however this was discontinued due to risk for worsening psychosis symptoms.  On 3/1/2024 court documents received supporting mental health commitment with Holliday order in [facility]. Holliday order includes medications: Zyprexa (olanzapine), Haldol (haloperidol), Invega (paliperidone), and Seroquel (quetiapine). Per chart review, patient previously tolerated paliperidone (Invega) medication well and this medication was effective for treating psychosis and mood disorder symptoms however Invega p.o. was not covered by insurance and subsequently was discontinued.  Pharmacy liaison consult completed during current hospitalization and prior auth completed for health insurance coverage of Invega p.o. tablets.  Invega Sustenna long-acting injection also covered by health insurance.  Plan will be to continue olanzapine 15 mg p.o. at bedtime for now with plan to  titrate down in upcoming days with initiation of medication paliperidone.  Provider initiated Holliday medication paliperidone (Invega) 24-hour tablet 1.5 mg PO daily with backup IM olanzapine 10 mg on 3/1/2024.   Plan will be to continue paliperidone (Invega) p.o. and titrate to therapeutic dose while monitoring for side effects.  If patient tolerating paliperidone p.o. plan will be to consider initiating long-acting injection Invega Sustenna prior to discharge due to patient's history of noncompliance with psychiatric medications as well as patient reporting directly to writer that he will be noncompliant with psychiatric medications in the community.  Plan will be for patient to discharge to crisis facility housing.  Unit CTC facilitating discharge plan.    Patient appears to be improving on current regimen and is voicing less delusions today. He is tolerating Invega so far.  PLAN      1. Ongoing education given regarding diagnostic and treatment options with risks, benefits and alternatives and adequate verbalization of understanding.  2.  Medications:  Increase paliperidone (Invega) 24-hour tablet to 6.0 mg PO daily to target psychosis symptoms and mood disorder symptoms with backup olanzapine 10 mg IM injection.  Patient has Holliday order for this medication and must take.  Offer p.o. paliperidone (Invega) first, if patient refuses then administer IM olanzapine.  Modify order of olanzapine ODT tablet 10 mg PO at bedtime to target psychosis symptoms and mood disorder symptoms, to include backup IM olanzapine 10 mg.  Patient now has a [place] order for this medication and must take.  Offer p.o. olanzapine first.  If patient refuses then administer IM olanzapine.  Plan will be to titrate down on olanzapine with dose increases of paliperidone.  Continue multivitamin 1 tab PO daily  Continue cholecalciferol (vitamin D3) capsule 1250 mcg PO every 7 days for 8 doses to target vitamin D deficiency, administer immediately  after supper  PRN benztropine tablet 0.5 mg PO2 times daily as needed to target side effect of movement disorder  PRN hydroxyzine tablet 25 mg PO every 4 hours as needed for anxiety  PRN olanzapine tablet 10 mg PO every 3 hours as needed for agitation, ordered linked with olanzapine IM injection  PRN melatonin tablet 3 mg PO at bedtime as needed for sleep  PRN nicotine lozenge 2 mg buccal every 1 hour as needed for nicotine withdrawal symptoms    cholecalciferol  1,250 mcg Oral Q7 Days     multivitamin, therapeutic  1 tablet Oral Daily     paliperidone ER  6 mg Oral Daily    Or     OLANZapine  10 mg Intramuscular Daily     OLANZapine zydis  10 mg Oral At Bedtime    Or     OLANZapine  10 mg Intramuscular At Bedtime     senna-docusate  1-2 tablet Oral BID       3.  Labs/Imaging:  -CMP and CBC completed on 2/28/2024                 4. Consults:  -Orthopedic consult placed 2/28/2024 to address left knee subluxation    5. Precautions:  -Self-injury precautions, suicide precautions; risk moderate    6. Legal:  - Committment    7. Discharge planning:  -IRTS vs other residential option    No further change in treatment plan  Patient seen, chart reviewed, case reviewed with  and with nursing.   Case reviewed in multi-disciplinary treatment team.    [facility] Invega injection    Continue  further taper of Zyprexa, and anticipate FABIANO Invega soon    Byron Calderon MD

## 2024-03-07 PROCEDURE — 250N000013 HC RX MED GY IP 250 OP 250 PS 637: Performed by: PSYCHIATRY & NEUROLOGY

## 2024-03-07 PROCEDURE — 99232 SBSQ HOSP IP/OBS MODERATE 35: CPT | Performed by: PSYCHIATRY & NEUROLOGY

## 2024-03-07 PROCEDURE — 124N000002 HC R&B MH UMMC

## 2024-03-07 PROCEDURE — G0177 OPPS/PHP; TRAIN & EDUC SERV: HCPCS

## 2024-03-07 PROCEDURE — 250N000013 HC RX MED GY IP 250 OP 250 PS 637

## 2024-03-07 RX ORDER — OLANZAPINE 10 MG/2ML
10 INJECTION, POWDER, FOR SOLUTION INTRAMUSCULAR AT BEDTIME
Status: DISCONTINUED | OUTPATIENT
Start: 2024-03-07 | End: 2024-03-08

## 2024-03-07 RX ADMIN — PALIPERIDONE 6 MG: 6 TABLET, EXTENDED RELEASE ORAL at 07:50

## 2024-03-07 RX ADMIN — Medication 1250 MCG: at 18:22

## 2024-03-07 RX ADMIN — THERA TABS 1 TABLET: TAB at 07:50

## 2024-03-07 RX ADMIN — OLANZAPINE 7.5 MG: 5 TABLET, ORALLY DISINTEGRATING ORAL at 21:30

## 2024-03-07 RX ADMIN — SENNOSIDES AND DOCUSATE SODIUM 2 TABLET: 8.6; 5 TABLET ORAL at 07:50

## 2024-03-07 RX ADMIN — SENNOSIDES AND DOCUSATE SODIUM 2 TABLET: 8.6; 5 TABLET ORAL at 21:30

## 2024-03-07 ASSESSMENT — ACTIVITIES OF DAILY LIVING (ADL)
ADLS_ACUITY_SCORE: 45
LAUNDRY: UNABLE TO COMPLETE
ADLS_ACUITY_SCORE: 45
DRESS: INDEPENDENT
ADLS_ACUITY_SCORE: 45
ORAL_HYGIENE: INDEPENDENT
ADLS_ACUITY_SCORE: 45
HYGIENE/GROOMING: INDEPENDENT
HYGIENE/GROOMING: INDEPENDENT
ADLS_ACUITY_SCORE: 45
LAUNDRY: UNABLE TO COMPLETE
DRESS: INDEPENDENT
ADLS_ACUITY_SCORE: 45
ORAL_HYGIENE: INDEPENDENT
ADLS_ACUITY_SCORE: 45

## 2024-03-07 NOTE — CARE PLAN
03/07/24 1500   Group Therapy Session   Group Attendance attended group session   Time Session Began 1330   Time Session Ended 1400   Total Time (minutes) 30   Total # Attendees 2   Group Type life skill;expressive therapy   Group Topic Covered coping skills/lifestyle management;emotions/expression   Group Session Detail Iceberg activity   Patient Response/Contribution cooperative with task   Patient Participation Detail Pt was cooperative completing worksheet that went a long with topic.  Once it was completed, he was sharing what he had written down.  Much of the conversation was off topic, which was exacerbated by a peer having delusional thoughts as well. Pt asked off topic questions and it was difficukt to follow his thought process as he shared.     Pt will continue to be encouraged to attend groups for further asssesssment and to address goals identified on plan of care.

## 2024-03-07 NOTE — CARE PLAN
Pt was an enthusiastic participant in dance/movement therapy (D/MT) using both verbal and nonverbal group therapy processes. Pt both offered and received peer support.  He stood to dance in rhythmic synchrony with others, despite stating he feels more comfortable singing than dancing.  He did sing throughout the session and appeared to connect the music with his movement as well.  He expressed gratitude for the session.       03/06/24 3766   Expressive Therapy   Therapy Type dance/movement   Minutes of Treatment 55

## 2024-03-07 NOTE — CARE PLAN
03/07/24 1200   Group Therapy Session   Group Attendance attended group session   Time Session Began 1015   Time Session Ended 1145   Total Time (minutes) 90   Total # Attendees 4-7   Group Type task skill;expressive therapy   Group Topic Covered problem-solving;emotions/expression;coping skills/lifestyle management   Group Session Detail OT CLinic   Patient Response/Contribution cooperative with task;offered helpful suggestions to peers;organized   Patient Participation Detail See Note     Pt actively participated in occupational therapy clinic to facilitate coping skill exploration, creative expression within personally meaningful activities, and clinical observation of social, cognitive, and kinesthetic performance skills. Pt response: Independent to initiate, gather materials, sequence, and adjust to workspace demands as needed. Demonstrated good focus, planning, and problem solving for selected painting/canvas task. Able to ask for assistance as needed, and appropriately social with peers and staff. Pt will continue to be encouraged to attend groups for further asssesssment and to address goals identified on plan of care.

## 2024-03-07 NOTE — PROGRESS NOTES
03/07/24 1541   Group Therapy Session   Group Attendance attended group session   Time Session Began 1415   Time Session Ended 1500   Total Time (minutes) 45   Total # Attendees 3   Group Type psychoeducation;psychotherapeutic   Group Topic Covered coping skills/lifestyle management;cognitive therapy techniques;emotions/expression   Group Session Detail Group members checked in with how they are feeling, a success for the day, and verbal processing as needed. The group then discussed common cognitive distortions and what they mean and how they can manifest. Group members then found one that they find they would like to work on and the group discussed ways to challenge cognitive distortions.   Patient Response/Contribution able to recall/repeat info presented;cooperative with task;discussed personal experience with topic;expressed understanding of topic;listened actively   Patient Participation Detail Pt attended group. Pt checked in as feeling angry but did not elaborate. Pt was engage in the conversation but at times looked distracted. Pt was engaged with another peer who he knows outside of the hospital. There was some reminsicing between the two but pt was appropriate with the content of the conversation.

## 2024-03-07 NOTE — CARE PLAN
03/06/24 1900   Group Therapy Session   Group Attendance attended group session   Time Session Began 1615   Time Session Ended 1700   Total Time (minutes) 45   Total # Attendees 4   Group Type expressive therapy   Group Topic Covered emotions/expression   Patient Response/Contribution cooperative with task     Art Therapy directive is to create art in response to a chosen personal intention for the day and week ahead. Pt were given a choice of drawing materials and handout of personal intention ideas.  Goals of directive: mindfulness, emotional regulation, identifying personal strengths and goals, media exploration.  Pt was an engaged participant, focused on drawing for the full duration of group. Pt created two drawings using lines, shapes and colors in response to pts chosen intentions of: growth and peace. Pt was an active participant in group discussion regarding trust in personal relationships. Pts mood was calm.

## 2024-03-07 NOTE — PLAN OF CARE
BEH IP Unit Acuity Rating Score (UARS)  Patient is given one point for every criteria they meet.    CRITERIA SCORING   On a 72 hour hold, court hold, committed, stay of commitment, or revocation. 1    Patient LOS on BEH unit exceeds 20 days. 0  LOS: 17   Patient under guardianship, 55+, otherwise medically complex, or under age 11. 0   Suicide ideation without relief of precipitating factors. 0   Current plan for suicide. 0   Current plan for homicide. 0   Imminent risk or actual attempt to seriously harm another without relief of factors precipitating the attempt. 0   Severe dysfunction in daily living (ex: complete neglect for self care, extreme disruption in vegetative function, extreme deterioration in social interactions). 0   Recent (last 7 days) or current physical aggression in the ED or on unit. 0   Restraints or seclusion episode in past 72 hours. 0   Recent (last 7 days) or current verbal aggression, agitation, yelling, etc., while in the ED or unit. 0   Active psychosis. 1   Need for constant or near constant redirection (from leaving, from others, etc).  0   Intrusive or disruptive behaviors. 0   Patient requires 3 or more hours of individualized nursing care per 8-hour shift (i.e. for ADLs, meds, therapeutic interventions). 0   TOTAL 2

## 2024-03-07 NOTE — CARE PLAN
Pt slept for 6.75 hours. No behavior or safety concern noted during the shift. No PRN administered.Will continue to monitor.

## 2024-03-07 NOTE — PLAN OF CARE
-Attending Provider: CARISSA Davis CNP  -Voicemail Code: [phone]#  -Team Note Due: Tuesday  -Next Steps:     CTC is waiting to here back from [facility] regarding admission date   Set up with psychiatry    Draft PD and request signature from [facility]. Sent to  [facility] with change of status form.       Assessment/Intervention/Current Symtoms and Care Coordination:  Chart review and met with team, discussed pt progress, symptomology, and response to treatment.  Discussed the discharge plan and any potential impediments to discharge.  [facility] sent lab results to [facility] via email.   [facility] called and confirmed email was received. [facility] was informed that the team would review the labs and follow up with [facility]. [facility] later received an email stating they would like to move forward with admission, and they also asked for HepC and Covid Labs. [facility] sent them covid labs and told them [facility] has not completed hepC labs. [facility] requested info on how they would like meds sent at discharge and whether or not they have a psych provider that they like residents to use while staying in their homes.       Discharge Plan or Goal:  Pending further stabilization, continued compliance with medications, continued activities of daily living, and development of a safe discharge plan.   Considerations:     Barriers to Discharge:  Impact of mental health symptoms on well being   Impact of mental health symptoms on activities of daily living  Need for medication monitoring and medication management     Referral Status:       Legal Status:  Commitment with [facility]: [place]  File Number: [id number]  Start: February 22 2024  Expiration: August 29th 2024     Contacts:  Norah IvoryCADI  through [facility]): [phone]  [email or web address]  [facility] (group home [facility] will be discharging to: Rev. Carlton [phone]        Phone: [phone]         FAX: [phone]  [email or web address]       Upcoming Meetings and Dates/Important Information:      -Still Needed on AVS:

## 2024-03-07 NOTE — PLAN OF CARE
Alertness: alert and oriented   Affect: bright  Mood: calm  Appearance: WNL  Thought Process: denied hallucinations, but occasionally made   Anxiety, depression, SI,SIB,HI: denied MHS  Social: he was social with staff and going to group.  Skin: WNL  Bowel/bladder: denied bowel and bladder issues  Breakfast and lunch: 100%  Vital signs/pain: VS stable and denied pain  Medications: med compliant

## 2024-03-07 NOTE — PROGRESS NOTES
"        Interim History:     Patient seen and chart reviewed. Case discussed in multi-disciplinary treatment team      According to Nursing report:  Patient has been calm and cooperative. He has not been showing psychosis. He attends groups.    According to Nursing notes from yesterday:  Affect: brightens upon approach  Mood: calm. He reported feeling \" hyper\" because another patient was hyperactive  Appearance: poor  Thought Process: He stated, \" I feel like I absorb peoples emotions and thoughts, but I don't like to talk about it because I feel like it's just used against me. I'm really not delusional like people think I am, I depress a lot of my thoughts.\"  Anxiety, depression, SI,SIB,HI: denied anxiety,depression, SI,SIB, and HI. He talked about missing his ex girlfriend.  Social: He was social with peers and going to groups    According to  :  Legal Status:  Commitment with [facility]: [place]  File Number: [id number]  Start: February 22 2024  Expiration: August 29th 2024         On interview today:  Patient denies suicidal or homicidal thoughts and denies hallucinations. Patient is not revealing delusions on interview. Patient denies side effects to medications.     Patient  is concerned about weight gain and we discussed how this can be avoided and  managed.      ROS: Patient has no other physical complaints today.        Vital signs:  Temp: 98.3  F (36.8  C) Temp src: Oral BP: 117/74 Pulse: 110   Resp: 16 SpO2: 98 % O2 Device: None (Room air)     Weight: 108.5 kg (239 lb 1.6 oz)  Estimated body mass index is 29.12 kg/m  as calculated from the following:    Height as of this encounter: 1.93 m (6' 3.98\").    Weight as of this encounter: 108.5 kg (239 lb 1.6 oz).         MSE:  Appearance:  adequate hygiend  Mood: euthymic  Affect:mood congruent  Suicidal Ideation: PRESENT / ABSENT: absent   Homicidal Ideation: PRESENT / ABSENT: absent   Thought process: circumstantial, tangential, less " scattered  Thought content: not revealing delusions on interview this morning   Fund of Knowledge: Average  Attention/Concentration: Poor  Language ability:  Intact, speech is slightly rapid however improving  Memory: Intact  Insight:  limited.  Judgement: limited  Orientation: Yes, x4  Psychomotor Behavior: Normal  Muscle Strength and Tone: MuscleStrength: Normal  Gait and Station: Normal  Minimal change in mental status in the past 24 hours          MEDICATIONS   Medications:  Scheduled Meds:    cholecalciferol  1,250 mcg Oral Q7 Days     multivitamin, therapeutic  1 tablet Oral Daily     paliperidone ER  6 mg Oral Daily    Or     OLANZapine  10 mg Intramuscular Daily     OLANZapine zydis  10 mg Oral At Bedtime    Or     OLANZapine  10 mg Intramuscular At Bedtime     senna-docusate  1-2 tablet Oral BID     Continuous Infusions:  PRN Meds:.acetaminophen, alum & mag hydroxide-simethicone, benztropine, hydrOXYzine HCl, melatonin, nicotine, OLANZapine **OR** OLANZapine, polyethylene glycol    Medication adherence issues: MS Med Adherence Y/N: Yes, patient reports history of noncompliance with psychiatric medications in the community.  Patient is cooperative with meds in the hospital currently  Medication side effects: MEDICATION SIDE EFFECTS: Patient does not report any side effects to writer today  Benefit: Yes / No: Yes           LABS   Recent Results (from the past 72 hour(s))   HIV Antigen Antibody Combo Cascade    Collection Time: 03/05/24  9:26 AM   Result Value Ref Range    HIV Antigen Antibody Combo Nonreactive Nonreactive   Quantiferon TB Gold Plus Grey Tube    Collection Time: 03/05/24  9:26 AM    Specimen: Arm, Right; Blood   Result Value Ref Range    Quantiferon Nil Tube 0.02 IU/mL   Quantiferon TB Gold Plus Green Tube    Collection Time: 03/05/24  9:26 AM    Specimen: Arm, Right; Blood   Result Value Ref Range    Quantiferon TB1 Tube 0.03 IU/mL   Quantiferon TB Gold Plus Yellow Tube    Collection Time:  03/05/24  9:26 AM    Specimen: Arm, Right; Blood   Result Value Ref Range    Quantiferon TB2 Tube 0.04    Quantiferon TB Gold Plus Purple Tube    Collection Time: 03/05/24  9:26 AM    Specimen: Arm, Right; Blood   Result Value Ref Range    Quantiferon Mitogen 10.00 IU/mL   Extra Green Top (Lithium Heparin) Tube    Collection Time: 03/05/24  9:26 AM   Result Value Ref Range    Hold Specimen JIC    Extra Purple Top Tube    Collection Time: 03/05/24  9:26 AM   Result Value Ref Range    Hold Specimen JIC    Quantiferon TB Gold Plus    Collection Time: 03/05/24  9:26 AM    Specimen: Arm, Right; Blood   Result Value Ref Range    Quantiferon-TB Gold Plus Negative Negative    TB1 Ag minus Nil Value 0.01 IU/mL    TB2 Ag minus Nil Value 0.02 IU/mL    Mitogen minus Nil Result 9.98 IU/mL    Nil Result 0.02 IU/mL         Provider reviewed results of XR KNEE LEFT 3 VIEWS completed on 2/28/2024. Results indicate: incompletely visualized postoperative changes of IM jhoana and screw fixation of the tibia. No evidence for acute fracture around the knee joint. There is degenerative change within the patellofemoral compartment. No significant effusion.    Vitamin D level noted to be low at 10 ng/mL; supplement ordered 2/22/24 to address vitamin D deficiency.     Latest Reference Range & Units 02/17/24 13:58 02/17/24 15:16 02/18/24 11:07 02/18/24 14:37 02/19/24 02:49 02/20/24 07:44 02/28/24 15:48 02/28/24 18:49   Sodium 135 - 145 mmol/L  140 143     141   Potassium 3.4 - 5.3 mmol/L  3.6 3.8     3.9   Chloride 98 - 107 mmol/L  104 108 (H)     102   Carbon Dioxide (CO2) 22 - 29 mmol/L  26 28 28   Urea Nitrogen 6.0 - 20.0 mg/dL  6.2 8.7     14.9   Creatinine 0.67 - 1.17 mg/dL  0.88 0.94     0.80   GFR Estimate >60 mL/min/1.73m2  >90 >90     >90   Calcium 8.6 - 10.0 mg/dL  9.4 9.2     9.5   Anion Gap 7 - 15 mmol/L  10 7     11   Magnesium 1.7 - 2.3 mg/dL  1.9      2.0   Phosphorus 2.5 - 4.5 mg/dL        4.2   Albumin 3.5 - 5.2 g/dL  4.4  4.1     4.2   Protein Total 6.4 - 8.3 g/dL  7.0 6.5     6.7   Alkaline Phosphatase 40 - 150 U/L  49 44     60   ALT 0 - 70 U/L  6 7     9   AST 0 - 45 U/L  11 13     22   Bilirubin Total <=1.2 mg/dL  1.3 (H) 1.2     0.3   Cholesterol <200 mg/dL      135     Glucose 70 - 99 mg/dL  104 (H) 135 (H)     93   HDL Cholesterol >=40 mg/dL      36 (L)     Hemoglobin A1C <5.7 %      4.8     LDL Cholesterol Calculated <=100 mg/dL      84     Non HDL Cholesterol <130 mg/dL      99     Triglycerides <150 mg/dL      75     TSH 0.30 - 4.20 uIU/mL      2.08     Vitamin D, Total (25-Hydroxy) 20 - 50 ng/mL      10 (L)     WBC 4.0 - 11.0 10e3/uL  5.4      6.1   Hemoglobin 13.3 - 17.7 g/dL  16.9      16.7   Hematocrit 40.0 - 53.0 %  46.5      48.8   Platelet Count 150 - 450 10e3/uL  232      200   RBC Count 4.40 - 5.90 10e6/uL  5.49      5.43   MCV 78 - 100 fL  85      90   MCH 26.5 - 33.0 pg  30.8      30.8   MCHC 31.5 - 36.5 g/dL  36.3      34.2   RDW 10.0 - 15.0 %  11.9      12.3   % Neutrophils %  78      64   % Lymphocytes %  13      22   % Monocytes %  7      9   % Eosinophils %  1      4   % Basophils %  1      1   Absolute Basophils 0.0 - 0.2 10e3/uL  0.0      0.1   Absolute Eosinophils 0.0 - 0.7 10e3/uL  0.1      0.2   Absolute Immature Granulocytes <=0.4 10e3/uL  0.0      0.0   Absolute Lymphocytes 0.8 - 5.3 10e3/uL  0.7 (L)      1.4   Absolute Monocytes 0.0 - 1.3 10e3/uL  0.4      0.5   % Immature Granulocytes %  0      0   Absolute Neutrophils 1.6 - 8.3 10e3/uL  4.2      3.9   Absolute NRBCs 10e3/uL  0.0      0.0   NRBCs per 100 WBC <1 /100  0      0   SARS CoV2 PCR Negative      Negative      Amphetamine Qual Urine Screen Negative  Screen Negative          Fentanyl Qual Urine Screen Negative  Screen Negative          Cocaine Urine Screen Negative  Screen Negative          Benzodiazepine Urine Screen Negative  Screen Negative          Opiates Qualitative Urine Screen Negative  Screen Negative          PCP Urine Screen  "Negative  Screen Negative          Cannabinoids Qual Urine Screen Negative  Screen Positive !          Barbiturates Qual Urine Screen Negative  Screen Negative          XR KNEE LEFT 3 VIEWS        Rpt    EKG 12-LEAD, TRACING ONLY     Rpt       (H): Data is abnormally high  (L): Data is abnormally low  !: Data is abnormal  Rpt: View report in Results Review for more information  No results found for: \"PHENYTOIN\", \"PHENOBARB\", \"VALPROATE\", \"CBMZ\"       DIAGNOSIS   Principal Problem:    Schizophrenia, schizoaffective, chronic with acute exacerbation (H)      Schizoaffective disorder, bipolar type, currently hypomanic  History of schizophrenia  PTSD  R/O OCD    History of generalized anxiety disorder with panic attacks  History of ADHD    Clinically Significant Risk Factors                         # Overweight: Estimated body mass index is 29.12 kg/m  as calculated from the following:    Height as of this encounter: 1.93 m (6' 3.98\").    Weight as of this encounter: 108.5 kg (239 lb 1.6 oz).   # Moderate Malnutrition: based on nutrition assessment          Active Problem List:  Patient Active Problem List   Diagnosis     Irritable bowel syndrome     Class 2 severe obesity due to excess calories with serious comorbidity and body mass index (BMI) of 37.0 to 37.9 in adult (H)     Patellofemoral instability of left knee with pain     Tobacco abuse     Suicidal ideation     Moderate episode of recurrent major depressive disorder (H)     CARSON (obstructive sleep apnea)     Unspecified psychosis not due to a substance or known physiological condition (H)     Marijuana use     Schizophrenia, unspecified type (H)     Schizophrenia, schizoaffective, chronic with acute exacerbation (H)          HOSPITAL COURSE AND ASSESSMENT      This is a 33 year old male with history of schizophrenia, MDD, suicidal ideation, and PTSD who presented to [facility] ED on 2/17/2024 via EMS for psychiatric evaluation.  Patient [facility] health [facility] and was experiencing symptoms of psychosis including disorganized, paranoid, and delusional thoughts.  Patient also exhibiting poor p.o. intake and not attending to ADLs such as bathing.  Patient has a history of Patellofemoral instability of left knee and requires a brace to stabilize.  Per chart review patient was not taking any psychiatric medications prior to admission.  Patient endorsed some cannabis use and urine drug screening negative for all except cannabinoids.  While admitted to the ED, psychiatry provider was consulted and initiated olanzapine to target psychosis symptoms.  Patient was also started on a trial of Anafranil to target OCD symptoms.  Patient was evaluated by medical provider in the ED and determined to be medically stable.  Patient subsequently admitted to inpatient psychiatry unit 10 [address] for further psychiatric stabilization.  Legal status at time of admission was: 72-hour hold.  Olanzapine started in the ED to target psychosis symptoms and continued during psychiatric hospitalization.  Psychiatric provider initiated petition for mental health commitment with Mg on 2/20/2024.  Trial of Anafranil initiated in the ED to target OCD symptoms however this was discontinued due to risk for worsening psychosis symptoms.  On 3/1/2024 court documents received supporting mental health commitment with Holliday order in [facility]. Holliday order includes medications: Zyprexa (olanzapine), Haldol (haloperidol), Invega (paliperidone), and Seroquel (quetiapine). Per chart review, patient previously tolerated paliperidone (Invega) medication well and this medication was effective for treating psychosis and mood disorder symptoms however Invega p.o. was not covered by insurance and subsequently was discontinued.  Pharmacy liaison consult completed during current hospitalization and prior auth completed for health insurance coverage of Invega p.o. tablets.  Invega Sustenna  long-acting injection also covered by health insurance.  Plan will be to continue olanzapine 15 mg p.o. at bedtime for now with plan to titrate down in upcoming days with initiation of medication paliperidone.  Provider initiated Holliday medication paliperidone (Invega) 24-hour tablet 1.5 mg PO daily with backup IM olanzapine 10 mg on 3/1/2024.   Plan will be to continue paliperidone (Invega) p.o. and titrate to therapeutic dose while monitoring for side effects.  If patient tolerating paliperidone p.o. plan will be to consider initiating long-acting injection Invega Sustenna prior to discharge due to patient's history of noncompliance with psychiatric medications as well as patient reporting directly to writer that he will be noncompliant with psychiatric medications in the community.  Plan will be for patient to discharge to crisis facility housing.  Unit CTC facilitating discharge plan.    Patient appears to be improving on current regimen and is voicing less delusions today. He is tolerating Invega so far.  PLAN      1. Ongoing education given regarding diagnostic and treatment options with risks, benefits and alternatives and adequate verbalization of understanding.  2.  Medications:  Increase paliperidone (Invega) 24-hour tablet to 6.0 mg PO daily to target psychosis symptoms and mood disorder symptoms with backup olanzapine 10 mg IM injection.  Patient has Holliday order for this medication and must take.  Offer p.o. paliperidone (Invega) first, if patient refuses then administer IM olanzapine.  Decrease olanzapine ODT tablet to 7.5  mg PO at bedtime to target psychosis symptoms and mood disorder symptoms, to include backup IM olanzapine 10 mg.  Patient now has a [place] order for this medication and must take.  Offer p.o. olanzapine first.  If patient refuses then administer IM olanzapine.  Plan will be to continue  to titrate down on olanzapine with dose increases of paliperidone.  Continue multivitamin 1 tab  PO daily  Continue cholecalciferol (vitamin D3) capsule 1250 mcg PO every 7 days for 8 doses to target vitamin D deficiency, administer immediately after supper  PRN benztropine tablet 0.5 mg PO2 times daily as needed to target side effect of movement disorder  PRN hydroxyzine tablet 25 mg PO every 4 hours as needed for anxiety  PRN olanzapine tablet 10 mg PO every 3 hours as needed for agitation, ordered linked with olanzapine IM injection  PRN melatonin tablet 3 mg PO at bedtime as needed for sleep  PRN nicotine lozenge 2 mg buccal every 1 hour as needed for nicotine withdrawal symptoms    cholecalciferol  1,250 mcg Oral Q7 Days     multivitamin, therapeutic  1 tablet Oral Daily     OLANZapine zydis  7.5 mg Oral At Bedtime    Or     OLANZapine  10 mg Intramuscular At Bedtime     paliperidone ER  6 mg Oral Daily    Or     OLANZapine  10 mg Intramuscular Daily     senna-docusate  1-2 tablet Oral BID       3.  Labs/Imaging:  -CMP and CBC completed on 2/28/2024                 4. Consults:  -Orthopedic consult placed 2/28/2024 to address left knee subluxation    5. Precautions:  -Self-injury precautions, suicide precautions; risk moderate    6. Legal:  - Committment    7. Discharge planning:  -IRTS vs other residential option    No further change in treatment plan  Patient seen, chart reviewed, case reviewed with  and with nursing.   Case reviewed in multi-disciplinary treatment team.      Discharge next week to [facility] after his first Invega injection    Continue  further taper of Zyprexa, and anticipate FABIANO Invega soon    Byron Calderon MD

## 2024-03-08 LAB — HCV AB SERPL QL IA: NONREACTIVE

## 2024-03-08 PROCEDURE — 124N000002 HC R&B MH UMMC

## 2024-03-08 PROCEDURE — 250N000013 HC RX MED GY IP 250 OP 250 PS 637: Performed by: PSYCHIATRY & NEUROLOGY

## 2024-03-08 PROCEDURE — G0177 OPPS/PHP; TRAIN & EDUC SERV: HCPCS

## 2024-03-08 PROCEDURE — 99232 SBSQ HOSP IP/OBS MODERATE 35: CPT | Performed by: PSYCHIATRY & NEUROLOGY

## 2024-03-08 PROCEDURE — 90853 GROUP PSYCHOTHERAPY: CPT

## 2024-03-08 PROCEDURE — 250N000013 HC RX MED GY IP 250 OP 250 PS 637

## 2024-03-08 RX ORDER — OLANZAPINE 5 MG/1
5 TABLET, ORALLY DISINTEGRATING ORAL AT BEDTIME
Status: DISCONTINUED | OUTPATIENT
Start: 2024-03-08 | End: 2024-03-12

## 2024-03-08 RX ORDER — OLANZAPINE 10 MG/2ML
10 INJECTION, POWDER, FOR SOLUTION INTRAMUSCULAR AT BEDTIME
Status: DISCONTINUED | OUTPATIENT
Start: 2024-03-08 | End: 2024-03-12

## 2024-03-08 RX ADMIN — SENNOSIDES AND DOCUSATE SODIUM 1 TABLET: 8.6; 5 TABLET ORAL at 08:34

## 2024-03-08 RX ADMIN — OLANZAPINE 5 MG: 5 TABLET, ORALLY DISINTEGRATING ORAL at 20:07

## 2024-03-08 RX ADMIN — Medication 3 MG: at 21:33

## 2024-03-08 RX ADMIN — SENNOSIDES AND DOCUSATE SODIUM 2 TABLET: 8.6; 5 TABLET ORAL at 20:07

## 2024-03-08 RX ADMIN — PALIPERIDONE 6 MG: 6 TABLET, EXTENDED RELEASE ORAL at 08:33

## 2024-03-08 RX ADMIN — THERA TABS 1 TABLET: TAB at 08:34

## 2024-03-08 ASSESSMENT — ACTIVITIES OF DAILY LIVING (ADL)
HYGIENE/GROOMING: INDEPENDENT
DRESS: INDEPENDENT
ADLS_ACUITY_SCORE: 45
ORAL_HYGIENE: INDEPENDENT
ADLS_ACUITY_SCORE: 45
ORAL_HYGIENE: INDEPENDENT
ADLS_ACUITY_SCORE: 45
HYGIENE/GROOMING: INDEPENDENT
LAUNDRY: UNABLE TO COMPLETE
ADLS_ACUITY_SCORE: 45
DRESS: INDEPENDENT
ADLS_ACUITY_SCORE: 45

## 2024-03-08 NOTE — PLAN OF CARE
BEH IP Unit Acuity Rating Score (UARS)  Patient is given one point for every criteria they meet.    CRITERIA SCORING   On a 72 hour hold, court hold, committed, stay of commitment, or revocation. 1    Patient LOS on BEH unit exceeds 20 days. 0  LOS: 18   Patient under guardianship, 55+, otherwise medically complex, or under age 11. 0   Suicide ideation without relief of precipitating factors. 0   Current plan for suicide. 0   Current plan for homicide. 0   Imminent risk or actual attempt to seriously harm another without relief of factors precipitating the attempt. 0   Severe dysfunction in daily living (ex: complete neglect for self care, extreme disruption in vegetative function, extreme deterioration in social interactions). 0   Recent (last 7 days) or current physical aggression in the ED or on unit. 0   Restraints or seclusion episode in past 72 hours. 0   Recent (last 7 days) or current verbal aggression, agitation, yelling, etc., while in the ED or unit. 0   Active psychosis. 1   Need for constant or near constant redirection (from leaving, from others, etc).  0   Intrusive or disruptive behaviors. 0   Patient requires 3 or more hours of individualized nursing care per 8-hour shift (i.e. for ADLs, meds, therapeutic interventions). 0   TOTAL 2

## 2024-03-08 NOTE — CARE PLAN
03/07/24 1821   Group Therapy Session   Group Attendance attended group session   Time Session Began 1600   Time Session Ended 1655   Total Time (minutes) 55   Total # Attendees 3   Group Type life skill   Patient Participation Detail General health and coping group with a discussion focused on various mental health topics, including mental health management, social situations, healthy support systems, healthy mind/body, and activities of daily living. Pt Response: Pt responded to prompts thoughtfully and insightfully. Demonstrated active listening. Shared his appreciation for group upon closure.

## 2024-03-08 NOTE — CARE PLAN
03/08/24 1345   Group Therapy Session   Group Attendance attended group session   Time Session Began 1015   Time Session Ended 1145   Total Time (minutes) 90   Total # Attendees 3-4   Group Type task skill;expressive therapy   Group Topic Covered problem-solving;coping skills/lifestyle management;emotions/expression   Group Session Detail OT Clinic   Patient Response/Contribution cooperative with task   Patient Participation Detail See Note     Pt actively participated in occupational therapy clinic to facilitate coping skill exploration, creative expression within personally meaningful activities, and clinical observation of social, cognitive, and kinesthetic performance skills. Pt response: Independent to initiate, gather materials, sequence, and adjust to workspace demands as needed. Demonstrated fair focus, planning, and problem solving for selected wood project. Able to ask for assistance as needed, and appropriately social with peers and staff. Pt will continue to be encouraged to attend groups for further asssesssment and to address goals identified on plan of care.

## 2024-03-08 NOTE — CARE PLAN
03/08/24 1600   Group Therapy Session   Group Attendance attended group session   Time Session Began 1300   Time Session Ended 1415   Total Time (minutes) 45   Total # Attendees 3   Group Type life skill   Group Topic Covered balanced lifestyle;coping skills/lifestyle management   Group Session Detail Relaxation   Patient Response/Contribution cooperative with task   Patient Participation Detail See Note     Pt independently attended relaxation group today and was cooperative.  Pt demonstrated a fair understanding of the topic covered however some limited insight into their own specific issues related to topic.  Pt was  social with peers and staff as appropriate. Pt's affect was appropriate to group and attention span was fair.  Pt will continue to be encouraged to attend groups for ongoing assessment and to work toward goals identified on plan of care.

## 2024-03-08 NOTE — PLAN OF CARE
Pt has a full range affect with calm mood attending groups. Pt reports having frequent awakening during the night but stated it was easy to get back asleep. Pt rates anxiety at 0/10 and depression 0/10. Pt rates pain at 0/10. Pt reports no SI/HI/SIB and contracts for safety. Pt denies any hallucinations and not noted responding to any internal stimuli. Pt was medication compliant. No reported or observed medication side effects. Pt reported concern over his 30lb weight gain since admission. Pt states it is from the zyprexa and that med is being weaned down. Encouraged pt to pick healthy choices for meal selections. Pt was visible on unit and social with peers playing games. Continue current POC.      Plan of Care Reviewed With: patient    Overall Patient Progress: no changeOverall Patient Progress: no change

## 2024-03-08 NOTE — PLAN OF CARE
Goal Outcome Evaluation:    Plan of Care Reviewed With: patient       Patient presents with a full range affect, he is social and interactive with peers. [facility], played cards with peers, he also attended groups this shift. He describes his mood as good, patient told writer that he gets triggered by other peers behavior on the unit but he tries very hard to control himself and that has worked well with his stay. He denies SI/SIB/HI and AVH. He denies feeling anxious or depressed, denies having any pain this shift. He reports eating and drinking adequately, denies having any issues with his bowel and bladder. He is medication compliant, VS stable to continue with POC.

## 2024-03-08 NOTE — PROGRESS NOTES
Interim History:     Patient seen and chart reviewed. Case discussed in multi-disciplinary treatment team      According to Nursing notes from yesterday:  Alertness: alert and oriented   Affect: bright  Mood: calm  Appearance: WNL  Thought Process: denied hallucinations, but occasionally made   Anxiety, depression, SI,SIB,HI: denied MHS  Social: he was social with staff and going to group.  Skin: WNL  Bowel/bladder: denied bowel and bladder issues  Breakfast and lunch: 100%  Vital signs/pain: VS stable and denied pain  Medications: med compliant   Patient presents with a full range affect, he is social and interactive with peers. He [place] [facility] [place] played cards with peers, he also attended groups this shift. He describes his mood as good, patient told writer that he gets triggered by other peers behavior on the unit but he tries very hard to control himself and that has worked well with his stay. He denies SI/SIB/HI and AVH. He denies feeling anxious or depressed, denies having any pain this shift. He reports eating and drinking adequately, denies having any issues with his bowel and bladder. He is medication compliant, VS stable to continue with POC.         According to  :  Legal Status:  Commitment with [facility]: [place]  File Number: [id number]  Start: February 22 2024  Expiration: August 29th 2024         On interview today:  Patient denies suicidal or homicidal thoughts and denies hallucinations. Patient is not revealing delusions on interview, but has some unrealistic thinking. Patient denies side effects to medications.     Patient  is concerned about weight gain and we discussed how this can be avoided and  managed.      ROS: Patient has no other physical complaints today.        Vital signs:  Temp: 98.4  F (36.9  C) Temp src: Oral BP: 131/82 Pulse: 94   Resp: 14 SpO2: 98 % O2 Device: None (Room air)     Weight: 108.5 kg (239 lb 1.6 oz)  Estimated body mass  "index is 29.12 kg/m  as calculated from the following:    Height as of this encounter: 1.93 m (6' 3.98\").    Weight as of this encounter: 108.5 kg (239 lb 1.6 oz).         MSE:  Appearance:  adequate hygiend  Mood: euthymic  Affect:mood congruent  Suicidal Ideation: PRESENT / ABSENT: absent   Homicidal Ideation: PRESENT / ABSENT: absent   Thought process: circumstantial, tangential, less scattered  Thought content: not revealing delusions on interview this morning   Fund of Knowledge: Average  Attention/Concentration: Poor  Language ability:  Intact, speech is slightly rapid however improving  Memory: Intact  Insight:  limited.  Judgement: limited  Orientation: Yes, x4  Psychomotor Behavior: Normal  Muscle Strength and Tone: MuscleStrength: Normal  Gait and Station: Normal  Minimal change in mental status in the past 24 hours            MEDICATIONS   Medications:  Scheduled Meds:    cholecalciferol  1,250 mcg Oral Q7 Days     multivitamin, therapeutic  1 tablet Oral Daily     OLANZapine zydis  7.5 mg Oral At Bedtime    Or     OLANZapine  10 mg Intramuscular At Bedtime     paliperidone ER  6 mg Oral Daily    Or     OLANZapine  10 mg Intramuscular Daily     senna-docusate  1-2 tablet Oral BID     Continuous Infusions:  PRN Meds:.acetaminophen, alum & mag hydroxide-simethicone, benztropine, hydrOXYzine HCl, melatonin, nicotine, OLANZapine **OR** OLANZapine, polyethylene glycol    Medication adherence issues: MS Med Adherence Y/N: Yes, patient reports history of noncompliance with psychiatric medications in the community.  Patient is cooperative with meds in the hospital currently  Medication side effects: MEDICATION SIDE EFFECTS: Patient does not report any side effects to writer today  Benefit: Yes / No: Yes           LABS   Recent Results (from the past 72 hour(s))   HIV Antigen Antibody Combo Cascade    Collection Time: 03/05/24  9:26 AM   Result Value Ref Range    HIV Antigen Antibody Combo Nonreactive Nonreactive "   Quantiferon TB Gold Plus Grey Tube    Collection Time: 03/05/24  9:26 AM    Specimen: Arm, Right; Blood   Result Value Ref Range    Quantiferon Nil Tube 0.02 IU/mL   Quantiferon TB Gold Plus Green Tube    Collection Time: 03/05/24  9:26 AM    Specimen: Arm, Right; Blood   Result Value Ref Range    Quantiferon TB1 Tube 0.03 IU/mL   Quantiferon TB Gold Plus Yellow Tube    Collection Time: 03/05/24  9:26 AM    Specimen: Arm, Right; Blood   Result Value Ref Range    Quantiferon TB2 Tube 0.04    Quantiferon TB Gold Plus Purple Tube    Collection Time: 03/05/24  9:26 AM    Specimen: Arm, Right; Blood   Result Value Ref Range    Quantiferon Mitogen 10.00 IU/mL   Extra Green Top (Lithium Heparin) Tube    Collection Time: 03/05/24  9:26 AM   Result Value Ref Range    Hold Specimen JIC    Extra Purple Top Tube    Collection Time: 03/05/24  9:26 AM   Result Value Ref Range    Hold Specimen JIC    Quantiferon TB Gold Plus    Collection Time: 03/05/24  9:26 AM    Specimen: Arm, Right; Blood   Result Value Ref Range    Quantiferon-TB Gold Plus Negative Negative    TB1 Ag minus Nil Value 0.01 IU/mL    TB2 Ag minus Nil Value 0.02 IU/mL    Mitogen minus Nil Result 9.98 IU/mL    Nil Result 0.02 IU/mL         Provider reviewed results of XR KNEE LEFT 3 VIEWS completed on 2/28/2024. Results indicate: incompletely visualized postoperative changes of IM jhoana and screw fixation of the tibia. No evidence for acute fracture around the knee joint. There is degenerative change within the patellofemoral compartment. No significant effusion.    Vitamin D level noted to be low at 10 ng/mL; supplement ordered 2/22/24 to address vitamin D deficiency.     Latest Reference Range & Units 02/17/24 13:58 02/17/24 15:16 02/18/24 11:07 02/18/24 14:37 02/19/24 02:49 02/20/24 07:44 02/28/24 15:48 02/28/24 18:49   Sodium 135 - 145 mmol/L  140 143     141   Potassium 3.4 - 5.3 mmol/L  3.6 3.8     3.9   Chloride 98 - 107 mmol/L  104 108 (H)     102   Carbon  Dioxide (CO2) 22 - 29 mmol/L  26 28     28   Urea Nitrogen 6.0 - 20.0 mg/dL  6.2 8.7     14.9   Creatinine 0.67 - 1.17 mg/dL  0.88 0.94     0.80   GFR Estimate >60 mL/min/1.73m2  >90 >90     >90   Calcium 8.6 - 10.0 mg/dL  9.4 9.2     9.5   Anion Gap 7 - 15 mmol/L  10 7     11   Magnesium 1.7 - 2.3 mg/dL  1.9      2.0   Phosphorus 2.5 - 4.5 mg/dL        4.2   Albumin 3.5 - 5.2 g/dL  4.4 4.1     4.2   Protein Total 6.4 - 8.3 g/dL  7.0 6.5     6.7   Alkaline Phosphatase 40 - 150 U/L  49 44     60   ALT 0 - 70 U/L  6 7     9   AST 0 - 45 U/L  11 13     22   Bilirubin Total <=1.2 mg/dL  1.3 (H) 1.2     0.3   Cholesterol <200 mg/dL      135     Glucose 70 - 99 mg/dL  104 (H) 135 (H)     93   HDL Cholesterol >=40 mg/dL      36 (L)     Hemoglobin A1C <5.7 %      4.8     LDL Cholesterol Calculated <=100 mg/dL      84     Non HDL Cholesterol <130 mg/dL      99     Triglycerides <150 mg/dL      75     TSH 0.30 - 4.20 uIU/mL      2.08     Vitamin D, Total (25-Hydroxy) 20 - 50 ng/mL      10 (L)     WBC 4.0 - 11.0 10e3/uL  5.4      6.1   Hemoglobin 13.3 - 17.7 g/dL  16.9      16.7   Hematocrit 40.0 - 53.0 %  46.5      48.8   Platelet Count 150 - 450 10e3/uL  232      200   RBC Count 4.40 - 5.90 10e6/uL  5.49      5.43   MCV 78 - 100 fL  85      90   MCH 26.5 - 33.0 pg  30.8      30.8   MCHC 31.5 - 36.5 g/dL  36.3      34.2   RDW 10.0 - 15.0 %  11.9      12.3   % Neutrophils %  78      64   % Lymphocytes %  13      22   % Monocytes %  7      9   % Eosinophils %  1      4   % Basophils %  1      1   Absolute Basophils 0.0 - 0.2 10e3/uL  0.0      0.1   Absolute Eosinophils 0.0 - 0.7 10e3/uL  0.1      0.2   Absolute Immature Granulocytes <=0.4 10e3/uL  0.0      0.0   Absolute Lymphocytes 0.8 - 5.3 10e3/uL  0.7 (L)      1.4   Absolute Monocytes 0.0 - 1.3 10e3/uL  0.4      0.5   % Immature Granulocytes %  0      0   Absolute Neutrophils 1.6 - 8.3 10e3/uL  4.2      3.9   Absolute NRBCs 10e3/uL  0.0      0.0   NRBCs per 100 WBC <1 /100   "0      0   SARS CoV2 PCR Negative      Negative      Amphetamine Qual Urine Screen Negative  Screen Negative          Fentanyl Qual Urine Screen Negative  Screen Negative          Cocaine Urine Screen Negative  Screen Negative          Benzodiazepine Urine Screen Negative  Screen Negative          Opiates Qualitative Urine Screen Negative  Screen Negative          PCP Urine Screen Negative  Screen Negative          Cannabinoids Qual Urine Screen Negative  Screen Positive !          Barbiturates Qual Urine Screen Negative  Screen Negative          XR KNEE LEFT 3 VIEWS        Rpt    EKG 12-LEAD, TRACING ONLY     Rpt       (H): Data is abnormally high  (L): Data is abnormally low  !: Data is abnormal  Rpt: View report in Results Review for more information  No results found for: \"PHENYTOIN\", \"PHENOBARB\", \"VALPROATE\", \"CBMZ\"       DIAGNOSIS   Principal Problem:    Schizophrenia, schizoaffective, chronic with acute exacerbation (H)      Schizoaffective disorder, bipolar type, currently hypomanic  History of schizophrenia  PTSD  R/O OCD    History of generalized anxiety disorder with panic attacks  History of ADHD    Clinically Significant Risk Factors                         # Overweight: Estimated body mass index is 29.12 kg/m  as calculated from the following:    Height as of this encounter: 1.93 m (6' 3.98\").    Weight as of this encounter: 108.5 kg (239 lb 1.6 oz).   # Moderate Malnutrition: based on nutrition assessment          Active Problem List:  Patient Active Problem List   Diagnosis     Irritable bowel syndrome     Class 2 severe obesity due to excess calories with serious comorbidity and body mass index (BMI) of 37.0 to 37.9 in adult (H)     Patellofemoral instability of left knee with pain     Tobacco abuse     Suicidal ideation     Moderate episode of recurrent major depressive disorder (H)     CARSON (obstructive sleep apnea)     Unspecified psychosis not due to a substance or known physiological condition " (H)     Marijuana use     Schizophrenia, unspecified type (H)     Schizophrenia, schizoaffective, chronic with acute exacerbation (H)          HOSPITAL COURSE AND ASSESSMENT      This is a 33 year old male with history of schizophrenia, MDD, suicidal ideation, and PTSD who presented to [facility] ED on 2/17/2024 via EMS for psychiatric evaluation.  Patient was at [facility] and was experiencing symptoms of psychosis including disorganized, paranoid, and delusional thoughts.  Patient also exhibiting poor p.o. intake and not attending to ADLs such as bathing.  Patient has a history of Patellofemoral instability of left knee and requires a brace to stabilize.  Per chart review patient was not taking any psychiatric medications prior to admission.  Patient endorsed some cannabis use and urine drug screening negative for all except cannabinoids.  While admitted to the ED, psychiatry provider was consulted and initiated olanzapine to target psychosis symptoms.  Patient was also started on a trial of Anafranil to target OCD symptoms.  Patient was evaluated by medical provider in the ED and determined to be medically stable.  Patient subsequently admitted to inpatient psychiatry unit 10 [address] for further psychiatric stabilization.  Legal status at time of admission was: 72-hour hold.  Olanzapine started in the ED to target psychosis symptoms and continued during psychiatric hospitalization.  Psychiatric provider initiated petition for mental health commitment with [facility] on 2/20/2024.  Trial of Anafranil initiated in the ED to target OCD symptoms however this was discontinued due to risk for worsening psychosis symptoms.  On 3/1/2024 court documents received supporting mental health commitment with [facility] order in [facility]. [facility] order includes medications: Zyprexa (olanzapine), Haldol (haloperidol), Invega (paliperidone), and Seroquel (quetiapine). Per chart review, patient previously  tolerated paliperidone (Invega) medication well and this medication was effective for treating psychosis and mood disorder symptoms however Invega p.o. was not covered by insurance and subsequently was discontinued.  Pharmacy liaison consult completed during current hospitalization and prior auth completed for health insurance coverage of Invega p.o. tablets.  Invega Sustenna long-acting injection also covered by health insurance.  Plan will be to continue olanzapine 15 mg p.o. at bedtime for now with plan to titrate down in upcoming days with initiation of medication paliperidone.  Provider initiated [facility] medication paliperidone (Invega) 24-hour tablet 1.5 mg PO daily with backup IM olanzapine 10 mg on 3/1/2024.   Plan will be to continue paliperidone (Invega) p.o. and titrate to therapeutic dose while monitoring for side effects.  If patient tolerating paliperidone p.o. plan will be to consider initiating long-acting injection Invega Sustenna prior to discharge due to patient's history of noncompliance with psychiatric medications as well as patient reporting directly to writer that he will be noncompliant with psychiatric medications in the community.  Plan will be for patient to discharge to crisis facility housing.  Unit CTC facilitating discharge plan.    Patient appears to be improving on current regimen and is voicing less delusions today. He is tolerating Invega so far.  PLAN      1. Ongoing education given regarding diagnostic and treatment options with risks, benefits and alternatives and adequate verbalization of understanding.  2.  Medications:  Increase paliperidone (Invega) 24-hour tablet to 6.0 mg PO daily to target psychosis symptoms and mood disorder symptoms with backup olanzapine 10 mg IM injection.  Patient has [facility] order for this medication and must take.  Offer p.o. paliperidone (Invega) first, if patient refuses then administer IM olanzapine.  Decrease olanzapine ODT tablet to 5.0  mg PO  at bedtime to target psychosis symptoms and mood disorder symptoms, to include backup IM olanzapine 10 mg.  Patient now has a [facility] order for this medication and must take.  Offer p.o. olanzapine first.  If patient refuses then administer IM olanzapine.  Plan will be to continue  to titrate down on olanzapine with dose increases of paliperidone.  Continue multivitamin 1 tab PO daily  Continue cholecalciferol (vitamin D3) capsule 1250 mcg PO every 7 days for 8 doses to target vitamin D deficiency, administer immediately after supper  PRN benztropine tablet 0.5 mg PO2 times daily as needed to target side effect of movement disorder  PRN hydroxyzine tablet 25 mg PO every 4 hours as needed for anxiety  PRN olanzapine tablet 10 mg PO every 3 hours as needed for agitation, ordered linked with olanzapine IM injection  PRN melatonin tablet 3 mg PO at bedtime as needed for sleep  PRN nicotine lozenge 2 mg buccal every 1 hour as needed for nicotine withdrawal symptoms    cholecalciferol  1,250 mcg Oral Q7 Days     multivitamin, therapeutic  1 tablet Oral Daily     OLANZapine zydis  7.5 mg Oral At Bedtime    Or     OLANZapine  10 mg Intramuscular At Bedtime     paliperidone ER  6 mg Oral Daily    Or     OLANZapine  10 mg Intramuscular Daily     senna-docusate  1-2 tablet Oral BID       3.  Labs/Imaging:  -CMP and CBC completed on 2/28/2024                 4. Consults:  -Orthopedic consult placed 2/28/2024 to address left knee subluxation    5. Precautions:  -Self-injury precautions, suicide precautions; risk moderate    6. Legal:  - Committment    7. Discharge planning:  -IRTS vs other residential option    No further change in treatment plan  Patient seen, chart reviewed, case reviewed with  and with nursing.   Case reviewed in multi-disciplinary treatment team.      Discharge next week to [facility] after his first Invega injection    Continue further taper of Zyprexa, and anticipate FABIANO Invega  soon    Byron Calderon MD

## 2024-03-08 NOTE — PLAN OF CARE
-Attending Provider: CAIRSSA Davis CNP  -Voicemail Code: [id number]  -Team Note Due: Tuesday  -Next Steps:     CTC is waiting to here back from [facility] regarding admission date   Set up with psychiatry    [place] and request signature from TCM. Sent to  [facility] with change of status form.       Assessment/Intervention/Current Symtoms and Care Coordination:  Chart review and met with team, discussed pt progress, symptomology, and response to treatment.  Discussed the discharge plan and any potential impediments to discharge.  [facility] received email from [facility] confirming Higinio can move in end of week next. [facility] requested that Higinio also complete a HepC test as well. [facility] confirmed that Higinio can be discharged with 30 day supply of medications, and [facility] requested signed med orders upon discharge.   [facility] coordinated with charge nurse to request lab testing for hep c.   [facility] coordinated meeting with Higinio's Peer  on unit due to them showing up unannounced.     Discharge Plan or Goal:  Pending further stabilization, continued compliance with medications, continued activities of daily living, and development of a safe discharge plan.   Considerations:     Barriers to Discharge:  Impact of mental health symptoms on well being   Impact of mental health symptoms on activities of daily living  Need for medication monitoring and medication management     Referral Status:       Legal Status:  Commitment with [facility]: [place]  File Number: [id number]  Start: February 22 2024  Expiration: August 29th 2024     Contacts:  Norah (CADI  through [facility] [place] [facility]): [phone]  [email or web address]  [facility] (group home Higinio will be discharging to: Rev. Carlton [phone]        Phone: [phone]         FAX: [phone] [email or web address]       Upcoming Meetings and Dates/Important Information:      -Still  Needed on AVS:

## 2024-03-08 NOTE — PLAN OF CARE
Goal Outcome Evaluation:       Pt appeared asleep for 7 hours. No behavioral and medical concerns noted and reported. Remains 15 minutes safety check. Will continue POC.

## 2024-03-09 PROCEDURE — 124N000002 HC R&B MH UMMC

## 2024-03-09 PROCEDURE — 250N000013 HC RX MED GY IP 250 OP 250 PS 637: Performed by: PSYCHIATRY & NEUROLOGY

## 2024-03-09 PROCEDURE — 250N000013 HC RX MED GY IP 250 OP 250 PS 637

## 2024-03-09 RX ADMIN — Medication 3 MG: at 21:34

## 2024-03-09 RX ADMIN — THERA TABS 1 TABLET: TAB at 07:58

## 2024-03-09 RX ADMIN — OLANZAPINE 5 MG: 5 TABLET, ORALLY DISINTEGRATING ORAL at 20:01

## 2024-03-09 RX ADMIN — SENNOSIDES AND DOCUSATE SODIUM 1 TABLET: 8.6; 5 TABLET ORAL at 07:58

## 2024-03-09 RX ADMIN — PALIPERIDONE 6 MG: 6 TABLET, EXTENDED RELEASE ORAL at 07:58

## 2024-03-09 RX ADMIN — SENNOSIDES AND DOCUSATE SODIUM 2 TABLET: 8.6; 5 TABLET ORAL at 20:01

## 2024-03-09 ASSESSMENT — ACTIVITIES OF DAILY LIVING (ADL)
ORAL_HYGIENE: INDEPENDENT
ADLS_ACUITY_SCORE: 45
ORAL_HYGIENE: INDEPENDENT
ADLS_ACUITY_SCORE: 45
DRESS: INDEPENDENT
ADLS_ACUITY_SCORE: 45
HYGIENE/GROOMING: INDEPENDENT
HYGIENE/GROOMING: INDEPENDENT
ADLS_ACUITY_SCORE: 45
ADLS_ACUITY_SCORE: 45
LAUNDRY: WITH SUPERVISION
ADLS_ACUITY_SCORE: 45
ADLS_ACUITY_SCORE: 45
DRESS: INDEPENDENT
ADLS_ACUITY_SCORE: 45

## 2024-03-09 NOTE — PROGRESS NOTES
03/08/24 1834   Group Therapy Session   Group Attendance attended group session   Time Session Began 1615   Time Session Ended 1700   Total Time (minutes) 45   Total # Attendees 7   Group Type psychoeducation;psychotherapeutic   Group Topic Covered coping skills/lifestyle management;emotions/expression   Group Session Detail Group members checked in with how they are feeling and a success for the day. The group discussed utilizing music as a therapeutic tool to explore emotions, expression, and promote relaxation. Writer discussed the significance of music in emotional regulation and coping strategies. Group members shared personal experiences and associations with songs they chose.   Patient Response/Contribution able to recall/repeat info presented;cooperative with task;discussed personal experience with topic;expressed understanding of topic;listened actively;organized   Patient Participation Detail Pt checked in feeling hopefull. Pt enjoyed the music, often singing along with it, and chatted with a peer sitting near. A different peer approached him to say something then the peer did a kick in the air and walked away. Pt said that he was ok and it didn't bother him.

## 2024-03-09 NOTE — CARE PLAN
Occupational Therapy Group Note:     03/09/24 1541   Group Therapy Session   Group Attendance attended group session   Time Session Began 1015   Time Session Ended 1100   Total Time (minutes) 35 (no charge)   Total # Attendees 3   Group Type recreation   Group Topic Covered leisure exploration/use of leisure time;structured socialization   Group Session Detail OT Leisure Group: Jenga and Sequence   Patient Response/Contribution confronted peers appropriately;cooperative with task;listened actively;offered helpful suggestions to peers   Patient Participation Detail Patient actively engaged in a therapeutic leisure activity in order to promote: cognitive skills (attention, planning, sequencing), leisure exploration, and structured socialization. Patient entered group late. Patient presented with a blunted affect and calm/cooperative mood. Patient remained attentive and engaged for majority of group. Patient demonstrated the ability to successfully recall instructions of a familiar task. Patient assisted with explaining rules and objective of game to peers appropriately. Patient confirmed enjoyment of activity at conclusion of group. Patient engaged in prosocial reciprocal interaction with peers. No safety or behavioral concerns observed in group.

## 2024-03-09 NOTE — PLAN OF CARE
Problem: Adult Inpatient Plan of Care  Goal: Plan of Care Review  Description: The Plan of Care Review/Shift note should be completed every shift.  The Outcome Evaluation is a brief statement about your assessment that the patient is improving, declining, or no change.  This information will be displayed automatically on your shift  note.  Outcome: Progressing   Goal Outcome Evaluation:    Plan of Care Reviewed With: patient      Pt had a decent shift. Attended the unit group. Socialized with peers. Did not request any prn medication. Pt did complain of swollen leg after sitting for about 3-4 hours playing cards. Provided ice and encouraged to keep leg elevated. Pt is guarded. Denies SI/HI/AVH. Denies anxiety and depression. Pt is denies any problem with eating or sleeping. Denies bowel issue.   Plan: Continue to provide safe environment and therapeutic milieu.

## 2024-03-09 NOTE — PLAN OF CARE
"Individual Therapy Note      Date of Service: March 8, 2024    Patient: Higinio goes by \"Higinio,\" uses he/him pronouns    Individuals Present: Zuleyka Angulo [facility]    Session start: 1515  Session end: 1600  Session duration in minutes: 45    Patient Active Problem List   Diagnosis    Irritable bowel syndrome    Class 2 severe obesity due to excess calories with serious comorbidity and body mass index (BMI) of 37.0 to 37.9 in adult (H)    Patellofemoral instability of left knee with pain    Tobacco abuse    Suicidal ideation    Moderate episode of recurrent major depressive disorder (H)    CARSON (obstructive sleep apnea)    Unspecified psychosis not due to a substance or known physiological condition (H)    Marijuana use    Schizophrenia, unspecified type (H)    Schizophrenia, schizoaffective, chronic with acute exacerbation (H)         Modality Used:Person Centered, Brief Therapy, and Solution Focused    Goals: Verbal processing    Patient Description of current symptoms: feeling overwhelmed with old grief coming up     Mental Status Exam:   Attitude: cooperative  Eye Contact: good  Mood: anxious and better  Affect: mood congruent  Speech: clear, coherent  Psychomotor Behavior: no evidence of tardive dyskinesia, dystonia, or tics  Thought Process:  linear  Associations: loosening of associations present  Thought Content: no evidence of suicidal ideation or homicidal ideation, no evidence of psychotic thought, no auditory hallucinations present, and no visual hallucinations present  Insight: fair  Judgement: fair  Attention Span and Concentration: intact    Pt progress: Met pt in interview room. Pt shared that he enjoyed getting the visitors he had earlier. Pt also shared that he has been remembering things b/c a peer that is on the unit is someone he knows and pt shared that the things that pt says have some truth to them. Pt shared that the peer had saved his life. Pt shared some of his thoughts around [place] and how [place] way has made it hard for him to process what has gone on in the last couple of years. Pt shared of some grief over the loss of his ex-girlfriend, some childhood trauma, the relationships he has with his family, and his thoughts around his dx. Pt did endorse hearing voices before but that has been resolved. Pt shared his had a bad experience with meds before so he gets skeptical about meds, but the meds he is on now seems to have less side effects. 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 Pt endorsed using marijuana in the past and got some benefit from it. Writer provided education around the use of THC and the effects it can have on mental health. Pt explained that he doesn't use often anymore and knows what kinds are ok to use and not ues.    Writer asked how he felt having shared things with writer that he stated he hasn't told many people about. Pt expressed feeling nervous about it but it did feel good to talk to someone about it. Writer encourage pt to continue with therapy after discharge to continue to have someone to process with. Pt mentioned that he has a therapist he likes and will try to get set up with her after discharge.     Pt did share some supernatural type of experiences he had as well as some ideas and thoughts with loose associations. Pt would often try to qualify that he wasn't sure how true they were or if they were real.     Treatment Objective(s) Addressed:   The focus of this session was on processing feelings related to trauma and past and identifying additional supports     Progress Towards Goals and Assessment of Patient:   Patient is making progress towards treatment goals as evidenced by increased awareness.       Therapeutic Intervention(s):   Provided active listening, unconditional positive regard, and validation. Engaged in guided  discovery, explored patient's perspectives and helped expand them through socratic dialogue. Provided positive reinforcement for progress towards goals, gains in knowledge, and application of skills previously taught.     Plan/next step: Continue to engage in unit programming, check in with therapist as needed.     06166 - Psychotherapy (with patient) - 45 (38-52*) min

## 2024-03-09 NOTE — PLAN OF CARE
"Pt has a full range affect with calm mood attending groups. Pt was was religiously preoccupied today. Pt talked about the bible directing his thoughts. Pt also stated GF threatened to kill herself if \"I left and went back to [place]\". Pt also stated \"I absorb others personalities and emotions\". Pt rates anxiety at 0/10 and depression 0/10. Pt rates pain at 0/10. Pt reports no SI/HI/SIB and contracts for safety. Pt denies any hallucinations and not noted responding to any internal stimuli. Pt was medication compliant. No reported or observed medication side effects. Pt showered this morning. Pt was visible on unit and social with peers playing games. Continue current POC     Plan of Care Reviewed With: patient Plan of Care Reviewed With: patient    Overall Patient Progress: no changeOverall Patient Progress: no change           "

## 2024-03-10 PROCEDURE — 250N000013 HC RX MED GY IP 250 OP 250 PS 637: Performed by: PSYCHIATRY & NEUROLOGY

## 2024-03-10 PROCEDURE — 99222 1ST HOSP IP/OBS MODERATE 55: CPT

## 2024-03-10 PROCEDURE — 124N000002 HC R&B MH UMMC

## 2024-03-10 PROCEDURE — 250N000013 HC RX MED GY IP 250 OP 250 PS 637

## 2024-03-10 RX ADMIN — ACETAMINOPHEN 650 MG: 325 TABLET, FILM COATED ORAL at 21:02

## 2024-03-10 RX ADMIN — PALIPERIDONE 6 MG: 6 TABLET, EXTENDED RELEASE ORAL at 08:18

## 2024-03-10 RX ADMIN — SENNOSIDES AND DOCUSATE SODIUM 1 TABLET: 8.6; 5 TABLET ORAL at 08:18

## 2024-03-10 RX ADMIN — THERA TABS 1 TABLET: TAB at 08:18

## 2024-03-10 RX ADMIN — SENNOSIDES AND DOCUSATE SODIUM 2 TABLET: 8.6; 5 TABLET ORAL at 19:28

## 2024-03-10 RX ADMIN — OLANZAPINE 5 MG: 5 TABLET, ORALLY DISINTEGRATING ORAL at 19:28

## 2024-03-10 ASSESSMENT — ACTIVITIES OF DAILY LIVING (ADL)
ADLS_ACUITY_SCORE: 45
ADLS_ACUITY_SCORE: 45
DRESS: INDEPENDENT
ADLS_ACUITY_SCORE: 45
HYGIENE/GROOMING: INDEPENDENT
ADLS_ACUITY_SCORE: 45
ADLS_ACUITY_SCORE: 45
HYGIENE/GROOMING: INDEPENDENT
ADLS_ACUITY_SCORE: 45
ADLS_ACUITY_SCORE: 45
LAUNDRY: WITH SUPERVISION
ADLS_ACUITY_SCORE: 45
ORAL_HYGIENE: INDEPENDENT
DRESS: INDEPENDENT
ORAL_HYGIENE: INDEPENDENT
ADLS_ACUITY_SCORE: 45

## 2024-03-10 NOTE — CONSULTS
"[facility]  Consult Note - Hospitalist Service  Date of Admission:  2/17/2024  Consult Requested by: CARISSA Coe, CNP  Reason for Consult: \"Pt has calf swelling and pain, possible DVT\"    Assessment & Plan   Higinio Wallace is a 33 year old male admitted on 2/17/2024. He has a past medical history of schizophrenia, MDD c/b suicidal ideation, schizophrenia, PTSD who was admitted to station [facility] after presenting to the ED from his IRTS program for evaluation of psychiatric decompensation (delusions, paranoia, hallucinations, disorganized thinking). He remains admitted for psychiatric monitoring and management. Medicine was consulted this AM out of concern for DVT d/t calf swelling and pain.    Bilateral Lower Extremity Edema  Weight Gain  Pt notes onset of BLE edema 2-3 days ago. However, even prior to this, patient expresses concern w/ his nearly 30lb weight gain since admission (review of objective data shows he was 211 lbs on 2/15/24 and as of 3/5/24 was 239 lbs). As far as his BLE go, he denies any focal calf pain, and Homans' sign negative on exam, no palpable cord, less suspicion of DVT. BLE edema is symmetric and no e/o cellulitis (no erythema or open wounds) to suggest infectious etiology for swelling. At this point, suspect BLE edema is a manifestation of his rapid weight gain following initiation of dual-antipsychotic therapy (started on Zyprexa 2/17, and Paliperidone 3/1). Per UpToDate, Paliperidone has an incidence of generalized edema of <2%, but has been observed to have weight gain incidence of at least 7% from baseline in adult patients. This weight gain, in Paliperidone specifically, is typically rapid in onset following initiation of the medication. As for Zyprexa, the incidence of peripheral edema is ~3% and weight gain has incidence reported as between 3-6% but as high as 64%.   - Recommend Primary Team downtitrate on Zyprexa and/or Paliperidone " "to smallest dose needed for clinical benefit  - D/w pt who agrees to trial compression stockings   - Only to wear during the day, and remove at night (lotion legs once stockings removed for the day to avoid dry skin [can use Aquaphor or lotion on floor available to patients])  - If worsening edema despite use of compressions stockings, can consider an oral diuretic  - Pt would like to try a low salt diet, so this was ordered. Can change back top regular diet if he wishes  - Continue to monitor    Episodic Chest Pain  Pt notes brief \"twinges\" of substernal, central chest pain w/o any provoking or palliating factors, no particular pattern. No chest pain or dyspnea at the moment. Reports these began ~one week ago. At this point, unclear etiology for the chest pain, low suspicion of primary cardiac etiology as otherwise asymptomatic, and remains HDS. Do not feel BLE edema is r/t cardiac concerns (see above). Query if r/t mood (anxiety) or if potential heartburn.   - Continue to monitor  - Notify Medicine if recurrence of chest pain or changes in hemodynamics     The patient's care was discussed with the Bedside Nurse and Patient.    Medicine will sign off. No further recommendations at this time.  Please feel free to reconsult if any new medical issues or concerns.  Thank you for the opportunity to care for this patient.     Clinically Significant Risk Factors                         # Overweight: Estimated body mass index is 29.12 kg/m  as calculated from the following:    Height as of this encounter: 1.93 m (6' 3.98\").    Weight as of this encounter: 108.5 kg (239 lb 1.6 oz).   # Moderate Malnutrition: based on nutrition assessment           Arian Livingston PA-C  Hospitalist Service  Securely message with [employer] (more info)  Text page via [facility] Paging/Directory   ______________________________________________________________________    Chief Complaint   \"My legs are swollen and I've gained weight man\"    History is " "obtained from the patient    History of Present Illness   Higinio Wallace is a 33 year old male who is seen in his room this morning.  Patient expresses 2 main concerns this morning.  Firstly, he noticed bilateral lower extremity swelling approximately 2-3 days ago.  The swelling extends from his ankles up to his knees, but is not painful.  The swelling is not localized or present in 1 area of the leg, and is circumferential throughout the distal lower extremity.  He denies calf pain or swelling, or any trauma to the BLEs.  Does note chronic pain at the L shin secondary to surgery and instrumentation to the L tibia 2 years ago, but no changes in this.    His other concern is rapid weight gain since admission.  He believes that the above symptoms are due to Zyprexa, noting that both of the symptoms came on shortly after starting Zyprexa.  Additionally, he inquires if he is perhaps eating too much salt.    He does note \"very brief\" episodes of substernal central chest pain, without radiation anywhere.  He does not have the chest pain at the moment.  These episodes are not associated with shortness of breath, and he cannot specify an exact pattern to the brief periods of chest pain.  He has never had these before, and first noticed them 1 week ago.    Past Medical History    Past Medical History:   Diagnosis Date    Anxiety     Class 2 obesity due to excess calories in adult     Complication of anesthesia     Depression     Gastroesophageal reflux disease with esophagitis     History of nephrolithiasis     Irritable bowel syndrome     Major depression, recurrent (H24)     Malrotation of intestine (H28)     Manic disorder (H)     Panic disorder     Patellofemoral instability of left knee with pain     Pre-diabetes     Psychosis (H)     PTSD (post-traumatic stress disorder)     Tobacco use        Past Surgical History   Past Surgical History:   Procedure Laterality Date    ARTHROSCOPY KNEE WITH PATELLAR REALIGNMENT Left " 11/13/2020    Procedure: Knee Arthroscopy, Medial Patello-femoral Ligament Reconstruction with Allograft, Distal Tibial Tubercle Osteotomy, Lateral Retinacular Lengthening;  Surgeon: Sourav Keating MD;  Location: [facility] OR    ARTHROTOMY TIBIAL TUBERCLE SHIFT Left 11/13/2020    Procedure: tibial tubercle Osteotomy ;  Surgeon: Sourav Keating MD;  Location: [facility] OR    COLONOSCOPY N/A 10/07/2020    Procedure: COLONOSCOPY, WITH POLYPECTOMY AND BIOPSY;  Surgeon: Donell Holland MD;  Location: [facility] OR    deviated septum  2018    KNEE SURGERY Left     OPEN REDUCTION INTERNAL FIXATION RODDING INTRAMEDULLARY TIBIA Left 11/13/2020    Procedure: plus derotation tibial osteotomy;  Surgeon: Sourav Keating MD;  Location: [facility] OR       Medications   I have reviewed this patient's current medications       Review of Systems    The 5 point Review of Systems is negative other than noted in the HPI or here.      Physical Exam   Vital Signs: Temp: 98.2  F (36.8  C) Temp src: Oral BP: 124/72 Pulse: 98   Resp: 16 SpO2: 98 % O2 Device: None (Room air)    Weight: 239 lbs 1.6 oz    Constitutional: awake, alert, cooperative, no apparent distress, and appears stated age  Eyes: lids and lashes normal, sclera clear, and conjunctiva normal  ENT: normocephalic, without obvious abnormality  Respiratory: Non-labored breathing on RA.  Cardiovascular: No obvious cardiac distress. DP pulses 2+ and symmetric.  GI: No obvious distension.  Skin: no bruising or bleeding, no redness, warmth, or swelling, no rashes, and no lesions on visualized skin.   Musculoskeletal: Non-pitting edema to BLEs, extending from ankles superiorly to tibial plateaus bilaterally. No palpable cord. Negative Homans sign. No other areas of edema. Normal AROM. No deformities.  Neurologic: Moving all extremities equally and spontaneously. No obvious focal neuro deficits.  Neuropsychiatric: General: normal, calm, and normal eye contact  Level of  consciousness: alert / normal  Affect: normal and pleasant  Memory and insight: normal, memory for past and recent events intact, and thought process normal    Medical Decision Making       60 MINUTES SPENT BY ME on the date of service doing chart review, history, exam, documentation & further activities per the note.      Data         Imaging results reviewed over the past 24 hrs:   No results found for this or any previous visit (from the past 24 hour(s)).  No lab results found in last 7 days.

## 2024-03-10 NOTE — PLAN OF CARE
Pt has a full range affect with calm mood attending groups. Pt was less religiously preoccupied today than yesterday. Pt did request a bible and was informed a spiritual consult has been ordered. Pt rates anxiety at 0/10 and depression 0/10. Pt rates pain at 0/10. Pt reports no SI/HI/SIB and contracts for safety. Pt denies any hallucinations and not noted responding to any internal stimuli. Pt was medication compliant. No reported or observed medication side effects. Pt was seen by medicine today for LE edema and teds and a low sodium diet was ordered. Pt was visible on unit and social with peers playing games. Continue current POC.    Plan of Care Reviewed With: patient Plan of Care Reviewed With: patient    Overall Patient Progress: no changeOverall Patient Progress: no change

## 2024-03-10 NOTE — PLAN OF CARE
Problem: Adult Inpatient Plan of Care  Goal: Plan of Care Review  Description: The Plan of Care Review/Shift note should be completed every shift.  The Outcome Evaluation is a brief statement about your assessment that the patient is improving, declining, or no change.  This information will be displayed automatically on your shift  note.  Outcome: Progressing   Goal Outcome Evaluation:    Plan of Care Reviewed With: patient      Pt presents with full range affect. Requests to see the hospital . Spiritual consult placed. Pt is sociable with peers and has no behavioral outburst. Pt denies auditory and visual hallucinations. Denies anxiety and depression. Denies SI/HI. Denies physical pain or medication side effects. Pt denies having any issue with eating or sleeping. No bladder issue reported.     Addendum: Pt reports swollen left leg. Edema is non pitting. Encouraged to keep feet elevated and use cold pack as needed. Provider notified. Charli bass ordered and internal medicine consulted.   Goal: Continue to provide teaching and reassurance.

## 2024-03-10 NOTE — PROGRESS NOTES
03/09/24 1906   Group Therapy Session   Group Attendance attended group session   Time Session Began 1600   Time Session Ended 1645   Total Time (minutes) 20   Total # Attendees 5   Group Type life skill;task skill   Group Session Detail Education and group discussion provided on the benefits of positive affirmations and gratitudes with choice making a Positive Affirmation or Gratitudes March Calendar for concentration, shifting negative thoughts to positive, tracking, sequencing, simple problem solving, healthy leisure, coping, mood stabilization, fostering hope for sustainable change, soicalization and expanding self-awareness.   Patient Participation Detail Pt joined group late, but was pleasant and polite while present.  Pt was able to complete a calendar while working independently after the intial instructions were given.  No charge.

## 2024-03-10 NOTE — PLAN OF CARE
Goal Outcome Evaluation:        Pt appeared asleep for 5.75 hours. No behavioral and medical concerns noted and reported. Remains 15 minutes safety check. Will continue POC.

## 2024-03-11 PROCEDURE — 99232 SBSQ HOSP IP/OBS MODERATE 35: CPT

## 2024-03-11 PROCEDURE — 250N000013 HC RX MED GY IP 250 OP 250 PS 637

## 2024-03-11 PROCEDURE — 90832 PSYTX W PT 30 MINUTES: CPT

## 2024-03-11 PROCEDURE — 124N000002 HC R&B MH UMMC

## 2024-03-11 PROCEDURE — 250N000013 HC RX MED GY IP 250 OP 250 PS 637: Performed by: PSYCHIATRY & NEUROLOGY

## 2024-03-11 RX ORDER — OLANZAPINE 10 MG/2ML
10 INJECTION, POWDER, FOR SOLUTION INTRAMUSCULAR DAILY
Status: DISCONTINUED | OUTPATIENT
Start: 2024-03-12 | End: 2024-03-18 | Stop reason: HOSPADM

## 2024-03-11 RX ADMIN — THERA TABS 1 TABLET: TAB at 08:11

## 2024-03-11 RX ADMIN — PALIPERIDONE 6 MG: 6 TABLET, EXTENDED RELEASE ORAL at 08:11

## 2024-03-11 RX ADMIN — OLANZAPINE 5 MG: 5 TABLET, ORALLY DISINTEGRATING ORAL at 21:18

## 2024-03-11 RX ADMIN — SENNOSIDES AND DOCUSATE SODIUM 1 TABLET: 8.6; 5 TABLET ORAL at 08:11

## 2024-03-11 RX ADMIN — SENNOSIDES AND DOCUSATE SODIUM 1 TABLET: 8.6; 5 TABLET ORAL at 21:18

## 2024-03-11 RX ADMIN — Medication 3 MG: at 22:04

## 2024-03-11 ASSESSMENT — ACTIVITIES OF DAILY LIVING (ADL)
ADLS_ACUITY_SCORE: 45
DRESS: INDEPENDENT
ADLS_ACUITY_SCORE: 45
ADLS_ACUITY_SCORE: 45
ORAL_HYGIENE: INDEPENDENT
ADLS_ACUITY_SCORE: 45
ORAL_HYGIENE: INDEPENDENT
ADLS_ACUITY_SCORE: 45
LAUNDRY: WITH SUPERVISION
HYGIENE/GROOMING: INDEPENDENT
ADLS_ACUITY_SCORE: 45
HYGIENE/GROOMING: INDEPENDENT
DRESS: INDEPENDENT

## 2024-03-11 NOTE — CARE PLAN
03/11/24 1400   Group Therapy Session   Group Attendance attended group session   Time Session Began 1330   Time Session Ended 1415   Total Time (minutes) 45   Total # Attendees 2   Group Type expressive therapy;psychotherapeutic   Group Topic Covered coping skills/lifestyle management;emotions/expression   Group Session Detail Self Portrait   Patient Response/Contribution cooperative with task;discussed personal experience with topic   Patient Participation Detail See Note     Pt  independently attended topic group today and was cooperative.  Pt demonstrated a fair understanding of the topic covered but limited insight into their own specific issues related to topic. Pt still tends to focus on bigger , broad topics of good and evil in the world, instead of looking at his individual issues. Pt was social with peers and staff. Pt's affect was flat and attention span was fair.  Pt will continue to be encouraged to attend groups for ongoing assessment and to work toward goals identified on plan of care.

## 2024-03-11 NOTE — PLAN OF CARE
Problem: Psychotic Signs/Symptoms  Goal: Improved Behavioral Control (Psychotic Signs/Symptoms)  Outcome: Progressing   Goal Outcome Evaluation:    Plan of Care Reviewed With: patient      Pt is visible in the lounge playing cards with his peers. Is pleasant and cooperative. Denies physical discomfort. Denies auditory and visual hallucinations. Denies anxiety and depression. Denies SI/HI. Takes his medications as prescribed. Denies any problem with eating or sleeping. No behavioral outburst.   Plan: Continue to provide safe environment and therapeutic milieu.

## 2024-03-11 NOTE — CONSULTS
SPIRITUAL HEALTH SERVICES - Consult Note [facility] ([facility] [place]) [facility]     Referral Source/Reason for Visit:  Patient request for a Bible      Summary and Recommendations -  *   Provided Higinio with the Bible he requested  We spoke briefly about his desire to have a full Bible rather than just a New Testament  Higinio has strong beliefs and is supported by his family    [facility] will remain available to Higinio while he is in hospital         Eda Solomon   Chaplain Resident  Pager [phone]    [facility] available 24/7 for emergent requests/referrals, either by paging the on-call  or by entering an ASAP/STAT consult in OnThe[employer], which will also page the on-call .         * [facility] remains available 24/7 for emergent requests/referrals, either by having the switchboard page the on-call  or by entering an ASAP/STAT consult in Epic (this will also page the on-call ). Routine Epic consults receive an initial respon

## 2024-03-11 NOTE — PLAN OF CARE
-Attending Provider: CARISSA Davis CNP  -Voicemail Code: [id number]#  -Team Note Due: Tuesday  -Next Steps:     [facility] is waiting to here back from [facility] regarding admission date   Set up with psychiatry    Draft PD and request signature from TCM. Sent to  [facility] with change of status form.       Assessment/Intervention/Current Symtoms and Care Coordination:  Chart review and met with team, discussed pt progress, symptomology, and response to treatment.  Discussed the discharge plan and any potential impediments to discharge.  CTC sent HepC lab results to [facility] and provided update that Higinio is experiencing an increase in mental health symptoms due to the reduction of one of his medications in preporation for AMADO. CTC requested consideration for a admission to their program the following week over later this week.     Discharge Plan or Goal:  Pending further stabilization, continued compliance with medications, continued activities of daily living, and development of a safe discharge plan.   Considerations:     Barriers to Discharge:  Impact of mental health symptoms on well being   Impact of mental health symptoms on activities of daily living  Need for medication monitoring and medication management     Referral Status:       Legal Status:  Commitment with [facility]: [place]  File Number: [id number]  Start: February 22 2024  Expiration: August 29th 2024     Contacts:  Norah (CADI  through [facility]): [phone]  [email or web address]  [facility] (group home Higinio will be discharging to: Rev. Carlton [phone]        Phone: [phone]         FAX: [phone] [email or web address]       Upcoming Meetings and Dates/Important Information:      -Still Needed on AVS:

## 2024-03-11 NOTE — PLAN OF CARE
"Pt has a full range affect with calm mood attending groups. Pt was not religiously preoccupied as he was this past weekend. Pt was delusional about being able to feel others emotions and cause dreams and nightmares. Pt stated \"once I touched my girlfriends hand and I could feel her sorrow, I cause dreams not only good ones some are nightmares, I have a high sensitivity to emotional healing of the soul\". Pt rates anxiety at 0/10 and depression 0/10. Pt rates pain at 0/10. Pt reports no SI/HI/SIB and contracts for safety. Pt denies any hallucinations and not noted responding to any internal stimuli. Pt was medication compliant. No reported or observed medication side effects. Pt was visible on unit and social with peers playing games. Pt was given JOANA socks this am. Pts room was blocked after his roommate discharged due to his delusions of causing dreams and nightmares. Continue current POC.    Plan of Care Reviewed With: patient Plan of Care Reviewed With: patient    Overall Patient Progress: no changeOverall Patient Progress: no change           "

## 2024-03-11 NOTE — CARE PLAN
"   03/11/24 1200   Group Therapy Session   Group Attendance attended group session   Time Session Began 1015   Time Session Ended 1145   Total Time (minutes) 90   Total # Attendees 3-5   Group Type task skill;expressive therapy   Group Topic Covered problem-solving;emotions/expression   Group Session Detail OT Clinic   Patient Response/Contribution cooperative with task;organized   Patient Participation Detail See Note     Pt actively participated in occupational therapy clinic to facilitate coping skill exploration, creative expression within personally meaningful activities, and clinical observation of social, cognitive, and kinesthetic performance skills. Pt response: Independent to initiate, gather materials, sequence, and adjust to workspace demands as needed. Demonstrated fair focus, planning, and problem solving for selected wood project task. Able to ask for assistance as needed.  Social with peers and staff, although conversations were not always on topic.  Pt did report that he doesn't like being on his current meds due to them \"inhibiting my ability to freely flow with music.\"  Pt reports [place] once he [place] but is unsure of what he should do. Pt will continue to be encouraged to attend groups for further asssesssment and to address goals identified on plan of care.     "

## 2024-03-11 NOTE — PROGRESS NOTES
"PSYCHIATRY  PROGRESS NOTE     DATE OF SERVICE   3/11/2024         CHIEF COMPLAINT   \"Alright.\"       SUBJECTIVE   Nursing reports:    Pt is visible in the lounge playing cards with his peers. Is pleasant and cooperative. Denies physical discomfort. Denies auditory and visual hallucinations. Denies anxiety and depression. Denies SI/HI. Takes his medications as prescribed. Denies any problem with eating or sleeping. No behavioral outburst.   Plan: Continue to provide safe environment and therapeutic milieu.     Pt appeared asleep for 7 hours.      reports:    -Attending Provider: CARISSA Davis CNP  -Voicemail Code: [id number]  -Team Note Due: Tuesday  -Next Steps:                 [facility] is waiting to here back from [facility] regarding admission date              Set up with psychiatry               Draft PD and request signature from [facility]. Sent to  [facility] with change of status form.         Assessment/Intervention/Current Symtoms and Care Coordination:  Chart review and met with team, discussed pt progress, symptomology, and response to treatment.  Discussed the discharge plan and any potential impediments to discharge.  [facility] sent HepC lab results to [facility] and provided update that Higinio is experiencing an increase in mental health symptoms due to the reduction of one of his medications in preporation for AMADO. [facility] requested consideration for a admission to their program the following week over later this week.      Discharge Plan or Goal:  Pending further stabilization, continued compliance with medications, continued activities of daily living, and development of a safe discharge plan.   Considerations:      Barriers to Discharge:  Impact of mental health symptoms on well being   Impact of mental health symptoms on activities of daily living  Need for medication monitoring and medication management     Referral Status:        Legal Status:  Commitment with [facility]: " "[place]  File Number: [id number]  Start: February 22 2024  Expiration: August 29th 2024      Contacts:  Norah (CADI  through [facility]): [phone]  [email or web address]  Charis Dunn (group home Higinio will be discharging to: Rev. Carlton [phone]        Phone: [phone]         FAX: [phone] [email or web address]        Upcoming Meetings and Dates/Important Information:        -Still Needed on AVS:              OBJECTIVE   Met with patient in hospital room of unit [address]. Upon patient interview, patient reports his mood as, \"alright.\"  Patient's affect appears blunted and guarded.  Patient's thought process is circumstantial, tangential, perseverative, and slightly difficult to follow.  Patient continues to endorse delusional thoughts that he is significantly affected by other's emotions and that his presence has an impact on other people's thoughts, emotions, and specifically reports he feels he triggers and nightmares for other people.  Patient also reports feeling triggered which he describes as \"feeling more hyper\" due to another patient being admitted to the unit who he knows from outside the hospital.  Patient reports this other patient is experiencing psychosis symptoms and reports this patient, \"Told me that he has killed for me.\"  Patient reports he is aware that this patient is experiencing psychosis and does not believe the other patients statement is reality however he endorses finding these types of statements triggering.  Patient reports his preference would be to have no roommate at this time due to an increase in paranoid thoughts as well as feeling increased paranoid delusional thoughts.  Patient continues to endorse lupe-based delusional thoughts however reports these are significantly improving; patient rates intensity of these delusions at 1/10  (0=none, 10=severe).  Patient denies any HI.  Denies any thoughts of SI, SIB, SA, " "intent or plan. Patient able to contract for safety. Patient denies auditory hallucinations or visual hallucinations today. Patient continues to reports that he is able to manage his mental health symptoms by suppressing these thoughts. Patient denies depression or anxiety today. Patient reports appetite is decreased.  Patient has previously reported delusional thought that when he is eating food he believes he is \"eating himself.\"  Today, he reports these thoughts are improving.  Per nursing documentation, patient has been eating and drinking adequately.  Patient denies any issues with bowel or bladder. Patient vital signs reviewed and noted to be stable.  Patient does not endorse ongoing edema in lower extremities which internal medicine provider have been following to address.  Provider assessed patient's lower extremities and patient is noted to have mild-moderate nonpitting lower extremity edema which is slightly increased on left lower extremity.  Patient denies any other medical concerns today.  Patient denies any side effects today of medication regimen.  Today, provider discussed plan to increase paliperidone (Invega) 24-hour tablet to 9 mg PO daily to target psychosis and mood disorder symptoms.  Patient has [facility] order for this medication and must take.  Offer p.o. paliperidone first, if patient refuses then administer backup IM olanzapine 10 mg.  Patient verbalized understanding and is in agreement with this.     Internal medicine was consulted to address lower extremity edema.  Per documentation from 3/10/2024:    [facility]  Consult Note - Hospitalist Service  Date of Admission:  2/17/2024  Consult Requested by: CARISSA Coe, CNP  Reason for Consult: \"Pt has calf swelling and pain, possible DVT\"        Assessment & Plan  Higinio Wallace is a 33 year old male admitted on 2/17/2024. He has a past medical history of schizophrenia, MDD c/b suicidal " ideation, schizophrenia, PTSD who was admitted to station 10N after presenting to the ED from his IRTS program for evaluation of psychiatric decompensation (delusions, paranoia, hallucinations, disorganized thinking). He remains admitted for psychiatric monitoring and management. Medicine was consulted this AM out of concern for DVT d/t calf swelling and pain.     Bilateral Lower Extremity Edema  Weight Gain  Pt notes onset of BLE edema 2-3 days ago. However, even prior to this, patient expresses concern w/ his nearly 30lb weight gain since admission (review of objective data shows he was 211 lbs on 2/15/24 and as of 3/5/24 was 239 lbs). As far as his BLE go, he denies any focal calf pain, and Homans' sign negative on exam, no palpable cord, less suspicion of DVT. BLE edema is symmetric and no e/o cellulitis (no erythema or open wounds) to suggest infectious etiology for swelling. At this point, suspect BLE edema is a manifestation of his rapid weight gain following initiation of dual-antipsychotic therapy (started on Zyprexa 2/17, and Paliperidone 3/1). Per UpToDate, Paliperidone has an incidence of generalized edema of <2%, but has been observed to have weight gain incidence of at least 7% from baseline in adult patients. This weight gain, in Paliperidone specifically, is typically rapid in onset following initiation of the medication. As for Zyprexa, the incidence of peripheral edema is ~3% and weight gain has incidence reported as between 3-6% but as high as 64%.   - Recommend Primary Team downtitrate on Zyprexa and/or Paliperidone to smallest dose needed for clinical benefit  - D/w pt who agrees to trial compression stockings              - Only to wear during the day, and remove at night (lotion legs once stockings removed for the day to avoid dry skin [can use Aquaphor or lotion on floor available to patients])  - If worsening edema despite use of compressions stockings, can consider an oral diuretic  - Pt  "would like to try a low salt diet, so this was ordered. Can change back top regular diet if he wishes  - Continue to monitor     Episodic Chest Pain  Pt notes brief \"twinges\" of substernal, central chest pain w/o any provoking or palliating factors, no particular pattern. No chest pain or dyspnea at the moment. Reports these began ~one week ago. At this point, unclear etiology for the chest pain, low suspicion of primary cardiac etiology as otherwise asymptomatic, and remains HDS. Do not feel BLE edema is r/t cardiac concerns (see above). Query if r/t mood (anxiety) or if potential heartburn.   - Continue to monitor  - Notify Medicine if recurrence of chest pain or changes in hemodynamics        The patient's care was discussed with the Bedside Nurse and Patient.     Medicine will sign off. No further recommendations at this time.  Please feel free to reconsult if any new medical issues or concerns.  Thank you for the opportunity to care for this patie    Orthopedic surgery consulted to address patient report of left knee subluxation, per documentation from orthopedic consult on 2/28/2024:    Assessment:  Patient is a 34 y/o man with history of recurrent patellar instability and subluxations s/p L derotational tibial osteotomy, MPFL reconstruction and TTO with Dr. Han in 2020.      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  At this time patient said that he would like to defer all surgical management as he is worried about cost of procedures given his housing instability.  He currently is wearing a left knee patellar stabilizing brace which appears to be fitting appropriately.  We recommended that he continue using the brace while ambulatory as we do not have any other patellar restrictive braces in the inpatient setting.  Discussed having the patient follow-up with Dr. Saucedo in the outpatient setting that she may recommend additional " exercises and/or nonoperative techniques that may help him with his recurrent left patella subluxation.  Patient agreed to this plan and all his questions were answered.  No operative indication at this time        Plan:  - Continue Patellar stabilizing brace while ambulatory  - Ice/Elevation/Compression as needed for swelling  - Outpatient follow-up with Dr. Han for discussion regarding non-surgical vs surgical options.   - No inpatient orthopedic surgical need at this time.         - Imaging: Completed  - Activity: Continue Brace use with activity. Avoid excessive cutting exercises/running.   - Weight bearing: WBAT LLE  - Pain control: Per primary  - Diet: Regular Diet  - Follow-up: Will arrange follow-up with Dr. Han in outpatient setting of review of options  - Disposition: Per primary     MEDICATIONS   Medications:  Scheduled Meds:    cholecalciferol  1,250 mcg Oral Q7 Days     multivitamin, therapeutic  1 tablet Oral Daily     [START ON 3/12/2024] paliperidone ER  9 mg Oral Daily    Or     [START ON 3/12/2024] OLANZapine  10 mg Intramuscular Daily     OLANZapine zydis  5 mg Oral At Bedtime    Or     OLANZapine  10 mg Intramuscular At Bedtime     senna-docusate  1-2 tablet Oral BID     Continuous Infusions:  PRN Meds:.acetaminophen, alum & mag hydroxide-simethicone, benztropine, hydrOXYzine HCl, melatonin, nicotine, OLANZapine **OR** OLANZapine, polyethylene glycol    Medication adherence issues: MS Med Adherence Y/N: Yes, patient reports history of noncompliance with psychiatric medications in the community.  Patient is cooperative with meds in the hospital currently  Medication side effects: MEDICATION SIDE EFFECTS: Patient does not report any side effects to writer today  Benefit: Yes / No: Yes       ROS   Pertinent items are noted in HPI.       MENTAL STATUS EXAM   Vitals: /72 (BP Location: Left arm, Patient Position: Sitting, Cuff Size: Adult Regular)   Pulse 71   Temp 97.7  F (36.5  C)  "(Oral)   Resp 16   Ht 1.93 m (6' 3.98\")   Wt 108.5 kg (239 lb 1.6 oz)   SpO2 97%   BMI 29.12 kg/m      Appearance: Casually groomed  Mood: Reports mood as, \"alright.\"  Affect: blunted and guarded  was congruent to speech  Suicidal Ideation: PRESENT / ABSENT: absent   Homicidal Ideation: PRESENT / ABSENT: absent   Thought process: circumstantial, tangential, perseverative, slightly difficult to follow however improving  Thought content: significant for delusions, preoccupations, and paranoid ideation.   Fund of Knowledge: Average  Attention/Concentration: Poor  Language ability:  Intact   Memory: Intact  Insight:  limited.  Judgement: limited  Orientation: Yes, x4  Psychomotor Behavior: Normal  Muscle Strength and Tone: MuscleStrength: Normal  Gait and Station: Normal       LABS   personally reviewed.     Provider reviewed results of XR KNEE LEFT 3 VIEWS completed on 2/28/2024. Results indicate: incompletely visualized postoperative changes of IM jhoana and screw fixation of the tibia. No evidence for acute fracture around the knee joint. There is degenerative change within the patellofemoral compartment. No significant effusion.    Vitamin D level noted to be low at 10 ng/mL; supplement ordered 2/22/24 to address vitamin D deficiency.     Latest Reference Range & Units 02/17/24 13:58 02/17/24 15:16 02/18/24 11:07 02/18/24 14:37 02/19/24 02:49 02/20/24 07:44 02/28/24 15:48 02/28/24 18:49 03/05/24 09:26   Sodium 135 - 145 mmol/L  140 143     141    Potassium 3.4 - 5.3 mmol/L  3.6 3.8     3.9    Chloride 98 - 107 mmol/L  104 108 (H)     102    Carbon Dioxide (CO2) 22 - 29 mmol/L  26 28     28    Urea Nitrogen 6.0 - 20.0 mg/dL  6.2 8.7     14.9    Creatinine 0.67 - 1.17 mg/dL  0.88 0.94     0.80    GFR Estimate >60 mL/min/1.73m2  >90 >90     >90    Calcium 8.6 - 10.0 mg/dL  9.4 9.2     9.5    Anion Gap 7 - 15 mmol/L  10 7     11    Magnesium 1.7 - 2.3 mg/dL  1.9      2.0    Phosphorus 2.5 - 4.5 mg/dL        4.2  "   Albumin 3.5 - 5.2 g/dL  4.4 4.1     4.2    Protein Total 6.4 - 8.3 g/dL  7.0 6.5     6.7    Alkaline Phosphatase 40 - 150 U/L  49 44     60    ALT 0 - 70 U/L  6 7     9    AST 0 - 45 U/L  11 13     22    Bilirubin Total <=1.2 mg/dL  1.3 (H) 1.2     0.3    Cholesterol <200 mg/dL      135      Glucose 70 - 99 mg/dL  104 (H) 135 (H)     93    HDL Cholesterol >=40 mg/dL      36 (L)      Hemoglobin A1C <5.7 %      4.8      LDL Cholesterol Calculated <=100 mg/dL      84      Non HDL Cholesterol <130 mg/dL      99      Triglycerides <150 mg/dL      75      TSH 0.30 - 4.20 uIU/mL      2.08      Vitamin D, Total (25-Hydroxy) 20 - 50 ng/mL      10 (L)      WBC 4.0 - 11.0 10e3/uL  5.4      6.1    Hemoglobin 13.3 - 17.7 g/dL  16.9      16.7    Hematocrit 40.0 - 53.0 %  46.5      48.8    Platelet Count 150 - 450 10e3/uL  232      200    RBC Count 4.40 - 5.90 10e6/uL  5.49      5.43    MCV 78 - 100 fL  85      90    MCH 26.5 - 33.0 pg  30.8      30.8    MCHC 31.5 - 36.5 g/dL  36.3      34.2    RDW 10.0 - 15.0 %  11.9      12.3    % Neutrophils %  78      64    % Lymphocytes %  13      22    % Monocytes %  7      9    % Eosinophils %  1      4    % Basophils %  1      1    Absolute Basophils 0.0 - 0.2 10e3/uL  0.0      0.1    Absolute Eosinophils 0.0 - 0.7 10e3/uL  0.1      0.2    Absolute Immature Granulocytes <=0.4 10e3/uL  0.0      0.0    Absolute Lymphocytes 0.8 - 5.3 10e3/uL  0.7 (L)      1.4    Absolute Monocytes 0.0 - 1.3 10e3/uL  0.4      0.5    % Immature Granulocytes %  0      0    Absolute Neutrophils 1.6 - 8.3 10e3/uL  4.2      3.9    Absolute NRBCs 10e3/uL  0.0      0.0    NRBCs per 100 WBC <1 /100  0      0    Quantiferon-TB Gold Plus Result Negative          Negative   TB1 Ag minus Nil Value IU/mL         0.01   TB2 Ag minus Nil Value IU/mL         0.02   Mitogen minus Nil Result IU/mL         9.98   Nil Result IU/mL         0.02   QUANTIFERON-TB GOLD PLUS          Rpt   SARS CoV2 PCR Negative      Negative      "  Hepatitis C Antibody Nonreactive          Nonreactive   HIV Antigen Antibody Combo Nonreactive          Nonreactive   Amphetamine Qual Urine Screen Negative  Screen Negative           Fentanyl Qual Urine Screen Negative  Screen Negative           Cocaine Urine Screen Negative  Screen Negative           Benzodiazepine Urine Screen Negative  Screen Negative           Opiates Qualitative Urine Screen Negative  Screen Negative           PCP Urine Screen Negative  Screen Negative           Cannabinoids Qual Urine Screen Negative  Screen Positive !           Barbiturates Qual Urine Screen Negative  Screen Negative           XR KNEE LEFT 3 VIEWS        Rpt     EKG 12-LEAD, TRACING ONLY     Rpt        (H): Data is abnormally high  (L): Data is abnormally low  !: Data is abnormal  Rpt: View report in Results Review for more information  No results found for: \"PHENYTOIN\", \"PHENOBARB\", \"VALPROATE\", \"CBMZ\"       DIAGNOSIS   Principal Problem:    Schizoaffective disorder, bipolar type, currently hypomanic  History of schizophrenia  PTSD  R/O OCD    History of generalized anxiety disorder with panic attacks  History of ADHD    Clinically Significant Risk Factors                         # Overweight: Estimated body mass index is 29.12 kg/m  as calculated from the following:    Height as of this encounter: 1.93 m (6' 3.98\").    Weight as of this encounter: 108.5 kg (239 lb 1.6 oz).   # Moderate Malnutrition: based on nutrition assessment          Active Problem List:  Patient Active Problem List   Diagnosis     Irritable bowel syndrome     Class 2 severe obesity due to excess calories with serious comorbidity and body mass index (BMI) of 37.0 to 37.9 in adult (H)     Patellofemoral instability of left knee with pain     Tobacco abuse     Suicidal ideation     Moderate episode of recurrent major depressive disorder (H)     CARSON (obstructive sleep apnea)     Unspecified psychosis not due to a substance or known physiological " condition (H)     Marijuana use     Schizophrenia, unspecified type (H)     Schizophrenia, schizoaffective, chronic with acute exacerbation (H)          HOSPITAL COURSE AND ASSESSMENT   This is a 33 year old male with history of schizophrenia, MDD, suicidal ideation, and PTSD who presented to [facility] ED on 2/17/2024 via EMS for psychiatric evaluation.  Patient was at [facility] and was experiencing symptoms of psychosis including disorganized, paranoid, and delusional thoughts.  Patient also exhibiting poor p.o. intake and not attending to ADLs such as bathing.  Patient has a history of Patellofemoral instability of left knee and requires a brace to stabilize.  Per chart review patient was not taking any psychiatric medications prior to admission.  Patient endorsed some cannabis use and urine drug screening negative for all except cannabinoids.  While admitted to the ED, psychiatry provider was consulted and initiated olanzapine to target psychosis symptoms.  Patient was also started on a trial of Anafranil to target OCD symptoms.  Patient was evaluated by medical provider in the ED and determined to be medically stable.  Patient subsequently admitted to inpatient psychiatry unit 10 N for further psychiatric stabilization.  Legal status at time of admission was: 72-hour hold.  Olanzapine started in the ED to target psychosis symptoms and continued during psychiatric hospitalization.  Psychiatric provider initiated petition for mental health commitment with Mg on 2/20/2024.  Trial of Anafranil initiated in the ED to target OCD symptoms however this was discontinued due to risk for worsening psychosis symptoms.  On 3/1/2024 court documents received supporting mental health commitment with [facility] order in [facility]. Holliday order includes medications: Zyprexa (olanzapine), Haldol (haloperidol), Invega (paliperidone), and Seroquel (quetiapine). Per chart review, patient  previously tolerated paliperidone (Invega) medication well and this medication was effective for treating psychosis and mood disorder symptoms however Invega p.o. was not covered by insurance and subsequently was discontinued.  Pharmacy liaison consult completed during current hospitalization and prior auth completed for health insurance coverage of Invega p.o. tablets.  Invega Sustenna long-acting injection also covered by health insurance.  Plan will be to continue olanzapine 15 mg p.o. at bedtime for now with plan to titrate down in upcoming days with initiation of medication paliperidone.  Provider initiated Holliday medication paliperidone (Invega) 24-hour tablet 1.5 mg PO daily with backup IM olanzapine 10 mg on 3/1/2024.   Plan will be to continue paliperidone (Invega) p.o. and titrate to therapeutic dose while monitoring for side effects.  If patient tolerating paliperidone p.o. plan will be to consider initiating long-acting injection Invega Sustenna prior to discharge due to patient's history of noncompliance with psychiatric medications as well as patient reporting directly to writer that he will be noncompliant with psychiatric medications in the community.  Plan will be for patient to discharge to crisis facility housing.  Unit CTC facilitating discharge plan.     PLAN   1. Ongoing education given regarding diagnostic and treatment options with risks, benefits and alternatives and adequate verbalization of understanding.  2.  Medications:  Increase paliperidone (Invega) 24-hour tablet to 9 mg PO daily to target psychosis symptoms and mood disorder symptoms with backup olanzapine 10 mg IM injection.  Patient has Holliday order for this medication and must take.  Offer p.o. paliperidone (Invega) first, if patient refuses then administer IM olanzapine.  Continue olanzapine ODT tablet 5 mg PO at bedtime to target psychosis symptoms and mood disorder symptoms, with backup IM olanzapine 10 mg.  Patient now has a  Holliday order for this medication and must take.  Offer p.o. olanzapine first.  If patient refuses then administer IM olanzapine.  Plan will be to titrate down on olanzapine with dose increases of paliperidone.  Continue multivitamin 1 tab PO daily  Continue cholecalciferol (vitamin D3) capsule 1250 mcg PO every 7 days for 8 doses to target vitamin D deficiency, administer immediately after supper  PRN benztropine tablet 0.5 mg PO2 times daily as needed to target side effect of movement disorder  PRN hydroxyzine tablet 25 mg PO every 4 hours as needed for anxiety  PRN olanzapine tablet 5-10 mg PO every 3 hours as needed for agitation, ordered linked with olanzapine IM injection  PRN melatonin tablet 3 mg PO at bedtime as needed for sleep  PRN nicotine lozenge 2 mg buccal every 1 hour as needed for nicotine withdrawal symptoms    3.  Labs/Imaging:  -XR KNEE LEFT 3 VIEWS ordered 2/28/2024  -CMP and CBC completed on 2/28/2024                 4. Consults:  -Orthopedic consult placed 2/28/2024 to address left knee subluxation    5. Precautions:  -Self-injury precautions, suicide precautions; risk moderate    6. Legal:  -Mental health commitment with [facility] starting 3/1/2024, expires 8/29/2024   -Holliday order in place which includes medications Zyprexa, Haldol, Invega, Seroquel    7. Discharge planning:  -Per unit CTC        [facility] Patient able to contract for safety and Patient on precautions    Coordination of Care:   Treatment Plan reviewed and physician signed, Care discussed with Care/Treatment Team Members, Chart reviewed and Patient seen      Re-Certification I certify that the inpatient psychiatric facility services furnished since the previous certification were, and continue to be, medically necessary for, either, treatment which could reasonably be expected to improve the patient s condition or diagnostic study and that the hospital records indicate that the services  furnished were, either, intensive treatment services, admission and related services necessary for diagnostic study, or equivalent services.     I certify that the patient continues to need, on a daily basis, active treatment furnished directly by or requiring the supervision of inpatient psychiatric facility personnel.   I estimate 14-21 days of hospitalization is necessary for proper treatment of the patient. My plans for post-hospital care for this patient are   TBD     CARISSA Mendieta CNP    -     03/11/24       -     12:00 PM       Total time  35 minutes with > 50%spent on coordination of cares and psycho-education.    This note was created with help of Dragon dictation system. Grammatical / typing errors are not intentional.    CARISSA Mendieta CNP

## 2024-03-11 NOTE — PLAN OF CARE
[facility] IP Unit Acuity Rating Score (UARS)  Patient is given one point for every criteria they meet.    CRITERIA SCORING   On a 72 hour hold, court hold, committed, stay of commitment, or revocation. 1    Patient LOS on [facility]H unit exceeds 20 days. 0  LOS: 21   Patient under guardianship, 55+, otherwise medically complex, or under age 11. 0   Suicide ideation without relief of precipitating factors. 0   Current plan for suicide. 0   Current plan for homicide. 0   Imminent risk or actual attempt to seriously harm another without relief of factors precipitating the attempt. 0   Severe dysfunction in daily living (ex: complete neglect for self care, extreme disruption in vegetative function, extreme deterioration in social interactions). 0   Recent (last 7 days) or current physical aggression in the ED or on unit. 0   Restraints or seclusion episode in past 72 hours. 0   Recent (last 7 days) or current verbal aggression, agitation, yelling, etc., while in the ED or unit. 0   Active psychosis. 1   Need for constant or near constant redirection (from leaving, from others, etc).  0   Intrusive or disruptive behaviors. 0   Patient requires 3 or more hours of individualized nursing care per 8-hour shift (i.e. for ADLs, meds, therapeutic interventions). 0   TOTAL 2

## 2024-03-12 PROCEDURE — 124N000002 HC R&B MH UMMC

## 2024-03-12 PROCEDURE — H2032 ACTIVITY THERAPY, PER 15 MIN: HCPCS

## 2024-03-12 PROCEDURE — 99232 SBSQ HOSP IP/OBS MODERATE 35: CPT

## 2024-03-12 PROCEDURE — G0177 OPPS/PHP; TRAIN & EDUC SERV: HCPCS

## 2024-03-12 PROCEDURE — 250N000013 HC RX MED GY IP 250 OP 250 PS 637

## 2024-03-12 PROCEDURE — 250N000013 HC RX MED GY IP 250 OP 250 PS 637: Performed by: PSYCHIATRY & NEUROLOGY

## 2024-03-12 RX ORDER — LANOLIN ALCOHOL/MO/W.PET/CERES
3 CREAM (GRAM) TOPICAL AT BEDTIME
Status: DISCONTINUED | OUTPATIENT
Start: 2024-03-12 | End: 2024-03-18 | Stop reason: HOSPADM

## 2024-03-12 RX ADMIN — SENNOSIDES AND DOCUSATE SODIUM 2 TABLET: 8.6; 5 TABLET ORAL at 19:29

## 2024-03-12 RX ADMIN — THERA TABS 1 TABLET: TAB at 08:33

## 2024-03-12 RX ADMIN — SENNOSIDES AND DOCUSATE SODIUM 1 TABLET: 8.6; 5 TABLET ORAL at 08:33

## 2024-03-12 RX ADMIN — PALIPERIDONE 9 MG: 6 TABLET, EXTENDED RELEASE ORAL at 08:33

## 2024-03-12 ASSESSMENT — ACTIVITIES OF DAILY LIVING (ADL)
ADLS_ACUITY_SCORE: 45
HYGIENE/GROOMING: INDEPENDENT
LAUNDRY: WITH SUPERVISION
ADLS_ACUITY_SCORE: 45
ORAL_HYGIENE: INDEPENDENT
ADLS_ACUITY_SCORE: 45
ADLS_ACUITY_SCORE: 45
DRESS: INDEPENDENT
HYGIENE/GROOMING: INDEPENDENT
ADLS_ACUITY_SCORE: 45
DRESS: INDEPENDENT
ADLS_ACUITY_SCORE: 45
ORAL_HYGIENE: INDEPENDENT
ADLS_ACUITY_SCORE: 45

## 2024-03-12 NOTE — PROGRESS NOTES
"PSYCHIATRY  PROGRESS NOTE     DATE OF SERVICE   3/12/2024         CHIEF COMPLAINT   \" Good.\"       SUBJECTIVE   Nursing reports:    Patient presents as alert and oriented, he was visible in the lounge area, he is social and interactive with peers. He continues to make delusional statements about being able to know and feel other people's emotions when they stay with him in the same room which  causes them to have either good or bad dreams. He continues to report that he doesn't want to make others have bad dreams because he has had two people who were his room mates and he made them have night coronado. He denies having any pain or discomfort, denies feeling anxious or depressed. He reports no SI/SIB/HI and AVH. He is compliant with his scheduled medications, his VS stable, he had PRN Melatonin at HS,JOANA Stockings off @ HS stored in his locker, to continue with POC.      Pt appeared asleep for  6.75 hours.       reports:    -Attending Provider: CARISSA Davis CNP  -Voicemail Code: [id number]#  -Team Note Due: Tuesday  -Next Steps:                 [facility] is waiting to here back from [facility] regarding admission date              Set up with psychiatry               Draft PD and request signature from TCM. Sent to  [facility] with change of status form.         Assessment/Intervention/Current Symtoms and Care Coordination:  Chart review and met with team, discussed pt progress, symptomology, and response to treatment.  Discussed the discharge plan and any potential impediments to discharge.  [facility] met with Higinio to discuss discharge plans. [facility] confirmed bed available at [facility] and waiting to set admission date until AMADO is established.   [facility] called [facility] and contracted plan for Rev. Brandt to follow up with [facility] about confirming admission date. [facility] will await call from Brandt.      Discharge Plan or Goal:  Pending further stabilization, continued compliance with medications, continued " "activities of daily living, and development of a safe discharge plan.   Considerations:      Barriers to Discharge:  Impact of mental health symptoms on well being   Impact of mental health symptoms on activities of daily living  Need for medication monitoring and medication management     Referral Status:        Legal Status:  Commitment with Mg [facility]: Odette  File Number: [id number]  Start: February 22 2024  Expiration: August 29th 2024      Contacts:  Norah (CADI  through [facility]): [phone]  [email or web address]  [facility] (group home Higinio will be discharging to: Rev. Carlton [phone]        Phone: [phone]         FAX: [phone] [email or web address]        Upcoming Meetings and Dates/Important Information:        -Still Needed on AVS:      OBJECTIVE   Met with patient in hospital room of unit [address]. Upon patient interview, patient reports his mood as, \"good.\"  Patient's affect appears blunted and guarded however improving.  Patient's thought process is circumstantial, tangential, and slightly difficult to follow.  Patient continues to endorse delusional thoughts that he is significantly affected by other's emotions and that his presence has an impact on other people's thoughts and emotions.  Patient also extensively discusses his thoughts regarding how he handles his emotions and how he manages conflicts with others.  Patient also reports he finds music very therapeutic; provider discussed with patient that patient care order is currently in place which allows him to have access to music for 15-20 minutes per day with staff supervision if unit staff are able to accommodate this.  Patient verbalizes understanding and is in agreement with this.  Patient also requests to go outside and provider discussed that there is a current order in place allowing for this however due to patient being under commitment 2 staff and security must go " with patient.  Provider discussed that plan will be to address options for facilitating this with nursing staff.  Patient verbalizes understanding and is in agreement with this.  Patient continues to endorse lupe-based delusional thoughts however reports these are significantly improving.  Patient denies any HI.  Denies any thoughts of SI, SIB, SA, intent or plan. Patient able to contract for safety. Patient denies auditory hallucinations or visual hallucinations today.  Patient denies depression or anxiety today. Patient reports appetite continues to be suppressed however endorses that today for the first time he felt sensation of hunger.  Per nursing documentation, patient has been eating and drinking adequately.  Patient does report quest double portions of potatoes as he reports low sodium diet is limiting food options.  Provider subsequently updated nutrition order to reflect this.  Patient denies any issues with bowel or bladder. Patient vital signs reviewed and noted to be stable.  Patient reports ongoing edema in lower extremities is significantly improving and he believes this was a side effect of medication olanzapine.  Provider discussed option of discontinuing olanzapine 5 mg PO at bedtime due to potential side effect of lower extremity edema as well as increased dose of Invega.  Patient verbalizes agreement with this plan. Patient denies any other medical concerns today.  Patient denies any side effects today of medication regimen.  Patient does report some difficulty sleeping overnight and reports melatonin has been helpful for sleep promotion when he takes it.  Provider discussed option of scheduling melatonin tablet 3 mg PO at bedtime for sleep promotion and patient in agreement with this.  Today, provider discussed plan to continue paliperidone (Invega) 24-hour tablet to 9 mg PO daily to target psychosis and mood disorder symptoms.  Patient has [facility] order for this medication and must take.  Offer  "p.o. paliperidone first, if patient refuses then administer backup IM olanzapine 10 mg.  Patient verbalized understanding and is in agreement with this.     Internal medicine was consulted to address lower extremity edema.  Per documentation from 3/10/2024:    [facility]  Consult Note - Hospitalist Service  Date of Admission:  2/17/2024  Consult Requested by: Jamie Conley, CARISSA, CNP  Reason for Consult: \"Pt has calf swelling and pain, possible DVT\"        Assessment & Plan  Higinio Wallace is a 33 year old male admitted on 2/17/2024. He has a past medical history of schizophrenia, MDD c/b suicidal ideation, schizophrenia, PTSD who was admitted to station 10[facility] after presenting to the ED from his IRTS program for evaluation of psychiatric decompensation (delusions, paranoia, hallucinations, disorganized thinking). He remains admitted for psychiatric monitoring and management. Medicine was consulted this AM out of concern for DVT d/t calf swelling and pain.     Bilateral Lower Extremity Edema  Weight Gain  Pt notes onset of BLE edema 2-3 days ago. However, even prior to this, patient expresses concern w/ his nearly 30lb weight gain since admission (review of objective data shows he was 211 lbs on 2/15/24 and as of 3/5/24 was 239 lbs). As far as his BLE go, he denies any focal calf pain, and Homans' sign negative on exam, no palpable cord, less suspicion of DVT. BLE edema is symmetric and no e/o cellulitis (no erythema or open wounds) to suggest infectious etiology for swelling. At this point, suspect BLE edema is a manifestation of his rapid weight gain following initiation of dual-antipsychotic therapy (started on Zyprexa 2/17, and Paliperidone 3/1). Per UpToDate, Paliperidone has an incidence of generalized edema of <2%, but has been observed to have weight gain incidence of at least 7% from baseline in adult patients. This weight gain, in Paliperidone specifically, is " "typically rapid in onset following initiation of the medication. As for Zyprexa, the incidence of peripheral edema is ~3% and weight gain has incidence reported as between 3-6% but as high as 64%.   - Recommend Primary Team downtitrate on Zyprexa and/or Paliperidone to smallest dose needed for clinical benefit  - D/w pt who agrees to trial compression stockings              - Only to wear during the day, and remove at night (lotion legs once stockings removed for the day to avoid dry skin [can use Aquaphor or lotion on floor available to patients])  - If worsening edema despite use of compressions stockings, can consider an oral diuretic  - Pt would like to try a low salt diet, so this was ordered. Can change back top regular diet if he wishes  - Continue to monitor     Episodic Chest Pain  Pt notes brief \"twinges\" of substernal, central chest pain w/o any provoking or palliating factors, no particular pattern. No chest pain or dyspnea at the moment. Reports these began ~one week ago. At this point, unclear etiology for the chest pain, low suspicion of primary cardiac etiology as otherwise asymptomatic, and remains HDS. Do not feel BLE edema is r/t cardiac concerns (see above). Query if r/t mood (anxiety) or if potential heartburn.   - Continue to monitor  - Notify Medicine if recurrence of chest pain or changes in hemodynamics        The patient's care was discussed with the Bedside Nurse and Patient.     Medicine will sign off. No further recommendations at this time.  Please feel free to reconsult if any new medical issues or concerns.  Thank you for the opportunity to care for this patie    Orthopedic surgery consulted to address patient report of left knee subluxation, per documentation from orthopedic consult on 2/28/2024:    Assessment:  Patient is a 34 y/o man with history of recurrent patellar instability and subluxations s/p L derotational tibial osteotomy, MPFL reconstruction and TTO with Dr. Han in " 2020.      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  At this time patient said that he would like to defer all surgical management as he is worried about cost of procedures given his housing instability.  He currently is wearing a left knee patellar stabilizing brace which appears to be fitting appropriately.  We recommended that he continue using the brace while ambulatory as we do not have any other patellar restrictive braces in the inpatient setting.  Discussed having the patient follow-up with Dr. Saucedo in the outpatient setting that she may recommend additional exercises and/or nonoperative techniques that may help him with his recurrent left patella subluxation.  Patient agreed to this plan and all his questions were answered.  No operative indication at this time        Plan:  - Continue Patellar stabilizing brace while ambulatory  - Ice/Elevation/Compression as needed for swelling  - Outpatient follow-up with Dr. Han for discussion regarding non-surgical vs surgical options.   - No inpatient orthopedic surgical need at this time.         - Imaging: Completed  - Activity: Continue Brace use with activity. Avoid excessive cutting exercises/running.   - Weight bearing: WBAT LLE  - Pain control: Per primary  - Diet: Regular Diet  - Follow-up: Will arrange follow-up with Dr. Han in outpatient setting of review of options  - Disposition: Per primary     MEDICATIONS   Medications:  Scheduled Meds:    cholecalciferol  1,250 mcg Oral Q7 Days     multivitamin, therapeutic  1 tablet Oral Daily     paliperidone ER  9 mg Oral Daily    Or     OLANZapine  10 mg Intramuscular Daily     OLANZapine zydis  5 mg Oral At Bedtime    Or     OLANZapine  10 mg Intramuscular At Bedtime     senna-docusate  1-2 tablet Oral BID     Continuous Infusions:  PRN Meds:.acetaminophen, alum & mag hydroxide-simethicone, benztropine, hydrOXYzine HCl,  "melatonin, nicotine, OLANZapine **OR** OLANZapine, polyethylene glycol    Medication adherence issues: MS Med Adherence Y/N: Yes, patient reports history of noncompliance with psychiatric medications in the community.  Patient is cooperative with meds in the hospital currently  Medication side effects: MEDICATION SIDE EFFECTS: Patient does not report any side effects to writer today  Benefit: Yes / No: Yes       ROS   Pertinent items are noted in HPI.       MENTAL STATUS EXAM   Vitals: /79   Pulse 78   Temp 97.5  F (36.4  C) (Oral)   Resp 16   Ht 1.93 m (6' 3.98\")   Wt 109.8 kg (242 lb 1.6 oz)   SpO2 98%   BMI 29.48 kg/m      Appearance: Casually groomed  Mood: Reports mood as, \"good.\"  Affect: blunted and guarded however improving was congruent to speech  Suicidal Ideation: PRESENT / ABSENT: absent   Homicidal Ideation: PRESENT / ABSENT: absent   Thought process: circumstantial, tangential, slightly difficult to follow however improving  Thought content: significant for delusions, preoccupations, and paranoid ideation.   Fund of Knowledge: Average  Attention/Concentration: Poor  Language ability:  Intact   Memory: Intact  Insight:  limited.  Judgement: limited  Orientation: Yes, x4  Psychomotor Behavior: Normal  Muscle Strength and Tone: MuscleStrength: Normal  Gait and Station: Normal       LABS   personally reviewed.     Provider reviewed results of XR KNEE LEFT 3 VIEWS completed on 2/28/2024. Results indicate: incompletely visualized postoperative changes of IM jhoana and screw fixation of the tibia. No evidence for acute fracture around the knee joint. There is degenerative change within the patellofemoral compartment. No significant effusion.    Vitamin D level noted to be low at 10 ng/mL; supplement ordered 2/22/24 to address vitamin D deficiency.     Latest Reference Range & Units 02/17/24 13:58 02/17/24 15:16 02/18/24 11:07 02/18/24 14:37 02/19/24 02:49 02/20/24 07:44 02/28/24 15:48 02/28/24 18:49 " 03/05/24 09:26   Sodium 135 - 145 mmol/L  140 143     141    Potassium 3.4 - 5.3 mmol/L  3.6 3.8     3.9    Chloride 98 - 107 mmol/L  104 108 (H)     102    Carbon Dioxide (CO2) 22 - 29 mmol/L  26 28     28    Urea Nitrogen 6.0 - 20.0 mg/dL  6.2 8.7     14.9    Creatinine 0.67 - 1.17 mg/dL  0.88 0.94     0.80    GFR Estimate >60 mL/min/1.73m2  >90 >90     >90    Calcium 8.6 - 10.0 mg/dL  9.4 9.2     9.5    Anion Gap 7 - 15 mmol/L  10 7     11    Magnesium 1.7 - 2.3 mg/dL  1.9      2.0    Phosphorus 2.5 - 4.5 mg/dL        4.2    Albumin 3.5 - 5.2 g/dL  4.4 4.1     4.2    Protein Total 6.4 - 8.3 g/dL  7.0 6.5     6.7    Alkaline Phosphatase 40 - 150 U/L  49 44     60    ALT 0 - 70 U/L  6 7     9    AST 0 - 45 U/L  11 13     22    Bilirubin Total <=1.2 mg/dL  1.3 (H) 1.2     0.3    Cholesterol <200 mg/dL      135      Glucose 70 - 99 mg/dL  104 (H) 135 (H)     93    HDL Cholesterol >=40 mg/dL      36 (L)      Hemoglobin A1C <5.7 %      4.8      LDL Cholesterol Calculated <=100 mg/dL      84      Non HDL Cholesterol <130 mg/dL      99      Triglycerides <150 mg/dL      75      TSH 0.30 - 4.20 uIU/mL      2.08      Vitamin D, Total (25-Hydroxy) 20 - 50 ng/mL      10 (L)      WBC 4.0 - 11.0 10e3/uL  5.4      6.1    Hemoglobin 13.3 - 17.7 g/dL  16.9      16.7    Hematocrit 40.0 - 53.0 %  46.5      48.8    Platelet Count 150 - 450 10e3/uL  232      200    RBC Count 4.40 - 5.90 10e6/uL  5.49      5.43    MCV 78 - 100 fL  85      90    MCH 26.5 - 33.0 pg  30.8      30.8    MCHC 31.5 - 36.5 g/dL  36.3      34.2    RDW 10.0 - 15.0 %  11.9      12.3    % Neutrophils %  78      64    % Lymphocytes %  13      22    % Monocytes %  7      9    % Eosinophils %  1      4    % Basophils %  1      1    Absolute Basophils 0.0 - 0.2 10e3/uL  0.0      0.1    Absolute Eosinophils 0.0 - 0.7 10e3/uL  0.1      0.2    Absolute Immature Granulocytes <=0.4 10e3/uL  0.0      0.0    Absolute Lymphocytes 0.8 - 5.3 10e3/uL  0.7 (L)      1.4   "  Absolute Monocytes 0.0 - 1.3 10e3/uL  0.4      0.5    % Immature Granulocytes %  0      0    Absolute Neutrophils 1.6 - 8.3 10e3/uL  4.2      3.9    Absolute NRBCs 10e3/uL  0.0      0.0    NRBCs per 100 WBC <1 /100  0      0    Quantiferon-TB Gold Plus Result Negative          Negative   TB1 Ag minus Nil Value IU/mL         0.01   TB2 Ag minus Nil Value IU/mL         0.02   Mitogen minus Nil Result IU/mL         9.98   Nil Result IU/mL         0.02   QUANTIFERON-TB GOLD PLUS          Rpt   SARS CoV2 PCR Negative      Negative       Hepatitis C Antibody Nonreactive          Nonreactive   HIV Antigen Antibody Combo Nonreactive          Nonreactive   Amphetamine Qual Urine Screen Negative  Screen Negative           Fentanyl Qual Urine Screen Negative  Screen Negative           Cocaine Urine Screen Negative  Screen Negative           Benzodiazepine Urine Screen Negative  Screen Negative           Opiates Qualitative Urine Screen Negative  Screen Negative           PCP Urine Screen Negative  Screen Negative           Cannabinoids Qual Urine Screen Negative  Screen Positive !           Barbiturates Qual Urine Screen Negative  Screen Negative           XR KNEE LEFT 3 VIEWS        Rpt     EKG 12-LEAD, TRACING ONLY     Rpt        (H): Data is abnormally high  (L): Data is abnormally low  !: Data is abnormal  Rpt: View report in Results Review for more information  No results found for: \"PHENYTOIN\", \"PHENOBARB\", \"VALPROATE\", \"CBMZ\"       DIAGNOSIS   Principal Problem:    Schizoaffective disorder, bipolar type, currently hypomanic  History of schizophrenia  PTSD  R/O OCD    History of generalized anxiety disorder with panic attacks  History of ADHD    Clinically Significant Risk Factors                         # Overweight: Estimated body mass index is 29.48 kg/m  as calculated from the following:    Height as of this encounter: 1.93 m (6' 3.98\").    Weight as of this encounter: 109.8 kg (242 lb 1.6 oz).   # Moderate " Malnutrition: based on nutrition assessment          Active Problem List:  Patient Active Problem List   Diagnosis     Irritable bowel syndrome     Class 2 severe obesity due to excess calories with serious comorbidity and body mass index (BMI) of 37.0 to 37.9 in adult (H)     Patellofemoral instability of left knee with pain     Tobacco abuse     Suicidal ideation     Moderate episode of recurrent major depressive disorder (H)     CARSON (obstructive sleep apnea)     Unspecified psychosis not due to a substance or known physiological condition (H)     Marijuana use     Schizophrenia, unspecified type (H)     Schizophrenia, schizoaffective, chronic with acute exacerbation (H)          HOSPITAL COURSE AND ASSESSMENT   This is a 33 year old male with history of schizophrenia, MDD, suicidal ideation, and PTSD who presented to [facility] ED on 2/17/2024 via EMS for psychiatric evaluation.  [facility] and was experiencing symptoms of psychosis including disorganized, paranoid, and delusional thoughts.  Patient also exhibiting poor p.o. intake and not attending to ADLs such as bathing.  Patient has a history of Patellofemoral instability of left knee and requires a brace to stabilize.  Per chart review patient was not taking any psychiatric medications prior to admission.  Patient endorsed some cannabis use and urine drug screening negative for all except cannabinoids.  While admitted to the ED, psychiatry provider was consulted and initiated olanzapine to target psychosis symptoms.  Patient was also started on a trial of Anafranil to target OCD symptoms.  Patient was evaluated by medical provider in the ED and determined to be medically stable.  Patient subsequently admitted to inpatient psychiatry unit 10 N for further psychiatric stabilization.  Legal status at time of admission was: 72-hour hold.  Olanzapine started in the ED to target psychosis symptoms and continued  during psychiatric hospitalization.  Psychiatric provider initiated petition for mental health commitment with Mg on 2/20/2024.  Trial of Anafranil initiated in the ED to target OCD symptoms however this was discontinued due to risk for worsening psychosis symptoms.  On 3/1/2024 court documents received supporting mental health commitment with [facility] order in [facility]. Holliday order includes medications: Zyprexa (olanzapine), Haldol (haloperidol), Invega (paliperidone), and Seroquel (quetiapine). Per chart review, patient previously tolerated paliperidone (Invega) medication well and this medication was effective for treating psychosis and mood disorder symptoms however Invega p.o. was not covered by insurance and subsequently was discontinued.  Pharmacy liaison consult completed during current hospitalization and prior auth completed for health insurance coverage of Invega p.o. tablets.  Invega Sustenna long-acting injection also covered by health insurance.  Plan will be to continue olanzapine 15 mg p.o. at bedtime for now with plan to titrate down in upcoming days with initiation of medication paliperidone.  Provider initiated Holliday medication paliperidone (Invega) 24-hour tablet 1.5 mg PO daily with backup IM olanzapine 10 mg on 3/1/2024.   Plan will be to continue paliperidone (Invega) p.o. and titrate to therapeutic dose while monitoring for side effects.  If patient tolerating paliperidone p.o. plan will be to consider initiating long-acting injection Invega Sustenna prior to discharge due to patient's history of noncompliance with psychiatric medications as well as patient reporting directly to writer that he will be noncompliant with psychiatric medications in the community.  Plan will be for patient to discharge to crisis facility housing.  Unit CTC facilitating discharge plan.     PLAN   1. Ongoing education given regarding diagnostic and treatment options with risks, benefits and alternatives  and adequate verbalization of understanding.  2.  Medications:  Continue paliperidone (Invega) 24-hour tablet to 9 mg PO daily to target psychosis symptoms and mood disorder symptoms with backup olanzapine 10 mg IM injection.  Patient has [facility]iday order for this medication and must take.  Offer p.o. paliperidone (Invega) first, if patient refuses then administer IM olanzapine.  Discontinue olanzapine ODT tablet 5 mg PO at bedtime to target psychosis symptoms and mood disorder symptoms, with backup IM olanzapine 10 mg.  Patient now has a [facility]iday order for this medication and must take.  Offer p.o. olanzapine first.  If patient refuses then administer IM olanzapine.  Plan will be to titrate down on olanzapine with dose increases of paliperidone.  Continue multivitamin 1 tab PO daily  Continue cholecalciferol (vitamin D3) capsule 1250 mcg PO every 7 days for 8 doses to target vitamin D deficiency, administer immediately after supper  PRN benztropine tablet 0.5 mg PO2 times daily as needed to target side effect of movement disorder  PRN hydroxyzine tablet 25 mg PO every 4 hours as needed for anxiety  PRN olanzapine tablet 5-10 mg PO every 3 hours as needed for agitation, ordered linked with olanzapine IM injection  PRN melatonin tablet 3 mg PO at bedtime as needed for sleep  PRN nicotine lozenge 2 mg buccal every 1 hour as needed for nicotine withdrawal symptoms    3.  Labs/Imaging:  -XR KNEE LEFT 3 VIEWS ordered 2/28/2024  -CMP and CBC completed on 2/28/2024                 4. Consults:  -Orthopedic consult placed 2/28/2024 to address left knee subluxation    5. Precautions:  -Self-injury precautions, suicide precautions; risk moderate    6. Legal:  -Mental health commitment with [facility] starting 3/1/2024, expires 8/29/2024   -Holliday order in place which includes medications Zyprexa, Haldol, Invega, Seroquel    7. Discharge planning:  -Per unit CTC        Risk Assessment:  [facility] RISK ASSESSMENT: Patient able to  contract for safety and Patient on precautions    Coordination of Care:   Treatment Plan reviewed and physician signed, Care discussed with Care/Treatment Team Members, Chart reviewed and Patient seen      Re-Certification I certify that the inpatient psychiatric facility services furnished since the previous certification were, and continue to be, medically necessary for, either, treatment which could reasonably be expected to improve the patient s condition or diagnostic study and that the hospital records indicate that the services furnished were, either, intensive treatment services, admission and related services necessary for diagnostic study, or equivalent services.     I certify that the patient continues to need, on a daily basis, active treatment furnished directly by or requiring the supervision of inpatient psychiatric facility personnel.   I estimate 14-21 days of hospitalization is necessary for proper treatment of the patient. My plans for post-hospital care for this patient are   TBD     CARISSA Mendieta CNP    -     03/12/24       -     1:00 PM       Total time  35 minutes with > 50%spent on coordination of cares and psycho-education.    This note was created with help of Dragon dictation system. Grammatical / typing errors are not intentional.    CARISSA Mendieta CNP

## 2024-03-12 NOTE — PLAN OF CARE
Goal Outcome Evaluation:    Plan of Care Reviewed With: patient      Patient presents as alert and oriented, [facility], he is social and interactive with peers. He continues to make delusional statements about being able to know and feel other people's emotions when they stay with him in the same room which  causes them to have either good or bad dreams. He continues to report that he doesn't want to make others have bad dreams because he has had two people who were his room mates and he made them have night coronado. He denies having any pain or discomfort, denies feeling anxious or depressed. He reports no SI/SIB/HI and AVH. He is compliant with his scheduled medications, his VS stable, he had PRN Melatonin at HS,JOANA Stockings off @ HS stored in his locker, to continue with POC.

## 2024-03-12 NOTE — PROVIDER NOTIFICATION
03/12/24 1320   Individualization/Patient Specific Goals   Patient Personal Strengths expressive of emotions;good impulse control;community support;independent living skills;insight into illness/situation;intellectual cognitive skills;positive attitude   Patient Vulnerabilities adverse childhood experience(s);history of unsuccessful treatment;housing insecurity;lacks insight into illness;substance abuse/addiction   Interprofessional Rounds   Summary Eating well, sleeping well, going to groups, social with peers. Taking medications, going up on 1 medication before starting AMADO. Still signs of paranoia and grandious   Participants advanced practice nurse;nursing;[facility]   Behavioral Team Discussion   Participants Lily Cronin RN, Law RN, Inocencio CTC   Progress Responding well to miliue and medications. Confirmed bed is available at group home when stable and on proper medications   Anticipated length of stay 7-18 days   Continued Stay Criteria/Rationale Needs clinical stabilization, medication management, and safe dispo plan   Medical/Physical See H&P   Plan Medication management, mental health symptom stabilization, development of safe dispo plan, Petition for commitment due to history of multiple ED visits and level of recent decompensation   Anticipated Discharge Disposition group home     PRECAUTIONS AND SAFETY    Behavioral Orders   Procedures    Code 1 - Restrict to Unit    Code 2     To xray    Code 3    Code 3     To [place] with security and 2 staff, by himself    Routine Programming     As clinically indicated    Self Injury Precaution    Status 15     Every 15 minutes.    Suicide precautions: Suicide Risk: MODERATE; Clinical rationale to override score: modification to the care environment, Collateral information supporting Suicidal/self-harm behaviors     Patients on Suicide Precautions should have a Combination Diet ordered that includes a Diet selection(s) AND a Behavioral Tray selection for Safe  Tray - with utensils, or Safe Tray - NO utensils       Order Specific Question:   Suicide Risk     Answer:   MODERATE     Order Specific Question:   Clinical rationale to override score:     Answer:   modification to the care environment     Order Specific Question:   Clinical rationale to override score:     Answer:   Collateral information supporting Suicidal/self-harm behaviors       Safety  Safety WDL: WDL  Patient Location: group room  Observed Behavior: calm, sitting  Observed Behavior (Comment): Watching TV  Safety Measures: suicide check-in completed, suicide assessment completed, safety rounds completed  Diversional Activity: music  Suicidality: Status 15, Unpredictable frequency of checking on patient

## 2024-03-12 NOTE — PLAN OF CARE
BEH IP Unit Acuity Rating Score (UARS)  Patient is given one point for every criteria they meet.    CRITERIA SCORING   On a 72 hour hold, court hold, committed, stay of commitment, or revocation. 1    Patient LOS on BEH unit exceeds 20 days. 0  LOS: 22   Patient under guardianship, 55+, otherwise medically complex, or under age 11. 0   Suicide ideation without relief of precipitating factors. 0   Current plan for suicide. 0   Current plan for homicide. 0   Imminent risk or actual attempt to seriously harm another without relief of factors precipitating the attempt. 0   Severe dysfunction in daily living (ex: complete neglect for self care, extreme disruption in vegetative function, extreme deterioration in social interactions). 0   Recent (last 7 days) or current physical aggression in the ED or on unit. 0   Restraints or seclusion episode in past 72 hours. 0   Recent (last 7 days) or current verbal aggression, agitation, yelling, etc., while in the ED or unit. 0   Active psychosis. 1   Need for constant or near constant redirection (from leaving, from others, etc).  0   Intrusive or disruptive behaviors. 0   Patient requires 3 or more hours of individualized nursing care per 8-hour shift (i.e. for ADLs, meds, therapeutic interventions). 0   TOTAL 2

## 2024-03-12 NOTE — CARE PLAN
03/12/24 1625   Group Therapy Session   Group Attendance attended group session   Time Session Began 1115   Time Session Ended 1200   Total Time (minutes) 45   Total # Attendees 2   Group Type life skill;psychoeducation   Group Topic Covered coping skills/lifestyle management;relationship;problem-solving;structured socialization;cognitive activities   Group Session Detail General Health and Coping Skills: education and group discussion on a health and social/relationship-related topic.   Patient Participation Detail Pt participated in a group discussion on learning styles and boundaries. Pt provided thoughtful insight and personal experiences to the discussion. Pt appeared comfortable and engaged in the activity.

## 2024-03-12 NOTE — CARE PLAN
03/12/24 1537   Group Therapy Session   Group Attendance attended group session   Time Session Began 1015   Time Session Ended 1115   Total Time (minutes) 60   Total # Attendees 3   Group Type task skill;recreation;life skill   Group Topic Covered balanced lifestyle;coping skills/lifestyle management;leisure exploration/use of leisure time;problem-solving;self-care activities;relaxation techniques   Group Session Detail OT Clinic: open task group focused on activities to promote self-expression, independence, and leisure exploration while working to demonstrate cognitive skills   Patient Participation Detail Pt participated in a task group to complete fuze beading projects. Pt appeared engaged in the activity, was social with peers and staff, and seemed to be in a good-natured mood. Pt's work was neat and organized, and was able to complete their project within the group timeframe. Pt was open to sharing their personal experiences with the group and talked about their discharge plans, feelings about their former partner, and potential career paths.

## 2024-03-12 NOTE — PLAN OF CARE
Goal Outcome Evaluation:    Pt was in group and the peers left, he was the only member on topic and understanding the DBT concept of Wise mind. Writer and he decided to do 1:1 therapy when the peers left and had a 30 minutes session, see note.

## 2024-03-12 NOTE — PLAN OF CARE
-Attending Provider: CARISSA Davis CNP  -Voicemail Code: [id number]  -Team Note Due: Tuesday  -Next Steps:     CTC is waiting to here back from [facility] regarding admission date   Set up with psychiatry    Draft PD and request signature from TCM. Sent to  [facility] with change of status form.       Assessment/Intervention/Current Symtoms and Care Coordination:  Chart review and met with team, discussed pt progress, symptomology, and response to treatment.  Discussed the discharge plan and any potential impediments to discharge.  CTC met with Higinio to discuss discharge plans. CTC confirmed bed available at [facility] and waiting to set admission date until AMADO is established.   CTC called [facility] and contracted plan for Rev. Brandt to follow up with [facility] about confirming admission date. CTC will await call from [facility].     Discharge Plan or Goal:  Pending further stabilization, continued compliance with medications, continued activities of daily living, and development of a safe discharge plan.   Considerations:     Barriers to Discharge:  Impact of mental health symptoms on well being   Impact of mental health symptoms on activities of daily living  Need for medication monitoring and medication management     Referral Status:       Legal Status:  Commitment with [facility]: [place]  File Number: [id number]  Start: February 22 2024  Expiration: August 29th 2024     Contacts:  Norah (CADI  through [facility]): [phone]  [email or web address]  [facility] (group home Higinio will be discharging to: Rev. Brandt [phone]        Phone: [phone]         FAX: [phone] [email or web address]       Upcoming Meetings and Dates/Important Information:      -Still Needed on AVS:

## 2024-03-12 NOTE — PLAN OF CARE
Goal Outcome Evaluation:            Pt appeared asleep for  6.75 hours. No behavioral and medical concerns noted and reported. Remains 15 minutes safety check. Will continue POC.

## 2024-03-12 NOTE — CARE PLAN
03/12/24 1704   Group Therapy Session   Group Attendance attended group session   Time Session Began 1315   Time Session Ended 1400   Total Time (minutes) 30   Total # Attendees 5   Group Type life skill;psychoeducation   Group Topic Covered balanced lifestyle;emotions/expression;self-care activities;relaxation techniques   Group Session Detail General Health and Coping Skills: Journaling - group activity to educate and promote the therapeutic benefits of processing and self-reflection using journaling.   Patient Participation Detail Pt participated in a group activity exploring the therapeutic benefits of journaling. Pt contributed thoughtful responses to the discussion questions; appeared focused and engaged in the journaling activity.

## 2024-03-12 NOTE — PLAN OF CARE
"Goal Outcome Evaluation:    Individual Therapy Note      Date of Service: March 11, 2024    Patient: Higinio goes by \"Higinio,\" uses he/him pronouns    Individuals Present: RENO Melendez    Session start: 16:30 pm  Session end: 17:00 pm  Session duration in minutes: 30    Patient Active Problem List   Diagnosis    Irritable bowel syndrome    Class 2 severe obesity due to excess calories with serious comorbidity and body mass index (BMI) of 37.0 to 37.9 in adult (H)    Patellofemoral instability of left knee with pain    Tobacco abuse    Suicidal ideation    Moderate episode of recurrent major depressive disorder (H)    CARSON (obstructive sleep apnea)    Unspecified psychosis not due to a substance or known physiological condition (H)    Marijuana use    Schizophrenia, unspecified type (H)    Schizophrenia, schizoaffective, chronic with acute exacerbation (H)     Modality Used:DBT, Person Centered, and Motivational Interviewing    Goals: discussed how DBT wise mind applied to his life, he spoke of some relationship challenges etc.    Patient Description of current symptoms: doing better, able to have insight about behaviors and mental health challenges     Mental Status Exam:   Attitude: cooperative  Eye Contact: good  Mood: better  Affect: appropriate and in normal range  Speech: clear, coherent  Psychomotor Behavior: no evidence of tardive dyskinesia, dystonia, or tics  Thought Process:  logical and goal oriented  Associations: no loose associations  Thought Content: no evidence of suicidal ideation or homicidal ideation  Insight: good  Judgement: fair  Attention Span and Concentration: intact    Pt progress: Pt is insightful and self aware    Treatment Objective(s) Addressed:   The focus of this session was on processing feelings related to Dbt wise mind skill and how it applies to his life, building distress tolerance, identifying treatment goals, building self-esteem, exploring obstacles to safety in the " community, and building skills in boundaries and friendships      Progress Towards Goals and Assessment of Patient:   Patient is making progress towards treatment goals as evidenced by insight, self awareness, leader in groups.       Therapeutic Intervention(s):   Provided active listening, unconditional positive regard, and validation. Engaged in guided discovery, explored patient's perspectives and helped expand them through socratic dialogue. Taught the link between thoughts, feelings, and behaviors. Reviewed healthy living that supports positive mental health, including looking at sleep hygiene, regular movement, nutrition, and regular socialization. Provided positive reinforcement for progress towards goals, gains in knowledge, and application of skills previously taught.  Engaged in social skills training. Worked on relapse prevention planning (review of stressors, early warning signs, written plan to respond to signs, and rehearse plan). Introduced and practiced Wise Mind Explored motivation for behavioral change.    Plan/next step: Pt attends groups, he happened to be the only one with attention and understanding of topic, so when peers left writer and he decided to have 1:1 session and talk about how he practices and how that skill applies to his life.    01913 - Psychotherapy (with patient) - 30 (16-37*) min

## 2024-03-12 NOTE — PLAN OF CARE
Pt has a full range affect with calm mood attending groups. During check in pt was less delusional but was over heard having some delusional talks with peers Pt had questions about Invega PO vs AMADO side effects. Explained AMADO is not given until pt can tolerate PO doses first. Pt was receptive but is leery of getting an AMADO. Discussed benefits of AMADO and pt was in agreement but was still leery of the AMADO. Encouraged to speak with provider regarding AMADO. Pt rates anxiety at 0/10 and depression 0/10. Pt rates pain at 0/10. Pt reports no SI/HI/SIB and contracts for safety. Pt denies any hallucinations and not noted responding to any internal stimuli. Pt was medication compliant. No reported or observed medication side effects. Pt was visible on unit on social with peers. Continue current POC.    Plan of Care Reviewed With: patient Plan of Care Reviewed With: patient    Overall Patient Progress: no changeOverall Patient Progress: no change

## 2024-03-13 PROCEDURE — H2032 ACTIVITY THERAPY, PER 15 MIN: HCPCS

## 2024-03-13 PROCEDURE — G0177 OPPS/PHP; TRAIN & EDUC SERV: HCPCS

## 2024-03-13 PROCEDURE — 250N000013 HC RX MED GY IP 250 OP 250 PS 637

## 2024-03-13 PROCEDURE — 124N000002 HC R&B MH UMMC

## 2024-03-13 PROCEDURE — 250N000013 HC RX MED GY IP 250 OP 250 PS 637: Performed by: PSYCHIATRY & NEUROLOGY

## 2024-03-13 RX ADMIN — PALIPERIDONE 9 MG: 6 TABLET, EXTENDED RELEASE ORAL at 08:08

## 2024-03-13 RX ADMIN — SENNOSIDES AND DOCUSATE SODIUM 2 TABLET: 8.6; 5 TABLET ORAL at 19:50

## 2024-03-13 RX ADMIN — SENNOSIDES AND DOCUSATE SODIUM 2 TABLET: 8.6; 5 TABLET ORAL at 08:08

## 2024-03-13 RX ADMIN — THERA TABS 1 TABLET: TAB at 08:08

## 2024-03-13 RX ADMIN — Medication 3 MG: at 19:51

## 2024-03-13 ASSESSMENT — ACTIVITIES OF DAILY LIVING (ADL)
ADLS_ACUITY_SCORE: 45
HYGIENE/GROOMING: INDEPENDENT
ADLS_ACUITY_SCORE: 45
LAUNDRY: WITH SUPERVISION
ADLS_ACUITY_SCORE: 45
DRESS: INDEPENDENT
ADLS_ACUITY_SCORE: 45
ADLS_ACUITY_SCORE: 45
ORAL_HYGIENE: INDEPENDENT

## 2024-03-13 NOTE — PROGRESS NOTES
03/12/24 1900   Group Therapy Session   Group Attendance attended group session   Time Session Began 1600   Time Session Ended 1645   Total Time (minutes) 40   Total # Attendees 3   Group Type recreation   Group Topic Covered leisure exploration/use of leisure time   Group Session Detail TR leisure group   Patient Response/Contribution cooperative with task   Patient Participation Detail Pt participated in Therapeutic Recreation group with focus on leisure participation, communication skills, and critical thinking. Engaged and focused in the group recreational activity via a group game.  Pt was a full participant throughout the group. Pt interacted appropriately with peers while they were in group, but more of the group he was the only patient in group. Pt stated he goes to all the groups, as he doesn't have anything else to do and that he likes going to the TR group specifically.

## 2024-03-13 NOTE — PLAN OF CARE
"  Problem: Psychotic Signs/Symptoms  Goal: Optimal Cognitive Function (Psychotic Signs/Symptoms)  Outcome: Progressing   Goal Outcome Evaluation:    RN Assessment:  SI/Self harm:  pt denies  Aggression/agitation/HI:  none reported or observed  AVH:  pt denies, though states sometimes he'll have hallucinations \"but they're just my anxiety making me see things that aren't real\".   Sleep: adequate  PRN Med: No PRNs administered this shift  Medication AE: none reported or observed  Physical Complaints/Issues: pt denies  I & O: eating and drinking well  ADLs: independent  Visits: none this shift  Vitals:  WNL  COVID 19 Assessment:  no symptoms present  Milieu Participation: visible, active and social with others, attended groups. Pt enjoyed singing in group, states that \"working on my music\" is therapeutic for him   Behavior: overall calm, pleasant and cooperative. Pt was observed attempting to help calm a peer who was escalating by offering reassurance to patient. Pt states that he believes the Invega is helping, he states \"I have more control over my thoughts\".   No other concerns at this time. Nursing will continue to monitor and assess.    "

## 2024-03-13 NOTE — PROGRESS NOTES
Pt was a  during dance/movement therapy (D/MT) despite being more passive than previous sessions with this therapist.  For example, he participated primarily from a seated position using the music to support singing and gentle movement.  Pt swayed and engaged in movements that reflected the music in an interpretive manner. He engaged in the emotional content of the lyrics, expressing feelings of contained angst (memories of past experiences rather than current emotional struggles.)  Pt was welcoming and engaging with other patients and stated he feels prepared for an upcoming discharge.       03/13/24 8902   Expressive Therapy   Therapy Type dance/movement   Minutes of Treatment 55

## 2024-03-13 NOTE — PLAN OF CARE
Problem: Psychotic Signs/Symptoms  Goal: Improved Behavioral Control (Psychotic Signs/Symptoms)  Outcome: Progressing   Goal Outcome Evaluation:    Plan of Care Reviewed With: patient      Pt's symptoms appear to be improving. Pt denies having any safety concerns including thoughts of harming himself or others. Pt denies auditory and visual hallucinations and takes his medications as prescribed. No delusional or paranoid statement noted. No medication side effects reported or observed. Denies any physical discomfort. Pt denies having any problem with eating or sleeping.   Plan: Continue to provide safe environment and therapeutic milieu.

## 2024-03-13 NOTE — PROGRESS NOTES
"Higinio Wallace is a 30 year old male who is being evaluated via a billable video visit.      The patient has been notified of following:     \"This video visit will be conducted via a call between you and your physician/provider. We have found that certain health care needs can be provided without the need for an in-person physical exam.  This service lets us provide the care you need with a video conversation.  If a prescription is necessary we can send it directly to your pharmacy.  If lab work is needed we can place an order for that and you can then stop by our lab to have the test done at a later time.    Video visits are billed at different rates depending on your insurance coverage.  Please reach out to your insurance provider with any questions.    If during the course of the call the physician/provider feels a video visit is not appropriate, you will not be charged for this service.\"    Patient has given verbal consent for Video visit? Yes  How would you like to obtain your AVS? MyChart  If you are dropped from the video visit, the video invite should be resent to: Text to cell phone: [phone]  Will anyone else be joining your video visit? No        Video-Visit Details    Type of service:  Video Visit    Video Start Time: 3:04PM  Video End Time: 3:17PM    Originating Location (pt. Location): Home    Distant Location (provider location):  [facility]     Platform used for Video Visit: AmWell and then Doximity. Amwell stopped working    Donell Holland MD      " ----- Message from Chioma Heller sent at 3/12/2024  3:32 PM EDT -----  03/12/24 3:32 PM    Hello, our patient Sridevi Sanders has had CRC: Colonoscopy completed/performed. Please assist in updating the patient chart by making an External outreach to [facility] facility located in [address] [place] [id number] . [phone] The date of service is 2018.    Thank you,  Chioma Heller   [facility]     03/12/24 3:33 PM    Hello, our patient Sridevi Sanders has had Diabetic Eye Exam completed/performed. Please assist in updating the patient chart by making an External outreach to [facility] facility located in [address] [place] [id number]. The date of service is May 2023.    Thank you,  Chioma Heller   [facility]

## 2024-03-13 NOTE — PLAN OF CARE
Occupational Therapy Group Note:     03/13/24 1446   Group Therapy Session   Group Attendance attended group session   Time Session Began 1030   Time Session Ended 1155   Total Time (minutes) 75   Total # Attendees 4-5   Group Type expressive therapy;task skill   Group Topic Covered cognitive activities;coping skills/lifestyle management;emotions/expression;leisure exploration/use of leisure time;relaxation techniques;structured socialization   Group Session Detail OT Clinic Group   Patient Response/Contribution confronted peers appropriately;cooperative with task;listened actively;offered helpful suggestions to peers   Patient Participation Detail Pt actively participated in occupational therapy clinic to facilitate coping skill exploration, creative expression within personally meaningful activities, and clinical observation of social, cognitive, and kinesthetic performance skills. Pt response: supervision-independent to initiate, gather materials, sequence, and adjust to workspace demands as needed. Demonstrated good focus, planning, and problem solving for selected beading, watercolor, and cognitive puzzle task. Patient worked on all tasks in an organized manner; able to reach task completion on all projects. Engaged in casual prosocial reciprocal conversation with peers. Patient was receptive of compliments offered to him in regards to his completed projects. Able to ask for assistance as needed, and appropriate with peers and staff.

## 2024-03-13 NOTE — PLAN OF CARE
BEH IP Unit Acuity Rating Score (UARS)  Patient is given one point for every criteria they meet.    CRITERIA SCORING   On a 72 hour hold, court hold, committed, stay of commitment, or revocation. 1    Patient LOS on BEH unit exceeds 20 days. 0  LOS: 23   Patient under guardianship, 55+, otherwise medically complex, or under age 11. 0   Suicide ideation without relief of precipitating factors. 0   Current plan for suicide. 0   Current plan for homicide. 0   Imminent risk or actual attempt to seriously harm another without relief of factors precipitating the attempt. 0   Severe dysfunction in daily living (ex: complete neglect for self care, extreme disruption in vegetative function, extreme deterioration in social interactions). 0   Recent (last 7 days) or current physical aggression in the ED or on unit. 0   Restraints or seclusion episode in past 72 hours. 0   Recent (last 7 days) or current verbal aggression, agitation, yelling, etc., while in the ED or unit. 0   Active psychosis. 1   Need for constant or near constant redirection (from leaving, from others, etc).  0   Intrusive or disruptive behaviors. 0   Patient requires 3 or more hours of individualized nursing care per 8-hour shift (i.e. for ADLs, meds, therapeutic interventions). 0   TOTAL 2

## 2024-03-13 NOTE — PLAN OF CARE
-Attending Provider: CARISSA Davis CNP  -Voicemail Code: [id number]  -Team Note Due: Tuesday  -Next Steps:     CTC needs to call Rev. Carlton to confirm 3/18 discharge on Friday 3/15   Set up with psychiatry and therapy   Draft PD and request signature from TCM. Sent to  [facility] with change of status form.       Assessment/Intervention/Current Symtoms and Care Coordination:  Chart review and met with team, discussed pt progress, symptomology, and response to treatment.  Discussed the discharge plan and any potential impediments to discharge.  CTC called [facility] and spoke with Rev. Carlton regarding confirming a hospital discharge date for him to admit to [facility]. Rev. Carlton was agreeable to 3/18 around 12:30pm. Rev. Carlton requested he be discharged with 30 day supply of medications, signed orders for medications, and his next psych appointment scheduled.       Discharge Plan or Goal:  Discharge 3/18 at 12:30pm to [facility]    Barriers to Discharge:  Impact of mental health symptoms on well being   Impact of mental health symptoms on activities of daily living  Need for medication monitoring and medication management     Referral Status:       Legal Status:  Commitment with [facility]: [place]  File Number: [id number]  Start: February 22 2024  Expiration: August 29th 2024     Contacts:  Norah (CADI  through [facility] [place]): [phone]  [email or web address]  [facility] (group home Higinio will be discharging to: Rev. Carlton [phone]        Phone: [phone]         FAX: [phone] [email or web address]       Upcoming Meetings and Dates/Important Information:

## 2024-03-14 PROCEDURE — 93005 ELECTROCARDIOGRAM TRACING: CPT

## 2024-03-14 PROCEDURE — 250N000013 HC RX MED GY IP 250 OP 250 PS 637: Performed by: PSYCHIATRY & NEUROLOGY

## 2024-03-14 PROCEDURE — 93010 ELECTROCARDIOGRAM REPORT: CPT | Performed by: INTERNAL MEDICINE

## 2024-03-14 PROCEDURE — H2032 ACTIVITY THERAPY, PER 15 MIN: HCPCS

## 2024-03-14 PROCEDURE — 99232 SBSQ HOSP IP/OBS MODERATE 35: CPT

## 2024-03-14 PROCEDURE — G0177 OPPS/PHP; TRAIN & EDUC SERV: HCPCS

## 2024-03-14 PROCEDURE — 90853 GROUP PSYCHOTHERAPY: CPT

## 2024-03-14 PROCEDURE — 124N000002 HC R&B MH UMMC

## 2024-03-14 PROCEDURE — 250N000013 HC RX MED GY IP 250 OP 250 PS 637

## 2024-03-14 RX ADMIN — PALIPERIDONE 9 MG: 6 TABLET, EXTENDED RELEASE ORAL at 07:59

## 2024-03-14 RX ADMIN — SENNOSIDES AND DOCUSATE SODIUM 2 TABLET: 8.6; 5 TABLET ORAL at 08:00

## 2024-03-14 RX ADMIN — SENNOSIDES AND DOCUSATE SODIUM 2 TABLET: 8.6; 5 TABLET ORAL at 19:04

## 2024-03-14 RX ADMIN — Medication 1250 MCG: at 18:05

## 2024-03-14 RX ADMIN — THERA TABS 1 TABLET: TAB at 08:00

## 2024-03-14 RX ADMIN — Medication 3 MG: at 19:04

## 2024-03-14 ASSESSMENT — ACTIVITIES OF DAILY LIVING (ADL)
ADLS_ACUITY_SCORE: 45
ADLS_ACUITY_SCORE: 45
ORAL_HYGIENE: INDEPENDENT
ADLS_ACUITY_SCORE: 45
DRESS: INDEPENDENT
LAUNDRY: WITH SUPERVISION
ADLS_ACUITY_SCORE: 45
HYGIENE/GROOMING: INDEPENDENT
ADLS_ACUITY_SCORE: 45
ADLS_ACUITY_SCORE: 45
ORAL_HYGIENE: INDEPENDENT
ADLS_ACUITY_SCORE: 45
HYGIENE/GROOMING: INDEPENDENT
LAUNDRY: WITH SUPERVISION
ADLS_ACUITY_SCORE: 45
DRESS: INDEPENDENT
ADLS_ACUITY_SCORE: 45

## 2024-03-14 NOTE — PLAN OF CARE
"  Problem: Suicide Risk  Goal: Absence of Self-Harm  Outcome: Adequate for Care Transition   Goal Outcome Evaluation:      RN Assessment:  SI/Self harm:  pt denies   Aggression/agitation/HI:  none reported or observed  AVH:  pt denies, and does not appear to be responding to internal stimuli  Sleep: adequate  PRN Med: No PRNs administered this shift  Medication AE: none reported or observed  Physical Complaints/Issues: bilateral swelling in lower extremities, wearing JOANA socks  I & O: eating and drinking well  LBM: yesterday, pt reports it was \"normal\", denies diarrhea or loose stools  ADLs: independent  Visits: none this shift  Vitals:  WNL  COVID 19 Assessment:  no symptoms present  Milieu Participation: visible, social and engaged. Pt attended group this morning.  Behavior: overall calm, pleasant and cooperative. Pt is easily engaged and cooperative with care. Pt reports he is looking forward to discharge early next week. Pt denies any concerns regarding invega sustenna injection scheduled for tomorrow. Pt states he is \"doing good\" and believes the invega is helping him.   No behavioral outbursts this shift.  Interactions with peers are appropriate.  No other concerns at this time. Nursing will continue to monitor and assess.   "

## 2024-03-14 NOTE — PLAN OF CARE
BEH IP Unit Acuity Rating Score (UARS)  Patient is given one point for every criteria they meet.    CRITERIA SCORING   On a 72 hour hold, court hold, committed, stay of commitment, or revocation. 1    Patient LOS on BEH unit exceeds 20 days. 0  LOS: 24   Patient under guardianship, 55+, otherwise medically complex, or under age 11. 0   Suicide ideation without relief of precipitating factors. 0   Current plan for suicide. 0   Current plan for homicide. 0   Imminent risk or actual attempt to seriously harm another without relief of factors precipitating the attempt. 0   Severe dysfunction in daily living (ex: complete neglect for self care, extreme disruption in vegetative function, extreme deterioration in social interactions). 0   Recent (last 7 days) or current physical aggression in the ED or on unit. 0   Restraints or seclusion episode in past 72 hours. 0   Recent (last 7 days) or current verbal aggression, agitation, yelling, etc., while in the ED or unit. 0   Active psychosis. 1   Need for constant or near constant redirection (from leaving, from others, etc).  0   Intrusive or disruptive behaviors. 0   Patient requires 3 or more hours of individualized nursing care per 8-hour shift (i.e. for ADLs, meds, therapeutic interventions). 0   TOTAL 2

## 2024-03-14 NOTE — PROVIDER NOTIFICATION
03/14/24 0600   Sleep/Rest   Night Time # Hours 7 hours     Pt had a quiet night with no behavioral or safety concerns. Pt was observed sleeping the entire night, no acute events noted or reported.

## 2024-03-14 NOTE — PLAN OF CARE
03/13/24 1900   Group Therapy Session   Group Attendance attended group session   Time Session Began 1600   Time Session Ended 1645   Total Time (minutes) 45   Total # Attendees 4   Group Type expressive therapy   Group Topic Covered emotions/expression   Patient Response/Contribution cooperative with task       Art Therapy directive was to create art in response to questions on a handout related to the five senses/sensory interactions within the environments around us.  Goals of directive: emotional expression, emotional regulation, sensory exploration, expressing aspects of self, media exploration.  Pt was an engaged participant, focused on task for the majority of group. Pt created a drawing of a aron in response to the question on handout: if you were a natural object what would you be?  Pts mood was calm, pleasant participant.

## 2024-03-14 NOTE — PLAN OF CARE
-Attending Provider: CARISSA Davis CNP  -Voicemail Code: [phone]  -Team Note Due: Tuesday  -Next Steps:        Coordinate plan for medications at discharge. [facility] uses [facility].    Draft PD and request signature from [facility]. Sent to  [facility] with change of status form.    Higinio wants PCP and Dietitian appointments set up      Assessment/Intervention/Current Symtoms and Care Coordination:  Chart review and met with team, discussed pt progress, symptomology, and response to treatment.  Discussed the discharge plan and any potential impediments to discharge.  CTC met with Higinio to discuss discharge plans and confirm discharge date of Monday 3/18. CTC obtained signature on [facility] for [facility]. [facility] reviewed the provisional discharge process with Higinio.   [facility] called [facility] to try and schedule psychiatry services. 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 CTC is awaiting call back.   [facility] called Rev. Carlton at [facility] to discuss AMADO situation. [facility] contracted plan for second loading dose of Invega to be administered at the home Friday 3/22. Rev. Carlton asked how the medications would be sent. CTC stated he can talk to the provider to confirm options, but thinks they can be sent with the patient or sent to the pharmacy. Rev. Carlton stated they use the [facility].   [facility] requested care coordination set Higinio up with a new psychiatrist in [facility] within the next two weeks. [facility] requested this be completed by 3/15.  [facility] faxed RAFAL to [facility]. [facility] scheduled telehealth therapy with Jen Apley for Thursday March 21st at 10am. Link will be sent to Higinio's email.       Discharge Plan or Goal:  Discharge 3/18 at 12:30pm to [facility]    Barriers to Discharge:  Impact of mental health symptoms on well being    Impact of mental health symptoms on activities of daily living  Need for medication monitoring and medication management     Referral Status:       Legal Status:  Commitment with [facility]: [place]  File Number: [id number]  Start: February 22 2024  Expiration: August 29th 2024     Contacts:  Norah (CADI  through [facility] [place]): [phone]  [email or web address]  [facility] (group home Higinio will be discharging to: Rev. Carlton [phone]        Phone: [phone]         FAX: [phone] [email or web address]       Upcoming Meetings and Dates/Important Information:

## 2024-03-14 NOTE — DISCHARGE INSTRUCTIONS
Behavioral Discharge Planning and Instructions    Summary: You were admitted to [facility] on 2/17/2024 due to Disorganized Thinking/Behaviors, Delusional Thoughts, and Psychotic Symptomology. You were treated by CARISSA Davis CNP and Byron Calderon MD and provisionally discharged on 03/18/2023 to Group Home. [address] [facility] [address].    You were [facility] on [facility].  You were also court ordered to take the medications the doctor ordered for you. You are being discharged on a Provisional Discharge Agreement which shall remain in effect for the duration of the Commitment.  Your Commitment expires on 08/29/2024.      Continue to follow with your Mental Health : Ashanti Semaj. Her number is [phone]       Main Diagnosis:   Schizoaffective disorder, bipolar type, currently hypomanic     Health Care Follow-up:     Invega Long Acting Injectable Due March 22nd 2024 (orders for this dose and maintance dose have been sent to pharmacy)    Appointment type: Psychiatry   Date/time: Thursday March 28th, 2024 @  2:40 PM In person  Provider: MONTEZ Duque, PMHNP-BC  Address: [facility] [address] [place] [address]  Phone: [phone]  Fax: [phone]  Note:  Please arrive 10 minutes early to complete intake paperwork.   ** [facility] to fax AVS upon discharge, please.       Appointment Date/Time: Virtual-3/21 at 10am   Therapist: Jen Apley through [facility]      Phone Number: [phone]   Link will be sent to email: [email or web address]    Appointment Date/Time: In-person April 11th at 10:10am    Primary Care Provider: Dr. Purcell      Address: [address] [address]     Phone: [phone]        Attend all scheduled appointments with your outpatient providers. Call at  "least 24 hours in advance if you need to reschedule an appointment to ensure continued access to your outpatient providers.     Major Treatments, Procedures and Findings:  You were provided with: a psychiatric assessment, assessed for medical stability, medication evaluation and/or management, group therapy, and milieu management    Symptoms to Report: Feeling more aggressive, increased confusion, losing more sleep, mood getting worse, or thoughts of suicide.    Early warning signs can include: Increased depression or anxiety sleep disturbances increased thoughts or behaviors of suicide or self-harm  increased unusual thinking, such as paranoia or hearing voices.    Safety and Wellness: Take all medicines as directed. Make no changes unless your doctor suggests them. Follow treatment recommendations. Refrain from alcohol and non-prescribed drugs. Ask your support system to help you reduce your access to items that could harm yourself or others. If there is a concern for safety, call 677.    Resources:   General Mental Health Resources:   National [place] on Mental Illness (RADHA) [place]: Connect for help, to navigate the mental health system, and for support and for resources. Call: 0-348-IHGC-Helps / 0-447-801-6019  Crisis Text Line: The 24/7 emergency service is available if you or someone you know is experiencing a psychiatric or mental health crisis. Text: \"[place]\" to 681173  [place] [facility]: Are you an adult needing support? Talk to a specialist who has firsthand experience living with a mental health condition. Call: 595.134.3194  Text: \"Support\" to 29863  mentalhealthmn.org/support/[place]-warmline/  National Suicide Prevention Lifeline: The 24/7 lifeline provides support when in distress, has prevention and crisis resources for you or your loved ones, and resources for professionals. Call 4-445-031-TALK (7119)  Peer Support Connection Warmlines: Rhhj-uy-iitv telephone support that s safe and " supportive. Open 5 p.m. to 9 a.m. Call or text: 1-483.361.7938   COVID Cares Stress Phone Support Service. Any [place] experiencing stress can call 559-TGRG8SK (325-714-2425) for free telephone support from 9am to 9pm every day. The service is a collaboration with volunteers from the [place] Psychiatric Society, the [place] Psychological Association, the [place] Black Psychologists, and [facility]. The free service is also accessible at Dash where searchers can also find psychiatric and mental health services availability and real-time Substance Use Disorder Treatment program openings.  Adult Rehabilitative Mental Health Services (ARMHS): https://[place].gov/dhs/partners-and-providers/policies-procedures/adult-mental-health/adult-rehabilitative-mental-health-services/armhs-certified-providers/  Mental Health Association of [place] (www.mentalhealth.org): 687.689.8187 or 934-090-9080  Self- Management and Recovery Training., [employer]-- Toll free: 626.640.4297  www.[employer]  [facility] Crisis (COPE) Response - Adult 818 228-4451    Outpatient Psychiatry/Therapy Resources:   [facility] - (phone: 612.119.5127) https://www.[employer]/care/specialty/mental-behavioral-health/ https://www.[employer]/care/find/doctors/psychologists/  [facility] - (phone: 7-249-GDMUKSFW) https://www.ealth[place].org/treatments/Counseling-Adult  [facility] - (phone: 579.590.5588) https://[facility] HospitalSPIL [facility]AMES[facility] Hospitals.[employer]SM[employer]/  [facility] - (phone: 688.324.2927)  https://[facility]/  [facility] - (phone: 1-975.287.8506) https://www.[employer]/  [facility] - (phone: 937.101.5599) https://www.KoolConnect Technologiesetherapy.com/  [facility] - (phone: 394.758.1247) https://Desmos/  [facility] - (phone: 195.626.1845)  https://walkin.org/    General Medication Instructions:   See your medication sheet(s) for instructions.   Take all medicines as directed.  Make no changes unless your doctor suggests them.   Go to all your doctor visits.  Be sure to have all your required lab tests. This way, your medicines can be refilled on time.  Do not use any drugs not prescribed by your doctor.  Avoid alcohol.    Advance Directives:   Scanned document on file with Loiza? No scanned doc  Is document scanned? No. Copy Requested.  Honoring Choices Your Rights Handout: Informed and given  Was more information offered? Pt declined    The Treatment team has appreciated the opportunity to work with you. If you have any questions or concerns about your recent admission, you can contact the unit which can receive your call 24 hours a day, 7 days a week. They will be able to get in touch with a Provider if needed. The unit number is [phone].

## 2024-03-14 NOTE — PLAN OF CARE
"  Problem: Psychotic Signs/Symptoms  Goal: Improved Behavioral Control (Psychotic Signs/Symptoms)  Outcome: Progressing   Goal Outcome Evaluation:    Plan of Care Reviewed With: patient      Pt was visible in the lounge for much of the shift. Pt is cooperative, polite and pleasant. Pt is sociable with peers and staff. Denies anxiety and depression. Denies auditory and visual hallucinations. Denies SI/HI. Denies physical discomfort. Stated \" I'm doing alright. My Invega is working.\" Pt is eating and sleeping in adequate amounts. No behavioral outburst.   Plan: Continue to provide safe environment and therapeutic milieu.     "

## 2024-03-14 NOTE — PLAN OF CARE
03/14/24 1440   Group Therapy Session   Group Attendance attended group session   Time Session Began 1015   Time Session Ended 1145   Total Time (minutes) 90   Total # Attendees 4   Group Type Occupational Therapy   Group Topic Covered balanced lifestyle;coping skills/lifestyle management;leisure exploration/use of leisure time;relaxation techniques   Group Session Detail OT Clinic Group   Patient Participation Detail Intervention: OT Clinic with 3 peers. Pt participated in a OT Clinic group to facilitate coping skills exploration and creative expression through personally meaningful activities, and to encourage utilization of these healthy coping skills to promote overall health and wellness. Group included clinical observation of social, cognitive and kinesthetic performance skills to inform treatment and safe discharge planning.    Patient Response: Joined at the start of group and was an active participant for the duration of time, demonstrating consistent focus on the projects they worked on during this time. Was social off and on as well, both initiating and responding to conversation started by others. Noted to feel good about leaving on Monday and also having some nerves about it. States having been to facilities like this in the past and having issues if there are other patients/residents there who are negative or seems to cause chaos. Reports having a hard time being around these people and eventually leaving the facility to remove self from situation if this person persists after this patient attempts to set boundaries. Agreeable to remain open minded and is hopeful being there will only be 4 people/patients there including himself.     Mood/Affect: Pleasant       Plan: Patient encouraged to maintain attendance for continued ongoing support in working towards occupational therapy goals to support overall treatment/care.        Participated in afternoon leisure group for period of time before being  pulled to meet with provider. Was pleasant and social during this time, offering assistance and cues to others who were unfamiliar with the activity. No charge (25 min)

## 2024-03-14 NOTE — PLAN OF CARE
Problem: Psychotic Signs/Symptoms  Goal: Improved Behavioral Control (Psychotic Signs/Symptoms)  Outcome: Progressing   Goal Outcome Evaluation:    Plan of Care Reviewed With: patient      Pt had a decent shift. Was isolative and withdrawn to himself. Spent some time sleeping in his room. Took his medication as prescribed. Pt had a calm mood and subdued affect. Pt denies auditory and visual hallucinations. Denies SI/HI. Denies medication side effects. No behavioral outburst. Pt is cooperative and polite.   Plan: Status 15s; Build trust with pt. Continue to build on strengths. Encourage compliance and healthy coping.

## 2024-03-15 LAB
ALBUMIN SERPL BCG-MCNC: 4.2 G/DL (ref 3.5–5.2)
ALP SERPL-CCNC: 46 U/L (ref 40–150)
ALT SERPL W P-5'-P-CCNC: 12 U/L (ref 0–70)
ANION GAP SERPL CALCULATED.3IONS-SCNC: 5 MMOL/L (ref 7–15)
AST SERPL W P-5'-P-CCNC: 17 U/L (ref 0–45)
ATRIAL RATE - MUSE: 69 BPM
BASOPHILS # BLD AUTO: 0 10E3/UL (ref 0–0.2)
BASOPHILS NFR BLD AUTO: 1 %
BILIRUB SERPL-MCNC: 0.4 MG/DL
BUN SERPL-MCNC: 15.2 MG/DL (ref 6–20)
CALCIUM SERPL-MCNC: 9.3 MG/DL (ref 8.6–10)
CHLORIDE SERPL-SCNC: 102 MMOL/L (ref 98–107)
CREAT SERPL-MCNC: 0.86 MG/DL (ref 0.67–1.17)
DEPRECATED HCO3 PLAS-SCNC: 32 MMOL/L (ref 22–29)
DIASTOLIC BLOOD PRESSURE - MUSE: NORMAL MMHG
EGFRCR SERPLBLD CKD-EPI 2021: >90 ML/MIN/1.73M2
EOSINOPHIL # BLD AUTO: 0.2 10E3/UL (ref 0–0.7)
EOSINOPHIL NFR BLD AUTO: 3 %
ERYTHROCYTE [DISTWIDTH] IN BLOOD BY AUTOMATED COUNT: 12.8 % (ref 10–15)
GLUCOSE SERPL-MCNC: 92 MG/DL (ref 70–99)
HCT VFR BLD AUTO: 48.2 % (ref 40–53)
HGB BLD-MCNC: 16.4 G/DL (ref 13.3–17.7)
IMM GRANULOCYTES # BLD: 0 10E3/UL
IMM GRANULOCYTES NFR BLD: 0 %
INTERPRETATION ECG - MUSE: NORMAL
LYMPHOCYTES # BLD AUTO: 0.9 10E3/UL (ref 0.8–5.3)
LYMPHOCYTES NFR BLD AUTO: 17 %
MCH RBC QN AUTO: 30.7 PG (ref 26.5–33)
MCHC RBC AUTO-ENTMCNC: 34 G/DL (ref 31.5–36.5)
MCV RBC AUTO: 90 FL (ref 78–100)
MONOCYTES # BLD AUTO: 0.5 10E3/UL (ref 0–1.3)
MONOCYTES NFR BLD AUTO: 9 %
NEUTROPHILS # BLD AUTO: 3.5 10E3/UL (ref 1.6–8.3)
NEUTROPHILS NFR BLD AUTO: 70 %
NRBC # BLD AUTO: 0 10E3/UL
NRBC BLD AUTO-RTO: 0 /100
P AXIS - MUSE: 46 DEGREES
PLATELET # BLD AUTO: 173 10E3/UL (ref 150–450)
POTASSIUM SERPL-SCNC: 3.9 MMOL/L (ref 3.4–5.3)
PR INTERVAL - MUSE: 150 MS
PROT SERPL-MCNC: 6.5 G/DL (ref 6.4–8.3)
QRS DURATION - MUSE: 102 MS
QT - MUSE: 382 MS
QTC - MUSE: 409 MS
R AXIS - MUSE: 85 DEGREES
RBC # BLD AUTO: 5.35 10E6/UL (ref 4.4–5.9)
SODIUM SERPL-SCNC: 139 MMOL/L (ref 135–145)
SYSTOLIC BLOOD PRESSURE - MUSE: NORMAL MMHG
T AXIS - MUSE: 36 DEGREES
VENTRICULAR RATE- MUSE: 69 BPM
WBC # BLD AUTO: 5 10E3/UL (ref 4–11)

## 2024-03-15 PROCEDURE — 250N000013 HC RX MED GY IP 250 OP 250 PS 637: Performed by: PSYCHIATRY & NEUROLOGY

## 2024-03-15 PROCEDURE — 250N000011 HC RX IP 250 OP 636: Mod: JZ

## 2024-03-15 PROCEDURE — 36415 COLL VENOUS BLD VENIPUNCTURE: CPT

## 2024-03-15 PROCEDURE — 80053 COMPREHEN METABOLIC PANEL: CPT

## 2024-03-15 PROCEDURE — 124N000002 HC R&B MH UMMC

## 2024-03-15 PROCEDURE — 250N000013 HC RX MED GY IP 250 OP 250 PS 637

## 2024-03-15 PROCEDURE — 85004 AUTOMATED DIFF WBC COUNT: CPT

## 2024-03-15 PROCEDURE — G0177 OPPS/PHP; TRAIN & EDUC SERV: HCPCS

## 2024-03-15 PROCEDURE — 99232 SBSQ HOSP IP/OBS MODERATE 35: CPT

## 2024-03-15 RX ADMIN — PALIPERIDONE 9 MG: 6 TABLET, EXTENDED RELEASE ORAL at 09:07

## 2024-03-15 RX ADMIN — PALIPERIDONE PALMITATE 234 MG: 234 INJECTION INTRAMUSCULAR at 10:44

## 2024-03-15 RX ADMIN — THERA TABS 1 TABLET: TAB at 09:07

## 2024-03-15 RX ADMIN — Medication 3 MG: at 21:29

## 2024-03-15 RX ADMIN — SENNOSIDES AND DOCUSATE SODIUM 2 TABLET: 8.6; 5 TABLET ORAL at 09:07

## 2024-03-15 RX ADMIN — SENNOSIDES AND DOCUSATE SODIUM 2 TABLET: 8.6; 5 TABLET ORAL at 21:29

## 2024-03-15 ASSESSMENT — ACTIVITIES OF DAILY LIVING (ADL)
ADLS_ACUITY_SCORE: 45

## 2024-03-15 NOTE — PROGRESS NOTES
03/14/24 1900   Group Therapy Session   Time Session Began 1600   Time Session Ended 1650   Total Time (minutes) 45   Total # Attendees 6   Group Type expressive therapy   Group Topic Covered balanced lifestyle;self-care activities;relaxation techniques   Group Session Detail Mindfulness   Patient Response/Contribution cooperative with task   Patient Participation Detail Cooperatively engaged in Evening Music Relaxation group to decrease anxiety and promote Mindfulness.  Energized affect, appropriately engaged in session, responding well to the music, singing along to the song with feeling.

## 2024-03-15 NOTE — PROGRESS NOTES
"   03/14/24 1500   Group Therapy Session   Group Attendance attended group session   Time Session Began 1415   Time Session Ended 1500   Total Time (minutes) 45   Total # Attendees 4   Group Type psychoeducation;psychotherapeutic   Group Topic Covered emotions/expression;coping skills/lifestyle management   Group Session Detail Group members checked in with how they are feeling and a success. Pt's then took turns picking cards with questions to answer relating to self-acceptance, self-esteem, self-confidence, goals and life vision, and self-awareness.   Patient Response/Contribution able to recall/repeat info presented;cooperative with task;confronted peers appropriately;discussed personal experience with topic;expressed understanding of topic;listened actively;offered helpful suggestions to peers;organized   Patient Participation Detail Pt attended group and checked in as feeling optimistic related to his upcoming discharge plan. Pt was insightful and cooperative with the questions and activity. Another peers was attempting to engage him in side coversation and pt was attempting to minimize the distratction. When peer became more disruptive pt was also attempting to deescalate. After peer left and writer checked in with pt's writer expressed empathy for the pt and the \"herrera\" that the peers is going through. Pt shared he had caffine today, from a cup of coffee eariler, so was more energized as well. Pt shared some of his thoughts and experiences with the group and was appropriate with peers.       "

## 2024-03-15 NOTE — PROGRESS NOTES
"PSYCHIATRY  PROGRESS NOTE     DATE OF SERVICE   3/14/2024         CHIEF COMPLAINT   \" Good.\"       SUBJECTIVE   Nursing reports:    Pt was visible in the lounge for much of the shift. Pt is cooperative, polite and pleasant. Pt is sociable with peers and staff. Denies anxiety and depression. Denies auditory and visual hallucinations. Denies SI/HI. Denies physical discomfort. Stated \" I'm doing alright. My Invega is working.\" Pt is eating and sleeping in adequate amounts. No behavioral outburst.   Plan: Continue to provide safe environment and therapeutic milieu.     Per nursing documentation patient slept for 7 hours.     reports:      -Attending Provider: CARISSA Davis CNP  -Voicemail Code: [id number]  -Team Note Due: Tuesday  -Next Steps:                               Coordinate plan for medications at discharge. [facility] uses [facility].               Draft PD and request signature from [facility]. Sent to  [facility] with change of status form.               Higinio wants PCP and Dietitian appointments set up        Assessment/Intervention/Current Symtoms and Care Coordination:  Chart review and met with team, discussed pt progress, symptomology, and response to treatment.  Discussed the discharge plan and any potential impediments to discharge.  [facility] met with Higinio to discuss discharge plans and confirm discharge date of Monday 3/18. [facility] obtained signature on [facility] for [facility]. [facility] reviewed the provisional discharge process with Higinio.   [facility] called [facility] to try and schedule psychiatry services. [facility] was informed psychiatry referrals need to be made internally, and due to Higinio having an older but still active DA, may or may not be able to get an internal referral for psych services. [facility] is awaiting call back.   [facility] called Rev. Carlton at [facility] to discuss AMADO situation. [facility] contracted plan for second loading dose of Invega to be administered at the home Friday " "3/22. Rev. Carlton asked how the medications would be sent. CTC stated he can talk to the provider to confirm options, but thinks they can be sent with the patient or sent to the pharmacy. Rev. Carlton stated they use the [facility].   [facility] requested care coordination set Higinio up with a new psychiatrist in [facility] within the next two weeks. [facility] requested this be completed by 3/15.  [facility] faxed RAFAL to [facility]. [facility] scheduled telehealth therapy with Jen Apley for Thursday March 21st at 10am. Link will be sent to Higinio's email.         Discharge Plan or Goal:  Discharge 3/18 at 12:30pm to [facility]     Barriers to Discharge:  Impact of mental health symptoms on well being   Impact of mental health symptoms on activities of daily living  Need for medication monitoring and medication management     Referral Status:        Legal Status:  [facility] [place]  File Number: [id number]  Start: February 22 2024  Expiration: August 29th 2024      Contacts:  Norah (CADI  through [facility] [place]): [phone]  [email or web address]  [facility] (group home Higinio will be discharging to:  [place] [phone]        Phone: [phone]         FAX: [phone] [email or web address]        Upcoming Meetings and Dates/Important Information:                 OBJECTIVE   Met with patient in hospital room of unit 10 [address]. Upon patient interview, patient reports his mood as, \"good.\"  Patient's affect appears blunted and guarded however improving.  Patient's thought process is goal oriented today.  Today patient does not endorse any paranoid or delusional thoughts. Patient denies any HI.  Denies any thoughts of SI, SIB, SA, intent or plan. Patient able to contract for safety. Patient denies auditory hallucinations or visual hallucinations.  Patient denies depression or anxiety today. Patient reports appetite continues to be decreased " "however reports this is improving.  Per nursing documentation, patient has been eating and drinking adequately.  Patient denies any issues with bowel or bladder. Patient vital signs reviewed and noted to be stable.  Patient reports ongoing edema in lower extremities is significantly improving and he believes this was a side effect of medication olanzapine.  Patient reports compression stockings have been helping with lower extremity edema.  Patient denies any medical concerns today.  Patient denies any side effects today of medication regimen.  Patient does not endorse difficulty sleeping overnight.  Today, provider discussed plan to continue paliperidone (Invega) 24-hour tablet 9 mg PO daily to target psychosis and mood disorder symptoms.  Patient has Holliday order for this medication and must take.  Offer p.o. paliperidone first, if patient refuses then administer backup IM olanzapine 10 mg.  Provider also discussed plan to initiate paliperidone \"Invega Sustenna\" long-acting injection first loading dose 234 mg IM once tomorrow 3/15/2024 at 10 AM.  This is a Holliday medication and patient must be compliant.  Patient verbalized understanding and is in agreement with this.  Patient offers no other questions or concerns.  Provider reviewed vital signs and noted to be stable.  Provider discussed option to repeat labs as well as EKG to monitor QT interval and patient in agreement.    Internal medicine was consulted to address lower extremity edema.  Per documentation from 3/10/2024:    [facility]  Consult Note - Hospitalist Service  Date of Admission:  2/17/2024  Consult Requested by: CARISSA Coe, CNP  Reason for Consult: \"Pt has calf swelling and pain, possible DVT\"        Assessment & Plan  Higinio Wallace is a 33 year old male admitted on 2/17/2024. He has a past medical history of schizophrenia, MDD c/b suicidal ideation, schizophrenia, PTSD who was admitted to " station 10N after presenting to the ED from his IRTS program for evaluation of psychiatric decompensation (delusions, paranoia, hallucinations, disorganized thinking). He remains admitted for psychiatric monitoring and management. Medicine was consulted this AM out of concern for DVT d/t calf swelling and pain.     Bilateral Lower Extremity Edema  Weight Gain  Pt notes onset of BLE edema 2-3 days ago. However, even prior to this, patient expresses concern w/ his nearly 30lb weight gain since admission (review of objective data shows he was 211 lbs on 2/15/24 and as of 3/5/24 was 239 lbs). As far as his BLE go, he denies any focal calf pain, and Homans' sign negative on exam, no palpable cord, less suspicion of DVT. BLE edema is symmetric and no e/o cellulitis (no erythema or open wounds) to suggest infectious etiology for swelling. At this point, suspect BLE edema is a manifestation of his rapid weight gain following initiation of dual-antipsychotic therapy (started on Zyprexa 2/17, and Paliperidone 3/1). Per UpToDate, Paliperidone has an incidence of generalized edema of <2%, but has been observed to have weight gain incidence of at least 7% from baseline in adult patients. This weight gain, in Paliperidone specifically, is typically rapid in onset following initiation of the medication. As for Zyprexa, the incidence of peripheral edema is ~3% and weight gain has incidence reported as between 3-6% but as high as 64%.   - Recommend Primary Team downtitrate on Zyprexa and/or Paliperidone to smallest dose needed for clinical benefit  - D/w pt who agrees to trial compression stockings              - Only to wear during the day, and remove at night (lotion legs once stockings removed for the day to avoid dry skin [can use Aquaphor or lotion on floor available to patients])  - If worsening edema despite use of compressions stockings, can consider an oral diuretic  - Pt would like to try a low salt diet, so this was  "ordered. Can change back top regular diet if he wishes  - Continue to monitor     Episodic Chest Pain  Pt notes brief \"twinges\" of substernal, central chest pain w/o any provoking or palliating factors, no particular pattern. No chest pain or dyspnea at the moment. Reports these began ~one week ago. At this point, unclear etiology for the chest pain, low suspicion of primary cardiac etiology as otherwise asymptomatic, and remains HDS. Do not feel BLE edema is r/t cardiac concerns (see above). Query if r/t mood (anxiety) or if potential heartburn.   - Continue to monitor  - Notify Medicine if recurrence of chest pain or changes in hemodynamics        The patient's care was discussed with the Bedside Nurse and Patient.     Medicine will sign off. No further recommendations at this time.  Please feel free to reconsult if any new medical issues or concerns.  Thank you for the opportunity to care for this patie    Orthopedic surgery consulted to address patient report of left knee subluxation, per documentation from orthopedic consult on 2/28/2024:    Assessment:  Patient is a 32 y/o man with history of recurrent patellar instability and subluxations s/p L derotational tibial osteotomy, MPFL reconstruction and TTO with Dr. Han in 2020.      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  At this time patient said that he would like to defer all surgical management as he is worried about cost of procedures given his housing instability.  He currently is wearing a left knee patellar stabilizing brace which appears to be fitting appropriately.  We recommended that he continue using the brace while ambulatory as we do not have any other patellar restrictive braces in the inpatient setting.  Discussed having the patient follow-up with Dr. Saucedo in the outpatient setting that she may recommend additional exercises and/or nonoperative techniques that " "may help him with his recurrent left patella subluxation.  Patient agreed to this plan and all his questions were answered.  No operative indication at this time        Plan:  - Continue Patellar stabilizing brace while ambulatory  - Ice/Elevation/Compression as needed for swelling  - Outpatient follow-up with Dr. Han for discussion regarding non-surgical vs surgical options.   - No inpatient orthopedic surgical need at this time.         - Imaging: Completed  - Activity: Continue Brace use with activity. Avoid excessive cutting exercises/running.   - Weight bearing: WBAT LLE  - Pain control: Per primary  - Diet: Regular Diet  - Follow-up: Will arrange follow-up with Dr. Han in outpatient setting of review of options  - Disposition: Per primary     MEDICATIONS   Medications:  Scheduled Meds:    cholecalciferol  1,250 mcg Oral Q7 Days     melatonin  3 mg Oral At Bedtime     multivitamin, therapeutic  1 tablet Oral Daily     paliperidone ER  9 mg Oral Daily    Or     OLANZapine  10 mg Intramuscular Daily     [START ON 3/15/2024] paliperidone  234 mg Intramuscular Once     senna-docusate  1-2 tablet Oral BID     Continuous Infusions:  PRN Meds:.acetaminophen, alum & mag hydroxide-simethicone, benztropine, hydrOXYzine HCl, nicotine, OLANZapine **OR** OLANZapine, polyethylene glycol    Medication adherence issues: MS Med Adherence Y/N: Yes, patient reports history of noncompliance with psychiatric medications in the community.  Patient is cooperative with meds in the hospital currently  Medication side effects: MEDICATION SIDE EFFECTS: Patient does not report any side effects to writer today  Benefit: Yes / No: Yes       ROS   Pertinent items are noted in HPI.       MENTAL STATUS EXAM   Vitals: /70   Pulse 70   Temp 98.2  F (36.8  C) (Oral)   Resp 17   Ht 1.93 m (6' 3.98\")   Wt 111.4 kg (245 lb 11.2 oz)   SpO2 97%   BMI 29.92 kg/m      Appearance: Casually groomed  Mood: Reports mood as, " "\"good.\"  Affect: blunted and guarded however improving was congruent to speech  Suicidal Ideation: PRESENT / ABSENT: absent   Homicidal Ideation: PRESENT / ABSENT: absent   Thought process: Goal oriented today   thought content: Denies SI or HI  Fund of Knowledge: Average  Attention/Concentration: Poor  Language ability:  Intact   Memory: Intact  Insight:  limited.  Judgement: limited  Orientation: Yes, x4  Psychomotor Behavior: Normal  Muscle Strength and Tone: MuscleStrength: Normal  Gait and Station: Normal       LABS   personally reviewed.     EKG from 3/14/2024 reviewed, Results: Sinus rhythm, incomplete right bundle branch block, borderline EKG when compared with EKG from 2/18/2024 QT interval: 382 QTc interval: 409    Provider reviewed results of XR KNEE LEFT 3 VIEWS completed on 2/28/2024. Results indicate: incompletely visualized postoperative changes of IM jhoana and screw fixation of the tibia. No evidence for acute fracture around the knee joint. There is degenerative change within the patellofemoral compartment. No significant effusion.    Vitamin D level noted to be low at 10 ng/mL; supplement ordered 2/22/24 to address vitamin D deficiency.     Latest Reference Range & Units 02/17/24 13:58 02/17/24 15:16 02/18/24 11:07 02/18/24 14:37 02/19/24 02:49 02/20/24 07:44 02/28/24 15:48 02/28/24 18:49 03/05/24 09:26   Sodium 135 - 145 mmol/L  140 143     141    Potassium 3.4 - 5.3 mmol/L  3.6 3.8     3.9    Chloride 98 - 107 mmol/L  104 108 (H)     102    Carbon Dioxide (CO2) 22 - 29 mmol/L  26 28     28    Urea Nitrogen 6.0 - 20.0 mg/dL  6.2 8.7     14.9    Creatinine 0.67 - 1.17 mg/dL  0.88 0.94     0.80    GFR Estimate >60 mL/min/1.73m2  >90 >90     >90    Calcium 8.6 - 10.0 mg/dL  9.4 9.2     9.5    Anion Gap 7 - 15 mmol/L  10 7     11    Magnesium 1.7 - 2.3 mg/dL  1.9      2.0    Phosphorus 2.5 - 4.5 mg/dL        4.2    Albumin 3.5 - 5.2 g/dL  4.4 4.1     4.2    Protein Total 6.4 - 8.3 g/dL  7.0 6.5     6.7  "   Alkaline Phosphatase 40 - 150 U/L  49 44     60    ALT 0 - 70 U/L  6 7     9    AST 0 - 45 U/L  11 13     22    Bilirubin Total <=1.2 mg/dL  1.3 (H) 1.2     0.3    Cholesterol <200 mg/dL      135      Glucose 70 - 99 mg/dL  104 (H) 135 (H)     93    HDL Cholesterol >=40 mg/dL      36 (L)      Hemoglobin A1C <5.7 %      4.8      LDL Cholesterol Calculated <=100 mg/dL      84      Non HDL Cholesterol <130 mg/dL      99      Triglycerides <150 mg/dL      75      TSH 0.30 - 4.20 uIU/mL      2.08      Vitamin D, Total (25-Hydroxy) 20 - 50 ng/mL      10 (L)      WBC 4.0 - 11.0 10e3/uL  5.4      6.1    Hemoglobin 13.3 - 17.7 g/dL  16.9      16.7    Hematocrit 40.0 - 53.0 %  46.5      48.8    Platelet Count 150 - 450 10e3/uL  232      200    RBC Count 4.40 - 5.90 10e6/uL  5.49      5.43    MCV 78 - 100 fL  85      90    MCH 26.5 - 33.0 pg  30.8      30.8    MCHC 31.5 - 36.5 g/dL  36.3      34.2    RDW 10.0 - 15.0 %  11.9      12.3    % Neutrophils %  78      64    % Lymphocytes %  13      22    % Monocytes %  7      9    % Eosinophils %  1      4    % Basophils %  1      1    Absolute Basophils 0.0 - 0.2 10e3/uL  0.0      0.1    Absolute Eosinophils 0.0 - 0.7 10e3/uL  0.1      0.2    Absolute Immature Granulocytes <=0.4 10e3/uL  0.0      0.0    Absolute Lymphocytes 0.8 - 5.3 10e3/uL  0.7 (L)      1.4    Absolute Monocytes 0.0 - 1.3 10e3/uL  0.4      0.5    % Immature Granulocytes %  0      0    Absolute Neutrophils 1.6 - 8.3 10e3/uL  4.2      3.9    Absolute NRBCs 10e3/uL  0.0      0.0    NRBCs per 100 WBC <1 /100  0      0    Quantiferon-TB Gold Plus Result Negative          Negative   TB1 Ag minus Nil Value IU/mL         0.01   TB2 Ag minus Nil Value IU/mL         0.02   Mitogen minus Nil Result IU/mL         9.98   Nil Result IU/mL         0.02   QUANTIFERON-TB GOLD PLUS          Rpt   SARS CoV2 PCR Negative      Negative       Hepatitis C Antibody Nonreactive          Nonreactive   HIV Antigen Antibody Combo  "Nonreactive          Nonreactive   Amphetamine Qual Urine Screen Negative  Screen Negative           Fentanyl Qual Urine Screen Negative  Screen Negative           Cocaine Urine Screen Negative  Screen Negative           Benzodiazepine Urine Screen Negative  Screen Negative           Opiates Qualitative Urine Screen Negative  Screen Negative           PCP Urine Screen Negative  Screen Negative           Cannabinoids Qual Urine Screen Negative  Screen Positive !           Barbiturates Qual Urine Screen Negative  Screen Negative           XR KNEE LEFT 3 VIEWS        Rpt     EKG 12-LEAD, TRACING ONLY     Rpt        (H): Data is abnormally high  (L): Data is abnormally low  !: Data is abnormal  Rpt: View report in Results Review for more information  No results found for: \"PHENYTOIN\", \"PHENOBARB\", \"VALPROATE\", \"CBMZ\"       DIAGNOSIS   Principal Problem:    Schizoaffective disorder, bipolar type, currently hypomanic  History of schizophrenia  PTSD  R/O OCD    History of generalized anxiety disorder with panic attacks  History of ADHD    Clinically Significant Risk Factors                         # Overweight: Estimated body mass index is 29.92 kg/m  as calculated from the following:    Height as of this encounter: 1.93 m (6' 3.98\").    Weight as of this encounter: 111.4 kg (245 lb 11.2 oz).   # Moderate Malnutrition: based on nutrition assessment          Active Problem List:  Patient Active Problem List   Diagnosis     Irritable bowel syndrome     Class 2 severe obesity due to excess calories with serious comorbidity and body mass index (BMI) of 37.0 to 37.9 in adult (H)     Patellofemoral instability of left knee with pain     Tobacco abuse     Suicidal ideation     Moderate episode of recurrent major depressive disorder (H)     CARSON (obstructive sleep apnea)     Unspecified psychosis not due to a substance or known physiological condition (H)     Marijuana use     Schizophrenia, unspecified type (H)     Schizophrenia, " schizoaffective, chronic with acute exacerbation (H)          HOSPITAL COURSE AND ASSESSMENT   This is a 33 year old male with history of schizophrenia, MDD, suicidal ideation, and PTSD who presented to [facility] ED on 2/17/2024 via EMS for psychiatric evaluation.  [facility] and was experiencing symptoms of psychosis including disorganized, paranoid, and delusional thoughts.  Patient also exhibiting poor p.o. intake and not attending to ADLs such as bathing.  Patient has a history of Patellofemoral instability of left knee and requires a brace to stabilize.  Per chart review patient was not taking any psychiatric medications prior to admission.  Patient endorsed some cannabis use and urine drug screening negative for all except cannabinoids.  While admitted to the ED, psychiatry provider was consulted and initiated olanzapine to target psychosis symptoms.  Patient was also started on a trial of Anafranil to target OCD symptoms.  Patient was evaluated by medical provider in the ED and determined to be medically stable.  Patient subsequently admitted to inpatient psychiatry unit 10 N for further psychiatric stabilization.  Legal status at time of admission was: 72-hour hold.  Olanzapine started in the ED to target psychosis symptoms and continued during psychiatric hospitalization.  Psychiatric provider initiated petition for mental health commitment with Mg on 2/20/2024.  Trial of Anafranil initiated in the ED to target OCD symptoms however this was discontinued due to risk for worsening psychosis symptoms.  On 3/1/2024 court documents received supporting mental health commitment with Holliday order in [facility]. Holliday order includes medications: Zyprexa (olanzapine), Haldol (haloperidol), Invega (paliperidone), and Seroquel (quetiapine). Per chart review, patient previously tolerated paliperidone (Invega) medication well and this medication was effective for  treating psychosis and mood disorder symptoms however Invega p.o. was not covered by insurance and subsequently was discontinued.  Pharmacy liaison consult completed during current hospitalization and prior auth completed for health insurance coverage of Invega p.o. tablets.  Invega Sustenna long-acting injection also covered by health insurance.  Plan will be to continue olanzapine 15 mg p.o. at bedtime for now with plan to titrate down in upcoming days with initiation of medication paliperidone.  Provider initiated Holliday medication paliperidone (Invega) 24-hour tablet 1.5 mg PO daily with backup IM olanzapine 10 mg on 3/1/2024.   Plan will be to continue paliperidone (Invega) p.o. and titrate to therapeutic dose while monitoring for side effects.  If patient tolerating paliperidone p.o. plan will be to consider initiating long-acting injection Invega Sustenna prior to discharge due to patient's history of noncompliance with psychiatric medications as well as patient reporting directly to writer that he will be noncompliant with psychiatric medications in the community.  Plan will be for patient to discharge to crisis facility housing.  Unit CTC facilitating discharge plan.     PLAN   1. Ongoing education given regarding diagnostic and treatment options with risks, benefits and alternatives and adequate verbalization of understanding.  2.  Medications:  Initiate first loading dose of paliperidone (Invega Sustenna) IM injection 234 mg intramuscular once on 3/15/2024 at 10 AM, this is a Holliday medication and patient must take.  Continue paliperidone (Invega) 24-hour tablet to 9 mg PO daily to target psychosis symptoms and mood disorder symptoms with backup olanzapine 10 mg IM injection.  Patient has Holliday order for this medication and must take.  Offer p.o. paliperidone (Invega) first, if patient refuses then administer IM olanzapine.  Continue multivitamin 1 tab PO daily  Continue cholecalciferol (vitamin D3)  capsule 1250 mcg PO every 7 days for 8 doses to target vitamin D deficiency, administer immediately after supper  PRN benztropine tablet 0.5 mg PO2 times daily as needed to target side effect of movement disorder  PRN hydroxyzine tablet 25 mg PO every 4 hours as needed for anxiety  PRN olanzapine tablet 5-10 mg PO every 3 hours as needed for agitation, ordered linked with olanzapine IM injection  PRN melatonin tablet 3 mg PO at bedtime as needed for sleep  PRN nicotine lozenge 2 mg buccal every 1 hour as needed for nicotine withdrawal symptoms    3.  Labs/Imaging:  -XR KNEE LEFT 3 VIEWS ordered 2/28/2024  -CMP and CBC completed on 2/28/2024                 4. Consults:  -Orthopedic consult placed 2/28/2024 to address left knee subluxation    5. Precautions:  -Self-injury precautions, suicide precautions; risk moderate    6. Legal:  -Mental health commitment with [facility] starting 3/1/2024, expires 8/29/2024   -Holliday order in place which includes medications Zyprexa, Haldol, Invega, Seroquel    7. Discharge planning:  -Per unit CTC        [facility] Patient able to contract for safety and Patient on precautions    Coordination of Care:   Treatment Plan reviewed and physician signed, Care discussed with Care/Treatment Team Members, Chart reviewed and Patient seen      Re-Certification I certify that the inpatient psychiatric facility services furnished since the previous certification were, and continue to be, medically necessary for, either, treatment which could reasonably be expected to improve the patient s condition or diagnostic study and that the hospital records indicate that the services furnished were, either, intensive treatment services, admission and related services necessary for diagnostic study, or equivalent services.     I certify that the patient continues to need, on a daily basis, active treatment furnished directly by or requiring the supervision of  inpatient psychiatric facility personnel.   I estimate 14-21 days of hospitalization is necessary for proper treatment of the patient. My plans for post-hospital care for this patient are   TBD     CARISSA Mendieta CNP    -     03/14/24       -     2:00 PM       Total time  35 minutes with > 50%spent on coordination of cares and psycho-education.    This note was created with help of Dragon dictation system. Grammatical / typing errors are not intentional.    CARISSA Mendieta CNP

## 2024-03-15 NOTE — PLAN OF CARE
Problem: Psychotic Signs/Symptoms  Goal: Improved Behavioral Control (Psychotic Signs/Symptoms)  Outcome: Progressing   Goal Outcome Evaluation:    Plan of Care Reviewed With: patient      Pt was sociable with peers and staff. Attended the unit group. Took a shower and changed into clean clothes. Pt denies anxiety, depression, SI/HI/AVH. Pt did not appear to be responding to internal stimuli. Did not request any prn medication. Did not make any hyper-Mandaen or paranoid statements. Pt denied having any problem with eating or sleeping.   Plan: Continue to provide safe environment and therapeutic milieu.

## 2024-03-15 NOTE — PLAN OF CARE
03/15/24 1604   Group Therapy Session   Group Attendance attended group session   Time Session Began 1015   Time Session Ended 1200   Total Time (minutes) 45   Total # Attendees 6   Group Type task skill;recreation;life skill   Group Topic Covered balanced lifestyle;coping skills/lifestyle management;leisure exploration/use of leisure time;problem-solving;self-care activities;relaxation techniques   Group Session Detail OT Clinic: open task group focused on activities to promote self-expression, independence, and leisure exploration while working to demonstrate cognitive skills   Patient Participation Detail Pt participated in a task group to complete origami projects. Pt appeared engaged in the activity and was attentive to the demonstrations. Pt completed the projects and was open to receiving guidance. Pt was social and would share personal experiences and their thoughts about social relationships.

## 2024-03-15 NOTE — PLAN OF CARE
03/15/24 1608   Group Therapy Session   Group Attendance attended group session   Time Session Began 1315   Time Session Ended 1415   Total Time (minutes) 45   Total # Attendees 5   Group Type life skill;psychoeducation   Group Topic Covered coping skills/lifestyle management;relationship;problem-solving;structured socialization;cognitive activities   Group Session Detail General Health and Coping Skills: education and group discussion on a health and social/relationship-related topic.   Patient Participation Detail Pt participated in a group discussion on communication and apologies. Pt contributed their thoughts and personal experiences to the discussion. Pt expressed struggling with other people's negativity, and stated that they tend to distance themselves from friends. Pt appeared engaged in the activity and was attentive to the discussion.

## 2024-03-15 NOTE — PLAN OF CARE
Care Coordinator Note(s):    Care Request(s):   Psychiatry  - New  Preferences: 2 weeks, in person  Notes: Higinio will need a new psychiatrist that can prescribe AMADO [facility]. Near [facility]. Next AMADO due 3/22.      Care Outcome(s):  N&A: [facility]? Waiting for nurse line call back.    [facility] P: [phone]  - No available providers @ [address], [address] [facility] [id number] [phone] Phone [phone] Fax  - 1 provider in [place], virtual only.    [facility] [phone] [address] [place] [address] [place] [address]  -  All providers can prescribe AMADO.     **[facility] in person providers available in 2-3 weeks : Tiffany Sher, MSN, PMHNP-BC, Alisha Bentley, MSN, PMHNP-BC    Verbal authorization from patient needed to schedule.     Updated CTC. Patient and CTC to call and schedule.     Further Actions:  None.    -Meri Lanier  Adult Behavioral Health Care Coordinator

## 2024-03-15 NOTE — PROGRESS NOTES
"PSYCHIATRY  PROGRESS NOTE     DATE OF SERVICE   3/15/2024         CHIEF COMPLAINT   \" Good.\"       SUBJECTIVE   Nursing reports:    Pt was sociable with peers and staff. Attended the unit group. Took a shower and changed into clean clothes. Pt denies anxiety, depression, SI/HI/AVH. Pt did not appear to be responding to internal stimuli. Did not request any prn medication. Did not make any hyper-Yarsanism or paranoid statements. Pt denied having any problem with eating or sleeping.   Plan: Continue to provide safe environment and therapeutic milieu.           Pt appeared asleep for 6hours           reports:  ttending Provider: CARISSA Davis CNP  -Voicemail Code: [id number]  -Team Note Due: Tuesday  -Next Steps:                               Coordinate plan for medications at discharge. [facility] uses [facility].               Draft PD and request signature from [facility]. Sent to  [facility] with change of status form.               Higinio robledo PCP and Dietitian appointments set up        Assessment/Intervention/Current Symtoms and Care Coordination:  Chart review and met with team, discussed pt progress, symptomology, and response to treatment.  Discussed the discharge plan and any potential impediments to discharge.  CTC met with Higinio to get RAFAL for ACP. CTC coordinate with care coordinator to scheduled psych appointment.         Discharge Plan or Goal:  Discharge 3/18 at 12:30pm to [facility]     Barriers to Discharge:  Impact of mental health symptoms on well being   Impact of mental health symptoms on activities of daily living  Need for medication monitoring and medication management     Referral Status:        Legal Status:  Commitment with [facility]: [place]  File Number: [id number]  Start: February 22 2024  Expiration: August 29th 2024      Contacts:  Norah (CADI  through [facility]): [phone]  [email or web address]  Charis " "Homes (group home Higinio will be discharging to: [facility]. [facility] [phone]        Phone: [phone]         FAX: [phone] [email or web address]        Upcoming Meetings and Dates/Important Information:                OBJECTIVE   Met with patient in hospital room of unit 10 [address]. Upon patient interview, patient reports his mood as, \" good.\"  Patient's affect appears blunted however improving.  Patient's thought process is goal oriented today.  Today patient reports having some intermittent Jewish delusional thoughts however reports this is significantly improving since initiation of medication Invega.  Patient reports that he now feels, \"more able to control my thoughts and what I share with people.\"  Patient denies any HI.  Denies any thoughts of SI, SIB, SA, intent or plan. Patient able to contract for safety. Patient denies auditory hallucinations or visual hallucinations.  Patient denies depression symptoms.  Patient does endorse anxiety symptoms rated 1/10  (0=none, 10=severe) which she reports is related to thinking about his scheduled Invega Sustenna long-acting injection scheduled for today.  Patient reports appetite continues to be decreased however reports this is improving.  Per nursing documentation, patient has been eating and drinking adequately.  Patient denies any issues with bowel or bladder.  He reports his last bowel movement was yesterday and reports scheduled stool softeners help with her morning regular bowel movements.  Patient vital signs reviewed and noted to be stable. Patient denies any medical concerns today.  Patient denies any side effects today of medication regimen.  Patient does not endorse difficulty sleeping overnight.  Today, provider discussed plan to continue paliperidone (Invega) 24-hour tablet 9 mg PO daily to target psychosis and mood disorder symptoms.  Patient has [facility] order for this medication and must take.  Offer p.o. paliperidone first, if patient " "refuses then administer backup IM olanzapine 10 mg.  Provider also discussed plan to initiate paliperidone \"Invega Sustenna\" long-acting injection first loading dose 234 mg IM once today 3/15/2024 at 10 AM.  Provider also discussed that he will get second loading dose of Invega Sustenna 156 mg IM injection a week from today on 3/22/2024 which will be administered at the group home.  Provider discussed that then maintenance dose of Invega Sustenna 156 mg IM injection will be due 4/19/2024.  Patient verbalized understanding of this.  Patient offers no other questions or concerns.  Provider reviewed vital signs and noted to be stable.     Patient requested wanting a PCP established near the group home where he is discharging to.  Provider updated unit CTC of this.  Unit CTC reports they are in process of facilitating discharge appointments.  Unit CTC has also previously reported that long-acting injection Invega Sustenna able to be administered at group home.  Unit CTC directed provider to send discharge medications including long-acting injection orders to [facility] medical pharmacy.  Provider sent discharge medications including second loading dose of Invega Sustenna 156 mg IM injection scheduled for 3/22/2024 and first maintenance dose of Invega Sustenna 156 mg IM injection scheduled for 4/19/2024 to [facility] pharmacy.      Internal medicine was consulted to address lower extremity edema.  Per documentation from 3/10/2024:    [facility]  Consult Note - Hospitalist Service  Date of Admission:  2/17/2024  Consult Requested by: CARISSA Coe, CNP  Reason for Consult: \"Pt has calf swelling and pain, possible DVT\"        Assessment & Plan  Higinio Wallace is a 33 year old male admitted on 2/17/2024. He has a past medical history of schizophrenia, MDD c/b suicidal ideation, schizophrenia, PTSD who was admitted to station 10[facility] after presenting to the ED from his IRTS " program for evaluation of psychiatric decompensation (delusions, paranoia, hallucinations, disorganized thinking). He remains admitted for psychiatric monitoring and management. Medicine was consulted this AM out of concern for DVT d/t calf swelling and pain.     Bilateral Lower Extremity Edema  Weight Gain  Pt notes onset of BLE edema 2-3 days ago. However, even prior to this, patient expresses concern w/ his nearly 30lb weight gain since admission (review of objective data shows he was 211 lbs on 2/15/24 and as of 3/5/24 was 239 lbs). As far as his BLE go, he denies any focal calf pain, and Homans' sign negative on exam, no palpable cord, less suspicion of DVT. BLE edema is symmetric and no e/o cellulitis (no erythema or open wounds) to suggest infectious etiology for swelling. At this point, suspect BLE edema is a manifestation of his rapid weight gain following initiation of dual-antipsychotic therapy (started on Zyprexa 2/17, and Paliperidone 3/1). Per UpToDate, Paliperidone has an incidence of generalized edema of <2%, but has been observed to have weight gain incidence of at least 7% from baseline in adult patients. This weight gain, in Paliperidone specifically, is typically rapid in onset following initiation of the medication. As for Zyprexa, the incidence of peripheral edema is ~3% and weight gain has incidence reported as between 3-6% but as high as 64%.   - Recommend Primary Team downtitrate on Zyprexa and/or Paliperidone to smallest dose needed for clinical benefit  - D/w pt who agrees to trial compression stockings              - Only to wear during the day, and remove at night (lotion legs once stockings removed for the day to avoid dry skin [can use Aquaphor or lotion on floor available to patients])  - If worsening edema despite use of compressions stockings, can consider an oral diuretic  - Pt would like to try a low salt diet, so this was ordered. Can change back top regular diet if he wishes  -  "Continue to monitor     Episodic Chest Pain  Pt notes brief \"twinges\" of substernal, central chest pain w/o any provoking or palliating factors, no particular pattern. No chest pain or dyspnea at the moment. Reports these began ~one week ago. At this point, unclear etiology for the chest pain, low suspicion of primary cardiac etiology as otherwise asymptomatic, and remains HDS. Do not feel BLE edema is r/t cardiac concerns (see above). Query if r/t mood (anxiety) or if potential heartburn.   - Continue to monitor  - Notify Medicine if recurrence of chest pain or changes in hemodynamics        The patient's care was discussed with the Bedside Nurse and Patient.     Medicine will sign off. No further recommendations at this time.  Please feel free to reconsult if any new medical issues or concerns.  Thank you for the opportunity to care for this patie    Orthopedic surgery consulted to address patient report of left knee subluxation, per documentation from orthopedic consult on 2/28/2024:    Assessment:  Patient is a 34 y/o man with history of recurrent patellar instability and subluxations s/p L derotational tibial osteotomy, MPFL reconstruction and TTO with Dr. Han in 2020.      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  At this time patient said that he would like to defer all surgical management as he is worried about cost of procedures given his housing instability.  He currently is wearing a left knee patellar stabilizing brace which appears to be fitting appropriately.  We recommended that he continue using the brace while ambulatory as we do not have any other patellar restrictive braces in the inpatient setting.  Discussed having the patient follow-up with Dr. Saucedo in the outpatient setting that she may recommend additional exercises and/or nonoperative techniques that may help him with his recurrent left patella subluxation.  " "Patient agreed to this plan and all his questions were answered.  No operative indication at this time        Plan:  - Continue Patellar stabilizing brace while ambulatory  - Ice/Elevation/Compression as needed for swelling  - Outpatient follow-up with Dr. Han for discussion regarding non-surgical vs surgical options.   - No inpatient orthopedic surgical need at this time.         - Imaging: Completed  - Activity: Continue Brace use with activity. Avoid excessive cutting exercises/running.   - Weight bearing: WBAT LLE  - Pain control: Per primary  - Diet: Regular Diet  - Follow-up: Will arrange follow-up with Dr. Han in outpatient setting of review of options  - Disposition: Per primary     MEDICATIONS   Medications:  Scheduled Meds:    cholecalciferol  1,250 mcg Oral Q7 Days     melatonin  3 mg Oral At Bedtime     multivitamin, therapeutic  1 tablet Oral Daily     paliperidone ER  9 mg Oral Daily    Or     OLANZapine  10 mg Intramuscular Daily     senna-docusate  1-2 tablet Oral BID     Continuous Infusions:  PRN Meds:.acetaminophen, alum & mag hydroxide-simethicone, benztropine, hydrOXYzine HCl, nicotine, OLANZapine **OR** OLANZapine, polyethylene glycol    Medication adherence issues: MS Med Adherence Y/N: Yes, patient reports history of noncompliance with psychiatric medications in the community.  Patient is cooperative with meds in the hospital currently  Medication side effects: MEDICATION SIDE EFFECTS: Patient does not report any side effects to writer today  Benefit: Yes / No: Yes       ROS   Pertinent items are noted in HPI.       MENTAL STATUS EXAM   Vitals: /75 (BP Location: Right arm)   Pulse 79   Temp 98.3  F (36.8  C) (Oral)   Resp 17   Ht 1.93 m (6' 3.98\")   Wt 111.4 kg (245 lb 11.2 oz)   SpO2 98%   BMI 29.92 kg/m      Appearance: Casually groomed  Mood: Reports mood as, \"good.\"  Affect: blunted however improving was congruent to speech  Suicidal Ideation: PRESENT / ABSENT: absent "   Homicidal Ideation: PRESENT / ABSENT: absent   Thought process: Goal oriented today   thought content: Denies SI or HI  Fund of Knowledge: Average  Attention/Concentration: Poor  Language ability:  Intact   Memory: Intact  Insight:  limited.  Judgement: limited  Orientation: Yes, x4  Psychomotor Behavior: Normal  Muscle Strength and Tone: MuscleStrength: Normal  Gait and Station: Normal       LABS   personally reviewed.     EKG from 3/14/2024 reviewed, Results: Sinus rhythm, incomplete right bundle branch block, borderline EKG when compared with EKG from 2/18/2024 QT interval: 382 QTc interval: 409    Provider reviewed results of XR KNEE LEFT 3 VIEWS completed on 2/28/2024. Results indicate: incompletely visualized postoperative changes of IM jhoana and screw fixation of the tibia. No evidence for acute fracture around the knee joint. There is degenerative change within the patellofemoral compartment. No significant effusion.    Vitamin D level noted to be low at 10 ng/mL; supplement ordered 2/22/24 to address vitamin D deficiency.     Latest Reference Range & Units 02/18/24 11:07 02/18/24 14:37 02/19/24 02:49 02/20/24 07:44 02/28/24 15:48 02/28/24 18:49 03/05/24 09:26 03/14/24 15:07 03/15/24 07:41   Sodium 135 - 145 mmol/L 143     141   139   Potassium 3.4 - 5.3 mmol/L 3.8     3.9   3.9   Chloride 98 - 107 mmol/L 108 (H)     102   102   Carbon Dioxide (CO2) 22 - 29 mmol/L 28     28   32 (H)   Urea Nitrogen 6.0 - 20.0 mg/dL 8.7     14.9   15.2   Creatinine 0.67 - 1.17 mg/dL 0.94     0.80   0.86   GFR Estimate >60 mL/min/1.73m2 >90     >90   >90   Calcium 8.6 - 10.0 mg/dL 9.2     9.5   9.3   Anion Gap 7 - 15 mmol/L 7     11   5 (L)   Magnesium 1.7 - 2.3 mg/dL      2.0      Phosphorus 2.5 - 4.5 mg/dL      4.2      Albumin 3.5 - 5.2 g/dL 4.1     4.2   4.2   Protein Total 6.4 - 8.3 g/dL 6.5     6.7   6.5   Alkaline Phosphatase 40 - 150 U/L 44     60   46   ALT 0 - 70 U/L 7     9   12   AST 0 - 45 U/L 13     22   17    Bilirubin Total <=1.2 mg/dL 1.2     0.3   0.4   Cholesterol <200 mg/dL    135        Glucose 70 - 99 mg/dL 135 (H)     93   92   HDL Cholesterol >=40 mg/dL    36 (L)        Hemoglobin A1C <5.7 %    4.8        LDL Cholesterol Calculated <=100 mg/dL    84        Non HDL Cholesterol <130 mg/dL    99        Triglycerides <150 mg/dL    75        TSH 0.30 - 4.20 uIU/mL    2.08        Vitamin D, Total (25-Hydroxy) 20 - 50 ng/mL    10 (L)        WBC 4.0 - 11.0 10e3/uL      6.1   5.0   Hemoglobin 13.3 - 17.7 g/dL      16.7   16.4   Hematocrit 40.0 - 53.0 %      48.8   48.2   Platelet Count 150 - 450 10e3/uL      200   173   RBC Count 4.40 - 5.90 10e6/uL      5.43   5.35   MCV 78 - 100 fL      90   90   MCH 26.5 - 33.0 pg      30.8   30.7   MCHC 31.5 - 36.5 g/dL      34.2   34.0   RDW 10.0 - 15.0 %      12.3   12.8   % Neutrophils %      64   70   % Lymphocytes %      22   17   % Monocytes %      9   9   % Eosinophils %      4   3   % Basophils %      1   1   Absolute Basophils 0.0 - 0.2 10e3/uL      0.1   0.0   Absolute Eosinophils 0.0 - 0.7 10e3/uL      0.2   0.2   Absolute Immature Granulocytes <=0.4 10e3/uL      0.0   0.0   Absolute Lymphocytes 0.8 - 5.3 10e3/uL      1.4   0.9   Absolute Monocytes 0.0 - 1.3 10e3/uL      0.5   0.5   % Immature Granulocytes %      0   0   Absolute Neutrophils 1.6 - 8.3 10e3/uL      3.9   3.5   Absolute NRBCs 10e3/uL      0.0   0.0   NRBCs per 100 WBC <1 /100      0   0   Quantiferon-TB Gold Plus Result Negative        Negative     TB1 Ag minus Nil Value IU/mL       0.01     TB2 Ag minus Nil Value IU/mL       0.02     Mitogen minus Nil Result IU/mL       9.98     Nil Result IU/mL       0.02     QUANTIFERON-TB GOLD PLUS        Rpt     SARS CoV2 PCR Negative    Negative         Hepatitis C Antibody Nonreactive        Nonreactive     HIV Antigen Antibody Combo Nonreactive        Nonreactive     XR KNEE LEFT 3 VIEWS      Rpt       EKG 12-LEAD, TRACING ONLY   Rpt      Rpt    (H): Data is  "abnormally high  (L): Data is abnormally low  Rpt: View report in Results Review for more information  No results found for: \"PHENYTOIN\", \"PHENOBARB\", \"VALPROATE\", \"CBMZ\"       DIAGNOSIS   Principal Problem:    Schizoaffective disorder, bipolar type, currently hypomanic  History of schizophrenia  PTSD  R/O OCD    History of generalized anxiety disorder with panic attacks  History of ADHD    Clinically Significant Risk Factors                         # Overweight: Estimated body mass index is 29.92 kg/m  as calculated from the following:    Height as of this encounter: 1.93 m (6' 3.98\").    Weight as of this encounter: 111.4 kg (245 lb 11.2 oz).   # Moderate Malnutrition: based on nutrition assessment          Active Problem List:  Patient Active Problem List   Diagnosis     Irritable bowel syndrome     Class 2 severe obesity due to excess calories with serious comorbidity and body mass index (BMI) of 37.0 to 37.9 in adult (H)     Patellofemoral instability of left knee with pain     Tobacco abuse     Suicidal ideation     Moderate episode of recurrent major depressive disorder (H)     CARSON (obstructive sleep apnea)     Unspecified psychosis not due to a substance or known physiological condition (H)     Marijuana use     Schizophrenia, unspecified type (H)     Schizophrenia, schizoaffective, chronic with acute exacerbation (H)          HOSPITAL COURSE AND ASSESSMENT   This is a 33 year old male with history of schizophrenia, MDD, suicidal ideation, and PTSD who presented [facility] ED on 2/17/2024 via EMS for psychiatric evaluation.  [facility] experiencing symptoms of psychosis including disorganized, paranoid, and delusional thoughts.  Patient also exhibiting poor p.o. intake and not attending to ADLs such as bathing.  Patient has a history of Patellofemoral instability of left knee and requires a brace to stabilize.  Per chart review patient was not taking " any psychiatric medications prior to admission.  Patient endorsed some cannabis use and urine drug screening negative for all except cannabinoids.  While admitted to the ED, psychiatry provider was consulted and initiated olanzapine to target psychosis symptoms.  Patient was also started on a trial of Anafranil to target OCD symptoms.  Patient was evaluated by medical provider in the ED and determined to be medically stable.  Patient subsequently admitted to inpatient psychiatry unit 10 N for further psychiatric stabilization.  Legal status at time of admission was: 72-hour hold.  Olanzapine started in the ED to target psychosis symptoms and continued during psychiatric hospitalization.  Psychiatric provider initiated petition for mental health commitment with [facility] on 2/20/2024.  Trial of Anafranil initiated in the ED to target OCD symptoms however this was discontinued due to risk for worsening psychosis symptoms.  On 3/1/2024 court documents received supporting mental health commitment with [facility] order in [facility]. [facility] order includes medications: Zyprexa (olanzapine), Haldol (haloperidol), Invega (paliperidone), and Seroquel (quetiapine). Per chart review, patient previously tolerated paliperidone (Invega) medication well and this medication was effective for treating psychosis and mood disorder symptoms however Invega p.o. was not covered by insurance and subsequently was discontinued.  Pharmacy liaison consult completed during current hospitalization and prior auth completed for health insurance coverage of Invega p.o. tablets.  Invega Sustenna long-acting injection also covered by health insurance.  Plan will be to continue olanzapine 15 mg p.o. at bedtime for now with plan to titrate down in upcoming days with initiation of medication paliperidone.  Provider initiated [facility] medication paliperidone (Invega) 24-hour tablet 1.5 mg PO daily with backup IM olanzapine 10 mg on 3/1/2024.   Plan will be  to continue paliperidone (Invega) p.o. and titrate to therapeutic dose while monitoring for side effects.  If patient tolerating paliperidone p.o. plan will be to consider initiating long-acting injection Invega Sustenna prior to discharge due to patient's history of noncompliance with psychiatric medications as well as patient reporting directly to writer that he will be noncompliant with psychiatric medications in the community.  Plan will be for patient to discharge to group home.  Unit CTC facilitating discharge plan.     PLAN   1. Ongoing education given regarding diagnostic and treatment options with risks, benefits and alternatives and adequate verbalization of understanding.  2.  Medications:  Initiate first loading dose of paliperidone (Invega Sustenna) IM injection 234 mg intramuscular once on 3/15/2024 at 10 AM, this is a [facility] medication and patient must take.  Continue paliperidone (Invega) 24-hour tablet to 9 mg PO daily to target psychosis symptoms and mood disorder symptoms with backup olanzapine 10 mg IM injection.  Patient has [facility] order for this medication and must take.  Offer p.o. paliperidone (Invega) first, if patient refuses then administer IM olanzapine.  Continue p.o. Invega until second loading dose of Invega Sustenna which will be administered at group home on 3/22/2024.  Continue multivitamin 1 tab PO daily  Continue cholecalciferol (vitamin D3) capsule 1250 mcg PO every 7 days for 8 doses to target vitamin D deficiency, administer immediately after supper  PRN benztropine tablet 0.5 mg PO2 times daily as needed to target side effect of movement disorder  PRN hydroxyzine tablet 25 mg PO every 4 hours as needed for anxiety  PRN olanzapine tablet 5-10 mg PO every 3 hours as needed for agitation, ordered linked with olanzapine IM injection  PRN melatonin tablet 3 mg PO at bedtime as needed for sleep  PRN nicotine lozenge 2 mg buccal every 1 hour as needed for nicotine withdrawal  symptoms    3.  Labs/Imaging:  -XR KNEE LEFT 3 VIEWS ordered 2/28/2024  -CMP and CBC completed on 2/28/2024                 4. Consults:  -Orthopedic consult placed 2/28/2024 to address left knee subluxation    5. Precautions:  -Self-injury precautions, suicide precautions; risk moderate    6. Legal:  -Mental health commitment with [facility] starting 3/1/2024, expires 8/29/2024   -[facility] order in place which includes medications Zyprexa, Haldol, Invega, Seroquel    7. Discharge planning:  -Per unit CTC        Risk Assessment: IP MHAC RISK ASSESSMENT: Patient able to contract for safety and Patient on precautions    Coordination of Care:   Treatment Plan reviewed and physician signed, Care discussed with Care/Treatment Team Members, Chart reviewed and Patient seen      Re-Certification I certify that the inpatient psychiatric facility services furnished since the previous certification were, and continue to be, medically necessary for, either, treatment which could reasonably be expected to improve the patient s condition or diagnostic study and that the hospital records indicate that the services furnished were, either, intensive treatment services, admission and related services necessary for diagnostic study, or equivalent services.     I certify that the patient continues to need, on a daily basis, active treatment furnished directly by or requiring the supervision of inpatient psychiatric facility personnel.   I estimate 14-21 days of hospitalization is necessary for proper treatment of the patient. My plans for post-hospital care for this patient are   TBD     CARISSA Mendieta CNP    -     03/15/24       -     10:00 AM       Total time  35 minutes with > 50%spent on coordination of cares and psycho-education.    This note was created with help of Dragon dictation system. Grammatical / typing errors are not intentional.    CARISSA Mendieta CNP

## 2024-03-15 NOTE — CARE PLAN
Pt appeared asleep for 6hours .No behavior or medical concerns noted during this shift.Pt remains on 15 minutes safety check. Will continue to monitor.

## 2024-03-15 NOTE — PLAN OF CARE
RN Shift Summary     Patient presented with a calm affect. He was present in the milieu and friendly/social with peers. He attended groups today. He was aware of his medications and displayed good insight into his plan of care. He asked appropriate questions about the long-acting injection that was scheduled today. Patient took all scheduled medications without issue and acknowledged that they are helping him. He talked about how he used to think that medications went against his [employer] beliefs, but he now believes that God has a hand in scientific advancement and that medication can help a lot of people. He is future-oriented and hopes he can go to school to become a psychologist or to study law or philosophy so he can use his experience to help others. He is looking forward to discharging soon. Invega Sustenna 234 mg long-acting injection administered in right gluteal muscle around 1030; patient tolerated injection well. Additional staff present for administration of IM for safety. Patient education provided throughout the injection process.    Vitals: B/P: 124/77, T: 98.5, P: 71, R: 17

## 2024-03-15 NOTE — PLAN OF CARE
BEH IP Unit Acuity Rating Score (UARS)  Patient is given one point for every criteria they meet.    CRITERIA SCORING   On a 72 hour hold, court hold, committed, stay of commitment, or revocation. 1    Patient LOS on BEH unit exceeds 20 days. 0  LOS: 25   Patient under guardianship, 55+, otherwise medically complex, or under age 11. 0   Suicide ideation without relief of precipitating factors. 0   Current plan for suicide. 0   Current plan for homicide. 0   Imminent risk or actual attempt to seriously harm another without relief of factors precipitating the attempt. 0   Severe dysfunction in daily living (ex: complete neglect for self care, extreme disruption in vegetative function, extreme deterioration in social interactions). 0   Recent (last 7 days) or current physical aggression in the ED or on unit. 0   Restraints or seclusion episode in past 72 hours. 0   Recent (last 7 days) or current verbal aggression, agitation, yelling, etc., while in the ED or unit. 0   Active psychosis. 1   Need for constant or near constant redirection (from leaving, from others, etc).  0   Intrusive or disruptive behaviors. 0   Patient requires 3 or more hours of individualized nursing care per 8-hour shift (i.e. for ADLs, meds, therapeutic interventions). 0   TOTAL 2

## 2024-03-15 NOTE — PLAN OF CARE
-Attending Provider: CARISSA Davis CNP  -Voicemail Code: [id number]  -Team Note Due: Tuesday  -Next Steps:        Coordinate plan for medications at discharge. [facility] uses [facility].    Draft PD and request signature from TCM. Sent to  [facility] with change of status form.    Higinio wants PCP and Dietitian appointments set up      Assessment/Intervention/Current Symtoms and Care Coordination:  Chart review and met with team, discussed pt progress, symptomology, and response to treatment.  Discussed the discharge plan and any potential impediments to discharge.  CTC met with Higinio to get RAFAL for ACP. CTC coordinate with care coordinator to scheduled psych appointment.       Discharge Plan or Goal:  Discharge 3/18 at 12:30pm to [facility]    Barriers to Discharge:  Impact of mental health symptoms on well being   Impact of mental health symptoms on activities of daily living  Need for medication monitoring and medication management     Referral Status:       Legal Status:  Commitment with [facility]: [place]  File Number: [id number]  Start: February 22 2024  Expiration: August 29th 2024     Contacts:  Norah (CADI  through [facility] [place]): [phone]  [email or web address]  [facility] (group home Higinio will be discharging to: Rev. Carlton [phone]        Phone: [phone]         FAX: [phone] [email or web address]       Upcoming Meetings and Dates/Important Information:

## 2024-03-15 NOTE — PLAN OF CARE
Problem: Adult Inpatient Plan of Care  Goal: Optimal Comfort and Wellbeing  Outcome: Progressing   Goal Outcome Evaluation:     RN Assessment:  SI/Self harm:  Denied.  Aggression/agitation/HI:  No  AVH:  Denied  Sleep: No issues  PRN Med: No PRNs administered this shift  Medication : Compliant  Physical Complaints/Issues:No issues  I & O: eating and drinking well  LBM:   ADLs: independent  Visits: 0  Vitals:  WNL  COVID 19 Assessment:      Patient does not have new respiratory symptoms.  Patient does not have new sore throat.  Patient does not have a fever greater than 99.5.        Pt is alert and oriented . Pt was visible in milieu .Denied having pain and discomfort. Denied all mental health symptoms . Used  a computer with supervision . No acute concerns . Writer will continue to monitor.

## 2024-03-16 PROCEDURE — 250N000013 HC RX MED GY IP 250 OP 250 PS 637: Performed by: PSYCHIATRY & NEUROLOGY

## 2024-03-16 PROCEDURE — 124N000002 HC R&B MH UMMC

## 2024-03-16 PROCEDURE — G0177 OPPS/PHP; TRAIN & EDUC SERV: HCPCS

## 2024-03-16 PROCEDURE — 250N000013 HC RX MED GY IP 250 OP 250 PS 637

## 2024-03-16 RX ORDER — CHOLECALCIFEROL (VITAMIN D3) 1250 MCG
1250 CAPSULE ORAL
Qty: 4 CAPSULE | Refills: 0 | Status: SHIPPED | OUTPATIENT
Start: 2024-03-21

## 2024-03-16 RX ORDER — LANOLIN ALCOHOL/MO/W.PET/CERES
3 CREAM (GRAM) TOPICAL AT BEDTIME
Qty: 30 TABLET | Refills: 0 | Status: SHIPPED | OUTPATIENT
Start: 2024-03-16

## 2024-03-16 RX ORDER — MULTIVITAMIN,THERAPEUTIC
1 TABLET ORAL DAILY
Qty: 30 TABLET | Refills: 0 | Status: SHIPPED | OUTPATIENT
Start: 2024-03-16

## 2024-03-16 RX ORDER — POLYETHYLENE GLYCOL 3350 17 G/17G
17 POWDER, FOR SOLUTION ORAL DAILY PRN
Qty: 510 G | Refills: 0 | Status: SHIPPED | OUTPATIENT
Start: 2024-03-16

## 2024-03-16 RX ORDER — PALIPERIDONE 9 MG/1
9 TABLET, EXTENDED RELEASE ORAL DAILY
Qty: 5 TABLET | Refills: 0 | Status: SHIPPED | OUTPATIENT
Start: 2024-03-16 | End: 2024-08-22

## 2024-03-16 RX ORDER — AMOXICILLIN 250 MG
1-2 CAPSULE ORAL 2 TIMES DAILY
Qty: 120 TABLET | Refills: 0 | Status: SHIPPED | OUTPATIENT
Start: 2024-03-16 | End: 2024-08-22

## 2024-03-16 RX ORDER — ACETAMINOPHEN 325 MG/1
325-650 TABLET ORAL EVERY 4 HOURS PRN
Qty: 120 TABLET | Refills: 0 | Status: SHIPPED | OUTPATIENT
Start: 2024-03-16

## 2024-03-16 RX ADMIN — THERA TABS 1 TABLET: TAB at 08:48

## 2024-03-16 RX ADMIN — SENNOSIDES AND DOCUSATE SODIUM 2 TABLET: 8.6; 5 TABLET ORAL at 08:48

## 2024-03-16 RX ADMIN — PALIPERIDONE 9 MG: 6 TABLET, EXTENDED RELEASE ORAL at 08:48

## 2024-03-16 RX ADMIN — SENNOSIDES AND DOCUSATE SODIUM 2 TABLET: 8.6; 5 TABLET ORAL at 20:26

## 2024-03-16 RX ADMIN — Medication 3 MG: at 21:07

## 2024-03-16 ASSESSMENT — ACTIVITIES OF DAILY LIVING (ADL)
DRESS: INDEPENDENT
ADLS_ACUITY_SCORE: 45
HYGIENE/GROOMING: INDEPENDENT
ADLS_ACUITY_SCORE: 45
ORAL_HYGIENE: INDEPENDENT
ADLS_ACUITY_SCORE: 45

## 2024-03-16 NOTE — PLAN OF CARE
"RN Shift Summary     Patient presented with a calm affect. He was present in the milieu throughout the day and social with peers. He spent time watching TV and attended available programming. He took scheduled medications; no PRNs administered this shift. He denies SI/SIB/HI or physical concerns. Patient made several statements that indicated paranoia and delusional thinking. Patient stated, \"I see signs in the TV\" and \"I know there are messages from the government all the movies.\" Writer asked for clarification, and patient stated that he thought there was a pending civil war in the [place] between the Republicans and the Democrats, and that every commercial, TV show, and movie had hidden messages from the government on both sides that had messages about the war. Patient also talked about spies and having to be careful what you say. Patient also engaged in conversations with a peer about being in a \"warzone\" with a peer and making comments about being in an \"[facility]\" while watching a science fiction movie.    Vitals: B/P: 118/82, T: 97.5, P: 91, R: 17    "

## 2024-03-16 NOTE — PLAN OF CARE
03/16/24 1232   Group Therapy Session   Group Attendance attended group session   Time Session Began 1015   Time Session Ended 1100   Total Time (minutes) 45   Total # Attendees 4   Group Type task skill;recreation;life skill   Group Topic Covered balanced lifestyle;leisure exploration/use of leisure time;self-care activities;coping skills/lifestyle management;cognitive activities   Group Session Detail Line Dancing: group activity to educate participants in popular line dances for light movement and exercise, social engagement, and cognitive/problem-solving skills.   Patient Participation Detail Pt participated in a line dancing group activity for light movement and exercise, social engagement, and cognitive/problem-solving skills. Pt reported that they enjoy dancing and was familiar with one of the dances. Pt appeared social and was open to sharing personal experiences from grade school with the group. Pt appeared engaged with the activities and was an active participant. Pt reported having difficulty coordinating with the dance moves and sat down to take a break during a dance. Pt also expressed feeling discomfort with their knees; was able to use their personal knee brace.

## 2024-03-16 NOTE — CARE PLAN
Pt appeared asleep for 7 hours. No behavior or medical concerns noted during this shift.Pt remains on 15 minutes safety checks .Will continue to monitor.

## 2024-03-17 PROCEDURE — 250N000013 HC RX MED GY IP 250 OP 250 PS 637

## 2024-03-17 PROCEDURE — 124N000002 HC R&B MH UMMC

## 2024-03-17 PROCEDURE — 250N000013 HC RX MED GY IP 250 OP 250 PS 637: Performed by: PSYCHIATRY & NEUROLOGY

## 2024-03-17 PROCEDURE — 90853 GROUP PSYCHOTHERAPY: CPT

## 2024-03-17 RX ADMIN — SENNOSIDES AND DOCUSATE SODIUM 2 TABLET: 8.6; 5 TABLET ORAL at 08:30

## 2024-03-17 RX ADMIN — SENNOSIDES AND DOCUSATE SODIUM 2 TABLET: 8.6; 5 TABLET ORAL at 20:55

## 2024-03-17 RX ADMIN — PALIPERIDONE 9 MG: 6 TABLET, EXTENDED RELEASE ORAL at 08:30

## 2024-03-17 RX ADMIN — THERA TABS 1 TABLET: TAB at 08:31

## 2024-03-17 RX ADMIN — Medication 3 MG: at 20:58

## 2024-03-17 ASSESSMENT — ACTIVITIES OF DAILY LIVING (ADL)
ADLS_ACUITY_SCORE: 45
DRESS: SCRUBS (BEHAVIORAL HEALTH)
ADLS_ACUITY_SCORE: 45
HYGIENE/GROOMING: INDEPENDENT
ORAL_HYGIENE: INDEPENDENT

## 2024-03-17 NOTE — CARE PLAN
Pt appeared asleep for 7 hours. No behavior or concerns noted during the shift. Pt remains on 15 minutes safety checks. Will continue to monitor.

## 2024-03-17 NOTE — PLAN OF CARE
"   03/17/24 1839   Group Therapy Session   Group Attendance attended group session   Time Session Began 1615   Time Session Ended 0700   Total Time (minutes) 45   Total # Attendees 5   Group Type psychotherapeutic   Group Topic Covered structured socialization   Group Session Detail Process conversation and pastels   Patient Response/Contribution cooperative with task;discussed personal experience with topic;expressed readiness to alter behaviors;listened actively;offered helpful suggestions to peers   Patient Participation Detail mood good, happy about discharge, he was insightful about how processing/ venting helps some patients. He made a thoughtful landsacpe with \"  heart fruit trees.\"     Goal Outcome Evaluation:                        "

## 2024-03-17 NOTE — PLAN OF CARE
"RN Shift Summary     Patient presented with a calm affect. He was present in the milieu throughout the day. He was social with peers and spent time watching TV. He expressed feeling \"excited\" to discharge tomorrow. He said he knows that the [facility] is the right [facility] for him, but he is also hoping to be able to find time to be alone as well. He said that he tends to make friends with people that can be emotionally draining, and he wants to work on setting boundaries so he can have time to himself as well. He did not make any delusional, paranoid, or psychotic statements to writer today. He denies SI/SIB/HI or hallucinations today.     Discharge planning:  - Medications ordered to [facility]  - Discharge time: 12:30 PM  - All belongings on unit (nothing in security)    Vitals: B/P: 113/81, T: 97.9, P: 73  "

## 2024-03-17 NOTE — PLAN OF CARE
RN: Pt alert and oriented, VSS. Pt was calm and cooperative. Pt was observed reading bible in his room at times, otherwise spent most of the evening shift in the lounge watching TV, social with peers. Pt denied all MH symptoms, including: SI/SIB/HI, AV hallucinations, anxiety and depression, no PRN med administered this shift. Pt was not observed making delusional statements this shift, except made commands about bible, pt said that he like to read bible, but the more he read it, the more he realized that many things in the bible were wrong and that the bible scares him. Pt also reported that he had a heart attack two years ago, now he sometimes sees different color of the lights that flash rhythmically with his heart rate at night. Pt has +1 pitting edema in bilateral LLE , ankle and feet. Charli stocking applied and were removed at HS and put them in pt's locker. Pt has good appetite, ate 100% of meal this evening. Pt was medication compliant, no reported or observed side effect. Pt denied pain, reports no issues with bowel and bladder. Continue with POC.      Goal Outcome Evaluation:    Plan of Care Reviewed With: patient       Problem: Psychotic Signs/Symptoms  Goal: Improved Psychomotor Symptoms (Psychotic Signs/Symptoms)  Intervention: Manage Psychomotor Movement  Recent Flowsheet Documentation  Taken 3/16/2024 1700 by Lesa Flanagan RN  Patient Performed Hygiene: dressed  Diversional Activity: reading  Activity (Behavioral Health): up ad madina

## 2024-03-18 VITALS
HEIGHT: 76 IN | BODY MASS INDEX: 29.92 KG/M2 | WEIGHT: 245.7 LBS | SYSTOLIC BLOOD PRESSURE: 116 MMHG | OXYGEN SATURATION: 98 % | HEART RATE: 70 BPM | TEMPERATURE: 97.4 F | DIASTOLIC BLOOD PRESSURE: 76 MMHG | RESPIRATION RATE: 18 BRPM

## 2024-03-18 PROCEDURE — 99239 HOSP IP/OBS DSCHRG MGMT >30: CPT

## 2024-03-18 PROCEDURE — 250N000013 HC RX MED GY IP 250 OP 250 PS 637

## 2024-03-18 PROCEDURE — G0177 OPPS/PHP; TRAIN & EDUC SERV: HCPCS

## 2024-03-18 PROCEDURE — 250N000013 HC RX MED GY IP 250 OP 250 PS 637: Performed by: PSYCHIATRY & NEUROLOGY

## 2024-03-18 RX ADMIN — SENNOSIDES AND DOCUSATE SODIUM 2 TABLET: 8.6; 5 TABLET ORAL at 07:51

## 2024-03-18 RX ADMIN — PALIPERIDONE 9 MG: 6 TABLET, EXTENDED RELEASE ORAL at 07:52

## 2024-03-18 RX ADMIN — THERA TABS 1 TABLET: TAB at 07:51

## 2024-03-18 ASSESSMENT — ACTIVITIES OF DAILY LIVING (ADL)
ADLS_ACUITY_SCORE: 45

## 2024-03-18 NOTE — PLAN OF CARE
-Attending Provider: CARISSA Davis CNP  -Voicemail Code: [id number]  -Team Note Due: Tuesday  -Next Steps:              Assessment/Intervention/Current Symtoms and Care Coordination:  Chart review and met with team, discussed pt progress, symptomology, and response to treatment.  Discussed the discharge plan and any potential impediments to discharge.  CTC scheduled PCP appointment.   CTC called Rev. Carlton and confirmed discharge plans for this day at 12:30am.   CTC met with Higinio and reviewed the provisional discharge agreement, obtained signature, and sent copy with change of status form to [facility].       Discharge Plan or Goal:  Discharge 3/18 at 12:30pm to [facility]    Barriers to Discharge:  Impact of mental health symptoms on well being   Impact of mental health symptoms on activities of daily living  Need for medication monitoring and medication management     Referral Status:       Legal Status:  Commitment with [facility]  County: [place]  File Number: [id number]  Start: February 22 2024  Expiration: August 29th 2024     Contacts:  Norah (CADI  through [facility] [place]): [phone]  [email or web address]  [facility] (group home Higinio will be discharging to: [facility] Carl[facility] [phone]        Phone: [phone]         FAX: [phone] [email or web address]  Ashanti Cha ([employer] ) Her number is [phone]        Upcoming Meetings and Dates/Important Information:

## 2024-03-18 NOTE — PLAN OF CARE
RN: Pt alert and oriented, VSS. Pt was calm / pleasant and cooperative this shift. Pt spent most of the evening shift in the lounge watching TV, social with selective peers. Pt attended OT group. Pt denied all MH symptoms, including: SI/SIB/HI, AV Hallucinations, anxiety and depression. Pt didn't make any delusional statements this shift. Pt was medication compliant, no reported or observed side effects. Pt continue to have 1+ pitting edema in BLE, but declined interventions. Pt has adequate PO intake, denied pain or physical discomfort. Pt reported no issues with bowel and bladder. Continue with POC.     Goal Outcome Evaluation:    Plan of Care Reviewed With: patient      Problem: Psychotic Signs/Symptoms  Goal: Optimal Cognitive Function (Psychotic Signs/Symptoms)  Intervention: Support and Promote Cognitive Ability  Recent Flowsheet Documentation  Taken 3/17/2024 1700 by Lesa Flanagan, RN  Trust Relationship/Rapport: care explained

## 2024-03-18 NOTE — CARE PLAN
Pt appeared asleep at the beginning of the shift. Pt slept for 7hours. No behavior concerns noted during this shift. Pt remains on 15 minutes safety checks.Will continue to monitor.   Consent: The risks of atrophy were reviewed with patient, she states no injury to the left hip.

## 2024-03-18 NOTE — PLAN OF CARE
Care Coordinator Note(s):    Care Request(s):     Psychiatry  - New  Preferences:  Preferences: 2 weeks, in person  Notes: Higinio will need a new psychiatrist that can prescribe AMADO Invega. Near [place] or [place]. Next AMADO due 3/22.      Care Outcome(s):    3/15:[facility] [phone] [address] [place] [address]  -  All providers can prescribe AMADO.      **[facility] [place] in person providers available in 2-3 weeks : Tiffany Sher, MSN, PMHNP-BC, Alisha Bentley, MSN, PMHNP-BC     Verbal authorization from patient needed to schedule.      Updated CTC. Patient and CTC to call and schedule.    3/18: CTC obtained RAFAL for [facility], CC to send to [facility] to approve scheduling.    [email or web address] RAFAL sent @ 9:40 AM.    Appointment type: Psychiatry   Date/time: Thursday March 28th, 2024 @  2:40 PM In person  Provider: MONTEZ Duque, PMHNP-BC  Address: [facility] [address] [place] [address]  Phone: [phone]  Fax: [phone]  Note:  Please arrive 10 minutes early to complete intake paperwork.     Further Actions:  None.    -Meri Lanier  Adult Behavioral Health Care Coordinator

## 2024-03-18 NOTE — PLAN OF CARE
Patient is discharged from unit 10 today. There's a provider's order to discharge patient. Prior to discharge, he attended OT groups. Social with peers. During check in with this writer patient reported feeling safe and ready to discharge. He denied all MH symptoms. Patient and writer went through discharge instructions together, he verbalized understanding of discharge instructions. He denied pain. Patient received a copy of his discharge instructions and he also received his belongings. Patient was taken off the unit by writer, he was picked up by peer  to transport to [facility]. His medications to be picked up from [facility].

## 2024-03-18 NOTE — DISCHARGE SUMMARY
PSYCHIATRY  DISCHARGE SUMMARY     DATE OF DISCHARGE   03/18/2024       DISCHARGE DIAGNOSIS   Schizophrenia, schizoaffective, chronic with acute exacerbation (H)    Patient Active Problem List   Diagnosis     Irritable bowel syndrome     Class 2 severe obesity due to excess calories with serious comorbidity and body mass index (BMI) of 37.0 to 37.9 in adult (H)     Patellofemoral instability of left knee with pain     Tobacco abuse     Suicidal ideation     Moderate episode of recurrent major depressive disorder (H)     CARSON (obstructive sleep apnea)     Unspecified psychosis not due to a substance or known physiological condition (H)     Marijuana use     Schizophrenia, unspecified type (H)     Schizophrenia, schizoaffective, chronic with acute exacerbation (H)          REASON FOR ADMISSION     This is a 33 year old male with history of schizophrenia, MDD, suicidal ideation, and PTSD who presented to [facility] ED on 2/17/2024 via EMS for psychiatric evaluation.  [facility] and was experiencing symptoms of psychosis including disorganized, paranoid, and delusional thoughts.  Patient also exhibiting poor p.o. intake and not attending to ADLs such as bathing.  Patient has a history of Patellofemoral instability of left knee and requires a brace to stabilize.  Per chart review patient was not taking any psychiatric medications prior to admission.  Patient endorsed some cannabis use and urine drug screening negative for all except cannabinoids.  While admitted to the ED, psychiatry provider was consulted and initiated olanzapine to target psychosis symptoms.  Patient was also started on a trial of Anafranil to target OCD symptoms.  Patient was evaluated by medical provider in the ED and determined to be medically stable.  Patient subsequently admitted to inpatient psychiatry unit 10 N with attending Dr. Ferrer for further psychiatric stabilization.        HOSPITAL COURSE    Admitted due to aforementioned presentation.  Education regarding diagnostic and treatment options with risks, benefits and alternatives and adequate verbalization of understanding.  Discussed reviewed in further detail, stressors and events leading to presentation.    Direct care was provided by CARISSA Mendieta CNP.    Legal status at time of admission was: 72-hour hold.  Medication olanzapine was started in the ED to target psychosis symptoms and continued during psychiatric hospitalization.  Psychiatric provider initiated petition for mental health commitment with [facility] on 2/20/2024.  Trial of Anafranil initiated in the ED to target OCD symptoms however this was discontinued due to risk for worsening psychosis symptoms.  On 3/1/2024 court documents received supporting mental health commitment with [facility] order in [facility]. [facility] order includes medications: Zyprexa (olanzapine), Haldol (haloperidol), Invega (paliperidone), and Seroquel (quetiapine). Per chart review, patient previously tolerated paliperidone (Invega) medication well and this medication was effective for treating psychosis and mood disorder symptoms however Invega p.o. was not covered by insurance and subsequently was discontinued.  Pharmacy liaison consult completed during current hospitalization and prior auth completed for health insurance coverage of Invega p.o. tablets.  Invega Sustenna long-acting injection also covered by health insurance. Provider initiated [facility] medication paliperidone (Invega) and titrated to therapeutic dose while monitoring for side effects.  Medication olanzapine was titrated down and discontinued due to initiation of paliperidone.  Patient also reported experiencing increased lower extremity edema which may have been related to olanzapine.  Lower extremity edema improved with initiation of JOANA stockings and low sodium diet.  Patient recommended to follow-up with PCP regarding lower extremity  "edema.  Patient tolerated p.o. paliperidone well and denied any side effects of this medication.  Subsequently long-acting injection Invega Sustenna was initiated prior to discharge due to patient's history of noncompliance with psychiatric medications as well as patient reporting directly to writer that he will be noncompliant with psychiatric medications in the community.  Patient tolerated this well and denies any side effects.  Patient's psychosis symptoms have notably improved however patient does continue to endorse some mild Mandaeism delusions which he reports are significantly improving.  Provider encouraged patient to continue to discuss mental health symptoms with his outpatient psychiatry provider in order to continue to manage mental health symptoms, for management of long-acting injection Invega Sustenna, and for medication refills.  Patient verbalized understanding and is in agreement with this plan for after discharge.    On day of discharge provider met with patient in hospital room of unit 10 N. Upon patient interview, patient reports his mood as, \" good.\"  Patient's affect appears blunted however brighter and improving.  Patient reports multiple nighttime awakenings.  Provider discussed plan to continue scheduled melatonin at bedtime and also discussed sleep hygiene as well as option to increase exercise to 30 minutes daily after discharge.  Patient verbalizes agreement with this and reports his sleep improves with daily exercise and he has not been able to do that while in the hospital.  Provider also discussed that if patient sleep does not improve with these recommendations that he should discuss this further with outpatient psychiatry provider after discharge.  Patient verbalized understanding and is in agreement with this.  Patient's thought process is goal oriented today.  Patient does not endorse any symptoms of psychosis today such as delusions.   Patient also denies auditory " hallucinations or visual hallucinations.  Patient denies depression symptoms.  Patient denies any anxiety symptoms.  Patient denies any HI.  Denies any thoughts of SI, SIB, SA, intent or plan. Patient able to contract for safety. Patient does have notable risk factors for self-harm, including single status, psychosis, and previous suicide attempts. However, risk is mitigated by Restorationism beliefs, sobriety, ability to volunteer a safety plan, and history of seeking help when needed. Therefore, based on all available evidence including the factors cited above, patient does not appear to be at imminent risk for self-harm, does not meet criteria for a 72-hr hold, and therefore remains appropriate for ongoing outpatient level of care. Provider discussed with patient that if they develop thoughts of SI, SIB, SA, HI, or any other medical emergency to go to the nearest ED or call 911. Patient verbalizes understanding of this. Patient was also provided with crisis numbers and mental health resources in the community at time of discharge. Patient reports appetite continues to be slightly decreased however reports this is improving.  Per nursing documentation, patient has been eating and drinking adequately.  Patient denies any issues with bowel or bladder.  Patient vital signs reviewed and noted to be stable. Patient denies any medical concerns today.  Patient denies any side effects today of medication regimen.  Provider reviewed plan with patient that he will get second loading dose of Invega Sustenna 156 mg IM injection a week from today on 3/22/2024 which will be administered at the group home.  Provider also discussed that then maintenance dose of Invega Sustenna 156 mg IM injection will be due 4/19/2024.  Patient verbalized understanding of this.  Patient offers no other questions or concerns.      During hospitalization internal medicine team was consulted on 3/10/2024 due to concern for calf swelling and possible DVT.  " Per internal medicine provider documentation from 3/10/2024:      Internal medicine was consulted to address lower extremity edema.  Per documentation from 3/10/2024:     [facility]  Consult Note - Hospitalist Service  Date of Admission:  2/17/2024  Consult Requested by: CARISSA Coe CNP  Reason for Consult: \"Pt has calf swelling and pain, possible DVT\"        Assessment & Plan  Higinio Wallace is a 33 year old male admitted on 2/17/2024. He has a past medical history of schizophrenia, MDD c/b suicidal ideation, schizophrenia, PTSD who was admitted to station 10N after presenting to the ED from his IRTS program for evaluation of psychiatric decompensation (delusions, paranoia, hallucinations, disorganized thinking). He remains admitted for psychiatric monitoring and management. Medicine was consulted this AM out of concern for DVT d/t calf swelling and pain.     Bilateral Lower Extremity Edema  Weight Gain  Pt notes onset of BLE edema 2-3 days ago. However, even prior to this, patient expresses concern w/ his nearly 30lb weight gain since admission (review of objective data shows he was 211 lbs on 2/15/24 and as of 3/5/24 was 239 lbs). As far as his BLE go, he denies any focal calf pain, and Homans' sign negative on exam, no palpable cord, less suspicion of DVT. BLE edema is symmetric and no e/o cellulitis (no erythema or open wounds) to suggest infectious etiology for swelling. At this point, suspect BLE edema is a manifestation of his rapid weight gain following initiation of dual-antipsychotic therapy (started on Zyprexa 2/17, and Paliperidone 3/1). Per UpToDate, Paliperidone has an incidence of generalized edema of <2%, but has been observed to have weight gain incidence of at least 7% from baseline in adult patients. This weight gain, in Paliperidone specifically, is typically rapid in onset following initiation of the medication. As for Zyprexa, the " "incidence of peripheral edema is ~3% and weight gain has incidence reported as between 3-6% but as high as 64%.   - Recommend Primary Team downtitrate on Zyprexa and/or Paliperidone to smallest dose needed for clinical benefit  - D/w pt who agrees to trial compression stockings              - Only to wear during the day, and remove at night (lotion legs once stockings removed for the day to avoid dry skin [can use Aquaphor or lotion on floor available to patients])  - If worsening edema despite use of compressions stockings, can consider an oral diuretic  - Pt would like to try a low salt diet, so this was ordered. Can change back top regular diet if he wishes  - Continue to monitor     Episodic Chest Pain  Pt notes brief \"twinges\" of substernal, central chest pain w/o any provoking or palliating factors, no particular pattern. No chest pain or dyspnea at the moment. Reports these began ~one week ago. At this point, unclear etiology for the chest pain, low suspicion of primary cardiac etiology as otherwise asymptomatic, and remains HDS. Do not feel BLE edema is r/t cardiac concerns (see above). Query if r/t mood (anxiety) or if potential heartburn.   - Continue to monitor  - Notify Medicine if recurrence of chest pain or changes in hemodynamics           The patient's care was discussed with the Bedside Nurse and Patient.     Medicine will sign off. No further recommendations at this time.  Please feel free to reconsult if any new medical issues or concerns.  Thank you for the opportunity to care for this patient.         Clinically Significant Risk Factors                          # Overweight: Estimated body mass index is 29.12 kg/m  as calculated from the following:    Height as of this encounter: 1.93 m (6' 3.98\").    Weight as of this encounter: 108.5 kg (239 lb 1.6 oz).   # Moderate Malnutrition: based on nutrition assessment               Arian Livingston PA-C  Hospitalist Service  Securely message with [employer] " (more info)  Text page via [facility] Paging/Directory      Orthopedic surgery consulted to address patient report of left knee subluxation, per documentation from orthopedic consult on 2/28/2024:     Assessment:  Patient is a 34 y/o man with history of recurrent patellar instability and subluxations s/p L derotational tibial osteotomy, MPFL reconstruction and TTO with Dr. Han in 2020.      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  At this time patient said that he would like to defer all surgical management as he is worried about cost of procedures given his housing instability.  He currently is wearing a left knee patellar stabilizing brace which appears to be fitting appropriately.  We recommended that he continue using the brace while ambulatory as we do not have any other patellar restrictive braces in the inpatient setting.  Discussed having the patient follow-up with Dr. Saucedo in the outpatient setting that she may recommend additional exercises and/or nonoperative techniques that may help him with his recurrent left patella subluxation.  Patient agreed to this plan and all his questions were answered.  No operative indication at this time        Plan:  - Continue Patellar stabilizing brace while ambulatory  - Ice/Elevation/Compression as needed for swelling  - Outpatient follow-up with Dr. Han for discussion regarding non-surgical vs surgical options.   - No inpatient orthopedic surgical need at this time.         - Imaging: Completed  - Activity: Continue Brace use with activity. Avoid excessive cutting exercises/running.   - Weight bearing: WBAT LLE  - Pain control: Per primary  - Diet: Regular Diet  - Follow-up: Will arrange follow-up with Dr. Han in outpatient setting of review of options  - Disposition: Per primary      During current hospitalization orthopedic surgery consult completed on 2/28/2024 to address left knee  subluxation.  Per orthopedic surgery provider documentation from 2/28/2024:    [facility]  Orthopedic Surgery Consult     Name: Higinio Wallace  Age: 33 year old  MRN: [id number]  YOB: 1990     Reason for Consult: L knee subluxation     Requesting Provider: Lily Cronin     Assessment and Plan:      Assessment:  Patient is a 32 y/o man with history of recurrent patellar instability and subluxations s/p L derotational tibial osteotomy, MPFL reconstruction and TTO with Dr. Han in 2020.      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  At this time patient said that he would like to defer all surgical management as he is worried about cost of procedures given his housing instability.  He currently is wearing a left knee patellar stabilizing brace which appears to be fitting appropriately.  We recommended that he continue using the brace while ambulatory as we do not have any other patellar restrictive braces in the inpatient setting.  Discussed having the patient follow-up with Dr. Saucedo in the outpatient setting that she may recommend additional exercises and/or nonoperative techniques that may help him with his recurrent left patella subluxation.  Patient agreed to this plan and all his questions were answered.  No operative indication at this time        Plan:  - Continue Patellar stabilizing brace while ambulatory  - Ice/Elevation/Compression as needed for swelling  - Outpatient follow-up with Dr. Han for discussion regarding non-surgical vs surgical options.   - No inpatient orthopedic surgical need at this time.         - Imaging: Completed  - Activity: Continue Brace use with activity. Avoid excessive cutting exercises/running.   - Weight bearing: WBAT LLE  - Pain control: Per primary  - Diet: Regular Diet  - Follow-up: Will arrange follow-up with Dr. Han in outpatient setting of  review of options  - Disposition: Per primary     Staff: Trent Olea MD     Respectfully,     Boston Ba MD  Orthopedic Surgery PGY1  [phone]     Please page me directly with any questions/concerns during regular weekday hours before 5 pm. If there is no response, if it is a weekend, or if it is during evening hours then please page the orthopedic surgery resident on call.     History of Present Illness:      Patient was seen and examined by me. History, PMH, Meds, SH, complete ROS (10 organ systems) and PE reviewed with patient and prior medical records.       Patient is a 33-year-old male with history of schizophrenia, MDD, suicidal ideation, and PTSD who was admitted to the [facility] inpatient psychiatric unit on 02/20 over concerns for psychosis.  Patient also has a known history of bilateral left greater than right patellar instability with multiple subluxation events of the left knee.  Patient states that earlier this morning he had a recurrent subluxation event while walking in the hallways.  Patient states that he had his socks on and slipped slightly and felt his left kneecap subluxed laterally.  Patient had multiple previous surgeries on the left leg including left derotational tibial osteotomy, MPFL reconstruction, and distal tibial tubercle osteotomy with Dr. Saucedo.  Patient states that surgery was successful in reducing his subluxation events for approximately 2 years postop, however, for the past years he has experienced recurrent subluxations.  Patient denies any significant discomfort at current time.  Patient has a moderate knee swelling which he says is slightly abnormal for him.  Patient is worsening no new numbness or tingling in the left lower extremity.      Behavioral discharge plan and instructions were reviewed with patient and he was provided with a copy at time of discharge:    Behavioral Discharge Planning and Instructions     Summary: You were admitted to [facility] on 2/17/2024  due to Disorganized Thinking/Behaviors, Delusional Thoughts, and Psychotic Symptomology. You were treated by CARISSA Davis, CATALINA and Byron Calderon MD and provisionally discharged on 03/18/2023 to Group Home. [address] [facility] [address].     [facility].  You were also court ordered to take the medications the doctor ordered for you. You are being discharged on a Provisional Discharge Agreement which shall remain in effect for the duration of the Commitment.  Your Commitment expires on 08/29/2024.       Continue to follow with your Mental Health : Ashanti Cha. Her number is [phone]         Main Diagnosis:   Schizoaffective disorder, bipolar type, currently hypomanic      Health Care Follow-up:      Invega Long Acting Injectable Due March 22nd 2024 (orders for this dose and maintance dose have been sent to pharmacy)     Appointment type: Psychiatry   Date/time: Thursday March 28th, 2024 @  2:40 PM In person  Provider: Meera Sher, MSN, PMHNP-BC  Address: [facility] [address] [place] [address]  Phone: [phone]  Fax: [phone]  Note:  Please arrive 10 minutes early to complete intake paperwork.   ** HUC to fax AVS upon discharge, please.         Appointment Date/Time: Virtual-3/21 at 10am   Therapist: Jen Apley through [facility]      Phone Number: [phone]   Link will be sent to email: [email or web address]     Appointment Date/Time: In-person April 11th at 10:10am    Primary Care Provider: Dr. Purcell      Address: [address] [address]     Phone: [phone]           Attend all scheduled appointments with your outpatient providers. Call at least 24 hours in advance if you need to reschedule an appointment to ensure continued  "access to your outpatient providers.      Major Treatments, Procedures and Findings:  You were provided with: a psychiatric assessment, assessed for medical stability, medication evaluation and/or management, group therapy, and milieu management     Symptoms to Report: Feeling more aggressive, increased confusion, losing more sleep, mood getting worse, or thoughts of suicide.     Early warning signs can include: Increased depression or anxiety sleep disturbances increased thoughts or behaviors of suicide or self-harm  increased unusual thinking, such as paranoia or hearing voices.     Safety and Wellness: Take all medicines as directed. Make no changes unless your doctor suggests them. Follow treatment recommendations. Refrain from alcohol and non-prescribed drugs. Ask your support system to help you reduce your access to items that could harm yourself or others. If there is a concern for safety, call 066.     Resources:   General Mental Health Resources:   National [place] on Mental Illness (RADHA) [place]: Connect for help, to navigate the mental health system, and for support and for resources. Call: 3-283-NPMW-Helps / 1-035-221-8765  Crisis Text Line: The 24/7 emergency service is available if you or someone you know is experiencing a psychiatric or mental health crisis. Text: \"[place]\" to 490080  [facility]: Are you an adult needing support? Talk to a specialist who has firsthand experience living with a mental health condition. Call: 588.535.8919  Text: \"Support\" to 71099  [facility].org/support/[place]-warm[facility]/  National Suicide Prevention Lifeline: The 24/7 lifeline provides support when in distress, has prevention and crisis resources for you or your loved ones, and resources for professionals. Call 3-583-888-TALK (5496)  Peer Support Connection Warmlines: Lfwj-zy-knfr telephone support that s safe and supportive. Open 5 p.m. to 9 a.m. Call or text: 1-650.196.6805   COVID Cares Stress Phone " Support Service. Any [place] experiencing stress can call 238-DHLZ1RF (891-745-6388) for free telephone support from 9am to 9pm every day. The service is a collaboration with volunteers from the [place] Psychiatric Society, the [place] Psychological Association, the [place] Black Psychologists, and [facility] [place]. The free service is also accessible at Measurabl where searchers can also find psychiatric and mental health services availability and real-time Substance Use Disorder Treatment program openings.  Adult Rehabilitative Mental Health Services (ARMHS): https://[place].gov/dhs/partners-and-providers/policies-procedures/adult-mental-health/adult-rehabilitative-mental-health-services/armhs-certified-providers/  Mental Health Association of [place] (www.mentalhealth.org): 289.235.9521 or 261-319-3528  Self- Management and Recovery Training., [place]-- Toll free: 566.206.9627  www.bettermarks  [facility] (COPE) Response - Adult 146 353-0574     Outpatient Psychiatry/Therapy Resources:   [facility] - (phone: 188.889.4953) https://www.Swirl/care/specialty/mental-behavioral-health/ https://www.Swirl/care/find/doctors/psychologists/  [facility] - (phone: 4-101-OOKNCVBA) https://www.[facility].org/treatments/Counseling-Adult  [place] Mental [facility] - (phone: 510.499.4752) https://[facility] Hospital[facility] Networks[facility].Virally/  [facility] - (phone: 501.242.7831)  https://[facility]-mn.Virally/  [facility] - (phone: 1-968.805.8938) https://www.[employer] Coffee/  [facility] - (phone: 439.888.2298) https://www.apiOmatetherSHERPANDIPITY.com/  [facility] - (phone: 750.245.2388) https://SafetyCertified/  [facility] - (phone: 463.326.1190) https://walkin.org/     General Medication Instructions:   See your medication sheet(s) for instructions.   Take all  "medicines as directed.  Make no changes unless your doctor suggests them.   Go to all your doctor visits.  Be sure to have all your required lab tests. This way, your medicines can be refilled on time.  Do not use any drugs not prescribed by your doctor.  Avoid alcohol.     Advance Directives:   Scanned document on file with [employer]? No scanned doc  Is document scanned? No. Copy Requested.  Honoring Choices Your Rights Handout: Informed and given  Was more information offered? Pt declined     The Treatment team has appreciated the opportunity to work with you. If you have any questions or concerns about your recent admission, you can contact the unit which can receive your call 24 hours a day, 7 days a week. They will be able to get in touch with a Provider if needed. The unit number is [phone].       MENTAL STATUS EXAM   Vitals: /76 (BP Location: Right arm, Patient Position: Sitting, Cuff Size: Adult Regular)   Pulse 70   Temp 97.4  F (36.3  C) (Oral)   Resp 18   Ht 1.93 m (6' 3.98\")   Wt 111.4 kg (245 lb 11.2 oz)   SpO2 98%   BMI 29.92 kg/m      Mental Status:  Appearance: Casually groomed  Mood: Reports mood as, \"good.\"  Affect: blunted however brighter and improving was congruent to speech  Suicidal Ideation: PRESENT / ABSENT: absent   Homicidal Ideation: PRESENT / ABSENT: absent   Thought process: Goal oriented today   thought content: Denies SI or HI  Fund of Knowledge: Average  Attention/Concentration: Poor  Language ability:  Intact   Memory: Intact  Insight:  limited.  Judgement: limited  Orientation: Yes, x4  Psychomotor Behavior: Normal  Muscle Strength and Tone: MuscleStrength: Normal  Gait and Station: Normal       DISCHARGE MEDICATIONS   Discharge Medication Options:   Discharge Medication List as of 3/18/2024  1:51 PM        START taking these medications    Details   cholecalciferol (VITAMIN D3) 1250 mcg (86320 units) capsule Take 1 capsule (1,250 mcg) by mouth every 7 days, Disp-4 " capsule, R-0, E-Prescribe      !! paliperidone (INVEGA SUSTENNA) 156 MG/ML RIGO injection Inject 1 mL (156 mg) into the muscle once for 1 dose, Disp-1 mL, R-0, E-Prescribe      !! paliperidone (INVEGA SUSTENNA) 156 MG/ML RIGO injection Inject 1 mL (156 mg) into the muscle every 28 days, Disp-1 mL, R-0, E-Prescribe      paliperidone ER (INVEGA) 9 MG 24 hr tablet Take 1 tablet (9 mg) by mouth daily, Disp-5 tablet, R-0, E-Prescribe      polyethylene glycol (MIRALAX) 17 GM/Dose powder Take 17 g by mouth daily as needed for constipation, Disp-510 g, R-0, E-Prescribe      senna-docusate (SENOKOT-S/PERICOLACE) 8.6-50 MG tablet Take 1-2 tablets by mouth 2 times daily, Disp-120 tablet, R-0, E-Prescribe       !! - Potential duplicate medications found. Please discuss with provider.        CONTINUE these medications which have CHANGED    Details   acetaminophen (TYLENOL) 325 MG tablet Take 1-2 tablets (325-650 mg) by mouth every 4 hours as needed for mild pain, Disp-120 tablet, R-0, E-Prescribe      melatonin 3 MG tablet Take 1 tablet (3 mg) by mouth at bedtime, Disp-30 tablet, R-0, E-Prescribe      multivitamin, therapeutic (THERA-VIT) TABS tablet Take 1 tablet by mouth daily, Disp-30 tablet, R-0, E-Prescribe           STOP taking these medications       alum & mag hydroxide-simethicone (MAALOX) 200-200-20 MG/5ML SUSP suspension Comments:   Reason for Stopping:         calcium carbonate 500 mg, elemental, (OSCAL 500) 1250 (500 Ca) MG TABS tablet Comments:   Reason for Stopping:         diphenhydrAMINE (BENADRYL) 25 MG tablet Comments:   Reason for Stopping:         docusate sodium (COLACE) 100 MG capsule Comments:   Reason for Stopping:         guaiFENesin (ROBITUSSIN) 20 mg/mL liquid Comments:   Reason for Stopping:         loperamide (IMODIUM) 2 MG capsule Comments:   Reason for Stopping:         magnesium hydroxide (MILK OF MAGNESIA) 400 MG/5ML suspension Comments:   Reason for Stopping:         nicotine (COMMIT) 2 MG  lozenge Comments:   Reason for Stopping:         nicotine (NICODERM CQ) 14 MG/24HR 24 hr patch Comments:   Reason for Stopping:         nicotine (NICODERM CQ) 21 MG/24HR 24 hr patch Comments:   Reason for Stopping:         nicotine (NICORETTE) 4 MG lozenge Comments:   Reason for Stopping:         OLANZapine (ZYPREXA) 5 MG tablet Comments:   Reason for Stopping:         ramelteon (ROZEREM) 8 MG tablet Comments:   Reason for Stopping:               Medication adherence issues: MS Med Adherence Y/N: No  Medication side effects: MEDICATION SIDE EFFECTS: no side effects reported         DISCHARGE PLAN   1.  Education given regarding diagnostic and treatment options with risks, benefits and alternatives with adequate verbalization of understanding.  2.  Discharge to group home.  Upon detailed review of risk factors, patient amenable for release.   3.  Continue aforementioned medications and associated medication changes with follow-up by outpatient mental health provider.  4.  Crisis management planning in place.    5.  Continue efforts for sobriety.  6.  Attend health Care Follow-up appointments:      Invega Long Acting Injectable Due March 22nd 2024 (orders for this dose and maintance dose have been sent to pharmacy)     Appointment type: Psychiatry   Date/time: Thursday March 28th, 2024 @  2:40 PM In person  Provider: MONTEZ Duque, PMHNP-BC  Address: [facility] [address] [place] [address]  Phone: [phone]  Fax: [phone]  Note:  Please arrive 10 minutes early to complete intake paperwork.   ** HUC to fax AVS upon discharge, please.         Appointment Date/Time: Virtual-3/21 at 10am   Therapist: Jen Apley through [facility]      Phone Number: [phone]   Link will be sent to email: [email or web address]     Appointment Date/Time: In-person April 11th at 10:10am    Primary Care Provider: Dr. Purcell      Address: [facility]  [place], [address] [place] [address]     Phone: [phone]     Nursing and  to review further discharge recommendations.     TOTAL TIME:  Greater than 30 minutes for discharge planning.    This note was created with help of Dragon dictation system. Grammatical / typing errors are not intentional.    CARISSA Mendieta CNP

## 2024-05-08 ENCOUNTER — OFFICE VISIT (OUTPATIENT)
Dept: INTERNAL MEDICINE | Facility: CLINIC | Age: 34
End: 2024-05-08
Payer: COMMERCIAL

## 2024-05-08 VITALS
HEIGHT: 77 IN | HEART RATE: 71 BPM | WEIGHT: 274.2 LBS | SYSTOLIC BLOOD PRESSURE: 121 MMHG | OXYGEN SATURATION: 98 % | TEMPERATURE: 98.7 F | DIASTOLIC BLOOD PRESSURE: 83 MMHG | RESPIRATION RATE: 18 BRPM | BODY MASS INDEX: 32.37 KG/M2

## 2024-05-08 DIAGNOSIS — F17.200 TOBACCO DEPENDENCE SYNDROME: ICD-10-CM

## 2024-05-08 DIAGNOSIS — F25.9 SCHIZOPHRENIA, SCHIZOAFFECTIVE, CHRONIC WITH ACUTE EXACERBATION (H): Primary | ICD-10-CM

## 2024-05-08 PROBLEM — F20.9 SCHIZOPHRENIA, UNSPECIFIED TYPE (H): Status: RESOLVED | Noted: 2024-02-19 | Resolved: 2024-05-08

## 2024-05-08 PROBLEM — F29 UNSPECIFIED PSYCHOSIS NOT DUE TO A SUBSTANCE OR KNOWN PHYSIOLOGICAL CONDITION (H): Status: RESOLVED | Noted: 2024-02-17 | Resolved: 2024-05-08

## 2024-05-08 PROCEDURE — 99214 OFFICE O/P EST MOD 30 MIN: CPT | Performed by: INTERNAL MEDICINE

## 2024-05-08 ASSESSMENT — PATIENT HEALTH QUESTIONNAIRE - PHQ9
10. IF YOU CHECKED OFF ANY PROBLEMS, HOW DIFFICULT HAVE THESE PROBLEMS MADE IT FOR YOU TO DO YOUR WORK, TAKE CARE OF THINGS AT HOME, OR GET ALONG WITH OTHER PEOPLE: SOMEWHAT DIFFICULT
SUM OF ALL RESPONSES TO PHQ QUESTIONS 1-9: 6
SUM OF ALL RESPONSES TO PHQ QUESTIONS 1-9: 6

## 2024-05-08 NOTE — PROGRESS NOTES
ASSESSMENT AND PLAN:    1. Schizophrenia, schizoaffective  Ongoing psychiatric care, not actively psychotic at this time.      2. Tobacco dependence syndrome  ongoing    3. Precordial pain  Atypical, twinge like pain left upper chest.  His exam today is negative.  He is reassured that the symptoms are not cardiac or pulmonary and likely musculoskeletal.      Follow up prn.     CHIEF COMPLAINT:  Chest symptoms and follow up    HISTORY OF PRESENT ILLNESS:  Higinio Wallace is a 33 year old male with recent hospitalization for schizophrenia with decompensation.  He also, at the time, reports atypical chest symptoms which he describes as 'twinge' like discomfort, momentary in duration, in the left upper chest, unrelated to exertion, or pleuritic.  No unusual cough or dyspnea.  Not bothering him so much now.      REVIEW OF SYSTEMS:   See HPI, all other systems on review are negative.    Past Medical History:   Diagnosis Date    Anxiety     Class 2 obesity due to excess calories in adult     Complication of anesthesia     Depression     Gastroesophageal reflux disease with esophagitis     History of nephrolithiasis     Irritable bowel syndrome     Major depression, recurrent (H24)     Malrotation of intestine (H28)     Manic disorder (H)     Panic disorder     Patellofemoral instability of left knee with pain     Pre-diabetes     PTSD (post-traumatic stress disorder)     Tobacco dependence syndrome      History   Smoking Status    Some Days    Types: Cigarettes   Smokeless Tobacco    Never     Family History   Problem Relation Age of Onset    Other - See Comments Mother         PONV    Rheumatoid Arthritis Mother     Cerebrovascular Disease Brother     Atrial fibrillation Brother     Other - See Comments Brother         cardiac autoimmune deficiency    Irritable Bowel Syndrome Maternal Aunt      Past Surgical History:   Procedure Laterality Date    ARTHROSCOPY KNEE WITH PATELLAR REALIGNMENT Left 11/13/2020    Procedure:  "Knee Arthroscopy, Medial Patello-femoral Ligament Reconstruction with Allograft, Distal Tibial Tubercle Osteotomy, Lateral Retinacular Lengthening;  Surgeon: Sourav Keating MD;  Location: [facility] OR    ARTHROTOMY TIBIAL TUBERCLE SHIFT Left 11/13/2020    Procedure: tibial tubercle Osteotomy ;  Surgeon: Sourav Keating MD;  Location: [facility] OR    COLONOSCOPY N/A 10/07/2020    Procedure: COLONOSCOPY, WITH POLYPECTOMY AND BIOPSY;  Surgeon: Donell Holland MD;  Location: [facility] OR    deviated septum  2018    OPEN REDUCTION INTERNAL FIXATION RODDING INTRAMEDULLARY TIBIA Left 11/13/2020    Procedure: plus derotation tibial osteotomy;  Surgeon: Sourav Keating MD;  Location: [facility] OR     Allergies   Allergen Reactions    Bee Venom Anaphylaxis and Other (See Comments)     Swelling  Large local reactions/ swelling      Fruit Acid Concentrate [Fruit Extracts] Swelling     Lips swell and crust over little bit- tongue gets numb    Liquid Adhesive Dermatitis     And band aid      Monosodium Glutamate Hives     VITALS:  Vitals:    05/08/24 1354   BP: 121/83   BP Location: Left arm   Patient Position: Sitting   Cuff Size: Adult Large   Pulse: 71   Resp: 18   Temp: 98.7  F (37.1  C)   TempSrc: Tympanic   SpO2: 98%   Weight: 124.4 kg (274 lb 3.2 oz)   Height: 1.956 m (6' 5\")     Estimated body mass index is 32.52 kg/m  as calculated from the following:    Height as of this encounter: 1.956 m (6' 5\").    Weight as of this encounter: 124.4 kg (274 lb 3.2 oz).  Wt Readings from Last 3 Encounters:   05/08/24 124.4 kg (274 lb 3.2 oz)   03/14/24 111.4 kg (245 lb 11.2 oz)   09/02/22 140.2 kg (309 lb)     PHYSICAL EXAM:  Constitutional:  In NAD, alert and oriented  Neck: no cervical or axillary adenopathy  Chest wall:  no focal costochondral tenderness, the pectoral muscle is mildly tender  Cardiac:  S1 S2   Lungs: Clear   Psychiatric:  Mood and behavior are appropriate, thinking is clear.     DECISION TO OBTAIN OLD RECORDS " AND/OR OBTAIN HISTORY FROM SOMEONE OTHER THAN PATIENT, AND/OR ACCESSING CARE EVERYWHERE):  1  reviewed psychiatric hospitalization     REVIEW AND SUMMARIZATION OF OLD RECORDS, AND/OR OBTAINING HISTORY FROM SOMEONE OTHER THAN PATIENT, AND/OR DISCUSSION OF CASE WITH ANOTHER HEALTH CARE PROVIDER:  2 reviewed old health notes    REVIEW AND/OR ORDER OF OF CLINICAL LAB TESTS: 1  0.    REVIEW AND/OR ORDER OF RADIOLOGY TESTS: 1 0.    REVIEW AND/OR ORDER OF MEDICAL TESTS (EKG/ECHO/COLONOSCOPY/EGD): 1  0    INDEPENDENT  VISUALIZATION OF IMAGE, TRACING, OR SPECIMEN ITSELF (2 EACH):  0     TOTAL: 3    Current Outpatient Medications   Medication Sig Dispense Refill    acetaminophen (TYLENOL) 325 MG tablet Take 1-2 tablets (325-650 mg) by mouth every 4 hours as needed for mild pain 120 tablet 0    cholecalciferol (VITAMIN D3) 1250 mcg (27264 units) capsule Take 1 capsule (1,250 mcg) by mouth every 7 days 4 capsule 0    melatonin 3 MG tablet Take 1 tablet (3 mg) by mouth at bedtime 30 tablet 0    multivitamin, therapeutic (THERA-VIT) TABS tablet Take 1 tablet by mouth daily 30 tablet 0    paliperidone (INVEGA SUSTENNA) 156 MG/ML RIGO injection Inject 1 mL (156 mg) into the muscle every 28 days 1 mL 0    paliperidone ER (INVEGA) 9 MG 24 hr tablet Take 1 tablet (9 mg) by mouth daily 5 tablet 0    polyethylene glycol (MIRALAX) 17 GM/Dose powder Take 17 g by mouth daily as needed for constipation 510 g 0    senna-docusate (SENOKOT-S/PERICOLACE) 8.6-50 MG tablet Take 1-2 tablets by mouth 2 times daily 120 tablet 0    paliperidone (INVEGA SUSTENNA) 156 MG/ML RIGO injection Inject 1 mL (156 mg) into the muscle once for 1 dose 1 mL 0     Hasmukh Purcell MD  Internal Medicine  [facility]

## 2024-06-13 ENCOUNTER — VIRTUAL VISIT (OUTPATIENT)
Dept: INTERNAL MEDICINE | Facility: CLINIC | Age: 34
End: 2024-06-13
Payer: COMMERCIAL

## 2024-06-13 DIAGNOSIS — M54.50 LOW BACK PAIN WITHOUT SCIATICA, UNSPECIFIED BACK PAIN LATERALITY, UNSPECIFIED CHRONICITY: Primary | ICD-10-CM

## 2024-06-13 DIAGNOSIS — K58.0 IRRITABLE BOWEL SYNDROME WITH DIARRHEA: ICD-10-CM

## 2024-06-13 PROCEDURE — 99213 OFFICE O/P EST LOW 20 MIN: CPT | Mod: 95 | Performed by: INTERNAL MEDICINE

## 2024-06-13 NOTE — PROGRESS NOTES
"Higinio is a 33 year old who is being evaluated via a billable video visit.    f the video visit is dropped, the invitation should be resent by: Text to cell phone: [phone]  Will anyone else be joining your video visit? No    Assessment & Plan     Low back pain   Can't exclude him from community service for back arthritis. Xray does not support significant DJD and likely his symptoms are related to poor conditioning.      Irritable bowel syndrome with diarrhea  A nutrition consultation is not likely to beneficial.  He has understood that he should avoid fiber in the diet, and we discussed that moderate fiber in the diet would likely be beneficial.  I will prescribe him metamucil as well.      Nicotine/Tobacco Cessation  He reports that he has been smoking cigarettes. He started smoking about 23 years ago. He has a 5.9 pack-year smoking history. He has never used smokeless tobacco.    BMI  Estimated body mass index is 32.52 kg/m  as calculated from the following:    Height as of 5/8/24: 1.956 m (6' 5\").    Weight as of 5/8/24: 124.4 kg (274 lb 3.2 oz).     Full up next scheduled visit.     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  He feels his back arthritis should excuse him from this.  Also, he wonders about having nutrition consult due to his irritable bowel syndrome.  He is under the notion that he should be on a low fiber diet.          6/13/2024     7:04 AM   Additional Questions   Roomed by ROBERTO Ward         6/13/2024     7:04 AM   Patient Reported Additional Medications   Patient reports taking the following new medications alone     Video Start Time: 8:16 AM    History of Present Illness       Reason for visit:  Referral Requests    He eats 0-1 " servings of fruits and vegetables daily.He consumes 0 sweetened beverage(s) daily.   He is taking medications regularly.     Objective    Alert and oriented in NAD    Video-Visit Details    Type of service:  Video Visit   Video End Time:8:28 AM  Originating Location (pt. Location): Home  Distant Location (provider location):  On-site  Platform used for Video Visit: Fartun    Signed Electronically by: Hasmukh Purcell MD

## 2024-06-16 ENCOUNTER — HEALTH MAINTENANCE LETTER (OUTPATIENT)
Age: 34
End: 2024-06-16

## 2024-08-21 ENCOUNTER — TELEPHONE (OUTPATIENT)
Dept: INTERNAL MEDICINE | Facility: CLINIC | Age: 34
End: 2024-08-21
Payer: COMMERCIAL

## 2024-08-21 NOTE — TELEPHONE ENCOUNTER
"Patient seen by Dr. Purcell 6/13/24, per note from visit \"A nutrition consultation is not likely to beneficial. He has understood that he should avoid fiber in the diet, and we discussed that moderate fiber in the diet would likely be beneficial. I will prescribe him metamucil as well.\"    Received phone call from Casey, nurse at [facility]. She states Higinio is refusing to take the metamucil. She states since he is not taking this medication, they will not take him off of his commitment.     Spoke with Higinio who states that the metamucil makes it more difficult to have a bowel movement. He does not want to take this.     Patient scheduled for telephone visit with Dr. Chaidez 8/22/24 as Dr. Purcell is out of the office. Plan to discuss metamucil and address what is needed for commitment.     SEVERIANO Norton   [facility]  "

## 2024-08-22 ENCOUNTER — VIRTUAL VISIT (OUTPATIENT)
Dept: INTERNAL MEDICINE | Facility: CLINIC | Age: 34
End: 2024-08-22
Payer: COMMERCIAL

## 2024-08-22 DIAGNOSIS — F25.9 SCHIZOPHRENIA, SCHIZOAFFECTIVE, CHRONIC WITH ACUTE EXACERBATION (H): ICD-10-CM

## 2024-08-22 DIAGNOSIS — K58.2 IRRITABLE BOWEL SYNDROME WITH BOTH CONSTIPATION AND DIARRHEA: Primary | ICD-10-CM

## 2024-08-22 PROBLEM — Z86.59 HISTORY OF SUICIDAL IDEATION: Status: ACTIVE | Noted: 2019-09-16

## 2024-08-22 PROCEDURE — 99442 PR PHYSICIAN TELEPHONE EVALUATION 11-20 MIN: CPT | Mod: 93 | Performed by: INTERNAL MEDICINE

## 2024-08-22 ASSESSMENT — ENCOUNTER SYMPTOMS: ABDOMINAL PAIN: 1

## 2024-08-22 NOTE — PROGRESS NOTES
Higinio is a 33 year old who is being evaluated via a billable telephone visit.    What phone number would you like to be contacted at? [phone]   How would you like to obtain your AVS? MyChart  Originating Location (pt. Location): Home  {PROVIDER LOCATION On-site should be selected for visits conducted from your clinic location or adjoining [facility] hospital, academic office, or other nearby [facility] building. Off-site should be selected for all other provider locations, including home:372704}  Distant Location (provider location):  Off-site    {PROVIDER CHARTING PREFERENCE:777015}    Subjective   Higinio is a 33 year old, presenting for the following health issues:  Abdominal Pain (Pt reports ongoing IBS Sx's - he was unable to get Senna & Metamucil due to insurance coverage )  {(!) Visit Details have not yet been documented.  Please enter Visit Details and then use this list to pull in documentation. (Optional):142800}  Abdominal Pain     History of Present Illness       Reason for visit:  Medication follow up and IBS Symtoms   He is taking medications regularly.       {SUPERLIST (Optional):116278}  {additonal problems for provider to add (Optional):597563}    {ROS Picklists (Optional):831053}      Objective           Vitals:  No vitals were obtained today due to virtual visit.    Physical Exam   General: Alert and no distress //Respiratory: No audible wheeze, cough, or shortness of breath // Psychiatric:  Appropriate affect, tone, and pace of words      {Diagnostic Test Results (Optional):583921}      Phone call duration: *** minutes  Signed Electronically by: Topher Chaidez MD  {Email feedback regarding this note to [email or web address]   :129918}

## 2024-08-22 NOTE — PROGRESS NOTES
OFFICE VISIT--Phone    Higinio is a 33 year old male being evaluated via a billable phone visit, and would like to be contacted via the following  Home number [phone] (home)    ASSESSMENT and PLAN:  1. Irritable bowel syndrome with both constipation and diarrhea  Okay to no longer take senna/Colace and Metamucil.  Encouraged use of MiraLAX    2.  Schizophrenia.  On injectable Invega and doing well.  Court commitment proceedings discussed     Patient Instructions   Medicine list clarified.  Discontinue duplicates and oral Invega    Continue monthly injectable Invega    Discontinue psyllium capsule    Discontinue senna due to insurance    Encouraged use of MiraLAX currently written as needed    Other medicines clarified            Return if symptoms worsen or fail to improve, for using a phone visit.       CHIEF COMPLAINT:  Chief Complaint   Patient presents with     Abdominal Pain     Pt reports ongoing IBS Sx's - he was unable to get Senna & Metamucil due to insurance coverage        HISTORY OF PRESENT ILLNESS:  Higinio is a 33 year old male contacting the clinic today via phone for clarification of medication.  Undergoing court proceedings for commitment.  Living in [facility] house.  Senna listed on chart but insurance will not cover.  Metamucil causing problems so not taking and this is listed as noncompliance.  Would like these removed.  Bowel movements 3-4 times per week.  Has MiraLAX listed as needed but does not use it since he was last in the hospital.  Discussed options for further treatment or use of MiraLAX more frequently    Invega orally was changed to Invega monthly injections.  He thinks this makes him a little more constipated but otherwise feels well    Other medicines reviewed and taking.  No other issues    Abdominal Pain     History of Present Illness       Reason for visit:  Medication follow up and IBS Symtoms   He is taking medications regularly.    REVIEW OF SYSTEMS:  Sleeping well    Today's  "PHQ-2 Score:       1/5/2021     3:22 PM   PHQ-2 ( 1999 [employer])   Q1: Little interest or pleasure in doing things 0   Q2: Feeling down, depressed or hopeless 0   PHQ-2 Score 0   PHQ-2 Total Score (12-17 Years)- Positive if 3 or more points; Administer PHQ-A if positive 0       PFSH:  Social History     Social History Narrative    Living in a group home.  Has worked at [employer].  Has Mother in [place].         TOBACCO USE:  History   Smoking Status     Some Days     Types: Cigarettes   Smokeless Tobacco     Never       VITALS:  There were no vitals filed for this visit.  There were no vitals taken for this visit.   Estimated body mass index is 32.52 kg/m  as calculated from the following:    Height as of 5/8/24: 1.956 m (6' 5\").    Weight as of 5/8/24: 124.4 kg (274 lb 3.2 oz).    PHYSICAL EXAM:  (observations via Phone)  Alert and oriented    MEDICATIONS  Current Outpatient Medications   Medication Sig Dispense Refill     acetaminophen (TYLENOL) 325 MG tablet Take 1-2 tablets (325-650 mg) by mouth every 4 hours as needed for mild pain 120 tablet 0     cholecalciferol (VITAMIN D3) 1250 mcg (48060 units) capsule Take 1 capsule (1,250 mcg) by mouth every 7 days 4 capsule 0     melatonin 3 MG tablet Take 1 tablet (3 mg) by mouth at bedtime 30 tablet 0     multivitamin, therapeutic (THERA-VIT) TABS tablet Take 1 tablet by mouth daily 30 tablet 0     paliperidone (INVEGA SUSTENNA) 156 MG/ML RIGO injection Inject 1 mL (156 mg) into the muscle every 28 days 1 mL 0     polyethylene glycol (MIRALAX) 17 GM/Dose powder Take 17 g by mouth daily as needed for constipation 510 g 0       Notes summarized:   Labs, x-rays, cardiology, GI tests reviewed:   Recent Labs   Lab Test 03/15/24  0741 02/28/24  1849 02/20/24  0744   HGB 16.4 16.7  --    WBC 5.0 6.1  --     141  --    POTASSIUM 3.9 3.9  --    CR 0.86 0.80  --    A1C  --   --  4.8   TSH  --   --  2.08   VITDT  --   --  10*     Lab Results   Component Value Date    " [id number] Negative 02/19/2024    [id number] Not Detected 10/03/2020     Lab Results   Component Value Date    CHOL 135 02/20/2024     New orders: No orders of the defined types were placed in this encounter.      Independent review of:  Patient would like to receive their AVS by Edufii    Phone-Visit Details  Type of service:  Phone Visit      6/13/2024     7:04 AM   Additional Questions   Roomed by ROBERTO Ward     Patient has given verbal consent to a Phone visit?  Yes  How would you like to obtain your AVS?  Edufii  Will anyone else be joining your phone visit, giving supplemental history? No    Originating location (pt location):  [facility]    Distant Location (provider location):  Off-site    Phone Start Time: 9:34 AM  Phone End time:  9:43 AM  Conversation plus orders: 9 minutes  Dictation time:  3 minutes    The visit lasted a total of 11 minutes     Topher Chaidez MD  Internal Medicine  [facility]

## 2024-08-22 NOTE — PATIENT INSTRUCTIONS
Medicine list clarified.  Discontinue duplicates and oral Invega    Continue monthly injectable Invega    Discontinue psyllium capsule    Discontinue senna due to insurance    Encouraged use of MiraLAX currently written as needed    Other medicines clarified

## 2024-10-22 ENCOUNTER — LAB (OUTPATIENT)
Dept: LAB | Facility: CLINIC | Age: 34
End: 2024-10-22
Payer: COMMERCIAL

## 2024-10-22 DIAGNOSIS — F43.10 POSTTRAUMATIC STRESS DISORDER: ICD-10-CM

## 2024-10-22 DIAGNOSIS — F25.0 SCHIZOAFFECTIVE DISORDER, BIPOLAR TYPE (H): Primary | ICD-10-CM

## 2024-10-22 LAB
ANION GAP SERPL CALCULATED.3IONS-SCNC: 11 MMOL/L (ref 7–15)
BUN SERPL-MCNC: 12.4 MG/DL (ref 6–20)
CALCIUM SERPL-MCNC: 10 MG/DL (ref 8.8–10.4)
CHLORIDE SERPL-SCNC: 104 MMOL/L (ref 98–107)
CHOLEST SERPL-MCNC: 193 MG/DL
CREAT SERPL-MCNC: 0.94 MG/DL (ref 0.67–1.17)
EGFRCR SERPLBLD CKD-EPI 2021: >90 ML/MIN/1.73M2
EST. AVERAGE GLUCOSE BLD GHB EST-MCNC: 114 MG/DL
FASTING STATUS PATIENT QL REPORTED: NO
FASTING STATUS PATIENT QL REPORTED: NO
GLUCOSE SERPL-MCNC: 104 MG/DL (ref 70–99)
HBA1C MFR BLD: 5.6 % (ref 0–5.6)
HCO3 SERPL-SCNC: 26 MMOL/L (ref 22–29)
HDLC SERPL-MCNC: 38 MG/DL
LDLC SERPL CALC-MCNC: 126 MG/DL
NONHDLC SERPL-MCNC: 155 MG/DL
POTASSIUM SERPL-SCNC: 4.1 MMOL/L (ref 3.4–5.3)
SODIUM SERPL-SCNC: 141 MMOL/L (ref 135–145)
TRIGL SERPL-MCNC: 146 MG/DL

## 2024-10-22 PROCEDURE — 80048 BASIC METABOLIC PNL TOTAL CA: CPT

## 2024-10-22 PROCEDURE — 83036 HEMOGLOBIN GLYCOSYLATED A1C: CPT

## 2024-10-22 PROCEDURE — 80061 LIPID PANEL: CPT

## 2024-10-22 PROCEDURE — 36415 COLL VENOUS BLD VENIPUNCTURE: CPT

## 2025-02-05 ENCOUNTER — TELEPHONE (OUTPATIENT)
Dept: INTERNAL MEDICINE | Facility: CLINIC | Age: 35
End: 2025-02-05
Payer: COMMERCIAL

## 2025-02-05 NOTE — TELEPHONE ENCOUNTER
General Call      Reason for Call:  Patient discharged from [facility] on 2/1/2025    Date of last appointment with provider: 8/22/2024 (Kaylynn)    Could we send this information to you in [facility] or would you prefer to receive a phone call?:   Janeth would prefer a phone call     Okay to leave a detailed message?: Yes at Other phone number:  [phone]

## 2025-02-06 NOTE — TELEPHONE ENCOUNTER
Spoke briefly with Higinio. He has a provider he is following up with but could not recall name at this time and he was not home. Advised to follow up and contact [place] with additional questions or concerns.

## 2025-04-11 NOTE — TELEPHONE ENCOUNTER
Patient is scheduled for colonoscopy with Dr. Holland    Spoke with: patient    Date of Procedure: 11-4-20    Location: asc    Sedation Type mac    Informed patient they will need an adult  yes    Informed Patient of COVID Test Requirement yes    Preferred Pharmacy for Pre Prescription [facility] Nurse will call to complete assessment yes    Additional comments: none  
hyperglycemia

## 2025-06-21 ENCOUNTER — HEALTH MAINTENANCE LETTER (OUTPATIENT)
Age: 35
End: 2025-06-21

## (undated) DEVICE — K-WIRE 3 X 285MM

## (undated) DEVICE — COVER CAMERA IN-LIGHT DISP LT-C02

## (undated) DEVICE — SU ETHIBOND 1 CT-1 30" X425H

## (undated) DEVICE — STRAP KNEE/BODY 31143004

## (undated) DEVICE — SU VICRYL 2-0 CT-2 27" UND J269H

## (undated) DEVICE — SOL NACL 0.9% IRRIG 1000ML BOTTLE 2F7124

## (undated) DEVICE — TUBING SUCTION 12"X1/4" N612

## (undated) DEVICE — SU VICRYL 1 CT-2 27" J335H

## (undated) DEVICE — DRSG TEGADERM 4X4 3/4" 1626W

## (undated) DEVICE — Device

## (undated) DEVICE — LINEN ORTHO PACK 5446

## (undated) DEVICE — SYR 10ML FINGER CONTROL W/O NDL 309695

## (undated) DEVICE — GLOVE PROTEXIS BLUE W/NEU-THERA 8.0  2D73EB80

## (undated) DEVICE — ADH SKIN CLOSURE PREMIERPRO EXOFIN 1.0ML 3470

## (undated) DEVICE — SU VICRYL 0 CT-2 27" UND J270H

## (undated) DEVICE — SPONGE RAY-TEC 4X8" 7318

## (undated) DEVICE — SU VICRYL 0 CT-2 27" J334H

## (undated) DEVICE — ESU GROUND PAD ADULT W/CORD E7507

## (undated) DEVICE — DRSG TEGADERM 4X10" 1627

## (undated) DEVICE — ESU PENCIL W/SMOKE EVAC NEPTUNE STRYKER 0703-046-000

## (undated) DEVICE — SU VICRYL 0 CT-1 3X27" J430T

## (undated) DEVICE — GLOVE PROTEXIS POWDER FREE 8.0 ORTHOPEDIC 2D73ET80

## (undated) DEVICE — GLOVE PROTEXIS MICRO 6.5  2D73PM65

## (undated) DEVICE — SUCTION MANIFOLD DORNOCH ULTRA CART UL-CL500

## (undated) DEVICE — ENDO SNARE EXACTO COLD 9MM LOOP 2.4MMX230CM 00711115

## (undated) DEVICE — WIRE GUIDE STRK BALL TIP 3X800MM 1806-0080S

## (undated) DEVICE — BNDG ELASTIC 6" DBL LENGTH UNSTERILE 6611-16

## (undated) DEVICE — GLOVE GAMMEX NEOPRENE ULTRA SZ 6.5 LF 8513

## (undated) DEVICE — SOL WATER IRRIG 1000ML BOTTLE 2F7114

## (undated) DEVICE — SU PDS II 1 CTX 36" Z371T

## (undated) DEVICE — DRILL BIT STRK 2.5X125MM GOLD 700347

## (undated) DEVICE — DRAPE C-ARMOR 5 SIDED 5523

## (undated) DEVICE — SU ETHILON 3-0 FS-1 18" 663G

## (undated) DEVICE — ENDO TRAP POLYP E-TRAP 00711099

## (undated) DEVICE — SU VICRYL 1 CT-1 27" UND J261H

## (undated) DEVICE — SU MONOCRYL 3-0 PS-2 18" UND Y497G

## (undated) DEVICE — PACK ACL SUPPLEMENT STD

## (undated) DEVICE — PREP CHLORAPREP 26ML TINTED HI-LITE ORANGE 930815

## (undated) DEVICE — LABEL MEDICATION SYSTEM 3303-P

## (undated) DEVICE — SPONGE LAP 18X18" X8435

## (undated) DEVICE — TUBING ARTHROSCOPY PUMP ARTHREX AR-6410

## (undated) DEVICE — SUCTION MANIFOLD NEPTUNE 2 SYS 1 PORT 702-025-000

## (undated) DEVICE — LINEN TOWEL PACK X5 5464

## (undated) DEVICE — GLOVE PROTEXIS W/NEU-THERA 8.0  2D73TE80

## (undated) DEVICE — SU NDL MCGOWAN 1/2 1859-6D

## (undated) DEVICE — ENDO FORCEP BX CAPTURA PRO SPIKE G50696

## (undated) DEVICE — SPECIMEN CONTAINER 3OZ W/FORMALIN 59901

## (undated) DEVICE — GOWN IMPERVIOUS 2XL BLUE

## (undated) DEVICE — NDL 22GA 1.5"

## (undated) DEVICE — PIN STEINMAN 2.0MM 5/64X9" SGL END TROCAR

## (undated) DEVICE — GLOVE PROTEXIS W/NEU-THERA 7.5  2D73TE75

## (undated) DEVICE — TOURNIQUET CUFF 34" REPRO BROWN 60-7070-106

## (undated) DEVICE — BLADE SAW SAGITTAL STRK 34.5X11.5X0.64MM 2108-148-000S8

## (undated) DEVICE — BNDG ESMARK 6" STERILE LF 820-3612

## (undated) DEVICE — KIT ENDO TURNOVER/PROCEDURE CARRY-ON 101822

## (undated) DEVICE — SOL NACL 0.9% IRRIG 3000ML BAG 2B7477

## (undated) DEVICE — LINEN BACK PACK 5440

## (undated) DEVICE — SU VICRYL 2-0 CT-1 27" UND J259H

## (undated) DEVICE — GOWN XLG DISP 9545

## (undated) DEVICE — DRAPE TIBURON TOP SHEET 100X60" 29352

## (undated) DEVICE — DRAPE C-ARM W/STRAPS 42X72" 07-CA104

## (undated) DEVICE — DRAPE IOBAN INCISE 10X20CM 6635

## (undated) RX ORDER — HYDROMORPHONE HYDROCHLORIDE 1 MG/ML
INJECTION, SOLUTION INTRAMUSCULAR; INTRAVENOUS; SUBCUTANEOUS
Status: DISPENSED
Start: 2020-11-13

## (undated) RX ORDER — ONDANSETRON 2 MG/ML
INJECTION INTRAMUSCULAR; INTRAVENOUS
Status: DISPENSED
Start: 2020-11-13

## (undated) RX ORDER — PROPOFOL 10 MG/ML
INJECTION, EMULSION INTRAVENOUS
Status: DISPENSED
Start: 2020-11-13

## (undated) RX ORDER — FENTANYL CITRATE 50 UG/ML
INJECTION, SOLUTION INTRAMUSCULAR; INTRAVENOUS
Status: DISPENSED
Start: 2020-11-13

## (undated) RX ORDER — LIDOCAINE HYDROCHLORIDE 20 MG/ML
INJECTION, SOLUTION EPIDURAL; INFILTRATION; INTRACAUDAL; PERINEURAL
Status: DISPENSED
Start: 2020-11-13

## (undated) RX ORDER — DEXAMETHASONE SODIUM PHOSPHATE 4 MG/ML
INJECTION, SOLUTION INTRA-ARTICULAR; INTRALESIONAL; INTRAMUSCULAR; INTRAVENOUS; SOFT TISSUE
Status: DISPENSED
Start: 2020-11-13

## (undated) RX ORDER — NEOSTIGMINE METHYLSULFATE 1 MG/ML
VIAL (ML) INJECTION
Status: DISPENSED
Start: 2020-11-13

## (undated) RX ORDER — KETOROLAC TROMETHAMINE 30 MG/ML
INJECTION, SOLUTION INTRAMUSCULAR; INTRAVENOUS
Status: DISPENSED
Start: 2020-11-13

## (undated) RX ORDER — ACETAMINOPHEN 325 MG/1
TABLET ORAL
Status: DISPENSED
Start: 2020-11-13

## (undated) RX ORDER — CEFAZOLIN SODIUM 1 G/3ML
INJECTION, POWDER, FOR SOLUTION INTRAMUSCULAR; INTRAVENOUS
Status: DISPENSED
Start: 2020-11-13

## (undated) RX ORDER — FENTANYL CITRATE-0.9 % NACL/PF 10 MCG/ML
PLASTIC BAG, INJECTION (ML) INTRAVENOUS
Status: DISPENSED
Start: 2020-11-13

## (undated) RX ORDER — GLYCOPYRROLATE 0.2 MG/ML
INJECTION INTRAMUSCULAR; INTRAVENOUS
Status: DISPENSED
Start: 2020-11-13

## (undated) RX ORDER — CEFAZOLIN SODIUM IN 0.9 % NACL 3 G/100 ML
INTRAVENOUS SOLUTION, PIGGYBACK (ML) INTRAVENOUS
Status: DISPENSED
Start: 2020-11-13

## (undated) RX ORDER — SCOLOPAMINE TRANSDERMAL SYSTEM 1 MG/1
PATCH, EXTENDED RELEASE TRANSDERMAL
Status: DISPENSED
Start: 2020-11-13

## (undated) RX ORDER — BUPIVACAINE HYDROCHLORIDE 2.5 MG/ML
INJECTION, SOLUTION INFILTRATION; PERINEURAL
Status: DISPENSED
Start: 2020-11-13

## (undated) RX ORDER — EPHEDRINE SULFATE 50 MG/ML
INJECTION, SOLUTION INTRAMUSCULAR; INTRAVENOUS; SUBCUTANEOUS
Status: DISPENSED
Start: 2020-11-13

## (undated) RX ORDER — CALCIUM CHLORIDE 100 MG/ML
INJECTION INTRAVENOUS; INTRAVENTRICULAR
Status: DISPENSED
Start: 2020-11-13

## (undated) RX ORDER — SODIUM CHLORIDE, SODIUM LACTATE, POTASSIUM CHLORIDE, CALCIUM CHLORIDE 600; 310; 30; 20 MG/100ML; MG/100ML; MG/100ML; MG/100ML
INJECTION, SOLUTION INTRAVENOUS
Status: DISPENSED
Start: 2020-11-13

## (undated) RX ORDER — GLYCOPYRROLATE 0.2 MG/ML
INJECTION, SOLUTION INTRAMUSCULAR; INTRAVENOUS
Status: DISPENSED
Start: 2020-11-13

## (undated) RX ORDER — TRANEXAMIC ACID 650 MG/1
TABLET ORAL
Status: DISPENSED
Start: 2020-11-13